# Patient Record
Sex: FEMALE | Race: BLACK OR AFRICAN AMERICAN | Employment: FULL TIME | URBAN - METROPOLITAN AREA
[De-identification: names, ages, dates, MRNs, and addresses within clinical notes are randomized per-mention and may not be internally consistent; named-entity substitution may affect disease eponyms.]

---

## 2017-01-14 ENCOUNTER — OFFICE VISIT (OUTPATIENT)
Dept: OPHTHALMOLOGY | Facility: CLINIC | Age: 57
End: 2017-01-14

## 2017-01-14 DIAGNOSIS — H40.1132 PRIMARY OPEN ANGLE GLAUCOMA OF BOTH EYES, MODERATE STAGE: Primary | ICD-10-CM

## 2017-01-14 DIAGNOSIS — H25.13 AGE-RELATED NUCLEAR CATARACT OF BOTH EYES: ICD-10-CM

## 2017-01-14 DIAGNOSIS — H43.393 FLOATER, VITREOUS, BILATERAL: ICD-10-CM

## 2017-01-14 PROCEDURE — 99212 OFFICE O/P EST SF 10 MIN: CPT | Performed by: OPHTHALMOLOGY

## 2017-01-14 PROCEDURE — 99243 OFF/OP CNSLTJ NEW/EST LOW 30: CPT | Performed by: OPHTHALMOLOGY

## 2017-01-14 PROCEDURE — 92250 FUNDUS PHOTOGRAPHY W/I&R: CPT | Performed by: OPHTHALMOLOGY

## 2017-01-14 PROCEDURE — 92015 DETERMINE REFRACTIVE STATE: CPT | Performed by: OPHTHALMOLOGY

## 2017-01-14 NOTE — PROGRESS NOTES
Nakul Granados is a 60year old female. HPI:     HPI     Consult    Additional comments: Patient is referred by Dr. Pat Duque   Patient is here for a complete eye exam and photos. She is taking Latanoprost OU qhs.   Patient states that s Smokeless Status: Never Used                        Alcohol Use: No                 Comment: None.        Medications:    Current Outpatient Prescriptions:  amoxicillin 875 MG Oral Tab Take 1 tablet (875 mg total) by mouth 2 (two) times da reaction(s): AZITHROMYCIN  Celecoxib                   Comment:Other reaction(s): CELECOXIB  Erythromycin            Pain  Sulfa Antibiotics           Comment:Other reaction(s): SULFA (SULFONAMIDE ANTIBIOTICS)    ROS:     ROS     Positive for: Cardiovascul Refraction (Auto)      Sphere Cylinder Axis Dist Add Near   Right -1.25 +1.00 130      Left -0.75 +0.50 180            Manifest Refraction #2      Sphere Cylinder Axis Dist Add Near   Right -1.25 +1.00 130 20/25 +2.50 20/20   Left -1.00 +0.50 180 20/20- +2

## 2017-01-14 NOTE — ASSESSMENT & PLAN NOTE
IOP is stable. Continue Latanoprost at bedtime in both eyes. Will have patient back in 4 months for a visual field, optic nerve scan and pressure check. Photos were taken today to document optic nerves.

## 2017-01-14 NOTE — PATIENT INSTRUCTIONS
Primary open angle glaucoma of both eyes  IOP is stable. Continue Latanoprost at bedtime in both eyes. Will have patient back in 4 months for a visual field, optic nerve scan and pressure check. Photos were taken today to document optic nerves.

## 2017-01-14 NOTE — ASSESSMENT & PLAN NOTE
Discussed early cataracts with patient. No treatment recommended at this time. New glasses today; update as needed. Discussed that new prescription is only a slight change.

## 2017-01-16 ENCOUNTER — TELEPHONE (OUTPATIENT)
Dept: INTERNAL MEDICINE CLINIC | Facility: CLINIC | Age: 57
End: 2017-01-16

## 2017-01-16 DIAGNOSIS — N28.9 RENAL LESION: Primary | ICD-10-CM

## 2017-02-08 ENCOUNTER — OFFICE VISIT (OUTPATIENT)
Dept: ORTHOPEDICS CLINIC | Facility: CLINIC | Age: 57
End: 2017-02-08

## 2017-02-08 DIAGNOSIS — M75.121 COMPLETE ROTATOR CUFF TEAR OR RUPTURE OF RIGHT SHOULDER, NOT SPECIFIED AS TRAUMATIC: Primary | ICD-10-CM

## 2017-02-08 PROCEDURE — 99213 OFFICE O/P EST LOW 20 MIN: CPT | Performed by: ORTHOPAEDIC SURGERY

## 2017-02-08 PROCEDURE — 99212 OFFICE O/P EST SF 10 MIN: CPT | Performed by: ORTHOPAEDIC SURGERY

## 2017-02-08 RX ORDER — DIAZEPAM 10 MG/1
10 TABLET ORAL ONCE AS NEEDED
Qty: 1 TABLET | Refills: 0 | Status: SHIPPED | OUTPATIENT
Start: 2017-02-08 | End: 2017-02-08

## 2017-02-08 RX ORDER — PREDNISONE 20 MG/1
20 TABLET ORAL DAILY
Qty: 5 TABLET | Refills: 0 | Status: SHIPPED | OUTPATIENT
Start: 2017-02-08 | End: 2017-02-13

## 2017-02-08 NOTE — PROGRESS NOTES
Chief Complaint: Right shoulder pain, mild left shoulder pain    NURSING INTAKE COMMENTS: Patient presents with:  Shoulder Pain: right -- rates pain 7/10 at this time. Pain is interfering with sleep. States she does have numbness and tingling in BUE.  Right Hypertension Mother    • Hypertension Sister    • Hypertension Brother    • Diabetes Neg    • Glaucoma Neg    • Macular degeneration Neg         Smoking Status: Never Smoker                      Smokeless Status: Never Used                        Alcohol U

## 2017-02-18 ENCOUNTER — HOSPITAL ENCOUNTER (OUTPATIENT)
Dept: MRI IMAGING | Age: 57
Discharge: HOME | End: 2017-02-18
Attending: ORTHOPAEDIC SURGERY
Payer: COMMERCIAL

## 2017-02-18 DIAGNOSIS — M75.121 COMPLETE ROTATOR CUFF TEAR OR RUPTURE OF RIGHT SHOULDER, NOT SPECIFIED AS TRAUMATIC: ICD-10-CM

## 2017-02-18 PROCEDURE — 73221 MRI JOINT UPR EXTREM W/O DYE: CPT

## 2017-03-22 ENCOUNTER — OFFICE VISIT (OUTPATIENT)
Dept: ORTHOPEDICS CLINIC | Facility: CLINIC | Age: 57
End: 2017-03-22

## 2017-03-22 DIAGNOSIS — M75.111 INCOMPLETE TEAR OF RIGHT ROTATOR CUFF: Primary | ICD-10-CM

## 2017-03-22 PROCEDURE — 99213 OFFICE O/P EST LOW 20 MIN: CPT | Performed by: ORTHOPAEDIC SURGERY

## 2017-03-22 PROCEDURE — 99212 OFFICE O/P EST SF 10 MIN: CPT | Performed by: ORTHOPAEDIC SURGERY

## 2017-03-22 RX ORDER — PREDNISONE 20 MG/1
TABLET ORAL
Refills: 0 | COMMUNITY
Start: 2017-02-08 | End: 2017-04-13 | Stop reason: ALTCHOICE

## 2017-03-22 RX ORDER — DIAZEPAM 10 MG/1
TABLET ORAL
Refills: 0 | COMMUNITY
Start: 2017-02-08 | End: 2017-03-22 | Stop reason: ALTCHOICE

## 2017-03-22 NOTE — PROGRESS NOTES
Patient returns for follow-up on her right shoulder. She recently had an MRI after failing reasonable conservative management.     I reviewed the MRI results with her in detail and it shows several areas of high-grade partial tearing of the supraspinatus a

## 2017-04-06 ENCOUNTER — TELEPHONE (OUTPATIENT)
Dept: INTERNAL MEDICINE CLINIC | Facility: CLINIC | Age: 57
End: 2017-04-06

## 2017-04-06 ENCOUNTER — TELEPHONE (OUTPATIENT)
Dept: OPHTHALMOLOGY | Facility: CLINIC | Age: 57
End: 2017-04-06

## 2017-04-06 DIAGNOSIS — H00.19 CHALAZION, UNSPECIFIED LATERALITY: Primary | ICD-10-CM

## 2017-04-06 NOTE — TELEPHONE ENCOUNTER
Actions Requested: expedited evening appt  Situation/Background   Problem: knot on top of right eye lid   Onset: > 2weeks   Associated Symptoms: tender to touch, slight drainage, film cover over eyes at time, denies blurred vision, H/A, afebrile, denies ph

## 2017-04-06 NOTE — TELEPHONE ENCOUNTER
\"Knot\" under right eyelid for 2 weeks with mucous. Tried warm compresses, but no improvement.  Appointment scheduled on 3/8/17 at 10:45/kp

## 2017-04-06 NOTE — TELEPHONE ENCOUNTER
Pt states she has a knot on her R eye, thought it was a stye but has not gone away in the past two weeks, pt is concerned, requesting appt for Saturday 4/8. Pls call thank you.

## 2017-04-06 NOTE — TELEPHONE ENCOUNTER
Pt states has knot on eye, thought was stye   Requesting appt with Dr Antoinette Zarate on Sat- no slots    Call transferred Fe/ROWDY

## 2017-04-08 ENCOUNTER — OFFICE VISIT (OUTPATIENT)
Dept: OPHTHALMOLOGY | Facility: CLINIC | Age: 57
End: 2017-04-08

## 2017-04-08 ENCOUNTER — HOSPITAL ENCOUNTER (OUTPATIENT)
Dept: ULTRASOUND IMAGING | Facility: HOSPITAL | Age: 57
Discharge: HOME | End: 2017-04-08
Attending: UROLOGY
Payer: COMMERCIAL

## 2017-04-08 DIAGNOSIS — Q61.02 MULTIPLE RENAL CYSTS: ICD-10-CM

## 2017-04-08 DIAGNOSIS — H00.11 CHALAZION OF RIGHT UPPER EYELID: Primary | ICD-10-CM

## 2017-04-08 DIAGNOSIS — H00.14 CHALAZION LEFT UPPER EYELID: ICD-10-CM

## 2017-04-08 PROCEDURE — 76770 US EXAM ABDO BACK WALL COMP: CPT

## 2017-04-08 PROCEDURE — 99242 OFF/OP CONSLTJ NEW/EST SF 20: CPT | Performed by: OPHTHALMOLOGY

## 2017-04-08 PROCEDURE — 99212 OFFICE O/P EST SF 10 MIN: CPT | Performed by: OPHTHALMOLOGY

## 2017-04-08 NOTE — ASSESSMENT & PLAN NOTE
Recommend aggressive warm compresses; he should use them 20-30 times per day. Information on chalazia given. Told patient that it is not big enough to excise at this time.   Discussed with patient that since is still in the early stages, it may be able t

## 2017-04-08 NOTE — PATIENT INSTRUCTIONS
Chalazion of right upper eyelid  Recommend aggressive warm compresses; he should use them 20-30 times per day. Information on chalazia given. Told patient that it is not big enough to excise at this time.   Discussed with patient that since is still in t 15 minutes, 3 to 4 times a day. This will reduce the swelling and soften the hardened oils blocking the duct. · Massage the area gently after applying the warm compress to help drain the chalazion.  Or follow the directions given by your healthcare provide instructions.

## 2017-04-08 NOTE — PROGRESS NOTES
Ciera Arguello is a 60year old female. HPI:     HPI     Consult    Additional comments: Consult per Dr. Milan Coronel           Comments   Pt complains of a \"knot\" on her RUL nasally with swelling x 2 weeks. She says it gets better and worse.   Pt has been Neg    • Macular degeneration Neg        Social History:   Smoking Status: Never Smoker                      Smokeless Status: Never Used                        Alcohol Use: No                 Comment: None.        Medications:    Current Outpatient Prescri CELECOXIB  Erythromycin            Pain  Sulfa Antibiotics           Comment:Other reaction(s): SULFA (SULFONAMIDE ANTIBIOTICS)    ROS:     ROS     Positive for: Eyes    Negative for: Constitutional, Gastrointestinal, Neurological, Skin, Genitourinary, Mus This Visit:    No prescriptions requested or ordered in this encounter     Follow up instructions:  Return in about 5 weeks (around 5/13/2017) for Visual field, OCT, IOP check (has appointment scheduled) .     4/8/2017  Scribed by: Sherri Bustamante MD

## 2017-04-08 NOTE — TELEPHONE ENCOUNTER
Completed an order for the patient to see ophthalmology. Please provide the patient the phone number so she can call for appointment. Call the patient and relay the information.  Thanks

## 2017-04-13 ENCOUNTER — OFFICE VISIT (OUTPATIENT)
Dept: INTERNAL MEDICINE CLINIC | Facility: CLINIC | Age: 57
End: 2017-04-13

## 2017-04-13 VITALS
WEIGHT: 202.81 LBS | DIASTOLIC BLOOD PRESSURE: 83 MMHG | HEART RATE: 73 BPM | TEMPERATURE: 98 F | SYSTOLIC BLOOD PRESSURE: 129 MMHG | BODY MASS INDEX: 30 KG/M2

## 2017-04-13 DIAGNOSIS — K21.9 GASTROESOPHAGEAL REFLUX DISEASE, ESOPHAGITIS PRESENCE NOT SPECIFIED: Primary | ICD-10-CM

## 2017-04-13 DIAGNOSIS — Z09 FOLLOW UP: ICD-10-CM

## 2017-04-13 DIAGNOSIS — I10 ESSENTIAL HYPERTENSION: ICD-10-CM

## 2017-04-13 DIAGNOSIS — H00.021 HORDEOLUM INTERNUM OF RIGHT UPPER EYELID: ICD-10-CM

## 2017-04-13 PROCEDURE — 99212 OFFICE O/P EST SF 10 MIN: CPT | Performed by: INTERNAL MEDICINE

## 2017-04-13 PROCEDURE — 99214 OFFICE O/P EST MOD 30 MIN: CPT | Performed by: INTERNAL MEDICINE

## 2017-04-13 RX ORDER — PANTOPRAZOLE SODIUM 40 MG/1
40 TABLET, DELAYED RELEASE ORAL
Qty: 90 TABLET | Refills: 3 | Status: SHIPPED | OUTPATIENT
Start: 2017-04-13 | End: 2018-05-21

## 2017-04-13 RX ORDER — AMLODIPINE BESYLATE 5 MG/1
TABLET ORAL
Qty: 90 TABLET | Refills: 1 | Status: SHIPPED | OUTPATIENT
Start: 2017-04-13 | End: 2017-09-05

## 2017-04-13 RX ORDER — AMITRIPTYLINE HYDROCHLORIDE 25 MG/1
TABLET, FILM COATED ORAL
Qty: 90 TABLET | Refills: 1 | Status: SHIPPED | OUTPATIENT
Start: 2017-04-13 | End: 2017-04-13

## 2017-04-13 RX ORDER — TOBRAMYCIN AND DEXAMETHASONE 3; 1 MG/ML; MG/ML
2 SUSPENSION/ DROPS OPHTHALMIC
Qty: 2.5 ML | Refills: 0 | Status: SHIPPED | OUTPATIENT
Start: 2017-04-13 | End: 2017-04-27 | Stop reason: ALTCHOICE

## 2017-04-13 NOTE — PROGRESS NOTES
HPI:    Patient ID: Manuel Dean is a 60year old female. Hypertension  This is a chronic problem. The current episode started more than 1 year ago. Condition status: stable.  Pertinent negatives include no anxiety, chest pain, peripheral edema or [facility] MAJOR [facility] W/O US      COLONOSCOPY  2009    OTHER SURGICAL HISTORY  2005,2007,2008    Comment LAPAROSCOPIC LYSIS OF ADHESION    OTHER SURGICAL HISTORY      Comment right foot bunionectomy    OTHER SURGICAL NIGHT Disp: 3 Bottle Rfl: 3   gabapentin 300 MG Oral Cap  Disp:  Rfl: 0   methocarbamol (ROBAXIN-750) 750 MG Oral Tab One to two four times a day as needed for muscle relaxation Disp: 40 tablet Rfl: 3   nystatin (MYCOSTATIN) 001718 UNIT/GM External Ointmen continue present medications : Amlodipine Besylate 5 mg.    3. (H00.021) Hordeolum internum of right upper eyelid  On physical exam, patient had an internal hordeolum of the right eye, upper lid.    Plan: I prescribed tobramycin-dexamethasone (TOBRADEX) 0.3 reflects my personal performance and is accurate and complete.   Livan Benavides MD, 4/13/2017, 6:25 PM

## 2017-04-18 ENCOUNTER — TELEPHONE (OUTPATIENT)
Dept: SURGERY | Facility: CLINIC | Age: 57
End: 2017-04-18

## 2017-04-18 ENCOUNTER — APPOINTMENT (OUTPATIENT)
Dept: LAB | Age: 57
End: 2017-04-18
Attending: UROLOGY
Payer: COMMERCIAL

## 2017-04-18 DIAGNOSIS — N39.0 URINARY TRACT INFECTION, SITE UNSPECIFIED: ICD-10-CM

## 2017-04-18 DIAGNOSIS — R31.9 HEMATURIA: ICD-10-CM

## 2017-04-18 DIAGNOSIS — R31.9 HEMATURIA: Primary | ICD-10-CM

## 2017-04-18 PROCEDURE — 81001 URINALYSIS AUTO W/SCOPE: CPT

## 2017-04-18 PROCEDURE — 87086 URINE CULTURE/COLONY COUNT: CPT

## 2017-04-18 NOTE — TELEPHONE ENCOUNTER
Advised patient of Dr. Magda Bella note. Patient verbalized understanding and stated that dropped off urine at the lab around 2:45pm today. I see the urine culture in progress ordered by Dr Luis Alberto Davis, but do not see the urinalysis as pending.       Called refere

## 2017-04-18 NOTE — TELEPHONE ENCOUNTER
See below. Returned pt's call and spoke with her. She states she is having some blood in her urine with some small clots and describes \"mild\"  burning with urination, urgency and frequency. States urine is bright red. Denies fever.  Advised her on submit

## 2017-04-18 NOTE — TELEPHONE ENCOUNTER
I called the patient on her cell phone but there was no answer. I left a message on the patient's voicemail that the order for urinalysis and urine culture has been completed and tomorrow or later today I will have the urinalysis available.   The urine cul

## 2017-04-18 NOTE — TELEPHONE ENCOUNTER
Patient states she is experiencing discomfort  And blood in urine since this morning. States there are some blood clots as well. Wanted to see Dr Zaina Bean or another Urologist today.  Let her know Dr Zaina Bean is in surgery for the rest of the day and unfortunat

## 2017-04-23 ENCOUNTER — APPOINTMENT (OUTPATIENT)
Dept: GENERAL RADIOLOGY | Facility: HOSPITAL | Age: 57
End: 2017-04-23
Attending: EMERGENCY MEDICINE
Payer: COMMERCIAL

## 2017-04-23 ENCOUNTER — APPOINTMENT (OUTPATIENT)
Dept: CT IMAGING | Facility: HOSPITAL | Age: 57
End: 2017-04-23
Attending: EMERGENCY MEDICINE
Payer: COMMERCIAL

## 2017-04-23 ENCOUNTER — HOSPITAL ENCOUNTER (EMERGENCY)
Facility: HOSPITAL | Age: 57
Discharge: HOME | End: 2017-04-23
Attending: EMERGENCY MEDICINE
Payer: COMMERCIAL

## 2017-04-23 VITALS
SYSTOLIC BLOOD PRESSURE: 137 MMHG | OXYGEN SATURATION: 99 % | DIASTOLIC BLOOD PRESSURE: 70 MMHG | HEIGHT: 68 IN | WEIGHT: 203 LBS | BODY MASS INDEX: 30.77 KG/M2 | TEMPERATURE: 98 F | HEART RATE: 78 BPM | RESPIRATION RATE: 16 BRPM

## 2017-04-23 DIAGNOSIS — R31.9 HEMATURIA: Primary | ICD-10-CM

## 2017-04-23 PROCEDURE — 85025 COMPLETE CBC W/AUTO DIFF WBC: CPT | Performed by: EMERGENCY MEDICINE

## 2017-04-23 PROCEDURE — 74176 CT ABD & PELVIS W/O CONTRAST: CPT

## 2017-04-23 PROCEDURE — 99284 EMERGENCY DEPT VISIT MOD MDM: CPT

## 2017-04-23 PROCEDURE — 81001 URINALYSIS AUTO W/SCOPE: CPT

## 2017-04-23 PROCEDURE — 96374 THER/PROPH/DIAG INJ IV PUSH: CPT

## 2017-04-23 PROCEDURE — 74000 XR ABDOMEN (KUB) (1 AP VIEW)  (CPT=74000): CPT

## 2017-04-23 PROCEDURE — 80048 BASIC METABOLIC PNL TOTAL CA: CPT | Performed by: EMERGENCY MEDICINE

## 2017-04-23 PROCEDURE — 87086 URINE CULTURE/COLONY COUNT: CPT

## 2017-04-23 PROCEDURE — 88108 CYTOPATH CONCENTRATE TECH: CPT | Performed by: EMERGENCY MEDICINE

## 2017-04-23 RX ORDER — HYDROMORPHONE HYDROCHLORIDE 1 MG/ML
0.5 INJECTION, SOLUTION INTRAMUSCULAR; INTRAVENOUS; SUBCUTANEOUS ONCE
Status: COMPLETED | OUTPATIENT
Start: 2017-04-23 | End: 2017-04-23

## 2017-04-23 RX ORDER — CIPROFLOXACIN 500 MG/1
500 TABLET, FILM COATED ORAL 2 TIMES DAILY
Qty: 14 TABLET | Refills: 0 | Status: SHIPPED | OUTPATIENT
Start: 2017-04-23 | End: 2017-04-27

## 2017-04-23 RX ORDER — TRAMADOL HYDROCHLORIDE 50 MG/1
TABLET ORAL EVERY 4 HOURS PRN
Qty: 15 TABLET | Refills: 0 | Status: SHIPPED | OUTPATIENT
Start: 2017-04-23 | End: 2017-09-16

## 2017-04-23 NOTE — ED PROVIDER NOTES
Patient Seen in: [facility] AND [facility] Emergency Department    History   Patient presents with:  Urinary Symptoms (urologic)    Stated Complaint: urinary complaint    HPI    66-year-old female with history of renal cysts which are followed by yearly imaging, PERFORMED  5/2015    COLONOSCOPY N/A 11/11/2016    Comment Procedure: COLONOSCOPY;  Surgeon: Endy Lambert MD;  Location: [facility] ENDOSCOPY       Medications :   Ciprofloxacin HCl 500 MG Oral Tab,  Take 1 tablet (500 mg total) by mouth 2 (two) ti as noted in HPI. Constitutional and vital signs reviewed. All other systems reviewed and negative except as noted above. PSFH elements reviewed from today and agreed except as otherwise stated in HPI.     Physical Exam       ED Triage Vitals   BP 0 within normal limits   CBC W/ DIFFERENTIAL - Abnormal; Notable for the following:     MCH 26.6 (*)     MPV 7.0 (*)     All other components within normal limits   CBC WITH DIFFERENTIAL WITH PLATELET    Narrative:      The following orders were created for p

## 2017-04-25 ENCOUNTER — TELEPHONE (OUTPATIENT)
Dept: INTERNAL MEDICINE CLINIC | Facility: CLINIC | Age: 57
End: 2017-04-25

## 2017-04-25 DIAGNOSIS — R19.7 DIARRHEA, UNSPECIFIED TYPE: Primary | ICD-10-CM

## 2017-04-25 NOTE — TELEPHONE ENCOUNTER
Urine culture was negative so far in 24 hours. Please call the patient and and tell her to stop the ciprofloxacin. I also put an order for C. difficile in the stool which is an infection the patient can get by taking antibiotics.  Call the patient and rel

## 2017-04-25 NOTE — TELEPHONE ENCOUNTER
Patient was called. LMTCB regarding current symptoms. Please transfer call to Triage RN at ext. [phone]. Thank you.

## 2017-04-25 NOTE — TELEPHONE ENCOUNTER
Per pt, she was in [facility] ER 04/23/2017 for stomach prob and they give her rx med Cipro but it makes her sick Sx: dizzy, light headed and diarrhea , pt made an appt with EG on Thursday but would like to know if she needs to continue to take this med?   Offered

## 2017-04-25 NOTE — TELEPHONE ENCOUNTER
Actions Requested: Tasked to Dr. Mau Cody for further advise and medical recommendations.    Situation/Background   Problem: Diarrhea and dizziness   Onset: Last night   Associated Symptoms:     History of Same:    Precipitated By:    Aggravating/Alleviating Fact

## 2017-04-27 ENCOUNTER — OFFICE VISIT (OUTPATIENT)
Dept: INTERNAL MEDICINE CLINIC | Facility: CLINIC | Age: 57
End: 2017-04-27

## 2017-04-27 ENCOUNTER — OFFICE VISIT (OUTPATIENT)
Dept: PODIATRY CLINIC | Facility: CLINIC | Age: 57
End: 2017-04-27

## 2017-04-27 VITALS — HEART RATE: 63 BPM | TEMPERATURE: 98 F | DIASTOLIC BLOOD PRESSURE: 83 MMHG | SYSTOLIC BLOOD PRESSURE: 146 MMHG

## 2017-04-27 DIAGNOSIS — M20.42 HAMMER TOE OF LEFT FOOT: Primary | ICD-10-CM

## 2017-04-27 DIAGNOSIS — N20.0 NEPHROLITHIASIS: Primary | ICD-10-CM

## 2017-04-27 PROCEDURE — 99213 OFFICE O/P EST LOW 20 MIN: CPT

## 2017-04-27 PROCEDURE — 99214 OFFICE O/P EST MOD 30 MIN: CPT | Performed by: INTERNAL MEDICINE

## 2017-04-27 PROCEDURE — 99212 OFFICE O/P EST SF 10 MIN: CPT | Performed by: INTERNAL MEDICINE

## 2017-04-27 RX ORDER — AMITRIPTYLINE HYDROCHLORIDE 25 MG/1
TABLET, FILM COATED ORAL
Refills: 0 | COMMUNITY
Start: 2017-04-13 | End: 2017-04-27

## 2017-04-27 NOTE — PROGRESS NOTES
HPI:    Patient ID: Jose Eduardo Vasques is a 60year old female. Foot Pain   The pain is present in the left foot and left toes. This is a chronic problem. The current episode started more than 1 year ago. There has been no history of extremity trauma.  The pr Comment right foot bunionectomy    OTHER SURGICAL HISTORY  11-14-14    Comment right superficial anterior peroneal nerve biopsy and right proximal thigh muscle biopsy.     UPPER GI ENDOSCOPY PERFORMED  5/2015    COLONOSCOPY N/A 11/11/2016    Comment Pr Ophthalmic Solution INSTILL 1 DROP INTO BOTH EYES EVERY NIGHT Disp: 3 Bottle Rfl: 3   gabapentin 300 MG Oral Cap  Disp:  Rfl: 0   methocarbamol (ROBAXIN-750) 750 MG Oral Tab One to two four times a day as needed for muscle relaxation Disp: 40 tablet Rfl: 3 Imaging & Referrals:  None       ID#[id number]    By signing my name below, Gavin Abraham,  attest that this documentation has been prepared under the direction and in the presence of Thor Tierney DPM.   Electronically Signed: Alanis Engle, 4/27/2017,

## 2017-04-27 NOTE — PROGRESS NOTES
HPI:    Patient ID: Manuel Dean is a 60year old female. The patient arrives for evaluation following a visit to [facility] ED on 04/23/2017 for a 6 day history of hematuria due to nephrolithiasis.    HPI    Review of Systems   Constitutional: Negative for fe Comment Procedure: COLONOSCOPY;  Surgeon: Christian Drake MD;  Location: [facility] ENDOSCOPY      Family History   Problem Relation Age of Onset   • Hypertension Father    • Hypertension Mother    • Hypertension Sister    • Hypertension Brother    • Erythromycin            Pain  Sulfa Antibiotics           Comment:Other reaction(s): SULFA (SULFONAMIDE ANTIBIOTICS)   PHYSICAL EXAM:   Physical Exam   Constitutional: She is oriented to person, place, and time. She appears well-developed. No distress.    H The patient arrives for evaluation following a visit to [address] [place] [address] ED on 04/23/2017 for a 6 day history of hematuria and suprapubic abdominal pain. PMHx includes renal cysts and diverticular disease.  Medications on discharge includes Ciprofloxacin HCl 500 MG BID

## 2017-05-08 ENCOUNTER — TELEPHONE (OUTPATIENT)
Dept: OPHTHALMOLOGY | Facility: CLINIC | Age: 57
End: 2017-05-08

## 2017-05-08 NOTE — TELEPHONE ENCOUNTER
Patient cannot make 5/13/17 appt. That appt was rescheduled, but 2 separate appts were made, one for the visual field and OCT with no MD and then another for the IOP with the doctor.

## 2017-05-11 ENCOUNTER — TELEPHONE (OUTPATIENT)
Dept: SURGERY | Facility: CLINIC | Age: 57
End: 2017-05-11

## 2017-05-11 ENCOUNTER — OFFICE VISIT (OUTPATIENT)
Dept: SURGERY | Facility: CLINIC | Age: 57
End: 2017-05-11

## 2017-05-11 VITALS
DIASTOLIC BLOOD PRESSURE: 83 MMHG | HEART RATE: 80 BPM | WEIGHT: 201 LBS | BODY MASS INDEX: 29.77 KG/M2 | RESPIRATION RATE: 16 BRPM | SYSTOLIC BLOOD PRESSURE: 135 MMHG | TEMPERATURE: 98 F | HEIGHT: 69 IN

## 2017-05-11 DIAGNOSIS — R31.9 HEMATURIA: Primary | ICD-10-CM

## 2017-05-11 DIAGNOSIS — R31.0 GROSS HEMATURIA: Primary | ICD-10-CM

## 2017-05-11 DIAGNOSIS — Q61.02 MULTIPLE RENAL CYSTS: ICD-10-CM

## 2017-05-11 PROCEDURE — 99212 OFFICE O/P EST SF 10 MIN: CPT | Performed by: UROLOGY

## 2017-05-11 PROCEDURE — 99214 OFFICE O/P EST MOD 30 MIN: CPT | Performed by: UROLOGY

## 2017-05-13 ENCOUNTER — HOSPITAL ENCOUNTER (OUTPATIENT)
Dept: CT IMAGING | Age: 57
Discharge: HOME | End: 2017-05-13
Attending: UROLOGY
Payer: COMMERCIAL

## 2017-05-13 DIAGNOSIS — R31.0 GROSS HEMATURIA: ICD-10-CM

## 2017-05-13 PROCEDURE — 82565 ASSAY OF CREATININE: CPT

## 2017-05-13 PROCEDURE — 74178 CT ABD&PLV WO CNTR FLWD CNTR: CPT | Performed by: UROLOGY

## 2017-05-27 ENCOUNTER — OFFICE VISIT (OUTPATIENT)
Dept: OPHTHALMOLOGY | Facility: CLINIC | Age: 57
End: 2017-05-27

## 2017-05-27 DIAGNOSIS — H40.1132 PRIMARY OPEN ANGLE GLAUCOMA OF BOTH EYES, MODERATE STAGE: ICD-10-CM

## 2017-05-27 PROCEDURE — 92133 CPTRZD OPH DX IMG PST SGM ON: CPT | Performed by: OPHTHALMOLOGY

## 2017-05-27 PROCEDURE — 92083 EXTENDED VISUAL FIELD XM: CPT | Performed by: OPHTHALMOLOGY

## 2017-05-29 NOTE — PROGRESS NOTES
Lei Tucker is a 60year old female. HPI:     HPI     Patient is here for a glaucoma evaluation with HVF and OCT, no M.D.        Last edited by Deon Quiñones on 5/27/2017  8:48 AM.     Patient History:  Past Medical History   Diagnosis Date   • Unspec tablets ( mg total) by mouth every 4 (four) hours as needed for Pain.  Disp: 15 tablet Rfl: 0   AmLODIPine Besylate 5 MG Oral Tab Take at bedtime Disp: 90 tablet Rfl: 1   Pantoprazole Sodium 40 MG Oral Tab EC Take 1 tablet (40 mg total) by mouth every check.    5/29/2017  Scribed by: Geo Turcios MD

## 2017-06-02 ENCOUNTER — OFFICE VISIT (OUTPATIENT)
Dept: SURGERY | Facility: CLINIC | Age: 57
End: 2017-06-02

## 2017-06-02 VITALS
HEIGHT: 69 IN | HEART RATE: 76 BPM | SYSTOLIC BLOOD PRESSURE: 136 MMHG | RESPIRATION RATE: 16 BRPM | TEMPERATURE: 98 F | BODY MASS INDEX: 29.77 KG/M2 | WEIGHT: 201 LBS | DIASTOLIC BLOOD PRESSURE: 76 MMHG

## 2017-06-02 DIAGNOSIS — Q61.02 MULTIPLE RENAL CYSTS: ICD-10-CM

## 2017-06-02 DIAGNOSIS — R31.0 GROSS HEMATURIA: Primary | ICD-10-CM

## 2017-06-02 PROCEDURE — 52000 CYSTOURETHROSCOPY: CPT | Performed by: UROLOGY

## 2017-06-02 PROCEDURE — 99213 OFFICE O/P EST LOW 20 MIN: CPT | Performed by: UROLOGY

## 2017-06-02 NOTE — H&P
Sam Reaves is a 60year old female. HPI:   Patient presents with:  Hematuria: Cystoscopy with Dr. Benjamin Lewis    23-year-old female presents for office cystoscopy in follow-up to a previous visit May 11, 2017.   The patient is being followed for a minimall foot bunionectomy    OTHER SURGICAL HISTORY  11-14-14    Comment right superficial anterior peroneal nerve biopsy and right proximal thigh muscle biopsy.     UPPER GI ENDOSCOPY PERFORMED  5/2015    COLONOSCOPY N/A 11/11/2016    Comment Procedure: Lyndel Prima 40 tablet Rfl: 3   nystatin (MYCOSTATIN) [id number] UNIT/GM External Ointment Apply to affeted area twice a day as needed.  Disp: 30 g Rfl: 5       Allergies:    Azithromycin                Comment:Other reaction(s): AZITHROMYCIN  Celecoxib                   Co

## 2017-06-10 ENCOUNTER — OFFICE VISIT (OUTPATIENT)
Dept: OPHTHALMOLOGY | Facility: CLINIC | Age: 57
End: 2017-06-10

## 2017-06-10 DIAGNOSIS — H00.11 CHALAZION OF RIGHT UPPER EYELID: ICD-10-CM

## 2017-06-10 DIAGNOSIS — H53.8 BLURRY VISION, BILATERAL: ICD-10-CM

## 2017-06-10 DIAGNOSIS — H40.1132 PRIMARY OPEN ANGLE GLAUCOMA OF BOTH EYES, MODERATE STAGE: Primary | ICD-10-CM

## 2017-06-10 PROBLEM — H00.14 CHALAZION LEFT UPPER EYELID: Status: RESOLVED | Noted: 2017-04-08 | Resolved: 2017-06-10

## 2017-06-10 PROCEDURE — 99213 OFFICE O/P EST LOW 20 MIN: CPT | Performed by: OPHTHALMOLOGY

## 2017-06-10 PROCEDURE — 99212 OFFICE O/P EST SF 10 MIN: CPT | Performed by: OPHTHALMOLOGY

## 2017-06-10 RX ORDER — LATANOPROST 50 UG/ML
SOLUTION/ DROPS OPHTHALMIC
Qty: 3 BOTTLE | Refills: 3 | Status: SHIPPED | OUTPATIENT
Start: 2017-06-10 | End: 2017-10-14

## 2017-06-10 NOTE — PATIENT INSTRUCTIONS
Primary open angle glaucoma of both eyes  IOP is stable. Continue Latanoprost at bedtime in both eyes. Will have patient back in 4 months for a pressure check.

## 2017-06-10 NOTE — ASSESSMENT & PLAN NOTE
Patient had new glasses from January 2017 filled, but she still complains of blurry vision. Refer patient to Dr. Bro Hair for a glasses check in the near future.

## 2017-06-10 NOTE — PROGRESS NOTES
Juliana Wyatt is a 60year old female. HPI:     HPI     Here for an IOP check; Pt has been using Latanoprost OU qhs as directed. Pt states that her vision is getting worse at distance or near with her glasses on.    Pt was seen in April 2017 for a maurice degeneration Neg        Social History:   Smoking Status: Never Smoker                      Smokeless Status: Never Used                        Alcohol Use: No                 Comment: None.        Medications:    Current Outpatient Prescriptions:  latanopr Musculoskeletal, HENT, Respiratory, Psychiatric, Allergic/Imm, Heme/Lymph    Last edited by Emilie Goldman O.T. on 6/10/2017  8:52 AM. (History)          PHYSICAL EXAM:     Base Eye Exam     Visual Acuity (Snellen - Linear)      Right Left   Dist cc 20 % Ophthalmic Solution 3 Bottle 3      Sig: INSTILL 1 DROP INTO BOTH EYES EVERY NIGHT            Follow up instructions:  Return in about 4 months (around 10/10/2017) for IOP check.     6/10/2017  Scribed by: Maritza Yates MD

## 2017-06-10 NOTE — ASSESSMENT & PLAN NOTE
IOP is stable. Continue Latanoprost at bedtime in both eyes. Will have patient back in 4 months for a pressure check.

## 2017-06-14 NOTE — LETTER
5/7/2019          To Whom It May Concern:    Sujatha Lu is currently under my medical care, she was seen in the office today and requires the same postoperative restrictions which is may bear weight 50% of the day.     If you require additional informati yes

## 2017-06-27 ENCOUNTER — OFFICE VISIT (OUTPATIENT)
Dept: OPHTHALMOLOGY | Facility: CLINIC | Age: 57
End: 2017-06-27

## 2017-06-27 ENCOUNTER — OFFICE VISIT (OUTPATIENT)
Dept: OPTOMETRY | Facility: CLINIC | Age: 57
End: 2017-06-27

## 2017-06-27 DIAGNOSIS — H00.11 CHALAZION OF RIGHT UPPER EYELID: Primary | ICD-10-CM

## 2017-06-27 DIAGNOSIS — H53.8 BLURRY VISION, BILATERAL: Primary | ICD-10-CM

## 2017-06-27 PROCEDURE — 67800 REMOVE EYELID LESION: CPT | Performed by: OPHTHALMOLOGY

## 2017-06-27 NOTE — PROGRESS NOTES
Miguel Escobar is a 60year old female. HPI:     HPI     Patient is here for a excision of chalazion of the RUL, nasally. Patient states that she has been using warm compresses to the RUL, but the chalazion still there and has not gone away.       Last e Smokeless tobacco: Never Used                      Alcohol use: No               Comment: None.        Medications:    Current Outpatient Prescriptions:  latanoprost 0.005 % Ophthalmic Solution INSTILL 1 Pachymetry (2/25/2015)       Right Left    Thickness 519/+1.5 523/+1          Pupils       Pupils    Right PERRL    Left PERRL                 ASSESSMENT/PLAN:     Diagnoses and Plan:     Chalazion of right upper eyelid  Chalazion of RUL, nasally excised i

## 2017-06-27 NOTE — PROGRESS NOTES
Bethany Jansen is a 60year old female. HPI:     HPI     Patient had an EE with [facility] on 1-14-17 and had a refraction and new glasses RX. Cannot see well with them far or near.  Had them made at [facility] and wants to make sure that they were made properl Smokeless tobacco: Never Used                      Alcohol use: No               Comment: None.        Medications:    Current Outpatient Prescriptions:  latanoprost 0.005 % Ophthalmic Solution INS Sphere Cylinder Axis Add    Right -1.25 +1.00 130 +2.50    Left -1.00 +0.50 180 +2.50    Type:  Progressive bifocal    OD axis off by 10 degrees and OS by 20 degrees. Progressive height is off.           Manifest Refraction       Sphere Cylinder Axis Dis

## 2017-06-27 NOTE — ASSESSMENT & PLAN NOTE
I gave patient a copy of current RX with a note for [facility] to check the axis ou and also the height of the progressive.   NO CHARGE GLASSES RECHECK FOR CHENCHO

## 2017-06-27 NOTE — PATIENT INSTRUCTIONS
Blurry vision, bilateral  I gave patient a copy of current RX with a note for [facility] to check the axis ou and also the height of the progressive.   NO CHARGE GLASSES RECHECK FOR [facility]

## 2017-06-27 NOTE — PROGRESS NOTES
Sanjuana Pabon is a 60year old female. HPI:     HPI     Patient had an EE with [facility] on 1-14-17 and had a refraction and new glasses RX. Cannot see well with them far or near.  Had them made at [facility] and wants to make sure that they were made properl Smokeless tobacco: Never Used                      Alcohol use: No               Comment: None.        Medications:    Current Outpatient Prescriptions:  latanoprost 0.005 % Ophthalmic Solution INS Sphere Cylinder Axis Add    Right -1.25 +1.00 130 +2.50    Left -1.00 +0.50 180 +2.50    Type:  Progressive bifocal    OD axis off by 10 degrees and OS by 20 degrees. Progressive height is off.           Manifest Refraction       Sphere Cylinder Axis Dis

## 2017-06-27 NOTE — PATIENT INSTRUCTIONS
Chalazion of right upper eyelid  Chalazion of RUL, nasally excised in office under local anesthesia. Erythromycin ophthalmic ointment was applied to excised area. Right eye was patched. Patient was told to remove the patch when he gets home.   Patient i

## 2017-08-04 ENCOUNTER — TELEPHONE (OUTPATIENT)
Dept: INTERNAL MEDICINE CLINIC | Facility: CLINIC | Age: 57
End: 2017-08-04

## 2017-08-04 NOTE — TELEPHONE ENCOUNTER
Actions Requested: Possible MD approval appt slot for Thursday 8/10 7:20 pm?  (Pt requesting evening time after 5 pm only)  Problem: swollen middle joint of left index finger  Onset and Timing: Monday  Associated Symptoms: slight redness  Aggravating by: n

## 2017-08-08 NOTE — TELEPHONE ENCOUNTER
Patient called back on status; was informed no available appt with DR REYES and still awaiting on approval.  She desired a late evening appt.  She was fine with seeing any available MD.    appt made WED 6:20pm with DR Geovanna Zayas for acute visit. swollen middle joe

## 2017-08-09 ENCOUNTER — OFFICE VISIT (OUTPATIENT)
Dept: INTERNAL MEDICINE CLINIC | Facility: CLINIC | Age: 57
End: 2017-08-09

## 2017-08-09 VITALS
WEIGHT: 206 LBS | HEART RATE: 80 BPM | HEIGHT: 69 IN | SYSTOLIC BLOOD PRESSURE: 146 MMHG | TEMPERATURE: 98 F | DIASTOLIC BLOOD PRESSURE: 79 MMHG | BODY MASS INDEX: 30.51 KG/M2

## 2017-08-09 DIAGNOSIS — M79.89 SWELLING OF RIGHT INDEX FINGER: Primary | ICD-10-CM

## 2017-08-09 PROCEDURE — 99213 OFFICE O/P EST LOW 20 MIN: CPT | Performed by: INTERNAL MEDICINE

## 2017-08-09 PROCEDURE — 99212 OFFICE O/P EST SF 10 MIN: CPT | Performed by: INTERNAL MEDICINE

## 2017-08-09 RX ORDER — IBUPROFEN 600 MG/1
600 TABLET ORAL EVERY 8 HOURS PRN
Qty: 30 TABLET | Refills: 0 | Status: SHIPPED | OUTPATIENT
Start: 2017-08-09 | End: 2017-09-05

## 2017-08-09 NOTE — PROGRESS NOTES
Juliana Wyatt is a 59year old female. Patient presents with:  Upper Extremity Injury (musculoskeletal): pt didnt not have an accident with her right finger, pt woke up a week ago with her fingers swollen.        HPI:   Patient is a 61-year-old woman comes 2.5 % Rectal Cream Place 1 Application rectally 2 (two) times daily.  Apply by topical route 2 times every day to the affected area(s) Disp: 3 Tube Rfl: 3   gabapentin 300 MG Oral Cap  Disp:  Rfl: 0   methocarbamol (ROBAXIN-750) 750 MG Oral Tab One to two f fatigue  VISION: No recent vision problems, blurry vision or double vision  HEENT: No decreased hearing ear pain nasal congestion or sore throat  SKIN: denies any unusual skin lesions or rashes  RESPIRATORY: denies shortness of breath, cough, wheezing  CAR

## 2017-08-09 NOTE — PATIENT INSTRUCTIONS
Tendonitis  A tendon is the thick fibrous cord that joins muscle to bone and allows joints to move. When a tendon becomes inflamed, it is called tendonitis. This can occur from overuse, injury, or infection.  This usually involves the shoulders, forearm, © 1696-4176 [address], [address] [place]. All rights reserved. This information is not intended as a substitute for professional medical care. Always follow your healthcare professional's instructions.

## 2017-08-15 ENCOUNTER — TELEPHONE (OUTPATIENT)
Dept: INTERNAL MEDICINE CLINIC | Facility: CLINIC | Age: 57
End: 2017-08-15

## 2017-08-15 DIAGNOSIS — L60.0 INGROWING TOENAIL: ICD-10-CM

## 2017-08-15 DIAGNOSIS — L84 CALLUS OF FOOT: Primary | ICD-10-CM

## 2017-08-15 NOTE — TELEPHONE ENCOUNTER
NEED REFERRAL FOR     DR Jessi CLARKE  RIGHT FOOT CALLUS AND INGROW TOENAIL  LEFT CALLUS AND INGROW TOENAIL PROBLEM LEFT FOOT SECOND TOE.       VISITS 3

## 2017-08-21 ENCOUNTER — NURSE TRIAGE (OUTPATIENT)
Dept: OTHER | Age: 57
End: 2017-08-21

## 2017-08-21 ENCOUNTER — OFFICE VISIT (OUTPATIENT)
Dept: FAMILY MEDICINE CLINIC | Facility: CLINIC | Age: 57
End: 2017-08-21

## 2017-08-21 VITALS
TEMPERATURE: 98 F | OXYGEN SATURATION: 98 % | WEIGHT: 200 LBS | RESPIRATION RATE: 20 BRPM | DIASTOLIC BLOOD PRESSURE: 78 MMHG | BODY MASS INDEX: 30 KG/M2 | SYSTOLIC BLOOD PRESSURE: 128 MMHG | HEART RATE: 71 BPM

## 2017-08-21 DIAGNOSIS — H66.92 LEFT ACUTE OTITIS MEDIA: ICD-10-CM

## 2017-08-21 DIAGNOSIS — R05.9 COUGH: ICD-10-CM

## 2017-08-21 DIAGNOSIS — J03.90 TONSILLITIS: ICD-10-CM

## 2017-08-21 DIAGNOSIS — J02.9 SORE THROAT: Primary | ICD-10-CM

## 2017-08-21 LAB
CONTROL LINE PRESENT WITH A CLEAR BACKGROUND (YES/NO): YES YES/NO
STREP GRP A CUL-SCR: NEGATIVE

## 2017-08-21 PROCEDURE — 87880 STREP A ASSAY W/OPTIC: CPT | Performed by: PHYSICIAN ASSISTANT

## 2017-08-21 PROCEDURE — 99203 OFFICE O/P NEW LOW 30 MIN: CPT | Performed by: PHYSICIAN ASSISTANT

## 2017-08-21 RX ORDER — BENZONATATE 200 MG/1
200 CAPSULE ORAL 3 TIMES DAILY PRN
Qty: 20 CAPSULE | Refills: 0 | Status: SHIPPED | OUTPATIENT
Start: 2017-08-21 | End: 2017-08-28

## 2017-08-21 RX ORDER — AMOXICILLIN AND CLAVULANATE POTASSIUM 875; 125 MG/1; MG/1
1 TABLET, FILM COATED ORAL 2 TIMES DAILY
Qty: 20 TABLET | Refills: 0 | Status: SHIPPED | OUTPATIENT
Start: 2017-08-21 | End: 2017-08-23 | Stop reason: ALTCHOICE

## 2017-08-21 NOTE — TELEPHONE ENCOUNTER
Advised IC since no appt available after 5 PM [place] downtown [place],advised home care  Reason for Disposition  • Patient wants to be seen    Protocols used: Manisha Thomas

## 2017-08-21 NOTE — TELEPHONE ENCOUNTER
Pt seeking Appt for cough/cold,sore throat since Friday,taking mucinex,ibuprofen,nyquil,Pt downtown at work and seeking appt after 5 PM unable to find  appt to accommodate her schedule,advised [phone] or IC,Pt agreed

## 2017-08-21 NOTE — PROGRESS NOTES
CHIEF COMPLAINT:   Patient presents with:  URI      HPI:   Sam Reaves is a 59year old female who presents for upper respiratory symptoms for  5 days. Sx began on Thursday, felt chills, sore throat on left side.   Cough started after sore throat, then e 625 MG Oral Tab Take 1 tablet (625 mg total) by mouth 2 (two) times daily. Disp: 60 tablet Rfl: 12   hydrocortisone 2.5 % Rectal Cream Place 1 Application rectally 2 (two) times daily.  Apply by topical route 2 times every day to the affected area(s) Disp: biopsy. 5/2015: UPPER GI ENDOSCOPY PERFORMED      Smoking status: Never Smoker                                                              Smokeless tobacco: Never Used                      Alcohol use: No               Comment: None.          REVIEW OF S (primary encounter diagnosis)  Cough  Left acute otitis media  Tonsillitis  Daryl Kunz was seen today for uri.     Diagnoses and all orders for this visit:    Sore throat  -     STREP A ASSAY W/OPTIC    Cough    Left acute otitis media    Tonsillitis    Other or be signs of more severe illness that require emergent evaluation and treatment. Otitis Media (Middle-Ear Infection) in Adults  Otitis media is another name for a middle-ear infection. It means an infection behind your eardrum.  This kind of ear infect tube or cause the area around it to swell. This can keep fluid from draining from the middle ear. The fluid builds up behind the eardrum. Bacteria and viruses can grow in this fluid. The bacteria and viruses cause the middle-ear infection.   What are the sy your symptoms don’t get better in 48 to 72 hours, contact your health care provider. Middle-ear infections can cause long-term problems if not treated.  They can lead to:  · Infection in other parts of the head  · Permanent hearing loss  · Paralysis of a n

## 2017-08-21 NOTE — PATIENT INSTRUCTIONS
augmentin twice a day to cover tonsillitis and ear infection. Take with food.   Ok to continue ibuprofen if needed  Tessalon cough capsules- take one three times a day if needed for cough    Florastor or Align (or generic ok) over the counter between doses away. You may feel like your middle ear is full. This can continue for months and may affect your hearing. · Chronic otitis media with effusion. Fluid (effusion) remains in the middle ear for a long time.  Or it builds up again and again, even though there your eardrum moves. If you eardrum doesn’t move well, it may mean you have fluid behind it. Your provider may also do a test called tympanometry. This test tells how well the middle ear is working. It can find any changes in pressure in the middle ear.  Clif Croft intended as a substitute for professional medical care. Always follow your healthcare professional's instructions.

## 2017-08-22 ENCOUNTER — TELEPHONE (OUTPATIENT)
Dept: FAMILY MEDICINE CLINIC | Facility: CLINIC | Age: 57
End: 2017-08-22

## 2017-08-22 ENCOUNTER — NURSE TRIAGE (OUTPATIENT)
Dept: OTHER | Age: 57
End: 2017-08-22

## 2017-08-22 DIAGNOSIS — R19.7 DIARRHEA, UNSPECIFIED TYPE: Primary | ICD-10-CM

## 2017-08-22 RX ORDER — CEFUROXIME AXETIL 250 MG/1
250 TABLET ORAL 2 TIMES DAILY
Qty: 25 TABLET | Refills: 0 | Status: SHIPPED | OUTPATIENT
Start: 2017-08-22 | End: 2017-09-01

## 2017-08-22 NOTE — TELEPHONE ENCOUNTER
Action Requested: Summary for Provider     []  Critical Lab, Recommendations Needed  [x] Need Additional Advice  []   FYI    []   Need Orders  [] Need Medications Sent to Pharmacy  []  Other     SUMMARY: advised to return to IC since she's downtown at work

## 2017-08-23 RX ORDER — FLUCONAZOLE 150 MG/1
150 TABLET ORAL ONCE
Qty: 1 TABLET | Refills: 0 | Status: CANCELLED | OUTPATIENT
Start: 2017-08-23 | End: 2017-08-23

## 2017-08-23 NOTE — TELEPHONE ENCOUNTER
Pt seen in IC and abx changed from augmentin to cefuroxime axetil 250 mg one tablet by mouth bid for 2 days then one tablet tid for 7 days. Instructed by IC MD to start this medication today.   Pt is prone to getting yeast infection when taking abx and will

## 2017-08-23 NOTE — TELEPHONE ENCOUNTER
[id number]   F/u call-addressed Pt returned

## 2017-08-24 ENCOUNTER — OFFICE VISIT (OUTPATIENT)
Dept: FAMILY MEDICINE CLINIC | Facility: CLINIC | Age: 57
End: 2017-08-24

## 2017-08-24 DIAGNOSIS — B37.9 ANTIBIOTIC-INDUCED YEAST INFECTION: ICD-10-CM

## 2017-08-24 DIAGNOSIS — T36.95XA ANTIBIOTIC-INDUCED YEAST INFECTION: ICD-10-CM

## 2017-08-24 DIAGNOSIS — Z02.9 ENCOUNTERS FOR ADMINISTRATIVE PURPOSE: Primary | ICD-10-CM

## 2017-08-24 RX ORDER — FLUCONAZOLE 150 MG/1
150 TABLET ORAL ONCE
Qty: 2 TABLET | Refills: 0 | Status: SHIPPED | OUTPATIENT
Start: 2017-08-24 | End: 2017-08-24

## 2017-08-24 NOTE — PROGRESS NOTES
Pt presents due to antibiotic associated yeast infection. Seen and tx for cough/sinus/sore throat. On Ceftin after GI side effects of Augmentin. No has vaginal itching and whitish discharge. States always gets yeast infection from abx.   She has done wel

## 2017-09-05 ENCOUNTER — OFFICE VISIT (OUTPATIENT)
Dept: INTERNAL MEDICINE CLINIC | Facility: CLINIC | Age: 57
End: 2017-09-05

## 2017-09-05 ENCOUNTER — TELEPHONE (OUTPATIENT)
Dept: INTERNAL MEDICINE CLINIC | Facility: CLINIC | Age: 57
End: 2017-09-05

## 2017-09-05 ENCOUNTER — OFFICE VISIT (OUTPATIENT)
Dept: PODIATRY CLINIC | Facility: CLINIC | Age: 57
End: 2017-09-05

## 2017-09-05 VITALS
SYSTOLIC BLOOD PRESSURE: 119 MMHG | WEIGHT: 196.63 LBS | HEART RATE: 86 BPM | BODY MASS INDEX: 29.12 KG/M2 | HEIGHT: 69 IN | TEMPERATURE: 98 F | DIASTOLIC BLOOD PRESSURE: 76 MMHG

## 2017-09-05 DIAGNOSIS — Q64.9 URINARY ANOMALY: ICD-10-CM

## 2017-09-05 DIAGNOSIS — Z12.39 BREAST CANCER SCREENING, HIGH RISK PATIENT: Primary | ICD-10-CM

## 2017-09-05 DIAGNOSIS — M20.42 HAMMER TOE OF LEFT FOOT: ICD-10-CM

## 2017-09-05 DIAGNOSIS — Z00.00 ANNUAL PHYSICAL EXAM: Primary | ICD-10-CM

## 2017-09-05 DIAGNOSIS — I10 ESSENTIAL HYPERTENSION: ICD-10-CM

## 2017-09-05 DIAGNOSIS — M79.672 LEFT FOOT PAIN: Primary | ICD-10-CM

## 2017-09-05 DIAGNOSIS — L60.0 INGROWN TOENAIL: ICD-10-CM

## 2017-09-05 PROCEDURE — 99212 OFFICE O/P EST SF 10 MIN: CPT | Performed by: PODIATRIST

## 2017-09-05 PROCEDURE — 99213 OFFICE O/P EST LOW 20 MIN: CPT | Performed by: PODIATRIST

## 2017-09-05 PROCEDURE — 99396 PREV VISIT EST AGE 40-64: CPT | Performed by: INTERNAL MEDICINE

## 2017-09-05 RX ORDER — AMLODIPINE BESYLATE 5 MG/1
TABLET ORAL
Qty: 90 TABLET | Refills: 2 | Status: SHIPPED | OUTPATIENT
Start: 2017-09-05 | End: 2018-03-21

## 2017-09-05 NOTE — PROGRESS NOTES
HPI:    Patient ID: Bethany Jansen is a 59year old female. HPI  This 38-year-old female presents to the office today having not been seen in about 9 months.   She is very very frustrated with chronic and continued pain associated with the left second toe day as needed for muscle relaxation Disp: 40 tablet Rfl: 3   nystatin (MYCOSTATIN) 087523 UNIT/GM External Ointment Apply to affeted area twice a day as needed.  Disp: 30 g Rfl: 5     Allergies:  Azithromycin            Pain    Comment:Other reaction(s): AZ

## 2017-09-05 NOTE — TELEPHONE ENCOUNTER
Please remind the patient to perform routinely breast examination while taking a shower at least once a month.   Also to have a breast examination by gynecologist or PCP at least once a year since not all the tumors in the breast may show in  a mammogram.Pl

## 2017-09-06 ENCOUNTER — TELEPHONE (OUTPATIENT)
Dept: OBGYN CLINIC | Facility: CLINIC | Age: 57
End: 2017-09-06

## 2017-09-06 DIAGNOSIS — Z12.31 SCREENING MAMMOGRAM, ENCOUNTER FOR: Primary | ICD-10-CM

## 2017-09-07 ENCOUNTER — LAB ENCOUNTER (OUTPATIENT)
Dept: LAB | Facility: HOSPITAL | Age: 57
End: 2017-09-07
Attending: INTERNAL MEDICINE
Payer: COMMERCIAL

## 2017-09-07 DIAGNOSIS — Z00.00 ANNUAL PHYSICAL EXAM: ICD-10-CM

## 2017-09-07 LAB
ALBUMIN SERPL BCP-MCNC: 3.8 G/DL (ref 3.5–4.8)
ALBUMIN/GLOB SERPL: 1.2 {RATIO} (ref 1–2)
ALP SERPL-CCNC: 109 U/L (ref 32–100)
ALT SERPL-CCNC: 21 U/L (ref 14–54)
ANION GAP SERPL CALC-SCNC: 8 MMOL/L (ref 0–18)
AST SERPL-CCNC: 18 U/L (ref 15–41)
BASOPHILS # BLD: 0 K/UL (ref 0–0.2)
BASOPHILS NFR BLD: 0 %
BILIRUB SERPL-MCNC: 0.6 MG/DL (ref 0.3–1.2)
BUN SERPL-MCNC: 15 MG/DL (ref 8–20)
BUN/CREAT SERPL: 18.3 (ref 10–20)
CALCIUM SERPL-MCNC: 9.1 MG/DL (ref 8.5–10.5)
CHLORIDE SERPL-SCNC: 104 MMOL/L (ref 95–110)
CHOLEST SERPL-MCNC: 233 MG/DL (ref 110–200)
CO2 SERPL-SCNC: 29 MMOL/L (ref 22–32)
CREAT SERPL-MCNC: 0.82 MG/DL (ref 0.5–1.5)
EOSINOPHIL # BLD: 0.1 K/UL (ref 0–0.7)
EOSINOPHIL NFR BLD: 1 %
ERYTHROCYTE [DISTWIDTH] IN BLOOD BY AUTOMATED COUNT: 14.6 % (ref 11–15)
GLOBULIN PLAS-MCNC: 3.3 G/DL (ref 2.5–3.7)
GLUCOSE SERPL-MCNC: 110 MG/DL (ref 70–99)
HCT VFR BLD AUTO: 42.8 % (ref 35–48)
HDLC SERPL-MCNC: 49 MG/DL
HGB BLD-MCNC: 14.1 G/DL (ref 12–16)
LDLC SERPL CALC-MCNC: 136 MG/DL (ref 0–99)
LYMPHOCYTES # BLD: 2.2 K/UL (ref 1–4)
LYMPHOCYTES NFR BLD: 28 %
MCH RBC QN AUTO: 26.8 PG (ref 27–32)
MCHC RBC AUTO-ENTMCNC: 33 G/DL (ref 32–37)
MCV RBC AUTO: 81.2 FL (ref 80–100)
MONOCYTES # BLD: 0.6 K/UL (ref 0–1)
MONOCYTES NFR BLD: 7 %
NEUTROPHILS # BLD AUTO: 5.1 K/UL (ref 1.8–7.7)
NEUTROPHILS NFR BLD: 64 %
NONHDLC SERPL-MCNC: 184 MG/DL
OSMOLALITY UR CALC.SUM OF ELEC: 293 MOSM/KG (ref 275–295)
PLATELET # BLD AUTO: 385 K/UL (ref 140–400)
PMV BLD AUTO: 7.7 FL (ref 7.4–10.3)
POTASSIUM SERPL-SCNC: 4 MMOL/L (ref 3.3–5.1)
PROT SERPL-MCNC: 7.1 G/DL (ref 5.9–8.4)
RBC # BLD AUTO: 5.27 M/UL (ref 3.7–5.4)
SODIUM SERPL-SCNC: 141 MMOL/L (ref 136–144)
TRIGL SERPL-MCNC: 242 MG/DL (ref 1–149)
TSH SERPL-ACNC: 1.78 UIU/ML (ref 0.45–5.33)
WBC # BLD AUTO: 7.9 K/UL (ref 4–11)

## 2017-09-07 PROCEDURE — 80061 LIPID PANEL: CPT

## 2017-09-07 PROCEDURE — 36415 COLL VENOUS BLD VENIPUNCTURE: CPT

## 2017-09-07 PROCEDURE — 82306 VITAMIN D 25 HYDROXY: CPT

## 2017-09-07 PROCEDURE — 85025 COMPLETE CBC W/AUTO DIFF WBC: CPT

## 2017-09-07 PROCEDURE — 84443 ASSAY THYROID STIM HORMONE: CPT

## 2017-09-07 PROCEDURE — 80053 COMPREHEN METABOLIC PANEL: CPT

## 2017-09-08 LAB — 25(OH)D3 SERPL-MCNC: 11.2 NG/ML

## 2017-09-16 ENCOUNTER — HOSPITAL ENCOUNTER (OUTPATIENT)
Dept: GENERAL RADIOLOGY | Facility: HOSPITAL | Age: 57
Discharge: HOME | End: 2017-09-16
Attending: INTERNAL MEDICINE
Payer: COMMERCIAL

## 2017-09-16 ENCOUNTER — APPOINTMENT (OUTPATIENT)
Dept: LAB | Facility: HOSPITAL | Age: 57
End: 2017-09-16
Attending: INTERNAL MEDICINE
Payer: COMMERCIAL

## 2017-09-16 ENCOUNTER — OFFICE VISIT (OUTPATIENT)
Dept: INTERNAL MEDICINE CLINIC | Facility: CLINIC | Age: 57
End: 2017-09-16

## 2017-09-16 VITALS
HEIGHT: 69 IN | RESPIRATION RATE: 16 BRPM | BODY MASS INDEX: 29.33 KG/M2 | TEMPERATURE: 98 F | WEIGHT: 198 LBS | DIASTOLIC BLOOD PRESSURE: 84 MMHG | SYSTOLIC BLOOD PRESSURE: 136 MMHG | HEART RATE: 70 BPM

## 2017-09-16 DIAGNOSIS — R05.3 CHRONIC COUGH: ICD-10-CM

## 2017-09-16 DIAGNOSIS — R05.3 CHRONIC COUGH: Primary | ICD-10-CM

## 2017-09-16 DIAGNOSIS — Z11.59 ENCOUNTER FOR HEPATITIS C SCREENING TEST FOR LOW RISK PATIENT: ICD-10-CM

## 2017-09-16 DIAGNOSIS — R73.9 HYPERGLYCEMIA: ICD-10-CM

## 2017-09-16 LAB
ALBUMIN SERPL BCP-MCNC: 4.1 G/DL (ref 3.5–4.8)
ALBUMIN/GLOB SERPL: 1.3 {RATIO} (ref 1–2)
ALP SERPL-CCNC: 115 U/L (ref 32–100)
ALT SERPL-CCNC: 23 U/L (ref 14–54)
ANION GAP SERPL CALC-SCNC: 9 MMOL/L (ref 0–18)
AST SERPL-CCNC: 21 U/L (ref 15–41)
BILIRUB SERPL-MCNC: 1.1 MG/DL (ref 0.3–1.2)
BUN SERPL-MCNC: 10 MG/DL (ref 8–20)
BUN/CREAT SERPL: 11 (ref 10–20)
CALCIUM SERPL-MCNC: 9.1 MG/DL (ref 8.5–10.5)
CHLORIDE SERPL-SCNC: 103 MMOL/L (ref 95–110)
CO2 SERPL-SCNC: 29 MMOL/L (ref 22–32)
CREAT SERPL-MCNC: 0.91 MG/DL (ref 0.5–1.5)
GLOBULIN PLAS-MCNC: 3.2 G/DL (ref 2.5–3.7)
GLUCOSE SERPL-MCNC: 112 MG/DL (ref 70–99)
HBA1C MFR BLD: 5.8 % (ref 4–6)
OSMOLALITY UR CALC.SUM OF ELEC: 292 MOSM/KG (ref 275–295)
POTASSIUM SERPL-SCNC: 3.7 MMOL/L (ref 3.3–5.1)
PROT SERPL-MCNC: 7.3 G/DL (ref 5.9–8.4)
SODIUM SERPL-SCNC: 141 MMOL/L (ref 136–144)

## 2017-09-16 PROCEDURE — 99212 OFFICE O/P EST SF 10 MIN: CPT | Performed by: INTERNAL MEDICINE

## 2017-09-16 PROCEDURE — 71020 XR CHEST PA + LAT CHEST (CPT=71020): CPT | Performed by: INTERNAL MEDICINE

## 2017-09-16 PROCEDURE — 36415 COLL VENOUS BLD VENIPUNCTURE: CPT

## 2017-09-16 PROCEDURE — 86803 HEPATITIS C AB TEST: CPT

## 2017-09-16 PROCEDURE — 99214 OFFICE O/P EST MOD 30 MIN: CPT | Performed by: INTERNAL MEDICINE

## 2017-09-16 PROCEDURE — 80053 COMPREHEN METABOLIC PANEL: CPT

## 2017-09-16 PROCEDURE — 83036 HEMOGLOBIN GLYCOSYLATED A1C: CPT

## 2017-09-16 NOTE — PROGRESS NOTES
HPI:    Patient ID: Lei Tucker is a 59year old female. Cough   This is a chronic problem. The current episode started more than 1 month ago. The problem has been unchanged. The cough is non-productive.  Pertinent negatives include no chest pain, chil HISTORY      Comment: right superficial anterior peroneal nerve                biopsy and right proximal thigh muscle biopsy.   5/2015: UPPER GI ENDOSCOPY PERFORMED   Family History   Problem Relation Age of Onset   • Hypertension Father    • Hypertension M Hearing, tympanic membrane, external ear and ear canal normal.   Left Ear: Hearing, tympanic membrane, external ear and ear canal normal.   Nose: Nose normal.   Mouth/Throat: Oropharynx is clear and moist and mucous membranes are normal.   Left frontal ten medical record entries made by the scribe were at my direction and in my presence. I have reviewed the chart and discharge instructions (if applicable) and agree that the record reflects my personal performance and is accurate and complete.   Carry Stalls,

## 2017-09-18 LAB — HCV AB SERPL QL IA: NONREACTIVE

## 2017-09-25 NOTE — TELEPHONE ENCOUNTER
Patient got her yeast infection concern addressed by going to an [facility] walk-in clinic [facility] JEANNINE) on 8/24/17, see notes in chart. Patient then saw Dr. Donn Palma for her annual physical as planned on 9/5/17.

## 2017-09-27 ENCOUNTER — TELEPHONE (OUTPATIENT)
Dept: OPHTHALMOLOGY | Facility: CLINIC | Age: 57
End: 2017-09-27

## 2017-09-27 NOTE — TELEPHONE ENCOUNTER
Apt booked Oct 14 at 8:00 AM. Pt needed an earlier apt on the same day. Radha Fuentes will put on the schedule.

## 2017-09-27 NOTE — TELEPHONE ENCOUNTER
pt will call back in 15 minutes. She wont have service for a little while  Where she is at.   Thank you

## 2017-09-29 ENCOUNTER — HOSPITAL ENCOUNTER (OUTPATIENT)
Dept: RESPIRATORY THERAPY | Facility: HOSPITAL | Age: 57
Discharge: HOME | End: 2017-09-29
Attending: INTERNAL MEDICINE
Payer: COMMERCIAL

## 2017-09-29 DIAGNOSIS — R05.3 CHRONIC COUGH: ICD-10-CM

## 2017-09-29 PROCEDURE — 94060 EVALUATION OF WHEEZING: CPT | Performed by: INTERNAL MEDICINE

## 2017-09-29 PROCEDURE — 94726 PLETHYSMOGRAPHY LUNG VOLUMES: CPT | Performed by: INTERNAL MEDICINE

## 2017-09-29 PROCEDURE — 94729 DIFFUSING CAPACITY: CPT | Performed by: INTERNAL MEDICINE

## 2017-10-01 ENCOUNTER — HOSPITAL ENCOUNTER (OUTPATIENT)
Age: 57
Discharge: HOME | End: 2017-10-01
Payer: COMMERCIAL

## 2017-10-01 VITALS
RESPIRATION RATE: 18 BRPM | TEMPERATURE: 98 F | HEART RATE: 81 BPM | OXYGEN SATURATION: 97 % | HEIGHT: 69 IN | BODY MASS INDEX: 29.47 KG/M2 | SYSTOLIC BLOOD PRESSURE: 130 MMHG | WEIGHT: 199 LBS | DIASTOLIC BLOOD PRESSURE: 75 MMHG

## 2017-10-01 DIAGNOSIS — N30.00 ACUTE CYSTITIS WITHOUT HEMATURIA: Primary | ICD-10-CM

## 2017-10-01 PROCEDURE — 81002 URINALYSIS NONAUTO W/O SCOPE: CPT

## 2017-10-01 PROCEDURE — 99213 OFFICE O/P EST LOW 20 MIN: CPT

## 2017-10-01 PROCEDURE — 99214 OFFICE O/P EST MOD 30 MIN: CPT

## 2017-10-01 RX ORDER — CIPROFLOXACIN 500 MG/1
500 TABLET, FILM COATED ORAL 2 TIMES DAILY
Qty: 14 TABLET | Refills: 0 | Status: SHIPPED | OUTPATIENT
Start: 2017-10-01 | End: 2017-10-08

## 2017-10-01 NOTE — ED INITIAL ASSESSMENT (HPI)
PATIENT ARRIVED AMBULATORY TO ROOM C/O SYMPTOMS OF A UTI THAT STARTED 4 DAYS AGO. +BURNING WITH URINATION. +MALODOROUS URINE. +LOWER ABDOMINAL \"PRESSURE\" INTERMITTENT. +LOWER BACK PAIN. NO FEVERS. NO N/V/D. NO HEMATURIA.

## 2017-10-01 NOTE — ED PROVIDER NOTES
Patient presents with:  Urinary Symptoms (urologic)      HPI:     Ana Herrera is a 59year old female who presents with a chief complaint of dysuria, urgency, and frequency for the past couple days. No hematuria. No vaginal symptoms or risk for STDs. rashes  HEENT:normocephalic, ears and throat are clear  EYES: sclera non icteric, conjunctiva non-injected  NECK: supple, no adenopathy  LUNGS: clear to auscultation, no RRW  CARDIO: RRR without murmur  EXTREMITIES: no cyanosis or edema.  VALENCIA without diffic days

## 2017-10-02 ENCOUNTER — TELEPHONE (OUTPATIENT)
Dept: OTHER | Age: 57
End: 2017-10-02

## 2017-10-02 RX ORDER — NITROFURANTOIN 25; 75 MG/1; MG/1
100 CAPSULE ORAL 2 TIMES DAILY
Qty: 14 CAPSULE | Refills: 0 | Status: SHIPPED | OUTPATIENT
Start: 2017-10-02 | End: 2017-10-09

## 2017-10-02 NOTE — TELEPHONE ENCOUNTER
Advised pt of Dr Gaudencio Canchola recommendations. Pt states she doesn't want to take probiotic, she would like a different antibiotic.   Advised pt alternative antibiotic may also cause stomach upset, pt states she still would like Dr Sofie Salgado to consider

## 2017-10-02 NOTE — TELEPHONE ENCOUNTER
Patient has multiple allergies to antibiotics so would be hard to find which  antibiotic would work for her infection without having allergies to antibiotic  Or SE    please advised patient to take probiotics  floristar  250    Twice  Daily  For  10 days

## 2017-10-02 NOTE — TELEPHONE ENCOUNTER
Noted   Can  Try   - Macrobid   -send  To  Pharmacy      Fluids  F/u  With pcp   -in 1  Week or sooner if any sy or concerns

## 2017-10-02 NOTE — TELEPHONE ENCOUNTER
Dr. Antonio Amlodovar (on call) for Dr Rea Umaña:  [facility] was [facility] (10/1/17). Was tx with cipro for cystitis. Patient c/o of stomach cramping and diarrhea with first day of taking Cipro. She denied rash, no sob.   Please advise if

## 2017-10-08 ENCOUNTER — HOSPITAL ENCOUNTER (OUTPATIENT)
Dept: MAMMOGRAPHY | Facility: HOSPITAL | Age: 57
Discharge: HOME | End: 2017-10-08
Attending: INTERNAL MEDICINE
Payer: COMMERCIAL

## 2017-10-08 DIAGNOSIS — Z12.39 BREAST CANCER SCREENING, HIGH RISK PATIENT: ICD-10-CM

## 2017-10-08 PROCEDURE — 77067 SCR MAMMO BI INCL CAD: CPT | Performed by: INTERNAL MEDICINE

## 2017-10-09 ENCOUNTER — MED REC SCAN ONLY (OUTPATIENT)
Dept: INTERNAL MEDICINE CLINIC | Facility: CLINIC | Age: 57
End: 2017-10-09

## 2017-10-09 ENCOUNTER — HOSPITAL ENCOUNTER (OUTPATIENT)
Dept: GENERAL RADIOLOGY | Facility: HOSPITAL | Age: 57
Discharge: HOME | End: 2017-10-09
Attending: ORTHOPAEDIC SURGERY
Payer: COMMERCIAL

## 2017-10-09 ENCOUNTER — IMMUNIZATION (OUTPATIENT)
Dept: INTERNAL MEDICINE CLINIC | Facility: CLINIC | Age: 57
End: 2017-10-09

## 2017-10-09 ENCOUNTER — OFFICE VISIT (OUTPATIENT)
Dept: ORTHOPEDICS CLINIC | Facility: CLINIC | Age: 57
End: 2017-10-09

## 2017-10-09 VITALS — DIASTOLIC BLOOD PRESSURE: 84 MMHG | SYSTOLIC BLOOD PRESSURE: 122 MMHG | HEART RATE: 84 BPM | RESPIRATION RATE: 16 BRPM

## 2017-10-09 DIAGNOSIS — M25.561 ACUTE PAIN OF BOTH KNEES: ICD-10-CM

## 2017-10-09 DIAGNOSIS — M17.12 PRIMARY LOCALIZED OSTEOARTHROSIS OF LEFT LOWER LEG: ICD-10-CM

## 2017-10-09 DIAGNOSIS — M25.562 ACUTE PAIN OF BOTH KNEES: ICD-10-CM

## 2017-10-09 DIAGNOSIS — Z23 NEED FOR VACCINATION: ICD-10-CM

## 2017-10-09 DIAGNOSIS — M17.11 PRIMARY LOCALIZED OSTEOARTHROSIS OF RIGHT LOWER LEG: ICD-10-CM

## 2017-10-09 DIAGNOSIS — M25.561 ACUTE PAIN OF BOTH KNEES: Primary | ICD-10-CM

## 2017-10-09 DIAGNOSIS — M25.562 ACUTE PAIN OF BOTH KNEES: Primary | ICD-10-CM

## 2017-10-09 PROCEDURE — 73565 X-RAY EXAM OF KNEES: CPT | Performed by: ORTHOPAEDIC SURGERY

## 2017-10-09 PROCEDURE — 90471 IMMUNIZATION ADMIN: CPT | Performed by: INTERNAL MEDICINE

## 2017-10-09 PROCEDURE — 90686 IIV4 VACC NO PRSV 0.5 ML IM: CPT | Performed by: INTERNAL MEDICINE

## 2017-10-09 PROCEDURE — 20610 DRAIN/INJ JOINT/BURSA W/O US: CPT | Performed by: ORTHOPAEDIC SURGERY

## 2017-10-09 PROCEDURE — 73560 X-RAY EXAM OF KNEE 1 OR 2: CPT | Performed by: ORTHOPAEDIC SURGERY

## 2017-10-09 NOTE — ADDENDUM NOTE
Encounter addended by: Laurance Goodpasture, MD on: 10/9/2017  1:32 PM<BR>    Actions taken: Sign clinical note, Charge Capture section accepted

## 2017-10-09 NOTE — PROCEDURES
[facility]    Patient's Name Lei Tucker MRN [id number]    1960 Pulmonologist Stanton Cadet MD   Location [facility] PCP Miracle Greenberg MD     IMPRESSION:    The PFTs are Abnormal.    There is no airway o

## 2017-10-09 NOTE — PROGRESS NOTES
Per verbal order from VT, draw up 3ml of Kenalog 10 and 3ml of 1% lidocaine for cortisone injection to bilateral knee.  Antonina Reed

## 2017-10-09 NOTE — PROGRESS NOTES
Patient presents for bilateral knee pain and for evaluation of her continued right shoulder pain. Her shoulder did get better after last saw her,  by has started to increase in pain over the past 4 weeks.   She will observe but if it continues to be proble

## 2017-10-14 ENCOUNTER — OFFICE VISIT (OUTPATIENT)
Dept: OPHTHALMOLOGY | Facility: CLINIC | Age: 57
End: 2017-10-14

## 2017-10-14 DIAGNOSIS — H40.1132 PRIMARY OPEN ANGLE GLAUCOMA OF BOTH EYES, MODERATE STAGE: Primary | ICD-10-CM

## 2017-10-14 PROCEDURE — 99213 OFFICE O/P EST LOW 20 MIN: CPT | Performed by: OPHTHALMOLOGY

## 2017-10-14 PROCEDURE — 99212 OFFICE O/P EST SF 10 MIN: CPT | Performed by: OPHTHALMOLOGY

## 2017-10-14 RX ORDER — LATANOPROST 50 UG/ML
SOLUTION/ DROPS OPHTHALMIC
Qty: 3 BOTTLE | Refills: 3 | Status: SHIPPED | OUTPATIENT
Start: 2017-10-14 | End: 2018-08-06 | Stop reason: ALTCHOICE

## 2017-10-14 NOTE — PROGRESS NOTES
Fredis Bae is a 59year old female. HPI:     HPI     Consult    Additional comments: Per Dr. Karime Keita            Comments   Here for an IOP check. Pt has been using Latanoprost OU qhs as directed. Pt has not ocular complaints.         Last edited by Nataliia Lazaro Smokeless tobacco: Never Used                      Alcohol use: No               Comment: None.        Medications:    Current Outpatient Prescriptions:  latanoprost 0.005 % Ophthalmic Solution INSTILL 1 DROP INTO BOT 523/+1          Pupils       Pupils    Right PERRL    Left PERRL            Slit Lamp and Fundus Exam     External Exam       Right Left    External Normal Normal          Slit Lamp Exam       Right Left    Lids/Lashes Dermatochalasis, Meibomian gland dysf

## 2017-10-14 NOTE — PATIENT INSTRUCTIONS
Primary open angle glaucoma of both eyes  IOP is stable. Continue Latanoprost at bedtime in both eyes.     Will have patient back in 4 months for a dilated eye exam.

## 2017-10-14 NOTE — ASSESSMENT & PLAN NOTE
IOP is stable. Continue Latanoprost at bedtime in both eyes.     Will have patient back in 4 months for a dilated eye exam.

## 2017-10-28 ENCOUNTER — OFFICE VISIT (OUTPATIENT)
Dept: INTERNAL MEDICINE CLINIC | Facility: CLINIC | Age: 57
End: 2017-10-28

## 2017-10-28 ENCOUNTER — APPOINTMENT (OUTPATIENT)
Dept: LAB | Age: 57
End: 2017-10-28
Attending: INTERNAL MEDICINE
Payer: COMMERCIAL

## 2017-10-28 VITALS
TEMPERATURE: 98 F | SYSTOLIC BLOOD PRESSURE: 128 MMHG | BODY MASS INDEX: 28.88 KG/M2 | WEIGHT: 195 LBS | DIASTOLIC BLOOD PRESSURE: 82 MMHG | HEIGHT: 69 IN | HEART RATE: 72 BPM

## 2017-10-28 DIAGNOSIS — M60.9 MYOSITIS, UNSPECIFIED MYOSITIS TYPE, UNSPECIFIED SITE: Primary | ICD-10-CM

## 2017-10-28 DIAGNOSIS — K21.9 GASTROESOPHAGEAL REFLUX DISEASE WITHOUT ESOPHAGITIS: ICD-10-CM

## 2017-10-28 DIAGNOSIS — I10 ESSENTIAL HYPERTENSION: ICD-10-CM

## 2017-10-28 DIAGNOSIS — M60.9 MYOSITIS, UNSPECIFIED MYOSITIS TYPE, UNSPECIFIED SITE: ICD-10-CM

## 2017-10-28 PROCEDURE — 83735 ASSAY OF MAGNESIUM: CPT

## 2017-10-28 PROCEDURE — 99214 OFFICE O/P EST MOD 30 MIN: CPT | Performed by: INTERNAL MEDICINE

## 2017-10-28 PROCEDURE — 36415 COLL VENOUS BLD VENIPUNCTURE: CPT

## 2017-10-28 RX ORDER — DULOXETIN HYDROCHLORIDE 30 MG/1
30 CAPSULE, DELAYED RELEASE ORAL DAILY
Qty: 30 CAPSULE | Refills: 2 | Status: SHIPPED | OUTPATIENT
Start: 2017-10-28 | End: 2017-11-20

## 2017-10-28 NOTE — PROGRESS NOTES
Michelle Marcelino is a 59year old female. Patient presents with:  Cramps: Leg cramps. Foot Pain: Both feet shooting pain, mainly right. HPI:   Pt here of cramps on both legs, shooting pains both fees.   Constant pain 10/10 intensity over her muscles d area(s) Disp: 3 Tube Rfl: 3   nystatin (MYCOSTATIN) [id number] UNIT/GM External Ointment Apply to affeted area twice a day as needed.  Disp: 30 g Rfl: 5      Past Medical History:   Diagnosis Date   • Abscess of left thigh    • Acute meniscal tear of knee    • cramps diffusely throughout the body, difficulty with movements due to pain+  NEURO: Shooting pain in bilateral feet with  numbness and tingling+  ENDO: negative  ALL/Asthma: negative. PYSCH/Behavioral: negative.           EXAM:   /82 (BP Location: R hypertension  Medications reviewed. Controlled. Continue present management      GERD        Continue pantoprazole. Advised the patient to avoid ibuprofen if possible  Avoid fatty, acidic and spicy food.   Avoid alcohol, caffeine, chocolate, fizzy b

## 2017-11-20 ENCOUNTER — OFFICE VISIT (OUTPATIENT)
Dept: RHEUMATOLOGY | Facility: CLINIC | Age: 57
End: 2017-11-20

## 2017-11-20 ENCOUNTER — OFFICE VISIT (OUTPATIENT)
Dept: OBGYN CLINIC | Facility: CLINIC | Age: 57
End: 2017-11-20

## 2017-11-20 ENCOUNTER — APPOINTMENT (OUTPATIENT)
Dept: LAB | Facility: HOSPITAL | Age: 57
End: 2017-11-20
Attending: INTERNAL MEDICINE
Payer: COMMERCIAL

## 2017-11-20 VITALS
HEART RATE: 75 BPM | BODY MASS INDEX: 28.83 KG/M2 | SYSTOLIC BLOOD PRESSURE: 136 MMHG | WEIGHT: 194.63 LBS | DIASTOLIC BLOOD PRESSURE: 79 MMHG | HEIGHT: 69 IN

## 2017-11-20 VITALS
BODY MASS INDEX: 28.44 KG/M2 | HEIGHT: 68.8 IN | DIASTOLIC BLOOD PRESSURE: 83 MMHG | WEIGHT: 192 LBS | SYSTOLIC BLOOD PRESSURE: 152 MMHG | HEART RATE: 97 BPM

## 2017-11-20 DIAGNOSIS — Z01.419 ENCOUNTER FOR GYNECOLOGICAL EXAMINATION WITHOUT ABNORMAL FINDING: Primary | ICD-10-CM

## 2017-11-20 DIAGNOSIS — R25.2 MUSCLE CRAMPS: ICD-10-CM

## 2017-11-20 DIAGNOSIS — R25.2 MUSCLE CRAMPS: Primary | ICD-10-CM

## 2017-11-20 DIAGNOSIS — E55.9 VITAMIN D DEFICIENCY: ICD-10-CM

## 2017-11-20 PROCEDURE — 99396 PREV VISIT EST AGE 40-64: CPT | Performed by: OBSTETRICS & GYNECOLOGY

## 2017-11-20 PROCEDURE — 99212 OFFICE O/P EST SF 10 MIN: CPT | Performed by: INTERNAL MEDICINE

## 2017-11-20 PROCEDURE — 84443 ASSAY THYROID STIM HORMONE: CPT

## 2017-11-20 PROCEDURE — 82085 ASSAY OF ALDOLASE: CPT

## 2017-11-20 PROCEDURE — 36415 COLL VENOUS BLD VENIPUNCTURE: CPT

## 2017-11-20 PROCEDURE — 99244 OFF/OP CNSLTJ NEW/EST MOD 40: CPT | Performed by: INTERNAL MEDICINE

## 2017-11-20 PROCEDURE — 82550 ASSAY OF CK (CPK): CPT

## 2017-11-20 RX ORDER — DULOXETINE 40 MG/1
40 CAPSULE, DELAYED RELEASE ORAL DAILY
Qty: 30 CAPSULE | Refills: 5 | Status: SHIPPED | OUTPATIENT
Start: 2017-11-20 | End: 2017-11-20

## 2017-11-20 RX ORDER — DULOXETINE 40 MG/1
CAPSULE, DELAYED RELEASE ORAL
Qty: 90 CAPSULE | Refills: 5 | OUTPATIENT
Start: 2017-11-20

## 2017-11-20 RX ORDER — CHOLECALCIFEROL (VITAMIN D3) 1250 MCG
CAPSULE ORAL
Qty: 12 CAPSULE | Refills: 0 | Status: SHIPPED | OUTPATIENT
Start: 2017-11-20 | End: 2018-09-24

## 2017-11-20 RX ORDER — DULOXETINE 40 MG/1
40 CAPSULE, DELAYED RELEASE ORAL DAILY
Qty: 90 CAPSULE | Refills: 0 | Status: SHIPPED | OUTPATIENT
Start: 2017-11-20 | End: 2017-12-20

## 2017-11-20 NOTE — PROGRESS NOTES
Dear Dr. Nuvia Gauthier:    I saw your patient Kaye Goldman in consultation this afternoon at your request for evaluation of muscle cramps. As you know, she is a 13-year-old woman who for over 3 years has been having muscle spasms.   It can be in an arm, her lower b with positive nitrite, positive protein, large leukocyte esterase. She had knee x-rays done October 9th, 2017, showing bilateral 3 compartment osteoarthritis, severe in her right knee lateral compartment.   Right shoulder MRI February 2017 showed high-gr Her acid reflux is controlled. No stomach pain, nausea or vomiting, constipation or diarrhea, blood in her stools. She gets up 2-3 times a night to urinate. Her lips are dry which she attributes to duloxetine. No Raynaud's or headache.     Physical exam units once weekly for 12 weeks. She should feel better with this. 3.  Diffuse osteoarthritis. 4.  Right shoulder rotator cuff tear. 6.  Chronic insomnia. Apparently a sleep study showed sleep apnea in the past.  I cannot find a record of this.

## 2017-11-20 NOTE — TELEPHONE ENCOUNTER
Per Jax at [address] 20mg Duloxetine is cheaper than 40mg tab. Per [facility] it is ok to change to 20mg tablets. Patient will take 2 tabs once daily.

## 2017-11-21 NOTE — PROGRESS NOTES
HPI:    Patient ID: Sanjuana Pabon is a 59year old female. HPI   and . Only complaints on ROS are an unintended weight loss of 10 lbs in 6-7 months and a recent w/u for wide spread muscle spasms. She has seen Rheum and also had muscle biopsy. DULoxetine HCl 40 MG Oral Cap DR Particles Take 40 mg by mouth daily.  Disp: 90 capsule Rfl: 0     Allergies:  Azithromycin            Pain    Comment:Other reaction(s): AZITHROMYCIN  Celecoxib                   Comment:Other reaction(s): CELECOXIB  Bowen Deis

## 2017-11-23 ENCOUNTER — PATIENT MESSAGE (OUTPATIENT)
Dept: RHEUMATOLOGY | Facility: CLINIC | Age: 57
End: 2017-11-23

## 2017-11-30 NOTE — TELEPHONE ENCOUNTER
From: Jomar Delgado MD  To: Mike Warren  Sent: 11/23/2017 9:31 AM CST  Subject: Lab Results. I hope that you have improved on 40mg of duloxetine daily and high-dose vitamin D. One of your muscle enzyme tests was normal (aldolase).  Your other

## 2017-12-01 NOTE — TELEPHONE ENCOUNTER
She is on duloxetine 40 mg every 7 PM.  It is working better than any medicines she has tried in the past.  She has tried Xanax, Lyrica, tramadol, amitriptyline, gabapentin, Robaxin, tizanidine, and baclofen.     She will increase her duloxetine to 60 mg ev

## 2017-12-06 ENCOUNTER — TELEPHONE (OUTPATIENT)
Dept: ORTHOPEDICS CLINIC | Facility: CLINIC | Age: 57
End: 2017-12-06

## 2017-12-06 NOTE — TELEPHONE ENCOUNTER
Call to Azra Garduno. Appointment for next week.  Offered 4:50 today and offered for tomorrow unable to do either day

## 2017-12-07 ENCOUNTER — OFFICE VISIT (OUTPATIENT)
Dept: SURGERY | Facility: CLINIC | Age: 57
End: 2017-12-07

## 2017-12-07 VITALS
WEIGHT: 198 LBS | DIASTOLIC BLOOD PRESSURE: 88 MMHG | TEMPERATURE: 98 F | HEART RATE: 73 BPM | SYSTOLIC BLOOD PRESSURE: 148 MMHG | BODY MASS INDEX: 29.33 KG/M2 | HEIGHT: 69 IN

## 2017-12-07 DIAGNOSIS — R31.29 MICROHEMATURIA: ICD-10-CM

## 2017-12-07 DIAGNOSIS — R82.90 URINE FINDING: Primary | ICD-10-CM

## 2017-12-07 PROCEDURE — 99213 OFFICE O/P EST LOW 20 MIN: CPT | Performed by: UROLOGY

## 2017-12-07 PROCEDURE — 81002 URINALYSIS NONAUTO W/O SCOPE: CPT | Performed by: UROLOGY

## 2017-12-07 PROCEDURE — 99212 OFFICE O/P EST SF 10 MIN: CPT | Performed by: UROLOGY

## 2017-12-07 RX ORDER — DULOXETIN HYDROCHLORIDE 30 MG/1
CAPSULE, DELAYED RELEASE ORAL
Refills: 2 | COMMUNITY
Start: 2017-10-28 | End: 2017-12-07

## 2017-12-07 RX ORDER — CIPROFLOXACIN 500 MG/1
TABLET, FILM COATED ORAL
Refills: 0 | COMMUNITY
Start: 2017-10-01 | End: 2017-12-07

## 2017-12-07 RX ORDER — NITROFURANTOIN 25; 75 MG/1; MG/1
CAPSULE ORAL
Refills: 0 | COMMUNITY
Start: 2017-10-02 | End: 2017-12-07

## 2017-12-07 RX ORDER — DULOXETIN HYDROCHLORIDE 20 MG/1
CAPSULE, DELAYED RELEASE ORAL
Refills: 0 | COMMUNITY
Start: 2017-11-20 | End: 2017-12-07

## 2017-12-13 ENCOUNTER — OFFICE VISIT (OUTPATIENT)
Dept: PODIATRY CLINIC | Facility: CLINIC | Age: 57
End: 2017-12-13

## 2017-12-13 DIAGNOSIS — B35.1 ONYCHOMYCOSIS: ICD-10-CM

## 2017-12-13 DIAGNOSIS — M79.675 PAIN OF TOE OF LEFT FOOT: Primary | ICD-10-CM

## 2017-12-13 PROCEDURE — 99213 OFFICE O/P EST LOW 20 MIN: CPT | Performed by: PODIATRIST

## 2017-12-13 PROCEDURE — 99212 OFFICE O/P EST SF 10 MIN: CPT | Performed by: PODIATRIST

## 2017-12-13 NOTE — PROGRESS NOTES
HPI:    Patient ID: Cj Soria is a 59year old female. HPI  This 25-year-old female presents to the office having not been seen in about 3 months. Second left toe continues to be a source of pain and frustration.   When asked today she points to the affected area(s) Disp: 3 Tube Rfl: 3     Allergies:  Azithromycin            Pain    Comment:Other reaction(s): AZITHROMYCIN  Celecoxib                   Comment:Other reaction(s): CELECOXIB  Erythromycin            Pain  Sulfa Antibiotics       Tongue Swe

## 2017-12-18 ENCOUNTER — TELEPHONE (OUTPATIENT)
Dept: INTERNAL MEDICINE CLINIC | Facility: CLINIC | Age: 57
End: 2017-12-18

## 2017-12-18 DIAGNOSIS — M79.676 PAIN OF TOE, UNSPECIFIED LATERALITY: Primary | ICD-10-CM

## 2017-12-18 NOTE — TELEPHONE ENCOUNTER
Pt requesting referral to Pamella Segovia in podiatry. Pt has upcoming appointment on Friday 12/22/2017 and also for ongoing issues.      Please Advise

## 2017-12-22 ENCOUNTER — OFFICE VISIT (OUTPATIENT)
Dept: PODIATRY CLINIC | Facility: CLINIC | Age: 57
End: 2017-12-22

## 2017-12-22 VITALS — RESPIRATION RATE: 20 BRPM | HEART RATE: 68 BPM | DIASTOLIC BLOOD PRESSURE: 74 MMHG | SYSTOLIC BLOOD PRESSURE: 126 MMHG

## 2017-12-22 DIAGNOSIS — B35.1 ONYCHOMYCOSIS: Primary | ICD-10-CM

## 2017-12-22 PROCEDURE — 11750 EXCISION NAIL&NAIL MATRIX: CPT | Performed by: PODIATRIST

## 2017-12-22 NOTE — PROGRESS NOTES
HPI:    Patient ID: Scott Myers is a 59year old female. HPI  This 24-year-old female presents for nail removal second toe left foot. She is accompanied today by her . After review of procedure patient signed a written consent.   Review of Sys reevaluation in 1 week         ASSESSMENT/PLAN:   Onychomycosis  (primary encounter diagnosis)    No orders of the defined types were placed in this encounter.       Meds This Visit:  No prescriptions requested or ordered in this encounter    Imaging & Refe

## 2017-12-27 ENCOUNTER — TELEPHONE (OUTPATIENT)
Dept: INTERNAL MEDICINE CLINIC | Facility: CLINIC | Age: 57
End: 2017-12-27

## 2017-12-27 ENCOUNTER — OFFICE VISIT (OUTPATIENT)
Dept: RHEUMATOLOGY | Facility: CLINIC | Age: 57
End: 2017-12-27

## 2017-12-27 VITALS
DIASTOLIC BLOOD PRESSURE: 84 MMHG | HEART RATE: 73 BPM | HEIGHT: 69 IN | WEIGHT: 193.81 LBS | SYSTOLIC BLOOD PRESSURE: 133 MMHG | BODY MASS INDEX: 28.71 KG/M2

## 2017-12-27 DIAGNOSIS — Z12.83 SCREENING FOR SKIN CANCER: Primary | ICD-10-CM

## 2017-12-27 DIAGNOSIS — R25.2 MUSCLE CRAMPS: ICD-10-CM

## 2017-12-27 DIAGNOSIS — E55.9 VITAMIN D DEFICIENCY: Primary | ICD-10-CM

## 2017-12-27 PROCEDURE — 99212 OFFICE O/P EST SF 10 MIN: CPT | Performed by: INTERNAL MEDICINE

## 2017-12-27 PROCEDURE — 99213 OFFICE O/P EST LOW 20 MIN: CPT | Performed by: INTERNAL MEDICINE

## 2017-12-27 RX ORDER — DULOXETIN HYDROCHLORIDE 60 MG/1
CAPSULE, DELAYED RELEASE ORAL
Qty: 90 CAPSULE | Refills: 3 | Status: SHIPPED | OUTPATIENT
Start: 2017-12-27 | End: 2018-05-21 | Stop reason: ALTCHOICE

## 2017-12-27 NOTE — PROGRESS NOTES
HPI:    Patient ID: Fredis Bae is a 59year old female. Lexa Gagnon is a 66-year-old patient of Dr. Zaheer Perea chronic muscle cramps in different areas. Since I saw her November 20th, 2017, she has increased her duloxetine from 20 to 60 mg a day.   It has help AZITHROMYCIN  Celecoxib                   Comment:Other reaction(s): CELECOXIB  Erythromycin            Pain  Sulfa Antibiotics       Tongue Swelling    Comment:Other reaction(s): SULFA (SULFONAMIDE ANTIBIOTICS)   PHYSICAL EXAM:   Physical Exam   Constitut this.          Orders Placed This Encounter      Vitamin D, 25-Hydroxy    Meds This Visit:  Signed Prescriptions Disp Refills    DULoxetine HCl 60 MG Oral Cap DR Particles 90 capsule 3      Sig: Take one daily.            Imaging & Referrals:  None       ID

## 2017-12-27 NOTE — TELEPHONE ENCOUNTER
Dr. Nuvia Gauthier,    Pt called requesting a referral for Derm for left side Mole. Please advise and sign off on referral.      Thank you, [facility].

## 2017-12-29 NOTE — PROGRESS NOTES
Amber Chavarria is a 59year old female. HPI:   Patient presents with:  Hematuria: 6 month visit, UTI, left lower back pain      47-year-old female presents in follow-up to a previous visit June 7, 2017.   Is minimally suspicious Bosniak 2F right renal cys MD;  Location: [facility]                ENDOSCOPY  6.27/2017: EXCISION OF CHALAZION, SINGLE - OD - RIGHT EYE Right      Comment: Nasally  No date: HC ARTHROCENTESIS OR INJECT MAJOR JOINT W/O US  1996: HYSTERECTOMY      Comment: KAI  2005,2007,2008: OTHER SURGICA Comment:Other reaction(s): AZITHROMYCIN  Celecoxib                   Comment:Other reaction(s): CELECOXIB  Erythromycin            Pain  Sulfa Antibiotics       Tongue Swelling    Comment:Other reaction(s): SULFA (SULFONAMIDE ANTIBIOTICS)      ROS:       P

## 2018-01-02 ENCOUNTER — APPOINTMENT (OUTPATIENT)
Dept: LAB | Facility: HOSPITAL | Age: 58
End: 2018-01-02
Attending: INTERNAL MEDICINE
Payer: COMMERCIAL

## 2018-01-02 ENCOUNTER — OFFICE VISIT (OUTPATIENT)
Dept: PODIATRY CLINIC | Facility: CLINIC | Age: 58
End: 2018-01-02

## 2018-01-02 DIAGNOSIS — B35.1 ONYCHOMYCOSIS: ICD-10-CM

## 2018-01-02 DIAGNOSIS — E55.9 VITAMIN D DEFICIENCY: ICD-10-CM

## 2018-01-02 DIAGNOSIS — M79.675 PAIN OF TOE OF LEFT FOOT: Primary | ICD-10-CM

## 2018-01-02 PROCEDURE — 99212 OFFICE O/P EST SF 10 MIN: CPT | Performed by: PODIATRIST

## 2018-01-02 PROCEDURE — 99024 POSTOP FOLLOW-UP VISIT: CPT | Performed by: PODIATRIST

## 2018-01-02 PROCEDURE — 36415 COLL VENOUS BLD VENIPUNCTURE: CPT

## 2018-01-02 PROCEDURE — 82306 VITAMIN D 25 HYDROXY: CPT

## 2018-01-03 NOTE — PROGRESS NOTES
HPI:    Patient ID: Michelle Marcelino is a 59year old female. HPI  This 61-year-old female presents 1 week post nail removal second toe left foot. Patient has no specific noted concerns or complaints.   Review of Systems         Current Outpatient Prescrip

## 2018-01-05 LAB — 25(OH)D3 SERPL-MCNC: 57.9 NG/ML

## 2018-01-15 ENCOUNTER — TELEPHONE (OUTPATIENT)
Dept: INTERNAL MEDICINE CLINIC | Facility: CLINIC | Age: 58
End: 2018-01-15

## 2018-01-15 DIAGNOSIS — M25.511 RIGHT SHOULDER PAIN, UNSPECIFIED CHRONICITY: ICD-10-CM

## 2018-01-15 DIAGNOSIS — M25.561 RIGHT KNEE PAIN, UNSPECIFIED CHRONICITY: Primary | ICD-10-CM

## 2018-01-15 NOTE — TELEPHONE ENCOUNTER
Patient called requesting a referral to see Dr Luigi Leonardo for right shoulder and knee pain.      Please sign off on referral request.     Thank you, Jose

## 2018-01-16 ENCOUNTER — OFFICE VISIT (OUTPATIENT)
Dept: PODIATRY CLINIC | Facility: CLINIC | Age: 58
End: 2018-01-16

## 2018-01-16 DIAGNOSIS — M79.675 PAIN OF TOE OF LEFT FOOT: Primary | ICD-10-CM

## 2018-01-16 DIAGNOSIS — B35.1 ONYCHOMYCOSIS: ICD-10-CM

## 2018-01-16 PROCEDURE — 99212 OFFICE O/P EST SF 10 MIN: CPT | Performed by: PODIATRIST

## 2018-01-16 NOTE — PROGRESS NOTES
HPI:    Patient ID: Shantel Black is a 59year old female. HPI  She presents about 3 weeks after nail removal second toe left foot. She has some discomfort although not significant.   Review of Systems         Current Outpatient Prescriptions:  Marielos Felix

## 2018-01-27 ENCOUNTER — OFFICE VISIT (OUTPATIENT)
Dept: INTERNAL MEDICINE CLINIC | Facility: CLINIC | Age: 58
End: 2018-01-27

## 2018-01-27 ENCOUNTER — OFFICE VISIT (OUTPATIENT)
Dept: DERMATOLOGY CLINIC | Facility: CLINIC | Age: 58
End: 2018-01-27

## 2018-01-27 VITALS
SYSTOLIC BLOOD PRESSURE: 143 MMHG | HEART RATE: 73 BPM | BODY MASS INDEX: 28.57 KG/M2 | WEIGHT: 192.88 LBS | HEIGHT: 69 IN | TEMPERATURE: 98 F | DIASTOLIC BLOOD PRESSURE: 90 MMHG

## 2018-01-27 DIAGNOSIS — D23.60 BENIGN NEOPLASM OF SKIN OF UPPER LIMB, INCLUDING SHOULDER, UNSPECIFIED LATERALITY: ICD-10-CM

## 2018-01-27 DIAGNOSIS — D23.5 BENIGN NEOPLASM OF SKIN OF TRUNK, EXCEPT SCROTUM: ICD-10-CM

## 2018-01-27 DIAGNOSIS — H66.90 ACUTE OTITIS MEDIA, UNSPECIFIED OTITIS MEDIA TYPE: Primary | ICD-10-CM

## 2018-01-27 DIAGNOSIS — L82.0 INFLAMED SEBORRHEIC KERATOSIS: Primary | ICD-10-CM

## 2018-01-27 DIAGNOSIS — R53.83 OTHER FATIGUE: ICD-10-CM

## 2018-01-27 DIAGNOSIS — R06.81 APNEIC EPISODE: ICD-10-CM

## 2018-01-27 DIAGNOSIS — R25.2 MUSCLE CRAMPS: ICD-10-CM

## 2018-01-27 PROCEDURE — 99212 OFFICE O/P EST SF 10 MIN: CPT | Performed by: INTERNAL MEDICINE

## 2018-01-27 PROCEDURE — 99214 OFFICE O/P EST MOD 30 MIN: CPT | Performed by: INTERNAL MEDICINE

## 2018-01-27 PROCEDURE — 99213 OFFICE O/P EST LOW 20 MIN: CPT | Performed by: DERMATOLOGY

## 2018-01-27 PROCEDURE — 99212 OFFICE O/P EST SF 10 MIN: CPT | Performed by: DERMATOLOGY

## 2018-01-27 RX ORDER — AZITHROMYCIN 250 MG/1
TABLET, FILM COATED ORAL
Qty: 6 TABLET | Refills: 0 | Status: SHIPPED | OUTPATIENT
Start: 2018-01-27 | End: 2018-02-01 | Stop reason: ALTCHOICE

## 2018-01-27 RX ORDER — AMOXICILLIN 875 MG/1
875 TABLET, COATED ORAL 2 TIMES DAILY
Qty: 20 TABLET | Refills: 0 | Status: SHIPPED | OUTPATIENT
Start: 2018-01-27 | End: 2018-01-27 | Stop reason: CLARIF

## 2018-01-27 RX ORDER — IBUPROFEN 600 MG/1
600 TABLET ORAL EVERY 8 HOURS PRN
Qty: 60 TABLET | Refills: 0 | Status: SHIPPED | OUTPATIENT
Start: 2018-01-27 | End: 2018-01-27

## 2018-01-29 ENCOUNTER — TELEPHONE (OUTPATIENT)
Dept: INTERNAL MEDICINE CLINIC | Facility: CLINIC | Age: 58
End: 2018-01-29

## 2018-01-29 DIAGNOSIS — R06.83 SNORING: Primary | ICD-10-CM

## 2018-01-29 DIAGNOSIS — R06.81 APNEIC EPISODE: ICD-10-CM

## 2018-01-29 NOTE — TELEPHONE ENCOUNTER
Pt called in requesting an order for an at home sleep study be placed on her chart. Pt states she called the sleep study office and expressed nervousness about being away from home and they instructed her to request the at home study. Please advise.

## 2018-01-31 ENCOUNTER — OFFICE VISIT (OUTPATIENT)
Dept: ORTHOPEDICS CLINIC | Facility: CLINIC | Age: 58
End: 2018-01-31

## 2018-01-31 VITALS — HEART RATE: 92 BPM | RESPIRATION RATE: 20 BRPM | DIASTOLIC BLOOD PRESSURE: 88 MMHG | SYSTOLIC BLOOD PRESSURE: 154 MMHG

## 2018-01-31 DIAGNOSIS — M25.511 PAIN IN JOINT OF RIGHT SHOULDER: ICD-10-CM

## 2018-01-31 DIAGNOSIS — M17.11 PRIMARY LOCALIZED OSTEOARTHROSIS OF RIGHT LOWER LEG: Primary | ICD-10-CM

## 2018-01-31 PROCEDURE — 20610 DRAIN/INJ JOINT/BURSA W/O US: CPT | Performed by: ORTHOPAEDIC SURGERY

## 2018-01-31 RX ORDER — IBUPROFEN 600 MG/1
TABLET ORAL
Qty: 270 TABLET | Refills: 0 | Status: SHIPPED | OUTPATIENT
Start: 2018-01-31 | End: 2020-06-18

## 2018-01-31 NOTE — PROGRESS NOTES
Per verbal order from VT, draw up 3ml of Kenalog 10 and 3ml of 1% lidocaine for cortisone injection to right knee  andPer verbal order from VT, draw up 2ml of Kenalog 10 and 2ml of 1% lidocaine for cortisone injection to right shoulder.  Pts VS stable prior

## 2018-01-31 NOTE — TELEPHONE ENCOUNTER
Refill Protocol Appointment Criteria  · Appointment scheduled in the past 6 months or in the next 3 months  Recent Outpatient Visits            4 days ago Acute otitis media, unspecified otitis media type    [address] [place], [address] [facility] [place] [place]

## 2018-01-31 NOTE — PROGRESS NOTES
Patient returns for follow-up regarding her chronic right shoulder pain and knee pain. She understands that surgery is available for her right shoulder in the form of an arthroscopic rotator cuff repair in for her knee in the form of a knee replacement.

## 2018-02-01 ENCOUNTER — HOSPITAL ENCOUNTER (OUTPATIENT)
Age: 58
Discharge: HOME | End: 2018-02-01
Attending: EMERGENCY MEDICINE
Payer: COMMERCIAL

## 2018-02-01 VITALS
WEIGHT: 192 LBS | HEART RATE: 74 BPM | SYSTOLIC BLOOD PRESSURE: 139 MMHG | DIASTOLIC BLOOD PRESSURE: 71 MMHG | RESPIRATION RATE: 17 BRPM | OXYGEN SATURATION: 99 % | TEMPERATURE: 98 F | HEIGHT: 69 IN | BODY MASS INDEX: 28.44 KG/M2

## 2018-02-01 DIAGNOSIS — J01.90 ACUTE SINUSITIS, RECURRENCE NOT SPECIFIED, UNSPECIFIED LOCATION: Primary | ICD-10-CM

## 2018-02-01 PROCEDURE — 99213 OFFICE O/P EST LOW 20 MIN: CPT

## 2018-02-01 PROCEDURE — 99214 OFFICE O/P EST MOD 30 MIN: CPT

## 2018-02-01 RX ORDER — ACETAMINOPHEN AND CODEINE PHOSPHATE 300; 30 MG/1; MG/1
1 TABLET ORAL EVERY 6 HOURS PRN
Qty: 20 TABLET | Refills: 0 | Status: SHIPPED | OUTPATIENT
Start: 2018-02-01 | End: 2018-05-21 | Stop reason: ALTCHOICE

## 2018-02-01 RX ORDER — CEFDINIR 300 MG/1
300 CAPSULE ORAL 2 TIMES DAILY
Qty: 20 CAPSULE | Refills: 0 | Status: SHIPPED | OUTPATIENT
Start: 2018-02-01 | End: 2018-02-11

## 2018-02-01 NOTE — ED PROVIDER NOTES
Patient Seen in: [address] [facility]    History   No chief complaint on file. Stated Complaint: SINUS PRESSURE     HPI    Patient complains of sinus pain with nasal congestion and drainage.   Symptoms have been present for the l PERFORMED    Family history reviewed and is not pertinent to presenting problem. Smoking status: Never Smoker                                                              Smokeless tobacco: Never Used                      Alcohol use:  No               C at the present time as patient states her blood pressure has been elevated she denies use of over-the-counter decongestants and patient has history of esophageal reflux        Disposition and Plan     Clinical Impression:  Acute sinusitis, recurrence not s

## 2018-02-05 ENCOUNTER — TELEPHONE (OUTPATIENT)
Dept: OPHTHALMOLOGY | Facility: CLINIC | Age: 58
End: 2018-02-05

## 2018-02-05 NOTE — TELEPHONE ENCOUNTER
Patient has an appointment scheduled with Dr. Sara Mason on 2/10/18 and they need a referral for the appointment. Please do not hesitate to call me if you have any questions about this. Thank you, Dash Dueñas at Dr. Mercedes Hernandez office [id number].   Diagnosis is Primary

## 2018-02-05 NOTE — PROGRESS NOTES
Aicha Hunter is a 59year old female. HPI:     CC:  Patient presents with:  Lesion: established pt - LOV 6/2015. Presents with irritated mole to left side. no hx of skin CA. Allergies:  Celecoxib;  Erythromycin; Sulfa Antibiotics    HISTORY:    Pa Smokeless tobacco: Never Used                      Alcohol use: No               Comment: None. Current Outpatient Prescriptions:  DULoxetine HCl 60 MG Oral Cap DR Particles Take one daily.  Disp: 90 capsule Rfl: 3   C Tendinitis    • Unspecified essential hypertension    • Unspecified sleep apnea      Past Surgical History:  No date: ANAL SPHINCTEROTOMY      Comment: 03/12/2013 Gely  2009: COLONOSCOPY  11/11/2016: COLONOSCOPY N/A      Comment: Procedure: COLONOSCOPY; usual state of health. History, medications, allergies reviewed as noted. ROS:  Denies any other systemic complaints. No new or changeing lesions other than noted above. No fevers, chills, night sweats, unusual sun sensitivity.   No other skin compla Benign nevi, seborrheic  keratoses, cherry angiomas:  Reassurance regarding other benign skin lesions. Signs and symptoms of skin cancer, ABCDE's of melanoma discussed with patient. Sunscreen use, sun protection, self exams reviewed.   Followup as noted RTC

## 2018-02-07 ENCOUNTER — NURSE TRIAGE (OUTPATIENT)
Dept: OTHER | Age: 58
End: 2018-02-07

## 2018-02-07 RX ORDER — METHYLPREDNISOLONE 4 MG/1
TABLET ORAL
Qty: 1 KIT | Refills: 0 | Status: SHIPPED | OUTPATIENT
Start: 2018-02-07 | End: 2018-03-24

## 2018-02-07 NOTE — TELEPHONE ENCOUNTER
I have sent the order for Tim Earing. Please inform the patient to take the steroid Dosepak as prescribed. I am not working tomorrow. Please follow-up with me on Friday or early next week. .  Use Lorin pot, saline nasal spray, Claritin-D at night and zelalem

## 2018-02-07 NOTE — TELEPHONE ENCOUNTER
Action Requested: Summary for Provider     []  Critical Lab, Recommendations Needed  [x] Need Additional Advice  []   FYI    []   Need Orders  [] Need Medications Sent to Pharmacy  []  Other     SUMMARY: Advised f/u appt today but pt states cannot make a

## 2018-02-07 NOTE — TELEPHONE ENCOUNTER
Spoke with patient and relayed PJ message below--patient verbalizes understanding and agreement. F/U appt made with PJ for Monday, 2/12/18 at 5:10 p.m. Appt tomorrow cancelled per patient request. No further questions/concerns at this time.

## 2018-02-10 ENCOUNTER — OFFICE VISIT (OUTPATIENT)
Dept: OPHTHALMOLOGY | Facility: CLINIC | Age: 58
End: 2018-02-10

## 2018-02-10 DIAGNOSIS — H43.393 FLOATER, VITREOUS, BILATERAL: ICD-10-CM

## 2018-02-10 DIAGNOSIS — H02.403 PTOSIS OF BOTH EYELIDS: ICD-10-CM

## 2018-02-10 DIAGNOSIS — H40.1132 PRIMARY OPEN ANGLE GLAUCOMA OF BOTH EYES, MODERATE STAGE: Primary | ICD-10-CM

## 2018-02-10 DIAGNOSIS — H25.13 AGE-RELATED NUCLEAR CATARACT OF BOTH EYES: ICD-10-CM

## 2018-02-10 PROCEDURE — 99212 OFFICE O/P EST SF 10 MIN: CPT | Performed by: OPHTHALMOLOGY

## 2018-02-10 PROCEDURE — 92015 DETERMINE REFRACTIVE STATE: CPT | Performed by: OPHTHALMOLOGY

## 2018-02-10 PROCEDURE — 99243 OFF/OP CNSLTJ NEW/EST LOW 30: CPT | Performed by: OPHTHALMOLOGY

## 2018-02-10 NOTE — PATIENT INSTRUCTIONS
Primary open angle glaucoma of both eyes, moderate stage  IOP is stable. Continue Latanoprost at bedtime in both eyes. Will have patient back in 6 months for a visual field, OCT and pressure check.   (would have patient back in 4 months, but will have h

## 2018-02-10 NOTE — PROGRESS NOTES
Maria Elena Simmons is a 59year old female. HPI:     HPI     Consult    Additional comments: Consult per Dr. Esperanza Quinteros  Pts PCP is Dr. Janeth Alcaraz            Comments   Pt complains of blurred vision at distance and near with her glasses and would like an updated Rx. Hypertension Mother    • Hypertension Sister    • Hypertension Brother    • Diabetes Neg    • Glaucoma Neg    • Macular degeneration Neg        Social History: Smoking status: Never Smoker                                                              Smokel Allergic/Imm, Heme/Lymph    Last edited by Betzaida Whitfield O.T. on 2/10/2018  7:27 AM. (History)          PHYSICAL EXAM:     Base Eye Exam     Visual Acuity (Snellen - Linear)       Right Left    Dist cc 20/40 20/60    Dist ph cc NI NI    Near cc 20/40 ASSESSMENT/PLAN:     Diagnoses and Plan:     Primary open angle glaucoma of both eyes, moderate stage  IOP is stable. Continue Latanoprost at bedtime in both eyes. Will have patient back in 6 months for a visual field, OCT and pressure check.   (would

## 2018-02-10 NOTE — ASSESSMENT & PLAN NOTE
Discussed with patient that cataracts in both eyes are  advanced enough at this time to consider surgery; it would be patient's choice. Discussed options such as surgery or change of glasses RX.   Discussed surgical risks, benefits, alternatives and recove

## 2018-02-10 NOTE — ASSESSMENT & PLAN NOTE
IOP is stable. Continue Latanoprost at bedtime in both eyes. Will have patient back in 6 months for a visual field, OCT and pressure check.   (would have patient back in 4 months, but will have her back in 6 months because she will most likely be having

## 2018-02-12 ENCOUNTER — OFFICE VISIT (OUTPATIENT)
Dept: INTERNAL MEDICINE CLINIC | Facility: CLINIC | Age: 58
End: 2018-02-12

## 2018-02-12 VITALS
SYSTOLIC BLOOD PRESSURE: 145 MMHG | BODY MASS INDEX: 28.71 KG/M2 | WEIGHT: 193.81 LBS | DIASTOLIC BLOOD PRESSURE: 84 MMHG | HEIGHT: 69 IN | HEART RATE: 68 BPM | TEMPERATURE: 98 F

## 2018-02-12 DIAGNOSIS — H61.21 IMPACTED CERUMEN OF RIGHT EAR: ICD-10-CM

## 2018-02-12 DIAGNOSIS — I10 ESSENTIAL HYPERTENSION: ICD-10-CM

## 2018-02-12 DIAGNOSIS — J01.00 SUBACUTE MAXILLARY SINUSITIS: Primary | ICD-10-CM

## 2018-02-12 PROCEDURE — 99212 OFFICE O/P EST SF 10 MIN: CPT | Performed by: INTERNAL MEDICINE

## 2018-02-12 PROCEDURE — 99214 OFFICE O/P EST MOD 30 MIN: CPT | Performed by: INTERNAL MEDICINE

## 2018-02-12 PROCEDURE — 69210 REMOVE IMPACTED EAR WAX UNI: CPT | Performed by: INTERNAL MEDICINE

## 2018-02-12 RX ORDER — AMLODIPINE BESYLATE 10 MG/1
10 TABLET ORAL DAILY
Qty: 90 TABLET | Refills: 1 | Status: SHIPPED | OUTPATIENT
Start: 2018-02-12 | End: 2018-05-13

## 2018-02-12 RX ORDER — FLUTICASONE PROPIONATE 50 MCG
2 SPRAY, SUSPENSION (ML) NASAL DAILY
Qty: 1 BOTTLE | Refills: 3 | Status: SHIPPED | OUTPATIENT
Start: 2018-02-12 | End: 2018-05-21 | Stop reason: ALTCHOICE

## 2018-02-12 RX ORDER — LEVOCETIRIZINE DIHYDROCHLORIDE 5 MG/1
5 TABLET, FILM COATED ORAL EVERY EVENING
Qty: 30 TABLET | Refills: 1 | Status: SHIPPED | OUTPATIENT
Start: 2018-02-12 | End: 2018-02-12

## 2018-02-12 NOTE — PROGRESS NOTES
Bethany Jansen is a 59year old female. Patient presents with:  Sinus Problem      HPI:   She is here for follow up of sinusitis, her with her -Al. She was given abx in IC on 2/1/18.   She was given cefdinir in IC and zpack prior to that for acute o to the affected area(s) Disp: 3 Tube Rfl: 3      Past Medical History:   Diagnosis Date   • Abscess of left thigh    • Acute meniscal tear of knee    • Age-related nuclear cataract of both eyes 2/10/2015   • Anal sphincter incontinence 4/28/2014   • Chondr palpitations, swelling in feet. MUSK: Muscle cramps present. NEURO: denies tingling numbness or headaches  ENDO: No fatigue, polyuria, polydipsia, tremors. ALLERGY/ASTHMA: No seasonal allergy or asthma.   HEME: no anemia, no abnormal bleeding or easy bru cerumen of the right ear canal.  Impacted cerumen was removed with water irrigation and ear hook. Other orders  -     Levocetirizine Dihydrochloride 5 MG Oral Tab;  Take 1 tablet (5 mg total) by mouth every evening.  -     Fluticasone Propionate 50 MCG/ACT

## 2018-02-15 RX ORDER — LEVOCETIRIZINE DIHYDROCHLORIDE 5 MG/1
TABLET, FILM COATED ORAL
Qty: 90 TABLET | Refills: 1 | Status: SHIPPED | OUTPATIENT
Start: 2018-02-15 | End: 2018-03-24

## 2018-02-19 ENCOUNTER — OFFICE VISIT (OUTPATIENT)
Dept: SLEEP CENTER | Age: 58
End: 2018-02-19
Attending: INTERNAL MEDICINE
Payer: COMMERCIAL

## 2018-02-19 DIAGNOSIS — G47.33 OSA (OBSTRUCTIVE SLEEP APNEA): Primary | ICD-10-CM

## 2018-02-19 PROCEDURE — 95806 SLEEP STUDY UNATT&RESP EFFT: CPT

## 2018-02-20 ENCOUNTER — OFFICE VISIT (OUTPATIENT)
Dept: PODIATRY CLINIC | Facility: CLINIC | Age: 58
End: 2018-02-20

## 2018-02-20 DIAGNOSIS — M79.675 PAIN OF TOE OF LEFT FOOT: Primary | ICD-10-CM

## 2018-02-20 DIAGNOSIS — B35.1 ONYCHOMYCOSIS: ICD-10-CM

## 2018-02-20 PROCEDURE — 99212 OFFICE O/P EST SF 10 MIN: CPT | Performed by: PODIATRIST

## 2018-02-20 NOTE — PROGRESS NOTES
HPI:    Patient ID: Mike Warren is a 59year old female. HPI  Patient presents for follow-up in reference to the pain of the second left toe. Total nail removal has been performed on this toe.   She has discomfort but thinks that she is better than th evidence of edema nor erythema. The nailbed still has some scabbing irritation that needs to fall off. There is no open or draining no sign of infection. Some debridement was accomplished.   I will reevaluate to be certain in 1 month         ASSESSMENT/P

## 2018-02-21 NOTE — PROCEDURES
[address] [facility]  Accredited by the [employer] (AASM)    PATIENT'S NAME: Lyndamalousterling Diana   ATTENDING PHYSICIAN: Evelyne Parker MD   REFERRING PHYSICIAN: Evelyne Parker MD   PATIENT ACCOUNT #: [de-identified] LOCATION: Sleep Center not drive if at all sleepy. Please do not hesitate to contact me if there is any question whatsoever regarding interpretation of this study.     Dictated By Patricia Zhang MD  d: 02/21/2018 15:43:57  t: 02/21/2018 16:14:50  [facility] [id number]  C/C

## 2018-02-22 ENCOUNTER — TELEPHONE (OUTPATIENT)
Dept: INTERNAL MEDICINE CLINIC | Facility: CLINIC | Age: 58
End: 2018-02-22

## 2018-02-22 NOTE — TELEPHONE ENCOUNTER
Tiffanie Morales, states that Dr. Irma Stinson would like to speak with Opal Valenzuela directly regarding the patient. Please, call Dr. Irma Stinson on his cell phone # [phone]. Doctor does not want to speak the doctor on call or RN. Paged Dr. Marielena Chery.

## 2018-02-23 ENCOUNTER — LAB ENCOUNTER (OUTPATIENT)
Dept: LAB | Facility: HOSPITAL | Age: 58
End: 2018-02-23
Attending: OPHTHALMOLOGY
Payer: COMMERCIAL

## 2018-02-23 DIAGNOSIS — H54.7 VISION LOSS: Primary | ICD-10-CM

## 2018-02-23 LAB
BUN SERPL-MCNC: 13 MG/DL (ref 8–20)
CREAT SERPL-MCNC: 1.06 MG/DL (ref 0.5–1.5)

## 2018-02-23 PROCEDURE — 36415 COLL VENOUS BLD VENIPUNCTURE: CPT

## 2018-02-23 PROCEDURE — 84520 ASSAY OF UREA NITROGEN: CPT

## 2018-02-23 PROCEDURE — 82565 ASSAY OF CREATININE: CPT

## 2018-03-01 ENCOUNTER — TELEPHONE (OUTPATIENT)
Dept: INTERNAL MEDICINE CLINIC | Facility: CLINIC | Age: 58
End: 2018-03-01

## 2018-03-01 NOTE — TELEPHONE ENCOUNTER
Advised patient on Dr. Amaya Jimenez information and recommendation; she verbalized understanding. Given phone number to call to schedule the titration study. Advised to call back if needed; she agreed.

## 2018-03-01 NOTE — TELEPHONE ENCOUNTER
----- Message from Shereen Lima RN sent at 3/1/2018  8:43 AM CST -----      ----- Message -----  From: Duc Green MD  Sent: 2/23/2018   9:26 AM  To: Cornelia Baldwin Lpn/Cma    Results discussed with the patient she does not have a CPAP machine.   Will need ti

## 2018-03-03 ENCOUNTER — HOSPITAL ENCOUNTER (OUTPATIENT)
Dept: MRI IMAGING | Age: 58
Discharge: HOME | End: 2018-03-03
Attending: OPHTHALMOLOGY
Payer: COMMERCIAL

## 2018-03-03 DIAGNOSIS — H54.7 VISION LOSS: ICD-10-CM

## 2018-03-03 PROCEDURE — 70553 MRI BRAIN STEM W/O & W/DYE: CPT | Performed by: OPHTHALMOLOGY

## 2018-03-03 PROCEDURE — A9575 INJ GADOTERATE MEGLUMI 0.1ML: HCPCS | Performed by: OPHTHALMOLOGY

## 2018-03-12 ENCOUNTER — TELEPHONE (OUTPATIENT)
Dept: INTERNAL MEDICINE CLINIC | Facility: CLINIC | Age: 58
End: 2018-03-12

## 2018-03-12 DIAGNOSIS — M79.671 FOOT PAIN, RIGHT: Primary | ICD-10-CM

## 2018-03-12 NOTE — TELEPHONE ENCOUNTER
Patient has appt 3/27 for right foot. Please sign referral if you agree.      Thank you,   Adrian Hunt

## 2018-03-20 NOTE — PROGRESS NOTES
Bryant Leger is a 66-year-old patient of Dr. Monica Martinez chronic muscle cramps in different areas. Since I saw her December 27th, 2017, she has remained on  duloxetine 60 mg a day. It has helped, but her cramps have recently again become more frequent.   Just recently Pain    Comment:Other reaction(s): AZITHROMYCIN  Celecoxib                   Comment:Other reaction(s): CELECOXIB  Erythromycin            Pain  Sulfa Antibiotics       Tongue Swelling    Comment:Other reaction(s): SULFA (SULFONAMIDE ANTIBIOTICS)   PHYSICA She is scheduled for CPAP titration. I'll see her back when and if needed.

## 2018-03-21 ENCOUNTER — OFFICE VISIT (OUTPATIENT)
Dept: RHEUMATOLOGY | Facility: CLINIC | Age: 58
End: 2018-03-21

## 2018-03-21 VITALS
BODY MASS INDEX: 28.71 KG/M2 | HEART RATE: 76 BPM | DIASTOLIC BLOOD PRESSURE: 86 MMHG | HEIGHT: 69 IN | WEIGHT: 193.81 LBS | SYSTOLIC BLOOD PRESSURE: 149 MMHG

## 2018-03-21 DIAGNOSIS — R25.2 MUSCLE CRAMPS: Primary | ICD-10-CM

## 2018-03-21 DIAGNOSIS — G47.33 OBSTRUCTIVE SLEEP APNEA SYNDROME: ICD-10-CM

## 2018-03-21 PROCEDURE — 99212 OFFICE O/P EST SF 10 MIN: CPT | Performed by: INTERNAL MEDICINE

## 2018-03-21 PROCEDURE — 99213 OFFICE O/P EST LOW 20 MIN: CPT | Performed by: INTERNAL MEDICINE

## 2018-03-23 ENCOUNTER — OFFICE VISIT (OUTPATIENT)
Dept: SLEEP CENTER | Age: 58
End: 2018-03-23
Attending: INTERNAL MEDICINE
Payer: COMMERCIAL

## 2018-03-23 DIAGNOSIS — Z76.89 SLEEP CONCERN: Primary | ICD-10-CM

## 2018-03-23 PROCEDURE — 95811 POLYSOM 6/>YRS CPAP 4/> PARM: CPT

## 2018-03-24 ENCOUNTER — OFFICE VISIT (OUTPATIENT)
Dept: INTERNAL MEDICINE CLINIC | Facility: CLINIC | Age: 58
End: 2018-03-24

## 2018-03-24 ENCOUNTER — APPOINTMENT (OUTPATIENT)
Dept: LAB | Facility: HOSPITAL | Age: 58
End: 2018-03-24
Attending: INTERNAL MEDICINE
Payer: COMMERCIAL

## 2018-03-24 VITALS
RESPIRATION RATE: 16 BRPM | BODY MASS INDEX: 27.99 KG/M2 | HEART RATE: 109 BPM | DIASTOLIC BLOOD PRESSURE: 85 MMHG | TEMPERATURE: 98 F | WEIGHT: 189 LBS | HEIGHT: 69 IN | SYSTOLIC BLOOD PRESSURE: 132 MMHG

## 2018-03-24 DIAGNOSIS — R53.82 CHRONIC FATIGUE: ICD-10-CM

## 2018-03-24 DIAGNOSIS — R53.83 EXHAUSTION: ICD-10-CM

## 2018-03-24 DIAGNOSIS — K21.9 GASTROESOPHAGEAL REFLUX DISEASE WITHOUT ESOPHAGITIS: ICD-10-CM

## 2018-03-24 DIAGNOSIS — H02.403 PTOSIS OF BOTH EYELIDS: ICD-10-CM

## 2018-03-24 DIAGNOSIS — R53.82 CHRONIC FATIGUE: Primary | ICD-10-CM

## 2018-03-24 PROCEDURE — 99214 OFFICE O/P EST MOD 30 MIN: CPT | Performed by: INTERNAL MEDICINE

## 2018-03-24 PROCEDURE — 83519 RIA NONANTIBODY: CPT

## 2018-03-24 PROCEDURE — 84238 ASSAY NONENDOCRINE RECEPTOR: CPT

## 2018-03-24 PROCEDURE — 36415 COLL VENOUS BLD VENIPUNCTURE: CPT

## 2018-03-24 PROCEDURE — 83516 IMMUNOASSAY NONANTIBODY: CPT

## 2018-03-24 PROCEDURE — 86255 FLUORESCENT ANTIBODY SCREEN: CPT

## 2018-03-24 PROCEDURE — 99212 OFFICE O/P EST SF 10 MIN: CPT | Performed by: INTERNAL MEDICINE

## 2018-03-24 NOTE — PROGRESS NOTES
Alberto Moreno is a 59year old female. Patient presents with:  Cough: since leaving sleep study this morning  Joint Pain: f/u from rheumatology      HPI:   Jacinta Verma is here today for follow-up. She was seen by Dr. maya last week.   She is still continui Pantoprazole Sodium 40 MG Oral Tab EC Take 1 tablet (40 mg total) by mouth every morning before breakfast. Disp: 90 tablet Rfl: 3      Past Medical History:   Diagnosis Date   • Abscess of left thigh    • Acute meniscal tear of knee    • Age-related nucl throat. SKIN: denies any unusual skin lesions or rashes. RESPIRATORY: denies shortness of breath, wheezing, cough. CARDIOVASCULAR: denies chest pain on exertion, palpitations, swelling in feet.   GI: Acid reflux and heartburns, controlled on pantoprazole muscle cramps, excessive fatigue and tiredness. Autoimmune workup and extensive tests done, was negative for any particular diagnosis. Checking myasthenia gravis panel for completion.   We will contact the patient with information about referral to [facility]

## 2018-03-26 LAB
ACETYLCHOLINE BINDING AB: 0 NMOL/L
ACETYLCHOLINE BLOCKING AB: 11 %
TITIN ANTIBODY: <0.09 IV

## 2018-03-27 ENCOUNTER — OFFICE VISIT (OUTPATIENT)
Dept: PODIATRY CLINIC | Facility: CLINIC | Age: 58
End: 2018-03-27

## 2018-03-27 DIAGNOSIS — M20.42 HAMMER TOE OF LEFT FOOT: ICD-10-CM

## 2018-03-27 DIAGNOSIS — M79.675 PAIN OF TOE OF LEFT FOOT: Primary | ICD-10-CM

## 2018-03-27 PROCEDURE — 99212 OFFICE O/P EST SF 10 MIN: CPT | Performed by: PODIATRIST

## 2018-03-27 NOTE — PROGRESS NOTES
HPI:    Patient ID: Sadi Burkett is a 59year old female. HPI  This 57-year-old female presents for further discussion in reference to the second toe of the left foot.   The nail did not go back but patient still has a lot of frustration and complaints ASSESSMENT/PLAN:   Pain of toe of left foot  (primary encounter diagnosis)  Hammer toe of left foot    No orders of the defined types were placed in this encounter.       Meds This Visit:  No prescriptions requested or ordered in this encounter    Imaging

## 2018-03-28 NOTE — PROCEDURES
[address] [facility]  Accredited by the [employer] (AASM)    PATIENT'S NAME: Ban Burgos   ATTENDING PHYSICIAN: Rashawn Elkins MD   REFERRING PHYSICIAN: Rashawn Elkins MD   PATIENT ACCOUNT #: [de-identified] LOCATION: Sleep Center were no significant periodic limb movements, and the lowest desaturation was to 87%. The average heart rate was 73 beats per minute, and there was no significant bradycardia, asystole, or atrial fibrillation.   There were 4 episodes of transient sinus tach

## 2018-03-30 ENCOUNTER — TELEPHONE (OUTPATIENT)
Dept: OPHTHALMOLOGY | Facility: CLINIC | Age: 58
End: 2018-03-30

## 2018-03-30 NOTE — TELEPHONE ENCOUNTER
Patient is looking to be seen by CHENCHO soon. To re discuss treatment plan. And possibly get another referral to another specialist. Please call. Thank you.

## 2018-03-30 NOTE — TELEPHONE ENCOUNTER
Pt saw Dr. Yossi Rosales for cataarct sx but was told that he does not recommend surgery at this time. Pt states that he ordered an MRI of the brain; showing normal results. VF test was done on March 28 with Dr. Yossi Rosales.  Pt is upset because now shes been told he wou

## 2018-04-02 ENCOUNTER — TELEPHONE (OUTPATIENT)
Dept: CASE MANAGEMENT | Age: 58
End: 2018-04-02

## 2018-04-02 DIAGNOSIS — H53.8 BLURRING OF VISION: Primary | ICD-10-CM

## 2018-04-02 NOTE — TELEPHONE ENCOUNTER
I agree with Dr. Yessi Lambert. Pt. needs to see a neuroophthalmologist first. She needs to go to [place] due to her insurance. Please call her and tell her this.

## 2018-04-02 NOTE — TELEPHONE ENCOUNTER
Spoke to pt and gave her information to [facility] [place] Dr. Agnes Solis. She will get her referral from PCP and Call [facility] [place]. I told her to let me know if there is anything else I can help her with.

## 2018-04-02 NOTE — TELEPHONE ENCOUNTER
Dr. Rick Ma is requesting patient see an Neuro-Ophthalmology at [place], Dr. Prisca Dao. Please enter DX and sign referral if you agree. Thank you,  Jodi Rosas.    [facility]

## 2018-04-03 ENCOUNTER — TELEPHONE (OUTPATIENT)
Dept: CASE MANAGEMENT | Age: 58
End: 2018-04-03

## 2018-04-03 DIAGNOSIS — G47.33 OSA (OBSTRUCTIVE SLEEP APNEA): Primary | ICD-10-CM

## 2018-04-03 NOTE — TELEPHONE ENCOUNTER
Hi Dr. Patito Nick,    Patient needs referral for CPAP and supplies. Thank you,   Vidal Mullins.    [facility]

## 2018-04-04 ENCOUNTER — TELEPHONE (OUTPATIENT)
Dept: INTERNAL MEDICINE CLINIC | Facility: CLINIC | Age: 58
End: 2018-04-04

## 2018-04-04 NOTE — TELEPHONE ENCOUNTER
Dr. Darrick Zhong was requested by Dr. Ankush Vela. I am not sure if there is any particular reason.  Please clarify

## 2018-04-04 NOTE — TELEPHONE ENCOUNTER
Per [facility]     \"We have an in network Neuro-Ophtha, Malika Grayson, I would need to know if there is a reason why she can not see this provider. \"     Thank you, Jose

## 2018-04-09 ENCOUNTER — TELEPHONE (OUTPATIENT)
Dept: OPHTHALMOLOGY | Facility: CLINIC | Age: 58
End: 2018-04-09

## 2018-04-09 NOTE — TELEPHONE ENCOUNTER
Dr Michelle Frazier,     Would you be willing to redirect patient to neuro-ophthalmologist Nataliya Lao rather than [facility]?      Thank you, Jose

## 2018-04-17 ENCOUNTER — TELEPHONE (OUTPATIENT)
Dept: OPHTHALMOLOGY | Facility: CLINIC | Age: 58
End: 2018-04-17

## 2018-04-17 NOTE — TELEPHONE ENCOUNTER
I called Dr. Maged Mckeon office to follow up to see if this patient had an upcoming appointment and to see if they had received the information we had faxed them.    The  said the patient has an appointment with Dr. Jessy Perez on 6/5/18, but they had not

## 2018-04-17 NOTE — TELEPHONE ENCOUNTER
I confirmed with Lyndsey Whitney at Dr. Yessy Castaneda office that they had received all of the notes.

## 2018-04-24 ENCOUNTER — TELEPHONE (OUTPATIENT)
Dept: OBGYN CLINIC | Facility: CLINIC | Age: 58
End: 2018-04-24

## 2018-04-24 NOTE — TELEPHONE ENCOUNTER
PER PT STATE SHE'S HAVING PELVIC PAIN / PT REQUESTING TO GET IN TODAY TO SEE A DR / I DID OFFER PT THE APPT WITH DR. CHOU AT 2:20 TODAY / PT LOOKING FOR TIME AFTER 5 PM / PLS ADV

## 2018-04-24 NOTE — TELEPHONE ENCOUNTER
Pt states that she has pelvic pain, 4-5/10, for a few weeks. Denies any spotting. Pt made an appt with [facility].

## 2018-04-25 ENCOUNTER — OFFICE VISIT (OUTPATIENT)
Dept: OBGYN CLINIC | Facility: CLINIC | Age: 58
End: 2018-04-25

## 2018-04-25 VITALS
BODY MASS INDEX: 29 KG/M2 | SYSTOLIC BLOOD PRESSURE: 125 MMHG | HEART RATE: 71 BPM | DIASTOLIC BLOOD PRESSURE: 80 MMHG | WEIGHT: 193 LBS

## 2018-04-25 DIAGNOSIS — N49.1 BOIL OF TUNICA VAGINALIS: Primary | ICD-10-CM

## 2018-04-25 PROCEDURE — 99213 OFFICE O/P EST LOW 20 MIN: CPT | Performed by: CLINICAL NURSE SPECIALIST

## 2018-04-25 RX ORDER — CEFADROXIL 500 MG/1
500 CAPSULE ORAL 2 TIMES DAILY
Qty: 14 CAPSULE | Refills: 0 | Status: SHIPPED | OUTPATIENT
Start: 2018-04-25 | End: 2018-05-02

## 2018-04-25 NOTE — PROGRESS NOTES
Luz Elena Hanson is a 59year old female  No LMP recorded. Patient is not currently having periods (Reason: Partial Hysterectomy). Patient presents with:  Gyn Problem: vulvar boil  Here with c/o boil to mons pubis that is extremely painful.  States she 0  •  DULoxetine HCl 60 MG Oral Cap DR Particles, Take one daily. , Disp: 90 capsule, Rfl: 3  •  Cholecalciferol (VITAMIN D3) 61799 units Oral Cap, Take one weekly for 12 weeks. , Disp: 12 capsule, Rfl: 0  •  latanoprost 0.005 % Ophthalmic Solution, INSTILL

## 2018-05-01 ENCOUNTER — TELEPHONE (OUTPATIENT)
Dept: OPHTHALMOLOGY | Facility: CLINIC | Age: 58
End: 2018-05-01

## 2018-05-01 NOTE — TELEPHONE ENCOUNTER
Spoke to pt and states that Dr. Juan Villafana office has been canceling and rescheduling her cataract surgery multiple times due to insurance complications.  Pt states that they are telling her that her [employer] is  but pt has a reference number that

## 2018-05-01 NOTE — TELEPHONE ENCOUNTER
Pt is going to call us back on Thursday to let us know if she is able to keep her surgery scheduled on 5/7/18 or if they cancel it due to insurance problems.  If they cancel her  surgery for 5/7/18 she would like [facility] to refer her elsewhere for cataract surg

## 2018-05-03 NOTE — TELEPHONE ENCOUNTER
Spoke with patient and she says that her insurance problem has all been figured out and she will be having cataract surgery done on 5/7/18. She says she wanted to make sure that Chaparro Bowman knows.

## 2018-05-03 NOTE — TELEPHONE ENCOUNTER
Patient calling back. States she has information regarding her insurance and upcoming surgery. Please call. Thank you.

## 2018-05-21 ENCOUNTER — OFFICE VISIT (OUTPATIENT)
Dept: INTERNAL MEDICINE CLINIC | Facility: CLINIC | Age: 58
End: 2018-05-21

## 2018-05-21 VITALS
DIASTOLIC BLOOD PRESSURE: 74 MMHG | TEMPERATURE: 98 F | HEIGHT: 69 IN | SYSTOLIC BLOOD PRESSURE: 124 MMHG | HEART RATE: 83 BPM | BODY MASS INDEX: 29 KG/M2 | WEIGHT: 195.81 LBS

## 2018-05-21 DIAGNOSIS — Z12.83 SCREENING FOR SKIN CANCER: ICD-10-CM

## 2018-05-21 DIAGNOSIS — K21.9 GASTROESOPHAGEAL REFLUX DISEASE, ESOPHAGITIS PRESENCE NOT SPECIFIED: ICD-10-CM

## 2018-05-21 DIAGNOSIS — M25.511 ACUTE PAIN OF RIGHT SHOULDER: ICD-10-CM

## 2018-05-21 DIAGNOSIS — H26.9 CATARACT OF BOTH EYES, UNSPECIFIED CATARACT TYPE: ICD-10-CM

## 2018-05-21 DIAGNOSIS — M48.02 CERVICAL STENOSIS OF SPINAL CANAL: Primary | ICD-10-CM

## 2018-05-21 DIAGNOSIS — D22.9 NUMEROUS MOLES: ICD-10-CM

## 2018-05-21 PROCEDURE — 99212 OFFICE O/P EST SF 10 MIN: CPT | Performed by: INTERNAL MEDICINE

## 2018-05-21 PROCEDURE — 99214 OFFICE O/P EST MOD 30 MIN: CPT | Performed by: INTERNAL MEDICINE

## 2018-05-21 RX ORDER — PANTOPRAZOLE SODIUM 40 MG/1
40 TABLET, DELAYED RELEASE ORAL
Qty: 90 TABLET | Refills: 3 | Status: SHIPPED | OUTPATIENT
Start: 2018-05-21 | End: 2019-07-08

## 2018-05-21 RX ORDER — AMLODIPINE BESYLATE 10 MG/1
TABLET ORAL
Refills: 1 | COMMUNITY
Start: 2018-05-13 | End: 2018-05-21

## 2018-05-21 RX ORDER — AMLODIPINE BESYLATE 10 MG/1
TABLET ORAL
Qty: 90 TABLET | Refills: 1 | Status: SHIPPED | OUTPATIENT
Start: 2018-05-21 | End: 2018-11-19

## 2018-05-21 NOTE — PROGRESS NOTES
Fredis Bae is a 59year old female. Patient presents with:  Neck Pain: right side neck pain radiates to shoulder   Hypertension      HPI:     ADAN Gagnon is here for neck pain. She has chronic neck pain, it has been lately worse.   She has pain in the 2/10/2015   • Anal sphincter incontinence 4/28/2014   • Chondromalacia    • Colon polyps    • Degenerative disc disease    • Diverticular disease    • Esophageal reflux    • Fibroids    • Hammertoe    • Insomnia    • Primary open angle glaucoma of both eye Musculoskeletal: Positive for back pain, myalgias and neck pain. Muscle cramps   Skin: Negative for rash. Neurological: Negative for dizziness and headaches.        EXAM:   /74 (BP Location: Left arm, Cuff Size: large)   Pulse 83   Temp 98 cataract type        Relevant Orders    OPHTHALMOLOGY - INTERNAL    Acute pain of right shoulder        Relevant Orders    ORTHOPEDIC - INTERNAL    Screening for skin cancer        Relevant Orders    DERM - INTERNAL    Numerous moles        Relevant Orders

## 2018-05-22 ENCOUNTER — OFFICE VISIT (OUTPATIENT)
Dept: PODIATRY CLINIC | Facility: CLINIC | Age: 58
End: 2018-05-22

## 2018-05-22 ENCOUNTER — TELEPHONE (OUTPATIENT)
Dept: OPHTHALMOLOGY | Facility: CLINIC | Age: 58
End: 2018-05-22

## 2018-05-22 DIAGNOSIS — L60.0 INGROWN TOENAIL: Primary | ICD-10-CM

## 2018-05-22 PROCEDURE — 99212 OFFICE O/P EST SF 10 MIN: CPT | Performed by: PODIATRIST

## 2018-05-22 NOTE — PROGRESS NOTES
HPI:    Patient ID: Shant Zayas is a 59year old female. HPI  This 18-year-old female presents with continued pain associated with her 2 great toenails. I tried to trim them for this patient that she just continues to have miserable discomfort.   She prescriptions requested or ordered in this encounter    Imaging & Referrals:  None       [id number]

## 2018-06-06 ENCOUNTER — OFFICE VISIT (OUTPATIENT)
Dept: PODIATRY CLINIC | Facility: CLINIC | Age: 58
End: 2018-06-06

## 2018-06-06 ENCOUNTER — OFFICE VISIT (OUTPATIENT)
Dept: PAIN CLINIC | Facility: HOSPITAL | Age: 58
End: 2018-06-06
Attending: INTERNAL MEDICINE
Payer: COMMERCIAL

## 2018-06-06 VITALS
SYSTOLIC BLOOD PRESSURE: 139 MMHG | HEART RATE: 71 BPM | WEIGHT: 195 LBS | RESPIRATION RATE: 18 BRPM | DIASTOLIC BLOOD PRESSURE: 80 MMHG | HEIGHT: 69 IN | BODY MASS INDEX: 28.88 KG/M2

## 2018-06-06 VITALS — SYSTOLIC BLOOD PRESSURE: 130 MMHG | DIASTOLIC BLOOD PRESSURE: 85 MMHG | HEART RATE: 85 BPM | RESPIRATION RATE: 20 BRPM

## 2018-06-06 DIAGNOSIS — M79.18 BILATERAL MYOFASCIAL PAIN: Primary | ICD-10-CM

## 2018-06-06 DIAGNOSIS — L60.0 INGROWN TOENAIL: Primary | ICD-10-CM

## 2018-06-06 DIAGNOSIS — M48.02 CERVICAL STENOSIS OF SPINAL CANAL: ICD-10-CM

## 2018-06-06 PROCEDURE — 99211 OFF/OP EST MAY X REQ PHY/QHP: CPT

## 2018-06-06 PROCEDURE — 11750 EXCISION NAIL&NAIL MATRIX: CPT | Performed by: PODIATRIST

## 2018-06-06 RX ORDER — METHYLPREDNISOLONE ACETATE 40 MG/ML
40 INJECTION, SUSPENSION INTRA-ARTICULAR; INTRALESIONAL; INTRAMUSCULAR; SOFT TISSUE ONCE
Status: COMPLETED | OUTPATIENT
Start: 2018-06-06 | End: 2018-06-06

## 2018-06-06 RX ORDER — BUPIVACAINE HYDROCHLORIDE 2.5 MG/ML
10 INJECTION, SOLUTION EPIDURAL; INFILTRATION; INTRACAUDAL ONCE
Status: COMPLETED | OUTPATIENT
Start: 2018-06-06 | End: 2018-06-06

## 2018-06-06 RX ADMIN — METHYLPREDNISOLONE ACETATE 40 MG: 40 INJECTION, SUSPENSION INTRA-ARTICULAR; INTRALESIONAL; INTRAMUSCULAR; SOFT TISSUE at 16:47:00

## 2018-06-06 RX ADMIN — BUPIVACAINE HYDROCHLORIDE 10 ML: 2.5 INJECTION, SOLUTION EPIDURAL; INFILTRATION; INTRACAUDAL at 16:46:00

## 2018-06-06 NOTE — CHRONIC PAIN
[facility]  Pain Consultation     HISTORY OF PRESENT ILLNESS:  Amber Chavarria is a 59year old old female referred to the pain clinic by Dr. Lavonne Steward for Bilateral myofascial pain  (primary encounter diagnosis)  Cervical stenosis of spinal Wesley Romano Comment:Other reaction(s): CELECOXIB  Erythromycin            PAIN  Sulfa Antibiotics       TONGUE SWELLING    Comment:Other reaction(s): SULFA (SULFONAMIDE ANTIBIOTICS)    SURGICAL HISTORY:  Past Surgical History:  No date: ANAL SPHINCTEROTOMY      Commen dysfunction)     Age-related nuclear cataract of both eyes     Status post Nissen fundoplication     GERD (gastroesophageal reflux disease)     Chronic post-operative pain     Primary open angle glaucoma of both eyes     Neuropathy     Myopathy     Floater Hysterectomy     Other Topics Concern    Caffeine Concern Yes    Comment: 1 CUP COFFEE DAILY    Pt has a pacemaker No    Pt has a defibrillator No    Reaction to local anesthetic No     Social History Narrative   None on file       ADVANCE CARE PLANNING: [facility], X CERV AP LAT, 10/24/2015,              13:46. [facility], MRI              [facility], 11/07/2015, 11:10.     INDICATIONS: Chronic neck pain radiating into the left upper extremity.  Carlos Medal foraminal disc     osteophyte complex superimposed on generalized spondylotic bulge. 3. C4-5: Broad-based central/right paracentral disc osteophyte complex.      Moderate asymmetric central narrowing with impingement on right ventral     lateral margin of ml each site for a total 6 ml  Needle:  25 ga 1.5 inch  Technique:  Injection Through Needle  Attempts:  First Attempt  Injectate:  Depomedrol 5mg each site  Injectate Volume: 7.5 mL  Narrative:  No Paresthesia  Complications:  No Complications  Success:

## 2018-06-06 NOTE — PROGRESS NOTES
06/06/18  PRESENTS AMBULATORY TO CPM;  NEW CONSULT C/O CERVICAL NECK PAIN 10/10; PT SEEN & TX 2015 FOR SAME PROBLEM;  RECEIVED INJECTION AT THAT TIME;  HAS HAD RELIEF UP UNTIL April 2018;  INTERESTED IN INJECTION THERAPY;  SEEN BY DR. Ermias Horner;  REFER TO DI

## 2018-06-06 NOTE — PROGRESS NOTES
HPI:    Patient ID: Leesa Bender is a 59year old female. HPI  This 70-year-old female presents for correction of ingrown toenails. After review of the procedures patient signed a written consent.   Review of Systems  I reviewed present medical status,

## 2018-06-11 ENCOUNTER — TELEPHONE (OUTPATIENT)
Dept: OPHTHALMOLOGY | Facility: CLINIC | Age: 58
End: 2018-06-11

## 2018-06-11 NOTE — TELEPHONE ENCOUNTER
Spoke with Ramirez Paul at Dr. Rivera De Los Santos office. She says that patient was seen on 6/5/18 and has a f/u there on 7/3/18. On 7/3/18, she is due to have a visual field and OCT.   Dr. Stephanie Diaz has not dictated on the 6/5/18 appointment yet, but Ramirez Paul says she klaudia

## 2018-06-19 ENCOUNTER — OFFICE VISIT (OUTPATIENT)
Dept: PODIATRY CLINIC | Facility: CLINIC | Age: 58
End: 2018-06-19

## 2018-06-19 DIAGNOSIS — L60.0 INGROWN TOENAIL: Primary | ICD-10-CM

## 2018-06-19 PROCEDURE — 99212 OFFICE O/P EST SF 10 MIN: CPT | Performed by: PODIATRIST

## 2018-06-19 PROCEDURE — 99024 POSTOP FOLLOW-UP VISIT: CPT | Performed by: PODIATRIST

## 2018-06-19 RX ORDER — CLINDAMYCIN HYDROCHLORIDE 150 MG/1
150 CAPSULE ORAL 3 TIMES DAILY
Qty: 21 CAPSULE | Refills: 0 | Status: SHIPPED | OUTPATIENT
Start: 2018-06-19 | End: 2018-07-09 | Stop reason: ALTCHOICE

## 2018-06-19 NOTE — PROGRESS NOTES
HPI:    Patient ID: Sadi Burkett is a 59year old female. HPI  This 68-year-old female presents postsurgical for ingrown toenail procedures on both great toes.   The right is causing her no problem in the left is causing rather significant pain and incr [id number]

## 2018-07-09 ENCOUNTER — OFFICE VISIT (OUTPATIENT)
Dept: DERMATOLOGY CLINIC | Facility: CLINIC | Age: 58
End: 2018-07-09

## 2018-07-09 DIAGNOSIS — D23.4 BENIGN NEOPLASM OF SCALP AND SKIN OF NECK: ICD-10-CM

## 2018-07-09 DIAGNOSIS — L82.0 INFLAMED SEBORRHEIC KERATOSIS: Primary | ICD-10-CM

## 2018-07-09 DIAGNOSIS — D23.30 BENIGN NEOPLASM OF SKIN OF FACE: ICD-10-CM

## 2018-07-09 DIAGNOSIS — D23.9 BENIGN NEOPLASM OF SKIN, UNSPECIFIED LOCATION: ICD-10-CM

## 2018-07-09 DIAGNOSIS — L82.1 SEBORRHEIC KERATOSES: ICD-10-CM

## 2018-07-09 PROCEDURE — 99213 OFFICE O/P EST LOW 20 MIN: CPT | Performed by: DERMATOLOGY

## 2018-07-09 PROCEDURE — 99212 OFFICE O/P EST SF 10 MIN: CPT | Performed by: DERMATOLOGY

## 2018-07-10 ENCOUNTER — OFFICE VISIT (OUTPATIENT)
Dept: PODIATRY CLINIC | Facility: CLINIC | Age: 58
End: 2018-07-10

## 2018-07-10 DIAGNOSIS — M20.42 HAMMERTOE OF LEFT FOOT: ICD-10-CM

## 2018-07-10 DIAGNOSIS — L60.0 INGROWN TOENAIL: Primary | ICD-10-CM

## 2018-07-10 PROCEDURE — 99212 OFFICE O/P EST SF 10 MIN: CPT | Performed by: PODIATRIST

## 2018-07-10 NOTE — PROGRESS NOTES
HPI:    Patient ID: Aicha Hunter is a 59year old female. HPI  47-year-old female presents postsurgical for ingrown toenails. The patient has some discomfort associated with both borders of each great toe.   There is no clinical evidence of edema nor e

## 2018-07-11 ENCOUNTER — OFFICE VISIT (OUTPATIENT)
Dept: ORTHOPEDICS CLINIC | Facility: CLINIC | Age: 58
End: 2018-07-11

## 2018-07-11 VITALS — DIASTOLIC BLOOD PRESSURE: 73 MMHG | SYSTOLIC BLOOD PRESSURE: 139 MMHG | HEART RATE: 72 BPM

## 2018-07-11 DIAGNOSIS — M17.11 PRIMARY LOCALIZED OSTEOARTHROSIS OF RIGHT LOWER LEG: Primary | ICD-10-CM

## 2018-07-11 DIAGNOSIS — M25.511 PAIN IN JOINT OF RIGHT SHOULDER: ICD-10-CM

## 2018-07-11 PROCEDURE — 20610 DRAIN/INJ JOINT/BURSA W/O US: CPT | Performed by: ORTHOPAEDIC SURGERY

## 2018-07-11 NOTE — PROGRESS NOTES
Patient presents with continued right knee and right shoulder pain. We have in the past recommended right shoulder arthroscopic rotator cuff repair and possible right knee replacement. She is not interested in surgery at this time.   We will continue with

## 2018-07-12 ENCOUNTER — NURSE ONLY (OUTPATIENT)
Dept: FAMILY MEDICINE CLINIC | Facility: CLINIC | Age: 58
End: 2018-07-12

## 2018-07-12 VITALS
HEART RATE: 95 BPM | DIASTOLIC BLOOD PRESSURE: 78 MMHG | RESPIRATION RATE: 16 BRPM | OXYGEN SATURATION: 95 % | SYSTOLIC BLOOD PRESSURE: 138 MMHG

## 2018-07-12 DIAGNOSIS — Z01.30 BLOOD PRESSURE CHECK: Primary | ICD-10-CM

## 2018-07-12 NOTE — PROGRESS NOTES
Requesting blood pressure check. States feeling well, does have a sinus headache, no neurologic symptoms. Explained with  Any associated symptoms recommend visit Refusing  a formal visit, just wants BP check at this time. /78.  Explained to patient th

## 2018-07-22 NOTE — PROGRESS NOTES
Juliana Wyatt is a 59year old female. HPI:     CC:  Patient presents with:  Lesion: Pt presents today with dark lesion of concern under R eye x 1 year that is tender to the touch. Pt denies a personal/family hx of SC. Allergies:  Celecoxib;  Arlin Kami Hypertension Father    • Hypertension Mother    • Hypertension Sister    • Hypertension Brother    • Diabetes Neg    • Glaucoma Neg    • Macular degeneration Neg       Smoking status: Never Smoker INTRAOCULAR LENS IMPLA* Left      Comment: L PC IOL with Dr. Ngoc Sullivan @ [facility]  2009: COLONOSCOPY  11/11/2016: COLONOSCOPY N/A      Comment: Procedure: COLONOSCOPY;  Surgeon: Chante Garvey MD;  Location: [facility] allergies reviewed as noted. ROS:  Denies any other systemic complaints. No new or changeing lesions other than noted above. No fevers, chills, night sweats, unusual sun sensitivity. No other skin complaints.         History, medications, allergies r and symptoms of skin cancer, ABCDE's of melanoma discussed with patient. Sunscreen use, sun protection, self exams reviewed. Followup as noted RTC ---routine checkup    6 mos -one year or p.r.n.

## 2018-08-06 ENCOUNTER — OFFICE VISIT (OUTPATIENT)
Dept: INTERNAL MEDICINE CLINIC | Facility: CLINIC | Age: 58
End: 2018-08-06

## 2018-08-06 VITALS
HEART RATE: 76 BPM | SYSTOLIC BLOOD PRESSURE: 131 MMHG | WEIGHT: 196.19 LBS | BODY MASS INDEX: 29.06 KG/M2 | TEMPERATURE: 98 F | DIASTOLIC BLOOD PRESSURE: 78 MMHG | HEIGHT: 69 IN

## 2018-08-06 DIAGNOSIS — R21 RASH: ICD-10-CM

## 2018-08-06 DIAGNOSIS — G89.29 CHRONIC MIDLINE LOW BACK PAIN, WITH SCIATICA PRESENCE UNSPECIFIED: Primary | ICD-10-CM

## 2018-08-06 DIAGNOSIS — M54.5 CHRONIC MIDLINE LOW BACK PAIN, WITH SCIATICA PRESENCE UNSPECIFIED: Primary | ICD-10-CM

## 2018-08-06 PROCEDURE — 99212 OFFICE O/P EST SF 10 MIN: CPT | Performed by: INTERNAL MEDICINE

## 2018-08-06 PROCEDURE — 99213 OFFICE O/P EST LOW 20 MIN: CPT | Performed by: INTERNAL MEDICINE

## 2018-08-06 RX ORDER — CYCLOBENZAPRINE HCL 10 MG
10 TABLET ORAL NIGHTLY
Qty: 20 TABLET | Refills: 0 | Status: SHIPPED | OUTPATIENT
Start: 2018-08-06 | End: 2018-08-26

## 2018-08-06 RX ORDER — ACETAMINOPHEN 160 MG
2000 TABLET,DISINTEGRATING ORAL DAILY
Qty: 30 CAPSULE | Refills: 1 | Status: SHIPPED | OUTPATIENT
Start: 2018-08-06 | End: 2018-09-24 | Stop reason: ALTCHOICE

## 2018-08-07 ENCOUNTER — OFFICE VISIT (OUTPATIENT)
Dept: PODIATRY CLINIC | Facility: CLINIC | Age: 58
End: 2018-08-07

## 2018-08-07 DIAGNOSIS — L60.0 INGROWN TOENAIL: ICD-10-CM

## 2018-08-07 DIAGNOSIS — M20.42 HAMMER TOE OF LEFT FOOT: Primary | ICD-10-CM

## 2018-08-07 PROCEDURE — 99212 OFFICE O/P EST SF 10 MIN: CPT | Performed by: PODIATRIST

## 2018-08-07 NOTE — PROGRESS NOTES
HPI:    Patient ID: Manuel Dean is a 59year old female. HPI  23-year-old female presents for follow-up in reference to ingrown toenail procedures. She has some discomfort is primarily because of the buildup of callus and the scabs this remaining.   R INTERNAL       GY#[id number]

## 2018-08-07 NOTE — PROGRESS NOTES
Eliane Wall is a 59year old female. Patient presents with:  Back Pain  Rash      HPI:     HPI   Brooks Rebolledo is here with exacerbation of low back pain. She has long-standing history of cervicalgia and lumbago. She has history of fibromyalgia as well.   She angle glaucoma of both eyes 5/19/2015    Diagnosis of glaucoma OU; Started Latanoprost qhs after abnormal VF and OCT 2/25/16;  6/5/18 consult with Dr. Umm Morejon progress note   • Small bowel obstruction [facility])    • Tendinitis    • Unspecified essen Muscle cramps   Skin: Positive for rash. Neurological: Negative for dizziness and headaches.          EXAM:   /78 (BP Location: Right arm, Cuff Size: large)   Pulse 76   Temp 98.2 °F (36.8 °C) (Oral)   Ht 5' 9\" (1.753 m)   Wt 196 lb 3.2 oz (

## 2018-08-23 ENCOUNTER — OFFICE VISIT (OUTPATIENT)
Dept: PAIN CLINIC | Facility: HOSPITAL | Age: 58
End: 2018-08-23
Attending: INTERNAL MEDICINE
Payer: COMMERCIAL

## 2018-08-23 ENCOUNTER — TELEPHONE (OUTPATIENT)
Dept: PODIATRY CLINIC | Facility: CLINIC | Age: 58
End: 2018-08-23

## 2018-08-23 ENCOUNTER — TELEPHONE (OUTPATIENT)
Dept: PAIN CLINIC | Facility: HOSPITAL | Age: 58
End: 2018-08-23

## 2018-08-23 VITALS — HEIGHT: 69 IN | BODY MASS INDEX: 28.88 KG/M2 | RESPIRATION RATE: 18 BRPM | WEIGHT: 195 LBS

## 2018-08-23 DIAGNOSIS — M51.16 LUMBAR DISC DISEASE WITH RADICULOPATHY: Primary | ICD-10-CM

## 2018-08-23 PROCEDURE — 99211 OFF/OP EST MAY X REQ PHY/QHP: CPT

## 2018-08-23 NOTE — TELEPHONE ENCOUNTER
Pt came to  wanting to know if anyone found out if her ins would allow her to go see Dr Cally Michelle per Dr Buchanan [facility] request. Joshua Stacy a nurse was going to call her asap. Please call with details.

## 2018-08-23 NOTE — PROGRESS NOTES
INITIAL CONSULT:    06/06/18  PRESENTS AMBULATORY TO CPM;  NEW CONSULT C/O CERVICAL NECK PAIN 10/10; PT SEEN & TX 2015 FOR SAME PROBLEM;  RECEIVED INJECTION AT THAT TIME;  HAS HAD RELIEF UP UNTIL April 2018;  INTERESTED IN INJECTION THERAPY;  SEEN BY DR. MEYERS

## 2018-08-23 NOTE — CHRONIC PAIN
Follow-up Note    HISTORY OF PRESENT ILLNESS:  Sam Reaves is a 59year old old female, with hx of cervical neck pain, cervical spinal stenosis here for follow up.   She underwent a trigger point injections in the neck with Dr. Magdy Tovar with 3 weeks of rel ulcer  Genitourinary:  Negative  Integumentary :  Negative  Psychiatric:  Negative  Hematologic: No active bleeding  Lymphatic: No current lymphedema  Allergic/Immunologic:  Negative  Musculoskeletal: As above  Neurological: As above  Denies chest pain, sh Comment: 03/12/2013 [place]  05/07/2018: CATARACT EXTRACTION W/  INTRAOCULAR LENS IMPLA* Left      Comment: L PC IOL with Dr. Deysi Huff @ [facility]  2009: COLONOSCOPY  11/11/2016: COLONOSCOPY N/A      Comment: Procedure: COLONOSCOPY;  Surgeon: Fabrice Foss soft, non-tender  Gait: Normal;  cane user - No  Spine: Normal    ROM:   CERVICAL SPINE    Degree Pain   Flexion <45 No   Extension 30 No   Left SB 30 No   Right SB 30 No   Left Rotation 80 No   Right Rotation 80 No     SHOULDERS        LEFT Pain RIGHT Radah Healy pathology.     FINDINGS:  CRANIOCERVICAL AREA:     Normal foramen magnum with no Chiari malformation.     PARASPINAL AREA:       Normal with no visible mass. BONES:                 No fracture, pars defect, or osseous lesion.   CORD:                  Ada Fresh (CST): Dora Forde MD on 6/20/2016 at 20:46     Approved by  (CST): Dora Forde MD on 6/20/2016 at 21:06                                                               Electronically Verified                                            [facility]

## 2018-08-24 NOTE — TELEPHONE ENCOUNTER
Spoke to pt and informed her that referral for Yuliya Mcfadden was authorized. Apologized to pt for her not getting notification of this in the mail. Advised pt to call her insurance to discuss coverage for appt with Yuliya Mcfadden. Pt verbalized understanding.    Sj Bloom

## 2018-08-24 NOTE — TELEPHONE ENCOUNTER
pt called. LOV 8/7/18 with SCR. SCR referred her to see Dr. Arlin Vasquez for 2nd opinion. Was told a RN was going to call to her to see if visit with Dr. Arlin Vasquez would be covered? Please advise.   Thank you

## 2018-09-05 ENCOUNTER — HOSPITAL ENCOUNTER (OUTPATIENT)
Dept: MRI IMAGING | Facility: HOSPITAL | Age: 58
Discharge: HOME | End: 2018-09-05
Attending: ANESTHESIOLOGY
Payer: COMMERCIAL

## 2018-09-05 DIAGNOSIS — M51.16 LUMBAR DISC DISEASE WITH RADICULOPATHY: ICD-10-CM

## 2018-09-05 PROCEDURE — 72148 MRI LUMBAR SPINE W/O DYE: CPT | Performed by: ANESTHESIOLOGY

## 2018-09-08 ENCOUNTER — OFFICE VISIT (OUTPATIENT)
Dept: OPHTHALMOLOGY | Facility: CLINIC | Age: 58
End: 2018-09-08

## 2018-09-08 DIAGNOSIS — H40.1132 PRIMARY OPEN ANGLE GLAUCOMA OF BOTH EYES, MODERATE STAGE: ICD-10-CM

## 2018-09-08 PROCEDURE — 99212 OFFICE O/P EST SF 10 MIN: CPT | Performed by: OPHTHALMOLOGY

## 2018-09-08 PROCEDURE — 92083 EXTENDED VISUAL FIELD XM: CPT | Performed by: OPHTHALMOLOGY

## 2018-09-08 PROCEDURE — 92133 CPTRZD OPH DX IMG PST SGM ON: CPT | Performed by: OPHTHALMOLOGY

## 2018-09-08 PROCEDURE — 99213 OFFICE O/P EST LOW 20 MIN: CPT | Performed by: OPHTHALMOLOGY

## 2018-09-08 RX ORDER — LATANOPROST 50 UG/ML
SOLUTION/ DROPS OPHTHALMIC
Qty: 3 BOTTLE | Refills: 3 | Status: SHIPPED | OUTPATIENT
Start: 2018-09-08 | End: 2019-07-13

## 2018-09-08 NOTE — PROGRESS NOTES
Aicha Hunter is a 59year old female. HPI:     HPI     Consult      Additional comments: Consult per Dr Perez Cervantes              Comments     Pt is here for VF, OCT and IOP check.  Pt is taking latanoprost OU QHS pt states is getting drops in about 4 nights a with Dr. Tez Cage @ [facility]).     Family History   Problem Relation Age of Onset   • Hypertension Father    • Hypertension Mother    • Hypertension Sister    • Hypertension Brother    • Diabetes Neg    • Glaucoma Neg    • Macular degeneration Neg        Social Hi Thickness 519/+1.5 523/+1          Pupils       Pupils    Right PERRL    Left PERRL            Slit Lamp and Fundus Exam     External Exam       Right Left    External Normal Normal          Slit Lamp Exam       Right Left    Lids/Lashes Dermatochalasis, M WIDTH=9%></td></tr>  </table>       Follow up instructions:  Return in about 4 months (around 1/8/2019) for EE and photos.     9/8/2018  Scribed by: Kell Denton MD

## 2018-09-08 NOTE — ASSESSMENT & PLAN NOTE
IOP is stable. Continue Latanoprost in both eyes, but patient thinks that she will have better compliance if she takes the drops in the mornings. Therefore, will have patient take Latanoprost every morning in both eyes.     Visual field today with improve

## 2018-09-08 NOTE — PATIENT INSTRUCTIONS
Primary open angle glaucoma of both eyes, moderate stage  IOP is stable. Continue Latanoprost in both eyes, but patient thinks that she will have better compliance if she takes the drops in the mornings.   Therefore, will have patient take Latanoprost ever

## 2018-09-10 ENCOUNTER — TELEPHONE (OUTPATIENT)
Dept: INTERNAL MEDICINE CLINIC | Facility: CLINIC | Age: 58
End: 2018-09-10

## 2018-09-10 DIAGNOSIS — Z12.31 VISIT FOR SCREENING MAMMOGRAM: Primary | ICD-10-CM

## 2018-09-10 NOTE — TELEPHONE ENCOUNTER
Contacted pt and informed her that mammogram order has been generated. Contact number given. Pt verbalized understanding. Inquired if a paper copy can be mailed to her.   Placed order in the mail

## 2018-09-19 ENCOUNTER — OFFICE VISIT (OUTPATIENT)
Dept: PAIN CLINIC | Facility: HOSPITAL | Age: 58
End: 2018-09-19
Attending: ANESTHESIOLOGY
Payer: COMMERCIAL

## 2018-09-19 ENCOUNTER — TELEPHONE (OUTPATIENT)
Dept: PAIN CLINIC | Facility: HOSPITAL | Age: 58
End: 2018-09-19

## 2018-09-19 VITALS
WEIGHT: 195 LBS | HEART RATE: 81 BPM | DIASTOLIC BLOOD PRESSURE: 84 MMHG | RESPIRATION RATE: 18 BRPM | BODY MASS INDEX: 28.88 KG/M2 | SYSTOLIC BLOOD PRESSURE: 138 MMHG | HEIGHT: 69 IN

## 2018-09-19 DIAGNOSIS — M48.02 CERVICAL STENOSIS OF SPINAL CANAL: ICD-10-CM

## 2018-09-19 DIAGNOSIS — M51.16 LUMBAR DISC DISEASE WITH RADICULOPATHY: Primary | ICD-10-CM

## 2018-09-19 DIAGNOSIS — M12.811 ROTATOR CUFF ARTHROPATHY OF RIGHT SHOULDER: ICD-10-CM

## 2018-09-19 PROCEDURE — 99211 OFF/OP EST MAY X REQ PHY/QHP: CPT

## 2018-09-19 NOTE — CHRONIC PAIN
Follow-up Note    HISTORY OF PRESENT ILLNESS:  Shant Zayas is a 59year old old female, with hx of cervical neck pain, cervical spinal stenosis here for follow up.   She underwent a trigger point injections in the neck with Dr. Uche Maddox with 3 weeks of rel above  Weight Loss: Negative   Fever: Negative   Cardiovascular:  No current chest pain or palpitations   Respiratory:  No current shortness of breath   Gastrointestinal:  No active ulcer  Genitourinary:  Negative  Integumentary :  Negative  Psychiatric: Dr. Vlad Solano progress note  No date: Small bowel obstruction ([facility] [address]  No date: Tendinitis  No date: Unspecified essential hypertension  No date: Unspecified sleep apnea    SURGICAL HISTORY:  Past Surgical History:  No date: ANAL SPHI Not on file    Occupational History      Not on file    Tobacco Use      Smoking status: Never Smoker      Smokeless tobacco: Never Used    Substance and Sexual Activity      Alcohol use: No        Alcohol/week: 0.0 oz        Comment: None.        Drug use: Intrinsic Hand 5/5 5/5    Strength 5/5 5/5     LOWER EXTREMITY      LEFT RIGHT   Iliopsoas 5/5 5/5   Quadriceps 5/5 5/5   Foot DF 5/5 5/5   Foot EHL 5/5 5/5   Gastrocnemius 5/5 5/5     PULSES      LEFT RIGHT   Radial 2/4 2/4                    Right intact. C3-C4: Small central disc protrusion. Mild central narrowing. No foraminal         narrowing. Facet joints intact.   C4-C5: Mild spondylotic disc bulge with a broad-based central/right         paracentral chronic disc herniation with a smaller oste down legs, worsening. No injury. No surgery. No cortisone injections. TECHNIQUE:    A variety of imaging planes and parameters were utilized for visualization of suspected pathology.      FINDINGS:          NUMERATION: For the purposes of this examinati PROGRAM REVIEWED  yes      ASSESSMENT AND PLAN:    Alberto Moreno is a 59year old  female, with cervical and lumbar spondylosis.   Status post cervical epidural steroid injection with resolution of distal radicular component  - Imaging reviewed of her lumba

## 2018-09-20 ENCOUNTER — DOCUMENTATION ONLY (OUTPATIENT)
Dept: PAIN CLINIC | Facility: HOSPITAL | Age: 58
End: 2018-09-20

## 2018-09-24 ENCOUNTER — OFFICE VISIT (OUTPATIENT)
Dept: INTERNAL MEDICINE CLINIC | Facility: CLINIC | Age: 58
End: 2018-09-24

## 2018-09-24 ENCOUNTER — APPOINTMENT (OUTPATIENT)
Dept: LAB | Facility: HOSPITAL | Age: 58
End: 2018-09-24
Attending: INTERNAL MEDICINE
Payer: COMMERCIAL

## 2018-09-24 VITALS
WEIGHT: 200.19 LBS | DIASTOLIC BLOOD PRESSURE: 71 MMHG | HEART RATE: 84 BPM | TEMPERATURE: 98 F | HEIGHT: 69 IN | SYSTOLIC BLOOD PRESSURE: 108 MMHG | BODY MASS INDEX: 29.65 KG/M2

## 2018-09-24 DIAGNOSIS — Z23 NEED FOR VACCINATION: ICD-10-CM

## 2018-09-24 DIAGNOSIS — E55.9 VITAMIN D DEFICIENCY: ICD-10-CM

## 2018-09-24 DIAGNOSIS — G72.9 MYOPATHY: ICD-10-CM

## 2018-09-24 DIAGNOSIS — R25.2 MUSCLE CRAMPS: ICD-10-CM

## 2018-09-24 DIAGNOSIS — E55.9 VITAMIN D DEFICIENCY: Primary | ICD-10-CM

## 2018-09-24 LAB
ANION GAP SERPL CALC-SCNC: 9 MMOL/L (ref 0–18)
BUN SERPL-MCNC: 13 MG/DL (ref 8–20)
BUN/CREAT SERPL: 10.6 (ref 10–20)
CALCIUM SERPL-MCNC: 9.2 MG/DL (ref 8.5–10.5)
CHLORIDE SERPL-SCNC: 104 MMOL/L (ref 95–110)
CK SERPL-CCNC: 640 U/L (ref 38–234)
CO2 SERPL-SCNC: 27 MMOL/L (ref 22–32)
CREAT SERPL-MCNC: 1.23 MG/DL (ref 0.5–1.5)
GLUCOSE SERPL-MCNC: 101 MG/DL (ref 70–99)
MAGNESIUM SERPL-MCNC: 2.1 MG/DL (ref 1.8–2.5)
OSMOLALITY UR CALC.SUM OF ELEC: 290 MOSM/KG (ref 275–295)
POTASSIUM SERPL-SCNC: 3.8 MMOL/L (ref 3.3–5.1)
SODIUM SERPL-SCNC: 140 MMOL/L (ref 136–144)
TSH SERPL-ACNC: 2.28 UIU/ML (ref 0.45–5.33)

## 2018-09-24 PROCEDURE — 90471 IMMUNIZATION ADMIN: CPT | Performed by: INTERNAL MEDICINE

## 2018-09-24 PROCEDURE — 84443 ASSAY THYROID STIM HORMONE: CPT

## 2018-09-24 PROCEDURE — 90686 IIV4 VACC NO PRSV 0.5 ML IM: CPT | Performed by: INTERNAL MEDICINE

## 2018-09-24 PROCEDURE — 36415 COLL VENOUS BLD VENIPUNCTURE: CPT

## 2018-09-24 PROCEDURE — 99212 OFFICE O/P EST SF 10 MIN: CPT | Performed by: INTERNAL MEDICINE

## 2018-09-24 PROCEDURE — 82306 VITAMIN D 25 HYDROXY: CPT

## 2018-09-24 PROCEDURE — 99214 OFFICE O/P EST MOD 30 MIN: CPT | Performed by: INTERNAL MEDICINE

## 2018-09-24 PROCEDURE — 80048 BASIC METABOLIC PNL TOTAL CA: CPT

## 2018-09-24 PROCEDURE — 82550 ASSAY OF CK (CPK): CPT

## 2018-09-24 PROCEDURE — 83735 ASSAY OF MAGNESIUM: CPT

## 2018-09-24 RX ORDER — ERGOCALCIFEROL 1.25 MG/1
50000 CAPSULE ORAL WEEKLY
Qty: 12 CAPSULE | Refills: 0 | Status: SHIPPED | OUTPATIENT
Start: 2018-09-24 | End: 2018-12-23

## 2018-09-24 RX ORDER — POTASSIUM CHLORIDE 750 MG/1
10 TABLET, FILM COATED, EXTENDED RELEASE ORAL DAILY
Qty: 30 TABLET | Refills: 1 | Status: SHIPPED | OUTPATIENT
Start: 2018-09-24 | End: 2018-10-15

## 2018-09-24 RX ORDER — MULTIVITAMIN
1 TABLET ORAL
Status: ON HOLD | COMMUNITY
End: 2019-02-18

## 2018-09-25 ENCOUNTER — DOCUMENTATION ONLY (OUTPATIENT)
Dept: PAIN CLINIC | Facility: HOSPITAL | Age: 58
End: 2018-09-25

## 2018-09-25 ENCOUNTER — TELEPHONE (OUTPATIENT)
Dept: INTERNAL MEDICINE CLINIC | Facility: CLINIC | Age: 58
End: 2018-09-25

## 2018-09-25 RX ORDER — POTASSIUM CHLORIDE 750 MG/1
10 TABLET, FILM COATED, EXTENDED RELEASE ORAL DAILY
Qty: 90 TABLET | Refills: 0 | OUTPATIENT
Start: 2018-09-25

## 2018-09-25 NOTE — PROGRESS NOTES
Lei Tucker is a 59year old female. Patient presents with:  Back Pain      HPI:     HPI   Patiet is here for the follow up of back pain. This is a chronic problem. She was seen in pain clinic.   She had trigger point injection of the neck and back in Medical History:  No date: Abscess of left thigh  No date: Acute meniscal tear of knee  2/10/2015: Age-related nuclear cataract of both eyes  4/28/2014:  Anal sphincter incontinence  No date: Chondromalacia  No date: Colon polyps  No date: Degenerative disc and shortness of breath. Cardiovascular: Negative for chest pain and palpitations. Gastrointestinal: Positive for heartburn. Negative for abdominal pain, blood in stool, constipation, diarrhea, melena, nausea and vomiting. Genitourinary: Negative. transient elevation of CK in the past.  Recheck CK. Has been having muscle cramps.          Relevant Orders    BASIC METABOLIC PANEL (8)    TSH W REFLEX TO FREE T4    MAGNESIUM    CK CREATINE KINASE (NOT CREATININE)    BASIC METABOLIC PANEL (8)    EVELYNU

## 2018-09-25 NOTE — TELEPHONE ENCOUNTER
Spoke with Jonah Galvan and relayed PJ message below--verbalizes understanding and agreement and will fill original Rx. No further questions/concerns at this time.

## 2018-09-25 NOTE — TELEPHONE ENCOUNTER
Spoke with [facility]--Rx shows 12 capsules is a 90 day supply--verbalizes understanding and agreement and has current Rx filled and ready for . No further questions/concerns at this time.

## 2018-09-25 NOTE — TELEPHONE ENCOUNTER
Please inform that we are not planing it as long term,therefore please fill the original prescription

## 2018-09-25 NOTE — ASSESSMENT & PLAN NOTE
Patient with history of fibromyalgia and long-standing history of muscle cramps. Recurrence of the symptoms. Checking map, potassium, vitamin D and thyroid. Starting lower dose of K-Dur and magnesium supplement to help with the cramps.   Advised patient

## 2018-09-25 NOTE — TELEPHONE ENCOUNTER
Current Outpatient Medications:  Potassium Chloride ER 10 MEQ Oral Tab CR Take 1 tablet (10 mEq total) by mouth daily.  Disp: 30 tablet Rfl: 1       The patient is requesting authorization to dispense a 90 day supply

## 2018-09-25 NOTE — ASSESSMENT & PLAN NOTE
History of vitamin D deficiency. Her symptoms are better on ergocalciferol 50,000 units. Sending prescription. Checking levels. Talked about vitamin D rich food intake.     Please increase vitamin D rich food intake ( diary products like milk and yogurt

## 2018-09-25 NOTE — PATIENT INSTRUCTIONS
Please increase vitamin D rich food intake ( diary products like milk and yogurt, green leafy vegetables  likebroccoli, spinach, collards, eggs, fish, poultry, red beans.),  regular weight bearing exercises like walking, running, climbing stairs.      Ple

## 2018-09-25 NOTE — TELEPHONE ENCOUNTER
Current Outpatient Medications:  ergocalciferol 60388 units Oral Cap Take 1 capsule (50,000 Units total) by mouth once a week.  Disp: 12 capsule Rfl: 0     The patient is requesting authorization to dispense a 90 day supply

## 2018-09-25 NOTE — TELEPHONE ENCOUNTER
Please advise in regards to refill request. Thank You    Refill Protocol Appointment Criteria  · Appointment scheduled in the past 6 months or in the next 3 months  Recent Outpatient Visits            Yesterday Vitamin D deficiency    [facility]

## 2018-09-26 LAB — 25(OH)D3 SERPL-MCNC: 45.3 NG/ML (ref 30–100)

## 2018-09-26 RX ORDER — IBUPROFEN 600 MG/1
TABLET ORAL
Qty: 60 TABLET | Refills: 0 | Status: SHIPPED | OUTPATIENT
Start: 2018-09-26 | End: 2018-10-15

## 2018-09-27 ENCOUNTER — TELEPHONE (OUTPATIENT)
Dept: OTHER | Age: 58
End: 2018-09-27

## 2018-09-27 DIAGNOSIS — G72.9 MYOPATHY: ICD-10-CM

## 2018-09-27 DIAGNOSIS — R25.2 MUSCLE CRAMPS: Primary | ICD-10-CM

## 2018-09-27 NOTE — TELEPHONE ENCOUNTER
Notes recorded by Ellen Domingo RN on 9/25/2018 at 7:50 PM CDT  Advised patient of Dr. Tarun Turner note. Patient verbalized understanding.  Patient wanted to know if needs to start the potassium and magnesium that you prescribed yesterday?  ------

## 2018-09-27 NOTE — TELEPHONE ENCOUNTER
She may start potassium and magnesium as prescribed. See if that improves her symptoms. Repeat labs in 2 weeks.  Follow up in office after labs

## 2018-09-28 NOTE — TELEPHONE ENCOUNTER
Pt states she was told potassium and magnesium are normal and pt is asking why she needs to take these if labs were normal.    Please advise.

## 2018-09-29 NOTE — TELEPHONE ENCOUNTER
K and mag in small dose was suggested to see if it helps with her cramps. Do not have to take these. We will repeat muscle enzymes as ordered and see if Vitamin D improves. Attempted to call the patient. No answer.  Will discuss about id during the next

## 2018-10-08 ENCOUNTER — APPOINTMENT (OUTPATIENT)
Dept: LAB | Facility: HOSPITAL | Age: 58
End: 2018-10-08
Attending: INTERNAL MEDICINE
Payer: COMMERCIAL

## 2018-10-08 ENCOUNTER — TELEPHONE (OUTPATIENT)
Dept: PODIATRY CLINIC | Facility: CLINIC | Age: 58
End: 2018-10-08

## 2018-10-08 DIAGNOSIS — R25.2 MUSCLE CRAMPS: ICD-10-CM

## 2018-10-08 DIAGNOSIS — R74.8 ELEVATED CK: ICD-10-CM

## 2018-10-08 DIAGNOSIS — G72.9 MYOPATHY: ICD-10-CM

## 2018-10-08 DIAGNOSIS — G72.9 MYOPATHY: Primary | ICD-10-CM

## 2018-10-08 DIAGNOSIS — M20.42 HAMMERTOE OF LEFT FOOT: Primary | ICD-10-CM

## 2018-10-08 PROCEDURE — 82085 ASSAY OF ALDOLASE: CPT

## 2018-10-08 PROCEDURE — 80048 BASIC METABOLIC PNL TOTAL CA: CPT

## 2018-10-08 PROCEDURE — 83735 ASSAY OF MAGNESIUM: CPT

## 2018-10-08 PROCEDURE — 36415 COLL VENOUS BLD VENIPUNCTURE: CPT

## 2018-10-08 PROCEDURE — 82550 ASSAY OF CK (CPK): CPT

## 2018-10-08 NOTE — TELEPHONE ENCOUNTER
Pt states she needs new referral to see Dr. Thompson Patel, asking for several visits. Pls advise thank you.

## 2018-10-08 NOTE — PROGRESS NOTES
Results of elevated CK discussed with the patient. Kidney function normal  Cr normal.  The mag level normal.  Pending aldolase. Informed the patient to follow up with rheumatology.  Pt seen Dr Heidi Mckeon in the past for the same

## 2018-10-10 ENCOUNTER — HOSPITAL ENCOUNTER (OUTPATIENT)
Dept: MAMMOGRAPHY | Facility: HOSPITAL | Age: 58
Discharge: HOME | End: 2018-10-10
Attending: INTERNAL MEDICINE
Payer: COMMERCIAL

## 2018-10-10 DIAGNOSIS — Z12.31 VISIT FOR SCREENING MAMMOGRAM: ICD-10-CM

## 2018-10-10 PROCEDURE — 77067 SCR MAMMO BI INCL CAD: CPT | Performed by: INTERNAL MEDICINE

## 2018-10-10 PROCEDURE — 77063 BREAST TOMOSYNTHESIS BI: CPT | Performed by: INTERNAL MEDICINE

## 2018-10-15 ENCOUNTER — OFFICE VISIT (OUTPATIENT)
Dept: OBGYN CLINIC | Facility: CLINIC | Age: 58
End: 2018-10-15

## 2018-10-15 VITALS
WEIGHT: 198 LBS | SYSTOLIC BLOOD PRESSURE: 123 MMHG | HEART RATE: 81 BPM | DIASTOLIC BLOOD PRESSURE: 77 MMHG | BODY MASS INDEX: 29 KG/M2

## 2018-10-15 DIAGNOSIS — N90.89 PERINEAL CYST IN FEMALE: Primary | ICD-10-CM

## 2018-10-15 PROCEDURE — 99213 OFFICE O/P EST LOW 20 MIN: CPT | Performed by: OBSTETRICS & GYNECOLOGY

## 2018-10-15 NOTE — PROGRESS NOTES
Mike Warrne is a 59year old female  No LMP recorded. Patient is not currently having periods (Reason: Partial Hysterectomy).  Patient presents with:  Gyn Problem: pelvic pain x 1 month    Last seen in 16,   Pt is not here for pelvic pain-- ju Procedure: COLONOSCOPY;  Surgeon: Mariah Phillips MD;  Location: [facility] ENDOSCOPY   • COLONOSCOPY N/A 11/11/2016    Performed by Mariah Phillips MD at [address], SINGLE - OD - RIGHT EYE Right 6.27/2017 Genitalia: normal appearance, hair distribution, and no lesions  In lower left vulva/perineal area-- 1 cm smooth cyst under skin. No erythema. No tenderness. No head. Assessment & Plan:  1.  Perineal cyst in female, possible epidermal cyst  Pt will

## 2018-10-31 ENCOUNTER — OFFICE VISIT (OUTPATIENT)
Dept: RHEUMATOLOGY | Facility: CLINIC | Age: 58
End: 2018-10-31

## 2018-10-31 ENCOUNTER — OFFICE VISIT (OUTPATIENT)
Dept: PAIN CLINIC | Facility: HOSPITAL | Age: 58
End: 2018-10-31
Attending: ANESTHESIOLOGY
Payer: COMMERCIAL

## 2018-10-31 VITALS
HEART RATE: 75 BPM | HEIGHT: 69 IN | SYSTOLIC BLOOD PRESSURE: 132 MMHG | BODY MASS INDEX: 28.88 KG/M2 | DIASTOLIC BLOOD PRESSURE: 84 MMHG | WEIGHT: 195 LBS

## 2018-10-31 VITALS
HEART RATE: 71 BPM | HEIGHT: 69 IN | WEIGHT: 198 LBS | SYSTOLIC BLOOD PRESSURE: 143 MMHG | DIASTOLIC BLOOD PRESSURE: 89 MMHG | BODY MASS INDEX: 29.33 KG/M2

## 2018-10-31 DIAGNOSIS — R25.2 MUSCLE CRAMPS: Primary | ICD-10-CM

## 2018-10-31 DIAGNOSIS — M54.16 LUMBAR RADICULOPATHY: Chronic | ICD-10-CM

## 2018-10-31 PROCEDURE — 99212 OFFICE O/P EST SF 10 MIN: CPT | Performed by: INTERNAL MEDICINE

## 2018-10-31 PROCEDURE — 99213 OFFICE O/P EST LOW 20 MIN: CPT | Performed by: INTERNAL MEDICINE

## 2018-10-31 PROCEDURE — 99211 OFF/OP EST MAY X REQ PHY/QHP: CPT

## 2018-10-31 RX ORDER — TIZANIDINE 4 MG/1
4 TABLET ORAL NIGHTLY
Qty: 30 TABLET | Refills: 0 | Status: SHIPPED | OUTPATIENT
Start: 2018-10-31 | End: 2018-11-30

## 2018-10-31 NOTE — CHRONIC PAIN
[facility]  Pain Clinic  Follow Up Visit Report       Patient name: Nakul Granados 59year old female  : 1960  MRN: [id number]  Referring MD:  Keyla Torrez MD    COMPLAINT:  Low back pain radiating down to left lower extremity.   Nelly Aquino W/O US     • HYSTERECTOMY  1996    KAI   • OTHER SURGICAL HISTORY  2005,2007,2008    LAPAROSCOPIC LYSIS OF ADHESION   • OTHER SURGICAL HISTORY      right foot bunionectomy   • OTHER SURGICAL HISTORY  11-14-14    right superficial anterior peroneal nerve bi nausea or vomiting, or bowel incontinence   : no dysuria, or pyuria, or bladder incontinence   Endo: no goiter, lethargy, or heat/cold intolerance  Heme/Onc: no pallor, bruising, or bleeding   Musculoskeletal: no changes in strength or swelling   Neuro: procedure. We will start tizanidine 4 mg 1 tablet before she goes to bed.

## 2018-10-31 NOTE — PROGRESS NOTES
INITIAL CONSULT:  06/06/18  PRESENTS AMBULATORY TO CPM;  NEW CONSULT C/O CERVICAL NECK PAIN 10/10; PT SEEN & TX 2015 FOR SAME PROBLEM;  RECEIVED INJECTION AT THAT TIME;  HAS HAD RELIEF UP UNTIL April 2018;  INTERESTED IN INJECTION THERAPY;  SEEN BY DR. Renny Gutierrez

## 2018-11-05 ENCOUNTER — TELEPHONE (OUTPATIENT)
Dept: PAIN CLINIC | Facility: HOSPITAL | Age: 58
End: 2018-11-05

## 2018-11-05 ENCOUNTER — TELEPHONE (OUTPATIENT)
Dept: RHEUMATOLOGY | Facility: CLINIC | Age: 58
End: 2018-11-05

## 2018-11-05 NOTE — TELEPHONE ENCOUNTER
Spoke w/pt and we have scheduled her procedure for 11/16/18, pending insurance approval. Verbal instructions given to the patient, she verbalized understanding. She is aware if she needs to cancel or change her appt to please call us. No furhter needs.

## 2018-11-19 ENCOUNTER — DOCUMENTATION ONLY (OUTPATIENT)
Dept: PAIN CLINIC | Facility: HOSPITAL | Age: 58
End: 2018-11-19

## 2018-11-19 ENCOUNTER — OFFICE VISIT (OUTPATIENT)
Dept: INTERNAL MEDICINE CLINIC | Facility: CLINIC | Age: 58
End: 2018-11-19

## 2018-11-19 ENCOUNTER — TELEPHONE (OUTPATIENT)
Dept: INTERNAL MEDICINE CLINIC | Facility: CLINIC | Age: 58
End: 2018-11-19

## 2018-11-19 VITALS
HEART RATE: 66 BPM | DIASTOLIC BLOOD PRESSURE: 80 MMHG | BODY MASS INDEX: 29.57 KG/M2 | TEMPERATURE: 98 F | WEIGHT: 199.63 LBS | SYSTOLIC BLOOD PRESSURE: 141 MMHG | HEIGHT: 69 IN

## 2018-11-19 DIAGNOSIS — R25.2 MUSCLE CRAMPS: ICD-10-CM

## 2018-11-19 DIAGNOSIS — J32.1 CHRONIC FRONTAL SINUSITIS: ICD-10-CM

## 2018-11-19 DIAGNOSIS — I10 ESSENTIAL HYPERTENSION: Primary | ICD-10-CM

## 2018-11-19 DIAGNOSIS — Z00.00 ANNUAL PHYSICAL EXAM: Primary | ICD-10-CM

## 2018-11-19 PROBLEM — H43.393 FLOATER, VITREOUS, BILATERAL: Status: RESOLVED | Noted: 2018-02-10 | Resolved: 2018-11-19

## 2018-11-19 PROCEDURE — 99212 OFFICE O/P EST SF 10 MIN: CPT | Performed by: INTERNAL MEDICINE

## 2018-11-19 PROCEDURE — 99214 OFFICE O/P EST MOD 30 MIN: CPT | Performed by: INTERNAL MEDICINE

## 2018-11-19 RX ORDER — AZITHROMYCIN 250 MG/1
TABLET, FILM COATED ORAL
Qty: 6 TABLET | Refills: 0 | Status: SHIPPED | OUTPATIENT
Start: 2018-11-19 | End: 2018-12-04

## 2018-11-19 RX ORDER — AMOXICILLIN AND CLAVULANATE POTASSIUM 875; 125 MG/1; MG/1
1 TABLET, FILM COATED ORAL 2 TIMES DAILY
Qty: 14 TABLET | Refills: 0 | Status: SHIPPED | OUTPATIENT
Start: 2018-11-19 | End: 2018-11-19

## 2018-11-19 RX ORDER — AMLODIPINE BESYLATE 10 MG/1
TABLET ORAL
Qty: 90 TABLET | Refills: 1 | Status: SHIPPED | OUTPATIENT
Start: 2018-11-19 | End: 2019-05-20

## 2018-11-20 NOTE — PROGRESS NOTES
Amber Chavarria is a 59year old female. Patient presents with:  Sinus Problem  Hypertension      HPI:     HPI  Patient is here for follow-up of hypertension and with complaints of cough. Complaints of cough that started 5 days ago.   Complaints of sinus • Anal sphincter incontinence 4/28/2014   • Chondromalacia    • Colon polyps    • Degenerative disc disease    • Diverticular disease    • Esophageal reflux    • Fibroids    • Hammertoe    • Insomnia    • Primary open angle glaucoma of both eyes 5/19/201 Gastrointestinal: Positive for heartburn. Negative for abdominal pain, blood in stool, constipation, diarrhea, melena, nausea and vomiting. Genitourinary: Negative. Musculoskeletal: Positive for back pain, myalgias and neck pain.         Muscle cramp etiology. Ultrasound duplex bilateral lower extremities to rule out claudication, PAD. No obvious findings of venous disease on physical exam.  -     US ARTERIAL DUPLEX LOWER EXTREMITY BILATERAL (CPT=93925);  Future    Chronic frontal sinusitis  Chronic s

## 2018-11-21 ENCOUNTER — OFFICE VISIT (OUTPATIENT)
Dept: PODIATRY CLINIC | Facility: CLINIC | Age: 58
End: 2018-11-21

## 2018-11-21 DIAGNOSIS — M20.42 HAMMERTOE OF LEFT FOOT: Primary | ICD-10-CM

## 2018-11-21 DIAGNOSIS — M79.675 PAIN OF TOE OF LEFT FOOT: ICD-10-CM

## 2018-11-21 PROCEDURE — 99213 OFFICE O/P EST LOW 20 MIN: CPT | Performed by: PODIATRIST

## 2018-11-21 PROCEDURE — 99212 OFFICE O/P EST SF 10 MIN: CPT | Performed by: PODIATRIST

## 2018-11-21 NOTE — PROGRESS NOTES
HPI:    Patient ID: Sujatha Lu is a 59year old female. This 23-year-old female presents to discuss the pain associated with the second left toe. I have made recommendations about caring for this toe and referred her for a second opinion.   The nikita anesthesia and I would have closed shoes for approximately 3 weeks. She was accompanied today by her .   They are well-informed and I have answered all of their questions the timing is clearly up to this patient         ASSESSMENT/PLAN:   Rafael

## 2018-11-27 ENCOUNTER — TELEPHONE (OUTPATIENT)
Dept: INTERNAL MEDICINE CLINIC | Facility: CLINIC | Age: 58
End: 2018-11-27

## 2018-11-27 DIAGNOSIS — M54.16 LUMBAR RADICULOPATHY: Primary | Chronic | ICD-10-CM

## 2018-11-29 ENCOUNTER — TELEPHONE (OUTPATIENT)
Dept: OPHTHALMOLOGY | Facility: CLINIC | Age: 58
End: 2018-11-29

## 2018-11-29 NOTE — TELEPHONE ENCOUNTER
Pt calling to reschedule her 12/8 appointment. Offered pt next available. Pt declined. Please advise.

## 2018-12-04 ENCOUNTER — OFFICE VISIT (OUTPATIENT)
Dept: OPHTHALMOLOGY | Facility: CLINIC | Age: 58
End: 2018-12-04

## 2018-12-04 DIAGNOSIS — Z96.1 PSEUDOPHAKIA, LEFT EYE: ICD-10-CM

## 2018-12-04 DIAGNOSIS — H40.1132 PRIMARY OPEN ANGLE GLAUCOMA OF BOTH EYES, MODERATE STAGE: Primary | ICD-10-CM

## 2018-12-04 DIAGNOSIS — H25.11 AGE-RELATED NUCLEAR CATARACT, RIGHT: ICD-10-CM

## 2018-12-04 DIAGNOSIS — H43.393 FLOATER, VITREOUS, BILATERAL: ICD-10-CM

## 2018-12-04 DIAGNOSIS — H26.492 AFTER-CATARACT WITH VISION OBSCURED, LEFT: ICD-10-CM

## 2018-12-04 PROCEDURE — 99212 OFFICE O/P EST SF 10 MIN: CPT | Performed by: OPHTHALMOLOGY

## 2018-12-04 PROCEDURE — 99243 OFF/OP CNSLTJ NEW/EST LOW 30: CPT | Performed by: OPHTHALMOLOGY

## 2018-12-04 PROCEDURE — 92250 FUNDUS PHOTOGRAPHY W/I&R: CPT | Performed by: OPHTHALMOLOGY

## 2018-12-04 PROCEDURE — 92015 DETERMINE REFRACTIVE STATE: CPT | Performed by: OPHTHALMOLOGY

## 2018-12-04 NOTE — PATIENT INSTRUCTIONS
Primary open angle glaucoma of both eyes, moderate stage  Continue Latanoprost every morning in both eyes. Photos taken today to document optic nerves. Pt will return in 4 months for IOP check.        Age-related nuclear cataract, right  No treatment i

## 2018-12-04 NOTE — PROGRESS NOTES
Mike Warren is a 59year old female. HPI:     HPI     Consult      Additional comments: Consult per Dr. Wilson Reaves              Comments     Patient is here for an EE and photos. Patient is using Latanoprost qam OU as directed.    Patient last saw Dr. Erica Reyes anterior peroneal nerve biopsy and right proximal thigh muscle biopsy.); upper gi endoscopy performed (5/2015); colonoscopy (N/A, 11/11/2016) (Procedure: COLONOSCOPY;  Surgeon: Vito Mina MD;  Location: [facility]);  Excision of [place] Cardiovascular, Respiratory, Psychiatric, Allergic/Imm, Heme/Lymph    Last edited by Desiree Skinner on 12/4/2018  4:18 PM. (History)          PHYSICAL EXAM:     Base Eye Exam     Visual Acuity (Snellen - Linear)       Right Left    Dist cc 20/50 +2 20/50 +1 bifocal                 ASSESSMENT/PLAN:     Diagnoses and Plan:     Primary open angle glaucoma of both eyes, moderate stage  Continue Latanoprost every morning in both eyes. Photos taken today to document optic nerves.    Pt will return in 4 months for

## 2018-12-04 NOTE — ASSESSMENT & PLAN NOTE
Discussed posterior capsule opacity with patient. Could have YAG laser in the left eye in the near future. Discussed risks, benefits, treatments and alternatives. Information given and reviewed with patient. Patient elects to have YAG laser done.

## 2018-12-04 NOTE — ASSESSMENT & PLAN NOTE
Continue Latanoprost every morning in both eyes. Photos taken today to document optic nerves. Pt will return in 4 months for IOP check.

## 2018-12-05 ENCOUNTER — TELEPHONE (OUTPATIENT)
Dept: OPHTHALMOLOGY | Facility: CLINIC | Age: 58
End: 2018-12-05

## 2018-12-05 ENCOUNTER — TELEPHONE (OUTPATIENT)
Dept: FAMILY MEDICINE CLINIC | Facility: CLINIC | Age: 58
End: 2018-12-05

## 2018-12-05 DIAGNOSIS — H40.1130 PRIMARY OPEN ANGLE GLAUCOMA OF BOTH EYES, UNSPECIFIED GLAUCOMA STAGE: Primary | ICD-10-CM

## 2018-12-05 NOTE — TELEPHONE ENCOUNTER
Ronan King,     Patient needs a referral to Dr. Adalberto Holliday, please sign referral for processing.      Thank you,   Darshan Farmer

## 2018-12-14 ENCOUNTER — OFFICE VISIT (OUTPATIENT)
Dept: PAIN CLINIC | Facility: HOSPITAL | Age: 58
End: 2018-12-14
Attending: NURSE PRACTITIONER
Payer: COMMERCIAL

## 2018-12-14 DIAGNOSIS — M54.16 LUMBAR RADICULOPATHY: Primary | Chronic | ICD-10-CM

## 2018-12-14 DIAGNOSIS — M54.2 NECK PAIN: ICD-10-CM

## 2018-12-14 PROCEDURE — 99211 OFF/OP EST MAY X REQ PHY/QHP: CPT

## 2018-12-14 NOTE — PROGRESS NOTES
Patient presents to [facility] ambulatory for follow up of bilateral transforaminal epidural steroid injection done 11-26. She reports 50% improvement in her lower back. Today she has no back pain but it gets worse at night.   She complains of right sided neck p

## 2018-12-14 NOTE — CHRONIC PAIN
Follow-up Note  CC: follow up TFESI  HISTORY OF PRESENT ILLNESS:  Maria Elena Simmons is a 59year old old female, originally referred to the pain clinic by Dr. Moya ref.  provider found, with history of Lumbar radiculopathy  (primary encounter diagnosis)  Neck frida Negative   Fever: Negative   Cardiovascular:  No current chest pain or palpitations   Respiratory:  No current shortness of breath   Gastrointestinal:  No active ulcer  Genitourinary:  Negative  Integumentary :  Negative  Psychiatric:  Negative  Hematologi 6/5/18 consult with Dr. Miguel Cannon progress note   • Small bowel obstruction [facility])    • Tendinitis    • Unspecified essential hypertension    • Unspecified sleep apnea        SURGICAL HISTORY:  Past Surgical History:   Procedure Laterality Date   • Comment: None.        Drug use: No      Sexual activity: Yes        Birth control/protection: Hysterectomy    Other Topics      Concerns:         Service: Not Asked        Blood Transfusions: Not Asked        Caffeine Concern: Yes          1 CUP COF Normal  Edema - Absent  Skin - normal     IMAGING:  No new relevant studies     [place] PHYSICIAN MONITORING PROGRAM REVIEWED  Yes      ASSESSMENT AND PLAN:    Cj Soria is a 59year old  female, with  Low back pain and neck pain.    - recommended updating c

## 2018-12-17 ENCOUNTER — HOSPITAL ENCOUNTER (OUTPATIENT)
Dept: ULTRASOUND IMAGING | Facility: HOSPITAL | Age: 58
Discharge: HOME | End: 2018-12-17
Attending: INTERNAL MEDICINE
Payer: COMMERCIAL

## 2018-12-17 DIAGNOSIS — R25.2 MUSCLE CRAMPS: ICD-10-CM

## 2018-12-17 PROCEDURE — 93923 UPR/LXTR ART STDY 3+ LVLS: CPT | Performed by: INTERNAL MEDICINE

## 2018-12-19 ENCOUNTER — OFFICE VISIT (OUTPATIENT)
Dept: OPHTHALMOLOGY | Facility: CLINIC | Age: 58
End: 2018-12-19

## 2018-12-19 DIAGNOSIS — H26.492 AFTER-CATARACT WITH VISION OBSCURED, LEFT: Primary | ICD-10-CM

## 2018-12-19 PROCEDURE — 66821 AFTER CATARACT LASER SURGERY: CPT | Performed by: OPHTHALMOLOGY

## 2018-12-19 NOTE — PATIENT INSTRUCTIONS
After-cataract with vision obscured, left  Left YAG laser capsulotomy was done in the office today by Dr. Jamie Rebolledo with no complications. Patient will return 2-4 weeks post op left YAG.

## 2018-12-19 NOTE — ASSESSMENT & PLAN NOTE
Left YAG laser capsulotomy was done in the office today by Dr. Trevor Fleming with no complications. Patient will return 2-4 weeks post op left YAG.

## 2019-01-02 ENCOUNTER — OFFICE VISIT (OUTPATIENT)
Dept: ORTHOPEDICS CLINIC | Facility: CLINIC | Age: 59
End: 2019-01-02

## 2019-01-02 VITALS — RESPIRATION RATE: 16 BRPM | DIASTOLIC BLOOD PRESSURE: 68 MMHG | SYSTOLIC BLOOD PRESSURE: 113 MMHG | HEART RATE: 83 BPM

## 2019-01-02 DIAGNOSIS — M25.511 PAIN IN JOINT OF RIGHT SHOULDER: Primary | ICD-10-CM

## 2019-01-02 PROCEDURE — 20610 DRAIN/INJ JOINT/BURSA W/O US: CPT | Performed by: ORTHOPAEDIC SURGERY

## 2019-01-05 ENCOUNTER — HOSPITAL ENCOUNTER (OUTPATIENT)
Dept: MRI IMAGING | Facility: HOSPITAL | Age: 59
Discharge: HOME | End: 2019-01-05
Attending: NURSE PRACTITIONER
Payer: COMMERCIAL

## 2019-01-05 DIAGNOSIS — M54.2 NECK PAIN: ICD-10-CM

## 2019-01-05 PROCEDURE — 72141 MRI NECK SPINE W/O DYE: CPT | Performed by: NURSE PRACTITIONER

## 2019-01-19 ENCOUNTER — OFFICE VISIT (OUTPATIENT)
Dept: OBGYN CLINIC | Facility: CLINIC | Age: 59
End: 2019-01-19

## 2019-01-19 VITALS
HEIGHT: 69 IN | WEIGHT: 197 LBS | BODY MASS INDEX: 29.18 KG/M2 | HEART RATE: 83 BPM | SYSTOLIC BLOOD PRESSURE: 117 MMHG | DIASTOLIC BLOOD PRESSURE: 76 MMHG

## 2019-01-19 DIAGNOSIS — Z12.31 SCREENING MAMMOGRAM, ENCOUNTER FOR: ICD-10-CM

## 2019-01-19 DIAGNOSIS — Z01.419 ENCOUNTER FOR GYNECOLOGICAL EXAMINATION WITHOUT ABNORMAL FINDING: Primary | ICD-10-CM

## 2019-01-19 PROCEDURE — 99396 PREV VISIT EST AGE 40-64: CPT | Performed by: OBSTETRICS & GYNECOLOGY

## 2019-01-19 RX ORDER — NYSTATIN AND TRIAMCINOLONE ACETONIDE 100000; 1 [USP'U]/G; MG/G
1 OINTMENT TOPICAL 2 TIMES DAILY
Qty: 15 G | Refills: 0 | Status: SHIPPED | OUTPATIENT
Start: 2019-01-19 | End: 2019-04-01

## 2019-01-31 NOTE — PROGRESS NOTES
HPI:    Patient ID: Miguel Escobar is a 59year old female. HPI   and . Post hyst for benign disease. Rare use of Mycolog for external irritation. Her grandson is studying music at [address].      Review of Systems   Constitutional: Positive for unex PHYSICAL EXAM:   Physical Exam   Constitutional: She appears well-developed and well-nourished. No distress. Neck: Neck supple. No thyromegaly present. Cardiovascular: Normal rate, regular rhythm and normal heart sounds. No murmur heard.   Pulmonary

## 2019-02-09 ENCOUNTER — OFFICE VISIT (OUTPATIENT)
Dept: OPHTHALMOLOGY | Facility: CLINIC | Age: 59
End: 2019-02-09

## 2019-02-09 DIAGNOSIS — Z98.890 POST-OPERATIVE STATE: Primary | ICD-10-CM

## 2019-02-09 DIAGNOSIS — H40.1132 PRIMARY OPEN ANGLE GLAUCOMA OF BOTH EYES, MODERATE STAGE: ICD-10-CM

## 2019-02-09 PROCEDURE — 99024 POSTOP FOLLOW-UP VISIT: CPT | Performed by: OPHTHALMOLOGY

## 2019-02-09 PROCEDURE — 99212 OFFICE O/P EST SF 10 MIN: CPT | Performed by: OPHTHALMOLOGY

## 2019-02-09 NOTE — PROGRESS NOTES
Scott Myers is a 59year old female. HPI:     HPI     Patient is here for a post op Yag Capsulotomy OS. She had the Yag done OS on 12/19/18. Patient states her vision in the left eye has improved since having the laser.   She is taking Latanoprost OU • Hypertension Father    • Hypertension Mother    • Hypertension Sister    • Hypertension Brother    • Diabetes Neg    • Glaucoma Neg    • Macular degeneration Neg    • Breast Cancer Neg    • Ovarian Cancer Neg        Social History: Social History    To Dermatochalasis, Meibomian gland dysfunction    Conjunctiva/Sclera  Normal    Cornea  Clear- no K spindles    Anterior Chamber  Deep and quiet    Iris  No transillumination defects    Lens  PC IOL with YAG     Vitreous  Vitreous floaters          Fundus Ex

## 2019-02-09 NOTE — PATIENT INSTRUCTIONS
Post-operative state  Patient is doing well after left YAG laser. New glasses today; update as needed. Discussed that new prescription is only a slight change.        Primary open angle glaucoma of both eyes, moderate stage  Continue Latanoprost every mo

## 2019-02-17 ENCOUNTER — HOSPITAL ENCOUNTER (INPATIENT)
Facility: HOSPITAL | Age: 59
LOS: 2 days | Discharge: HOME | DRG: 540 | End: 2019-02-20
Attending: EMERGENCY MEDICINE | Admitting: HOSPITALIST
Payer: COMMERCIAL

## 2019-02-17 ENCOUNTER — APPOINTMENT (OUTPATIENT)
Dept: GENERAL RADIOLOGY | Facility: HOSPITAL | Age: 59
DRG: 540 | End: 2019-02-17
Attending: EMERGENCY MEDICINE
Payer: COMMERCIAL

## 2019-02-17 DIAGNOSIS — M86.172 OTHER ACUTE OSTEOMYELITIS OF LEFT FOOT (HCC): Primary | ICD-10-CM

## 2019-02-17 PROBLEM — M86.9 PYOGENIC INFLAMMATION OF BONE (HCC): Status: ACTIVE | Noted: 2019-02-17

## 2019-02-17 LAB
ANION GAP SERPL CALC-SCNC: 5 MMOL/L (ref 0–18)
BASOPHILS # BLD AUTO: 0.03 X10(3) UL (ref 0–0.2)
BASOPHILS NFR BLD AUTO: 0.3 %
BUN BLD-MCNC: 15 MG/DL (ref 7–18)
BUN/CREAT SERPL: 14.4 (ref 10–20)
CALCIUM BLD-MCNC: 8.7 MG/DL (ref 8.5–10.1)
CHLORIDE SERPL-SCNC: 107 MMOL/L (ref 98–107)
CO2 SERPL-SCNC: 28 MMOL/L (ref 21–32)
CREAT BLD-MCNC: 1.04 MG/DL (ref 0.55–1.02)
CRP SERPL-MCNC: <0.29 MG/DL (ref ?–0.3)
DEPRECATED RDW RBC AUTO: 41.1 FL (ref 35.1–46.3)
EOSINOPHIL # BLD AUTO: 0.05 X10(3) UL (ref 0–0.7)
EOSINOPHIL NFR BLD AUTO: 0.4 %
ERYTHROCYTE [DISTWIDTH] IN BLOOD BY AUTOMATED COUNT: 14.3 % (ref 11–15)
ERYTHROCYTE [SEDIMENTATION RATE] IN BLOOD: 11 MM/HR (ref 0–30)
GLUCOSE BLD-MCNC: 113 MG/DL (ref 70–99)
HCT VFR BLD AUTO: 40.1 % (ref 35–48)
HGB BLD-MCNC: 13.6 G/DL (ref 12–16)
IMM GRANULOCYTES # BLD AUTO: 0.04 X10(3) UL (ref 0–1)
IMM GRANULOCYTES NFR BLD: 0.3 %
LYMPHOCYTES # BLD AUTO: 3.12 X10(3) UL (ref 1–4)
LYMPHOCYTES NFR BLD AUTO: 26.3 %
MCH RBC QN AUTO: 27.2 PG (ref 26–34)
MCHC RBC AUTO-ENTMCNC: 33.9 G/DL (ref 31–37)
MCV RBC AUTO: 80.2 FL (ref 80–100)
MONOCYTES # BLD AUTO: 0.73 X10(3) UL (ref 0.1–1)
MONOCYTES NFR BLD AUTO: 6.1 %
NEUTROPHILS # BLD AUTO: 7.9 X10 (3) UL (ref 1.5–7.7)
NEUTROPHILS # BLD AUTO: 7.9 X10(3) UL (ref 1.5–7.7)
NEUTROPHILS NFR BLD AUTO: 66.6 %
OSMOLALITY SERPL CALC.SUM OF ELEC: 292 MOSM/KG (ref 275–295)
PLATELET # BLD AUTO: 412 10(3)UL (ref 150–450)
POTASSIUM SERPL-SCNC: 3.2 MMOL/L (ref 3.5–5.1)
RBC # BLD AUTO: 5 X10(6)UL (ref 3.8–5.3)
SODIUM SERPL-SCNC: 140 MMOL/L (ref 136–145)
WBC # BLD AUTO: 11.9 X10(3) UL (ref 4–11)

## 2019-02-17 PROCEDURE — 73660 X-RAY EXAM OF TOE(S): CPT | Performed by: EMERGENCY MEDICINE

## 2019-02-17 PROCEDURE — 0H9NXZZ DRAINAGE OF LEFT FOOT SKIN, EXTERNAL APPROACH: ICD-10-PCS | Performed by: EMERGENCY MEDICINE

## 2019-02-17 RX ORDER — MORPHINE SULFATE 4 MG/ML
4 INJECTION, SOLUTION INTRAMUSCULAR; INTRAVENOUS ONCE
Status: COMPLETED | OUTPATIENT
Start: 2019-02-17 | End: 2019-02-17

## 2019-02-17 RX ORDER — POTASSIUM CHLORIDE 20 MEQ/1
40 TABLET, EXTENDED RELEASE ORAL ONCE
Status: COMPLETED | OUTPATIENT
Start: 2019-02-17 | End: 2019-02-17

## 2019-02-18 ENCOUNTER — APPOINTMENT (OUTPATIENT)
Dept: MRI IMAGING | Facility: HOSPITAL | Age: 59
DRG: 540 | End: 2019-02-18
Attending: HOSPITALIST
Payer: COMMERCIAL

## 2019-02-18 PROBLEM — M86.172 OTHER ACUTE OSTEOMYELITIS OF LEFT FOOT (HCC): Status: ACTIVE | Noted: 2019-02-18

## 2019-02-18 LAB
ANION GAP SERPL CALC-SCNC: 3 MMOL/L (ref 0–18)
BASOPHILS # BLD AUTO: 0.03 X10(3) UL (ref 0–0.2)
BASOPHILS NFR BLD AUTO: 0.3 %
BILIRUB UR QL: NEGATIVE
BUN BLD-MCNC: 11 MG/DL (ref 7–18)
BUN/CREAT SERPL: 12.8 (ref 10–20)
CALCIUM BLD-MCNC: 8.3 MG/DL (ref 8.5–10.1)
CHLORIDE SERPL-SCNC: 112 MMOL/L (ref 98–107)
CLARITY UR: CLEAR
CO2 SERPL-SCNC: 28 MMOL/L (ref 21–32)
COLOR UR: YELLOW
CREAT BLD-MCNC: 0.86 MG/DL (ref 0.55–1.02)
DEPRECATED RDW RBC AUTO: 42.1 FL (ref 35.1–46.3)
EOSINOPHIL # BLD AUTO: 0.03 X10(3) UL (ref 0–0.7)
EOSINOPHIL NFR BLD AUTO: 0.3 %
ERYTHROCYTE [DISTWIDTH] IN BLOOD BY AUTOMATED COUNT: 14.5 % (ref 11–15)
EST. AVERAGE GLUCOSE BLD GHB EST-MCNC: 128 MG/DL (ref 68–126)
GLUCOSE BLD-MCNC: 125 MG/DL (ref 70–99)
GLUCOSE UR-MCNC: NEGATIVE MG/DL
HBA1C MFR BLD HPLC: 6.1 % (ref ?–5.7)
HCT VFR BLD AUTO: 37.7 % (ref 35–48)
HGB BLD-MCNC: 12.5 G/DL (ref 12–16)
HGB UR QL STRIP.AUTO: NEGATIVE
IMM GRANULOCYTES # BLD AUTO: 0.03 X10(3) UL (ref 0–1)
IMM GRANULOCYTES NFR BLD: 0.3 %
KETONES UR-MCNC: NEGATIVE MG/DL
LEUKOCYTE ESTERASE UR QL STRIP.AUTO: NEGATIVE
LYMPHOCYTES # BLD AUTO: 2.48 X10(3) UL (ref 1–4)
LYMPHOCYTES NFR BLD AUTO: 23.4 %
MCH RBC QN AUTO: 26.7 PG (ref 26–34)
MCHC RBC AUTO-ENTMCNC: 33.2 G/DL (ref 31–37)
MCV RBC AUTO: 80.6 FL (ref 80–100)
MONOCYTES # BLD AUTO: 0.7 X10(3) UL (ref 0.1–1)
MONOCYTES NFR BLD AUTO: 6.6 %
NEUTROPHILS # BLD AUTO: 7.31 X10 (3) UL (ref 1.5–7.7)
NEUTROPHILS # BLD AUTO: 7.31 X10(3) UL (ref 1.5–7.7)
NEUTROPHILS NFR BLD AUTO: 69.1 %
NITRITE UR QL STRIP.AUTO: NEGATIVE
OSMOLALITY SERPL CALC.SUM OF ELEC: 297 MOSM/KG (ref 275–295)
PH UR: 6 [PH] (ref 5–8)
PLATELET # BLD AUTO: 376 10(3)UL (ref 150–450)
POTASSIUM SERPL-SCNC: 4.2 MMOL/L (ref 3.5–5.1)
PROT UR-MCNC: NEGATIVE MG/DL
RBC # BLD AUTO: 4.68 X10(6)UL (ref 3.8–5.3)
SODIUM SERPL-SCNC: 143 MMOL/L (ref 136–145)
SP GR UR STRIP: 1.01 (ref 1–1.03)
UROBILINOGEN UR STRIP-ACNC: <2
VIT C UR-MCNC: NEGATIVE MG/DL
WBC # BLD AUTO: 10.6 X10(3) UL (ref 4–11)

## 2019-02-18 PROCEDURE — 73720 MRI LWR EXTREMITY W/O&W/DYE: CPT | Performed by: HOSPITALIST

## 2019-02-18 RX ORDER — MORPHINE SULFATE 2 MG/ML
4 INJECTION, SOLUTION INTRAMUSCULAR; INTRAVENOUS EVERY 4 HOURS PRN
Status: DISCONTINUED | OUTPATIENT
Start: 2019-02-18 | End: 2019-02-20

## 2019-02-18 RX ORDER — PANTOPRAZOLE SODIUM 40 MG/1
40 TABLET, DELAYED RELEASE ORAL
Status: DISCONTINUED | OUTPATIENT
Start: 2019-02-18 | End: 2019-02-18

## 2019-02-18 RX ORDER — SODIUM CHLORIDE 0.9 % (FLUSH) 0.9 %
3 SYRINGE (ML) INJECTION AS NEEDED
Status: DISCONTINUED | OUTPATIENT
Start: 2019-02-18 | End: 2019-02-20

## 2019-02-18 RX ORDER — MORPHINE SULFATE 2 MG/ML
2 INJECTION, SOLUTION INTRAMUSCULAR; INTRAVENOUS EVERY 4 HOURS PRN
Status: DISCONTINUED | OUTPATIENT
Start: 2019-02-18 | End: 2019-02-20

## 2019-02-18 RX ORDER — DEXTROSE, SODIUM CHLORIDE, AND POTASSIUM CHLORIDE 5; .9; .15 G/100ML; G/100ML; G/100ML
INJECTION INTRAVENOUS CONTINUOUS
Status: DISCONTINUED | OUTPATIENT
Start: 2019-02-18 | End: 2019-02-19

## 2019-02-18 RX ORDER — METOCLOPRAMIDE HYDROCHLORIDE 5 MG/ML
10 INJECTION INTRAMUSCULAR; INTRAVENOUS EVERY 8 HOURS PRN
Status: DISCONTINUED | OUTPATIENT
Start: 2019-02-18 | End: 2019-02-20

## 2019-02-18 RX ORDER — HEPARIN SODIUM 5000 [USP'U]/ML
5000 INJECTION, SOLUTION INTRAVENOUS; SUBCUTANEOUS ONCE
Status: COMPLETED | OUTPATIENT
Start: 2019-02-18 | End: 2019-02-18

## 2019-02-18 RX ORDER — NYSTATIN AND TRIAMCINOLONE ACETONIDE 100000; 1 [USP'U]/G; MG/G
1 OINTMENT TOPICAL 2 TIMES DAILY PRN
Status: DISCONTINUED | OUTPATIENT
Start: 2019-02-18 | End: 2019-02-20

## 2019-02-18 RX ORDER — PANTOPRAZOLE SODIUM 40 MG/1
40 TABLET, DELAYED RELEASE ORAL
Status: DISCONTINUED | OUTPATIENT
Start: 2019-02-18 | End: 2019-02-20

## 2019-02-18 RX ORDER — POTASSIUM CHLORIDE 20 MEQ/1
40 TABLET, EXTENDED RELEASE ORAL EVERY 4 HOURS
Status: COMPLETED | OUTPATIENT
Start: 2019-02-18 | End: 2019-02-18

## 2019-02-18 RX ORDER — LATANOPROST 50 UG/ML
1 SOLUTION/ DROPS OPHTHALMIC DAILY
Status: DISCONTINUED | OUTPATIENT
Start: 2019-02-18 | End: 2019-02-20

## 2019-02-18 RX ORDER — ONDANSETRON 2 MG/ML
4 INJECTION INTRAMUSCULAR; INTRAVENOUS EVERY 6 HOURS PRN
Status: DISCONTINUED | OUTPATIENT
Start: 2019-02-18 | End: 2019-02-20

## 2019-02-18 NOTE — ED NOTES
Orders for admission, patient is aware of plan and ready to go upstairs.  Any questions, please call ED RN Josue Sifuentes at extension [phone]

## 2019-02-18 NOTE — ED PROVIDER NOTES
Pt endorsed to me by Dr. Denisha Hernandez. Discussed with Dr. Keyla Hester - informed him of pt consult. Pt to be admitted.

## 2019-02-18 NOTE — PROGRESS NOTES
[facility] dosing service    Initial Pharmacokinetic Consult for Vancomycin Dosing     Scottie Horner is a 59year old female admitted on 2/17 who is being treated for cellulitis.   Pharmacy has been asked to dose Vancomycin by Dr. Weiss Service    She is [employer] [facility] will follow and monitor renal function changes, toxicity and efficacy. Pharmacy will continue to follow her. We appreciate the opportunity to assist in her care.     David Robertson PharmD  2/18/2019  1:23 AM  [facility] Extension: [phone]

## 2019-02-18 NOTE — PROGRESS NOTES
ADMISSION NOTE    59year old female with h/o remote hammer toe surgery  presents with pain swelling and drainage from that toe. Xray compatable with osteomyelitis. . Available medical records partially reviewed. Dictation to follow.     Andra Nelson

## 2019-02-18 NOTE — PLAN OF CARE
Problem: PAIN - ADULT  Goal: Verbalizes/displays adequate comfort level or patient's stated pain goal  INTERVENTIONS:  - Encourage pt to monitor pain and request assistance  - Assess pain using appropriate pain scale  - Administer analgesics based on type stability and activity tolerance for standing, transferring and ambulating w/ or w/o assistive devices  - Assist with transfers and ambulation using safe patient handling equipment as needed  - Ensure adequate protection for wounds/incisions during mobiliz

## 2019-02-18 NOTE — CONSULTS
[facility]     report of Consultation    Sujatha Lu Patient Status:  Inpatient    1960 MRN [id number]   Location [facility] 5SW/SE Attending Meryle Blood, MD   Hosp Day # 0 PCP Hannah Thrasher MD     Date of Admission:   ARTHROCENTESIS OR INJECT MAJOR JOINT W/O US     • HYSTERECTOMY  1996    KAI   • OTHER SURGICAL HISTORY  2005,2007,2008    LAPAROSCOPIC LYSIS OF ADHESION   • OTHER SURGICAL HISTORY      right foot bunionectomy   • OTHER SURGICAL HISTORY  11-14-14    right s PRN    Review of Systems:  Musculoskeletal:negative, the patient is ambulatory.     Physical Exam:  Integument: Skin on the left foot was examined there is an open ulceration at the medial looks to be PIP joint area of the second toe but the second toe is s Floater, vitreous, bilateral     Ptosis of both eyelids     Obstructive sleep apnea syndrome     Cervical stenosis of spinal canal     Lumbar radiculopathy     Pseudophakia, left eye     After-cataract with vision obscured, left     Post-operative state

## 2019-02-18 NOTE — PLAN OF CARE
Problem: Patient Centered Care  Goal: Patient preferences are identified and integrated in the patient's plan of care  Interventions:  - What would you like us to know as we care for you?  Patient here with c/o pain to left foot X 1 week  - Provide timely, Electrolytes maintained within normal limits  INTERVENTIONS:  - Monitor labs and rhythm and assess patient for signs and symptoms of electrolyte imbalances  - Administer electrolyte replacement as ordered  - Monitor response to electrolyte replacements, in assist with strengthening/mobility  - Encourage toileting schedule  Outcome: Progressing      Comments: Patient is alert and oriented. Plan for MRI today. Pain controlled with PRN pain medications. Will continue to monitor.

## 2019-02-18 NOTE — ED PROVIDER NOTES
Patient Seen in: [facility] AND [facility] Emergency Department    History   Patient presents with: Foot Pain    Stated Complaint: left foot pain    HPI    23-year-old female presents for complaint of pain and swelling to her left second toe.   This has been pre • YAG CAPSULOTOMY - OS - LEFT EYE Left 12/19/2018    RJM           Social History    Tobacco Use      Smoking status: Never Smoker      Smokeless tobacco: Never Used    Alcohol use: No      Alcohol/week: 0.0 oz      Comment: None.      Drug use: No      Rev Nursing note and vitals reviewed.             ED Course     Labs Reviewed   BASIC METABOLIC PANEL (8) - Abnormal; Notable for the following components:       Result Value    Glucose 113 (*)     Potassium 3.2 (*)     Creatinine 1.04 (*)     GFR, Non- IMPRESSION:    There is diffuse soft tissue swelling with erosive changes of the mid and distal phalanx suspicious for osteomyelitis. No abnormal air collections. Remaining visualized left foot bones appears intact.     Case discussed with  Dr. Zaria Haas

## 2019-02-19 ENCOUNTER — APPOINTMENT (OUTPATIENT)
Dept: PICC SERVICES | Facility: HOSPITAL | Age: 59
DRG: 540 | End: 2019-02-19
Attending: INTERNAL MEDICINE
Payer: COMMERCIAL

## 2019-02-19 LAB
ANION GAP SERPL CALC-SCNC: 3 MMOL/L (ref 0–18)
BUN BLD-MCNC: 8 MG/DL (ref 7–18)
BUN/CREAT SERPL: 8.2 (ref 10–20)
CALCIUM BLD-MCNC: 9 MG/DL (ref 8.5–10.1)
CHLORIDE SERPL-SCNC: 108 MMOL/L (ref 98–107)
CO2 SERPL-SCNC: 30 MMOL/L (ref 21–32)
CREAT BLD-MCNC: 0.97 MG/DL (ref 0.55–1.02)
DEPRECATED RDW RBC AUTO: 43.6 FL (ref 35.1–46.3)
ERYTHROCYTE [DISTWIDTH] IN BLOOD BY AUTOMATED COUNT: 14.7 % (ref 11–15)
GLUCOSE BLD-MCNC: 109 MG/DL (ref 70–99)
HCT VFR BLD AUTO: 43.1 % (ref 35–48)
HGB BLD-MCNC: 14.2 G/DL (ref 12–16)
MCH RBC QN AUTO: 26.8 PG (ref 26–34)
MCHC RBC AUTO-ENTMCNC: 32.9 G/DL (ref 31–37)
MCV RBC AUTO: 81.5 FL (ref 80–100)
OSMOLALITY SERPL CALC.SUM OF ELEC: 291 MOSM/KG (ref 275–295)
PLATELET # BLD AUTO: 417 10(3)UL (ref 150–450)
POTASSIUM SERPL-SCNC: 4.4 MMOL/L (ref 3.5–5.1)
RBC # BLD AUTO: 5.29 X10(6)UL (ref 3.8–5.3)
SODIUM SERPL-SCNC: 141 MMOL/L (ref 136–145)
VANCOMYCIN TROUGH SERPL-MCNC: 16.5 UG/ML (ref 10–20)
WBC # BLD AUTO: 9.8 X10(3) UL (ref 4–11)

## 2019-02-19 PROCEDURE — 02HV33Z INSERTION OF INFUSION DEVICE INTO SUPERIOR VENA CAVA, PERCUTANEOUS APPROACH: ICD-10-PCS | Performed by: HOSPITALIST

## 2019-02-19 PROCEDURE — 99233 SBSQ HOSP IP/OBS HIGH 50: CPT | Performed by: HOSPITALIST

## 2019-02-19 RX ORDER — LIDOCAINE HYDROCHLORIDE 10 MG/ML
0.5 INJECTION, SOLUTION INFILTRATION; PERINEURAL ONCE AS NEEDED
Status: ACTIVE | OUTPATIENT
Start: 2019-02-19 | End: 2019-02-19

## 2019-02-19 RX ORDER — AMLODIPINE BESYLATE 10 MG/1
10 TABLET ORAL DAILY
Status: DISCONTINUED | OUTPATIENT
Start: 2019-02-19 | End: 2019-02-20

## 2019-02-19 RX ORDER — SODIUM CHLORIDE 0.9 % (FLUSH) 0.9 %
10 SYRINGE (ML) INJECTION AS NEEDED
Status: DISCONTINUED | OUTPATIENT
Start: 2019-02-19 | End: 2019-02-20

## 2019-02-19 NOTE — PROGRESS NOTES
120 [facility] dosing service    Follow-up Pharmacokinetic Consult for Vancomycin Dosing     Ana Herrera is a 59year old female who is being treated for osteomyelitis. Patient is on day 3 of Vancomycin 1.75 gm IV Q 12 hours. Goal trough is 15-20 ug/mL. pharmacokinetics, 89.8kg weight and renal function)    2. Pharmacy will re-check Vancomycin trough levels prior to 4th dose. Goal trough level 15-20 ug/mL. 3.  Pharmacy will need BUN/Scr daily while on Vancomycin to assess renal function.     4.  Pharm

## 2019-02-19 NOTE — CONSULTS
INFECTIOUS DISEASE CONSULT NOTE    Mars Andersons Patient Status:  Inpatient    1960 MRN [id number]   Location [facility] 5SW/SE Attending Renzo Turner MD   Hosp Day # 1 PCP Sukumar Loving, Procedure Laterality Date   • ANAL SPHINCTEROTOMY      03/12/2013 Gely   • CATARACT EXTRACTION W/  INTRAOCULAR LENS IMPLANT Left 05/07/2018    L PC IOL with Dr. Tommy Ko @ [address]    • COLONOSCOPY N/A 11/11/2016    Performed by Alyson Fernando MD sodium chloride 0.9% 500 mL IVPB, 1,750 mg, Intravenous, Q12H  •  morphINE sulfate (PF) 2 MG/ML injection 2 mg, 2 mg, Intravenous, Q4H PRN  •  morphINE sulfate (PF) 2 MG/ML injection 4 mg, 4 mg, Intravenous, Q4H PRN  •  latanoprost (XALATAN) 0.005 % opha 376.0  417.0     Recent Labs   Lab  02/17/19   2148  02/18/19   0613  02/18/19   1040  02/19/19   1019   GLU  113*  125*   --   109*   BUN  15  11   --   8   CREATSERUM  1.04*  0.86   --   0.97   GFRAA  67  85   --   73   GFRNAA  59*  74   --   64   CA  8 Sarah  2/19/2019

## 2019-02-19 NOTE — PROGRESS NOTES
[facility]    Progress Note    Lenda Pack Patient Status:  Inpatient    1960 MRN [id number]   Location [facility] 5SW/SE Attending Genia Bautista MD   Hosp Day # 1 PCP Lizeth Mercer MD       Subjective:   Lenda Pack is rest nystatin-triamcinolone    Results:     Lab Results   Component Value Date    WBC 10.6 02/18/2019    HGB 12.5 02/18/2019    HCT 37.7 02/18/2019    .0 02/18/2019    CREATSERUM 0.86 02/18/2019    BUN 11 02/18/2019     02/18/2019    K 4.2 02/18/20 2/18/2019 at 7:20          Mri Foot (w+wo), Left (cpt=73720)    Result Date: 2/18/2019  CONCLUSION:   Chronic appearing cortical erosion and destruction of the distal aspect of the second digit proximal phalanx suggestive of postoperative changes as there

## 2019-02-19 NOTE — PROGRESS NOTES
[facility]    Progress Note    Ana Herrera Patient Status:  Inpatient    1960 MRN [id number]   Location [facility] 5SW/SE Attending Niko Farley MD   Hosp Day # 1 PCP Lester Mg MD     History:  Past Medical History:   Kary Macular degeneration Neg    • Breast Cancer Neg    • Ovarian Cancer Neg       reports that  has never smoked. she has never used smokeless tobacco. She reports that she does not drink alcohol or use drugs.     Allergies:    Celecoxib                   Comme films discussed the care with the patient. The second toe of her left foot does not appear to be acutely or seriously infected at surgery beginning the eschar and scab over the area. There is no purulent drainage but it is still sore on palpation.     Rozann Homans Age-related nuclear cataract, right     Status post Nissen fundoplication     GERD (gastroesophageal reflux disease)     Chronic post-operative pain     Primary open angle glaucoma of both eyes     Neuropathy     Myopathy     Floater, vitreous     History

## 2019-02-19 NOTE — PLAN OF CARE
Problem: Patient Centered Care  Goal: Patient preferences are identified and integrated in the patient's plan of care  Interventions:  - What would you like us to know as we care for you?  Patient here with c/o pain to left foot X 1 week  - Provide timely, Electrolytes maintained within normal limits  INTERVENTIONS:  - Monitor labs and rhythm and assess patient for signs and symptoms of electrolyte imbalances  - Administer electrolyte replacement as ordered  - Monitor response to electrolyte replacements, in assist with strengthening/mobility  - Encourage toileting schedule  Outcome: Progressing      Comments: Patient alert and oriented and able to make her needs known. Morphine administered for pain management.

## 2019-02-20 VITALS
RESPIRATION RATE: 18 BRPM | SYSTOLIC BLOOD PRESSURE: 146 MMHG | HEIGHT: 69 IN | BODY MASS INDEX: 29.33 KG/M2 | TEMPERATURE: 99 F | DIASTOLIC BLOOD PRESSURE: 80 MMHG | OXYGEN SATURATION: 98 % | HEART RATE: 92 BPM | WEIGHT: 198 LBS

## 2019-02-20 LAB
ANION GAP SERPL CALC-SCNC: 5 MMOL/L (ref 0–18)
BUN BLD-MCNC: 10 MG/DL (ref 7–18)
BUN/CREAT SERPL: 11.1 (ref 10–20)
CALCIUM BLD-MCNC: 8.9 MG/DL (ref 8.5–10.1)
CHLORIDE SERPL-SCNC: 108 MMOL/L (ref 98–107)
CO2 SERPL-SCNC: 29 MMOL/L (ref 21–32)
CREAT BLD-MCNC: 0.9 MG/DL (ref 0.55–1.02)
DEPRECATED RDW RBC AUTO: 41.4 FL (ref 35.1–46.3)
ERYTHROCYTE [DISTWIDTH] IN BLOOD BY AUTOMATED COUNT: 14.4 % (ref 11–15)
GLUCOSE BLD-MCNC: 105 MG/DL (ref 70–99)
HCT VFR BLD AUTO: 40.8 % (ref 35–48)
HGB BLD-MCNC: 13.5 G/DL (ref 12–16)
MCH RBC QN AUTO: 26.8 PG (ref 26–34)
MCHC RBC AUTO-ENTMCNC: 33.1 G/DL (ref 31–37)
MCV RBC AUTO: 81.1 FL (ref 80–100)
OSMOLALITY SERPL CALC.SUM OF ELEC: 293 MOSM/KG (ref 275–295)
PLATELET # BLD AUTO: 364 10(3)UL (ref 150–450)
POTASSIUM SERPL-SCNC: 3.9 MMOL/L (ref 3.5–5.1)
RBC # BLD AUTO: 5.03 X10(6)UL (ref 3.8–5.3)
SODIUM SERPL-SCNC: 142 MMOL/L (ref 136–145)
WBC # BLD AUTO: 9.2 X10(3) UL (ref 4–11)

## 2019-02-20 PROCEDURE — 99239 HOSP IP/OBS DSCHRG MGMT >30: CPT | Performed by: HOSPITALIST

## 2019-02-20 RX ORDER — HYDROCODONE BITARTRATE AND ACETAMINOPHEN 5; 325 MG/1; MG/1
1-2 TABLET ORAL EVERY 6 HOURS PRN
Qty: 30 TABLET | Refills: 0 | Status: SHIPPED | OUTPATIENT
Start: 2019-02-20 | End: 2019-04-02 | Stop reason: ALTCHOICE

## 2019-02-20 NOTE — PLAN OF CARE
Problem: Patient Centered Care  Goal: Patient preferences are identified and integrated in the patient's plan of care  Interventions:  - What would you like us to know as we care for you?  Patient here with c/o pain to left foot X 1 week  - Provide timely, pain medication at this time     Problem: METABOLIC/FLUID AND ELECTROLYTES - ADULT  Goal: Electrolytes maintained within normal limits  INTERVENTIONS:  - Monitor labs and rhythm and assess patient for signs and symptoms of electrolyte imbalances  - Adminis with activity based on assessment  - Modify environment to reduce risk of injury  - Provide assistive devices as appropriate  - Consider OT/PT consult to assist with strengthening/mobility  - Encourage toileting schedule  Outcome: Progressing

## 2019-02-20 NOTE — PROGRESS NOTES
[facility] ID PROGRESS NOTE    Donnal Frame Patient Status:  Inpatient    1960 MRN [id number]   Location [facility] 5SW/SE Attending Abimbola Parada MD   Hosp Day # 2 PCP Familia Black MD     Subjective:  Awake, no new complaints.  Tolerating antib Vitamin D deficiency     Primary open angle glaucoma of both eyes, moderate stage     Floater, vitreous, bilateral     Ptosis of both eyelids     Obstructive sleep apnea syndrome     Cervical stenosis of spinal canal     Lumbar radiculopathy     [facility]  [phone]  2/20/2019

## 2019-02-20 NOTE — PROGRESS NOTES
02/20/19  0901   BP: 139/83   Pulse: 72   Resp: 18   Temp: 98.3 °F (36.8 °C)   Patient was seen resting comfortably nursing is in the room as well she is sitting in a chair.     Today discussed proper wound care with the patient's of just painted with Beta

## 2019-02-20 NOTE — PLAN OF CARE
METABOLIC/FLUID AND ELECTROLYTES - ADULT    • Electrolytes maintained within normal limits Adequate for Discharge        MUSCULOSKELETAL - ADULT    • Return mobility to safest level of function Adequate for Discharge        PAIN - ADULT    • Verbalizes/dis

## 2019-02-20 NOTE — CM/SW NOTE
Pt will be requiring LT IV Abx. Due to pt's insurance, if pt is not homebound & is ambulatory, pt will be required to come to [facility] infusion center. SW met w/ pt to discuss.  Pt aware that home option or other infusion center is not available due to insurance

## 2019-02-20 NOTE — PLAN OF CARE
Problem: Patient Centered Care  Goal: Patient preferences are identified and integrated in the patient's plan of care  Interventions:  - What would you like us to know as we care for you?  Patient here with c/o pain to left foot X 1 week  - Provide timely, Electrolytes maintained within normal limits  INTERVENTIONS:  - Monitor labs and rhythm and assess patient for signs and symptoms of electrolyte imbalances  - Administer electrolyte replacement as ordered  - Monitor response to electrolyte replacements, in assist with strengthening/mobility  - Encourage toileting schedule  Outcome: Progressing      Comments: Patient is alert and oriented. Pain controlled with PRN morphine. PICC line inserted for long term antibiotics. IV fluids discontinued.  Will continue to

## 2019-02-20 NOTE — CM/SW NOTE
Rn notified CTL that patient requesting different times for infusion center    CTL spoke with patient at bedside who stated the best times during the week are first thing in the morning or last late appointment in the afternoon,  due to her job.     CTL spo

## 2019-02-21 ENCOUNTER — OFFICE VISIT (OUTPATIENT)
Dept: HEMATOLOGY/ONCOLOGY | Facility: HOSPITAL | Age: 59
End: 2019-02-21
Attending: INTERNAL MEDICINE
Payer: COMMERCIAL

## 2019-02-21 VITALS
DIASTOLIC BLOOD PRESSURE: 81 MMHG | RESPIRATION RATE: 18 BRPM | OXYGEN SATURATION: 98 % | SYSTOLIC BLOOD PRESSURE: 138 MMHG | TEMPERATURE: 98 F | BODY MASS INDEX: 29 KG/M2 | HEART RATE: 92 BPM | WEIGHT: 198.19 LBS

## 2019-02-21 DIAGNOSIS — M86.172 OTHER ACUTE OSTEOMYELITIS OF LEFT FOOT (HCC): Primary | ICD-10-CM

## 2019-02-21 PROCEDURE — 96375 TX/PRO/DX INJ NEW DRUG ADDON: CPT

## 2019-02-21 PROCEDURE — 96365 THER/PROPH/DIAG IV INF INIT: CPT

## 2019-02-21 PROCEDURE — A4216 STERILE WATER/SALINE, 10 ML: HCPCS | Performed by: PHYSICIAN ASSISTANT

## 2019-02-21 RX ORDER — SODIUM CHLORIDE 9 MG/ML
INJECTION, SOLUTION INTRAVENOUS
Status: DISCONTINUED
Start: 2019-02-21 | End: 2019-02-21

## 2019-02-21 RX ORDER — 0.9 % SODIUM CHLORIDE 0.9 %
VIAL (ML) INJECTION
Status: DISCONTINUED
Start: 2019-02-21 | End: 2019-02-21

## 2019-02-21 NOTE — DISCHARGE SUMMARY
[facility]    Discharge Summary    Sadi Burkett Patient Status:  Inpatient    1960 MRN [id number]   Location [facility] 5SW/SE Attending No att. providers found   Hosp Day # 2 PCP Kathy Vargas MD     Date of Admission:  foot in bandage which is c/d/i  Neuro:  nonfocal      Reason for Admission:       Left second toe swelling and pain.     HISTORY OF PRESENT ILLNESS:  This is a very pleasant 52-year-old woman with no past medical history of diabetes or known vascular disea consult.   - Appreciate podiatry and ID rec  PICC line placed and pt dc'd home on IV ceftriaxone and daptomycin     #HTN  - Continue PTA amlodipine     #Prediabetes  - A1c 6.1           Consultations:   ID, podiatry    Discharge Condition:  Good    Discharg Where to Get Your Medications      Please  your prescriptions at the location directed by your doctor or nurse    Bring a paper prescription for each of these medications  · DAPTOmycin 50 mg/mL in Sterile Water for Injection  · HYDROcodone-acet

## 2019-02-21 NOTE — PROGRESS NOTES
Pt to infusion for daily Rocephin and Dapto for osteomyelitis of left foot. Reviewed PICC line care. Pt states arm with PICC line is sore. Some bruising around insertion site noted. Pt denies n/v.  States some diarrhea.   Suggested adding yogurt to diet

## 2019-02-22 ENCOUNTER — OFFICE VISIT (OUTPATIENT)
Dept: HEMATOLOGY/ONCOLOGY | Facility: HOSPITAL | Age: 59
End: 2019-02-22
Attending: INTERNAL MEDICINE
Payer: COMMERCIAL

## 2019-02-22 DIAGNOSIS — M86.172 OTHER ACUTE OSTEOMYELITIS OF LEFT FOOT (HCC): Primary | ICD-10-CM

## 2019-02-22 PROCEDURE — 96365 THER/PROPH/DIAG IV INF INIT: CPT

## 2019-02-22 PROCEDURE — 96375 TX/PRO/DX INJ NEW DRUG ADDON: CPT

## 2019-02-22 PROCEDURE — A4216 STERILE WATER/SALINE, 10 ML: HCPCS | Performed by: PHYSICIAN ASSISTANT

## 2019-02-22 RX ORDER — SODIUM CHLORIDE 9 MG/ML
INJECTION, SOLUTION INTRAVENOUS
Status: DISCONTINUED
Start: 2019-02-22 | End: 2019-02-22

## 2019-02-22 RX ORDER — 0.9 % SODIUM CHLORIDE 0.9 %
VIAL (ML) INJECTION
Status: DISCONTINUED
Start: 2019-02-22 | End: 2019-02-22

## 2019-02-22 NOTE — PROGRESS NOTES
Pt to infusion for daily Rocephin and Dapto for osteomyelitis of left foot. Pain in left foot is 5/10 and is taking hydrocodone with good results. Pain in mostly when she puts pressure on foot like when walking.   Patient states she has diarrhea she has had

## 2019-02-23 ENCOUNTER — OFFICE VISIT (OUTPATIENT)
Dept: HEMATOLOGY/ONCOLOGY | Facility: HOSPITAL | Age: 59
End: 2019-02-23
Attending: INTERNAL MEDICINE
Payer: COMMERCIAL

## 2019-02-23 ENCOUNTER — APPOINTMENT (OUTPATIENT)
Dept: LAB | Facility: HOSPITAL | Age: 59
End: 2019-02-23
Attending: INTERNAL MEDICINE
Payer: COMMERCIAL

## 2019-02-23 VITALS
HEART RATE: 71 BPM | DIASTOLIC BLOOD PRESSURE: 67 MMHG | TEMPERATURE: 97 F | OXYGEN SATURATION: 100 % | RESPIRATION RATE: 18 BRPM | SYSTOLIC BLOOD PRESSURE: 135 MMHG

## 2019-02-23 DIAGNOSIS — M86.172 OTHER ACUTE OSTEOMYELITIS OF LEFT FOOT (HCC): Primary | ICD-10-CM

## 2019-02-23 DIAGNOSIS — M86.172 OTHER ACUTE OSTEOMYELITIS OF LEFT FOOT (HCC): ICD-10-CM

## 2019-02-23 LAB — C DIFF TOX B STL QL: NEGATIVE

## 2019-02-23 PROCEDURE — A4216 STERILE WATER/SALINE, 10 ML: HCPCS | Performed by: PHYSICIAN ASSISTANT

## 2019-02-23 PROCEDURE — 96365 THER/PROPH/DIAG IV INF INIT: CPT

## 2019-02-23 PROCEDURE — 96375 TX/PRO/DX INJ NEW DRUG ADDON: CPT

## 2019-02-23 PROCEDURE — 87493 C DIFF AMPLIFIED PROBE: CPT

## 2019-02-23 RX ORDER — 0.9 % SODIUM CHLORIDE 0.9 %
VIAL (ML) INJECTION
Status: DISCONTINUED
Start: 2019-02-23 | End: 2019-02-23

## 2019-02-23 RX ORDER — SODIUM CHLORIDE 9 MG/ML
INJECTION, SOLUTION INTRAVENOUS
Status: DISCONTINUED
Start: 2019-02-23 | End: 2019-02-23

## 2019-02-23 NOTE — PROGRESS NOTES
Pt to infusion for daily Rocephin and Dapto for osteomyelitis of left foot. Pt denies n/v, fevers. States continued diarrhea. Stool sample for C-diff provided to [facility] lab this morning. Pt tolerated Rocephin over 30 minutes and Dapto IVP over 2 minutes.

## 2019-02-24 ENCOUNTER — OFFICE VISIT (OUTPATIENT)
Dept: HEMATOLOGY/ONCOLOGY | Facility: HOSPITAL | Age: 59
End: 2019-02-24
Attending: INTERNAL MEDICINE
Payer: COMMERCIAL

## 2019-02-24 VITALS
RESPIRATION RATE: 18 BRPM | TEMPERATURE: 99 F | OXYGEN SATURATION: 95 % | DIASTOLIC BLOOD PRESSURE: 63 MMHG | HEART RATE: 80 BPM | SYSTOLIC BLOOD PRESSURE: 141 MMHG

## 2019-02-24 DIAGNOSIS — M86.172 OTHER ACUTE OSTEOMYELITIS OF LEFT FOOT (HCC): Primary | ICD-10-CM

## 2019-02-24 PROCEDURE — 96375 TX/PRO/DX INJ NEW DRUG ADDON: CPT

## 2019-02-24 PROCEDURE — 96365 THER/PROPH/DIAG IV INF INIT: CPT

## 2019-02-24 PROCEDURE — A4216 STERILE WATER/SALINE, 10 ML: HCPCS | Performed by: PHYSICIAN ASSISTANT

## 2019-02-24 RX ORDER — SODIUM CHLORIDE 9 MG/ML
INJECTION, SOLUTION INTRAVENOUS
Status: DISCONTINUED
Start: 2019-02-24 | End: 2019-02-24

## 2019-02-24 RX ORDER — 0.9 % SODIUM CHLORIDE 0.9 %
VIAL (ML) INJECTION
Status: DISCONTINUED
Start: 2019-02-24 | End: 2019-02-24

## 2019-02-24 NOTE — PROGRESS NOTES
Patient to Infusion for daily Daptomycin and Rocephin for treatment of Osteomyelitis left foot. Patient denies pain in left foot. Cleaning wound daily with betadine, then applies gauze dressing.   Patient reports headache today, states she thinks her head

## 2019-02-25 ENCOUNTER — OFFICE VISIT (OUTPATIENT)
Dept: INTERNAL MEDICINE CLINIC | Facility: CLINIC | Age: 59
End: 2019-02-25

## 2019-02-25 ENCOUNTER — OFFICE VISIT (OUTPATIENT)
Dept: HEMATOLOGY/ONCOLOGY | Facility: HOSPITAL | Age: 59
End: 2019-02-25
Attending: INTERNAL MEDICINE
Payer: COMMERCIAL

## 2019-02-25 VITALS
SYSTOLIC BLOOD PRESSURE: 133 MMHG | HEART RATE: 71 BPM | RESPIRATION RATE: 18 BRPM | OXYGEN SATURATION: 99 % | TEMPERATURE: 98 F | DIASTOLIC BLOOD PRESSURE: 70 MMHG

## 2019-02-25 VITALS
TEMPERATURE: 99 F | HEART RATE: 78 BPM | SYSTOLIC BLOOD PRESSURE: 129 MMHG | DIASTOLIC BLOOD PRESSURE: 83 MMHG | HEIGHT: 69 IN | BODY MASS INDEX: 29 KG/M2

## 2019-02-25 DIAGNOSIS — Z09 HOSPITAL DISCHARGE FOLLOW-UP: Primary | ICD-10-CM

## 2019-02-25 DIAGNOSIS — M86.172 OTHER ACUTE OSTEOMYELITIS OF LEFT FOOT (HCC): ICD-10-CM

## 2019-02-25 DIAGNOSIS — M86.172 OTHER ACUTE OSTEOMYELITIS OF LEFT FOOT (HCC): Primary | ICD-10-CM

## 2019-02-25 PROBLEM — Z98.890 POST-OPERATIVE STATE: Status: RESOLVED | Noted: 2019-02-09 | Resolved: 2019-02-25

## 2019-02-25 PROBLEM — H53.8 BLURRY VISION, BILATERAL: Status: RESOLVED | Noted: 2017-06-10 | Resolved: 2019-02-25

## 2019-02-25 PROBLEM — H26.492 AFTER-CATARACT WITH VISION OBSCURED, LEFT: Status: RESOLVED | Noted: 2018-12-04 | Resolved: 2019-02-25

## 2019-02-25 LAB
ALBUMIN SERPL-MCNC: 3.5 G/DL (ref 3.4–5)
ALBUMIN/GLOB SERPL: 0.9 {RATIO} (ref 1–2)
ALP LIVER SERPL-CCNC: 106 U/L (ref 46–118)
ALT SERPL-CCNC: 27 U/L (ref 13–56)
ANION GAP SERPL CALC-SCNC: 7 MMOL/L (ref 0–18)
AST SERPL-CCNC: 22 U/L (ref 15–37)
BASOPHILS # BLD AUTO: 0.03 X10(3) UL (ref 0–0.2)
BASOPHILS NFR BLD AUTO: 0.4 %
BILIRUB SERPL-MCNC: 0.6 MG/DL (ref 0.1–2)
BUN BLD-MCNC: 8 MG/DL (ref 7–18)
BUN/CREAT SERPL: 8.1 (ref 10–20)
CALCIUM BLD-MCNC: 8.7 MG/DL (ref 8.5–10.1)
CHLORIDE SERPL-SCNC: 110 MMOL/L (ref 98–107)
CK SERPL-CCNC: 459 U/L (ref 26–192)
CO2 SERPL-SCNC: 24 MMOL/L (ref 21–32)
CREAT BLD-MCNC: 0.99 MG/DL (ref 0.55–1.02)
CRP SERPL-MCNC: 0.36 MG/DL (ref ?–0.3)
DEPRECATED RDW RBC AUTO: 42.5 FL (ref 35.1–46.3)
EOSINOPHIL # BLD AUTO: 0.09 X10(3) UL (ref 0–0.7)
EOSINOPHIL NFR BLD AUTO: 1.1 %
ERYTHROCYTE [DISTWIDTH] IN BLOOD BY AUTOMATED COUNT: 14.6 % (ref 11–15)
ERYTHROCYTE [SEDIMENTATION RATE] IN BLOOD: 10 MM/HR (ref 0–30)
GLOBULIN PLAS-MCNC: 3.8 G/DL (ref 2.8–4.4)
GLUCOSE BLD-MCNC: 120 MG/DL (ref 70–99)
HCT VFR BLD AUTO: 40.6 % (ref 35–48)
HGB BLD-MCNC: 13.6 G/DL (ref 12–16)
IMM GRANULOCYTES # BLD AUTO: 0.03 X10(3) UL (ref 0–1)
IMM GRANULOCYTES NFR BLD: 0.4 %
LYMPHOCYTES # BLD AUTO: 1.73 X10(3) UL (ref 1–4)
LYMPHOCYTES NFR BLD AUTO: 20.6 %
M PROTEIN MFR SERPL ELPH: 7.3 G/DL (ref 6.4–8.2)
MCH RBC QN AUTO: 26.9 PG (ref 26–34)
MCHC RBC AUTO-ENTMCNC: 33.5 G/DL (ref 31–37)
MCV RBC AUTO: 80.2 FL (ref 80–100)
MONOCYTES # BLD AUTO: 0.56 X10(3) UL (ref 0.1–1)
MONOCYTES NFR BLD AUTO: 6.7 %
NEUTROPHILS # BLD AUTO: 5.97 X10 (3) UL (ref 1.5–7.7)
NEUTROPHILS # BLD AUTO: 5.97 X10(3) UL (ref 1.5–7.7)
NEUTROPHILS NFR BLD AUTO: 70.8 %
OSMOLALITY SERPL CALC.SUM OF ELEC: 292 MOSM/KG (ref 275–295)
PLATELET # BLD AUTO: 358 10(3)UL (ref 150–450)
POTASSIUM SERPL-SCNC: 3.3 MMOL/L (ref 3.5–5.1)
RBC # BLD AUTO: 5.06 X10(6)UL (ref 3.8–5.3)
SODIUM SERPL-SCNC: 141 MMOL/L (ref 136–145)
WBC # BLD AUTO: 8.4 X10(3) UL (ref 4–11)

## 2019-02-25 PROCEDURE — 96375 TX/PRO/DX INJ NEW DRUG ADDON: CPT

## 2019-02-25 PROCEDURE — 1111F DSCHRG MED/CURRENT MED MERGE: CPT | Performed by: INTERNAL MEDICINE

## 2019-02-25 PROCEDURE — 99212 OFFICE O/P EST SF 10 MIN: CPT | Performed by: INTERNAL MEDICINE

## 2019-02-25 PROCEDURE — 82550 ASSAY OF CK (CPK): CPT | Performed by: PHYSICIAN ASSISTANT

## 2019-02-25 PROCEDURE — 99214 OFFICE O/P EST MOD 30 MIN: CPT | Performed by: INTERNAL MEDICINE

## 2019-02-25 PROCEDURE — 96374 THER/PROPH/DIAG INJ IV PUSH: CPT

## 2019-02-25 PROCEDURE — 85025 COMPLETE CBC W/AUTO DIFF WBC: CPT

## 2019-02-25 PROCEDURE — 80053 COMPREHEN METABOLIC PANEL: CPT

## 2019-02-25 PROCEDURE — 86140 C-REACTIVE PROTEIN: CPT

## 2019-02-25 PROCEDURE — A4216 STERILE WATER/SALINE, 10 ML: HCPCS | Performed by: PHYSICIAN ASSISTANT

## 2019-02-25 PROCEDURE — 85652 RBC SED RATE AUTOMATED: CPT

## 2019-02-25 RX ORDER — HYDROCODONE BITARTRATE AND ACETAMINOPHEN 5; 325 MG/1; MG/1
1 TABLET ORAL 2 TIMES DAILY PRN
Qty: 15 TABLET | Refills: 0 | Status: SHIPPED | OUTPATIENT
Start: 2019-02-25 | End: 2019-03-04

## 2019-02-25 RX ORDER — SODIUM CHLORIDE 9 MG/ML
INJECTION, SOLUTION INTRAVENOUS
Status: DISCONTINUED
Start: 2019-02-25 | End: 2019-02-25

## 2019-02-25 RX ORDER — 0.9 % SODIUM CHLORIDE 0.9 %
VIAL (ML) INJECTION
Status: DISCONTINUED
Start: 2019-02-25 | End: 2019-02-25

## 2019-02-25 NOTE — PROGRESS NOTES
Patient to Infusion for daily Daptomycin and Rocephin for treatment of Osteomyelitis left foot. Patient denies pain in left foot. Cleaning wound daily with betadine, then applies gauze dressing. Denies any fevers or flu like symptoms.      Pt tolerated m

## 2019-02-26 ENCOUNTER — TELEPHONE (OUTPATIENT)
Dept: ORTHOPEDICS CLINIC | Facility: CLINIC | Age: 59
End: 2019-02-26

## 2019-02-26 ENCOUNTER — OFFICE VISIT (OUTPATIENT)
Dept: HEMATOLOGY/ONCOLOGY | Facility: HOSPITAL | Age: 59
End: 2019-02-26
Attending: INTERNAL MEDICINE
Payer: COMMERCIAL

## 2019-02-26 VITALS
HEART RATE: 81 BPM | TEMPERATURE: 99 F | DIASTOLIC BLOOD PRESSURE: 78 MMHG | RESPIRATION RATE: 18 BRPM | SYSTOLIC BLOOD PRESSURE: 145 MMHG

## 2019-02-26 DIAGNOSIS — M86.172 OTHER ACUTE OSTEOMYELITIS OF LEFT FOOT (HCC): Primary | ICD-10-CM

## 2019-02-26 PROCEDURE — 96375 TX/PRO/DX INJ NEW DRUG ADDON: CPT

## 2019-02-26 PROCEDURE — 96365 THER/PROPH/DIAG IV INF INIT: CPT

## 2019-02-26 PROCEDURE — A4216 STERILE WATER/SALINE, 10 ML: HCPCS | Performed by: PHYSICIAN ASSISTANT

## 2019-02-26 RX ORDER — SODIUM CHLORIDE 9 MG/ML
INJECTION, SOLUTION INTRAVENOUS
Status: DISCONTINUED
Start: 2019-02-26 | End: 2019-02-26

## 2019-02-26 RX ORDER — 0.9 % SODIUM CHLORIDE 0.9 %
VIAL (ML) INJECTION
Status: DISCONTINUED
Start: 2019-02-26 | End: 2019-02-26

## 2019-02-26 NOTE — TELEPHONE ENCOUNTER
Pt. States that she saw Dr Jefe Sauer at [facility]. Last week for acute osteomyelitis of L foot, and she is concerned as she is not getting better. Pt. States that the antibiotics prescribed is not helping with the infection.  Pt was last seen in office on 11/2

## 2019-02-26 NOTE — PROGRESS NOTES
Jose Eduardo Vasques is a 59year old female. Patient presents with:  Hospital F/U      HPI:     HPI  Patient is here for hospital follow-up.   She was admitted to [facility] on 2/17/2019 and discharged home on 2/20/2019 for acute osteomyelitis of the left g into the vein daily. Disp: 42 Dose Rfl: 0   DAPTOmycin 50 mg/mL in Sterile Water for Injection Inject 10 mL (500 mg total) into the vein daily.  Disp: 420 mL Rfl: 0   HYDROcodone-acetaminophen 5-325 MG Oral Tab Take 1-2 tablets by mouth every 6 (six) hour Enma Tafoya MD;  Location: [facility] ENDOSCOPY   • Excision of chalazion, single - od - right eye Right 6.27/2017    Nasally   • Hc arthrocentesis or inject major joint w/o us     • Hysterectomy  1996    KAI   • Other surgical history  2005,2007, involving the middle and distal phalanx second digit.  Suspect osteomyelitis    MRI foot  Chronic appearing cortical erosion and destruction of the distal aspect of the second digit proximal phalanx suggestive of postoperative changes as there is no marrow

## 2019-02-26 NOTE — PROGRESS NOTES
Alessio Lute to infusion today for daily antibiotics for left foot osteomyelitis. She arrives alert and independent. She is able to wear regular shoes, states the swelling has gone down. Denies pain, fevers, chills.  She cleanses wound daily with betadine and has

## 2019-02-26 NOTE — TELEPHONE ENCOUNTER
Pt saw Dr. Crissy Gonzales in the hospital . Appointment for follow up on the 3-4-19. Pt is on IV antibiotics. Still getting IV antibiotics daily. Had this am. Pain in the toe - toe pain level 5. No fever. Denies redness. Slight swelling - not like it was.    Wants

## 2019-02-26 NOTE — TELEPHONE ENCOUNTER
I spoke with the patient and she is coming in on Monday she wants to have her second toe removed because she does not think it is getting any better. We can certainly arrange to have that done on an outpatient basis.   She has an appointment this coming Mo

## 2019-02-27 ENCOUNTER — OFFICE VISIT (OUTPATIENT)
Dept: HEMATOLOGY/ONCOLOGY | Facility: HOSPITAL | Age: 59
End: 2019-02-27
Attending: INTERNAL MEDICINE
Payer: COMMERCIAL

## 2019-02-27 VITALS
TEMPERATURE: 99 F | RESPIRATION RATE: 16 BRPM | SYSTOLIC BLOOD PRESSURE: 142 MMHG | DIASTOLIC BLOOD PRESSURE: 68 MMHG | OXYGEN SATURATION: 98 % | HEART RATE: 87 BPM

## 2019-02-27 DIAGNOSIS — M86.172 OTHER ACUTE OSTEOMYELITIS OF LEFT FOOT (HCC): Primary | ICD-10-CM

## 2019-02-27 LAB — CK SERPL-CCNC: 547 U/L (ref 26–192)

## 2019-02-27 PROCEDURE — 96365 THER/PROPH/DIAG IV INF INIT: CPT

## 2019-02-27 PROCEDURE — 82550 ASSAY OF CK (CPK): CPT

## 2019-02-27 PROCEDURE — A4216 STERILE WATER/SALINE, 10 ML: HCPCS | Performed by: PHYSICIAN ASSISTANT

## 2019-02-27 RX ORDER — SODIUM CHLORIDE 9 MG/ML
INJECTION, SOLUTION INTRAVENOUS
Status: DISCONTINUED
Start: 2019-02-27 | End: 2019-02-27

## 2019-02-27 RX ORDER — 0.9 % SODIUM CHLORIDE 0.9 %
VIAL (ML) INJECTION
Status: DISCONTINUED
Start: 2019-02-27 | End: 2019-02-27

## 2019-02-27 NOTE — PROGRESS NOTES
Pt to infusion for daily Rocephin for treat L foot osteomyelitis. Orders received from Dr. Nelly Mead to hold Dapto today due to elevated CPK. Order to re-draw today, and new orders to be given tomorrow. Pt arrived ambulatory.  Reports muscle pain, especiall

## 2019-02-28 ENCOUNTER — OFFICE VISIT (OUTPATIENT)
Dept: HEMATOLOGY/ONCOLOGY | Facility: HOSPITAL | Age: 59
End: 2019-02-28
Attending: INTERNAL MEDICINE
Payer: COMMERCIAL

## 2019-02-28 VITALS
RESPIRATION RATE: 16 BRPM | TEMPERATURE: 98 F | SYSTOLIC BLOOD PRESSURE: 124 MMHG | DIASTOLIC BLOOD PRESSURE: 72 MMHG | HEART RATE: 72 BPM

## 2019-02-28 DIAGNOSIS — M86.172 OTHER ACUTE OSTEOMYELITIS OF LEFT FOOT (HCC): Primary | ICD-10-CM

## 2019-02-28 PROCEDURE — 96365 THER/PROPH/DIAG IV INF INIT: CPT

## 2019-02-28 RX ORDER — SODIUM CHLORIDE 9 MG/ML
INJECTION, SOLUTION INTRAVENOUS
Status: DISCONTINUED
Start: 2019-02-28 | End: 2019-02-28

## 2019-02-28 RX ORDER — 0.9 % SODIUM CHLORIDE 0.9 %
VIAL (ML) INJECTION
Status: DISCONTINUED
Start: 2019-02-28 | End: 2019-02-28

## 2019-02-28 NOTE — PROGRESS NOTES
Pt to infusion for daily Rocephin to treat L foot osteomyelitis. Ambulating with a steady gait. Arrived c/o fatigue and just not feeling well. She did work today.  Informed her that her antibiotic orders were changed to continue ceftriaxone but change dapt

## 2019-03-01 ENCOUNTER — TELEPHONE (OUTPATIENT)
Dept: PODIATRY CLINIC | Facility: CLINIC | Age: 59
End: 2019-03-01

## 2019-03-01 ENCOUNTER — OFFICE VISIT (OUTPATIENT)
Dept: HEMATOLOGY/ONCOLOGY | Facility: HOSPITAL | Age: 59
End: 2019-03-01
Attending: INTERNAL MEDICINE
Payer: COMMERCIAL

## 2019-03-01 VITALS
SYSTOLIC BLOOD PRESSURE: 135 MMHG | HEART RATE: 85 BPM | RESPIRATION RATE: 16 BRPM | DIASTOLIC BLOOD PRESSURE: 73 MMHG | OXYGEN SATURATION: 95 % | TEMPERATURE: 98 F

## 2019-03-01 DIAGNOSIS — M86.172 OTHER ACUTE OSTEOMYELITIS OF LEFT FOOT (HCC): Primary | ICD-10-CM

## 2019-03-01 PROCEDURE — 96365 THER/PROPH/DIAG IV INF INIT: CPT

## 2019-03-01 RX ORDER — SODIUM CHLORIDE 9 MG/ML
INJECTION, SOLUTION INTRAVENOUS
Status: DISCONTINUED
Start: 2019-03-01 | End: 2019-03-01

## 2019-03-01 RX ORDER — 0.9 % SODIUM CHLORIDE 0.9 %
VIAL (ML) INJECTION
Status: DISCONTINUED
Start: 2019-03-01 | End: 2019-03-01

## 2019-03-01 NOTE — TELEPHONE ENCOUNTER
Pt states she is taking infusion treatments, states her dosage was recently changed, does she need to continue infusion treatment? States it is interfering with her work schedule. Pls advise thank you.

## 2019-03-01 NOTE — TELEPHONE ENCOUNTER
Call back to [facility]. Informed she needs to clear the antibiotics thru ID.  Pt verbalizes understanding

## 2019-03-01 NOTE — TELEPHONE ENCOUNTER
One of her antibiotic have been changed and it is a two hour infusion. She would like to remain on one antibiotic  But does she need two. She does have an appointment on Monday\  This is interferring with her work  Please advise

## 2019-03-01 NOTE — PROGRESS NOTES
This nurse reviewed plan of care with Alessio Diaz for length of infusion time for vancomycin and rocephin. Was unable to leave work in time today for vancomycin to start; to start tomorrow[place]  Reviewed the importance of keeping t

## 2019-03-02 ENCOUNTER — OFFICE VISIT (OUTPATIENT)
Dept: HEMATOLOGY/ONCOLOGY | Facility: HOSPITAL | Age: 59
End: 2019-03-02
Attending: INTERNAL MEDICINE
Payer: COMMERCIAL

## 2019-03-02 VITALS
HEART RATE: 78 BPM | DIASTOLIC BLOOD PRESSURE: 67 MMHG | SYSTOLIC BLOOD PRESSURE: 137 MMHG | OXYGEN SATURATION: 99 % | RESPIRATION RATE: 16 BRPM | TEMPERATURE: 99 F

## 2019-03-02 DIAGNOSIS — M86.172 OTHER ACUTE OSTEOMYELITIS OF LEFT FOOT (HCC): Primary | ICD-10-CM

## 2019-03-02 PROCEDURE — 96365 THER/PROPH/DIAG IV INF INIT: CPT

## 2019-03-02 PROCEDURE — 96367 TX/PROPH/DG ADDL SEQ IV INF: CPT

## 2019-03-02 PROCEDURE — 96366 THER/PROPH/DIAG IV INF ADDON: CPT

## 2019-03-02 RX ORDER — SODIUM CHLORIDE 9 MG/ML
INJECTION, SOLUTION INTRAVENOUS
Status: DISPENSED
Start: 2019-03-02 | End: 2019-03-02

## 2019-03-02 RX ORDER — 0.9 % SODIUM CHLORIDE 0.9 %
VIAL (ML) INJECTION
Status: DISPENSED
Start: 2019-03-02 | End: 2019-03-02

## 2019-03-02 NOTE — PROGRESS NOTES
Michell Shed to infusion today for daily antibiotics for left foot osteomyelitis. She arrives alert and independent. She is able to wear regular shoes, states it is painful when she walks distances for work and is trying to get [facility] paperwork completed . pain toda

## 2019-03-03 ENCOUNTER — OFFICE VISIT (OUTPATIENT)
Dept: HEMATOLOGY/ONCOLOGY | Facility: HOSPITAL | Age: 59
End: 2019-03-03
Attending: INTERNAL MEDICINE
Payer: COMMERCIAL

## 2019-03-03 VITALS — SYSTOLIC BLOOD PRESSURE: 132 MMHG | RESPIRATION RATE: 18 BRPM | DIASTOLIC BLOOD PRESSURE: 71 MMHG | HEART RATE: 81 BPM

## 2019-03-03 DIAGNOSIS — M86.172 OTHER ACUTE OSTEOMYELITIS OF LEFT FOOT (HCC): Primary | ICD-10-CM

## 2019-03-03 PROCEDURE — 96367 TX/PROPH/DG ADDL SEQ IV INF: CPT

## 2019-03-03 PROCEDURE — 96366 THER/PROPH/DIAG IV INF ADDON: CPT

## 2019-03-03 PROCEDURE — 96365 THER/PROPH/DIAG IV INF INIT: CPT

## 2019-03-03 RX ORDER — 0.9 % SODIUM CHLORIDE 0.9 %
VIAL (ML) INJECTION
Status: DISCONTINUED
Start: 2019-03-03 | End: 2019-03-03

## 2019-03-03 RX ORDER — SODIUM CHLORIDE 9 MG/ML
INJECTION, SOLUTION INTRAVENOUS
Status: DISCONTINUED
Start: 2019-03-03 | End: 2019-03-03

## 2019-03-03 NOTE — PROGRESS NOTES
Jax Webb arrives for IV ABX for treatment of osteomyelitis. She is able to wear regular shoes, states it is painful when she walks distances for work. Pt is still experiencing muscle pains from Dapto. She was changed to Vanco as her CPK levels were elevated.

## 2019-03-04 ENCOUNTER — OFFICE VISIT (OUTPATIENT)
Dept: PODIATRY CLINIC | Facility: CLINIC | Age: 59
End: 2019-03-04

## 2019-03-04 ENCOUNTER — OFFICE VISIT (OUTPATIENT)
Dept: HEMATOLOGY/ONCOLOGY | Facility: HOSPITAL | Age: 59
End: 2019-03-04
Attending: INTERNAL MEDICINE
Payer: COMMERCIAL

## 2019-03-04 VITALS
DIASTOLIC BLOOD PRESSURE: 80 MMHG | TEMPERATURE: 98 F | HEART RATE: 85 BPM | OXYGEN SATURATION: 99 % | RESPIRATION RATE: 18 BRPM | SYSTOLIC BLOOD PRESSURE: 142 MMHG

## 2019-03-04 DIAGNOSIS — M86.9 OSTEOMYELITIS OF TOE OF LEFT FOOT (HCC): Primary | ICD-10-CM

## 2019-03-04 DIAGNOSIS — M79.675 PAIN OF TOE OF LEFT FOOT: ICD-10-CM

## 2019-03-04 DIAGNOSIS — M20.42 HAMMER TOE OF LEFT FOOT: ICD-10-CM

## 2019-03-04 DIAGNOSIS — M20.42 HAMMERTOE OF LEFT FOOT: ICD-10-CM

## 2019-03-04 DIAGNOSIS — M79.672 LEFT FOOT PAIN: ICD-10-CM

## 2019-03-04 DIAGNOSIS — M86.172 OTHER ACUTE OSTEOMYELITIS OF LEFT FOOT (HCC): Primary | ICD-10-CM

## 2019-03-04 LAB
ALBUMIN SERPL-MCNC: 3.6 G/DL (ref 3.4–5)
ALBUMIN/GLOB SERPL: 0.9 {RATIO} (ref 1–2)
ALP LIVER SERPL-CCNC: 110 U/L (ref 46–118)
ALT SERPL-CCNC: 26 U/L (ref 13–56)
ANION GAP SERPL CALC-SCNC: 7 MMOL/L (ref 0–18)
AST SERPL-CCNC: 17 U/L (ref 15–37)
BASOPHILS # BLD AUTO: 0.03 X10(3) UL (ref 0–0.2)
BASOPHILS NFR BLD AUTO: 0.3 %
BILIRUB SERPL-MCNC: 0.3 MG/DL (ref 0.1–2)
BUN BLD-MCNC: 13 MG/DL (ref 7–18)
BUN/CREAT SERPL: 13 (ref 10–20)
CALCIUM BLD-MCNC: 8.7 MG/DL (ref 8.5–10.1)
CHLORIDE SERPL-SCNC: 109 MMOL/L (ref 98–107)
CK SERPL-CCNC: 293 U/L (ref 26–192)
CO2 SERPL-SCNC: 25 MMOL/L (ref 21–32)
CREAT BLD-MCNC: 1 MG/DL (ref 0.55–1.02)
CRP SERPL-MCNC: <0.29 MG/DL (ref ?–0.3)
DEPRECATED RDW RBC AUTO: 42.4 FL (ref 35.1–46.3)
EOSINOPHIL # BLD AUTO: 0.12 X10(3) UL (ref 0–0.7)
EOSINOPHIL NFR BLD AUTO: 1.3 %
ERYTHROCYTE [DISTWIDTH] IN BLOOD BY AUTOMATED COUNT: 14.6 % (ref 11–15)
ERYTHROCYTE [SEDIMENTATION RATE] IN BLOOD: 7 MM/HR (ref 0–30)
GLOBULIN PLAS-MCNC: 3.9 G/DL (ref 2.8–4.4)
GLUCOSE BLD-MCNC: 144 MG/DL (ref 70–99)
HCT VFR BLD AUTO: 39.6 % (ref 35–48)
HGB BLD-MCNC: 13.4 G/DL (ref 12–16)
IMM GRANULOCYTES # BLD AUTO: 0.03 X10(3) UL (ref 0–1)
IMM GRANULOCYTES NFR BLD: 0.3 %
LYMPHOCYTES # BLD AUTO: 2.32 X10(3) UL (ref 1–4)
LYMPHOCYTES NFR BLD AUTO: 24.5 %
M PROTEIN MFR SERPL ELPH: 7.5 G/DL (ref 6.4–8.2)
MCH RBC QN AUTO: 27.1 PG (ref 26–34)
MCHC RBC AUTO-ENTMCNC: 33.8 G/DL (ref 31–37)
MCV RBC AUTO: 80 FL (ref 80–100)
MONOCYTES # BLD AUTO: 0.57 X10(3) UL (ref 0.1–1)
MONOCYTES NFR BLD AUTO: 6 %
NEUTROPHILS # BLD AUTO: 6.41 X10 (3) UL (ref 1.5–7.7)
NEUTROPHILS # BLD AUTO: 6.41 X10(3) UL (ref 1.5–7.7)
NEUTROPHILS NFR BLD AUTO: 67.6 %
OSMOLALITY SERPL CALC.SUM OF ELEC: 295 MOSM/KG (ref 275–295)
PLATELET # BLD AUTO: 354 10(3)UL (ref 150–450)
POTASSIUM SERPL-SCNC: 3.3 MMOL/L (ref 3.5–5.1)
RBC # BLD AUTO: 4.95 X10(6)UL (ref 3.8–5.3)
SODIUM SERPL-SCNC: 141 MMOL/L (ref 136–145)
VANCOMYCIN TROUGH SERPL-MCNC: 3.6 UG/ML (ref 10–20)
WBC # BLD AUTO: 9.5 X10(3) UL (ref 4–11)

## 2019-03-04 PROCEDURE — 99213 OFFICE O/P EST LOW 20 MIN: CPT | Performed by: PODIATRIST

## 2019-03-04 PROCEDURE — 85652 RBC SED RATE AUTOMATED: CPT

## 2019-03-04 PROCEDURE — 80202 ASSAY OF VANCOMYCIN: CPT | Performed by: INTERNAL MEDICINE

## 2019-03-04 PROCEDURE — 96365 THER/PROPH/DIAG IV INF INIT: CPT

## 2019-03-04 PROCEDURE — 96366 THER/PROPH/DIAG IV INF ADDON: CPT

## 2019-03-04 PROCEDURE — 86140 C-REACTIVE PROTEIN: CPT

## 2019-03-04 PROCEDURE — 85025 COMPLETE CBC W/AUTO DIFF WBC: CPT

## 2019-03-04 PROCEDURE — 96367 TX/PROPH/DG ADDL SEQ IV INF: CPT

## 2019-03-04 PROCEDURE — 82550 ASSAY OF CK (CPK): CPT | Performed by: PHYSICIAN ASSISTANT

## 2019-03-04 PROCEDURE — 80053 COMPREHEN METABOLIC PANEL: CPT

## 2019-03-04 RX ORDER — SODIUM CHLORIDE 9 MG/ML
INJECTION, SOLUTION INTRAVENOUS
Status: DISCONTINUED
Start: 2019-03-04 | End: 2019-03-04

## 2019-03-04 RX ORDER — 0.9 % SODIUM CHLORIDE 0.9 %
VIAL (ML) INJECTION
Status: DISCONTINUED
Start: 2019-03-04 | End: 2019-03-04

## 2019-03-05 ENCOUNTER — TELEPHONE (OUTPATIENT)
Dept: PODIATRY CLINIC | Facility: CLINIC | Age: 59
End: 2019-03-05

## 2019-03-05 ENCOUNTER — OFFICE VISIT (OUTPATIENT)
Dept: HEMATOLOGY/ONCOLOGY | Facility: HOSPITAL | Age: 59
End: 2019-03-05
Attending: INTERNAL MEDICINE
Payer: COMMERCIAL

## 2019-03-05 VITALS
OXYGEN SATURATION: 99 % | DIASTOLIC BLOOD PRESSURE: 74 MMHG | TEMPERATURE: 98 F | RESPIRATION RATE: 16 BRPM | SYSTOLIC BLOOD PRESSURE: 128 MMHG | HEART RATE: 81 BPM

## 2019-03-05 DIAGNOSIS — M86.9 OSTEOMYELITIS OF TOE OF LEFT FOOT (HCC): Primary | ICD-10-CM

## 2019-03-05 DIAGNOSIS — M86.172 OTHER ACUTE OSTEOMYELITIS OF LEFT FOOT (HCC): Primary | ICD-10-CM

## 2019-03-05 PROCEDURE — 96366 THER/PROPH/DIAG IV INF ADDON: CPT

## 2019-03-05 PROCEDURE — 96365 THER/PROPH/DIAG IV INF INIT: CPT

## 2019-03-05 RX ORDER — SODIUM CHLORIDE 9 MG/ML
INJECTION, SOLUTION INTRAVENOUS
Status: DISCONTINUED
Start: 2019-03-05 | End: 2019-03-05

## 2019-03-05 RX ORDER — 0.9 % SODIUM CHLORIDE 0.9 %
VIAL (ML) INJECTION
Status: DISCONTINUED
Start: 2019-03-05 | End: 2019-03-05

## 2019-03-05 NOTE — PROGRESS NOTES
Latisha Shall arrives for IV ABX for treatment of osteomyelitis. States some loose stools. Eating yogurt. Denies fevers. States she saw podiatry MD yesterday and plans for surgery next week.   Pt states that Podiatrist was going to call ID to coordinate weekly a

## 2019-03-05 NOTE — TELEPHONE ENCOUNTER
Called and spoke to pt surgery is scheduled at [facility] on 03/15/19. Pt's PO appointment with Madonna Hernandez is scheduled on   03/25. Pt informed sx center will call the day before surgery with a time to arrive and all other instructions.   All questions answered

## 2019-03-06 ENCOUNTER — TELEPHONE (OUTPATIENT)
Dept: PODIATRY CLINIC | Facility: CLINIC | Age: 59
End: 2019-03-06

## 2019-03-06 ENCOUNTER — OFFICE VISIT (OUTPATIENT)
Dept: HEMATOLOGY/ONCOLOGY | Facility: HOSPITAL | Age: 59
End: 2019-03-06
Attending: INTERNAL MEDICINE
Payer: COMMERCIAL

## 2019-03-06 VITALS
OXYGEN SATURATION: 99 % | HEART RATE: 84 BPM | SYSTOLIC BLOOD PRESSURE: 134 MMHG | DIASTOLIC BLOOD PRESSURE: 79 MMHG | TEMPERATURE: 98 F | RESPIRATION RATE: 16 BRPM

## 2019-03-06 DIAGNOSIS — M86.172 OTHER ACUTE OSTEOMYELITIS OF LEFT FOOT (HCC): Primary | ICD-10-CM

## 2019-03-06 PROCEDURE — 96366 THER/PROPH/DIAG IV INF ADDON: CPT

## 2019-03-06 PROCEDURE — 96365 THER/PROPH/DIAG IV INF INIT: CPT

## 2019-03-06 PROCEDURE — 96367 TX/PROPH/DG ADDL SEQ IV INF: CPT

## 2019-03-06 RX ORDER — SODIUM CHLORIDE 9 MG/ML
INJECTION, SOLUTION INTRAVENOUS
Status: DISCONTINUED
Start: 2019-03-06 | End: 2019-03-06

## 2019-03-06 RX ORDER — 0.9 % SODIUM CHLORIDE 0.9 %
VIAL (ML) INJECTION
Status: DISCONTINUED
Start: 2019-03-06 | End: 2019-03-06

## 2019-03-06 NOTE — PROGRESS NOTES
Andrea Sams arrives for IV ABX for treatment of osteomyelitis. Denies fevers. Pt is waiting to hear back from Dr. Suazo [place] office to see if weekly antibiotics are an option for her. She is frustrated that their is not much follow-up from [facility].

## 2019-03-07 ENCOUNTER — OFFICE VISIT (OUTPATIENT)
Dept: HEMATOLOGY/ONCOLOGY | Facility: HOSPITAL | Age: 59
End: 2019-03-07
Attending: INTERNAL MEDICINE
Payer: COMMERCIAL

## 2019-03-07 ENCOUNTER — TELEPHONE (OUTPATIENT)
Dept: ORTHOPEDICS CLINIC | Facility: CLINIC | Age: 59
End: 2019-03-07

## 2019-03-07 VITALS
TEMPERATURE: 97 F | OXYGEN SATURATION: 99 % | DIASTOLIC BLOOD PRESSURE: 69 MMHG | RESPIRATION RATE: 16 BRPM | HEART RATE: 70 BPM | SYSTOLIC BLOOD PRESSURE: 126 MMHG

## 2019-03-07 DIAGNOSIS — M86.172 OTHER ACUTE OSTEOMYELITIS OF LEFT FOOT (HCC): Primary | ICD-10-CM

## 2019-03-07 PROCEDURE — 96365 THER/PROPH/DIAG IV INF INIT: CPT

## 2019-03-07 PROCEDURE — 96366 THER/PROPH/DIAG IV INF ADDON: CPT

## 2019-03-07 RX ORDER — 0.9 % SODIUM CHLORIDE 0.9 %
VIAL (ML) INJECTION
Status: DISCONTINUED
Start: 2019-03-07 | End: 2019-03-07

## 2019-03-07 RX ORDER — SODIUM CHLORIDE 9 MG/ML
INJECTION, SOLUTION INTRAVENOUS
Status: DISCONTINUED
Start: 2019-03-07 | End: 2019-03-07

## 2019-03-07 NOTE — PROGRESS NOTES
Mary Burnett arrives for IV ABX for treatment of osteomyelitis. Denies new complaints today. Pt expresses frustration with scheduling of MD appointments, infusion appointments, surgery all while trying to continue to work.   Pt states she may cancel ID appointmen

## 2019-03-08 ENCOUNTER — OFFICE VISIT (OUTPATIENT)
Dept: HEMATOLOGY/ONCOLOGY | Facility: HOSPITAL | Age: 59
End: 2019-03-08
Attending: INTERNAL MEDICINE
Payer: COMMERCIAL

## 2019-03-08 VITALS
DIASTOLIC BLOOD PRESSURE: 72 MMHG | SYSTOLIC BLOOD PRESSURE: 133 MMHG | TEMPERATURE: 99 F | HEART RATE: 81 BPM | RESPIRATION RATE: 16 BRPM | OXYGEN SATURATION: 98 %

## 2019-03-08 DIAGNOSIS — M86.172 OTHER ACUTE OSTEOMYELITIS OF LEFT FOOT (HCC): Primary | ICD-10-CM

## 2019-03-08 PROCEDURE — 96368 THER/DIAG CONCURRENT INF: CPT

## 2019-03-08 PROCEDURE — 96365 THER/PROPH/DIAG IV INF INIT: CPT

## 2019-03-08 PROCEDURE — 96366 THER/PROPH/DIAG IV INF ADDON: CPT

## 2019-03-08 RX ORDER — 0.9 % SODIUM CHLORIDE 0.9 %
VIAL (ML) INJECTION
Status: DISCONTINUED
Start: 2019-03-08 | End: 2019-03-08

## 2019-03-08 RX ORDER — SODIUM CHLORIDE 9 MG/ML
INJECTION, SOLUTION INTRAVENOUS
Status: DISCONTINUED
Start: 2019-03-08 | End: 2019-03-08

## 2019-03-08 NOTE — PROGRESS NOTES
Daryl Kunz arrives for IV ABX for treatment of osteomyelitis. .  Denies fevers. .  Patient given Rocephin over 30 minutes via PIV in right AC per pt preference to run both abx at same time for time saving. Vancomycin over 2 hours via PICC line.   Patient t

## 2019-03-09 ENCOUNTER — OFFICE VISIT (OUTPATIENT)
Dept: HEMATOLOGY/ONCOLOGY | Facility: HOSPITAL | Age: 59
End: 2019-03-09
Attending: INTERNAL MEDICINE
Payer: COMMERCIAL

## 2019-03-09 VITALS
SYSTOLIC BLOOD PRESSURE: 134 MMHG | TEMPERATURE: 97 F | RESPIRATION RATE: 18 BRPM | DIASTOLIC BLOOD PRESSURE: 73 MMHG | HEART RATE: 79 BPM

## 2019-03-09 DIAGNOSIS — M86.172 OTHER ACUTE OSTEOMYELITIS OF LEFT FOOT (HCC): Primary | ICD-10-CM

## 2019-03-09 PROCEDURE — 96365 THER/PROPH/DIAG IV INF INIT: CPT

## 2019-03-09 PROCEDURE — 96367 TX/PROPH/DG ADDL SEQ IV INF: CPT

## 2019-03-09 PROCEDURE — 96366 THER/PROPH/DIAG IV INF ADDON: CPT

## 2019-03-09 RX ORDER — 0.9 % SODIUM CHLORIDE 0.9 %
VIAL (ML) INJECTION
Status: DISCONTINUED
Start: 2019-03-09 | End: 2019-03-09

## 2019-03-09 RX ORDER — SODIUM CHLORIDE 9 MG/ML
INJECTION, SOLUTION INTRAVENOUS
Status: DISCONTINUED
Start: 2019-03-09 | End: 2019-03-09

## 2019-03-09 NOTE — PROGRESS NOTES
Joel Dueñas arrives for IV ABX for treatment of osteomyelitis. .  Denies fevers/chills or GI issues. Denies pain. Attempt x2 to place PIV without success - she is ok with rocephin via PICC line for today. Enc'd fluids.     .  Vancomycin over 2 hours, rocephi

## 2019-03-10 ENCOUNTER — APPOINTMENT (OUTPATIENT)
Dept: HEMATOLOGY/ONCOLOGY | Facility: HOSPITAL | Age: 59
End: 2019-03-10
Attending: INTERNAL MEDICINE
Payer: COMMERCIAL

## 2019-03-11 ENCOUNTER — OFFICE VISIT (OUTPATIENT)
Dept: HEMATOLOGY/ONCOLOGY | Facility: HOSPITAL | Age: 59
End: 2019-03-11
Attending: INTERNAL MEDICINE
Payer: COMMERCIAL

## 2019-03-11 VITALS
SYSTOLIC BLOOD PRESSURE: 121 MMHG | DIASTOLIC BLOOD PRESSURE: 71 MMHG | HEART RATE: 73 BPM | TEMPERATURE: 98 F | RESPIRATION RATE: 18 BRPM

## 2019-03-11 DIAGNOSIS — M86.172 OTHER ACUTE OSTEOMYELITIS OF LEFT FOOT (HCC): Primary | ICD-10-CM

## 2019-03-11 LAB
ALBUMIN SERPL-MCNC: 3.8 G/DL (ref 3.4–5)
ALBUMIN/GLOB SERPL: 1.1 {RATIO} (ref 1–2)
ALP LIVER SERPL-CCNC: 104 U/L (ref 46–118)
ALT SERPL-CCNC: 28 U/L (ref 13–56)
ANION GAP SERPL CALC-SCNC: 6 MMOL/L (ref 0–18)
AST SERPL-CCNC: 22 U/L (ref 15–37)
BASOPHILS # BLD AUTO: 0.04 X10(3) UL (ref 0–0.2)
BASOPHILS NFR BLD AUTO: 0.5 %
BILIRUB SERPL-MCNC: 0.5 MG/DL (ref 0.1–2)
BUN BLD-MCNC: 12 MG/DL (ref 7–18)
BUN/CREAT SERPL: 13.3 (ref 10–20)
CALCIUM BLD-MCNC: 8.6 MG/DL (ref 8.5–10.1)
CHLORIDE SERPL-SCNC: 108 MMOL/L (ref 98–107)
CK SERPL-CCNC: 396 U/L (ref 26–192)
CO2 SERPL-SCNC: 26 MMOL/L (ref 21–32)
CREAT BLD-MCNC: 0.9 MG/DL (ref 0.55–1.02)
CRP SERPL-MCNC: <0.29 MG/DL (ref ?–0.3)
DEPRECATED RDW RBC AUTO: 42.2 FL (ref 35.1–46.3)
EOSINOPHIL # BLD AUTO: 0.13 X10(3) UL (ref 0–0.7)
EOSINOPHIL NFR BLD AUTO: 1.5 %
ERYTHROCYTE [DISTWIDTH] IN BLOOD BY AUTOMATED COUNT: 14.6 % (ref 11–15)
ERYTHROCYTE [SEDIMENTATION RATE] IN BLOOD: 9 MM/HR (ref 0–30)
GLOBULIN PLAS-MCNC: 3.4 G/DL (ref 2.8–4.4)
GLUCOSE BLD-MCNC: 108 MG/DL (ref 70–99)
HCT VFR BLD AUTO: 38.1 % (ref 35–48)
HGB BLD-MCNC: 12.8 G/DL (ref 12–16)
IMM GRANULOCYTES # BLD AUTO: 0.03 X10(3) UL (ref 0–1)
IMM GRANULOCYTES NFR BLD: 0.3 %
LYMPHOCYTES # BLD AUTO: 2.36 X10(3) UL (ref 1–4)
LYMPHOCYTES NFR BLD AUTO: 26.7 %
M PROTEIN MFR SERPL ELPH: 7.2 G/DL (ref 6.4–8.2)
MCH RBC QN AUTO: 26.7 PG (ref 26–34)
MCHC RBC AUTO-ENTMCNC: 33.6 G/DL (ref 31–37)
MCV RBC AUTO: 79.5 FL (ref 80–100)
MONOCYTES # BLD AUTO: 0.65 X10(3) UL (ref 0.1–1)
MONOCYTES NFR BLD AUTO: 7.4 %
NEUTROPHILS # BLD AUTO: 5.63 X10 (3) UL (ref 1.5–7.7)
NEUTROPHILS # BLD AUTO: 5.63 X10(3) UL (ref 1.5–7.7)
NEUTROPHILS NFR BLD AUTO: 63.6 %
OSMOLALITY SERPL CALC.SUM OF ELEC: 290 MOSM/KG (ref 275–295)
PLATELET # BLD AUTO: 346 10(3)UL (ref 150–450)
POTASSIUM SERPL-SCNC: 3.4 MMOL/L (ref 3.5–5.1)
RBC # BLD AUTO: 4.79 X10(6)UL (ref 3.8–5.3)
SODIUM SERPL-SCNC: 140 MMOL/L (ref 136–145)
VANCOMYCIN TROUGH SERPL-MCNC: 1.2 UG/ML (ref 10–20)
WBC # BLD AUTO: 8.8 X10(3) UL (ref 4–11)

## 2019-03-11 PROCEDURE — 80202 ASSAY OF VANCOMYCIN: CPT | Performed by: INTERNAL MEDICINE

## 2019-03-11 PROCEDURE — 85652 RBC SED RATE AUTOMATED: CPT

## 2019-03-11 PROCEDURE — 96367 TX/PROPH/DG ADDL SEQ IV INF: CPT

## 2019-03-11 PROCEDURE — 85025 COMPLETE CBC W/AUTO DIFF WBC: CPT

## 2019-03-11 PROCEDURE — 82550 ASSAY OF CK (CPK): CPT | Performed by: PHYSICIAN ASSISTANT

## 2019-03-11 PROCEDURE — 96365 THER/PROPH/DIAG IV INF INIT: CPT

## 2019-03-11 PROCEDURE — 86140 C-REACTIVE PROTEIN: CPT

## 2019-03-11 PROCEDURE — 80053 COMPREHEN METABOLIC PANEL: CPT

## 2019-03-11 PROCEDURE — 96366 THER/PROPH/DIAG IV INF ADDON: CPT

## 2019-03-11 RX ORDER — SODIUM CHLORIDE 9 MG/ML
INJECTION, SOLUTION INTRAVENOUS
Status: DISCONTINUED
Start: 2019-03-11 | End: 2019-03-11

## 2019-03-11 RX ORDER — 0.9 % SODIUM CHLORIDE 0.9 %
VIAL (ML) INJECTION
Status: DISCONTINUED
Start: 2019-03-11 | End: 2019-03-11

## 2019-03-11 NOTE — PROGRESS NOTES
Patient arrives for daily antibiotics for osteomyletis. Patient reports she is fatigue and has joint pain due to the antibiotic. PICC line present to left upper arm, PICC flushed with saline, positive blood return noted.  Vancomycin given over two hours, pa

## 2019-03-12 ENCOUNTER — OFFICE VISIT (OUTPATIENT)
Dept: HEMATOLOGY/ONCOLOGY | Facility: HOSPITAL | Age: 59
End: 2019-03-12
Attending: INTERNAL MEDICINE
Payer: COMMERCIAL

## 2019-03-12 VITALS
SYSTOLIC BLOOD PRESSURE: 150 MMHG | HEART RATE: 81 BPM | DIASTOLIC BLOOD PRESSURE: 85 MMHG | RESPIRATION RATE: 18 BRPM | TEMPERATURE: 98 F

## 2019-03-12 DIAGNOSIS — M86.172 OTHER ACUTE OSTEOMYELITIS OF LEFT FOOT (HCC): Primary | ICD-10-CM

## 2019-03-12 PROCEDURE — 99211 OFF/OP EST MAY X REQ PHY/QHP: CPT

## 2019-03-12 NOTE — PROGRESS NOTES
Carson Forrester to infusion; plan of care explained from [address]. IV antibiotics stopped, picc line removed, to call [facility] to schedule follow up exam one week post op. Carson Forrester in reclining position, picc line slowly removed.   PICC line removed, 41cm matches pic

## 2019-03-12 NOTE — PATIENT INSTRUCTIONS
Call [facility]  to schedule follow up with  one week post op

## 2019-03-13 ENCOUNTER — APPOINTMENT (OUTPATIENT)
Dept: HEMATOLOGY/ONCOLOGY | Facility: HOSPITAL | Age: 59
End: 2019-03-13
Attending: INTERNAL MEDICINE
Payer: COMMERCIAL

## 2019-03-14 ENCOUNTER — APPOINTMENT (OUTPATIENT)
Dept: HEMATOLOGY/ONCOLOGY | Facility: HOSPITAL | Age: 59
End: 2019-03-14
Attending: INTERNAL MEDICINE
Payer: COMMERCIAL

## 2019-03-15 ENCOUNTER — LAB REQUISITION (OUTPATIENT)
Dept: LAB | Facility: HOSPITAL | Age: 59
End: 2019-03-15
Payer: COMMERCIAL

## 2019-03-15 ENCOUNTER — APPOINTMENT (OUTPATIENT)
Dept: HEMATOLOGY/ONCOLOGY | Facility: HOSPITAL | Age: 59
End: 2019-03-15
Attending: INTERNAL MEDICINE
Payer: COMMERCIAL

## 2019-03-15 DIAGNOSIS — Z01.89 ENCOUNTER FOR OTHER SPECIFIED SPECIAL EXAMINATIONS: ICD-10-CM

## 2019-03-15 PROCEDURE — 88305 TISSUE EXAM BY PATHOLOGIST: CPT | Performed by: PODIATRIST

## 2019-03-15 PROCEDURE — 88311 DECALCIFY TISSUE: CPT | Performed by: PODIATRIST

## 2019-03-16 ENCOUNTER — TELEPHONE (OUTPATIENT)
Dept: PODIATRY CLINIC | Facility: CLINIC | Age: 59
End: 2019-03-16

## 2019-03-16 ENCOUNTER — APPOINTMENT (OUTPATIENT)
Dept: HEMATOLOGY/ONCOLOGY | Facility: HOSPITAL | Age: 59
End: 2019-03-16
Attending: INTERNAL MEDICINE
Payer: COMMERCIAL

## 2019-03-16 NOTE — TELEPHONE ENCOUNTER
Pt calling states she had sx 3/15 and pt is having pain in her foot. Pt aware of office hours and states she feels okay to wait until Monday. Was unable to transfer to RN, pls contact.

## 2019-03-17 ENCOUNTER — APPOINTMENT (OUTPATIENT)
Dept: HEMATOLOGY/ONCOLOGY | Facility: HOSPITAL | Age: 59
End: 2019-03-17
Attending: INTERNAL MEDICINE
Payer: COMMERCIAL

## 2019-03-18 ENCOUNTER — APPOINTMENT (OUTPATIENT)
Dept: HEMATOLOGY/ONCOLOGY | Facility: HOSPITAL | Age: 59
End: 2019-03-18
Attending: INTERNAL MEDICINE
Payer: COMMERCIAL

## 2019-03-18 NOTE — TELEPHONE ENCOUNTER
S/w pt and she states on 3/16 she had some bleeding through her sock, but it has stopped and dried up since. Pt has c/o nausea, dizziness, lightheaded, and diarrhea 4 times per day since Friday 3/15.  She states she stopped taking the hydorcodone on 3/15

## 2019-03-18 NOTE — TELEPHONE ENCOUNTER
Per verbal order from [facility]- stop the augmentin and if diarrhea doesn't stop go to ER and give a stool sample. Pt notified per [facility] orders and advised her to [facility] and notify us if symptoms don't improve or nay concerns. She verbalized understanding.

## 2019-03-19 ENCOUNTER — APPOINTMENT (OUTPATIENT)
Dept: HEMATOLOGY/ONCOLOGY | Facility: HOSPITAL | Age: 59
End: 2019-03-19
Attending: INTERNAL MEDICINE
Payer: COMMERCIAL

## 2019-03-20 ENCOUNTER — APPOINTMENT (OUTPATIENT)
Dept: HEMATOLOGY/ONCOLOGY | Facility: HOSPITAL | Age: 59
End: 2019-03-20
Attending: INTERNAL MEDICINE
Payer: COMMERCIAL

## 2019-03-21 ENCOUNTER — APPOINTMENT (OUTPATIENT)
Dept: HEMATOLOGY/ONCOLOGY | Facility: HOSPITAL | Age: 59
End: 2019-03-21
Attending: INTERNAL MEDICINE
Payer: COMMERCIAL

## 2019-03-22 ENCOUNTER — APPOINTMENT (OUTPATIENT)
Dept: HEMATOLOGY/ONCOLOGY | Facility: HOSPITAL | Age: 59
End: 2019-03-22
Attending: INTERNAL MEDICINE
Payer: COMMERCIAL

## 2019-03-23 ENCOUNTER — APPOINTMENT (OUTPATIENT)
Dept: HEMATOLOGY/ONCOLOGY | Facility: HOSPITAL | Age: 59
End: 2019-03-23
Attending: INTERNAL MEDICINE
Payer: COMMERCIAL

## 2019-03-24 ENCOUNTER — APPOINTMENT (OUTPATIENT)
Dept: HEMATOLOGY/ONCOLOGY | Facility: HOSPITAL | Age: 59
End: 2019-03-24
Attending: INTERNAL MEDICINE
Payer: COMMERCIAL

## 2019-03-25 ENCOUNTER — APPOINTMENT (OUTPATIENT)
Dept: HEMATOLOGY/ONCOLOGY | Facility: HOSPITAL | Age: 59
End: 2019-03-25
Attending: INTERNAL MEDICINE
Payer: COMMERCIAL

## 2019-03-25 ENCOUNTER — OFFICE VISIT (OUTPATIENT)
Dept: PODIATRY CLINIC | Facility: CLINIC | Age: 59
End: 2019-03-25

## 2019-03-25 DIAGNOSIS — Z98.890 POST-OPERATIVE STATE: Primary | ICD-10-CM

## 2019-03-25 DIAGNOSIS — M79.672 LEFT FOOT PAIN: ICD-10-CM

## 2019-03-25 DIAGNOSIS — M20.42 HAMMERTOE OF LEFT FOOT: ICD-10-CM

## 2019-03-25 DIAGNOSIS — M86.9 OSTEOMYELITIS OF TOE OF LEFT FOOT (HCC): ICD-10-CM

## 2019-03-25 PROCEDURE — 99024 POSTOP FOLLOW-UP VISIT: CPT | Performed by: PODIATRIST

## 2019-03-25 NOTE — PROGRESS NOTES
Ana Herrera is a 59year old female. Patient presents with:  Post-Op: s/p left 2nd toe -- Sx 03/15/19. Rates pain 3/10. Denies any fever. States right lower leg has had calf pain.          HPI:   Patient returns to the clinic now 10 days status post surge Latanoprost qhs after abnormal VF and OCT 2/25/16;  6/5/18 consult with Dr. Peace Brewster progress note   • Small bowel obstruction [facility])    • Tendinitis    • Unspecified essential hypertension    • Unspecified sleep apnea       Past Surgical History: a defibrillator: No        Reaction to local anesthetic: No          REVIEW OF SYSTEMS:   Review of Systems  Today reviewed systens as documented below  GENERAL HEALTH: feels well otherwise  SKIN: denies any unusual skin lesions or rashes  RESPIRATORY: den

## 2019-03-26 ENCOUNTER — APPOINTMENT (OUTPATIENT)
Dept: HEMATOLOGY/ONCOLOGY | Facility: HOSPITAL | Age: 59
End: 2019-03-26
Attending: INTERNAL MEDICINE
Payer: COMMERCIAL

## 2019-03-26 ENCOUNTER — TELEPHONE (OUTPATIENT)
Dept: INTERNAL MEDICINE CLINIC | Facility: CLINIC | Age: 59
End: 2019-03-26

## 2019-03-26 DIAGNOSIS — M79.671 FOOT PAIN, RIGHT: Primary | ICD-10-CM

## 2019-03-26 DIAGNOSIS — L60.0 INGROWN TOENAIL: ICD-10-CM

## 2019-03-26 NOTE — TELEPHONE ENCOUNTER
Patient requesting a referral with Dr. Brenda De La Torre.      Please sign off on this referral.    Thanks,    Galindo

## 2019-03-27 ENCOUNTER — APPOINTMENT (OUTPATIENT)
Dept: HEMATOLOGY/ONCOLOGY | Facility: HOSPITAL | Age: 59
End: 2019-03-27
Attending: INTERNAL MEDICINE
Payer: COMMERCIAL

## 2019-03-28 ENCOUNTER — APPOINTMENT (OUTPATIENT)
Dept: HEMATOLOGY/ONCOLOGY | Facility: HOSPITAL | Age: 59
End: 2019-03-28
Attending: INTERNAL MEDICINE
Payer: COMMERCIAL

## 2019-03-29 ENCOUNTER — APPOINTMENT (OUTPATIENT)
Dept: HEMATOLOGY/ONCOLOGY | Facility: HOSPITAL | Age: 59
End: 2019-03-29
Attending: INTERNAL MEDICINE
Payer: COMMERCIAL

## 2019-03-29 NOTE — PROGRESS NOTES
Mike Warren is a 60year old female. HPI:   Patient presents with:  Kidney Problem: 1 year follow up ultrasoud completed    15-year-old -American female presents in follow-up to a previous visit April 11, 2016.   I have followed the patient for [phone]    Comment LAPAROSCOPIC LYSIS OF ADHESION    OTHER SURGICAL HISTORY      Comment right foot bunionectomy    OTHER SURGICAL HISTORY  11-14-14    Comment right superficial anterior peroneal nerve biopsy and right proximal thigh muscle biopsy. methocarbamol (ROBAXIN-750) 750 MG Oral Tab One to two four times a day as needed for muscle relaxation Disp: 40 tablet Rfl: 3   nystatin (MYCOSTATIN) 212314 UNIT/GM External Ointment Apply to affeted area twice a day as needed.  Disp: 30 g Rfl: 5       A no

## 2019-03-30 ENCOUNTER — APPOINTMENT (OUTPATIENT)
Dept: HEMATOLOGY/ONCOLOGY | Facility: HOSPITAL | Age: 59
End: 2019-03-30
Attending: INTERNAL MEDICINE
Payer: COMMERCIAL

## 2019-03-31 ENCOUNTER — APPOINTMENT (OUTPATIENT)
Dept: HEMATOLOGY/ONCOLOGY | Facility: HOSPITAL | Age: 59
End: 2019-03-31
Attending: INTERNAL MEDICINE
Payer: COMMERCIAL

## 2019-04-01 ENCOUNTER — OFFICE VISIT (OUTPATIENT)
Dept: INTERNAL MEDICINE CLINIC | Facility: CLINIC | Age: 59
End: 2019-04-01

## 2019-04-01 ENCOUNTER — APPOINTMENT (OUTPATIENT)
Dept: HEMATOLOGY/ONCOLOGY | Facility: HOSPITAL | Age: 59
End: 2019-04-01
Attending: INTERNAL MEDICINE
Payer: COMMERCIAL

## 2019-04-01 VITALS
DIASTOLIC BLOOD PRESSURE: 84 MMHG | BODY MASS INDEX: 29 KG/M2 | HEART RATE: 69 BPM | HEIGHT: 69 IN | TEMPERATURE: 99 F | SYSTOLIC BLOOD PRESSURE: 136 MMHG

## 2019-04-01 DIAGNOSIS — Z51.81 ENCOUNTER FOR THERAPEUTIC DRUG MONITORING: ICD-10-CM

## 2019-04-01 DIAGNOSIS — G89.29 CHRONIC RIGHT SHOULDER PAIN: Primary | ICD-10-CM

## 2019-04-01 DIAGNOSIS — M25.511 CHRONIC RIGHT SHOULDER PAIN: Primary | ICD-10-CM

## 2019-04-01 DIAGNOSIS — L30.4 INTERTRIGO: ICD-10-CM

## 2019-04-01 DIAGNOSIS — R42 DIZZINESS: ICD-10-CM

## 2019-04-01 PROCEDURE — 99212 OFFICE O/P EST SF 10 MIN: CPT | Performed by: INTERNAL MEDICINE

## 2019-04-01 PROCEDURE — 99214 OFFICE O/P EST MOD 30 MIN: CPT | Performed by: INTERNAL MEDICINE

## 2019-04-01 RX ORDER — NYSTATIN AND TRIAMCINOLONE ACETONIDE 100000; 1 [USP'U]/G; MG/G
1 OINTMENT TOPICAL 2 TIMES DAILY PRN
Qty: 60 G | Refills: 1 | Status: SHIPPED | OUTPATIENT
Start: 2019-04-01 | End: 2019-06-18 | Stop reason: ALTCHOICE

## 2019-04-01 RX ORDER — IBUPROFEN 600 MG/1
TABLET ORAL
Qty: 270 TABLET | Refills: 0 | Status: CANCELLED | OUTPATIENT
Start: 2019-04-01

## 2019-04-01 RX ORDER — IBUPROFEN 600 MG/1
600 TABLET ORAL DAILY PRN
Qty: 20 TABLET | Refills: 0 | Status: SHIPPED | OUTPATIENT
Start: 2019-04-01 | End: 2019-05-20

## 2019-04-01 NOTE — PROGRESS NOTES
Mars Chandler is a 59year old female. Patient presents with:  Lightheadedness  Neck Pain      HPI:     HPI   Patient is here with the complaints of right-sided neck pain, lightheadedness and right shoulder pain. The symptoms started 2 weeks ago.   She ha daily as needed. Disp: 60 g Rfl: 1   ibuprofen 600 MG Oral Tab Take 1 tablet (600 mg total) by mouth daily as needed for Pain. Take with meals (for pain/inflammation).  Disp: 20 tablet Rfl: 0   HYDROcodone-acetaminophen 5-325 MG Oral Tab Take 1-2 tablets by Hysterectomy  1996    KAI   • Other surgical history  2005,2007,2008    LAPAROSCOPIC LYSIS OF ADHESION   • Other surgical history      right foot bunionectomy   • Other surgical history  11-14-14    right superficial anterior peroneal nerve biopsy and righ Diagnoses and all orders for this visit:    Chronic right shoulder pain  -     Cancel: PHYSIATRY - INTERNAL  -     ORTHOPEDIC - INTERNAL  Her physical exam is concerning for right rotator cuff tendinopathy.   She has history of rotator cuff tendinopat

## 2019-04-02 ENCOUNTER — LAB ENCOUNTER (OUTPATIENT)
Dept: LAB | Facility: HOSPITAL | Age: 59
End: 2019-04-02
Attending: INTERNAL MEDICINE
Payer: COMMERCIAL

## 2019-04-02 ENCOUNTER — OFFICE VISIT (OUTPATIENT)
Dept: PODIATRY CLINIC | Facility: CLINIC | Age: 59
End: 2019-04-02

## 2019-04-02 ENCOUNTER — APPOINTMENT (OUTPATIENT)
Dept: HEMATOLOGY/ONCOLOGY | Facility: HOSPITAL | Age: 59
End: 2019-04-02
Attending: INTERNAL MEDICINE
Payer: COMMERCIAL

## 2019-04-02 DIAGNOSIS — M86.9 OSTEOMYELITIS OF TOE OF LEFT FOOT (HCC): Primary | ICD-10-CM

## 2019-04-02 DIAGNOSIS — R42 DIZZINESS: ICD-10-CM

## 2019-04-02 DIAGNOSIS — Z51.81 ENCOUNTER FOR THERAPEUTIC DRUG MONITORING: ICD-10-CM

## 2019-04-02 DIAGNOSIS — Z00.00 ANNUAL PHYSICAL EXAM: ICD-10-CM

## 2019-04-02 PROCEDURE — 85025 COMPLETE CBC W/AUTO DIFF WBC: CPT

## 2019-04-02 PROCEDURE — 99212 OFFICE O/P EST SF 10 MIN: CPT | Performed by: PODIATRIST

## 2019-04-02 PROCEDURE — 80053 COMPREHEN METABOLIC PANEL: CPT

## 2019-04-02 PROCEDURE — 36415 COLL VENOUS BLD VENIPUNCTURE: CPT

## 2019-04-02 PROCEDURE — 99024 POSTOP FOLLOW-UP VISIT: CPT | Performed by: PODIATRIST

## 2019-04-02 PROCEDURE — 82550 ASSAY OF CK (CPK): CPT

## 2019-04-02 PROCEDURE — 84443 ASSAY THYROID STIM HORMONE: CPT

## 2019-04-02 RX ORDER — IBUPROFEN 600 MG/1
TABLET ORAL
Qty: 90 TABLET | Refills: 0 | OUTPATIENT
Start: 2019-04-02

## 2019-04-02 NOTE — PROGRESS NOTES
HPI:    Patient ID: Manuel Dean is a 59year old female. 78-year-old female presents to the office today having had an amputation of the second left toe 2 weeks ago.   The procedure was performed by  due to a significant infection with osteomye prescriptions requested or ordered in this encounter       Imaging & Referrals:  None       #[id number]

## 2019-04-03 ENCOUNTER — TELEPHONE (OUTPATIENT)
Dept: PODIATRY CLINIC | Facility: CLINIC | Age: 59
End: 2019-04-03

## 2019-04-03 NOTE — TELEPHONE ENCOUNTER
Pt called stating pt had appt 4-2-19. Advised to return in one week to have stitches removed. No appts available.   Please call pt to advise

## 2019-04-03 NOTE — TELEPHONE ENCOUNTER
Called pt and made appt on 4/9/19 @ 550pm at [facility].

## 2019-04-07 ENCOUNTER — APPOINTMENT (OUTPATIENT)
Dept: LAB | Facility: HOSPITAL | Age: 59
End: 2019-04-07
Attending: INTERNAL MEDICINE
Payer: COMMERCIAL

## 2019-04-07 DIAGNOSIS — Z00.00 ANNUAL PHYSICAL EXAM: ICD-10-CM

## 2019-04-07 PROCEDURE — 80061 LIPID PANEL: CPT

## 2019-04-07 PROCEDURE — 36415 COLL VENOUS BLD VENIPUNCTURE: CPT

## 2019-04-08 ENCOUNTER — TELEPHONE (OUTPATIENT)
Dept: PODIATRY CLINIC | Facility: CLINIC | Age: 59
End: 2019-04-08

## 2019-04-08 NOTE — TELEPHONE ENCOUNTER
Called pt up and patient and I did the letter together.   Sent the letter to [facility] Chart

## 2019-04-08 NOTE — TELEPHONE ENCOUNTER
Spoke to pt and she is requesting work note giving her a \"30 minute nichole period\" in the morning prior to work and in the evening leaving work. States she has to ride the train and bus to and from work.    Informed pt that I will send request to [facility] and c

## 2019-04-09 ENCOUNTER — OFFICE VISIT (OUTPATIENT)
Dept: PODIATRY CLINIC | Facility: CLINIC | Age: 59
End: 2019-04-09

## 2019-04-09 DIAGNOSIS — M86.9 OSTEOMYELITIS OF TOE OF LEFT FOOT (HCC): Primary | ICD-10-CM

## 2019-04-09 PROCEDURE — 99024 POSTOP FOLLOW-UP VISIT: CPT | Performed by: PODIATRIST

## 2019-04-09 PROCEDURE — 99212 OFFICE O/P EST SF 10 MIN: CPT | Performed by: PODIATRIST

## 2019-04-09 NOTE — PROGRESS NOTES
HPI:    Patient ID: Andreina Avila is a 59year old female. 68-year-old female presents postsurgical in reference to an amputation of the second left toe.   Patient has no specific noted complaints today      ROS:              Current Outpatient Medications

## 2019-04-09 NOTE — TELEPHONE ENCOUNTER
S/w pt and she states she can normally see letters from her phone but can't see this one. I advised she may need to look from a computer or laptop.  She will be at [place] [address] today and will print her a copy to p/u

## 2019-04-17 ENCOUNTER — OFFICE VISIT (OUTPATIENT)
Dept: ORTHOPEDICS CLINIC | Facility: CLINIC | Age: 59
End: 2019-04-17

## 2019-04-17 VITALS — SYSTOLIC BLOOD PRESSURE: 135 MMHG | RESPIRATION RATE: 18 BRPM | DIASTOLIC BLOOD PRESSURE: 74 MMHG | HEART RATE: 77 BPM

## 2019-04-17 DIAGNOSIS — M25.511 PAIN IN JOINT OF RIGHT SHOULDER: Primary | ICD-10-CM

## 2019-04-17 PROCEDURE — 20610 DRAIN/INJ JOINT/BURSA W/O US: CPT | Performed by: ORTHOPAEDIC SURGERY

## 2019-04-17 NOTE — PROGRESS NOTES
Per verbal order from [facility], draw up 2ml of Kenalog 10 and 2ml of 1% lidocaine for cortisone injection to right shoulder.  Antonina Reed

## 2019-05-07 ENCOUNTER — OFFICE VISIT (OUTPATIENT)
Dept: PODIATRY CLINIC | Facility: CLINIC | Age: 59
End: 2019-05-07

## 2019-05-07 DIAGNOSIS — M20.42 HAMMERTOE OF LEFT FOOT: Primary | ICD-10-CM

## 2019-05-07 DIAGNOSIS — L60.0 INGROWN TOENAIL: ICD-10-CM

## 2019-05-07 PROCEDURE — 99212 OFFICE O/P EST SF 10 MIN: CPT | Performed by: PODIATRIST

## 2019-05-07 PROCEDURE — 99024 POSTOP FOLLOW-UP VISIT: CPT | Performed by: PODIATRIST

## 2019-05-07 NOTE — PROGRESS NOTES
HPI:    Patient ID: Eliane Wall is a 59year old female. This 70-year-old female presents for follow-up in reference to the digital amputation.   Patient is progressing doing relatively well with no specific noted concerns other than a degree of swelling both great toes         ASSESSMENT/PLAN:   Hammertoe of left foot  (primary encounter diagnosis)  Ingrown toenail    No orders of the defined types were placed in this encounter.       Meds This Visit:  Requested Prescriptions      No prescriptions requeste

## 2019-05-08 ENCOUNTER — TELEPHONE (OUTPATIENT)
Dept: PODIATRY CLINIC | Facility: CLINIC | Age: 59
End: 2019-05-08

## 2019-05-08 NOTE — TELEPHONE ENCOUNTER
Pts employer calling states they received a back to work note from the office, but need clarification. They need to know the specific timeframe for pts restrictions. Asking to provide pt with a new note.

## 2019-05-09 NOTE — TELEPHONE ENCOUNTER
Pt cb and is requesting for a copy of the note be mailed home. Pt address in [facility] confirmed. Pt wants to be called when completed.  pls advise thank you

## 2019-05-20 ENCOUNTER — OFFICE VISIT (OUTPATIENT)
Dept: INTERNAL MEDICINE CLINIC | Facility: CLINIC | Age: 59
End: 2019-05-20

## 2019-05-20 VITALS
SYSTOLIC BLOOD PRESSURE: 121 MMHG | BODY MASS INDEX: 29.09 KG/M2 | HEART RATE: 76 BPM | DIASTOLIC BLOOD PRESSURE: 78 MMHG | WEIGHT: 196.38 LBS | TEMPERATURE: 98 F | HEIGHT: 69 IN

## 2019-05-20 DIAGNOSIS — Z12.11 ENCOUNTER FOR SCREENING COLONOSCOPY: ICD-10-CM

## 2019-05-20 DIAGNOSIS — I10 ESSENTIAL HYPERTENSION: ICD-10-CM

## 2019-05-20 DIAGNOSIS — R51.9 UNILATERAL HEADACHE: Primary | ICD-10-CM

## 2019-05-20 DIAGNOSIS — N28.1 RENAL CYST: ICD-10-CM

## 2019-05-20 DIAGNOSIS — E04.1 THYROID NODULE: ICD-10-CM

## 2019-05-20 PROBLEM — M86.9 PYOGENIC INFLAMMATION OF BONE (HCC): Status: RESOLVED | Noted: 2019-02-17 | Resolved: 2019-05-20

## 2019-05-20 PROBLEM — H00.11 CHALAZION OF RIGHT UPPER EYELID: Status: RESOLVED | Noted: 2017-04-08 | Resolved: 2019-05-20

## 2019-05-20 PROBLEM — H43.393 FLOATER, VITREOUS, BILATERAL: Status: RESOLVED | Noted: 2018-02-10 | Resolved: 2019-05-20

## 2019-05-20 PROBLEM — M86.172 OTHER ACUTE OSTEOMYELITIS OF LEFT FOOT (HCC): Status: RESOLVED | Noted: 2019-02-18 | Resolved: 2019-05-20

## 2019-05-20 PROCEDURE — 99214 OFFICE O/P EST MOD 30 MIN: CPT | Performed by: INTERNAL MEDICINE

## 2019-05-20 PROCEDURE — 99212 OFFICE O/P EST SF 10 MIN: CPT | Performed by: INTERNAL MEDICINE

## 2019-05-20 RX ORDER — AMLODIPINE BESYLATE 10 MG/1
TABLET ORAL
Qty: 90 TABLET | Refills: 1 | Status: SHIPPED | OUTPATIENT
Start: 2019-05-20 | End: 2019-07-08

## 2019-05-20 RX ORDER — INDOMETHACIN 25 MG/1
25 CAPSULE ORAL
Qty: 15 CAPSULE | Refills: 0 | Status: SHIPPED | OUTPATIENT
Start: 2019-05-20 | End: 2019-05-23

## 2019-05-20 NOTE — PROGRESS NOTES
Scottie Horner is a 59year old female. Patient presents with:  Headache  Hypertension: med refill      HPI:     HPI  Patient is here for above. Complaints of headache. Have been going on for 3 weeks. Has been having sharp pain behind her right eye.  No na needed.  Disp: 60 g Rfl: 1   latanoprost 0.005 % Ophthalmic Solution Instill 1 drop by ophthalmic route every morning in both eyes Disp: 3 Bottle Rfl: 3   IBUPROFEN 600 MG Oral Tab TAKE 1 TABLET BY MOUTH EVERY 8 HOURS AS NEEDED FOR PAIN Disp: 270 tablet Rfl CHENCHO      Social History:  Social History    Tobacco Use      Smoking status: Never Smoker      Smokeless tobacco: Never Used    Alcohol use: No      Alcohol/week: 0.0 oz      Comment: None.      Drug use: No       REVIEW OF SYSTEMS:   Review of Systems all orders for this visit:  Headaches. 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 Lacks autonomic findings of SUNCT headaches.   Her nasopharynx and sinuses are clear    Giving indomethacin 25 mg 3 times daily for 3 days followed by 25 mg as neede

## 2019-05-31 ENCOUNTER — OFFICE VISIT (OUTPATIENT)
Dept: PODIATRY CLINIC | Facility: CLINIC | Age: 59
End: 2019-05-31

## 2019-05-31 VITALS — SYSTOLIC BLOOD PRESSURE: 140 MMHG | DIASTOLIC BLOOD PRESSURE: 82 MMHG | HEART RATE: 74 BPM

## 2019-05-31 DIAGNOSIS — L60.0 INGROWN TOENAIL: Primary | ICD-10-CM

## 2019-05-31 PROCEDURE — 11750 EXCISION NAIL&NAIL MATRIX: CPT | Performed by: PODIATRIST

## 2019-05-31 NOTE — PROGRESS NOTES
HPI:    Patient ID: Shant Zayas is a 59year old female. 29-year-old female presents for correction of ingrown toenail. Have to review with the procedure patient signed a written consent.     ROS:     I did review medical status medications were noted

## 2019-06-03 ENCOUNTER — HOSPITAL ENCOUNTER (OUTPATIENT)
Dept: ULTRASOUND IMAGING | Age: 59
Discharge: HOME | End: 2019-06-03
Attending: INTERNAL MEDICINE
Payer: COMMERCIAL

## 2019-06-03 DIAGNOSIS — E04.1 THYROID NODULE: ICD-10-CM

## 2019-06-03 PROCEDURE — 76536 US EXAM OF HEAD AND NECK: CPT | Performed by: INTERNAL MEDICINE

## 2019-06-06 ENCOUNTER — TELEPHONE (OUTPATIENT)
Dept: PODIATRY CLINIC | Facility: CLINIC | Age: 59
End: 2019-06-06

## 2019-06-07 ENCOUNTER — TELEPHONE (OUTPATIENT)
Dept: ORTHOPEDICS CLINIC | Facility: CLINIC | Age: 59
End: 2019-06-07

## 2019-06-07 NOTE — TELEPHONE ENCOUNTER
SCR- please advise on note. LOV 5/31 and she doesn't have f/u scheduled yet. No evenings available soon.

## 2019-06-07 NOTE — TELEPHONE ENCOUNTER
The problem is I will leave at 445 for a 5 mtg. If there are no concerns she can wait another week.  Thanks scr

## 2019-06-07 NOTE — TELEPHONE ENCOUNTER
Pt. states that she needs to get a return to work note, which needs to be updated. Pt. States that she needs to p/up note today to take to work on Monday.

## 2019-06-07 NOTE — TELEPHONE ENCOUNTER
Pts states that her 6/11 Post op appt. was cx due to dr rachel pedro, pt. requesting to get a sooner appt.  around 4:50-5:00pm.

## 2019-06-07 NOTE — TELEPHONE ENCOUNTER
S/w pt and appt made on 6/18 for f/u.     Pt states she still needs 2 notes that should be until her f/u on 6/18-   One note that says she can only WB 50% of the day   The other note to say she needs 30 minutes extra travel time in the morning and leave wor

## 2019-06-18 ENCOUNTER — OFFICE VISIT (OUTPATIENT)
Dept: PODIATRY CLINIC | Facility: CLINIC | Age: 59
End: 2019-06-18

## 2019-06-18 VITALS — BODY MASS INDEX: 28.14 KG/M2 | HEIGHT: 69 IN | WEIGHT: 190 LBS

## 2019-06-18 DIAGNOSIS — L60.0 INGROWN TOENAIL: Primary | ICD-10-CM

## 2019-06-18 PROCEDURE — 99212 OFFICE O/P EST SF 10 MIN: CPT | Performed by: PODIATRIST

## 2019-06-18 PROCEDURE — 99024 POSTOP FOLLOW-UP VISIT: CPT | Performed by: PODIATRIST

## 2019-06-18 NOTE — PROGRESS NOTES
HPI:    Patient ID: Cj Soria is a 59year old female. 68-year-old female presents postsurgical for ingrown toenails. She has no specific noted complaints or concerns. Patient is doing well.   They have a slight bit of draining that I encourage her t

## 2019-07-01 ENCOUNTER — TELEPHONE (OUTPATIENT)
Dept: GASTROENTEROLOGY | Facility: CLINIC | Age: 59
End: 2019-07-01

## 2019-07-01 ENCOUNTER — OFFICE VISIT (OUTPATIENT)
Dept: GASTROENTEROLOGY | Facility: CLINIC | Age: 59
End: 2019-07-01

## 2019-07-01 VITALS
HEART RATE: 76 BPM | HEIGHT: 69 IN | WEIGHT: 196.81 LBS | DIASTOLIC BLOOD PRESSURE: 80 MMHG | SYSTOLIC BLOOD PRESSURE: 124 MMHG | BODY MASS INDEX: 29.15 KG/M2

## 2019-07-01 DIAGNOSIS — D36.9 ADENOMATOUS POLYPS: Primary | ICD-10-CM

## 2019-07-01 DIAGNOSIS — Z86.010 HISTORY OF COLON POLYPS: Primary | ICD-10-CM

## 2019-07-01 PROCEDURE — 99244 OFF/OP CNSLTJ NEW/EST MOD 40: CPT | Performed by: INTERNAL MEDICINE

## 2019-07-01 PROCEDURE — 99212 OFFICE O/P EST SF 10 MIN: CPT | Performed by: INTERNAL MEDICINE

## 2019-07-01 RX ORDER — POLYETHYLENE GLYCOL 3350, SODIUM CHLORIDE, SODIUM BICARBONATE, POTASSIUM CHLORIDE 420; 11.2; 5.72; 1.48 G/4L; G/4L; G/4L; G/4L
POWDER, FOR SOLUTION ORAL
Qty: 1 BOTTLE | Refills: 0 | Status: SHIPPED | OUTPATIENT
Start: 2019-07-01 | End: 2019-07-08 | Stop reason: ALTCHOICE

## 2019-07-01 NOTE — H&P
[address] [facility] [address] Gastroenterology                                                                                                  Clinic History and Physical     No 11/11/2016    Performed by Supriya Bray MD at [address], SINGLE - OD - RIGHT EYE Right 6.27/2017    Nasally   • HC ARTHROCENTESIS OR INJECT MAJOR JOINT W/O US     • HYSTERECTOMY  1996    KAI   • OTHER SURGICAL H shortness of breath  CARDIOVASCULAR:  negative for crushing sub-sternal chest pain  GASTROINTESTINAL:  see HPI  GENITOURINARY:  negative for dysuria or gross hematuria  INTEGUMENT/BREAST:  SKIN:  negative for jaundice   ALLERGIC/IMMUNOLOGIC:  negative for hospitalization, surgical intervention, or even death. I also specifically mentioned the miss rate of colonoscopy of 5-10% in the best of all circumstances. All questions were answered to the patient’s satisfaction.  The patient signed informed consent and

## 2019-07-01 NOTE — TELEPHONE ENCOUNTER
Scheduled for:  [place] [id number]  Provider Name: Igor Romano  Date:  9/6/19  Location:  [facility]  Sedation:  MAC  Time:  1215pm pt told to arrive at 1115am  Prep:colyte and miralax depending on coverage  Meds/Allergies Reconciled?:  Physician reviewed  Diagnosis wi

## 2019-07-01 NOTE — PATIENT INSTRUCTIONS
Recommend:  -Schedule colonoscopy w/MAC  -Prep: Split dose Colyte or suprep or equivalent    ** If MAC @ [facility]/NE:    - HOLD ACE/ARBs the night before and the day of the procedure(s)    - NO alcohol, recreational drugs nor erectile dysfunction mediations 24

## 2019-07-08 ENCOUNTER — OFFICE VISIT (OUTPATIENT)
Dept: INTERNAL MEDICINE CLINIC | Facility: CLINIC | Age: 59
End: 2019-07-08

## 2019-07-08 VITALS
BODY MASS INDEX: 29 KG/M2 | HEIGHT: 69 IN | DIASTOLIC BLOOD PRESSURE: 75 MMHG | SYSTOLIC BLOOD PRESSURE: 123 MMHG | HEART RATE: 89 BPM | WEIGHT: 195.81 LBS | TEMPERATURE: 98 F

## 2019-07-08 DIAGNOSIS — I10 ESSENTIAL HYPERTENSION: ICD-10-CM

## 2019-07-08 DIAGNOSIS — K21.9 GASTROESOPHAGEAL REFLUX DISEASE, ESOPHAGITIS PRESENCE NOT SPECIFIED: Primary | ICD-10-CM

## 2019-07-08 DIAGNOSIS — M25.552 LEFT HIP PAIN: ICD-10-CM

## 2019-07-08 PROCEDURE — 99214 OFFICE O/P EST MOD 30 MIN: CPT | Performed by: INTERNAL MEDICINE

## 2019-07-08 RX ORDER — PANTOPRAZOLE SODIUM 40 MG/1
40 TABLET, DELAYED RELEASE ORAL
Qty: 90 TABLET | Refills: 3 | Status: SHIPPED | OUTPATIENT
Start: 2019-07-08 | End: 2020-01-20 | Stop reason: ALTCHOICE

## 2019-07-08 RX ORDER — METHYLPREDNISOLONE 4 MG/1
TABLET ORAL
Qty: 1 KIT | Refills: 0 | Status: SHIPPED | OUTPATIENT
Start: 2019-07-08 | End: 2019-08-20 | Stop reason: ALTCHOICE

## 2019-07-08 RX ORDER — AMLODIPINE BESYLATE 10 MG/1
TABLET ORAL
Qty: 90 TABLET | Refills: 1 | Status: SHIPPED | OUTPATIENT
Start: 2019-07-08 | End: 2019-08-21

## 2019-07-08 RX ORDER — CYCLOBENZAPRINE HCL 10 MG
10 TABLET ORAL NIGHTLY
Qty: 30 TABLET | Refills: 0 | Status: SHIPPED | OUTPATIENT
Start: 2019-07-08 | End: 2019-08-07

## 2019-07-08 RX ORDER — ACETAMINOPHEN AND CODEINE PHOSPHATE 300; 30 MG/1; MG/1
1 TABLET ORAL DAILY PRN
Qty: 30 TABLET | Refills: 0 | Status: SHIPPED | OUTPATIENT
Start: 2019-07-08 | End: 2019-07-22

## 2019-07-08 NOTE — PROGRESS NOTES
Nakul Granados is a 62year old female. Patient presents with:  Hip Pain: left hip      HPI:     HPI    Left hip pain. Pain for 1 month;  Progressively getting worse. Able to walk ok, sometimes limbs due to pain. No tingling or numbness of the legs. No la • Degenerative disc disease    • Diverticular disease    • Esophageal reflux    • Fibroids    • Hammertoe    • High blood pressure    • Insomnia    • Primary open angle glaucoma of both eyes 5/19/2015    Diagnosis of glaucoma OU; Started Latanoprost qhs af Psychiatric/Behavioral: Positive for depression. The patient is nervous/anxious.           EXAM:   /75 (BP Location: Right arm, Patient Position: Sitting, Cuff Size: adult)   Pulse 89   Temp 98.1 °F (36.7 °C) (Oral)   Ht 5' 9\" (1.753 m)   Wt 195 lb 1 -     Pantoprazole Sodium 40 MG Oral Tab EC; Take 1 tablet (40 mg total) by mouth every morning before breakfast.  Scheduled for colonoscopy in September 2019. Essential hypertension. Stable on amlodipine 10 mg. Refill given.   Tolerating it well    Ot

## 2019-07-10 ENCOUNTER — HOSPITAL ENCOUNTER (OUTPATIENT)
Dept: GENERAL RADIOLOGY | Age: 59
Discharge: HOME | End: 2019-07-10
Attending: INTERNAL MEDICINE
Payer: COMMERCIAL

## 2019-07-10 ENCOUNTER — TELEPHONE (OUTPATIENT)
Dept: INTERNAL MEDICINE CLINIC | Facility: CLINIC | Age: 59
End: 2019-07-10

## 2019-07-10 DIAGNOSIS — R07.81 RIB PAIN: ICD-10-CM
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
## 2019-07-10 DIAGNOSIS — Z89.622: ICD-10-CM
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
## 2019-07-10 PROCEDURE — 71046 X-RAY EXAM CHEST 2 VIEWS: CPT | Performed by: INTERNAL MEDICINE

## 2019-07-10 PROCEDURE — 73502 X-RAY EXAM HIP UNI 2-3 VIEWS: CPT | Performed by: INTERNAL MEDICINE

## 2019-07-10 NOTE — TELEPHONE ENCOUNTER
Patient seen in [facility] 7/8/19 after an accidental fall. Is following up as recommended, due to new symptoms of pain felt under left breast area and right side of abdomen.  Worse pain felt with deep breaths and with movement, sitting/standing, feels may be bruis

## 2019-07-13 ENCOUNTER — OFFICE VISIT (OUTPATIENT)
Dept: OPHTHALMOLOGY | Facility: CLINIC | Age: 59
End: 2019-07-13

## 2019-07-13 DIAGNOSIS — H40.1132 PRIMARY OPEN ANGLE GLAUCOMA OF BOTH EYES, MODERATE STAGE: Primary | ICD-10-CM

## 2019-07-13 PROCEDURE — 99213 OFFICE O/P EST LOW 20 MIN: CPT | Performed by: OPHTHALMOLOGY

## 2019-07-13 RX ORDER — LATANOPROST 50 UG/ML
SOLUTION/ DROPS OPHTHALMIC
Qty: 3 BOTTLE | Refills: 3 | Status: SHIPPED | OUTPATIENT
Start: 2019-07-13 | End: 2020-06-17

## 2019-07-13 NOTE — ASSESSMENT & PLAN NOTE
IOP is elevated since patient discontinued Latanoprost on her own accord due to burning. Stressed importance of using eyedrops as directed. Discussed that non-compliance of with eye drops can lead to vision loss.   Discussed other options of eyedrops, b

## 2019-07-13 NOTE — PATIENT INSTRUCTIONS
Primary open angle glaucoma of both eyes, moderate stage  IOP is elevated since patient discontinued Latanoprost on her own accord due to burning. Stressed importance of using eyedrops as directed.   Discussed that non-compliance of with eye drops can le

## 2019-07-13 NOTE — PROGRESS NOTES
Andreina Avila is a 62year old female. HPI:     HPI     Consult      Additional comments: Consult per Dr. Lily Rubio              Comments     Patient is here for an IOP check.   Patient stopped taking Latanoprost OU QAM about 2 weeks ago because they burn he Relation Age of Onset   • Hypertension Father    • Hypertension Mother    • Hypertension Sister    • Hypertension Brother    • Diabetes Neg    • Glaucoma Neg    • Macular degeneration Neg    • Breast Cancer Neg    • Ovarian Cancer Neg        Social History Glasses          Tonometry (Applanation, 8:59 AM)       Right Left    Pressure 19 18          Pachymetry (2/25/2015)       Right Left    Thickness 519/+1.5 523/+1          Pupils       Pupils    Right PERRL    Left PERRL            Slit Lamp and Fundus Exa Both Eyes      Meds This Visit:  Requested Prescriptions      No prescriptions requested or ordered in this encounter        Follow up instructions:  Return in about 4 months (around 11/13/2019) for Visual field, OCT, Complete eye exam.    7/13/2019  Alvaro

## 2019-07-16 ENCOUNTER — OFFICE VISIT (OUTPATIENT)
Dept: SURGERY | Facility: CLINIC | Age: 59
End: 2019-07-16

## 2019-07-16 VITALS
BODY MASS INDEX: 28.58 KG/M2 | TEMPERATURE: 98 F | HEIGHT: 69 IN | SYSTOLIC BLOOD PRESSURE: 123 MMHG | WEIGHT: 193 LBS | RESPIRATION RATE: 16 BRPM | DIASTOLIC BLOOD PRESSURE: 76 MMHG | HEART RATE: 69 BPM

## 2019-07-16 DIAGNOSIS — N28.1 RENAL CYST: Primary | ICD-10-CM

## 2019-07-16 DIAGNOSIS — R31.29 MICROHEMATURIA: ICD-10-CM

## 2019-07-16 PROCEDURE — 99214 OFFICE O/P EST MOD 30 MIN: CPT | Performed by: UROLOGY

## 2019-07-22 ENCOUNTER — OFFICE VISIT (OUTPATIENT)
Dept: INTERNAL MEDICINE CLINIC | Facility: CLINIC | Age: 59
End: 2019-07-22

## 2019-07-22 VITALS
HEART RATE: 73 BPM | WEIGHT: 196.38 LBS | DIASTOLIC BLOOD PRESSURE: 77 MMHG | TEMPERATURE: 98 F | HEIGHT: 69 IN | SYSTOLIC BLOOD PRESSURE: 127 MMHG | BODY MASS INDEX: 29.09 KG/M2
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
## 2019-07-22 DIAGNOSIS — M25.552 LEFT HIP PAIN: ICD-10-CM

## 2019-07-22 DIAGNOSIS — R10.12 LEFT UPPER QUADRANT PAIN: ICD-10-CM

## 2019-07-22 PROCEDURE — 99214 OFFICE O/P EST MOD 30 MIN: CPT | Performed by: INTERNAL MEDICINE

## 2019-07-22 RX ORDER — ACETAMINOPHEN AND CODEINE PHOSPHATE 300; 30 MG/1; MG/1
1 TABLET ORAL DAILY PRN
Qty: 30 TABLET | Refills: 0 | Status: SHIPPED | OUTPATIENT
Start: 2019-07-22 | End: 2020-08-11

## 2019-07-22 RX ORDER — LIDOCAINE 50 MG/G
1 PATCH TOPICAL EVERY 24 HOURS
Qty: 15 PATCH | Refills: 0 | Status: SHIPPED | OUTPATIENT
Start: 2019-07-22 | End: 2019-08-21

## 2019-07-23 ENCOUNTER — OFFICE VISIT (OUTPATIENT)
Dept: PODIATRY CLINIC | Facility: CLINIC | Age: 59
End: 2019-07-23

## 2019-07-23 DIAGNOSIS — L60.0 INGROWN TOENAIL: Primary | ICD-10-CM

## 2019-07-23 NOTE — PROGRESS NOTES
HPI:    Patient ID: Leesa Bender is a 62year old female. 29-year-old female presents post ingrown toenail procedures with some continued pressure and discomfort.       ROS:              Current Outpatient Medications:  lidocaine 5 % External Patch Place

## 2019-07-23 NOTE — PROGRESS NOTES
Sujatha Lu is a 62year old female. Patient presents with:  Breast Pain: left breast f/u      HPI:     HPI  Patient is here for pain under the left breast.  She had a fall 2 weeks ago. She tripped and fell on the sidewalk.   She landed on her left side Pantoprazole Sodium 40 MG Oral Tab EC Take 1 tablet (40 mg total) by mouth every morning before breakfast. Disp: 90 tablet Rfl: 3   IBUPROFEN 600 MG Oral Tab TAKE 1 TABLET BY MOUTH EVERY 8 HOURS AS NEEDED FOR PAIN Disp: 270 tablet Rfl: 0      Past Medical Alcohol/week: 0.0 standard drinks      Comment: None. Drug use: No       REVIEW OF SYSTEMS:   Review of Systems   Constitutional: Negative for chills and fever. Eyes: Positive for blurred vision. Respiratory: Negative for cough.     Akin Frederick Had a fall 2 weeks ago. Chest x-ray was negative for any acute process or rib fracture.   Continues to have pain in the left rib cage area beneath the breast.  Most likely muscle contusion however given ongoing pain and no significant improvement I would l

## 2019-08-03 ENCOUNTER — HOSPITAL ENCOUNTER (OUTPATIENT)
Dept: CT IMAGING | Facility: HOSPITAL | Age: 59
Discharge: HOME | End: 2019-08-03
Attending: INTERNAL MEDICINE
Payer: COMMERCIAL

## 2019-08-03 ENCOUNTER — TELEPHONE (OUTPATIENT)
Dept: INTERNAL MEDICINE CLINIC | Facility: CLINIC | Age: 59
End: 2019-08-03
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
## 2019-08-03 DIAGNOSIS — R10.12 LEFT UPPER QUADRANT PAIN: ICD-10-CM
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
## 2019-08-03 LAB — CREAT BLD-MCNC: 0.8 MG/DL (ref 0.55–1.02)

## 2019-08-03 PROCEDURE — 74177 CT ABD & PELVIS W/CONTRAST: CPT | Performed by: INTERNAL MEDICINE

## 2019-08-03 PROCEDURE — 82565 ASSAY OF CREATININE: CPT

## 2019-08-03 NOTE — TELEPHONE ENCOUNTER
[facility] CT tech, [address] [place] [address] xt [id number], attempted to page Dr Karson Gómez without response, patient is in CT, radiologist recommends procedure be changed to CT abdomen and pelvis with David Gómez on call for Dr Nuvia Gauthier, verbal order to change order to CT abdo

## 2019-08-05 ENCOUNTER — HOSPITAL ENCOUNTER (OUTPATIENT)
Dept: ULTRASOUND IMAGING | Age: 59
Discharge: HOME | End: 2019-08-05
Attending: UROLOGY
Payer: COMMERCIAL

## 2019-08-05 DIAGNOSIS — N28.1 RENAL CYST: ICD-10-CM

## 2019-08-05 PROCEDURE — 76770 US EXAM ABDO BACK WALL COMP: CPT | Performed by: UROLOGY

## 2019-08-07 NOTE — PROGRESS NOTES
Nakul Granados is a 62year old female. HPI:   Patient presents with:  UTI: c/o lower back pain      62year old AA female here for followup to a previous visit 12/2017.   Had a Bosniak 2F right renal cyst, h/o microhematuria (cysto, 2017, CT urogram 5/20 EYE Left 12/19/2018    RJM      Family History   Problem Relation Age of Onset   • Hypertension Father    • Hypertension Mother    • Hypertension Sister    • Hypertension Brother    • Diabetes Neg    • Glaucoma Neg    • Macular degeneration Neg    • Breast defined types were placed in this encounter.       Meds This Visit:  Requested Prescriptions      No prescriptions requested or ordered in this encounter       Imaging & Referrals:  None     8/6/2019  Judy Allen MD

## 2019-08-16 RX ORDER — AMLODIPINE BESYLATE 10 MG/1
TABLET ORAL
Qty: 90 TABLET | Refills: 0 | OUTPATIENT
Start: 2019-08-16

## 2019-08-17 NOTE — TELEPHONE ENCOUNTER
Duplicate request, previously addressed. Approved 7/8/19 for 6 month supply.     Requested Prescriptions   Pending Prescriptions Disp Refills   • amLODIPine Besylate 10 MG Oral Tab [Pharmacy Med Name: AMLODIPINE BESYLATE 10MG TABLETS] 90 tablet 0     Sig:

## 2019-08-20 ENCOUNTER — OFFICE VISIT (OUTPATIENT)
Dept: PODIATRY CLINIC | Facility: CLINIC | Age: 59
End: 2019-08-20

## 2019-08-20 DIAGNOSIS — M79.672 LEFT FOOT PAIN: ICD-10-CM

## 2019-08-20 DIAGNOSIS — L60.0 INGROWN TOENAIL: Primary | ICD-10-CM

## 2019-08-20 PROCEDURE — L3060 FOOT ARCH SUPP LONGITUD/META: HCPCS | Performed by: PODIATRIST

## 2019-08-20 PROCEDURE — 99213 OFFICE O/P EST LOW 20 MIN: CPT | Performed by: PODIATRIST

## 2019-08-20 NOTE — PROGRESS NOTES
HPI:    Patient ID: Eliane Wall is a 62year old female. This 17-year-old female presents for follow-up in reference to 2 issues.   The first is in reference to postoperative ingrown toenails the second is in reference to chronic pain is with the left fo the use of ibuprofen 400 mg 3 times per day with meals. I reviewed the use of the medication, concerns and expectations. She was also instructed to ice the lateral aspect of the foot and ankle twice every evening for 15 minutes.   I do not see reason for

## 2019-08-21 ENCOUNTER — OFFICE VISIT (OUTPATIENT)
Dept: INTERNAL MEDICINE CLINIC | Facility: CLINIC | Age: 59
End: 2019-08-21

## 2019-08-21 ENCOUNTER — APPOINTMENT (OUTPATIENT)
Dept: LAB | Age: 59
End: 2019-08-21
Attending: INTERNAL MEDICINE
Payer: COMMERCIAL

## 2019-08-21 VITALS
SYSTOLIC BLOOD PRESSURE: 122 MMHG | DIASTOLIC BLOOD PRESSURE: 79 MMHG | HEIGHT: 69 IN | HEART RATE: 80 BPM | RESPIRATION RATE: 17 BRPM | BODY MASS INDEX: 28.73 KG/M2 | WEIGHT: 194 LBS

## 2019-08-21 DIAGNOSIS — E87.6 HYPOKALEMIA: ICD-10-CM

## 2019-08-21 DIAGNOSIS — M17.11 PRIMARY OSTEOARTHRITIS OF RIGHT KNEE: ICD-10-CM

## 2019-08-21 DIAGNOSIS — I10 ESSENTIAL HYPERTENSION: Primary | ICD-10-CM

## 2019-08-21 DIAGNOSIS — M75.111 NONTRAUMATIC INCOMPLETE TEAR OF RIGHT ROTATOR CUFF: ICD-10-CM

## 2019-08-21 PROBLEM — R74.8 ELEVATED CPK: Status: ACTIVE | Noted: 2019-08-21

## 2019-08-21 LAB
ANION GAP SERPL CALC-SCNC: 7 MMOL/L (ref 0–18)
BUN BLD-MCNC: 14 MG/DL (ref 7–18)
BUN/CREAT SERPL: 14.9 (ref 10–20)
CALCIUM BLD-MCNC: 9.2 MG/DL (ref 8.5–10.1)
CHLORIDE SERPL-SCNC: 107 MMOL/L (ref 98–112)
CO2 SERPL-SCNC: 28 MMOL/L (ref 21–32)
CREAT BLD-MCNC: 0.94 MG/DL (ref 0.55–1.02)
GLUCOSE BLD-MCNC: 104 MG/DL (ref 70–99)
OSMOLALITY SERPL CALC.SUM OF ELEC: 295 MOSM/KG (ref 275–295)
PATIENT FASTING: NO
POTASSIUM SERPL-SCNC: 3.7 MMOL/L (ref 3.5–5.1)
SODIUM SERPL-SCNC: 142 MMOL/L (ref 136–145)

## 2019-08-21 PROCEDURE — 99214 OFFICE O/P EST MOD 30 MIN: CPT | Performed by: INTERNAL MEDICINE

## 2019-08-21 PROCEDURE — 80048 BASIC METABOLIC PNL TOTAL CA: CPT

## 2019-08-21 PROCEDURE — 36415 COLL VENOUS BLD VENIPUNCTURE: CPT

## 2019-08-21 RX ORDER — AMLODIPINE BESYLATE 10 MG/1
TABLET ORAL
Qty: 90 TABLET | Refills: 3 | Status: SHIPPED | OUTPATIENT
Start: 2019-08-21 | End: 2020-08-11

## 2019-08-21 NOTE — PROGRESS NOTES
Lawrence Prieto is a 62year old female. Patient presents with:   Follow - Up      HPI:   Pt comes as a new pt -referred by Dr. Miles Lucas  C/c htn   C/o fell on July 8th and has some left rib pain and back pains   Had some ct scans and would like to go through i • Unspecified sleep apnea       Past Surgical History:   Procedure Laterality Date   • Anal sphincterotomy      03/12/2013 Gely   • Cataract extraction w/  intraocular lens implant Left 05/07/2018    L PC IOL with Dr. Maira Prather @ [facility]   • Colonoscopy N/A 1 suspicious lesions  HEENT: atraumatic, normocephalic  LUNGS: clear to auscultation, no wheeze  CARDIO: RRR without murmur  GI: good BS's,no masses or tenderness  EXTREMITIES: no cyanosis, or edema, noted mild swelling of the right knee, no crepitus felt bi

## 2019-09-04 ENCOUNTER — TELEPHONE (OUTPATIENT)
Dept: GASTROENTEROLOGY | Facility: CLINIC | Age: 59
End: 2019-09-04

## 2019-09-04 NOTE — TELEPHONE ENCOUNTER
Pt was not sure which prep instructions to use. She had one for the split dose Colyte and one for the Miralax/Gatorade. She picked up the Colyte at the pharmacy so she was instructed to use those instructions and she verbalizes understanding.     She ha

## 2019-09-05 ENCOUNTER — OFFICE VISIT (OUTPATIENT)
Dept: PAIN CLINIC | Facility: HOSPITAL | Age: 59
End: 2019-09-05
Attending: INTERNAL MEDICINE
Payer: COMMERCIAL

## 2019-09-05 ENCOUNTER — TELEPHONE (OUTPATIENT)
Dept: ORTHOPEDICS CLINIC | Facility: CLINIC | Age: 59
End: 2019-09-05

## 2019-09-05 ENCOUNTER — HOSPITAL ENCOUNTER (OUTPATIENT)
Dept: GENERAL RADIOLOGY | Facility: HOSPITAL | Age: 59
Discharge: HOME | End: 2019-09-05
Attending: PHYSICIAN ASSISTANT
Payer: COMMERCIAL

## 2019-09-05 ENCOUNTER — OFFICE VISIT (OUTPATIENT)
Dept: ORTHOPEDICS CLINIC | Facility: CLINIC | Age: 59
End: 2019-09-05

## 2019-09-05 VITALS
BODY MASS INDEX: 28.73 KG/M2 | HEART RATE: 67 BPM | WEIGHT: 194 LBS | SYSTOLIC BLOOD PRESSURE: 135 MMHG | DIASTOLIC BLOOD PRESSURE: 76 MMHG | HEIGHT: 69 IN

## 2019-09-05 VITALS
RESPIRATION RATE: 18 BRPM | HEART RATE: 78 BPM | SYSTOLIC BLOOD PRESSURE: 125 MMHG | DIASTOLIC BLOOD PRESSURE: 76 MMHG | BODY MASS INDEX: 28.73 KG/M2 | HEIGHT: 69 IN | WEIGHT: 194 LBS

## 2019-09-05 DIAGNOSIS — M54.16 LUMBAR RADICULOPATHY: Primary | Chronic | ICD-10-CM

## 2019-09-05 DIAGNOSIS — M67.911 DISORDER OF RIGHT ROTATOR CUFF: Primary | ICD-10-CM

## 2019-09-05 DIAGNOSIS — M25.552 LEFT HIP PAIN: ICD-10-CM

## 2019-09-05 DIAGNOSIS — G62.9 NEUROPATHY: ICD-10-CM

## 2019-09-05 PROCEDURE — 99214 OFFICE O/P EST MOD 30 MIN: CPT | Performed by: ORTHOPAEDIC SURGERY

## 2019-09-05 PROCEDURE — 99211 OFF/OP EST MAY X REQ PHY/QHP: CPT

## 2019-09-05 PROCEDURE — 20610 DRAIN/INJ JOINT/BURSA W/O US: CPT | Performed by: ORTHOPAEDIC SURGERY

## 2019-09-05 RX ORDER — IBUPROFEN 200 MG
600 TABLET ORAL EVERY 8 HOURS PRN
Qty: 40 TABLET | Refills: 2 | Status: SHIPPED | OUTPATIENT
Start: 2019-09-05 | End: 2019-10-02

## 2019-09-05 RX ORDER — MELOXICAM 7.5 MG/1
7.5 TABLET ORAL DAILY
Qty: 30 TABLET | Refills: 1 | Status: SHIPPED | OUTPATIENT
Start: 2019-09-05 | End: 2019-09-05

## 2019-09-05 RX ORDER — TRIAMCINOLONE ACETONIDE 40 MG/ML
40 INJECTION, SUSPENSION INTRA-ARTICULAR; INTRAMUSCULAR ONCE
Status: COMPLETED | OUTPATIENT
Start: 2019-09-05 | End: 2019-09-05

## 2019-09-05 NOTE — TELEPHONE ENCOUNTER
Spoke with  glenis Whitehead regarding prescription for Motrin 200 mg tablets. Per pharmacist patient's insurance does not cover the 200 mg tablets but will most likely will cover the 600 mg tablets.     Pharmacist is asking if Dr. Red Prieto will approv

## 2019-09-05 NOTE — CHRONIC PAIN
Initial Consultation Note      HISTORY OF PRESENT ILLNESS:  Aicha Hunter is a 62year old old female referred to the pain clinic by Dr. Lory Chou for LBP which began 2 weeks ago. The pain radiates into the both buttocks. No specific inciting event recalled. Kecia Small MD at [address], SINGLE - OD - RIGHT EYE Right 6.27/2017    Nasally   • HC ARTHROCENTESIS OR INJECT MAJOR JOINT W/O US     • HYSTERECTOMY  1996    KAI   • OTHER      Lap Nissen Fundoplication surgery   • OTHER SURGI Ta-see progress note   • Small bowel obstruction (HCC)    • Tendinitis    • Unspecified essential hypertension    • Unspecified sleep apnea     uses CPAP       FAMILY HISTORY:  Family History   Problem Relation Age of Onset   • Hypertension Father    • Not Asked        Blood Transfusions: Not Asked        Caffeine Concern: Yes          1 CUP COFFEE DAILY        Occupational Exposure: Not Asked        Hobby Hazards: Not Asked        Sleep Concern: Not Asked        Stress Concern: Not Asked        Weight C Lower Extremity: Normal  Right Upper Extremity:  Normal  Left Upper Extremity:  Normal    IMAGING:  No results found.     LABS:  Lab Results   Component Value Date    WBC 10.3 04/02/2019    RBC 5.19 04/02/2019    HGB 13.7 04/02/2019    HCT 42.2 04/02/2019

## 2019-09-05 NOTE — PROGRESS NOTES
Pt presents to [facility] for evaluation of low back pain, bilateral hip pain and right shoulder pain. Pt was seen in 2018 and received epidural injection with good relief for about 1 year.  Pt states the pain started coming back gradually,pain has been present fo

## 2019-09-05 NOTE — TELEPHONE ENCOUNTER
Correction: Ok to change prescription to Ibuprofen 600 mg tablets, one tablet PO Q8hrs PRN pain. Thanks.

## 2019-09-05 NOTE — PROGRESS NOTES
NURSING INTAKE COMMENTS: Patient presents with:  Consult: C/o right shoulder pain for a couple a months. MRI right shoulder completed on 2/18/17. Last cortisone injection was on 4/17/19 that lasted for a while.   Stts she is unsure if she reinjured or agg Unspecified sleep apnea     uses CPAP     Past Surgical History:   Procedure Laterality Date   • ANAL SPHINCTEROTOMY      03/12/2013 Gely   • CATARACT EXTRACTION W/  INTRAOCULAR LENS IMPLANT Left 05/07/2018    L PC IOL with Dr. Kaur Castellanos @ [place] [address] ANTIBIOTICS)  Family History   Problem Relation Age of Onset   • Hypertension Father    • Hypertension Mother    • Hypertension Sister    • Hypertension Brother    • Diabetes Neg    • Glaucoma Neg    • Macular degeneration Neg    • Breast Cancer Neg    • O tenderness over the greater trochanter. She is mildly tender in the left flank and gluteal musculature. Mild sciatic notch tenderness. Range of motion of the hip measures flexion 90 degrees, internal rotation 20 degrees, external rotation 50 degrees.   H pain, partial-thickness rotator cuff tear, left hip pain, likely lumbar radiculopathy    Plan: Recommended nonoperative treatment of both problems. Injected right subacromial space with Kenalog. Prescribe ibuprofen.   Recommended outpatient physical thera

## 2019-09-06 ENCOUNTER — HOSPITAL ENCOUNTER (OUTPATIENT)
Facility: HOSPITAL | Age: 59
Setting detail: HOSPITAL OUTPATIENT SURGERY
Discharge: HOME | End: 2019-09-06
Attending: INTERNAL MEDICINE | Admitting: INTERNAL MEDICINE
Payer: COMMERCIAL

## 2019-09-06 ENCOUNTER — ANESTHESIA (OUTPATIENT)
Dept: ENDOSCOPY | Facility: HOSPITAL | Age: 59
End: 2019-09-06
Payer: COMMERCIAL

## 2019-09-06 ENCOUNTER — ANESTHESIA EVENT (OUTPATIENT)
Dept: ENDOSCOPY | Facility: HOSPITAL | Age: 59
End: 2019-09-06
Payer: COMMERCIAL

## 2019-09-06 VITALS
RESPIRATION RATE: 20 BRPM | WEIGHT: 194 LBS | HEIGHT: 69 IN | HEART RATE: 70 BPM | BODY MASS INDEX: 28.73 KG/M2 | OXYGEN SATURATION: 99 % | SYSTOLIC BLOOD PRESSURE: 137 MMHG | DIASTOLIC BLOOD PRESSURE: 76 MMHG

## 2019-09-06 DIAGNOSIS — Z86.010 HISTORY OF COLON POLYPS: ICD-10-CM

## 2019-09-06 PROCEDURE — 0DBM8ZX EXCISION OF DESCENDING COLON, VIA NATURAL OR ARTIFICIAL OPENING ENDOSCOPIC, DIAGNOSTIC: ICD-10-PCS | Performed by: INTERNAL MEDICINE

## 2019-09-06 PROCEDURE — 45385 COLONOSCOPY W/LESION REMOVAL: CPT | Performed by: INTERNAL MEDICINE

## 2019-09-06 PROCEDURE — 45380 COLONOSCOPY AND BIOPSY: CPT | Performed by: INTERNAL MEDICINE

## 2019-09-06 PROCEDURE — 0DBP8ZX EXCISION OF RECTUM, VIA NATURAL OR ARTIFICIAL OPENING ENDOSCOPIC, DIAGNOSTIC: ICD-10-PCS | Performed by: INTERNAL MEDICINE

## 2019-09-06 RX ORDER — SODIUM CHLORIDE, SODIUM LACTATE, POTASSIUM CHLORIDE, CALCIUM CHLORIDE 600; 310; 30; 20 MG/100ML; MG/100ML; MG/100ML; MG/100ML
INJECTION, SOLUTION INTRAVENOUS CONTINUOUS
Status: DISCONTINUED | OUTPATIENT
Start: 2019-09-06 | End: 2019-09-06

## 2019-09-06 RX ORDER — LIDOCAINE HYDROCHLORIDE 10 MG/ML
INJECTION, SOLUTION EPIDURAL; INFILTRATION; INTRACAUDAL; PERINEURAL AS NEEDED
Status: DISCONTINUED | OUTPATIENT
Start: 2019-09-06 | End: 2019-09-06 | Stop reason: SURG

## 2019-09-06 RX ADMIN — SODIUM CHLORIDE, SODIUM LACTATE, POTASSIUM CHLORIDE, CALCIUM CHLORIDE: 600; 310; 30; 20 INJECTION, SOLUTION INTRAVENOUS at 11:49:00

## 2019-09-06 RX ADMIN — SODIUM CHLORIDE, SODIUM LACTATE, POTASSIUM CHLORIDE, CALCIUM CHLORIDE: 600; 310; 30; 20 INJECTION, SOLUTION INTRAVENOUS at 12:10:00

## 2019-09-06 RX ADMIN — LIDOCAINE HYDROCHLORIDE 50 MG: 10 INJECTION, SOLUTION EPIDURAL; INFILTRATION; INTRACAUDAL; PERINEURAL at 11:49:00

## 2019-09-06 NOTE — TELEPHONE ENCOUNTER
Dr Cynthia Cunningham, pt requesting 90 day Rx order for Meloxicam 7.5 mg. Pt was seen by you on 09/06/19.

## 2019-09-06 NOTE — H&P
Pre Procedure History & Physical Examination    Patient Name: Ana Herrera  MRN: [id number]  [facility]: [id number]  YOB: 1960    Diagnosis: Hx of polyps, repeat recommended 3 years.  Last done 2016      Medications Prior to Admission:  Cholecalcifer Primary open angle glaucoma of both eyes 5/19/2015    Diagnosis of glaucoma OU; Started Latanoprost qhs after abnormal VF and OCT 2/25/16;  6/5/18 consult with Dr. Vlad Solano progress note   • Small bowel obstruction [facility])    • Tendinitis    • Unsp hematuria  INTEGUMENT/BREAST:  SKIN:  negative for jaundice   ALLERGIC/IMMUNOLOGIC:  negative for hay fever  ENDOCRINE:  negative for cold intolerance and heat intolerance  MUSCULOSKELETAL:  negative for joint effusion/severe erythema  BEHAVIOR/PSYCH:  neg

## 2019-09-06 NOTE — ANESTHESIA POSTPROCEDURE EVALUATION
Patient: Ana Herrera    Procedure Summary     Date:  09/06/19 Room / Location:  [address] [place] [address] ENDOSCOPY 04 / [address] [place] [address] ENDOSCOPY    Anesthesia Start:  7628 Anesthesia Stop:      Procedure:  COLONOSCOPY (N/A ) Diagnosis:       History of colon polyps      (Polyps, divert

## 2019-09-06 NOTE — ANESTHESIA PREPROCEDURE EVALUATION
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  Disp: 30 tablet Rfl: 0 Taking   latanoprost 0.005 % Ophthalmic Solution Instill 1 drop by ophthalmic route every morning in both eyes Disp: 3 Bottle Rfl: 3 9/5/2019 at 2000   Pantoprazole Alcohol use: No        Alcohol/week: 0.0 standard drinks        Comment: None.        Drug use: No      Sexual activity: Yes        Birth control/protection: Hysterectomy    Lifestyle      Physical activity:        Days per week: Not on file        Minutes 9\" and weight is 194 lb. Her blood pressure is 130/67 and her pulse is 71. Her respiration is 13 and oxygen saturation is 100%.     09/04/19  1325 09/06/19  1137   BP:  130/67   Pulse:  71   Resp:  13   SpO2:  100%   Weight: 194 lb    Height: 5' 9\"

## 2019-09-06 NOTE — OPERATIVE REPORT
COLONOSCOPY REPORT    Mars Chandler     1960 Age 62year old   PCP Jeevan Mejia MD Endoscopist Celestino Rodriguez MD     Date of procedure: 19    Procedure: Colonoscopy w/ biopsy and cold snare polypectomy    Pre-operative diagnosis: polyp histor pandiverticulosis. 3. Terminal ileum: the visualized mucosa appeared normal.    4. A retroflexed view of the rectum revealed hemorrhoids.     5. The colonic mucosa throughout the colon showed normal vascular pattern, without evidence of angioectasias or

## 2019-09-06 NOTE — ANESTHESIA POSTPROCEDURE EVALUATION
Patient: Manuel Dean    Procedure Summary     Date:  09/06/19 Room / Location:  [address] [place] [address] ENDOSCOPY 04 / [address] [place] [address] ENDOSCOPY    Anesthesia Start:  2586 Anesthesia Stop:      Procedure:  COLONOSCOPY (N/A ) Diagnosis:       History of colon polyps      (Polyps, divert

## 2019-09-09 ENCOUNTER — TELEPHONE (OUTPATIENT)
Dept: GASTROENTEROLOGY | Facility: CLINIC | Age: 59
End: 2019-09-09

## 2019-09-09 RX ORDER — MELOXICAM 7.5 MG/1
TABLET ORAL
Qty: 90 TABLET | Refills: 1 | OUTPATIENT
Start: 2019-09-09

## 2019-09-09 NOTE — TELEPHONE ENCOUNTER
Entered into Epic:Recall colon in 5 years per dr. Flor Santoro. Last Colon done 9/6/19, next due 9/6/24. HM updated.

## 2019-09-09 NOTE — TELEPHONE ENCOUNTER
Viewed by Jose Eduardo Vasques on 9/9/2019 11:18 AM   Written by Trena Mcallister MD on 9/8/2019  5:13 PM     Dear Joe Romero,     I reviewed the pathology report from the polyp(s) completely removed during your recent colonoscopy.  One or more polyps were an adenoma, kristi

## 2019-09-09 NOTE — TELEPHONE ENCOUNTER
Patient was already prescribed Ibuprofen by Dr. Yan Jameson. Discontinue this prescription for Meloxicam as patient should only be taking one NSAID.

## 2019-09-09 NOTE — TELEPHONE ENCOUNTER
----- Message from Cortney Miller MD sent at 9/8/2019  5:13 PM CDT -----  GI staff: please place recall for colonoscopy in 5 years

## 2019-09-25 ENCOUNTER — DOCUMENTATION ONLY (OUTPATIENT)
Dept: PAIN CLINIC | Facility: HOSPITAL | Age: 59
End: 2019-09-25

## 2019-09-25 NOTE — PROGRESS NOTES
Procedure code 10660--[id number] APPROVAL    PA started via [address] # [id number]    Once approval has been received writer will fax order form to surgery center

## 2019-10-02 ENCOUNTER — DOCUMENTATION ONLY (OUTPATIENT)
Dept: PAIN CLINIC | Facility: HOSPITAL | Age: 59
End: 2019-10-02

## 2019-10-02 ENCOUNTER — OFFICE VISIT (OUTPATIENT)
Dept: INTERNAL MEDICINE CLINIC | Facility: CLINIC | Age: 59
End: 2019-10-02

## 2019-10-02 VITALS
HEART RATE: 86 BPM | HEIGHT: 69 IN | SYSTOLIC BLOOD PRESSURE: 132 MMHG | WEIGHT: 193.81 LBS | BODY MASS INDEX: 28.71 KG/M2 | DIASTOLIC BLOOD PRESSURE: 84 MMHG

## 2019-10-02 DIAGNOSIS — R74.8 ELEVATED CPK: ICD-10-CM

## 2019-10-02 DIAGNOSIS — B37.2 CANDIDA INFECTION OF FLEXURAL SKIN: ICD-10-CM

## 2019-10-02 DIAGNOSIS — M62.838 MUSCLE SPASM: Primary | ICD-10-CM

## 2019-10-02 DIAGNOSIS — M79.89 SWELLING OF LEFT HAND: ICD-10-CM

## 2019-10-02 PROCEDURE — 99214 OFFICE O/P EST MOD 30 MIN: CPT | Performed by: INTERNAL MEDICINE

## 2019-10-02 NOTE — PATIENT INSTRUCTIONS
Back Spasm (No Trauma)    Spasm of the back muscles can occur after a sudden forceful twisting or bending such as in a car accident. A spasm can also happen after a simple awkward movement, or after lifting something heavy with poor body positioning.  In · You can start with ice, then switch to heat. Heat from a hot shower, hot bath, or heating pad reduces pain and works well for muscle spasms. Put heat on the painful area for 20 minutes, then remove it for 20 minutes.  Do this over a period of 60 to 90 min If X-rays were taken, they may be reviewed by a radiologist. Roxy Keith will be told of any new findings that may affect your care.   Call   Call if any of these occur:  · Trouble breathing  · Confusion  · Drowsiness or trouble awakening  · Fainting or loss of con

## 2019-10-02 NOTE — PROGRESS NOTES
Procedure code 67104--[id number] for 10/14/19    Auth rcv'd from [facility] # [id number]    Valid from 09/25/19--12/24/19    Order form sent to the surgery center, confirmation rcv'd

## 2019-10-02 NOTE — PROGRESS NOTES
Mars Chandler is a 62year old female.   Patient presents with:  Muscle Pain: all over       HPI:   Patient comes for f/u  C/C muscle pains  C/o has elevated muscle enzymes and her whole body hurts like cramps and has had this for yrs   She has had both mus Sodium 40 MG Oral Tab EC Take 1 tablet (40 mg total) by mouth every morning before breakfast. Disp: 90 tablet Rfl: 3   IBUPROFEN 600 MG Oral Tab TAKE 1 TABLET BY MOUTH EVERY 8 HOURS AS NEEDED FOR PAIN Disp: 270 tablet Rfl: 0      Past Medical History:   Tabitha Ornelas capsulotomy - os - left eye Left 12/19/2018    CHENCHO      Social History:  Social History    Tobacco Use      Smoking status: Never Smoker      Smokeless tobacco: Never Used    Alcohol use: No      Alcohol/week: 0.0 standard drinks      Comment: None.       and advised her to keep the area clean and dry    Preventative medicine  Colonoscopy done September 6 by Dr. Navneet Saleem rpt in yrs ie 2024  Mammogram–10/11/19 - has upcoming appointment  Pap 2016 dr Darlyn Villafana -normal     The patient indicates understanding of thes

## 2019-10-03 ENCOUNTER — HOSPITAL ENCOUNTER (OUTPATIENT)
Dept: GENERAL RADIOLOGY | Age: 59
Discharge: HOME | End: 2019-10-03
Attending: INTERNAL MEDICINE
Payer: COMMERCIAL

## 2019-10-03 DIAGNOSIS — M79.89 SWELLING OF LEFT HAND: ICD-10-CM

## 2019-10-03 PROCEDURE — 73130 X-RAY EXAM OF HAND: CPT | Performed by: INTERNAL MEDICINE

## 2019-10-11 ENCOUNTER — HOSPITAL ENCOUNTER (OUTPATIENT)
Age: 59
Discharge: HOME | End: 2019-10-11
Attending: EMERGENCY MEDICINE
Payer: COMMERCIAL

## 2019-10-11 PROCEDURE — 90471 IMMUNIZATION ADMIN: CPT

## 2019-10-11 PROCEDURE — 90686 IIV4 VACC NO PRSV 0.5 ML IM: CPT

## 2019-10-12 ENCOUNTER — HOSPITAL ENCOUNTER (OUTPATIENT)
Dept: MAMMOGRAPHY | Age: 59
Discharge: HOME | End: 2019-10-12
Attending: OBSTETRICS & GYNECOLOGY
Payer: COMMERCIAL

## 2019-10-12 DIAGNOSIS — Z12.31 SCREENING MAMMOGRAM, ENCOUNTER FOR: ICD-10-CM

## 2019-10-12 PROCEDURE — 77067 SCR MAMMO BI INCL CAD: CPT | Performed by: OBSTETRICS & GYNECOLOGY

## 2019-10-12 PROCEDURE — 77063 BREAST TOMOSYNTHESIS BI: CPT | Performed by: OBSTETRICS & GYNECOLOGY

## 2019-10-24 ENCOUNTER — OFFICE VISIT (OUTPATIENT)
Dept: NEUROLOGY | Facility: CLINIC | Age: 59
End: 2019-10-24

## 2019-10-24 VITALS
DIASTOLIC BLOOD PRESSURE: 82 MMHG | SYSTOLIC BLOOD PRESSURE: 124 MMHG | RESPIRATION RATE: 18 BRPM | HEIGHT: 69 IN | WEIGHT: 193 LBS | BODY MASS INDEX: 28.58 KG/M2 | HEART RATE: 86 BPM

## 2019-10-24 DIAGNOSIS — M51.36 DDD (DEGENERATIVE DISC DISEASE), LUMBAR: ICD-10-CM

## 2019-10-24 DIAGNOSIS — M54.41 CHRONIC BILATERAL LOW BACK PAIN WITH BILATERAL SCIATICA: ICD-10-CM

## 2019-10-24 DIAGNOSIS — M79.18 MYOFASCIAL PAIN: Primary | ICD-10-CM

## 2019-10-24 DIAGNOSIS — M51.26 HERNIATION OF INTERVERTEBRAL DISC BETWEEN L4 AND L5: ICD-10-CM

## 2019-10-24 DIAGNOSIS — G89.29 CHRONIC BILATERAL LOW BACK PAIN WITH BILATERAL SCIATICA: ICD-10-CM

## 2019-10-24 DIAGNOSIS — M54.42 CHRONIC BILATERAL LOW BACK PAIN WITH BILATERAL SCIATICA: ICD-10-CM

## 2019-10-24 DIAGNOSIS — M48.061 LUMBAR FORAMINAL STENOSIS: ICD-10-CM

## 2019-10-24 PROBLEM — M51.369 DDD (DEGENERATIVE DISC DISEASE), LUMBAR: Status: ACTIVE | Noted: 2019-10-24

## 2019-10-24 PROCEDURE — 99244 OFF/OP CNSLTJ NEW/EST MOD 40: CPT | Performed by: PHYSICAL MEDICINE & REHABILITATION

## 2019-10-24 RX ORDER — DULOXETIN HYDROCHLORIDE 30 MG/1
30 CAPSULE, DELAYED RELEASE ORAL DAILY
Qty: 30 CAPSULE | Refills: 1 | Status: SHIPPED | OUTPATIENT
Start: 2019-10-24 | End: 2020-01-11 | Stop reason: ALTCHOICE

## 2019-10-24 NOTE — PROGRESS NOTES
Location of pain: entire body  Rate your pain: 10  Timeline of pain: few years  Characterize the pain: Muscle Spasms over the entire body  Worse with: turn a certain way   Better with: heating pads   Medications used: none  Previous Injections: none  Previ

## 2019-10-24 NOTE — PATIENT INSTRUCTIONS
1) Start Cymbalta 30 mg nightly. This emdication may take a couple of weeks to start working. Do not stop it abruptly as it can cause withdrawal side ffect  2) Begin formal physical therapy to work on stretching and strengthening the low back.    3) Follow

## 2019-10-24 NOTE — H&P
[address] H&P    Requesting Physician: Landen Esteves MD    Chief Complaint (Reason for Visit):  No chief complaint on file. History of Present Illness:   The patient is a 62year old RIGHT padilla aCtie Brewer MD at [address], SINGLE - OD - RIGHT EYE Right 6.27/2017    Nasally   • HC ARTHROCENTESIS OR INJECT MAJOR JOINT W/O US     • HYSTERECTOMY  1996    KAI   • OTHER      Lap Nissen Fundoplication surger by ophthalmic route every morning in both eyes, Disp: 3 Bottle, Rfl: 3  Pantoprazole Sodium 40 MG Oral Tab EC, Take 1 tablet (40 mg total) by mouth every morning before breakfast., Disp: 90 tablet, Rfl: 3  IBUPROFEN 600 MG Oral Tab, TAKE 1 TABLET BY MOUTH myotomes of the BILATERAL lower extremities   Sensation: Intact to light touch in all dermatomes of the lower extremities   Reflexes: 2/4 at L4 and S1  Facet Loading: no specific facet pain  Straight leg raise: negative for radicular pain symptoms  Slump t 08/21/2019 28.0  21.0 - 32.0 mmol/L Final   • Anion Gap 08/21/2019 7  0 - 18 mmol/L Final   • BUN 08/21/2019 14  7 - 18 mg/dL Final   • Creatinine 08/21/2019 0.94  0.55 - 1.02 mg/dL Final   • BUN/CREA Ratio 08/21/2019 14.9  10.0 - 20.0 Final   • Calcium, T foraminal stenosis. L2-L3:   No significant disc/facet abnormality, spinal stenosis, or foraminal stenosis. L3-L4:   No significant disc/facet abnormality, spinal stenosis, or foraminal stenosis. L4-L5:   Small diffuse disc bulge.  Mild bilateral l assessment and plan. All questions were answered. There were no barriers to learning.          Myofascial pain  (primary encounter diagnosis)  Chronic bilateral low back pain with bilateral sciatica  DDD (degenerative disc disease), lumbar  Herniation of

## 2019-10-25 ENCOUNTER — TELEPHONE (OUTPATIENT)
Dept: INTERNAL MEDICINE CLINIC | Facility: CLINIC | Age: 59
End: 2019-10-25

## 2019-10-25 DIAGNOSIS — M62.838 MUSCLE SPASM: Primary | ICD-10-CM

## 2019-10-25 DIAGNOSIS — R74.8 ELEVATED CPK: ICD-10-CM

## 2019-10-25 NOTE — TELEPHONE ENCOUNTER
Patient called requesting referral to Dr Shila Benedict MD (Physiatry). Please sign off if agree.   Thank you,  [address]

## 2019-10-28 ENCOUNTER — TELEPHONE (OUTPATIENT)
Dept: NEUROLOGY | Facility: CLINIC | Age: 59
End: 2019-10-28

## 2019-10-28 RX ORDER — DULOXETIN HYDROCHLORIDE 30 MG/1
CAPSULE, DELAYED RELEASE ORAL
Qty: 90 CAPSULE | Refills: 1 | OUTPATIENT
Start: 2019-10-28

## 2019-10-28 NOTE — TELEPHONE ENCOUNTER
Received in basket from [email or web address]  advising of approval for physical therapy. Will call Pt. To inform. Pt. Informed 8 PT sessions were approved. She will call to schedule appt.

## 2019-11-11 ENCOUNTER — HOSPITAL ENCOUNTER (OUTPATIENT)
Dept: GENERAL RADIOLOGY | Facility: HOSPITAL | Age: 59
Discharge: HOME | End: 2019-11-11
Attending: INTERNAL MEDICINE
Payer: COMMERCIAL

## 2019-11-11 ENCOUNTER — OFFICE VISIT (OUTPATIENT)
Dept: INTERNAL MEDICINE CLINIC | Facility: CLINIC | Age: 59
End: 2019-11-11

## 2019-11-11 ENCOUNTER — TELEPHONE (OUTPATIENT)
Dept: INTERNAL MEDICINE CLINIC | Facility: CLINIC | Age: 59
End: 2019-11-11

## 2019-11-11 ENCOUNTER — APPOINTMENT (OUTPATIENT)
Dept: PHYSICAL THERAPY | Age: 59
End: 2019-11-11
Attending: PHYSICAL MEDICINE & REHABILITATION
Payer: COMMERCIAL

## 2019-11-11 VITALS
WEIGHT: 197.25 LBS | RESPIRATION RATE: 18 BRPM | SYSTOLIC BLOOD PRESSURE: 119 MMHG | BODY MASS INDEX: 29.21 KG/M2 | TEMPERATURE: 98 F | HEIGHT: 69 IN | HEART RATE: 76 BPM | DIASTOLIC BLOOD PRESSURE: 73 MMHG

## 2019-11-11 DIAGNOSIS — G89.29 CHRONIC PAIN OF BOTH SHOULDERS: Primary | ICD-10-CM

## 2019-11-11 DIAGNOSIS — L84 CORNS AND CALLUS: ICD-10-CM

## 2019-11-11 DIAGNOSIS — M25.512 CHRONIC PAIN OF BOTH SHOULDERS: ICD-10-CM

## 2019-11-11 DIAGNOSIS — M54.16 LUMBAR RADICULOPATHY: Chronic | ICD-10-CM

## 2019-11-11 DIAGNOSIS — G89.29 CHRONIC PAIN OF BOTH SHOULDERS: ICD-10-CM

## 2019-11-11 DIAGNOSIS — M75.111 NONTRAUMATIC INCOMPLETE TEAR OF RIGHT ROTATOR CUFF: ICD-10-CM

## 2019-11-11 DIAGNOSIS — M25.511 CHRONIC PAIN OF BOTH SHOULDERS: ICD-10-CM

## 2019-11-11 DIAGNOSIS — M25.512 CHRONIC PAIN OF BOTH SHOULDERS: Primary | ICD-10-CM

## 2019-11-11 DIAGNOSIS — M25.511 CHRONIC PAIN OF BOTH SHOULDERS: Primary | ICD-10-CM

## 2019-11-11 PROCEDURE — 73030 X-RAY EXAM OF SHOULDER: CPT | Performed by: INTERNAL MEDICINE

## 2019-11-11 PROCEDURE — 99214 OFFICE O/P EST MOD 30 MIN: CPT | Performed by: INTERNAL MEDICINE

## 2019-11-11 NOTE — PATIENT INSTRUCTIONS
Back Basics: A Healthy Spine    A healthy spine supports the body while letting it move freely. It does this with the help of three natural curves. Strong, flexible muscles help, too. They support the spine by keeping its curves properly aligned.  The dis · Stay at a healthy weight. If you are overweight, losing weight will help most types of back pain. · Exercise is an important part of recovery from most types of back pain. The muscles behind and in front of the spine support the back.  This means strengt · Be careful if you are given prescription pain medicines, opioids, or medicine for muscle spasm. They can cause drowsiness, and affect your coordination, reflexes, and judgment. Don't drive or operate heavy machinery while taking these types of medicines. The best way to sleep is on your side with your knees bent. Put a low pillow under your head to support your neck in a neutral spine position. Don't use thick pillows that bend your neck to one side.  Put a pillow between your legs to further relax your low

## 2019-11-11 NOTE — PROGRESS NOTES
Juliana Wyatt is a 62year old female. Patient presents with:  Shoulder Pain: Pt comes in for bilateral shoulder pain, ongoing for about a month. Pt would like a referral for podiatry.        HPI:   Pt comes for f/u with her    C/c b/l shoulder pain Medication Sig Dispense Refill   • DULoxetine HCl (CYMBALTA) 30 MG Oral Cap DR Particles Take 1 capsule (30 mg total) by mouth daily. 30 capsule 1   • triamcinolone acetonide 0.1 % External Cream Apply topically 2 (two) times daily as needed.  60 g 3   • MD;  Location: [facility] ENDOSCOPY   • Colonoscopy N/A 9/6/2019    Procedure: COLONOSCOPY;  Surgeon: José Miguel Gambino MD;  Location: [facility] ENDOSCOPY   • Excision of chalazion, single - od - right eye Right 6.27/2017    Nasally   • Hc arthrocentesis or inject major joe bilaterally  Tenderness of the lower left paraspinal and tenderness of the suprascapular region of the left side      ASSESSMENT AND PLAN:   Diagnoses and all orders for this visit:    Chronic pain of both shoulders  -     XR SHOULDER, COMPLETE (MIN 2 VIEW

## 2019-11-18 ENCOUNTER — OFFICE VISIT (OUTPATIENT)
Dept: PHYSICAL THERAPY | Age: 59
End: 2019-11-18
Attending: PHYSICAL MEDICINE & REHABILITATION
Payer: COMMERCIAL

## 2019-11-18 DIAGNOSIS — M51.36 DDD (DEGENERATIVE DISC DISEASE), LUMBAR: ICD-10-CM

## 2019-11-18 DIAGNOSIS — M54.42 CHRONIC BILATERAL LOW BACK PAIN WITH BILATERAL SCIATICA: ICD-10-CM

## 2019-11-18 DIAGNOSIS — M51.26 HERNIATION OF INTERVERTEBRAL DISC BETWEEN L4 AND L5: ICD-10-CM

## 2019-11-18 DIAGNOSIS — M54.41 CHRONIC BILATERAL LOW BACK PAIN WITH BILATERAL SCIATICA: ICD-10-CM

## 2019-11-18 DIAGNOSIS — M79.18 MYOFASCIAL PAIN: ICD-10-CM

## 2019-11-18 DIAGNOSIS — G89.29 CHRONIC BILATERAL LOW BACK PAIN WITH BILATERAL SCIATICA: ICD-10-CM

## 2019-11-18 DIAGNOSIS — M48.061 LUMBAR FORAMINAL STENOSIS: ICD-10-CM

## 2019-11-18 PROCEDURE — 97163 PT EVAL HIGH COMPLEX 45 MIN: CPT | Performed by: PHYSICAL THERAPIST

## 2019-11-19 NOTE — PROGRESS NOTES
LUMBAR SPINE EVALUATION:   Referring Physician: Dr. Nakul Waterman  Diagnosis: Myofascial pain (M79.18)  Chronic bilateral low back pain with bilateral sciatica (M54.42,M54.41,G89.29)  DDD (degenerative disc disease), lumbar (M51.36)  Herniation of intervertebra Primary open angle glaucoma of both eyes (5/19/2015), Small bowel obstruction ([address] Tendinitis, Unspecified essential hypertension, and Unspecified sleep apnea.  She also has no past medical history of Anesthesia complication, Anxiety state, Deep vein thro (* denotes performed with pain)    L-spine           Flexion        75%*                Extension 50%*   R Rotation 100%   L Rotation 100%   R Sidebend    L Sidebend    R Sideglide 75%*   L Sideglide 75%*     Repeated Motion Testing:   RFIS- P cramps in ch prn    Education or treatment limitation: None  Rehab Potential:good  FOTO: 61 % limitated    Patient was advised of these findings, precautions, and treatment options and has agreed to actively participate in planning and for this course of care.     Thank

## 2019-11-25 ENCOUNTER — TELEPHONE (OUTPATIENT)
Dept: OPHTHALMOLOGY | Facility: CLINIC | Age: 59
End: 2019-11-25

## 2019-11-25 ENCOUNTER — TELEPHONE (OUTPATIENT)
Dept: INTERNAL MEDICINE CLINIC | Facility: CLINIC | Age: 59
End: 2019-11-25

## 2019-11-25 DIAGNOSIS — H40.1132 PRIMARY OPEN ANGLE GLAUCOMA OF BOTH EYES, MODERATE STAGE: Primary | ICD-10-CM

## 2019-11-25 DIAGNOSIS — H02.403 PTOSIS OF BOTH EYELIDS: ICD-10-CM

## 2019-11-25 NOTE — TELEPHONE ENCOUNTER
Per patient she received her xray result via [employer] and nothing was seen and Dr Cady Rosales told her if nothing seen on Xray she will issue an MRI on her Shoulders and neck. Please call patient when Order is ready.

## 2019-11-26 ENCOUNTER — TELEPHONE (OUTPATIENT)
Dept: INTERNAL MEDICINE CLINIC | Facility: CLINIC | Age: 59
End: 2019-11-26

## 2019-11-26 ENCOUNTER — MED REC SCAN ONLY (OUTPATIENT)
Dept: INTERNAL MEDICINE CLINIC | Facility: CLINIC | Age: 59
End: 2019-11-26

## 2019-11-26 ENCOUNTER — OFFICE VISIT (OUTPATIENT)
Dept: OPHTHALMOLOGY | Facility: CLINIC | Age: 59
End: 2019-11-26

## 2019-11-26 DIAGNOSIS — M65.842 STENOSING TENOSYNOVITIS OF FINGER OF LEFT HAND: Primary | ICD-10-CM

## 2019-11-26 DIAGNOSIS — H25.11 AGE-RELATED NUCLEAR CATARACT, RIGHT: ICD-10-CM

## 2019-11-26 DIAGNOSIS — Z96.1 PSEUDOPHAKIA, LEFT EYE: ICD-10-CM

## 2019-11-26 DIAGNOSIS — H40.1132 PRIMARY OPEN ANGLE GLAUCOMA OF BOTH EYES, MODERATE STAGE: Primary | ICD-10-CM

## 2019-11-26 DIAGNOSIS — H02.403 PTOSIS OF BOTH EYELIDS: ICD-10-CM

## 2019-11-26 DIAGNOSIS — H43.393 FLOATER, VITREOUS, BILATERAL: ICD-10-CM

## 2019-11-26 PROCEDURE — 92083 EXTENDED VISUAL FIELD XM: CPT | Performed by: OPHTHALMOLOGY

## 2019-11-26 PROCEDURE — 92133 CPTRZD OPH DX IMG PST SGM ON: CPT | Performed by: OPHTHALMOLOGY

## 2019-11-26 PROCEDURE — 92014 COMPRE OPH EXAM EST PT 1/>: CPT | Performed by: OPHTHALMOLOGY

## 2019-11-26 NOTE — ASSESSMENT & PLAN NOTE
Visual field and OCT completed in office today with stable results that were discussed with patient in office today. Continue Latanoprost every morning in both eyes. Return in 4 months for IOP check.

## 2019-11-26 NOTE — TELEPHONE ENCOUNTER
Dr. Dickson Oscar, you had referred patient to Dr. Marleny Wallis on 10/02/2019 for left hand swelling. [facility], spoke to [place] who states patient will be received steroid injection to the left had due to Tendonitis.      Please reply to pool

## 2019-11-26 NOTE — PATIENT INSTRUCTIONS
Primary open angle glaucoma of both eyes, moderate stage  Visual field and OCT completed in office today with stable results that were discussed with patient in office today. Continue Latanoprost every morning in both eyes.        Return in 4 months for

## 2019-11-26 NOTE — ASSESSMENT & PLAN NOTE
Refer to Dr. Mary Mcleod for evaluation and treatment of bilateral ptosis.    Referral requested, but discussed that she will also need to contact her primary care for referral.

## 2019-11-26 NOTE — PROGRESS NOTES
Cj Soria is a 62year old female. HPI:     HPI     Consult      Additional comments: Per Dr Monatna Cagle               Comments     Pt is here for a VF, OCT and complete exam. Pt is taking Latanoprost OU QAM as directed.  Pt states vision is stable, stil ENDOSCOPY); Excision of Chalazion, Single - OD - Right Eye (Right, 6.27/2017) (Nasally); Cataract extraction w/  intraocular lens implant (Left, 05/07/2018) (L PC IOL with Dr. Ronna Galeano @ [facility]);  Yag Capsulotomy - OS - Left Eye (Left, 12/19/2018) (CHENCHO); other ( PAIN  Sulfa Antibiotics       TONGUE SWELLING    Comment:Other reaction(s): SULFA (SULFONAMIDE ANTIBIOTICS)    ROS:     ROS     Positive for: Eyes    Negative for: Constitutional, Gastrointestinal, Neurological, Skin, Genitourinary, Musculoskeletal, HENT, Pt is waiting for new glasses to be made                   ASSESSMENT/PLAN:     Diagnoses and Plan:     Primary open angle glaucoma of both eyes, moderate stage  Visual field and OCT completed in office today with stable results that were discussed with

## 2019-11-26 NOTE — TELEPHONE ENCOUNTER
Dr. Estefanía Rachel office called requesting a referral for pt's steroid injection. Please sign referral if you agree. Thank you.

## 2019-11-26 NOTE — TELEPHONE ENCOUNTER
Call patient and left message--not sure who Dr. Trung Saleem is and why she would be seeing a plastic surgeon  Asked patient to call back to clarify  Did not sign referral

## 2019-11-26 NOTE — TELEPHONE ENCOUNTER
Call patient and spoke to her–she was told that Dr. Rosa Balderas is under the network–signed pended order  I had referred her to Dr. Remington Thakkar but the name did not come up and when she called it was Dr. Rosa Balderas

## 2019-11-27 ENCOUNTER — NURSE ONLY (OUTPATIENT)
Dept: INTERNAL MEDICINE CLINIC | Facility: CLINIC | Age: 59
End: 2019-11-27

## 2019-11-27 ENCOUNTER — OFFICE VISIT (OUTPATIENT)
Dept: NEUROLOGY | Facility: CLINIC | Age: 59
End: 2019-11-27

## 2019-11-27 VITALS
HEIGHT: 69 IN | DIASTOLIC BLOOD PRESSURE: 72 MMHG | BODY MASS INDEX: 29.18 KG/M2 | HEART RATE: 69 BPM | WEIGHT: 197 LBS | SYSTOLIC BLOOD PRESSURE: 120 MMHG

## 2019-11-27 DIAGNOSIS — M51.36 DDD (DEGENERATIVE DISC DISEASE), LUMBAR: ICD-10-CM

## 2019-11-27 DIAGNOSIS — M48.061 LUMBAR FORAMINAL STENOSIS: ICD-10-CM

## 2019-11-27 DIAGNOSIS — G89.29 CHRONIC BILATERAL LOW BACK PAIN WITH BILATERAL SCIATICA: ICD-10-CM

## 2019-11-27 DIAGNOSIS — M79.18 MYOFASCIAL PAIN: Primary | ICD-10-CM

## 2019-11-27 DIAGNOSIS — Z23 NEED FOR SHINGLES VACCINE: Primary | ICD-10-CM

## 2019-11-27 DIAGNOSIS — M54.42 CHRONIC BILATERAL LOW BACK PAIN WITH BILATERAL SCIATICA: ICD-10-CM

## 2019-11-27 DIAGNOSIS — Z23 NEED FOR VACCINATION: Primary | ICD-10-CM

## 2019-11-27 DIAGNOSIS — M51.26 HERNIATION OF INTERVERTEBRAL DISC BETWEEN L4 AND L5: ICD-10-CM

## 2019-11-27 DIAGNOSIS — M54.41 CHRONIC BILATERAL LOW BACK PAIN WITH BILATERAL SCIATICA: ICD-10-CM

## 2019-11-27 PROCEDURE — 99214 OFFICE O/P EST MOD 30 MIN: CPT | Performed by: PHYSICAL MEDICINE & REHABILITATION

## 2019-11-27 PROCEDURE — 90471 IMMUNIZATION ADMIN: CPT | Performed by: INTERNAL MEDICINE

## 2019-11-27 PROCEDURE — 90750 HZV VACC RECOMBINANT IM: CPT | Performed by: INTERNAL MEDICINE

## 2019-11-27 NOTE — PATIENT INSTRUCTIONS
Continue Cymbalta 30 mg nightly  Perform one session of physical therapy per month for the next 2 months with an extensive home exercise program  Follow-up with me in 3 months

## 2019-11-27 NOTE — PROGRESS NOTES
[address]  Progress Note    CHIEF COMPLAINT:  Patient presents with:  Leg Pain: pt here for f/u leg cramps on both legs. History of Present Illness:   The patient is a 62year old right-handed female wi Grey Manning MD at [address] [facility] ENDOSCOPY   • COLONOSCOPY N/A 11/11/2016    Performed by Mariah Phillips MD at [facility] [address], SINGLE - OD - RIGHT EYE Right 6.27/2017    Nasally   • HC ARTHROCENTESIS OR INJECT MAJOR JOINT W/O U for Pain.  30 tablet 0   • latanoprost 0.005 % Ophthalmic Solution Instill 1 drop by ophthalmic route every morning in both eyes 3 Bottle 3   • Pantoprazole Sodium 40 MG Oral Tab EC Take 1 tablet (40 mg total) by mouth every morning before breakfast. 90 tab spine.  ROM: FAROM  Motor: 5/5 in all myotomes of the BILATERAL lower extremities   Sensation: Intact to light touch in all dermatomes of the lower extremities   Reflexes: 2/4 at L4 and S1  Facet Loading: no specific facet pain  Straight leg raise: negativ 107  98 - 112 mmol/L Final   • CO2 08/21/2019 28.0  21.0 - 32.0 mmol/L Final   • Anion Gap 08/21/2019 7  0 - 18 mmol/L Final   • BUN 08/21/2019 14  7 - 18 mg/dL Final   • Creatinine 08/21/2019 0.94  0.55 - 1.02 mg/dL Final   • BUN/CREA Ratio 08/21/2019 14. Cymbalta 30 mg nightly. I would also like for her to continue with therapy with one session per month for the next 2 months and an extensive home exercise program.  I would like for Rikki Sandoval to follow-up with me in 3 months.   At that time we can determine if

## 2019-11-27 NOTE — PROGRESS NOTES
Pt presents for Shingles vaccine . Name and  verified . Order placed by Dr Lisbeth Maldonado . Pt states she contacted her insurance and was told Shingrix vaccine is covered in our clinic facility .

## 2019-12-02 ENCOUNTER — OFFICE VISIT (OUTPATIENT)
Dept: PHYSICAL THERAPY | Age: 59
End: 2019-12-02
Attending: PHYSICAL MEDICINE & REHABILITATION
Payer: COMMERCIAL

## 2019-12-02 DIAGNOSIS — G89.29 CHRONIC BILATERAL LOW BACK PAIN WITH BILATERAL SCIATICA: ICD-10-CM

## 2019-12-02 DIAGNOSIS — M51.36 DDD (DEGENERATIVE DISC DISEASE), LUMBAR: ICD-10-CM

## 2019-12-02 DIAGNOSIS — M48.061 LUMBAR FORAMINAL STENOSIS: ICD-10-CM

## 2019-12-02 DIAGNOSIS — M79.18 MYOFASCIAL PAIN: ICD-10-CM

## 2019-12-02 DIAGNOSIS — M54.42 CHRONIC BILATERAL LOW BACK PAIN WITH BILATERAL SCIATICA: ICD-10-CM

## 2019-12-02 DIAGNOSIS — M54.41 CHRONIC BILATERAL LOW BACK PAIN WITH BILATERAL SCIATICA: ICD-10-CM

## 2019-12-02 DIAGNOSIS — M51.26 HERNIATION OF INTERVERTEBRAL DISC BETWEEN L4 AND L5: ICD-10-CM

## 2019-12-02 PROCEDURE — 97110 THERAPEUTIC EXERCISES: CPT | Performed by: PHYSICAL THERAPIST

## 2019-12-02 NOTE — PROGRESS NOTES
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 Pt report 50% or more reduction in body cramping.     Plan: Cont PT per plan of care for one more visit    Charges: 2 therex       Total Timed Treatment: 26 min  Total Treatment Time: 26 min

## 2019-12-03 ENCOUNTER — TELEPHONE (OUTPATIENT)
Dept: INTERNAL MEDICINE CLINIC | Facility: CLINIC | Age: 59
End: 2019-12-03

## 2019-12-03 NOTE — TELEPHONE ENCOUNTER
Patient calling and states she need a referral for optometrists     Dr. Sánchez Marking  [facility]       Please advise

## 2019-12-04 ENCOUNTER — TELEPHONE (OUTPATIENT)
Dept: INTERNAL MEDICINE CLINIC | Facility: CLINIC | Age: 59
End: 2019-12-04

## 2019-12-04 DIAGNOSIS — H02.403 PTOSIS OF BOTH EYELIDS: Primary | ICD-10-CM

## 2019-12-04 NOTE — TELEPHONE ENCOUNTER
Patient needs a referral to see Aaron Che  For Ptosis of both eyelids. Dr. Minda Shaver already gave her one, but they informed her that she needs a referral from her PCP. Patient is scheduled on 12/11 with Dr. Madhav Hayes. Thank you.

## 2019-12-05 NOTE — TELEPHONE ENCOUNTER
Patient called again to check on the status of her referral.    Please see message below. Thank you.

## 2019-12-05 NOTE — TELEPHONE ENCOUNTER
Dr. Fabio Ayala, patient is requesting a referral for Dr. Ngoc Sullivan. Patient was referred by Dr. Michelle Frazier. Referral has been pended, please advise.      Please reply to pool: EM TRIAGE SUPPORT

## 2019-12-05 NOTE — TELEPHONE ENCOUNTER
Noted.     Nat Collins; Em Triage Support 4 minutes ago (1:51 PM)      Good Afternoon     Referral is pending review with insurance, once approved we will contact patient via phone or [employer]     [address] [place], [place] [address]

## 2019-12-06 NOTE — TELEPHONE ENCOUNTER
Duplicate message. See TE referral 12/04/2019. Referral to be faxed to Dr. Maira Prather once approved by insurance.

## 2019-12-09 ENCOUNTER — APPOINTMENT (OUTPATIENT)
Dept: PHYSICAL THERAPY | Age: 59
End: 2019-12-09
Attending: PHYSICAL MEDICINE & REHABILITATION
Payer: COMMERCIAL

## 2019-12-09 NOTE — TELEPHONE ENCOUNTER
Patient verbalized understanding of Galindo's message.      Nat Collins  Em Triage Support 26 minutes ago (1:57 PM)      Referral to Dr. Ronna Galeano has been authorized and sent to patients mychart     Galindo     Routing comment

## 2019-12-16 ENCOUNTER — APPOINTMENT (OUTPATIENT)
Dept: PHYSICAL THERAPY | Age: 59
End: 2019-12-16
Attending: PHYSICAL MEDICINE & REHABILITATION
Payer: COMMERCIAL

## 2019-12-17 ENCOUNTER — OFFICE VISIT (OUTPATIENT)
Dept: PODIATRY CLINIC | Facility: CLINIC | Age: 59
End: 2019-12-17

## 2019-12-17 DIAGNOSIS — M79.672 PAIN IN BOTH FEET: Primary | ICD-10-CM

## 2019-12-17 DIAGNOSIS — M20.41 HAMMER TOE OF RIGHT FOOT: ICD-10-CM

## 2019-12-17 DIAGNOSIS — M79.671 PAIN IN BOTH FEET: Primary | ICD-10-CM

## 2019-12-17 DIAGNOSIS — M19.90 ARTHRITIS: ICD-10-CM

## 2019-12-17 PROCEDURE — L3060 FOOT ARCH SUPP LONGITUD/META: HCPCS | Performed by: PODIATRIST

## 2019-12-17 PROCEDURE — 99213 OFFICE O/P EST LOW 20 MIN: CPT | Performed by: PODIATRIST

## 2019-12-18 NOTE — PROGRESS NOTES
HPI:    Patient ID: Miguel Escobar is a 62year old female. This pleasant 25-year-old female presents to the office today having not been seen in about 4 months. She has multiple complaints primarily with both feet.   The first complaint is pain associat physical exam pedal pulses are noted. There is no significant edema in either feet. There is some minor edema on the medial lower leg of the left but there is no erythema there is no palpable discomfort.   This is rather chronic and I do not believe relat

## 2019-12-23 ENCOUNTER — APPOINTMENT (OUTPATIENT)
Dept: PHYSICAL THERAPY | Age: 59
End: 2019-12-23
Attending: PHYSICAL MEDICINE & REHABILITATION
Payer: COMMERCIAL

## 2019-12-27 ENCOUNTER — OFFICE VISIT (OUTPATIENT)
Dept: PHYSICAL THERAPY | Age: 59
End: 2019-12-27
Attending: PHYSICAL MEDICINE & REHABILITATION
Payer: COMMERCIAL

## 2019-12-27 PROCEDURE — 97110 THERAPEUTIC EXERCISES: CPT | Performed by: PHYSICAL THERAPIST

## 2019-12-27 NOTE — PROGRESS NOTES
Vanda  Pt has attended 3 visits in Physical Therapy.      Dx: Myofascial pain (M79.18)  Chronic bilateral low back pain with bilateral sciatica (M54.42,M54.41,G89.29)  DDD (degenerative disc disease), lumbar (M51.36)  Herniation of intervertebr body mechanics with floor to waist lifting of 10# in order to lift laundry basket-not assessed  4. Pt to report ability to sit for 2 hours with pain reported less than 2/10 in order to drive  in car-met  5.  Pt to exhibit increase in lumbar AROM to min loss

## 2019-12-30 ENCOUNTER — OFFICE VISIT (OUTPATIENT)
Dept: ORTHOPEDICS CLINIC | Facility: CLINIC | Age: 59
End: 2019-12-30

## 2019-12-30 ENCOUNTER — APPOINTMENT (OUTPATIENT)
Dept: PHYSICAL THERAPY | Age: 59
End: 2019-12-30
Attending: PHYSICAL MEDICINE & REHABILITATION
Payer: COMMERCIAL

## 2019-12-30 VITALS
SYSTOLIC BLOOD PRESSURE: 134 MMHG | DIASTOLIC BLOOD PRESSURE: 87 MMHG | HEART RATE: 74 BPM | BODY MASS INDEX: 29.18 KG/M2 | WEIGHT: 197 LBS | HEIGHT: 69 IN

## 2019-12-30 DIAGNOSIS — M77.8 TENDINITIS OF RIGHT SHOULDER: ICD-10-CM

## 2019-12-30 DIAGNOSIS — M77.8 TENDINITIS OF LEFT SHOULDER: Primary | ICD-10-CM

## 2019-12-30 PROCEDURE — 20610 DRAIN/INJ JOINT/BURSA W/O US: CPT | Performed by: ORTHOPAEDIC SURGERY

## 2019-12-30 PROCEDURE — 99214 OFFICE O/P EST MOD 30 MIN: CPT | Performed by: ORTHOPAEDIC SURGERY

## 2019-12-30 RX ORDER — TRIAMCINOLONE ACETONIDE 40 MG/ML
40 INJECTION, SUSPENSION INTRA-ARTICULAR; INTRAMUSCULAR ONCE
Status: COMPLETED | OUTPATIENT
Start: 2019-12-30 | End: 2019-12-30

## 2019-12-30 NOTE — PROGRESS NOTES
NURSING INTAKE COMMENTS: Patient presents with:  Shoulder Pain: C/o bilateral shoulder pain. Rec'd cortisone injection in the right shoulder on 9/5/19 when she last saw Dr. Raiza Villagomez. Injection lasted for about 3 months.   She recalls no injury to the left with Dr. Tommy Ko @ [facility]   • COLONOSCOPY N/A 9/6/2019    Performed by Jaymie Hopson MD at [facility] ENDOSCOPY   • COLONOSCOPY N/A 11/11/2016    Performed by Alyson Fernando MD at [address] [place] [address] Ave, SINGLE - OD - RIGHT EYE Right 6. 2 Diabetes Neg    • Glaucoma Neg    • Macular degeneration Neg    • Breast Cancer Neg    • Ovarian Cancer Neg      No family Hx of DVT/PE    Social History    Occupational History      Not on file    Tobacco Use      Smoking status: Never Smoker      Smokele radiculopathy as well as intrinsic shoulder complaints. Strength of the upper extremities were 5 out of 5 including the rotator cuffs. Imaging: X-rays of the right shoulder previously were normal.      No results found.      Lab Results   Component Valu

## 2019-12-30 NOTE — PROGRESS NOTES
Verbal order given by Dr. Meka Yun to draw up 5 cc 0.5% marcaine, and 1 cc kenalog 40 for a left shoulder injection.

## 2020-01-01 NOTE — TELEPHONE ENCOUNTER
Per patient she has an appointment today with the pain center @ 12:45PM and they told her that she needs a referral.  Patient has an appointment with Dr Lamonte Clarke today but after her appointment with the pain clinic.
Referral has already been entered.
Statement Selected

## 2020-01-09 ENCOUNTER — HOSPITAL ENCOUNTER (OUTPATIENT)
Dept: MRI IMAGING | Age: 60
Discharge: HOME | End: 2020-01-09
Attending: ORTHOPAEDIC SURGERY
Payer: COMMERCIAL

## 2020-01-09 DIAGNOSIS — M77.8 TENDINITIS OF RIGHT SHOULDER: ICD-10-CM

## 2020-01-09 PROCEDURE — 73221 MRI JOINT UPR EXTREM W/O DYE: CPT | Performed by: ORTHOPAEDIC SURGERY

## 2020-01-11 ENCOUNTER — OFFICE VISIT (OUTPATIENT)
Dept: INTERNAL MEDICINE CLINIC | Facility: CLINIC | Age: 60
End: 2020-01-11

## 2020-01-11 VITALS
BODY MASS INDEX: 29.15 KG/M2 | HEART RATE: 64 BPM | DIASTOLIC BLOOD PRESSURE: 82 MMHG | SYSTOLIC BLOOD PRESSURE: 128 MMHG | HEIGHT: 69 IN | WEIGHT: 196.81 LBS

## 2020-01-11 DIAGNOSIS — R25.2 LEG CRAMPS: ICD-10-CM

## 2020-01-11 DIAGNOSIS — M79.18 MYOFASCIAL PAIN: ICD-10-CM

## 2020-01-11 DIAGNOSIS — R25.2 MUSCLE CRAMPS: ICD-10-CM

## 2020-01-11 DIAGNOSIS — K21.9 GASTROESOPHAGEAL REFLUX DISEASE, ESOPHAGITIS PRESENCE NOT SPECIFIED: ICD-10-CM

## 2020-01-11 DIAGNOSIS — I10 ESSENTIAL HYPERTENSION: Primary | ICD-10-CM

## 2020-01-11 DIAGNOSIS — M75.121 NONTRAUMATIC COMPLETE TEAR OF RIGHT ROTATOR CUFF: ICD-10-CM

## 2020-01-11 PROCEDURE — 99214 OFFICE O/P EST MOD 30 MIN: CPT | Performed by: INTERNAL MEDICINE

## 2020-01-11 RX ORDER — CYCLOBENZAPRINE HCL 10 MG
10 TABLET ORAL 3 TIMES DAILY
Qty: 30 TABLET | Refills: 1 | Status: SHIPPED | OUTPATIENT
Start: 2020-01-11 | End: 2020-01-11

## 2020-01-11 RX ORDER — CYCLOBENZAPRINE HCL 10 MG
10 TABLET ORAL NIGHTLY PRN
Qty: 30 TABLET | Refills: 0 | Status: SHIPPED | OUTPATIENT
Start: 2020-01-11 | End: 2020-01-31

## 2020-01-11 NOTE — PROGRESS NOTES
Luz Elena Hanson is a 62year old female.   Patient presents with:  Heartburn: Pt states heartburn last couple of weeks       HPI:   Patient comes for follow-up  C/C.heartburn and doesn't think the ppi is working   C/o cramps and heartburn   Dr Pal Avalos Non obstructing kid stones   elmer on cpap   Glaucoma suspect   Right shoulder rotator cuff tear  Osteoarthritis knees     Dr Deon Sandy - eye dr Dr Trudy Galicia - Carey Mendez- rheum   Dr. Milagros Guido- ortho   Dr Vincent Pérez intraocular lens implant Left 05/07/2018    L PC IOL with Dr. Tez Cage @ [facility]   • Colonoscopy N/A 11/11/2016    Procedure: COLONOSCOPY;  Surgeon: Dickson Duke MD;  Location: [address] [place] [address] ENDOSCOPY   • Colonoscopy N/A 9/6/2019    Procedure: COLONOSCOPY; good BS's,no masses + tenderness  EXTREMITIES: no cyanosis, or edema  Restricted range of motion of bilateral shoulders    ASSESSMENT AND PLAN:   Diagnoses and all orders for this visit:    Essential hypertension  Advised pt to follow a low salt , low sodi

## 2020-01-13 ENCOUNTER — TELEPHONE (OUTPATIENT)
Dept: CARDIOLOGY CLINIC | Facility: CLINIC | Age: 60
End: 2020-01-13

## 2020-01-13 ENCOUNTER — TELEPHONE (OUTPATIENT)
Dept: GASTROENTEROLOGY | Facility: CLINIC | Age: 60
End: 2020-01-13

## 2020-01-13 NOTE — TELEPHONE ENCOUNTER
Question:  Which Rx?     1 nighlty or tid? They town. Please respond. Current Outpatient Medications:   •  cyclobenzaprine 10 MG Oral Tab, Take 1 tablet (10 mg total) by mouth nightly as needed for Muscle spasms. , Disp: 30 tablet, Rfl: 0

## 2020-01-13 NOTE — TELEPHONE ENCOUNTER
Pt's last OV was 07/01/19  Colonoscopy 09/06/19    Prescribed pantoprazole by Arminda Mclaughlin MD on 07/08/19. Pt states for the last 2-3 weeks the pantoprazole is not working as well.     She takes one 40 mg tablet q am    She has heartburn all day with bloa

## 2020-01-13 NOTE — TELEPHONE ENCOUNTER
Patient has appointment with  2/17/2020, patient indicates medication for heartburn is not working. Patient would like to be seen sooner, but request Monday for later time slot, please call at:[phone],thanks.

## 2020-01-13 NOTE — TELEPHONE ENCOUNTER
I spoke to the pt and she accepted appt for 01/20/20 @ 5 pm. Date, time and location verified with the pt    Future Appointments   Date Time Provider Sherrie Marley   1/20/2020  5:00 PM Kerry Pulido MD [facility] Rhina GONZALEZ

## 2020-01-13 NOTE — TELEPHONE ENCOUNTER
RPh at [facility] patient already picked up the Cyclobenzaprine 10 mg three times a day on 1/11. Cannot put through the dosage change to 1 po q hs due to insurance not paying for.

## 2020-01-18 ENCOUNTER — APPOINTMENT (OUTPATIENT)
Dept: LAB | Facility: HOSPITAL | Age: 60
End: 2020-01-18
Attending: INTERNAL MEDICINE
Payer: COMMERCIAL

## 2020-01-18 DIAGNOSIS — K21.9 GASTROESOPHAGEAL REFLUX DISEASE, ESOPHAGITIS PRESENCE NOT SPECIFIED: ICD-10-CM

## 2020-01-18 PROCEDURE — 87338 HPYLORI STOOL AG IA: CPT

## 2020-01-20 ENCOUNTER — OFFICE VISIT (OUTPATIENT)
Dept: GASTROENTEROLOGY | Facility: CLINIC | Age: 60
End: 2020-01-20

## 2020-01-20 ENCOUNTER — TELEPHONE (OUTPATIENT)
Dept: GASTROENTEROLOGY | Facility: CLINIC | Age: 60
End: 2020-01-20

## 2020-01-20 VITALS
BODY MASS INDEX: 29.47 KG/M2 | SYSTOLIC BLOOD PRESSURE: 130 MMHG | WEIGHT: 199 LBS | DIASTOLIC BLOOD PRESSURE: 83 MMHG | HEART RATE: 69 BPM | HEIGHT: 69 IN

## 2020-01-20 DIAGNOSIS — K21.9 GASTROESOPHAGEAL REFLUX DISEASE, ESOPHAGITIS PRESENCE NOT SPECIFIED: Primary | ICD-10-CM

## 2020-01-20 DIAGNOSIS — R07.89 ATYPICAL CHEST PAIN: ICD-10-CM

## 2020-01-20 DIAGNOSIS — Z98.890 HISTORY OF NISSEN FUNDOPLICATION: ICD-10-CM

## 2020-01-20 DIAGNOSIS — R14.0 BLOATING SYMPTOM: ICD-10-CM

## 2020-01-20 PROCEDURE — 99214 OFFICE O/P EST MOD 30 MIN: CPT | Performed by: INTERNAL MEDICINE

## 2020-01-20 RX ORDER — SUCRALFATE 1 G/1
TABLET ORAL
Qty: 120 TABLET | Refills: 0 | Status: SHIPPED | OUTPATIENT
Start: 2020-01-20 | End: 2020-01-21

## 2020-01-20 RX ORDER — OMEPRAZOLE 20 MG/1
40 CAPSULE, DELAYED RELEASE ORAL EVERY MORNING
Qty: 180 CAPSULE | Refills: 0 | Status: SHIPPED | OUTPATIENT
Start: 2020-01-20 | End: 2020-03-17

## 2020-01-20 NOTE — PATIENT INSTRUCTIONS
1. Worsening bloating and reflux  2. Atypical chest pain, not with eating  3.  Weight loss      Recommend:  Consider cardiac workup first given atypical chest pain  Repeat 9/2024 for colonoscopy  Await H pylori  Plan EGD with MAC when cleared by primary/car

## 2020-01-20 NOTE — PROGRESS NOTES
[address] Gastroenterology  Clinic Follow-up Visit    Patient presents with:   Follow - Up    Subjective/HPI:   Lei Tucker is a 62year old year-old female, patient of  with a history Nissen fundoplication, hemorrhoids, diverticulosis, kg)  12/30/19 : 197 lb (89.4 kg)  11/27/19 : 197 lb (89.4 kg)  11/11/19 : 197 lb 4 oz (89.5 kg)  10/24/19 : 193 lb (87.5 kg)  10/02/19 : 193 lb 12.8 oz (87.9 kg)  09/04/19 : 194 lb (88 kg)  09/05/19 : 194 lb (88 kg)  09/05/19 : 194 lb (88 kg)  08/21/19 : 1 EYE Right 6.27/2017    Nasally   • HC ARTHROCENTESIS OR INJECT MAJOR JOINT W/O US     • HYSTERECTOMY  1996    KAI   • OTHER      Lap Nissen Fundoplication surgery   • OTHER SURGICAL HISTORY  2005,2007,2008    LAPAROSCOPIC LYSIS OF ADHESION   • OTHER SURGIC Allergies:    Celecoxib                   Comment:Other reaction(s): CELECOXIB  Erythromycin            PAIN  Sulfa Antibiotics       TONGUE SWELLING    Comment:Other reaction(s): SULFA (SULFONAMIDE ANTIBIOTICS)    ROS:   CONSTITUTIONAL:  negative fo polyps, anal sphincterotomy and rectocele,  presenting for atypical chest pain    1. Worsening bloating and reflux  2. Atypical chest pain, not with eating  3.  Weight loss      Recommend:  Consider cardiac workup first given atypical chest pain  Repeat 9/2 No prescriptions requested or ordered in this encounter       Imaging & Referrals:  None

## 2020-01-21 LAB — H PYLORI AG STL QL IA: NEGATIVE

## 2020-01-21 RX ORDER — SUCRALFATE 1 G/1
TABLET ORAL
Qty: 360 TABLET | Refills: 0 | Status: SHIPPED | OUTPATIENT
Start: 2020-01-21 | End: 2020-08-11

## 2020-01-21 NOTE — TELEPHONE ENCOUNTER
RN's   Pt is to get surgical clearance, see note below:    EGD with MAC when cleared by primary: Bella Aguayo/ cardiology ? Pt has not been seen by Cardiology, pt is to schedule appt with cardiology prior to EGD.   I scheduled EGD in March to allow stefani

## 2020-01-21 NOTE — TELEPHONE ENCOUNTER
Recommend:  Consider cardiac workup first given atypical chest pain      Do you want the pt cleared by both cardiology and her PCP or just cardiology?     Cardiologist is Verenice Oakes MD  PCP is Toño Hancock MD

## 2020-01-21 NOTE — TELEPHONE ENCOUNTER
Scheduled for: EGD [address]41 w/possible Dilatation  Provider Name: Dr Jason Hensley  Date: Fri 3/27/20  Location: [facility]  Sedation: MAC   Time: 9:00 am  Prep: Nothing after midnight the day before procedure and NPO 3 hrs prior procedure  Meds/Allergies Reconciled?: Celec

## 2020-01-22 ENCOUNTER — TELEPHONE (OUTPATIENT)
Dept: GASTROENTEROLOGY | Facility: CLINIC | Age: 60
End: 2020-01-22

## 2020-01-22 NOTE — TELEPHONE ENCOUNTER
Current Outpatient Medications   Medication Sig Dispense Refill   • omeprazole 20 MG Oral Capsule Delayed Release Take 2 capsules (40 mg total) by mouth every morning. 180 capsule 0     Per pharmacy - Pt picked up Pantoprazole on 1/11/2020.   Please clarify

## 2020-01-22 NOTE — TELEPHONE ENCOUNTER
Pt hs an appt with her PCP on 01/29/2020. She needs to see her PCP first due to HMO. If PCP recommends, she will refer to cardiology for cardiac clearance.     I will follow up at the end of the month

## 2020-01-22 NOTE — TELEPHONE ENCOUNTER
KISHOREI: Dr Saundra Egan    Pt has a current prescription for Pantoprazole 40 mg q am written by Abe Hutchins back in July 2019 for 1 year.     You wrote for omeprazole 20 mg 2 capsules by mouth q am.    The pharmacist will explain to her she only needs to take one or

## 2020-01-28 ENCOUNTER — OFFICE VISIT (OUTPATIENT)
Dept: PODIATRY CLINIC | Facility: CLINIC | Age: 60
End: 2020-01-28

## 2020-01-28 DIAGNOSIS — M19.90 ARTHRITIS: Primary | ICD-10-CM

## 2020-01-28 DIAGNOSIS — M20.41 HAMMER TOE OF RIGHT FOOT: ICD-10-CM

## 2020-01-28 PROCEDURE — 99212 OFFICE O/P EST SF 10 MIN: CPT | Performed by: PODIATRIST

## 2020-01-29 ENCOUNTER — OFFICE VISIT (OUTPATIENT)
Dept: OBGYN CLINIC | Facility: CLINIC | Age: 60
End: 2020-01-29

## 2020-01-29 ENCOUNTER — OFFICE VISIT (OUTPATIENT)
Dept: INTERNAL MEDICINE CLINIC | Facility: CLINIC | Age: 60
End: 2020-01-29

## 2020-01-29 VITALS
WEIGHT: 196.81 LBS | BODY MASS INDEX: 29 KG/M2 | SYSTOLIC BLOOD PRESSURE: 145 MMHG | HEART RATE: 80 BPM | DIASTOLIC BLOOD PRESSURE: 83 MMHG

## 2020-01-29 VITALS
DIASTOLIC BLOOD PRESSURE: 91 MMHG | RESPIRATION RATE: 18 BRPM | OXYGEN SATURATION: 98 % | HEART RATE: 78 BPM | TEMPERATURE: 99 F | SYSTOLIC BLOOD PRESSURE: 152 MMHG

## 2020-01-29 DIAGNOSIS — Z01.419 ENCOUNTER FOR GYNECOLOGICAL EXAMINATION WITHOUT ABNORMAL FINDING: Primary | ICD-10-CM

## 2020-01-29 DIAGNOSIS — Z12.31 SCREENING MAMMOGRAM, ENCOUNTER FOR: ICD-10-CM

## 2020-01-29 DIAGNOSIS — R09.82 POSTNASAL DRIP: ICD-10-CM

## 2020-01-29 DIAGNOSIS — J06.9 VIRAL UPPER RESPIRATORY TRACT INFECTION: ICD-10-CM

## 2020-01-29 DIAGNOSIS — I10 ESSENTIAL HYPERTENSION: Primary | ICD-10-CM

## 2020-01-29 PROCEDURE — 99214 OFFICE O/P EST MOD 30 MIN: CPT | Performed by: INTERNAL MEDICINE

## 2020-01-29 PROCEDURE — 99396 PREV VISIT EST AGE 40-64: CPT | Performed by: OBSTETRICS & GYNECOLOGY

## 2020-01-29 RX ORDER — BENZONATATE 100 MG/1
100 CAPSULE ORAL 2 TIMES DAILY PRN
Qty: 20 CAPSULE | Refills: 0 | Status: SHIPPED | OUTPATIENT
Start: 2020-01-29 | End: 2020-02-24

## 2020-01-29 NOTE — PROGRESS NOTES
HPI:    Patient ID: Aicha Hunter is a 62year old female. 49-year-old female presents for follow-up and states that she is benefiting but is frustrated with continued pain. She states her feet hurt every day.   She describes it as an aching soreness and shoes at all times no barefoot walking. I discussed hammertoe surgery.   Plan follow-up in a few weeks to assess the benefits of added support         ASSESSMENT/PLAN:   Arthritis  (primary encounter diagnosis)  Hammer toe of right foot    No orders of the

## 2020-01-30 NOTE — PROGRESS NOTES
Alberto Moreno is a 62year old female.   Patient presents with:  URI: Patient present with a cough, congestion and both ears painful      HPI:   Pt comes for f/u  C/c cough and congestion   C/o states she saw the gi dr and was rec to do ekg prior to [employer] left rib pain and back pains   Had some ct scans and would like to go through it  Needs referrals    Usually gets shots to her knees and shoulders- was told to follow-up with a new doctor     PM  gerd daily ppi --h/o nissens fundoplication  ? 2006  HTN  Hi disc disease    • Diverticular disease    • Esophageal reflux    • Fibroids    • Hammertoe    • High blood pressure    • Insomnia    • Primary open angle glaucoma of both eyes 5/19/2015    Diagnosis of glaucoma OU; Started Latanoprost qhs after abnormal VF coughing so much using  SKIN: denies any unusual skin lesions or rashes  RESPIRATORY: denies shortness of breath, + cough- yellow ,+ wheezing  CARDIOVASCULAR: denies chest pain on exertion, palpitations, swelling in feet  NEURO: +  headaches - from coughin

## 2020-01-31 ENCOUNTER — APPOINTMENT (OUTPATIENT)
Dept: LAB | Age: 60
End: 2020-01-31
Attending: INTERNAL MEDICINE
Payer: COMMERCIAL

## 2020-01-31 DIAGNOSIS — I10 ESSENTIAL HYPERTENSION: ICD-10-CM

## 2020-01-31 PROCEDURE — 93010 ELECTROCARDIOGRAM REPORT: CPT | Performed by: INTERNAL MEDICINE

## 2020-01-31 PROCEDURE — 93005 ELECTROCARDIOGRAM TRACING: CPT

## 2020-01-31 NOTE — TELEPHONE ENCOUNTER
Dr Byron Schroeder,    Please advise if this pt is cleared to proceed with EGD on 03/27/2020 for worsening bloating and reflux symptoms. See Dr Gerard Estes note from  [address] [place] [address] Rd 01/20/2020:      1. Worsening bloating and reflux  2. Atypical chest pain, not with eating  3.

## 2020-01-31 NOTE — TELEPHONE ENCOUNTER
Patient seen 2 days ago, EKG was ordered but she has not gotten that done yet   please ask her to get it done

## 2020-02-04 NOTE — TELEPHONE ENCOUNTER
Dr Gill Longo,    Per Dr Altaf Clark, pt is cleared for EGD.  Please advise if pt may proceed with EGD in March 27th of 2020

## 2020-02-05 NOTE — PROGRESS NOTES
HPI:    Patient ID: Ciera Arguello is a 62year old female. HPI   and . She is post hyst for benign disease. No new family medical issues.  Only issue on ROS is a skin mole on right breast.     Review of Systems   Constitutional: Negative for a Comment:Other reaction(s): CELECOXIB  Erythromycin            PAIN  Sulfa Antibiotics       TONGUE SWELLING    Comment:Other reaction(s): SULFA (SULFONAMIDE ANTIBIOTICS)   PHYSICAL EXAM:   Physical Exam   Constitutional: She appears well-de

## 2020-02-05 NOTE — TELEPHONE ENCOUNTER
I spoke to the pt and let her know Dr Ant Felix gave her cardiac clearance and Dr Deborah Hayes said ok to proceed.  Pt very appreciative

## 2020-02-14 ENCOUNTER — TELEPHONE (OUTPATIENT)
Dept: INTERNAL MEDICINE CLINIC | Facility: CLINIC | Age: 60
End: 2020-02-14

## 2020-02-14 NOTE — TELEPHONE ENCOUNTER
Patient states she is scheduled for surgery on 3/5/2020 for a eye lift and wants to know what is needed. Please advise.

## 2020-02-15 NOTE — TELEPHONE ENCOUNTER
Advised patient on Dr. Ronny Wagner information and recommendations. Patient verbalized understanding. She will call surgeon's office. Last chest xray 2019. EKG Jan 2020 WNL. Labs in 2019.  She will check and tell doctor at the 2/24/2020 pre-op appt with Dr Chan

## 2020-02-17 ENCOUNTER — TELEPHONE (OUTPATIENT)
Dept: INTERNAL MEDICINE CLINIC | Facility: CLINIC | Age: 60
End: 2020-02-17

## 2020-02-24 ENCOUNTER — OFFICE VISIT (OUTPATIENT)
Dept: INTERNAL MEDICINE CLINIC | Facility: CLINIC | Age: 60
End: 2020-02-24

## 2020-02-24 VITALS
WEIGHT: 199 LBS | DIASTOLIC BLOOD PRESSURE: 73 MMHG | SYSTOLIC BLOOD PRESSURE: 138 MMHG | BODY MASS INDEX: 29.47 KG/M2 | HEART RATE: 72 BPM | HEIGHT: 69 IN

## 2020-02-24 DIAGNOSIS — I10 ESSENTIAL HYPERTENSION: ICD-10-CM

## 2020-02-24 DIAGNOSIS — M17.0 PRIMARY OSTEOARTHRITIS OF BOTH KNEES: ICD-10-CM

## 2020-02-24 DIAGNOSIS — H02.403 PTOSIS OF BOTH EYELIDS: ICD-10-CM

## 2020-02-24 DIAGNOSIS — Z01.818 PREOP EXAMINATION: Primary | ICD-10-CM

## 2020-02-24 DIAGNOSIS — H61.21 RIGHT EAR IMPACTED CERUMEN: ICD-10-CM

## 2020-02-24 PROCEDURE — 99213 OFFICE O/P EST LOW 20 MIN: CPT | Performed by: INTERNAL MEDICINE

## 2020-02-24 NOTE — PROGRESS NOTES
Amber Chavarria is a 62year old female.   Patient presents with:  Pre-Op Exam: [facility]  #[phone] fax #[phone]  3/5 no labs requested just needs a letter       HPI:   Patient comes for preop  C/C preop  C/o we will go for eyelid lift C/o fell on July 8th and has some left rib pain and back pains   Had some ct scans and would like to go through it  Needs referrals    Usually gets shots to her knees and shoulders- was told to follow-up with a new doctor     [facility]  gerd daily ppi --h/o ni Started Latanoprost qhs after abnormal VF and OCT 2/25/16;  6/5/18 consult with Dr. Anoop Henson progress note   • Small bowel obstruction [facility])    • Tendinitis    • Unspecified essential hypertension    • Unspecified sleep apnea     uses CPAP      P heartburn, nausea or vomiting  : No Pain on urination, change in the color of urine, discharge, urinating frequently  MUS: No back pain,+ joint pain- knee , muscle pain  NEURO: denies headaches but has sensitivity to smell,no anxiety, depression    EXAM:

## 2020-02-25 ENCOUNTER — TELEPHONE (OUTPATIENT)
Dept: INTERNAL MEDICINE CLINIC | Facility: CLINIC | Age: 60
End: 2020-02-25

## 2020-02-29 ENCOUNTER — LAB ENCOUNTER (OUTPATIENT)
Dept: LAB | Facility: HOSPITAL | Age: 60
End: 2020-02-29
Attending: INTERNAL MEDICINE
Payer: COMMERCIAL

## 2020-02-29 DIAGNOSIS — I10 ESSENTIAL HYPERTENSION: ICD-10-CM

## 2020-02-29 LAB
ALBUMIN SERPL-MCNC: 3.9 G/DL (ref 3.4–5)
ALBUMIN/GLOB SERPL: 1 {RATIO} (ref 1–2)
ALP LIVER SERPL-CCNC: 119 U/L (ref 50–130)
ALT SERPL-CCNC: 25 U/L (ref 13–56)
ANION GAP SERPL CALC-SCNC: 3 MMOL/L (ref 0–18)
AST SERPL-CCNC: 20 U/L (ref 15–37)
BASOPHILS # BLD AUTO: 0.04 X10(3) UL (ref 0–0.2)
BASOPHILS NFR BLD AUTO: 0.5 %
BILIRUB SERPL-MCNC: 0.5 MG/DL (ref 0.1–2)
BUN BLD-MCNC: 13 MG/DL (ref 7–18)
BUN/CREAT SERPL: 13.5 (ref 10–20)
CALCIUM BLD-MCNC: 9.4 MG/DL (ref 8.5–10.1)
CHLORIDE SERPL-SCNC: 107 MMOL/L (ref 98–112)
CHOLEST SMN-MCNC: 243 MG/DL (ref ?–200)
CO2 SERPL-SCNC: 32 MMOL/L (ref 21–32)
CREAT BLD-MCNC: 0.96 MG/DL (ref 0.55–1.02)
DEPRECATED RDW RBC AUTO: 41.9 FL (ref 35.1–46.3)
EOSINOPHIL # BLD AUTO: 0.07 X10(3) UL (ref 0–0.7)
EOSINOPHIL NFR BLD AUTO: 0.8 %
ERYTHROCYTE [DISTWIDTH] IN BLOOD BY AUTOMATED COUNT: 14.5 % (ref 11–15)
GLOBULIN PLAS-MCNC: 3.9 G/DL (ref 2.8–4.4)
GLUCOSE BLD-MCNC: 106 MG/DL (ref 70–99)
HCT VFR BLD AUTO: 44.6 % (ref 35–48)
HDLC SERPL-MCNC: 62 MG/DL (ref 40–59)
HGB BLD-MCNC: 14.9 G/DL (ref 12–16)
IMM GRANULOCYTES # BLD AUTO: 0.03 X10(3) UL (ref 0–1)
IMM GRANULOCYTES NFR BLD: 0.3 %
LDLC SERPL CALC-MCNC: 150 MG/DL (ref ?–100)
LYMPHOCYTES # BLD AUTO: 2.32 X10(3) UL (ref 1–4)
LYMPHOCYTES NFR BLD AUTO: 26.2 %
M PROTEIN MFR SERPL ELPH: 7.8 G/DL (ref 6.4–8.2)
MCH RBC QN AUTO: 26.8 PG (ref 26–34)
MCHC RBC AUTO-ENTMCNC: 33.4 G/DL (ref 31–37)
MCV RBC AUTO: 80.4 FL (ref 80–100)
MONOCYTES # BLD AUTO: 0.55 X10(3) UL (ref 0.1–1)
MONOCYTES NFR BLD AUTO: 6.2 %
NEUTROPHILS # BLD AUTO: 5.84 X10 (3) UL (ref 1.5–7.7)
NEUTROPHILS # BLD AUTO: 5.84 X10(3) UL (ref 1.5–7.7)
NEUTROPHILS NFR BLD AUTO: 66 %
NONHDLC SERPL-MCNC: 181 MG/DL (ref ?–130)
OSMOLALITY SERPL CALC.SUM OF ELEC: 295 MOSM/KG (ref 275–295)
PATIENT FASTING Y/N/NP: YES
PATIENT FASTING Y/N/NP: YES
PLATELET # BLD AUTO: 345 10(3)UL (ref 150–450)
POTASSIUM SERPL-SCNC: 3.7 MMOL/L (ref 3.5–5.1)
RBC # BLD AUTO: 5.55 X10(6)UL (ref 3.8–5.3)
SODIUM SERPL-SCNC: 142 MMOL/L (ref 136–145)
TRIGL SERPL-MCNC: 154 MG/DL (ref 30–149)
TSI SER-ACNC: 1.64 MIU/ML (ref 0.36–3.74)
VLDLC SERPL CALC-MCNC: 31 MG/DL (ref 0–30)
WBC # BLD AUTO: 8.9 X10(3) UL (ref 4–11)

## 2020-02-29 PROCEDURE — 85025 COMPLETE CBC W/AUTO DIFF WBC: CPT

## 2020-02-29 PROCEDURE — 84443 ASSAY THYROID STIM HORMONE: CPT

## 2020-02-29 PROCEDURE — 85060 BLOOD SMEAR INTERPRETATION: CPT

## 2020-02-29 PROCEDURE — 80061 LIPID PANEL: CPT

## 2020-02-29 PROCEDURE — 80053 COMPREHEN METABOLIC PANEL: CPT

## 2020-02-29 PROCEDURE — 36415 COLL VENOUS BLD VENIPUNCTURE: CPT

## 2020-03-04 ENCOUNTER — OFFICE VISIT (OUTPATIENT)
Dept: ORTHOPEDICS CLINIC | Facility: CLINIC | Age: 60
End: 2020-03-04

## 2020-03-04 ENCOUNTER — HOSPITAL ENCOUNTER (OUTPATIENT)
Dept: GENERAL RADIOLOGY | Facility: HOSPITAL | Age: 60
Discharge: HOME | End: 2020-03-04
Attending: ORTHOPAEDIC SURGERY
Payer: COMMERCIAL

## 2020-03-04 VITALS — HEART RATE: 71 BPM | RESPIRATION RATE: 16 BRPM | DIASTOLIC BLOOD PRESSURE: 85 MMHG | SYSTOLIC BLOOD PRESSURE: 126 MMHG

## 2020-03-04 DIAGNOSIS — M25.561 CHRONIC PAIN OF BOTH KNEES: ICD-10-CM

## 2020-03-04 DIAGNOSIS — G89.29 CHRONIC PAIN OF BOTH KNEES: ICD-10-CM

## 2020-03-04 DIAGNOSIS — M25.562 CHRONIC PAIN OF BOTH KNEES: ICD-10-CM

## 2020-03-04 DIAGNOSIS — M17.0 PRIMARY OSTEOARTHRITIS OF BOTH KNEES: Primary | ICD-10-CM

## 2020-03-04 PROCEDURE — 99214 OFFICE O/P EST MOD 30 MIN: CPT | Performed by: ORTHOPAEDIC SURGERY

## 2020-03-04 PROCEDURE — 20610 DRAIN/INJ JOINT/BURSA W/O US: CPT | Performed by: ORTHOPAEDIC SURGERY

## 2020-03-04 PROCEDURE — 73562 X-RAY EXAM OF KNEE 3: CPT | Performed by: ORTHOPAEDIC SURGERY

## 2020-03-04 RX ORDER — MELOXICAM 7.5 MG/1
7.5 TABLET ORAL DAILY
Qty: 30 TABLET | Refills: 3 | Status: SHIPPED | OUTPATIENT
Start: 2020-03-04 | End: 2020-08-11

## 2020-03-04 RX ORDER — TRIAMCINOLONE ACETONIDE 40 MG/ML
40 INJECTION, SUSPENSION INTRA-ARTICULAR; INTRAMUSCULAR ONCE
Status: COMPLETED | OUTPATIENT
Start: 2020-03-04 | End: 2020-03-04

## 2020-03-04 RX ADMIN — TRIAMCINOLONE ACETONIDE 40 MG: 40 INJECTION, SUSPENSION INTRA-ARTICULAR; INTRAMUSCULAR at 18:15:00

## 2020-03-05 NOTE — PROGRESS NOTES
Per Dr Josiah Rollins, draw up two syringes of 3 mL of 0.5% Marcaine, 2mL 1% Lidocaine and 1 mL of Kenalog 40 for injection to bilateral knees. KP    Patient provided education handout for cortisone injection.     Patient left office prior to obtaining post inject

## 2020-03-05 NOTE — PROGRESS NOTES
NURSING INTAKE COMMENTS: Patient presents with: Follow - Up: LOV with Dr Miladys Triplett 12/30/19. bilateral knee pain: pain has been going on for years, but it's getting worse. it is getting difficult to walk. 9/10 pain.  pain equal.       HPI: This 62year old Fundoplication surgery   • OTHER SURGICAL HISTORY  2005,2007,2008    LAPAROSCOPIC LYSIS OF ADHESION   • OTHER SURGICAL HISTORY      right foot bunionectomy   • OTHER SURGICAL HISTORY  11-14-14    right superficial anterior peroneal nerve biopsy and right p Alcohol/week: 0.0 standard drinks        Comment: None.        Drug use: No      Sexual activity: Yes        Birth control/protection: Hysterectomy       Review of Systems:  GENERAL: denies fevers, chills, night sweats, fatigue, unintentional weight loss/ga moderate degenerative change bilateral knees primarily involving the lateral compartments and patellofemoral joints bilaterally.   Labs:  Lab Results   Component Value Date    WBC 8.9 02/29/2020    HGB 14.9 02/29/2020    .0 02/29/2020      Lab Result

## 2020-03-09 ENCOUNTER — TELEPHONE (OUTPATIENT)
Dept: INTERNAL MEDICINE CLINIC | Facility: CLINIC | Age: 60
End: 2020-03-09

## 2020-03-09 DIAGNOSIS — H02.834 DERMATOCHALASIS OF LEFT UPPER EYELID: ICD-10-CM

## 2020-03-09 DIAGNOSIS — H02.831 DERMATOCHALASIS OF RIGHT UPPER EYELID: Primary | ICD-10-CM

## 2020-03-09 NOTE — TELEPHONE ENCOUNTER
Dr. Lita Landa, patient is requesting a referral for Dr. Kimberley Santos. Referral has been pended, please advise.      Please reply to pool: [facility] CARE - MAIN

## 2020-03-09 NOTE — TELEPHONE ENCOUNTER
Patient calling and need a referral for Dr Vilma Rogers Ophthalmologist with [facility] appointment schedule for March 11, 2020        Please advise  [phone]

## 2020-03-10 NOTE — TELEPHONE ENCOUNTER
Managed Care, please inform pt when referral has been authorized. At time of this note status is \"open\".

## 2020-03-12 ENCOUNTER — TELEPHONE (OUTPATIENT)
Dept: INTERNAL MEDICINE CLINIC | Facility: CLINIC | Age: 60
End: 2020-03-12

## 2020-03-12 DIAGNOSIS — Z23 NEED FOR SHINGLES VACCINE: Primary | ICD-10-CM

## 2020-03-12 NOTE — TELEPHONE ENCOUNTER
Patient is due for second dose of Shingrix vaccine, had first dose on 11/27/2019. Please see pended order sign if approve, will contact patient for NV once order is signed. Thank you.

## 2020-03-12 NOTE — TELEPHONE ENCOUNTER
Please call patient and schedule NV appt for second dose of Shingrix at [facility], we have a vaccine on hold for her.  Thanks

## 2020-03-17 RX ORDER — OMEPRAZOLE 20 MG/1
CAPSULE, DELAYED RELEASE ORAL
Qty: 180 CAPSULE | Refills: 0 | Status: SHIPPED | OUTPATIENT
Start: 2020-03-17 | End: 2020-06-17

## 2020-03-17 NOTE — TELEPHONE ENCOUNTER
Requested Prescriptions     Pending Prescriptions Disp Refills   • OMEPRAZOLE 20 MG Oral Capsule Delayed Release [Pharmacy Med Name: OMEPRAZOLE 20MG CAPSULES] 180 capsule 0     Sig: TAKE 2 CAPSULES(40 MG) BY MOUTH EVERY MORNING     LR: 1/20/2020  Lov:1/20/

## 2020-03-20 ENCOUNTER — TELEPHONE (OUTPATIENT)
Dept: GASTROENTEROLOGY | Facility: CLINIC | Age: 60
End: 2020-03-20

## 2020-03-20 DIAGNOSIS — K21.9 GASTROESOPHAGEAL REFLUX DISEASE, ESOPHAGITIS PRESENCE NOT SPECIFIED: Primary | ICD-10-CM

## 2020-03-20 DIAGNOSIS — Z98.890 HISTORY OF NISSEN FUNDOPLICATION: ICD-10-CM

## 2020-03-20 DIAGNOSIS — R07.89 ATYPICAL CHEST PAIN: ICD-10-CM

## 2020-03-20 NOTE — TELEPHONE ENCOUNTER
Scheduled for:  [facility] [address] with possible dilatation  Provider Name:  Dr. Garth Moffett  Date:  5/8/2020  Location:  [facility]  Sedation:  MAC  Time:  7:45 am, arrival 6:45 am  Prep:  NPO after midnight  Meds/Allergies Reconciled?:  Physician reviewed  Diagnosis with code

## 2020-03-21 ENCOUNTER — NURSE ONLY (OUTPATIENT)
Dept: FAMILY MEDICINE CLINIC | Facility: CLINIC | Age: 60
End: 2020-03-21

## 2020-03-21 DIAGNOSIS — Z23 IMMUNIZATION DUE: Primary | ICD-10-CM

## 2020-03-21 PROCEDURE — 90750 HZV VACC RECOMBINANT IM: CPT | Performed by: INTERNAL MEDICINE

## 2020-03-21 PROCEDURE — 90471 IMMUNIZATION ADMIN: CPT | Performed by: INTERNAL MEDICINE

## 2020-04-30 ENCOUNTER — TELEPHONE (OUTPATIENT)
Dept: INTERNAL MEDICINE CLINIC | Facility: CLINIC | Age: 60
End: 2020-04-30

## 2020-04-30 ENCOUNTER — TELEPHONE (OUTPATIENT)
Dept: GASTROENTEROLOGY | Facility: CLINIC | Age: 60
End: 2020-04-30

## 2020-04-30 DIAGNOSIS — R07.89 ATYPICAL CHEST PAIN: ICD-10-CM

## 2020-04-30 DIAGNOSIS — Z98.890 HISTORY OF NISSEN FUNDOPLICATION: ICD-10-CM

## 2020-04-30 DIAGNOSIS — L84 CORNS AND CALLUS: Primary | ICD-10-CM

## 2020-04-30 DIAGNOSIS — K21.9 GASTROESOPHAGEAL REFLUX DISEASE, ESOPHAGITIS PRESENCE NOT SPECIFIED: Primary | ICD-10-CM

## 2020-04-30 DIAGNOSIS — R20.2 NUMBNESS AND TINGLING: ICD-10-CM

## 2020-04-30 DIAGNOSIS — R20.0 NUMBNESS AND TINGLING: ICD-10-CM

## 2020-04-30 NOTE — TELEPHONE ENCOUNTER
Patient called and advised she needs a referral for a Podiatrist.     Numbness, tingling & burning in her feet is getting worse. Please advise.

## 2020-04-30 NOTE — TELEPHONE ENCOUNTER
Patient is calling to reschedule the colonoscopy that is currently scheduled for 5/8. She would like a later date due to the Covid-19 crisis. Please advise

## 2020-04-30 NOTE — TELEPHONE ENCOUNTER
Signed referral as she has seen the podiatrist earlier this year–help pt with TE visit to discuss if sympt continue or any other questions or concerns

## 2020-05-01 NOTE — TELEPHONE ENCOUNTER
Advised patient of Dr. Sarah Perez note. Patient verbalized understanding and already scheduled an appointment with the podiatrist. Patient does not have any other concerns or issues, if she does will schedule telephone visit.

## 2020-05-04 NOTE — TELEPHONE ENCOUNTER
Scheduled for:  EGD - 59923 w/poss DIL  Provider Name:  Dr. Michael Galvan  Date:  FROM - 5/8/20                TO - 6/26/20  Location:  [facility]  Sedation:  MAC  Time:  FROM - 7:45 am            TO - 2:45 pm (pt is aware to arrive at 1:45 pm)  Prep:  NPO after midnight

## 2020-05-05 ENCOUNTER — TELEPHONE (OUTPATIENT)
Dept: GASTROENTEROLOGY | Facility: CLINIC | Age: 60
End: 2020-05-05

## 2020-05-05 DIAGNOSIS — R07.89 ATYPICAL CHEST PAIN: ICD-10-CM

## 2020-05-05 DIAGNOSIS — K21.9 GASTROESOPHAGEAL REFLUX DISEASE, ESOPHAGITIS PRESENCE NOT SPECIFIED: Primary | ICD-10-CM

## 2020-05-05 DIAGNOSIS — Z98.890 HISTORY OF NISSEN FUNDOPLICATION: ICD-10-CM

## 2020-05-05 NOTE — TELEPHONE ENCOUNTER
Rescheduled for:  EGD w/poss Dil 45527  Provider Name:  Dr. Christina Xiong  Date:    From-6/26/20  To-5/14/20  Location:    [facility]  Sedation:  MAC  Time:    From-1445  To-1100 (pt is aware to arrive at 1000)   Prep:  NPO after midnight, sent new instructions via [employer]

## 2020-05-11 ENCOUNTER — NURSE ONLY (OUTPATIENT)
Dept: LAB | Facility: HOSPITAL | Age: 60
End: 2020-05-11
Attending: INTERNAL MEDICINE
Payer: COMMERCIAL

## 2020-05-11 DIAGNOSIS — Z01.818 PRE-OP TESTING: ICD-10-CM

## 2020-05-14 ENCOUNTER — ANESTHESIA (OUTPATIENT)
Dept: ENDOSCOPY | Facility: HOSPITAL | Age: 60
End: 2020-05-14
Payer: COMMERCIAL

## 2020-05-14 ENCOUNTER — ANESTHESIA EVENT (OUTPATIENT)
Dept: ENDOSCOPY | Facility: HOSPITAL | Age: 60
End: 2020-05-14
Payer: COMMERCIAL

## 2020-05-14 ENCOUNTER — HOSPITAL ENCOUNTER (OUTPATIENT)
Facility: HOSPITAL | Age: 60
Setting detail: HOSPITAL OUTPATIENT SURGERY
Discharge: HOME | End: 2020-05-14
Attending: INTERNAL MEDICINE | Admitting: INTERNAL MEDICINE
Payer: COMMERCIAL

## 2020-05-14 VITALS
HEART RATE: 71 BPM | OXYGEN SATURATION: 97 % | SYSTOLIC BLOOD PRESSURE: 128 MMHG | DIASTOLIC BLOOD PRESSURE: 90 MMHG | RESPIRATION RATE: 15 BRPM | TEMPERATURE: 98 F | WEIGHT: 180 LBS | HEIGHT: 69 IN | BODY MASS INDEX: 26.66 KG/M2

## 2020-05-14 DIAGNOSIS — R07.89 ATYPICAL CHEST PAIN: ICD-10-CM

## 2020-05-14 DIAGNOSIS — Z98.890 HISTORY OF NISSEN FUNDOPLICATION: ICD-10-CM

## 2020-05-14 DIAGNOSIS — K21.9 GASTROESOPHAGEAL REFLUX DISEASE, ESOPHAGITIS PRESENCE NOT SPECIFIED: ICD-10-CM

## 2020-05-14 DIAGNOSIS — Z01.818 PRE-OP TESTING: Primary | ICD-10-CM

## 2020-05-14 PROCEDURE — 43239 EGD BIOPSY SINGLE/MULTIPLE: CPT | Performed by: INTERNAL MEDICINE

## 2020-05-14 PROCEDURE — 0DB68ZX EXCISION OF STOMACH, VIA NATURAL OR ARTIFICIAL OPENING ENDOSCOPIC, DIAGNOSTIC: ICD-10-PCS | Performed by: INTERNAL MEDICINE

## 2020-05-14 PROCEDURE — 0DB98ZX EXCISION OF DUODENUM, VIA NATURAL OR ARTIFICIAL OPENING ENDOSCOPIC, DIAGNOSTIC: ICD-10-PCS | Performed by: INTERNAL MEDICINE

## 2020-05-14 RX ORDER — SODIUM CHLORIDE, SODIUM LACTATE, POTASSIUM CHLORIDE, CALCIUM CHLORIDE 600; 310; 30; 20 MG/100ML; MG/100ML; MG/100ML; MG/100ML
INJECTION, SOLUTION INTRAVENOUS CONTINUOUS
Status: DISCONTINUED | OUTPATIENT
Start: 2020-05-14 | End: 2020-05-14

## 2020-05-14 RX ORDER — SODIUM CHLORIDE, SODIUM LACTATE, POTASSIUM CHLORIDE, CALCIUM CHLORIDE 600; 310; 30; 20 MG/100ML; MG/100ML; MG/100ML; MG/100ML
INJECTION, SOLUTION INTRAVENOUS CONTINUOUS PRN
Status: DISCONTINUED | OUTPATIENT
Start: 2020-05-14 | End: 2020-05-14 | Stop reason: SURG

## 2020-05-14 RX ORDER — NALOXONE HYDROCHLORIDE 0.4 MG/ML
80 INJECTION, SOLUTION INTRAMUSCULAR; INTRAVENOUS; SUBCUTANEOUS AS NEEDED
Status: DISCONTINUED | OUTPATIENT
Start: 2020-05-14 | End: 2020-05-14

## 2020-05-14 RX ADMIN — SODIUM CHLORIDE, SODIUM LACTATE, POTASSIUM CHLORIDE, CALCIUM CHLORIDE: 600; 310; 30; 20 INJECTION, SOLUTION INTRAVENOUS at 10:54:00

## 2020-05-14 RX ADMIN — SODIUM CHLORIDE, SODIUM LACTATE, POTASSIUM CHLORIDE, CALCIUM CHLORIDE: 600; 310; 30; 20 INJECTION, SOLUTION INTRAVENOUS at 10:38:00

## 2020-05-14 NOTE — OPERATIVE REPORT
ESOPHAGOGASTRODUODENOSCOPY (EGD) REPORT    Alberto Moreno     1960 Age 62year old   PCP Jannie Jameson MD Endoscopist Gumaro Myers MD       Date of procedure: 20    Procedure: EGD w/biopsies    Pre-operative diagnosis: bloating, reflux, atypi Duodenum: The duodenal mucosa appeared normal in the 1st and 2nd portion of the duodenum. Biopsies taken for celiac    Impression:  1. Chest pain and regurgitation possibly from loose nissen-- consider evaulation by surgery  2.  Bloating could be related to

## 2020-05-14 NOTE — H&P
Pre Procedure History & Physical Examination    Patient Name: Alberto Moreno  MRN: [id number]  CSN: [id number]  YOB: 1960    Diagnosis: atypical chest pain, reflux, bloating, early satiety    Wt Readings from Last 10 Encounters:  05/09/20 : 180 Acute meniscal tear of knee    • Age-related nuclear cataract of both eyes 2/10/2015   • Anal sphincter incontinence 4/28/2014   • Back problem    • Chondromalacia    • Colon polyps    • Degenerative disc disease    • Diverticular disease    • Esophageal r History    Tobacco Use      Smoking status: Never Smoker      Smokeless tobacco: Never Used    Alcohol use: No      Alcohol/week: 0.0 standard drinks      Comment: None.          ROS:   CONSTITUTIONAL:  negative for fevers, rigors  EYES:  negative for diplo

## 2020-05-14 NOTE — ANESTHESIA POSTPROCEDURE EVALUATION
Patient: Shantel Black    Procedure Summary     Date:  05/14/20 Room / Location:  [facility] ENDOSCOPY 04 / [facility] ENDOSCOPY    Anesthesia Start:  8792 Anesthesia Stop:  6724    Procedure:  ESOPHAGOGASTRODUODENOSCOPY (EGD) (N/A ) Diagnosis:       Gastroesophageal re

## 2020-05-14 NOTE — ANESTHESIA PREPROCEDURE EVALUATION
Anesthesia PreOp Note    HPI:     Leesa Bender is a 62year old female who presents for preoperative consultation requested by: Melina Moreira MD    Date of Surgery: 5/14/2020    Procedure(s):  ESOPHAGOGASTRODUODENOSCOPY (EGD)  Indication: Gastroesophageal Date Noted: 11/11/2016      Internal hemorrhoid         Date Noted: 11/11/2016      Neuropathy         Date Noted: 06/30/2015      Myopathy         Date Noted: 06/30/2015      Chronic post-operative pain         Date Noted: 04/23/2015      Status po • EXCISION OF CHALAZION, SINGLE - OD - RIGHT EYE Right 6.27/2017    Nasally   • HC ARTHROCENTESIS OR INJECT MAJOR JOINT W/O US     • HYSTERECTOMY  1996    KAI   • OTHER      Lap Nissen Fundoplication surgery   • OTHER SURGICAL HISTORY  2005,2007,2008    LA Family History   Problem Relation Age of Onset   • Hypertension Father    • Hypertension Mother    • Hypertension Sister    • Cancer Sister         liver cancer   • Hypertension Brother    • Diabetes Neg    • Glaucoma Neg    • Macular degeneration Neg    • Hobby Hazards: Not Asked        Sleep Concern: Not Asked        Stress Concern: Not Asked        Weight Concern: Not Asked        Special Diet: Not Asked        Back Care: Not Asked        Exercise: Not Asked        Bike Helmet: Not Asked        Seat GI/Hepatic/Renal    (+) GERD,     Endo/Other - negative ROS   Abdominal                Anesthesia Plan:   ASA:  2  Plan:   MAC  Informed Consent Plan and Risks Discussed With:  Patient  Discussed plan with:  Surgeon      I have informed Chavo Ashton and

## 2020-05-18 ENCOUNTER — HOSPITAL ENCOUNTER (OUTPATIENT)
Dept: GENERAL RADIOLOGY | Age: 60
Discharge: HOME | End: 2020-05-18
Attending: PODIATRIST
Payer: COMMERCIAL

## 2020-05-18 ENCOUNTER — OFFICE VISIT (OUTPATIENT)
Dept: PODIATRY CLINIC | Facility: CLINIC | Age: 60
End: 2020-05-18

## 2020-05-18 ENCOUNTER — TELEPHONE (OUTPATIENT)
Dept: INTERNAL MEDICINE CLINIC | Facility: CLINIC | Age: 60
End: 2020-05-18

## 2020-05-18 VITALS — RESPIRATION RATE: 18 BRPM | DIASTOLIC BLOOD PRESSURE: 76 MMHG | SYSTOLIC BLOOD PRESSURE: 121 MMHG | HEART RATE: 93 BPM

## 2020-05-18 DIAGNOSIS — L84 CALLUS OF FOOT: ICD-10-CM

## 2020-05-18 DIAGNOSIS — M20.11 VALGUS DEFORMITY OF BOTH GREAT TOES: ICD-10-CM

## 2020-05-18 DIAGNOSIS — M77.41 METATARSALGIA OF BOTH FEET: ICD-10-CM

## 2020-05-18 DIAGNOSIS — M20.12 VALGUS DEFORMITY OF BOTH GREAT TOES: ICD-10-CM

## 2020-05-18 DIAGNOSIS — M77.42 METATARSALGIA OF BOTH FEET: Primary | ICD-10-CM

## 2020-05-18 DIAGNOSIS — M77.42 METATARSALGIA OF BOTH FEET: ICD-10-CM

## 2020-05-18 DIAGNOSIS — M77.41 METATARSALGIA OF BOTH FEET: Primary | ICD-10-CM

## 2020-05-18 DIAGNOSIS — M20.42 HAMMER TOES OF BOTH FEET: ICD-10-CM

## 2020-05-18 DIAGNOSIS — M20.41 HAMMER TOES OF BOTH FEET: ICD-10-CM

## 2020-05-18 PROCEDURE — 73630 X-RAY EXAM OF FOOT: CPT | Performed by: PODIATRIST

## 2020-05-18 PROCEDURE — 99213 OFFICE O/P EST LOW 20 MIN: CPT | Performed by: PODIATRIST

## 2020-05-18 NOTE — PROGRESS NOTES
Scott Myers is a 62year old female. Patient presents with:   Foot Pain: Bilateral foot - she was seen by [facility] - rates pain as 10/10 at all the time         HPI:   Patient returns to the clinic she is complaining of diffuse burning in her feet in particula • High blood pressure    • Insomnia    • Primary open angle glaucoma of both eyes 5/19/2015    Diagnosis of glaucoma OU; Started Latanoprost qhs after abnormal VF and OCT 2/25/16;  6/5/18 consult with Dr. Madeline Vogel progress note   • Small bowel level: Not on file    Tobacco Use      Smoking status: Never Smoker      Smokeless tobacco: Never Used    Substance and Sexual Activity      Alcohol use: No        Alcohol/week: 0.0 standard drinks        Comment: None.        Drug use: No      Sexual activ prominence of the plantar fifth metatarsal.  The second toe has been amputated on the right foot. She has rigid hammertoes digits 2-5 bilateral on the x-rays these do not reduce on forefoot loading.     ASSESSMENT AND PLAN:   Diagnoses and all orders for t heads and hopefully offload and fix a lot of the burning. If this is helpful for all those things on the plantar surface of the foot then may proceed with fusions and arthroplasties as documented above.   Discussed at great length with the patient will hav

## 2020-05-18 NOTE — TELEPHONE ENCOUNTER
Per patient she is returning a call, verified name and , transferred to [facility] regarding schedule appointment for tomorrow 20.

## 2020-05-19 ENCOUNTER — TELEPHONE (OUTPATIENT)
Dept: OBGYN CLINIC | Facility: CLINIC | Age: 60
End: 2020-05-19

## 2020-05-19 ENCOUNTER — VIRTUAL PHONE E/M (OUTPATIENT)
Dept: INTERNAL MEDICINE CLINIC | Facility: CLINIC | Age: 60
End: 2020-05-19

## 2020-05-19 ENCOUNTER — TELEPHONE (OUTPATIENT)
Dept: INTERNAL MEDICINE CLINIC | Facility: CLINIC | Age: 60
End: 2020-05-19

## 2020-05-19 ENCOUNTER — APPOINTMENT (OUTPATIENT)
Dept: LAB | Facility: REFERENCE LAB | Age: 60
End: 2020-05-19
Attending: INTERNAL MEDICINE
Payer: COMMERCIAL

## 2020-05-19 DIAGNOSIS — R63.4 WEIGHT LOSS: ICD-10-CM

## 2020-05-19 DIAGNOSIS — R73.03 PREDIABETES: Primary | ICD-10-CM

## 2020-05-19 DIAGNOSIS — R73.03 PREDIABETES: ICD-10-CM

## 2020-05-19 DIAGNOSIS — R10.30 LOWER ABDOMINAL PAIN: ICD-10-CM

## 2020-05-19 PROCEDURE — 99214 OFFICE O/P EST MOD 30 MIN: CPT | Performed by: INTERNAL MEDICINE

## 2020-05-19 PROCEDURE — 36415 COLL VENOUS BLD VENIPUNCTURE: CPT

## 2020-05-19 PROCEDURE — 81001 URINALYSIS AUTO W/SCOPE: CPT

## 2020-05-19 PROCEDURE — 83036 HEMOGLOBIN GLYCOSYLATED A1C: CPT

## 2020-05-19 NOTE — PROGRESS NOTES
Telemedicine Visit     Virtual/Telephone Check-In    Ana Herrera verbally consents to a Telephone Check-In service on 05/19/20.  Patient understands and accepts financial responsibility for any deductible, co-insurance and/or co-pays associated with this COVID19 [x]       • Occupation or large gatherings:      Patient Active Problem List:     Anal sphincter incontinence     Essential hypertension     Rectocele     Anal sphincter tear     Colon polyp     Trigger finger     Hand arthritis     Age-related nuc TAKE 1 TABLET BY MOUTH EVERY 8 HOURS AS NEEDED FOR PAIN 270 tablet 0       Celecoxib                   Comment:Other reaction(s): CELECOXIB  Erythromycin            PAIN  Sulfa Antibiotics       TONGUE SWELLING    Comment:Other reaction(s): SULFA (SULFONAM Every conscious effort was taken to allow for sufficient and adequate time. This billing was spent on reviewing labs, medications, radiology tests and decision making.   Appropriate medical decision-making and tests are ordered as detailed in the plan of c

## 2020-05-19 NOTE — TELEPHONE ENCOUNTER
Did speak with pt today and noted all screening up to date and was askedto call gyn office if rpt pap was needed as her last one was 2016   Pap smear as per gyn recs, can hold off on pelvic exam unless patient feels the need to go  will follow-up on blood

## 2020-05-19 NOTE — TELEPHONE ENCOUNTER
Pt called this office to follow up. Contacted Dr. Moore Epp office via [address] inquiring about pap and was informed that paps are no longer needed as she is s/p hysterectomy. Reviewed Dr. Renée Martinez 5/19/20 visit note.  Appears that pelvic exam was recom

## 2020-05-20 ENCOUNTER — TELEPHONE (OUTPATIENT)
Dept: GASTROENTEROLOGY | Facility: CLINIC | Age: 60
End: 2020-05-20

## 2020-05-20 NOTE — TELEPHONE ENCOUNTER
Patient contacted and message from Dr. Elinor Dotson given. Number for central scheduling given. Patient voiced understanding.

## 2020-05-29 ENCOUNTER — HOSPITAL ENCOUNTER (OUTPATIENT)
Dept: CT IMAGING | Age: 60
Discharge: HOME | End: 2020-05-29
Attending: INTERNAL MEDICINE
Payer: COMMERCIAL

## 2020-05-29 DIAGNOSIS — R52 DIFFUSE PAIN: ICD-10-CM

## 2020-05-29 DIAGNOSIS — R63.4 WEIGHT LOSS, ABNORMAL: ICD-10-CM

## 2020-05-29 PROCEDURE — 71260 CT THORAX DX C+: CPT | Performed by: INTERNAL MEDICINE

## 2020-05-29 PROCEDURE — 82565 ASSAY OF CREATININE: CPT

## 2020-05-29 PROCEDURE — 74177 CT ABD & PELVIS W/CONTRAST: CPT | Performed by: INTERNAL MEDICINE

## 2020-06-01 ENCOUNTER — TELEPHONE (OUTPATIENT)
Dept: PODIATRY CLINIC | Facility: CLINIC | Age: 60
End: 2020-06-01

## 2020-06-17 ENCOUNTER — TELEPHONE (OUTPATIENT)
Dept: PODIATRY CLINIC | Facility: CLINIC | Age: 60
End: 2020-06-17

## 2020-06-17 ENCOUNTER — APPOINTMENT (OUTPATIENT)
Dept: GENERAL RADIOLOGY | Age: 60
End: 2020-06-17
Attending: NURSE PRACTITIONER
Payer: COMMERCIAL

## 2020-06-17 ENCOUNTER — HOSPITAL ENCOUNTER (OUTPATIENT)
Age: 60
Discharge: HOME | End: 2020-06-17
Payer: COMMERCIAL

## 2020-06-17 VITALS
RESPIRATION RATE: 20 BRPM | OXYGEN SATURATION: 96 % | SYSTOLIC BLOOD PRESSURE: 133 MMHG | TEMPERATURE: 98 F | HEART RATE: 85 BPM | DIASTOLIC BLOOD PRESSURE: 83 MMHG

## 2020-06-17 DIAGNOSIS — M79.672 LEFT FOOT PAIN: Primary | ICD-10-CM

## 2020-06-17 PROCEDURE — 99213 OFFICE O/P EST LOW 20 MIN: CPT

## 2020-06-17 PROCEDURE — 73630 X-RAY EXAM OF FOOT: CPT | Performed by: NURSE PRACTITIONER

## 2020-06-17 RX ORDER — OMEPRAZOLE 20 MG/1
CAPSULE, DELAYED RELEASE ORAL
Qty: 180 CAPSULE | Refills: 0 | Status: SHIPPED | OUTPATIENT
Start: 2020-06-17 | End: 2020-08-11

## 2020-06-17 RX ORDER — LATANOPROST 50 UG/ML
SOLUTION/ DROPS OPHTHALMIC
Qty: 3 BOTTLE | Refills: 3 | Status: SHIPPED | OUTPATIENT
Start: 2020-06-17 | End: 2021-04-10

## 2020-06-17 NOTE — TELEPHONE ENCOUNTER
Requested Prescriptions     Pending Prescriptions Disp Refills   • OMEPRAZOLE 20 MG Oral Capsule Delayed Release [Pharmacy Med Name: OMEPRAZOLE 20MG CAPSULES] 180 capsule 0     Sig: TAKE 2 CAPSULES(40 MG) BY MOUTH EVERY MORNING     Lr:3/17/2020  Lov: 1/20/

## 2020-06-17 NOTE — ED PROVIDER NOTES
Patient presents with:  Musculoskeletal Problem      HPI:     Juliana Wyatt is a 62year old female who presents today with a chief complaint of pain in the top of the left foot that started this morning.   The patient has a history of surgery in the left f Transportation needs:        Medical: Not on file        Non-medical: Not on file    Tobacco Use      Smoking status: Never Smoker      Smokeless tobacco: Never Used    Substance and Sexual Activity      Alcohol use: No        Alcohol/week: 0.0 standard above.  Constitutional and Vital Signs Reviewed.       Physical Exam:   Findings:  EXTREMITIES: no cyanosis or edema   Edema  No  Bruising:  No  Tendon function intact:  Yes  Skin intact:  Yes  Normal sensation: Yes  Normal capillary refill: Yes  ROM:  pain x-ray results. She has an appointment with her podiatrist for Monday. A postop shoe was given to the patient to use for comfort. I will recommend ice, elevation, and Tylenol as needed for pain. Diagnosis:    ICD-10-CM    1.  Left foot pain M79.672

## 2020-06-17 NOTE — TELEPHONE ENCOUNTER
Spoke to pt and she states that her left foot where her 2nd toe used to be has shooting, stabbing, piercing \"nerve pain\" that is radiate up foot. Taking ibuprofen, applying heat and ice. States foot is a little red.  Denies any swelling or increased warmt

## 2020-06-17 NOTE — TELEPHONE ENCOUNTER
Spoke to pt and informed her of Saúl's message and instructions. Pt states she will go to the [address] [place] [address] in [place]. Pt will keep appt for 06/22/20 and verbalized understanding.

## 2020-06-17 NOTE — TELEPHONE ENCOUNTER
If there is no open wound or any problem she can ice it as well my recommendation is for her to go to urgent care to have it evaluated and get some x-rays make sure that she does not have any type of a stress fracture or other problem going on in her foot

## 2020-06-18 RX ORDER — IBUPROFEN 600 MG/1
TABLET ORAL
Qty: 15 TABLET | Refills: 0 | Status: SHIPPED | OUTPATIENT
Start: 2020-06-18

## 2020-06-18 NOTE — TELEPHONE ENCOUNTER
Dr Zamzam Yeung requesting refill of ibuprofen 600 mg  Rx'd on 09/05/19 #40 and 2 RF  LOV was on 03/04/20

## 2020-06-22 ENCOUNTER — OFFICE VISIT (OUTPATIENT)
Dept: PODIATRY CLINIC | Facility: CLINIC | Age: 60
End: 2020-06-22

## 2020-06-22 VITALS — SYSTOLIC BLOOD PRESSURE: 153 MMHG | DIASTOLIC BLOOD PRESSURE: 86 MMHG | HEART RATE: 96 BPM

## 2020-06-22 DIAGNOSIS — M77.41 METATARSALGIA OF BOTH FEET: ICD-10-CM

## 2020-06-22 DIAGNOSIS — M77.42 METATARSALGIA OF BOTH FEET: ICD-10-CM

## 2020-06-22 DIAGNOSIS — M20.12 VALGUS DEFORMITY OF BOTH GREAT TOES: ICD-10-CM

## 2020-06-22 DIAGNOSIS — G57.82 NEUROMA OF THIRD INTERSPACE OF LEFT FOOT: Primary | ICD-10-CM

## 2020-06-22 DIAGNOSIS — L84 CALLUS OF FOOT: ICD-10-CM

## 2020-06-22 DIAGNOSIS — M20.11 VALGUS DEFORMITY OF BOTH GREAT TOES: ICD-10-CM

## 2020-06-22 PROCEDURE — 64455 NJX AA&/STRD PLTR COM DG NRV: CPT | Performed by: PODIATRIST

## 2020-06-22 PROCEDURE — 29799 UNLISTED PX CASTING/STRPG: CPT | Performed by: PODIATRIST

## 2020-06-22 PROCEDURE — L3000 FT INSERT UCB BERKELEY SHELL: HCPCS | Performed by: PODIATRIST

## 2020-06-22 RX ORDER — TRIAMCINOLONE ACETONIDE 40 MG/ML
20 INJECTION, SUSPENSION INTRA-ARTICULAR; INTRAMUSCULAR ONCE
Status: COMPLETED | OUTPATIENT
Start: 2020-06-22 | End: 2020-06-22

## 2020-06-22 RX ADMIN — TRIAMCINOLONE ACETONIDE 20 MG: 40 INJECTION, SUSPENSION INTRA-ARTICULAR; INTRAMUSCULAR at 14:45:00

## 2020-06-23 NOTE — PROGRESS NOTES
Ana Herrera is a 62year old female. Patient presents with:   Foot Pain: c/o 7/10 left foot pain, pt is taking tylenol for pain, pt denies injury, tingling, swelling, redness, fever, or chills, xray left foot on 6/17/2020  Orthotic Status: pt presents for Medical History:   Diagnosis Date   • Abscess of left thigh    • Acute meniscal tear of knee    • Age-related nuclear cataract of both eyes 2/10/2015   • Anal sphincter incontinence 4/28/2014   • Back problem    • Chondromalacia    • Colon polyps    • Dege liver cancer   • Hypertension Brother    • Diabetes Neg    • Glaucoma Neg    • Macular degeneration Neg    • Breast Cancer Neg    • Ovarian Cancer Neg       Social History    Socioeconomic History      Marital status:       Spouse name: Not on file visit:    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

## 2020-07-30 ENCOUNTER — OFFICE VISIT (OUTPATIENT)
Dept: OPHTHALMOLOGY | Facility: CLINIC | Age: 60
End: 2020-07-30

## 2020-07-30 DIAGNOSIS — H40.1132 PRIMARY OPEN ANGLE GLAUCOMA OF BOTH EYES, MODERATE STAGE: Primary | ICD-10-CM

## 2020-07-30 PROCEDURE — 99213 OFFICE O/P EST LOW 20 MIN: CPT | Performed by: OPHTHALMOLOGY

## 2020-07-30 NOTE — PROGRESS NOTES
Miguel Escobar is a 56year old female. HPI:     HPI     Pt is here for an IOP check. Pt is taking Latanoprost OU QHS as directed. Pt had bilateral blepharoplasty by Dr Darby Koch on 3/5/2020. Pt states vision is stable.  Pt has no ocular complaints at this ti bunionectomy); other surgical history (11-14-14) (right superficial anterior peroneal nerve biopsy and right proximal thigh muscle biopsy.); and blepharoplasty anesthesia (Bilateral, 03/05/2020) (Dr [facility] ).     Family History   Problem Antibiotics       TONGUE SWELLING    Comment:Other reaction(s): SULFA (SULFONAMIDE ANTIBIOTICS)    ROS:       PHYSICAL EXAM:     Base Eye Exam     Visual Acuity (Snellen - Linear)       Right Left    Dist cc 20/30 20/40 +2    Dist ph cc NI NI    Correction

## 2020-07-30 NOTE — ASSESSMENT & PLAN NOTE
IOP is stable. Continue Latanoprost at bedtime in both eyes.        Pt will return in 4 months for visual field, OCT and refraction and dilated eye exam.

## 2020-07-30 NOTE — PATIENT INSTRUCTIONS
Primary open angle glaucoma of both eyes, moderate stage  IOP is stable. Continue Latanoprost at bedtime in both eyes.        Pt will return in 4 months for visual field, OCT and refraction and dilated eye exam.

## 2020-08-11 ENCOUNTER — OFFICE VISIT (OUTPATIENT)
Dept: INTERNAL MEDICINE CLINIC | Facility: CLINIC | Age: 60
End: 2020-08-11

## 2020-08-11 VITALS
DIASTOLIC BLOOD PRESSURE: 79 MMHG | RESPIRATION RATE: 15 BRPM | SYSTOLIC BLOOD PRESSURE: 131 MMHG | BODY MASS INDEX: 30.04 KG/M2 | TEMPERATURE: 98 F | WEIGHT: 202.81 LBS | HEIGHT: 69 IN | HEART RATE: 74 BPM

## 2020-08-11 DIAGNOSIS — J01.00 ACUTE NON-RECURRENT MAXILLARY SINUSITIS: Primary | ICD-10-CM

## 2020-08-11 DIAGNOSIS — K21.9 GASTROESOPHAGEAL REFLUX DISEASE WITHOUT ESOPHAGITIS: ICD-10-CM

## 2020-08-11 DIAGNOSIS — M75.121 NONTRAUMATIC COMPLETE TEAR OF RIGHT ROTATOR CUFF: ICD-10-CM

## 2020-08-11 DIAGNOSIS — I10 ESSENTIAL HYPERTENSION: ICD-10-CM

## 2020-08-11 PROCEDURE — 3075F SYST BP GE 130 - 139MM HG: CPT | Performed by: INTERNAL MEDICINE

## 2020-08-11 PROCEDURE — 3078F DIAST BP <80 MM HG: CPT | Performed by: INTERNAL MEDICINE

## 2020-08-11 PROCEDURE — 3008F BODY MASS INDEX DOCD: CPT | Performed by: INTERNAL MEDICINE

## 2020-08-11 PROCEDURE — 99214 OFFICE O/P EST MOD 30 MIN: CPT | Performed by: INTERNAL MEDICINE

## 2020-08-11 RX ORDER — AMLODIPINE BESYLATE 10 MG/1
TABLET ORAL
Qty: 90 TABLET | Refills: 3 | Status: SHIPPED | OUTPATIENT
Start: 2020-08-11 | End: 2021-08-09

## 2020-08-11 RX ORDER — AMOXICILLIN AND CLAVULANATE POTASSIUM 875; 125 MG/1; MG/1
1 TABLET, FILM COATED ORAL 2 TIMES DAILY
Qty: 20 TABLET | Refills: 0 | Status: SHIPPED | OUTPATIENT
Start: 2020-08-11 | End: 2020-08-12 | Stop reason: ALTCHOICE

## 2020-08-11 RX ORDER — OMEPRAZOLE 20 MG/1
40 CAPSULE, DELAYED RELEASE ORAL EVERY MORNING
Qty: 180 CAPSULE | Refills: 3 | Status: SHIPPED | OUTPATIENT
Start: 2020-08-11 | End: 2021-09-24

## 2020-08-11 NOTE — PROGRESS NOTES
Shant Zayas is a 56year old female.   Patient presents with:  Sinusitis  Neck Pain  Shoulder Pain      HPI:   Patient comes for follow-up  C/c  Neck pain and shoulder pain and sinus isssues too   C/o sinus pain around her eyes for the past 3 days–tried o 2019--no help   Since the last visit she did see her orthopedic doctor and received a short of a cortisone shot to her right shoulder  Also c/o left hand tender swelling x few weeks   She also states that she gets rashes underneath her breasts and the tria Chondromalacia    • Colon polyps    • Degenerative disc disease    • Diverticular disease    • Esophageal reflux    • Fibroids    • Hammertoe    • High blood pressure    • Insomnia    • Primary open angle glaucoma of both eyes 5/19/2015    Diagnosis of gla vision or double vision--just the chronic left eye vision problems no shortness of breath  RESPIRATORY: denies shortness of breath, cough, wheezing  CARDIOVASCULAR: denies chest pain on exertion, palpitations, swelling in feet  GI: denies abdominal pain an complete tear of right rotator cuff  -     ORTHOPEDIC - INTERNAL  Reviewed x-ray from January 2020 noted she does have a full thickness tear which was new        Preventative medicine  Colonoscopy done  9/19  Dr. hall rpt in yrs ie 2024  Mammogram–10/19 -

## 2020-08-12 ENCOUNTER — OFFICE VISIT (OUTPATIENT)
Dept: ORTHOPEDICS CLINIC | Facility: CLINIC | Age: 60
End: 2020-08-12

## 2020-08-12 VITALS — RESPIRATION RATE: 16 BRPM | SYSTOLIC BLOOD PRESSURE: 133 MMHG | DIASTOLIC BLOOD PRESSURE: 74 MMHG | HEART RATE: 66 BPM

## 2020-08-12 DIAGNOSIS — M77.8 TENDINITIS OF RIGHT SHOULDER: ICD-10-CM

## 2020-08-12 DIAGNOSIS — M75.121 NONTRAUMATIC COMPLETE TEAR OF RIGHT ROTATOR CUFF: Primary | ICD-10-CM

## 2020-08-12 PROCEDURE — 20610 DRAIN/INJ JOINT/BURSA W/O US: CPT | Performed by: ORTHOPAEDIC SURGERY

## 2020-08-12 PROCEDURE — 3078F DIAST BP <80 MM HG: CPT | Performed by: PHYSICIAN ASSISTANT

## 2020-08-12 PROCEDURE — 3075F SYST BP GE 130 - 139MM HG: CPT | Performed by: PHYSICIAN ASSISTANT

## 2020-08-12 PROCEDURE — 99213 OFFICE O/P EST LOW 20 MIN: CPT | Performed by: ORTHOPAEDIC SURGERY

## 2020-08-12 RX ORDER — TRIAMCINOLONE ACETONIDE 40 MG/ML
40 INJECTION, SUSPENSION INTRA-ARTICULAR; INTRAMUSCULAR ONCE
Status: COMPLETED | OUTPATIENT
Start: 2020-08-12 | End: 2020-08-12

## 2020-08-12 RX ADMIN — TRIAMCINOLONE ACETONIDE 40 MG: 40 INJECTION, SUSPENSION INTRA-ARTICULAR; INTRAMUSCULAR at 14:15:00

## 2020-08-12 NOTE — PROGRESS NOTES
Per verbal order from AMIRA Tadeo, draw up 3ml of 0.5% Marcaine & 2ml 1% lidocaine and 1ml of Kenalog 40 for cortisone injection to left shoulder. Gillian Valera RN    Patient provided education handout for cortisone injection.    Patient lef

## 2020-08-12 NOTE — PROGRESS NOTES
NURSING INTAKE COMMENTS: Patient presents with:  Shoulder Pain: Right- pt had cortisone injection 09/2019 and pain returned 07/2020. HPI: This 56year old female presents today with complaints of shoulder pain follow-up.   She was last here 9 months a Sherman Negrete MD at [facility] ENDOSCOPY   • EXCISION OF CHALAZION, SINGLE - OD - RIGHT EYE Right 6.27/2017    Nasally   • HC ARTHROCENTESIS OR INJECT MAJOR JOINT W/O US     • HYSTERECTOMY  1996    KAI   • OTHER      Lap Nissen Fundoplication surgery   • OTHER GUZMAN Alcohol/week: 0.0 standard drinks        Comment: None.        Drug use: No      Sexual activity: Yes        Birth control/protection: Hysterectomy       Review of Systems:  GENERAL: denies fevers, chills, night sweats, fatigue, unintentional weight loss/ga impingement sign strongly positive. Speeds test mildly positive. Crank sign equivocal.  Neurological: Right hand neurologically intact light touch sensory and motor strength testing. Imaging:   No results found.    MRI of the right shoulder and report

## 2020-08-17 ENCOUNTER — TELEPHONE (OUTPATIENT)
Dept: INTERNAL MEDICINE CLINIC | Facility: CLINIC | Age: 60
End: 2020-08-17

## 2020-08-17 RX ORDER — DOXYCYCLINE HYCLATE 100 MG/1
100 TABLET, DELAYED RELEASE ORAL 2 TIMES DAILY
Qty: 14 TABLET | Refills: 0 | Status: SHIPPED | OUTPATIENT
Start: 2020-08-17 | End: 2020-08-24

## 2020-08-17 NOTE — TELEPHONE ENCOUNTER
Patient called, stating that she does not feel like the Amoxicillin-Pot Clavulanate 875-125 MG Oral Tab is helping. She is asking for a different medication to be prescribed.

## 2020-08-17 NOTE — TELEPHONE ENCOUNTER
Spoke with patient, (  verified ) informed of Dr. Bernida Nageotte  instructions below    Advised to call back with any questions/ concerns     Patient verbalizes understanding and agrees.

## 2020-08-17 NOTE — TELEPHONE ENCOUNTER
Verified pt name and . Pt asking for medication change. Pt states she stopped taking the Amoxicillin-Pot Clavulanate on 2020. States she had really bad diarrhea after taking that med.  Pt states she continues to have some diarrhea, she is now

## 2020-08-17 NOTE — TELEPHONE ENCOUNTER
Agree with advice of staying hydrated and following a brat diet and also will try changing the antibiotics to doxycycline

## 2020-08-19 ENCOUNTER — TELEPHONE (OUTPATIENT)
Dept: INTERNAL MEDICINE CLINIC | Facility: CLINIC | Age: 60
End: 2020-08-19

## 2020-08-19 NOTE — TELEPHONE ENCOUNTER
Doxycycline Mono is covered with $7.00 OOP. Pharmacist will switch. Approval given by Dr. Alexi Howell through [employer] message.

## 2020-08-19 NOTE — TELEPHONE ENCOUNTER
Prior authorization has been denied for Doxycycline. Patients plan states medication is not covered due to not meeting criteria. Patient must have tried two preferred alternatives.    -Minocycline  -Tetracycline

## 2020-08-19 NOTE — TELEPHONE ENCOUNTER
Doxycycline Hyclate 100 MG Oral Tab EC    Plan does not cover thos med plz call plan @ [phone] to initiate prior auth or call/fax pharmacy to change med

## 2020-08-19 NOTE — TELEPHONE ENCOUNTER
Prior authorization for Doxycycline Hyclate 100MG dr tablets completed w/ [employer] on cover my meds Key: [id number], turn around time 3-5 days.

## 2020-08-24 ENCOUNTER — LAB ENCOUNTER (OUTPATIENT)
Dept: LAB | Age: 60
End: 2020-08-24
Attending: INTERNAL MEDICINE
Payer: COMMERCIAL

## 2020-08-24 ENCOUNTER — OFFICE VISIT (OUTPATIENT)
Dept: INTERNAL MEDICINE CLINIC | Facility: CLINIC | Age: 60
End: 2020-08-24

## 2020-08-24 VITALS
HEIGHT: 69 IN | SYSTOLIC BLOOD PRESSURE: 132 MMHG | DIASTOLIC BLOOD PRESSURE: 83 MMHG | HEART RATE: 78 BPM | BODY MASS INDEX: 29.33 KG/M2 | WEIGHT: 198 LBS

## 2020-08-24 DIAGNOSIS — R73.03 PREDIABETES: ICD-10-CM

## 2020-08-24 DIAGNOSIS — M77.41 METATARSALGIA OF BOTH FEET: ICD-10-CM

## 2020-08-24 DIAGNOSIS — R25.2 LEG CRAMPS: ICD-10-CM

## 2020-08-24 DIAGNOSIS — R74.8 ELEVATED CK: ICD-10-CM

## 2020-08-24 DIAGNOSIS — M20.11 VALGUS DEFORMITY OF BOTH GREAT TOES: ICD-10-CM

## 2020-08-24 DIAGNOSIS — M77.42 METATARSALGIA OF BOTH FEET: ICD-10-CM

## 2020-08-24 DIAGNOSIS — I10 ESSENTIAL HYPERTENSION: ICD-10-CM

## 2020-08-24 DIAGNOSIS — M20.12 VALGUS DEFORMITY OF BOTH GREAT TOES: ICD-10-CM

## 2020-08-24 DIAGNOSIS — L84 CALLUS OF FOOT: ICD-10-CM

## 2020-08-24 DIAGNOSIS — I10 ESSENTIAL HYPERTENSION: Primary | ICD-10-CM

## 2020-08-24 LAB
ALBUMIN SERPL-MCNC: 3.8 G/DL (ref 3.4–5)
ALBUMIN/GLOB SERPL: 1 {RATIO} (ref 1–2)
ALP LIVER SERPL-CCNC: 141 U/L (ref 50–130)
ALT SERPL-CCNC: 38 U/L (ref 13–56)
ANION GAP SERPL CALC-SCNC: 6 MMOL/L (ref 0–18)
AST SERPL-CCNC: 18 U/L (ref 15–37)
BILIRUB SERPL-MCNC: 0.5 MG/DL (ref 0.1–2)
BUN BLD-MCNC: 15 MG/DL (ref 7–18)
BUN/CREAT SERPL: 14.7 (ref 10–20)
CALCIUM BLD-MCNC: 9.1 MG/DL (ref 8.5–10.1)
CHLORIDE SERPL-SCNC: 107 MMOL/L (ref 98–112)
CK SERPL-CCNC: 384 U/L (ref 26–192)
CO2 SERPL-SCNC: 28 MMOL/L (ref 21–32)
CREAT BLD-MCNC: 1.02 MG/DL (ref 0.55–1.02)
EST. AVERAGE GLUCOSE BLD GHB EST-MCNC: 123 MG/DL (ref 68–126)
GLOBULIN PLAS-MCNC: 4 G/DL (ref 2.8–4.4)
GLUCOSE BLD-MCNC: 103 MG/DL (ref 70–99)
HBA1C MFR BLD HPLC: 5.9 % (ref ?–5.7)
LDH SERPL L TO P-CCNC: 248 U/L
M PROTEIN MFR SERPL ELPH: 7.8 G/DL (ref 6.4–8.2)
OSMOLALITY SERPL CALC.SUM OF ELEC: 293 MOSM/KG (ref 275–295)
PATIENT FASTING Y/N/NP: NO
POTASSIUM SERPL-SCNC: 3.8 MMOL/L (ref 3.5–5.1)
SODIUM SERPL-SCNC: 141 MMOL/L (ref 136–145)

## 2020-08-24 PROCEDURE — 83036 HEMOGLOBIN GLYCOSYLATED A1C: CPT

## 2020-08-24 PROCEDURE — 83615 LACTATE (LD) (LDH) ENZYME: CPT

## 2020-08-24 PROCEDURE — 3008F BODY MASS INDEX DOCD: CPT | Performed by: INTERNAL MEDICINE

## 2020-08-24 PROCEDURE — 80053 COMPREHEN METABOLIC PANEL: CPT

## 2020-08-24 PROCEDURE — 99214 OFFICE O/P EST MOD 30 MIN: CPT | Performed by: INTERNAL MEDICINE

## 2020-08-24 PROCEDURE — 3079F DIAST BP 80-89 MM HG: CPT | Performed by: INTERNAL MEDICINE

## 2020-08-24 PROCEDURE — 3075F SYST BP GE 130 - 139MM HG: CPT | Performed by: INTERNAL MEDICINE

## 2020-08-24 PROCEDURE — 82550 ASSAY OF CK (CPK): CPT

## 2020-08-24 PROCEDURE — 36415 COLL VENOUS BLD VENIPUNCTURE: CPT

## 2020-08-24 RX ORDER — BACLOFEN 10 MG/1
10 TABLET ORAL 2 TIMES DAILY PRN
Qty: 30 TABLET | Refills: 0 | Status: SHIPPED | OUTPATIENT
Start: 2020-08-24 | End: 2020-12-03

## 2020-08-24 RX ORDER — DOXYCYCLINE 100 MG/1
CAPSULE ORAL
COMMUNITY
Start: 2020-08-19 | End: 2020-08-24

## 2020-08-24 NOTE — PROGRESS NOTES
Alberto Moreno is a 56year old female.   Patient presents with:  Cramps: bilateral legs      HPI:   Patient comes for follow-up  C/c cramps    c/o whole leg - mostly in th calves   Had it before and it was lessened after taking magnesium but not its back Used to see dr Gerardo Seip and now sees dr Ananya Lemus and got a shot to the right shoulder   Needs a referral to see pain clinic   Needs referral to see podiatrist --corns and calluses right foot / toe and also for ingrown toenails   Pain is 10/10 -- iburprof 1 T PO  QD 90 tablet 3   • triamcinolone acetonide 0.1 % External Cream 1 Application 2 (two) times daily as needed. apply to affected area     • IBUPROFEN 600 MG Oral Tab TAKE 1 TABLET BY MOUTH EVERY 8 HOURS WITH FOOD AS NEEDED FOR PAIN.  STOP IF STOMACH U ADHESION   • Other surgical history      right foot bunionectomy   • Other surgical history  11-14-14    right superficial anterior peroneal nerve biopsy and right proximal thigh muscle biopsy.    • Upper gi endoscopy performed  5/2015   • Yag capsulotomy - LDH; Future  Would like to go to [address] [place] [address]   Prediabetes  -     HEMOGLOBIN A1C; Future  Will recheck     Metatarsalgia of both feet  Callus of foot  Valgus deformity of both great toes  Will refer to podiatry       Preventative medicine  Colonoscopy done

## 2020-08-25 ENCOUNTER — OFFICE VISIT (OUTPATIENT)
Dept: PODIATRY CLINIC | Facility: CLINIC | Age: 60
End: 2020-08-25

## 2020-08-25 DIAGNOSIS — M77.42 METATARSALGIA OF BOTH FEET: Primary | ICD-10-CM

## 2020-08-25 DIAGNOSIS — M77.41 METATARSALGIA OF BOTH FEET: Primary | ICD-10-CM

## 2020-08-25 DIAGNOSIS — L84 CALLUS OF FOOT: ICD-10-CM

## 2020-08-25 DIAGNOSIS — M72.2 PLANTAR FASCIITIS OF RIGHT FOOT: ICD-10-CM

## 2020-08-25 DIAGNOSIS — M72.2 PLANTAR FASCIITIS OF LEFT FOOT: ICD-10-CM

## 2020-08-25 PROCEDURE — 11056 PARNG/CUTG B9 HYPRKR LES 2-4: CPT | Performed by: PODIATRIST

## 2020-08-25 NOTE — PROGRESS NOTES
Eliane Wall is a 56year old female. Patient presents with:   Foot Pain: Bilateral f/u - states she has pain in her feet rated as 10/10 on and off         HPI:   Patient returns to the clinic she is still not received her orthotics we will have to look in [facility])    • Tendinitis    • Unspecified essential hypertension    • Unspecified sleep apnea     uses CPAP      Past Surgical History:   Procedure Laterality Date   • ANAL SPHINCTEROTOMY      03/12/2013 Gely   • BLEPHAROPLASTY ANESTHESIA Bilateral 03/05/20 Comment: None.        Drug use: No      Sexual activity: Yes        Birth control/protection: Hysterectomy    Other Topics      Concerns:        Caffeine Concern: Yes          1 CUP COFFEE DAILY        Pt has a pacemaker: No        Pt has a defibrillato happened and why they did not come in. The patient indicates understanding of these issues and agrees to the plan.     Christo Sidhu DPM

## 2020-08-26 DIAGNOSIS — R74.8 ELEVATED CK: Primary | ICD-10-CM

## 2020-09-09 ENCOUNTER — LAB ENCOUNTER (OUTPATIENT)
Dept: LAB | Age: 60
End: 2020-09-09
Attending: FAMILY MEDICINE
Payer: COMMERCIAL

## 2020-09-09 ENCOUNTER — OFFICE VISIT (OUTPATIENT)
Dept: INTEGRATIVE MEDICINE | Facility: CLINIC | Age: 60
End: 2020-09-09

## 2020-09-09 VITALS
WEIGHT: 199 LBS | DIASTOLIC BLOOD PRESSURE: 62 MMHG | HEIGHT: 69 IN | OXYGEN SATURATION: 97 % | HEART RATE: 92 BPM | SYSTOLIC BLOOD PRESSURE: 110 MMHG | BODY MASS INDEX: 29.47 KG/M2

## 2020-09-09 DIAGNOSIS — R25.2 MUSCLE CRAMPING: ICD-10-CM

## 2020-09-09 DIAGNOSIS — E01.0 THYROMEGALY: ICD-10-CM

## 2020-09-09 DIAGNOSIS — M79.10 MYALGIA: ICD-10-CM

## 2020-09-09 DIAGNOSIS — E66.3 OVERWEIGHT (BMI 25.0-29.9): ICD-10-CM

## 2020-09-09 DIAGNOSIS — R74.8 ELEVATED CK: ICD-10-CM

## 2020-09-09 DIAGNOSIS — R25.2 MUSCLE CRAMPING: Primary | ICD-10-CM

## 2020-09-09 LAB
25(OH)D3 SERPL-MCNC: 16.9 NG/ML (ref 30–100)
CRP SERPL HS-MCNC: 1.07 MG/L (ref ?–3)
DEPRECATED HBV CORE AB SER IA-ACNC: 267.3 NG/ML (ref 18–340)
DHEA-S SERPL-MCNC: 41.9 UG/DL
ERYTHROCYTE [SEDIMENTATION RATE] IN BLOOD: 12 MM/HR (ref 0–30)
HAV IGM SER QL: 2.4 MG/DL (ref 1.6–2.6)
T3FREE SERPL-MCNC: 2.62 PG/ML (ref 2.4–4.2)
T4 FREE SERPL-MCNC: 1.1 NG/DL (ref 0.8–1.7)
THYROPEROXIDASE AB SERPL-ACNC: 43 U/ML (ref ?–60)
TSI SER-ACNC: 1.11 MIU/ML (ref 0.36–3.74)
VIT B12 SERPL-MCNC: 300 PG/ML (ref 193–986)

## 2020-09-09 PROCEDURE — 84443 ASSAY THYROID STIM HORMONE: CPT

## 2020-09-09 PROCEDURE — 82728 ASSAY OF FERRITIN: CPT

## 2020-09-09 PROCEDURE — 3074F SYST BP LT 130 MM HG: CPT | Performed by: FAMILY MEDICINE

## 2020-09-09 PROCEDURE — 82607 VITAMIN B-12: CPT

## 2020-09-09 PROCEDURE — 3008F BODY MASS INDEX DOCD: CPT | Performed by: FAMILY MEDICINE

## 2020-09-09 PROCEDURE — 86376 MICROSOMAL ANTIBODY EACH: CPT

## 2020-09-09 PROCEDURE — 82180 ASSAY OF ASCORBIC ACID: CPT

## 2020-09-09 PROCEDURE — 84140 ASSAY OF PREGNENOLONE: CPT

## 2020-09-09 PROCEDURE — 82306 VITAMIN D 25 HYDROXY: CPT

## 2020-09-09 PROCEDURE — 36415 COLL VENOUS BLD VENIPUNCTURE: CPT

## 2020-09-09 PROCEDURE — 86800 THYROGLOBULIN ANTIBODY: CPT

## 2020-09-09 PROCEDURE — 84439 ASSAY OF FREE THYROXINE: CPT

## 2020-09-09 PROCEDURE — 3078F DIAST BP <80 MM HG: CPT | Performed by: FAMILY MEDICINE

## 2020-09-09 PROCEDURE — 85652 RBC SED RATE AUTOMATED: CPT

## 2020-09-09 PROCEDURE — 82627 DEHYDROEPIANDROSTERONE: CPT

## 2020-09-09 PROCEDURE — 84481 FREE ASSAY (FT-3): CPT

## 2020-09-09 PROCEDURE — 84207 ASSAY OF VITAMIN B-6: CPT

## 2020-09-09 PROCEDURE — 84630 ASSAY OF ZINC: CPT

## 2020-09-09 PROCEDURE — 83735 ASSAY OF MAGNESIUM: CPT

## 2020-09-09 PROCEDURE — 86628 CANDIDA ANTIBODY: CPT

## 2020-09-09 PROCEDURE — 86141 C-REACTIVE PROTEIN HS: CPT

## 2020-09-09 PROCEDURE — 99214 OFFICE O/P EST MOD 30 MIN: CPT | Performed by: FAMILY MEDICINE

## 2020-09-09 NOTE — PROGRESS NOTES
Leesa Bender is a 56year old female. Patient presents with:  Establish Care      HPI:     Having severe cramps in her muscles. Will occur all day, worse at night. Waking her up from sleep. Will get bruises sometimes after the cramping.   Will occur • Primary open angle glaucoma of both eyes 5/19/2015    Diagnosis of glaucoma OU; Started Latanoprost qhs after abnormal VF and OCT 2/25/16;  6/5/18 consult with Dr. Soco Ward progress note   • Small bowel obstruction [facility])    • Tendinitis    • Un Days per week: Not on file        Minutes per session: Not on file      Stress: Not on file    Relationships      Social connections:        Talks on phone: Not on file        Gets together: Not on file        Attends Rastafarian service: Not on file Procedure: COLONOSCOPY;  Surgeon: Monika Block MD;  Location: [address] [place] [address] ENDOSCOPY   • Excision of chalazion, single - od - right eye Right 6.27/2017    Nasally   • Hc arthrocentesis or inject major joint w/o us     • Hysterectomy  1996    KAI   • Other      [place] - ZINC, PLASMA OR SERUM; Future  - CARNITINE; Future  - SED RATE, NIDAREN (AUTOMATED); Future  - C-RP/HIGH SENSITIVITY; Future  - VITAMIN C, SERUM; Future    2. Myalgia  - CANDIDA IGG/A/M AB PANEL;  Future  - DEHYDROEPIANDROSTERONE SULFATE; Future  - NABILA Vitamin D, 25-Hydroxy [E]      Vitamin B12 [E]      Vitamin B6 [E]      Zinc, Plasma Or Serum [E]      Carnitine      Sed Rate, Westergren (Automated) [E]      C-RP/High Sensitivity [E]      Vitamin C, Serum    No orders of the defined types were place

## 2020-09-09 NOTE — PATIENT INSTRUCTIONS
I have complete vasyl in the body's ability to heal and transform. The products and items listed below (the “Products”)  and their claims have not been evaluated by the Food and Drug Administration.  Dietary products are not intended to treat, prevent, m

## 2020-09-10 LAB — THYROGLOB SERPL-MCNC: 20 U/ML (ref ?–60)

## 2020-09-12 LAB — VITAMIN B6: 46.1 NMOL/L

## 2020-09-13 LAB
PREGNENOLONE BY MS/MS, SERUM: 18 NG/DL
VITAMIN C LEVEL: 63 UMOL/L

## 2020-09-14 LAB
CARNITINE ESTERIF/FREE (RATIO): 0.1
CARNITINE, ESTERIFIED: 4 UMOL/L
CARNITINE, FREE: 34 UMOL/L
CARNITINE, TOTAL: 38 UMOL/L
ZINC: 79.7 UG/DL

## 2020-09-15 LAB
CANDIDA ANTIBODY IGA: 1.55 EV
CANDIDA ANTIBODY IGG: 0.81 EV
CANDIDA ANTIBODY IGM: 0.15 EV

## 2020-09-16 NOTE — ASSESSMENT & PLAN NOTE
Chalazion of RUL, nasally excised in office under local anesthesia. Erythromycin ophthalmic ointment was applied to excised area. Right eye was patched. Patient was told to remove the patch when he gets home.   Patient instructed to use ice packs for sw Modified Advancement Flap Text: The defect edges were debeveled with a #15 scalpel blade.  Given the location of the defect, shape of the defect and the proximity to free margins a modified advancement flap was deemed most appropriate.  Using a sterile surgical marker, an appropriate advancement flap was drawn incorporating the defect and placing the expected incisions within the relaxed skin tension lines where possible.    The area thus outlined was incised deep to adipose tissue with a #15 scalpel blade.  The skin margins were undermined to an appropriate distance in all directions utilizing iris scissors.

## 2020-09-22 ENCOUNTER — HOSPITAL ENCOUNTER (OUTPATIENT)
Dept: ULTRASOUND IMAGING | Age: 60
Discharge: HOME | End: 2020-09-22
Attending: FAMILY MEDICINE
Payer: COMMERCIAL

## 2020-09-22 DIAGNOSIS — E01.0 THYROMEGALY: ICD-10-CM

## 2020-09-22 PROCEDURE — 76536 US EXAM OF HEAD AND NECK: CPT | Performed by: FAMILY MEDICINE

## 2020-09-28 ENCOUNTER — IMMUNIZATION (OUTPATIENT)
Dept: INTERNAL MEDICINE CLINIC | Facility: CLINIC | Age: 60
End: 2020-09-28

## 2020-09-28 DIAGNOSIS — Z23 NEED FOR VACCINATION: ICD-10-CM

## 2020-09-28 PROCEDURE — 90686 IIV4 VACC NO PRSV 0.5 ML IM: CPT | Performed by: INTERNAL MEDICINE

## 2020-09-28 PROCEDURE — 90471 IMMUNIZATION ADMIN: CPT | Performed by: INTERNAL MEDICINE

## 2020-10-08 ENCOUNTER — OFFICE VISIT (OUTPATIENT)
Dept: PODIATRY CLINIC | Facility: CLINIC | Age: 60
End: 2020-10-08

## 2020-10-08 DIAGNOSIS — M77.42 METATARSALGIA OF BOTH FEET: Primary | ICD-10-CM

## 2020-10-08 DIAGNOSIS — M77.41 METATARSALGIA OF BOTH FEET: Primary | ICD-10-CM

## 2020-10-08 PROCEDURE — 99213 OFFICE O/P EST LOW 20 MIN: CPT | Performed by: PODIATRIST

## 2020-10-08 NOTE — PROGRESS NOTES
HPI:    Patient ID: Eri Hill is a 56year old female. This pleasant 77-year-old female presents to the office today having not been seen in about 9 months. She is having so much frustration with her feet. She has recurrent calluses and nail issues. I have answered her questions am not sure that there is anything else available to care for this patient based on the multiple surgeries should she is already undergone.   Follow-up when symptoms redevelop         ASSESSMENT/PLAN:   Metatarsalgia of both f

## 2020-10-13 ENCOUNTER — HOSPITAL ENCOUNTER (OUTPATIENT)
Dept: MAMMOGRAPHY | Facility: HOSPITAL | Age: 60
Discharge: HOME | End: 2020-10-13
Attending: OBSTETRICS & GYNECOLOGY
Payer: COMMERCIAL

## 2020-10-13 DIAGNOSIS — Z12.31 SCREENING MAMMOGRAM, ENCOUNTER FOR: ICD-10-CM

## 2020-10-13 PROCEDURE — 77063 BREAST TOMOSYNTHESIS BI: CPT | Performed by: OBSTETRICS & GYNECOLOGY

## 2020-10-13 PROCEDURE — 77067 SCR MAMMO BI INCL CAD: CPT | Performed by: OBSTETRICS & GYNECOLOGY

## 2020-11-01 ENCOUNTER — HOSPITAL ENCOUNTER (OUTPATIENT)
Age: 60
Discharge: HOME | End: 2020-11-01
Payer: COMMERCIAL

## 2020-11-01 VITALS
RESPIRATION RATE: 20 BRPM | DIASTOLIC BLOOD PRESSURE: 75 MMHG | SYSTOLIC BLOOD PRESSURE: 130 MMHG | OXYGEN SATURATION: 99 % | HEART RATE: 64 BPM | TEMPERATURE: 97 F

## 2020-11-01 DIAGNOSIS — R42 VERTIGO: Primary | ICD-10-CM

## 2020-11-01 PROCEDURE — 99214 OFFICE O/P EST MOD 30 MIN: CPT

## 2020-11-01 PROCEDURE — 99213 OFFICE O/P EST LOW 20 MIN: CPT

## 2020-11-01 RX ORDER — MECLIZINE HYDROCHLORIDE 25 MG/1
25 TABLET ORAL 3 TIMES DAILY PRN
Qty: 90 TABLET | Refills: 0 | Status: SHIPPED | OUTPATIENT
Start: 2020-11-01 | End: 2020-12-01

## 2020-11-01 RX ORDER — MECLIZINE HYDROCHLORIDE 25 MG/1
25 TABLET ORAL ONCE
Status: COMPLETED | OUTPATIENT
Start: 2020-11-01 | End: 2020-11-01

## 2020-11-01 RX ORDER — ONDANSETRON 4 MG/1
4 TABLET, ORALLY DISINTEGRATING ORAL ONCE
Status: COMPLETED | OUTPATIENT
Start: 2020-11-01 | End: 2020-11-01

## 2020-11-01 RX ORDER — ONDANSETRON 4 MG/1
4 TABLET, ORALLY DISINTEGRATING ORAL EVERY 4 HOURS PRN
Qty: 10 TABLET | Refills: 0 | Status: SHIPPED | OUTPATIENT
Start: 2020-11-01 | End: 2020-11-08

## 2020-11-01 NOTE — ED INITIAL ASSESSMENT (HPI)
Dizzy upon waking up yesterday. Felt better throughout the day and worse today. + nausea without vomiting. Denies headache. Neuro intact. No cough, congestion, or fever.

## 2020-11-01 NOTE — ED PROVIDER NOTES
Patient Seen in: [facility]      History   Patient presents with:  Dizziness    Stated Complaint: vertigo    HPI    Patient presents to the immediate care with complaint of vertigo. Patient states that she does have a history of vertigo.   St ANESTHESIA Bilateral 03/05/2020    Dr Soo Khan Ophthalmology    • CATARACT EXTRACTION W/  INTRAOCULAR LENS IMPLANT Left 05/07/2018    L PC IOL with Dr. Maira Prather @ [facility]   • COLONOSCOPY N/A 9/6/2019    Performed by Portillo Layton MD at [facility] ENDOSCOPY   • COLO 130/75   Pulse 64   Temp 97.3 °F (36.3 °C) (Temporal)   Resp 20   SpO2 99%         Physical Exam  Vitals signs and nursing note reviewed. Constitutional:       Appearance: She is well-developed. HENT:      Head: Normocephalic and atraumatic.       Right develops chest pain or shortness of breath, focal weakness, dizziness, change in vision or blurred vision, or is any new or worsening symptoms. Strict return precautions were given. Patient advised to follow-up with his primary care doctor in 2-3 days.

## 2020-11-10 ENCOUNTER — TELEPHONE (OUTPATIENT)
Dept: INTERNAL MEDICINE CLINIC | Facility: CLINIC | Age: 60
End: 2020-11-10

## 2020-11-10 DIAGNOSIS — M20.12 VALGUS DEFORMITY OF BOTH GREAT TOES: Primary | ICD-10-CM

## 2020-11-10 DIAGNOSIS — M20.11 VALGUS DEFORMITY OF BOTH GREAT TOES: Primary | ICD-10-CM

## 2020-11-10 DIAGNOSIS — L84 CALLUS OF FOOT: ICD-10-CM

## 2020-11-10 DIAGNOSIS — M77.41 METATARSALGIA OF BOTH FEET: ICD-10-CM

## 2020-11-10 DIAGNOSIS — M77.42 METATARSALGIA OF BOTH FEET: ICD-10-CM

## 2020-11-10 NOTE — TELEPHONE ENCOUNTER
Patient is requesting a new referral with 3 visits for Dr. Luz Isaac. Patient is seeing Dr. Luz Isaac for pain in her feet.

## 2020-11-16 ENCOUNTER — TELEMEDICINE (OUTPATIENT)
Dept: INTERNAL MEDICINE CLINIC | Facility: CLINIC | Age: 60
End: 2020-11-16

## 2020-11-16 DIAGNOSIS — R05.8 DRY COUGH: Primary | ICD-10-CM

## 2020-11-16 PROCEDURE — 99213 OFFICE O/P EST LOW 20 MIN: CPT | Performed by: INTERNAL MEDICINE

## 2020-11-16 NOTE — PROGRESS NOTES
Telemedicine Visit for Respiratory Illness - Potential COVID-19 Infection    Virtual/Telephone Check-In    Lei Both verbally consents to a Telephone Check-In service on 11/16/20.  Patient understands and accepts financial responsibility for any deducti Status post Nissen fundoplication     GERD (gastroesophageal reflux disease)     Chronic post-operative pain     Neuropathy     Myopathy     Colon polyps     Diverticulosis     Internal hemorrhoid     Chronic cough     Muscle cramps     Vitamin D deficienc SULFA (SULFONAMIDE ANTIBIOTICS)    Physical Exam  Patient is alert and oriented x3, no acute distress  Patient speaking in complete sentences without any conversational dyspnea or respiratory just  No coughing heard    Summary of topics discussed/ diagnosi

## 2020-11-17 ENCOUNTER — OFFICE VISIT (OUTPATIENT)
Dept: PODIATRY CLINIC | Facility: CLINIC | Age: 60
End: 2020-11-17

## 2020-11-17 ENCOUNTER — LAB ENCOUNTER (OUTPATIENT)
Dept: LAB | Facility: HOSPITAL | Age: 60
End: 2020-11-17
Attending: INTERNAL MEDICINE
Payer: COMMERCIAL

## 2020-11-17 DIAGNOSIS — M79.675 PAIN IN TOES OF BOTH FEET: ICD-10-CM

## 2020-11-17 DIAGNOSIS — M79.671 PAIN IN BOTH FEET: ICD-10-CM

## 2020-11-17 DIAGNOSIS — M77.42 METATARSALGIA OF BOTH FEET: Primary | ICD-10-CM

## 2020-11-17 DIAGNOSIS — Z20.822 EXPOSURE TO COVID-19 VIRUS: Primary | ICD-10-CM

## 2020-11-17 DIAGNOSIS — M77.41 METATARSALGIA OF BOTH FEET: Primary | ICD-10-CM

## 2020-11-17 DIAGNOSIS — L84 CALLUS OF FOOT: ICD-10-CM

## 2020-11-17 DIAGNOSIS — L60.0 ONYCHOCRYPTOSIS: ICD-10-CM

## 2020-11-17 DIAGNOSIS — M79.674 PAIN IN TOES OF BOTH FEET: ICD-10-CM

## 2020-11-17 DIAGNOSIS — M79.672 PAIN IN BOTH FEET: ICD-10-CM

## 2020-11-17 PROCEDURE — 11056 PARNG/CUTG B9 HYPRKR LES 2-4: CPT | Performed by: PODIATRIST

## 2020-11-17 PROCEDURE — 11765 WEDGE EXCISION SKN NAIL FOLD: CPT | Performed by: PODIATRIST

## 2020-11-17 NOTE — PROGRESS NOTES
Kristin Henriquez is a 56year old female. Patient presents with: Foot Pain: bilateral -- States orthotics are not helping. Callus: bilateral feet -- Rates pain 6/10.          HPI:   Patient presents to the clinic with a chief complaint of ingrown toenails a Started Latanoprost qhs after abnormal VF and OCT 2/25/16;  6/5/18 consult with Dr. Anoop Henson progress note   • Small bowel obstruction [facility])    • Tendinitis    • Unspecified essential hypertension    • Unspecified sleep apnea     uses CPAP      P Never Smoker      Smokeless tobacco: Never Used    Substance and Sexual Activity      Alcohol use: No        Alcohol/week: 0.0 standard drinks        Comment: None.        Drug use: No      Sexual activity: Yes        Birth control/protection: Hysterectomy feet    Callus of foot    Pain in both feet    Onychocryptosis    Pain in toes of both feet        Plan: Today using a slant back technique trimmed and debrided onychocryptotic nail edges to the patient's relief.   Utilizing a 15 blade trimmed down debrided

## 2020-12-01 ENCOUNTER — TELEPHONE (OUTPATIENT)
Dept: OPHTHALMOLOGY | Facility: CLINIC | Age: 60
End: 2020-12-01

## 2020-12-03 ENCOUNTER — OFFICE VISIT (OUTPATIENT)
Dept: INTERNAL MEDICINE CLINIC | Facility: CLINIC | Age: 60
End: 2020-12-03

## 2020-12-03 ENCOUNTER — OFFICE VISIT (OUTPATIENT)
Dept: OPHTHALMOLOGY | Facility: CLINIC | Age: 60
End: 2020-12-03

## 2020-12-03 VITALS
HEIGHT: 69 IN | SYSTOLIC BLOOD PRESSURE: 128 MMHG | HEART RATE: 78 BPM | DIASTOLIC BLOOD PRESSURE: 81 MMHG | WEIGHT: 199 LBS | RESPIRATION RATE: 17 BRPM | BODY MASS INDEX: 29.47 KG/M2

## 2020-12-03 DIAGNOSIS — H20.00 ACUTE IRITIS, LEFT EYE: Primary | ICD-10-CM

## 2020-12-03 DIAGNOSIS — R25.2 LEG CRAMPS: ICD-10-CM

## 2020-12-03 DIAGNOSIS — M25.552 LEFT HIP PAIN: ICD-10-CM

## 2020-12-03 DIAGNOSIS — M25.50 PAIN IN JOINT, MULTIPLE SITES: Primary | ICD-10-CM

## 2020-12-03 DIAGNOSIS — M54.2 NECK PAIN: ICD-10-CM

## 2020-12-03 DIAGNOSIS — R73.03 PREDIABETES: ICD-10-CM

## 2020-12-03 DIAGNOSIS — I10 ESSENTIAL HYPERTENSION: ICD-10-CM

## 2020-12-03 DIAGNOSIS — E55.9 VITAMIN D DEFICIENCY: ICD-10-CM

## 2020-12-03 DIAGNOSIS — R74.8 ELEVATED CK: ICD-10-CM

## 2020-12-03 DIAGNOSIS — H01.116 CONTACT DERMATITIS OF EYELID, LEFT: ICD-10-CM

## 2020-12-03 PROCEDURE — 99213 OFFICE O/P EST LOW 20 MIN: CPT | Performed by: OPHTHALMOLOGY

## 2020-12-03 PROCEDURE — 99214 OFFICE O/P EST MOD 30 MIN: CPT | Performed by: INTERNAL MEDICINE

## 2020-12-03 PROCEDURE — 3079F DIAST BP 80-89 MM HG: CPT | Performed by: INTERNAL MEDICINE

## 2020-12-03 PROCEDURE — 3074F SYST BP LT 130 MM HG: CPT | Performed by: INTERNAL MEDICINE

## 2020-12-03 PROCEDURE — 3008F BODY MASS INDEX DOCD: CPT | Performed by: INTERNAL MEDICINE

## 2020-12-03 RX ORDER — PREDNISOLONE ACETATE 10 MG/ML
SUSPENSION/ DROPS OPHTHALMIC
Qty: 1 BOTTLE | Refills: 0 | Status: SHIPPED | OUTPATIENT
Start: 2020-12-03 | End: 2020-12-22

## 2020-12-03 RX ORDER — ERGOCALCIFEROL 1.25 MG/1
50000 CAPSULE ORAL WEEKLY
Qty: 4 CAPSULE | Refills: 3 | Status: SHIPPED | OUTPATIENT
Start: 2020-12-03 | End: 2021-06-14

## 2020-12-03 RX ORDER — BACLOFEN 10 MG/1
10 TABLET ORAL 2 TIMES DAILY PRN
Qty: 30 TABLET | Refills: 0 | Status: SHIPPED | OUTPATIENT
Start: 2020-12-03 | End: 2021-02-01

## 2020-12-03 NOTE — ASSESSMENT & PLAN NOTE
Discussed diagnosis and treatment in detail with patient. Start Pred forte drops:  Use 1 drop 4 times a day in the left eye for 1 week, then 3 times a day for 1 week, then 2 times a day for 1 week, then once a day for 1 week, then discontinue.     Patient

## 2020-12-03 NOTE — ASSESSMENT & PLAN NOTE
STOP Vaseline. Use 1% Hydrocortisone cream or ointment  (over the counter) 1-2 times per day on affected area for 5-7 days, being careful not to get in eye.

## 2020-12-03 NOTE — PROGRESS NOTES
Mars Chandler is a 56year old female. HPI:     HPI     Pt called yesterday complaining of a swelling around her left eye, irritation OS with tearing and some redness x2 weeks.  Pt states that when she uses the Latanoprost OU qhs her left eye burns after Kali Woo MD;  Location: [facility]); other surgical history (2005,2007,2008) ([facility]); other surgical history (right foot bunionectomy); other surgical history (11-14-14) (right superficial anterior peroneal nerve biopsy and Comment:Other reaction(s): CELECOXIB  Sulfa Antibiotics       TONGUE SWELLING    Comment:Other reaction(s): SULFA (SULFONAMIDE ANTIBIOTICS)    ROS:     ROS     Positive for: Eyes    Negative for: Constitutional, Gastrointestinal, Neurological, Skin, resolve. She has a VF, OCT, dilated eye exam with refraction and photos scheduled on 12/22/20. Contact dermatitis of eyelid, left  STOP Vaseline.   Use 1% Hydrocortisone cream or ointment  (over the counter) 1-2 times per day on affected area for

## 2020-12-03 NOTE — PROGRESS NOTES
Manuel Dean is a 56year old female.   Patient presents with:  Neck Pain  Hip Pain: left       HPI:   Urgent visit   C/c neck pain x one week --getting worse   C/o \"everything is sore - fingers left  hip   Difficult to turn head   Not taking baclofen   L clinic   Needs referral to see podiatrist --corns and calluses right foot / toe and also for ingrown toenails   Pain is 10/10 -- iburprofen does helps but doesn't like taking it too much --takes T#3 but t just puts her to sleep and then she will wake up wi 0   • ergocalciferol 1.25 MG (61900 UT) Oral Cap Take 1 capsule (50,000 Units total) by mouth once a week. 4 capsule 3   • omeprazole 20 MG Oral Capsule Delayed Release Take 2 capsules (40 mg total) by mouth every morning.  180 capsule 3   • amLODIPine Besy • Excision of chalazion, single - od - right eye Right 6.27/2017    Nasally   • Hc arthrocentesis or inject major joint w/o us     • Hysterectomy  1996    KAI   • Other      Lap Nissen Fundoplication surgery   • Other surgical history  2005,2007,2008 all orders for this visit:    Pain in joint, multiple sites  And   Left hip pain  -     XR HIP + PELVIS MIN 4 VIEWS LEFT (CPT=73503); Future  And   Leg cramps  -     baclofen 10 MG Oral Tab;  Take 1 tablet (10 mg total) by mouth 2 (two) times daily as neede

## 2020-12-03 NOTE — PATIENT INSTRUCTIONS
Acute iritis, left eye  Discussed diagnosis and treatment in detail with patient.    Start Pred forte drops:  Use 1 drop 4 times a day in the left eye for 1 week, then 3 times a day for 1 week, then 2 times a day for 1 week, then once a day for 1 week, then · You may use acetaminophen or ibuprofen to control pain, unless another medicine was prescribed.  Talk with your healthcare provider before taking these medicines if you have chronic liver or kidney disease, or if you have ever had a stomach ulcer or diges If iritis is treated right away, it will likely not cause any other problems. But in some cases it can cause complications. Your risk for these varies depending on your age, your health, and the cause of your iritis.  Possible complications include:  · Glau © 7925-0493 [employer] [address]. [address] [facility], [address]. All rights reserved. This information is not intended as a substitute for professional medical care. Always follow your healthcare professional's instructions.         Darcie Bates Your eye care doctor (ophthalmologist) will ask about your past health and give you an eye exam. He or she may look into your eye with a slit lamp microscope. This is a device that magnifies the surface and inside of your eye.  Your doctor may also put a dy

## 2020-12-04 ENCOUNTER — LAB ENCOUNTER (OUTPATIENT)
Dept: LAB | Age: 60
End: 2020-12-04
Attending: INTERNAL MEDICINE
Payer: COMMERCIAL

## 2020-12-04 ENCOUNTER — HOSPITAL ENCOUNTER (OUTPATIENT)
Dept: GENERAL RADIOLOGY | Age: 60
Discharge: HOME | End: 2020-12-04
Attending: INTERNAL MEDICINE
Payer: COMMERCIAL

## 2020-12-04 DIAGNOSIS — M25.552 LEFT HIP PAIN: ICD-10-CM

## 2020-12-04 DIAGNOSIS — I10 ESSENTIAL HYPERTENSION: ICD-10-CM

## 2020-12-04 DIAGNOSIS — R74.8 ELEVATED CK: ICD-10-CM

## 2020-12-04 DIAGNOSIS — R73.03 PREDIABETES: ICD-10-CM

## 2020-12-04 PROCEDURE — 80061 LIPID PANEL: CPT

## 2020-12-04 PROCEDURE — 80053 COMPREHEN METABOLIC PANEL: CPT

## 2020-12-04 PROCEDURE — 85025 COMPLETE CBC W/AUTO DIFF WBC: CPT

## 2020-12-04 PROCEDURE — 36415 COLL VENOUS BLD VENIPUNCTURE: CPT

## 2020-12-04 PROCEDURE — 83036 HEMOGLOBIN GLYCOSYLATED A1C: CPT

## 2020-12-04 PROCEDURE — 82550 ASSAY OF CK (CPK): CPT

## 2020-12-04 PROCEDURE — 73502 X-RAY EXAM HIP UNI 2-3 VIEWS: CPT | Performed by: INTERNAL MEDICINE

## 2020-12-09 DIAGNOSIS — Z01.419 ENCOUNTER FOR ROUTINE GYNECOLOGICAL EXAMINATION WITH PAPANICOLAOU SMEAR OF CERVIX: ICD-10-CM

## 2020-12-09 DIAGNOSIS — Z78.0 ASYMPTOMATIC MENOPAUSAL STATE: ICD-10-CM

## 2020-12-09 DIAGNOSIS — Z91.89 AT RISK FOR DECREASED BONE DENSITY: Primary | ICD-10-CM

## 2020-12-18 ENCOUNTER — PATIENT MESSAGE (OUTPATIENT)
Dept: INTERNAL MEDICINE CLINIC | Facility: CLINIC | Age: 60
End: 2020-12-18

## 2020-12-18 ENCOUNTER — HOSPITAL ENCOUNTER (OUTPATIENT)
Dept: BONE DENSITY | Age: 60
Discharge: HOME | End: 2020-12-18
Attending: INTERNAL MEDICINE
Payer: COMMERCIAL

## 2020-12-18 DIAGNOSIS — Z91.89 AT RISK FOR DECREASED BONE DENSITY: ICD-10-CM

## 2020-12-18 DIAGNOSIS — Z78.0 ASYMPTOMATIC MENOPAUSAL STATE: ICD-10-CM

## 2020-12-18 PROCEDURE — 77080 DXA BONE DENSITY AXIAL: CPT | Performed by: INTERNAL MEDICINE

## 2020-12-19 NOTE — TELEPHONE ENCOUNTER
From: Cornell Coelho  To: Elda Smith MD  Sent: 12/18/2020 6:06 PM CST  Subject: Test Results Question    I have a question about DEXA Scan resulted on 12/18/20, 12:50 PM.    Hi Dr Ian Vela,    What exactly does these findings mean ?      Thank you

## 2020-12-19 NOTE — TELEPHONE ENCOUNTER
Pt had Dexa test today    CONCLUSION:      1. Osteopenia. 2. Interval decrease in bone mineral density of 10.9% at the total hip and 7.9% at the lumbar spine. 3.  FRAX 10 year risk fracture assessment:  Major osteoporotic fracture 3.7%, hip fracture

## 2020-12-22 ENCOUNTER — OFFICE VISIT (OUTPATIENT)
Dept: OPHTHALMOLOGY | Facility: CLINIC | Age: 60
End: 2020-12-22

## 2020-12-22 DIAGNOSIS — H25.11 AGE-RELATED NUCLEAR CATARACT, RIGHT: ICD-10-CM

## 2020-12-22 DIAGNOSIS — Z96.1 PSEUDOPHAKIA, LEFT EYE: ICD-10-CM

## 2020-12-22 DIAGNOSIS — H43.393 FLOATER, VITREOUS, BILATERAL: ICD-10-CM

## 2020-12-22 DIAGNOSIS — H40.1132 PRIMARY OPEN ANGLE GLAUCOMA OF BOTH EYES, MODERATE STAGE: Primary | ICD-10-CM

## 2020-12-22 PROBLEM — H01.116: Status: RESOLVED | Noted: 2020-12-03 | Resolved: 2020-12-22

## 2020-12-22 PROCEDURE — 92133 CPTRZD OPH DX IMG PST SGM ON: CPT | Performed by: OPHTHALMOLOGY

## 2020-12-22 PROCEDURE — 92083 EXTENDED VISUAL FIELD XM: CPT | Performed by: OPHTHALMOLOGY

## 2020-12-22 PROCEDURE — 92015 DETERMINE REFRACTIVE STATE: CPT | Performed by: OPHTHALMOLOGY

## 2020-12-22 PROCEDURE — 92250 FUNDUS PHOTOGRAPHY W/I&R: CPT | Performed by: OPHTHALMOLOGY

## 2020-12-22 PROCEDURE — 92014 COMPRE OPH EXAM EST PT 1/>: CPT | Performed by: OPHTHALMOLOGY

## 2020-12-22 NOTE — PROGRESS NOTES
Sujatha Lu is a 56year old female. HPI:     HPI     Pt in today for a VF, OCT, photos and a complete eye exam. Per pt is taking Latanoprost OU QHS as directed. Pt complains of burry vision in OS that started after cataract surgery.       Last edited other surgical history (11-14-14) (right superficial anterior peroneal nerve biopsy and right proximal thigh muscle biopsy.); and blepharoplasty anesthesia (Bilateral, 03/05/2020) (Dr [facility] M[facility] ).     Family History   Problem Relation Age o +2    Dist ph cc NI NI    Near cc 20/50 20/50    Correction: Glasses          Tonometry (Applanation, 1:55 PM)       Right Left    Pressure 16 17          Pachymetry (2/25/2015)       Right Left    Thickness 519/+1.5 523/+1          Pupils       Pupils open angle glaucoma of both eyes, moderate stage  Visual field and OCT completed in office today with stable results that were discussed with patient in office today. Continue Latanoprost every morning in both eyes.        Return in 4 months for IOP chec

## 2020-12-24 ENCOUNTER — TELEPHONE (OUTPATIENT)
Dept: INTERNAL MEDICINE CLINIC | Facility: CLINIC | Age: 60
End: 2020-12-24

## 2020-12-24 DIAGNOSIS — M25.562 PAIN IN BOTH KNEES, UNSPECIFIED CHRONICITY: Primary | ICD-10-CM

## 2020-12-24 DIAGNOSIS — M25.511 RIGHT SHOULDER PAIN, UNSPECIFIED CHRONICITY: ICD-10-CM

## 2020-12-24 DIAGNOSIS — M25.561 PAIN IN BOTH KNEES, UNSPECIFIED CHRONICITY: Primary | ICD-10-CM

## 2020-12-24 NOTE — TELEPHONE ENCOUNTER
Patient called and would like a referral to see either Dr. Shivani Leonard or Dr. Sunita Gómez for Orthopedics. For Both of her knees and her right shoulder. Please Advise.

## 2020-12-26 ENCOUNTER — TELEPHONE (OUTPATIENT)
Dept: ORTHOPEDICS CLINIC | Facility: CLINIC | Age: 60
End: 2020-12-26

## 2020-12-26 ENCOUNTER — TELEPHONE (OUTPATIENT)
Dept: INTERNAL MEDICINE CLINIC | Facility: CLINIC | Age: 60
End: 2020-12-26

## 2020-12-26 NOTE — TELEPHONE ENCOUNTER
Patient calling and have a referral for Dr. Apoorva Sykes his first available is not until January 7 she need the referral switch to any ortho doctor.   Dr. Mercy Munoz have availability this Monday can the referral be switch to any ortho she need get

## 2020-12-26 NOTE — TELEPHONE ENCOUNTER
Per pt wanting to know if can be seen by Dr. Emeli Montejoo on 12/28/20. Advised doctors do not share patients. Pt wanting to be seen for knee pain and right shoulder pain. Pt made aware clinical staff will not be in until Monday.  Please advise

## 2020-12-26 NOTE — TELEPHONE ENCOUNTER
Austin Garcia  4:07 AM                Patient calling and have a referral for Dr. Yolette Davis his first available is not until January 7 she need the referral switch to any ortho doctor.   Dr. Evelin June have availability this Monday can t

## 2020-12-28 NOTE — TELEPHONE ENCOUNTER
S/w pt and she states she was just trying to get in sooner and isn't looking for a second opinion. Pt has appt with DO on 1/7. Pt is on waitlist and will call her sooner if any openings.

## 2021-01-07 ENCOUNTER — OFFICE VISIT (OUTPATIENT)
Dept: ORTHOPEDICS CLINIC | Facility: CLINIC | Age: 61
End: 2021-01-07

## 2021-01-07 VITALS
HEIGHT: 68 IN | WEIGHT: 188 LBS | SYSTOLIC BLOOD PRESSURE: 110 MMHG | RESPIRATION RATE: 20 BRPM | DIASTOLIC BLOOD PRESSURE: 78 MMHG | BODY MASS INDEX: 28.49 KG/M2

## 2021-01-07 DIAGNOSIS — M17.0 PRIMARY OSTEOARTHRITIS OF BOTH KNEES: Primary | ICD-10-CM

## 2021-01-07 PROCEDURE — 20610 DRAIN/INJ JOINT/BURSA W/O US: CPT | Performed by: ORTHOPAEDIC SURGERY

## 2021-01-07 PROCEDURE — 3008F BODY MASS INDEX DOCD: CPT | Performed by: ORTHOPAEDIC SURGERY

## 2021-01-07 PROCEDURE — 3078F DIAST BP <80 MM HG: CPT | Performed by: ORTHOPAEDIC SURGERY

## 2021-01-07 PROCEDURE — 3074F SYST BP LT 130 MM HG: CPT | Performed by: ORTHOPAEDIC SURGERY

## 2021-01-07 PROCEDURE — 99213 OFFICE O/P EST LOW 20 MIN: CPT | Performed by: ORTHOPAEDIC SURGERY

## 2021-01-07 RX ORDER — TRIAMCINOLONE ACETONIDE 40 MG/ML
40 INJECTION, SUSPENSION INTRA-ARTICULAR; INTRAMUSCULAR
Status: COMPLETED | OUTPATIENT
Start: 2021-01-07 | End: 2021-01-07

## 2021-01-07 RX ORDER — MELOXICAM 7.5 MG/1
7.5 TABLET ORAL DAILY
Qty: 30 TABLET | Refills: 0 | Status: SHIPPED | OUTPATIENT
Start: 2021-01-07 | End: 2021-02-09

## 2021-01-07 RX ADMIN — TRIAMCINOLONE ACETONIDE 40 MG: 40 INJECTION, SUSPENSION INTRA-ARTICULAR; INTRAMUSCULAR at 14:48:00

## 2021-01-07 RX ADMIN — TRIAMCINOLONE ACETONIDE 40 MG: 40 INJECTION, SUSPENSION INTRA-ARTICULAR; INTRAMUSCULAR at 14:47:00

## 2021-01-07 NOTE — PROGRESS NOTES
NURSING INTAKE COMMENTS: Patient presents with:  Knee Pain: bilateral -- Right knee is worse. Rates pain 8/10 in right knee. Pain can increase to 10/10. . States left knee pain s 4/10 right now.        HPI: This 56year old female presents today with compla • ESOPHAGOGASTRODUODENOSCOPY (EGD) N/A 5/14/2020    Performed by Edwin Tinsley MD at [address], SINGLE - OD - RIGHT EYE Right 6.27/2017    Nasally   • HC ARTHROCENTESIS OR INJECT MAJOR JOINT W/O US     • HYSTERECTOMY  1996 Hypertension Sister    • Cancer Sister         liver cancer   • Hypertension Brother    • Diabetes Neg    • Glaucoma Neg    • Macular degeneration Neg    • Breast Cancer Neg    • Ovarian Cancer Neg        Social History    Occupational History      Not on medial joint line lateral joint line. There is patellofemoral crepitus bilaterally. Ligament exam unremarkable. No pain with passive range of motion of the hips. Neurological: Light touch and pinprick sensation intact throughout the lower extremities. 10-year probability of a hip fracture > 3% or a 10-year probability of a major osteoporosis-related fracture > 20% based on the [place]-adapted WHO algorithm         CONCLUSION:   1. Osteopenia.   2. Interval decrease in bone mineral density of 10.9% at the tota

## 2021-01-07 NOTE — PROGRESS NOTES
Per verbal order from AMIRA Cross, draw up 2 syringes each with 3ml of 0.5% Marcaine & 2ml 1% lidocaine and 1ml of Kenalog 40 for cortisone injection to bilateral knees.   Linda Blackmon RN    Patient provided education handout for cortisone i

## 2021-01-13 ENCOUNTER — OFFICE VISIT (OUTPATIENT)
Dept: ORTHOPEDICS CLINIC | Facility: CLINIC | Age: 61
End: 2021-01-13

## 2021-01-13 VITALS
BODY MASS INDEX: 28.49 KG/M2 | HEIGHT: 68 IN | WEIGHT: 188 LBS | DIASTOLIC BLOOD PRESSURE: 85 MMHG | HEART RATE: 73 BPM | SYSTOLIC BLOOD PRESSURE: 136 MMHG

## 2021-01-13 DIAGNOSIS — M75.121 NONTRAUMATIC COMPLETE TEAR OF RIGHT ROTATOR CUFF: Primary | ICD-10-CM

## 2021-01-13 PROCEDURE — 3075F SYST BP GE 130 - 139MM HG: CPT | Performed by: ORTHOPAEDIC SURGERY

## 2021-01-13 PROCEDURE — 3008F BODY MASS INDEX DOCD: CPT | Performed by: ORTHOPAEDIC SURGERY

## 2021-01-13 PROCEDURE — 99213 OFFICE O/P EST LOW 20 MIN: CPT | Performed by: ORTHOPAEDIC SURGERY

## 2021-01-13 PROCEDURE — 20610 DRAIN/INJ JOINT/BURSA W/O US: CPT | Performed by: ORTHOPAEDIC SURGERY

## 2021-01-13 PROCEDURE — 3079F DIAST BP 80-89 MM HG: CPT | Performed by: ORTHOPAEDIC SURGERY

## 2021-01-13 RX ORDER — TRIAMCINOLONE ACETONIDE 40 MG/ML
40 INJECTION, SUSPENSION INTRA-ARTICULAR; INTRAMUSCULAR ONCE
Status: COMPLETED | OUTPATIENT
Start: 2021-01-13 | End: 2021-01-13

## 2021-01-13 RX ADMIN — TRIAMCINOLONE ACETONIDE 40 MG: 40 INJECTION, SUSPENSION INTRA-ARTICULAR; INTRAMUSCULAR at 14:29:00

## 2021-01-13 NOTE — PROGRESS NOTES
NURSING INTAKE COMMENTS: Patient presents with:  Shoulder Pain: Right- pt states cortisone injection on 8/12/20 helped until 12/2020. HPI: This 56year old female presents today with complaints of right shoulder rotator cuff tear follow-up.   Her last surgery   • OTHER SURGICAL HISTORY  2005,2007,2008    LAPAROSCOPIC LYSIS OF ADHESION   • OTHER SURGICAL HISTORY      right foot bunionectomy   • OTHER SURGICAL HISTORY  11-14-14    right superficial anterior peroneal nerve biopsy and right proximal thigh m Smoker      Smokeless tobacco: Never Used    Substance and Sexual Activity      Alcohol use: No        Alcohol/week: 0.0 standard drinks        Comment: None.        Drug use: No      Sexual activity: Yes        Birth control/protection: Hysterectomy density of the lumbar spine and hip was performed on a [employer] dual energy x-ray absorptiometry scanner.   FINDINGS:   LEFT FEMORAL NECK  BMD: 0.676 gm/sq. cm. T SCORE: -1.6 Z SCORE: -0.8  LEFT TOTAL HIP  BMD: 0.864 gm/sq. cm. T SCORE: -0.6 Z SCORE: -0.2  % CREATSERUM 0.94 12/04/2020    GFR 46 (L) 04/02/2016    GFRNAA 65 12/04/2020    GFRAA 75 12/04/2020        Assessment and Plan:  Diagnoses and all orders for this visit:    Nontraumatic complete tear of right rotator cuff  -     DRAIN/INJECT LARGE JOINT/

## 2021-01-13 NOTE — PROGRESS NOTES
Per verbal order from AMIRA Delgado, draw up 3ml of 0.5% Marcaine & 2ml 1% lidocaine and 1ml of Kenalog 40 for cortisone injection to right shoulder. Remy Tavera RN    Patient provided education handout for cortisone injection.

## 2021-02-01 ENCOUNTER — OFFICE VISIT (OUTPATIENT)
Dept: INTERNAL MEDICINE CLINIC | Facility: CLINIC | Age: 61
End: 2021-02-01

## 2021-02-01 VITALS
BODY MASS INDEX: 29.86 KG/M2 | HEIGHT: 68 IN | HEART RATE: 91 BPM | WEIGHT: 197 LBS | SYSTOLIC BLOOD PRESSURE: 124 MMHG | DIASTOLIC BLOOD PRESSURE: 80 MMHG

## 2021-02-01 DIAGNOSIS — I10 ESSENTIAL HYPERTENSION: Primary | ICD-10-CM

## 2021-02-01 DIAGNOSIS — R74.8 ELEVATED CPK: ICD-10-CM

## 2021-02-01 DIAGNOSIS — R05.3 CHRONIC COUGH: ICD-10-CM

## 2021-02-01 PROCEDURE — 3074F SYST BP LT 130 MM HG: CPT | Performed by: INTERNAL MEDICINE

## 2021-02-01 PROCEDURE — 3079F DIAST BP 80-89 MM HG: CPT | Performed by: INTERNAL MEDICINE

## 2021-02-01 PROCEDURE — 99214 OFFICE O/P EST MOD 30 MIN: CPT | Performed by: INTERNAL MEDICINE

## 2021-02-01 PROCEDURE — 3008F BODY MASS INDEX DOCD: CPT | Performed by: INTERNAL MEDICINE

## 2021-02-01 RX ORDER — PREDNISONE 20 MG/1
20 TABLET ORAL DAILY
Qty: 21 TABLET | Refills: 0 | Status: SHIPPED | OUTPATIENT
Start: 2021-02-01 | End: 2021-02-01

## 2021-02-01 RX ORDER — PREDNISONE 20 MG/1
20 TABLET ORAL DAILY
Qty: 7 TABLET | Refills: 0 | Status: SHIPPED | OUTPATIENT
Start: 2021-02-01 | End: 2021-02-08

## 2021-02-01 RX ORDER — BENZONATATE 100 MG/1
100 CAPSULE ORAL 2 TIMES DAILY PRN
Qty: 10 CAPSULE | Refills: 0 | Status: SHIPPED | OUTPATIENT
Start: 2021-02-01 | End: 2021-02-08

## 2021-02-01 NOTE — PROGRESS NOTES
Andreina Avila is a 56year old female.   Patient presents with:  Pain: soreness       HPI:   Pt comes for f/u  C/c cough x 2 weeks   C/o soreness from coughing so much - had this before   Taking ppi , denies PND , no fevers   HELPING HER  WHO IS SICK foot / toe and also for ingrown toenails   Pain is 10/10 -- iburprofen does helps but doesn't like taking it too much --takes T#3 but t just puts her to sleep and then she will wake up with pain -- unable ot sratch her back   No falls trauma or injury that Oral Tab Take 1 tablet (7.5 mg total) by mouth daily. 30 tablet 0   • triamcinolone acetonide 0.1 % External Cream Apply 1 Application topically 2 (two) times daily as needed.  apply to affected area 45 g 0   • ergocalciferol 1.25 MG (10860 UT) Oral Cap [facility] 9/6/2019    Procedure: COLONOSCOPY;  Surgeon: Qamar Vargas MD;  Location: [facility] ENDOSCOPY   • Excision of chalazion, single - od - right eye Right 6.27/2017    Nasally   • Hc arthrocentesis or inject major joint w/o us     • Hysterectomy  1996    KAI   • Ot MG Oral Cap; Take 1 capsule (100 mg total) by mouth 2 (two) times daily as needed for cough. -     Albuterol Sulfate 108 (90 Base) MCG/ACT Inhalation Aerosol Powder, Breath Activated; Inhale 2 puffs into the lungs every 4 to 6 hours as needed.   -     Disc

## 2021-02-07 NOTE — PROGRESS NOTES
Meagan Smith is a 53-year-old patient of Dr. Tristan Rosario, with chronic muscle cramps in different areas. who I last saw October 31st of 2018.   She has failed multiple medications in the past.      She day or night can have muscle spasms usually in her legs and Pulmonary/Chest: Effort normal and breath sounds normal.   Abdominal: Soft. Bowel sounds are normal. She exhibits no mass. There is no tenderness. There is no rebound and no guarding. Musculoskeletal: She exhibits no edema.    There is some tenderness t

## 2021-02-08 ENCOUNTER — OFFICE VISIT (OUTPATIENT)
Dept: INTERNAL MEDICINE CLINIC | Facility: CLINIC | Age: 61
End: 2021-02-08

## 2021-02-08 VITALS
HEART RATE: 86 BPM | HEIGHT: 68 IN | WEIGHT: 198 LBS | BODY MASS INDEX: 30.01 KG/M2 | DIASTOLIC BLOOD PRESSURE: 78 MMHG | SYSTOLIC BLOOD PRESSURE: 131 MMHG

## 2021-02-08 DIAGNOSIS — R05.3 CHRONIC COUGH: Primary | ICD-10-CM

## 2021-02-08 DIAGNOSIS — R05.8 DRY COUGH: ICD-10-CM

## 2021-02-08 DIAGNOSIS — M25.50 MULTIPLE JOINT PAIN: ICD-10-CM

## 2021-02-08 PROCEDURE — 3078F DIAST BP <80 MM HG: CPT | Performed by: INTERNAL MEDICINE

## 2021-02-08 PROCEDURE — 3075F SYST BP GE 130 - 139MM HG: CPT | Performed by: INTERNAL MEDICINE

## 2021-02-08 PROCEDURE — 3008F BODY MASS INDEX DOCD: CPT | Performed by: INTERNAL MEDICINE

## 2021-02-08 PROCEDURE — 99213 OFFICE O/P EST LOW 20 MIN: CPT | Performed by: INTERNAL MEDICINE

## 2021-02-08 RX ORDER — GUAIFENESIN AND CODEINE PHOSPHATE 100; 10 MG/5ML; MG/5ML
5 SOLUTION ORAL 3 TIMES DAILY PRN
Qty: 118 ML | Refills: 0 | Status: SHIPPED | OUTPATIENT
Start: 2021-02-08 | End: 2021-02-22

## 2021-02-08 RX ORDER — AZITHROMYCIN 250 MG/1
TABLET, FILM COATED ORAL
Qty: 6 TABLET | Refills: 0 | Status: SHIPPED | OUTPATIENT
Start: 2021-02-08 | End: 2021-02-13

## 2021-02-08 NOTE — PROGRESS NOTES
Andreina Avila is a 56year old female.   Patient presents with:  Joint Pain: follow up       HPI:   Patient comes for follow-up  C/C cough  C/o still coughing -- no help with cough pills or the prednisone  The inhaler does help sometimes no postnasal drip clinic   Needs referral to see podiatrist --corns and calluses right foot / toe and also for ingrown toenails   Pain is 10/10 -- iburprofen does helps but doesn't like taking it too much --takes T#3 but t just puts her to sleep and then she will wake up wi daily. 30 tablet 0   • triamcinolone acetonide 0.1 % External Cream Apply 1 Application topically 2 (two) times daily as needed.  apply to affected area 45 g 0   • ergocalciferol 1.25 MG (62603 UT) Oral Cap Take 1 capsule (50,000 Units total) by mouth once Rosette Garcia MD;  Location: [facility] ENDOSCOPY   • Excision of chalazion, single - od - right eye Right 6.27/2017    Nasally   • Hc arthrocentesis or inject major joint w/o us     • Hysterectomy  1996    KAI   • Other      Lap Nissen Fundoplication surgery   • Other Future  -     guaiFENesin-Codeine 100-10 MG/5ML Oral Syrup; Take 5 mL by mouth 3 (three) times daily as needed.   Unlikely Covid, will give her codeine cough syrup–she is aware of the side effects and not to drive or operate machinery after taking it  If no

## 2021-02-09 ENCOUNTER — HOSPITAL ENCOUNTER (OUTPATIENT)
Dept: GENERAL RADIOLOGY | Age: 61
Discharge: HOME | End: 2021-02-09
Attending: INTERNAL MEDICINE
Payer: COMMERCIAL

## 2021-02-09 ENCOUNTER — OFFICE VISIT (OUTPATIENT)
Dept: RHEUMATOLOGY | Facility: CLINIC | Age: 61
End: 2021-02-09

## 2021-02-09 VITALS
DIASTOLIC BLOOD PRESSURE: 72 MMHG | WEIGHT: 197 LBS | BODY MASS INDEX: 29.86 KG/M2 | SYSTOLIC BLOOD PRESSURE: 129 MMHG | HEART RATE: 77 BPM | HEIGHT: 68 IN

## 2021-02-09 DIAGNOSIS — R25.2 MUSCLE CRAMPS: Primary | ICD-10-CM

## 2021-02-09 DIAGNOSIS — R05.3 CHRONIC COUGH: ICD-10-CM

## 2021-02-09 DIAGNOSIS — R05.8 DRY COUGH: ICD-10-CM

## 2021-02-09 PROCEDURE — 3074F SYST BP LT 130 MM HG: CPT | Performed by: INTERNAL MEDICINE

## 2021-02-09 PROCEDURE — 99213 OFFICE O/P EST LOW 20 MIN: CPT | Performed by: INTERNAL MEDICINE

## 2021-02-09 PROCEDURE — 3008F BODY MASS INDEX DOCD: CPT | Performed by: INTERNAL MEDICINE

## 2021-02-09 PROCEDURE — 3078F DIAST BP <80 MM HG: CPT | Performed by: INTERNAL MEDICINE

## 2021-02-09 PROCEDURE — 71046 X-RAY EXAM CHEST 2 VIEWS: CPT | Performed by: INTERNAL MEDICINE

## 2021-02-09 RX ORDER — METAXALONE 800 MG/1
TABLET ORAL
Qty: 90 TABLET | Refills: 2 | Status: SHIPPED | OUTPATIENT
Start: 2021-02-09 | End: 2021-06-14

## 2021-02-18 ENCOUNTER — OFFICE VISIT (OUTPATIENT)
Dept: OBGYN CLINIC | Facility: CLINIC | Age: 61
End: 2021-02-18

## 2021-02-18 VITALS
DIASTOLIC BLOOD PRESSURE: 82 MMHG | BODY MASS INDEX: 30 KG/M2 | HEART RATE: 101 BPM | SYSTOLIC BLOOD PRESSURE: 130 MMHG | WEIGHT: 198 LBS

## 2021-02-18 DIAGNOSIS — Z12.31 SCREENING MAMMOGRAM, ENCOUNTER FOR: ICD-10-CM

## 2021-02-18 DIAGNOSIS — Z01.419 ENCOUNTER FOR GYNECOLOGICAL EXAMINATION WITHOUT ABNORMAL FINDING: Primary | ICD-10-CM

## 2021-02-18 DIAGNOSIS — R25.2 CRAMPS, MUSCLE, GENERAL: ICD-10-CM

## 2021-02-18 PROCEDURE — 3075F SYST BP GE 130 - 139MM HG: CPT | Performed by: OBSTETRICS & GYNECOLOGY

## 2021-02-18 PROCEDURE — 99396 PREV VISIT EST AGE 40-64: CPT | Performed by: OBSTETRICS & GYNECOLOGY

## 2021-02-18 PROCEDURE — 3079F DIAST BP 80-89 MM HG: CPT | Performed by: OBSTETRICS & GYNECOLOGY

## 2021-02-25 PROBLEM — R25.2 CRAMPS, MUSCLE, GENERAL: Status: ACTIVE | Noted: 2017-11-20

## 2021-02-25 NOTE — PROGRESS NOTES
HPI:    Patient ID: Jose Eduardo Vasques is a 56year old female. HPI   and . No new personal or family medical issues but her main complaint is that of severe recurrent muscle cramps in many body areas.  These happen multiple times daily and are [employer] TAKE 1 TABLET BY MOUTH EVERY 8 HOURS WITH FOOD AS NEEDED FOR PAIN. STOP IF STOMACH UPSET 15 tablet 0   • latanoprost 0.005 % Ophthalmic Solution INSTILL 1 DROP IN BOTH EYES EVERY MORNING 3 Bottle 3   • MAGNESIUM OR Take by mouth.        Allergies:  Erythrom defined types were placed in this encounter.       Meds This Visit:  Requested Prescriptions      No prescriptions requested or ordered in this encounter       Imaging & Referrals:  [facility] ROMINA 2D+3D SCREENING BILAT (CPT=77067/86654)       PG#[phone]

## 2021-03-08 ENCOUNTER — OFFICE VISIT (OUTPATIENT)
Dept: INTERNAL MEDICINE CLINIC | Facility: CLINIC | Age: 61
End: 2021-03-08

## 2021-03-08 ENCOUNTER — TELEPHONE (OUTPATIENT)
Dept: INTERNAL MEDICINE CLINIC | Facility: CLINIC | Age: 61
End: 2021-03-08

## 2021-03-08 VITALS
HEIGHT: 68 IN | SYSTOLIC BLOOD PRESSURE: 125 MMHG | WEIGHT: 198.38 LBS | HEART RATE: 102 BPM | BODY MASS INDEX: 30.06 KG/M2 | DIASTOLIC BLOOD PRESSURE: 79 MMHG

## 2021-03-08 DIAGNOSIS — Z20.822 COVID-19 RULED OUT: ICD-10-CM

## 2021-03-08 DIAGNOSIS — R05.3 CHRONIC COUGH: Primary | ICD-10-CM

## 2021-03-08 DIAGNOSIS — R74.8 ELEVATED CPK: ICD-10-CM

## 2021-03-08 DIAGNOSIS — G47.33 OBSTRUCTIVE SLEEP APNEA SYNDROME: ICD-10-CM

## 2021-03-08 DIAGNOSIS — R25.2 MUSCLE CRAMPS: ICD-10-CM

## 2021-03-08 DIAGNOSIS — R25.2 CRAMPS, MUSCLE, GENERAL: ICD-10-CM

## 2021-03-08 DIAGNOSIS — E55.9 VITAMIN D DEFICIENCY: ICD-10-CM

## 2021-03-08 DIAGNOSIS — M79.10 MUSCLE PAIN: Primary | ICD-10-CM

## 2021-03-08 PROCEDURE — 3008F BODY MASS INDEX DOCD: CPT | Performed by: INTERNAL MEDICINE

## 2021-03-08 PROCEDURE — 3078F DIAST BP <80 MM HG: CPT | Performed by: INTERNAL MEDICINE

## 2021-03-08 PROCEDURE — 99214 OFFICE O/P EST MOD 30 MIN: CPT | Performed by: INTERNAL MEDICINE

## 2021-03-08 PROCEDURE — 3074F SYST BP LT 130 MM HG: CPT | Performed by: INTERNAL MEDICINE

## 2021-03-08 RX ORDER — DEXAMETHASONE 4 MG/1
1 TABLET ORAL 2 TIMES DAILY
Qty: 3 CAN | Refills: 3 | Status: SHIPPED | OUTPATIENT
Start: 2021-03-08 | End: 2021-06-14

## 2021-03-08 NOTE — TELEPHONE ENCOUNTER
Patient states she contacted her insurance and was advised that it was her pcps responsibility to find her an in network rheumatologist. Requesting call with recommendations

## 2021-03-08 NOTE — PROGRESS NOTES
Eri Hill is a 56year old female.   Patient presents with:  Joint Pain      HPI:   Patient comes for follow-up  C/C still having her cough and also the leg cramps  C/O cramps is all over but mostly in the legs-stretching makes it works and the [employer] better   Would like to see dr Shelbie Poole   Not taking cymbalta - takes T#3 one a week , ibuprofen \"seldom\"- once a mn   Muscle cramps coming back - legs thighs and feet , no RLS               History– 11/19  C/o right shoulder has a tear  And she do isabel     Current Outpatient Medications   Medication Sig Dispense Refill   • Fluticasone Propionate HFA (FLOVENT HFA) 110 MCG/ACT Inhalation Aerosol Inhale 1 puff into the lungs 2 (two) times daily.  3 Can 3   • Metaxalone 800 MG Oral Tab Take one three ti sphincterotomy      03/12/2013 [facility]   • Blepharoplasty anesthesia Bilateral 03/05/2020    Dr Rekha [facility] Ophthal[facility]    • Cataract extraction w/  intraocular lens implant Left 05/07/2018    L PC IOL with Dr. Radha Pandey @ [facility]   • Colonoscopy N/A 11/11/2016 GENERAL: well developed, well nourished,in no apparent distress  SKIN: no rashes,no suspicious lesions  HEENT: atraumatic, normocephalic  LUNGS: clear to auscultation, no wheeze  CARDIO: RRR without murmur  EXTREMITIES: no cyanosis, or edema    ASSESSMEN

## 2021-03-08 NOTE — TELEPHONE ENCOUNTER
This is simply not true   I did place a referral for Dr. Jose Duke who is under [facility]-[phone]  Please let patient know that I am not 100% sure if this will be covered by her insurance and her insurance should be providing her with a list of in - network doctors or at least the website   Per the computer it does look like this is an in network doctor but will forward to manage care as well to see if they can check as pt was recommended to be evaluated at an academic center

## 2021-03-09 ENCOUNTER — LAB ENCOUNTER (OUTPATIENT)
Dept: LAB | Age: 61
End: 2021-03-09
Attending: INTERNAL MEDICINE
Payer: COMMERCIAL

## 2021-03-09 DIAGNOSIS — G72.9 MYOPATHY: ICD-10-CM

## 2021-03-09 DIAGNOSIS — R05.3 CHRONIC COUGH: ICD-10-CM

## 2021-03-09 DIAGNOSIS — R74.8 ELEVATED CPK: ICD-10-CM

## 2021-03-09 DIAGNOSIS — R25.2 LEG CRAMPS: ICD-10-CM

## 2021-03-09 DIAGNOSIS — G62.9 NEUROPATHY: Primary | ICD-10-CM

## 2021-03-09 DIAGNOSIS — R25.2 CRAMPS, MUSCLE, GENERAL: ICD-10-CM

## 2021-03-09 DIAGNOSIS — M48.061 LUMBAR FORAMINAL STENOSIS: ICD-10-CM

## 2021-03-09 DIAGNOSIS — M79.18 MYOFASCIAL PAIN: ICD-10-CM

## 2021-03-09 DIAGNOSIS — Z20.822 COVID-19 RULED OUT: ICD-10-CM

## 2021-03-09 LAB — SARS-COV-2 RNA RESP QL NAA+PROBE: NOT DETECTED

## 2021-03-10 NOTE — TELEPHONE ENCOUNTER
pls see other referral which was placed last night with documentation   Pt has seen rheumatology at [place] [address] and it was the rhematologist who had advised the pt to be referred to the Neuromuscular Clinic at [facility]

## 2021-03-10 NOTE — TELEPHONE ENCOUNTER
Hello,    Please advise why patient is needing to see Rheumatology at tertiary? Per health plan patient will need to see in  first, such as Rheumatology at [facility]. Please advise? Thank you, Trent Abdi Specialist    Managed Care.

## 2021-03-12 ENCOUNTER — HOSPITAL ENCOUNTER (OUTPATIENT)
Dept: RESPIRATORY THERAPY | Facility: HOSPITAL | Age: 61
Discharge: HOME | End: 2021-03-12
Attending: INTERNAL MEDICINE
Payer: COMMERCIAL

## 2021-03-12 DIAGNOSIS — R05.3 CHRONIC COUGH: ICD-10-CM

## 2021-03-12 PROCEDURE — 94726 PLETHYSMOGRAPHY LUNG VOLUMES: CPT | Performed by: INTERNAL MEDICINE

## 2021-03-12 PROCEDURE — 94729 DIFFUSING CAPACITY: CPT | Performed by: INTERNAL MEDICINE

## 2021-03-12 PROCEDURE — 94060 EVALUATION OF WHEEZING: CPT | Performed by: INTERNAL MEDICINE

## 2021-03-16 NOTE — TELEPHONE ENCOUNTER
Referral has been sent to health [facility] for review. Thank you, Leonardo hSaikh Specialist    Managed Care.

## 2021-03-22 ENCOUNTER — TELEPHONE (OUTPATIENT)
Dept: INTERNAL MEDICINE CLINIC | Facility: CLINIC | Age: 61
End: 2021-03-22

## 2021-03-22 DIAGNOSIS — R05.3 COUGH, PERSISTENT: Primary | ICD-10-CM

## 2021-03-23 NOTE — PROCEDURES
[address] [facility] 1960 MRN [id number]   Height  76 inh  Age 56year old   Weight  198 lbs  Sex Female         Spirometry:   FEV1 2.18 L which is 78%  FEV1/FVC 77%    No significant bronchodilator

## 2021-03-23 NOTE — ADDENDUM NOTE
Encounter addended by: Chris Jaime MD on: 3/23/2021 4:43 PM   Actions taken: Clinical Note Signed, Charge Capture section accepted

## 2021-03-23 NOTE — TELEPHONE ENCOUNTER
Please check with the pulmonary triage team and see whether the result has been reported. It appears to me that it has not resulted yet    Please inform patient that we are in the process of obtaining the report from the pulmonary lab.     Once I get the r

## 2021-03-23 NOTE — TELEPHONE ENCOUNTER
Respiratory lab contacted at [phone]. Tech confirmed the report has not been read yet. He will page Dr. Ade Burgos for read to be done and entered under \"notes\" tab. Patient notified of status.

## 2021-03-24 NOTE — TELEPHONE ENCOUNTER
Dr. Ian Vela please see PFT test results and notes from Dr. Ad Whitt. Please advise on results interpretation. Thanks.

## 2021-03-24 NOTE — TELEPHONE ENCOUNTER
Pt is looking for PFT results - its nL please inform pt    Also she needs to  change PCP name to you   She has [address]

## 2021-03-25 NOTE — TELEPHONE ENCOUNTER
Spoke with patient ( verified) and relayed Dr. Tamanna Sibley message below--patient verbalizes understanding and agreement. Patient previously called her insurance to change her PCP to Dr. Medina Sides will call again.     Patient reports she sti

## 2021-03-25 NOTE — TELEPHONE ENCOUNTER
Thank you   Please call patient and ask her to call the insurance to change PCP to my name  PFTs normal

## 2021-04-07 ENCOUNTER — TELEPHONE (OUTPATIENT)
Dept: INTERNAL MEDICINE CLINIC | Facility: CLINIC | Age: 61
End: 2021-04-07

## 2021-04-07 DIAGNOSIS — H43.393 FLOATER, VITREOUS, BILATERAL: Primary | ICD-10-CM

## 2021-04-10 ENCOUNTER — HOSPITAL ENCOUNTER (OUTPATIENT)
Dept: CT IMAGING | Facility: HOSPITAL | Age: 61
Discharge: HOME | End: 2021-04-10
Attending: INTERNAL MEDICINE
Payer: COMMERCIAL

## 2021-04-10 ENCOUNTER — OFFICE VISIT (OUTPATIENT)
Dept: OPHTHALMOLOGY | Facility: CLINIC | Age: 61
End: 2021-04-10

## 2021-04-10 DIAGNOSIS — H40.1132 PRIMARY OPEN ANGLE GLAUCOMA OF BOTH EYES, MODERATE STAGE: Primary | ICD-10-CM

## 2021-04-10 DIAGNOSIS — H25.11 AGE-RELATED NUCLEAR CATARACT, RIGHT: ICD-10-CM

## 2021-04-10 DIAGNOSIS — R05.3 COUGH, PERSISTENT: ICD-10-CM

## 2021-04-10 PROCEDURE — 99213 OFFICE O/P EST LOW 20 MIN: CPT | Performed by: OPHTHALMOLOGY

## 2021-04-10 PROCEDURE — 92015 DETERMINE REFRACTIVE STATE: CPT | Performed by: OPHTHALMOLOGY

## 2021-04-10 PROCEDURE — 71250 CT THORAX DX C-: CPT | Performed by: INTERNAL MEDICINE

## 2021-04-10 RX ORDER — LATANOPROST 50 UG/ML
SOLUTION/ DROPS OPHTHALMIC
Qty: 3 BOTTLE | Refills: 3 | Status: SHIPPED | OUTPATIENT
Start: 2021-04-10 | End: 2021-09-28

## 2021-04-10 NOTE — PATIENT INSTRUCTIONS
Primary open angle glaucoma of both eyes, moderate stage  IOP is stable. Continue Latanoprost at bedtime in both eyes. Patient states that Latanoprost at night is irritating her eyes.   Recommend using warm compresses twice a day and patient told to us

## 2021-04-10 NOTE — ASSESSMENT & PLAN NOTE
IOP is stable. Continue Latanoprost at bedtime in both eyes. Patient states that Latanoprost at night is irritating her eyes.   Recommend using warm compresses twice a day and patient told to use one drop of any brand of artificial tears 5-10 minutes i

## 2021-04-10 NOTE — PROGRESS NOTES
Ana Herrera is a 56year old female. HPI:     HPI     Pt is here for an IOP check. Pt is taking Latanoprost both eyes at bedtime as directed.  Pt complains of eyes burning and tearing after putting her drops in every night, feels eyes bother her the re 12/19/2018) ([facility]); other (Lap Nissen Fundoplication surgery); colonoscopy (N/A, 9/6/2019) (Procedure: COLONOSCOPY;  Surgeon: Leandro Phillips MD;  Location: [address] [place] [address]); other surgical history (2005,2007,2008) (LAPAROSCOPIC LYSIS OF ADHESION); other surgic mouth every morning. 180 capsule 3   • amLODIPine Besylate 10 MG Oral Tab TK 1 T PO  QD 90 tablet 3   • IBUPROFEN 600 MG Oral Tab TAKE 1 TABLET BY MOUTH EVERY 8 HOURS WITH FOOD AS NEEDED FOR PAIN.  STOP IF STOMACH UPSET 15 tablet 0   • MAGNESIUM OR Take by +0.75 180 2.50    Type: OLD Progressive bifocal          Wearing Rx #2       Sphere Cylinder Axis Add    Right -0.75 +1.00 135     Left Depauw +0.25 155     Type: LAST RX           Manifest Refraction       Sphere Cylinder [place] [facility] Near [facility]

## 2021-04-30 ENCOUNTER — TELEPHONE (OUTPATIENT)
Dept: INTERNAL MEDICINE CLINIC | Facility: CLINIC | Age: 61
End: 2021-04-30

## 2021-04-30 DIAGNOSIS — R25.2 CRAMPS, MUSCLE, GENERAL: ICD-10-CM

## 2021-04-30 DIAGNOSIS — R25.2 MUSCLE CRAMPS: ICD-10-CM

## 2021-04-30 DIAGNOSIS — M79.10 MUSCLE PAIN: Primary | ICD-10-CM

## 2021-04-30 DIAGNOSIS — R74.8 ELEVATED CPK: ICD-10-CM

## 2021-04-30 NOTE — TELEPHONE ENCOUNTER
Patient was referred to Dr. Summer Seals for muscle pain. Dr. Kylah Paulino is referring patient to neurologist Dr. Sharif Burris.  Patient is requesting a referral. Please advise

## 2021-05-03 ENCOUNTER — TELEPHONE (OUTPATIENT)
Dept: INTERNAL MEDICINE CLINIC | Facility: CLINIC | Age: 61
End: 2021-05-03

## 2021-05-03 ENCOUNTER — MED REC SCAN ONLY (OUTPATIENT)
Dept: INTERNAL MEDICINE CLINIC | Facility: CLINIC | Age: 61
End: 2021-05-03

## 2021-05-03 NOTE — TELEPHONE ENCOUNTER
Signed pended referral and will forward to manage care  Please let patient know that this has to be approved by managed care before she can go and will wait upon approval

## 2021-05-03 NOTE — TELEPHONE ENCOUNTER
Patient is requesting for an updated referral for orthopedic doctor, Dr. Flora Christie. Patient states she wants to see this doctor for bilateral knee issue follow up. Patient states she does not have an appointment scheduled with this doctor yet.     Ph#

## 2021-05-04 NOTE — TELEPHONE ENCOUNTER
Referral has been sent to health [facility] for review. Thank you, Dayana Cisneros Specialist    Managed Care.

## 2021-05-05 NOTE — TELEPHONE ENCOUNTER
MOVL message viewed by patient.      From   Neelam Henriquez To   Ashley Thomas and Delivered   5/4/2021 10:57 AM   Last Read in [place]   5/4/2021 11:01 AM by Mars Chandler

## 2021-05-11 NOTE — TELEPHONE ENCOUNTER
pls let pt know   she saw the doctor at [facility] (rheumatologist Dr. Yan Almanzar ) And that  Is the one who gave her the referral to the neuromuscular neurologist Dr. Ronald Mcnulty-- do we have a neuromuscular neurologist here or can we provide her with an in net

## 2021-05-11 NOTE — TELEPHONE ENCOUNTER
Dr. Alexi Howell, referral to Neurology was cancelled by [facility] since managed care never responded to message. Please advise on questions. General 05/07/2021  2:09 PM Alex Ramirez.  Auto: Referral message -   Note    ----- Message -----  From: Deandre Smith

## 2021-05-12 NOTE — TELEPHONE ENCOUNTER
Message below was sent by patient's health plan. PCP message will be forward to health plan. Please allow time for health plan to responded. Thank you, Erick Pierson Specialist    Managed Care.

## 2021-05-14 NOTE — TELEPHONE ENCOUNTER
Per patient;s health plan patient can be seen by the in network providers listed below. Thank you, Chico Stoll Specialist    Managed Care.      Dr. Santiago Saucedo [phone]  Dr. Mariana Delgado [phone]  Dr. Karolina Edmondson [phone]

## 2021-05-19 ENCOUNTER — HOSPITAL ENCOUNTER (OUTPATIENT)
Dept: GENERAL RADIOLOGY | Facility: HOSPITAL | Age: 61
Discharge: HOME | End: 2021-05-19
Attending: ORTHOPAEDIC SURGERY
Payer: COMMERCIAL

## 2021-05-19 ENCOUNTER — OFFICE VISIT (OUTPATIENT)
Dept: ORTHOPEDICS CLINIC | Facility: CLINIC | Age: 61
End: 2021-05-19

## 2021-05-19 VITALS — RESPIRATION RATE: 14 BRPM | HEART RATE: 79 BPM | DIASTOLIC BLOOD PRESSURE: 89 MMHG | SYSTOLIC BLOOD PRESSURE: 133 MMHG

## 2021-05-19 DIAGNOSIS — R52 PAIN: Primary | ICD-10-CM

## 2021-05-19 DIAGNOSIS — R52 PAIN: ICD-10-CM

## 2021-05-19 DIAGNOSIS — M17.0 PRIMARY OSTEOARTHRITIS OF BOTH KNEES: ICD-10-CM

## 2021-05-19 PROCEDURE — 20610 DRAIN/INJ JOINT/BURSA W/O US: CPT | Performed by: PHYSICIAN ASSISTANT

## 2021-05-19 PROCEDURE — 3079F DIAST BP 80-89 MM HG: CPT | Performed by: ORTHOPAEDIC SURGERY

## 2021-05-19 PROCEDURE — 99243 OFF/OP CNSLTJ NEW/EST LOW 30: CPT | Performed by: ORTHOPAEDIC SURGERY

## 2021-05-19 PROCEDURE — 3075F SYST BP GE 130 - 139MM HG: CPT | Performed by: ORTHOPAEDIC SURGERY

## 2021-05-19 PROCEDURE — 73562 X-RAY EXAM OF KNEE 3: CPT | Performed by: ORTHOPAEDIC SURGERY

## 2021-05-19 RX ORDER — MELOXICAM 15 MG/1
15 TABLET ORAL DAILY
Qty: 30 TABLET | Refills: 0 | Status: SHIPPED | OUTPATIENT
Start: 2021-05-19 | End: 2021-05-20

## 2021-05-19 RX ORDER — TRIAMCINOLONE ACETONIDE 40 MG/ML
40 INJECTION, SUSPENSION INTRA-ARTICULAR; INTRAMUSCULAR ONCE
Status: COMPLETED | OUTPATIENT
Start: 2021-05-19 | End: 2021-05-19

## 2021-05-19 RX ADMIN — TRIAMCINOLONE ACETONIDE 40 MG: 40 INJECTION, SUSPENSION INTRA-ARTICULAR; INTRAMUSCULAR at 17:15:00

## 2021-05-19 NOTE — PROGRESS NOTES
I have reviewed Blake Reis's assessment of this patient and agree with his documentation. Per verbal order from Dr. Devante Vaughn, draw up and 4ml of 0.5% Marcaine and 1ml of Kenalog 40 for injection into right knee. Colletta Husk, RN  Patient provided education padilla

## 2021-05-19 NOTE — PROGRESS NOTES
NURSING INTAKE COMMENTS: Patient presents with:  Knee Pain: bilateral knee pain - pain started years ago- denies any injuries and falls- has had cortisone injections in the past- 10/10 pain level at all times-       HPI: This 56year old female presents to LAPAROSCOPIC LYSIS OF ADHESION   • OTHER SURGICAL HISTORY      right foot bunionectomy   • OTHER SURGICAL HISTORY  11-14-14    right superficial anterior peroneal nerve biopsy and right proximal thigh muscle biopsy.    • UPPER GI ENDOSCOPY PERFORMED  5/2 Neg        Social History    Occupational History      Not on file    Tobacco Use      Smoking status: Never Smoker      Smokeless tobacco: Never Used    Vaping Use      Vaping Use: Never used    Substance and Sexual Activity      Alcohol use: No        Al Future    Primary osteoarthritis of both knees  -     DRAIN/INJECT LARGE JOINT/BURSA  -     triamcinolone acetonide (KENALOG-40) 40 MG/ML injection 40 mg    Procedure: The risks and benefits of a cortisone injection were discussed with the patient.   An inf

## 2021-05-20 RX ORDER — MELOXICAM 15 MG/1
TABLET ORAL
Qty: 90 TABLET | Refills: 0 | Status: SHIPPED | OUTPATIENT
Start: 2021-05-20 | End: 2021-06-23

## 2021-06-01 ENCOUNTER — OFFICE VISIT (OUTPATIENT)
Dept: PODIATRY CLINIC | Facility: CLINIC | Age: 61
End: 2021-06-01

## 2021-06-01 DIAGNOSIS — M77.41 METATARSALGIA OF BOTH FEET: ICD-10-CM

## 2021-06-01 DIAGNOSIS — M77.42 METATARSALGIA OF BOTH FEET: ICD-10-CM

## 2021-06-01 DIAGNOSIS — G62.9 NEUROPATHY: Primary | ICD-10-CM

## 2021-06-01 PROCEDURE — 99213 OFFICE O/P EST LOW 20 MIN: CPT | Performed by: PODIATRIST

## 2021-06-01 NOTE — PROGRESS NOTES
HPI:    Patient ID: Rizwan De La Vega is a 56year old female. 58-year-old female presents complaining of pain associated with both of her feet. She feels as though at times it is getting worse.   Its of burning numbish feeling it bothers her more towards the Oral Cap Take 1 capsule (50,000 Units total) by mouth once a week.  (Patient not taking: Reported on 6/1/2021 ) 4 capsule 3     Allergies:  Erythromycin            PAIN  Celecoxib                   Comment:Other reaction(s): CELECOXIB  Sulfa Antibiotics

## 2021-06-14 ENCOUNTER — LAB ENCOUNTER (OUTPATIENT)
Dept: LAB | Age: 61
End: 2021-06-14
Attending: INTERNAL MEDICINE
Payer: COMMERCIAL

## 2021-06-14 ENCOUNTER — OFFICE VISIT (OUTPATIENT)
Dept: NEUROLOGY | Facility: CLINIC | Age: 61
End: 2021-06-14

## 2021-06-14 VITALS
HEART RATE: 68 BPM | RESPIRATION RATE: 16 BRPM | SYSTOLIC BLOOD PRESSURE: 124 MMHG | DIASTOLIC BLOOD PRESSURE: 70 MMHG | HEIGHT: 68 IN | WEIGHT: 198 LBS | BODY MASS INDEX: 30.01 KG/M2

## 2021-06-14 DIAGNOSIS — E55.9 VITAMIN D DEFICIENCY: ICD-10-CM

## 2021-06-14 DIAGNOSIS — R20.2 PARESTHESIAS: ICD-10-CM

## 2021-06-14 DIAGNOSIS — R25.2 CRAMPS, MUSCLE, GENERAL: ICD-10-CM

## 2021-06-14 DIAGNOSIS — R25.2 MUSCLE CRAMPS: ICD-10-CM

## 2021-06-14 DIAGNOSIS — R10.9 ABDOMINAL CRAMPING: ICD-10-CM

## 2021-06-14 DIAGNOSIS — R74.8 ELEVATED CPK: Primary | ICD-10-CM

## 2021-06-14 DIAGNOSIS — R74.8 ELEVATED CPK: ICD-10-CM

## 2021-06-14 PROCEDURE — 82607 VITAMIN B-12: CPT

## 2021-06-14 PROCEDURE — 99244 OFF/OP CNSLTJ NEW/EST MOD 40: CPT | Performed by: OTHER

## 2021-06-14 PROCEDURE — 86341 ISLET CELL ANTIBODY: CPT

## 2021-06-14 PROCEDURE — 82306 VITAMIN D 25 HYDROXY: CPT

## 2021-06-14 PROCEDURE — 36415 COLL VENOUS BLD VENIPUNCTURE: CPT

## 2021-06-14 PROCEDURE — 82550 ASSAY OF CK (CPK): CPT

## 2021-06-14 PROCEDURE — 86900 BLOOD TYPING SEROLOGIC ABO: CPT

## 2021-06-14 PROCEDURE — 86850 RBC ANTIBODY SCREEN: CPT

## 2021-06-14 PROCEDURE — 82085 ASSAY OF ALDOLASE: CPT

## 2021-06-14 PROCEDURE — 3074F SYST BP LT 130 MM HG: CPT | Performed by: OTHER

## 2021-06-14 PROCEDURE — 86901 BLOOD TYPING SEROLOGIC RH(D): CPT

## 2021-06-14 PROCEDURE — 3008F BODY MASS INDEX DOCD: CPT | Performed by: OTHER

## 2021-06-14 PROCEDURE — 3078F DIAST BP <80 MM HG: CPT | Performed by: OTHER

## 2021-06-14 NOTE — H&P
[facility] New Patient / Consult Visit    Miguel Escobar is a 56year old female.                          Referring MD: Onnie Eisenmenger    Patient presents with:  Neurologic Problem: C/O of muscle cramps all over body      HPI:    Gui Cera first occurred, it was in both calfs in the legs and then progressed up to thighs after 2-3 yrs and then in the past 5 yrs has been more diffuse.      Otherwise, patient denies any recent weight change, fevers, chills, nausea, double vision/ blurry vision / SURGICAL HISTORY      right foot bunionectomy   • OTHER SURGICAL HISTORY  11-14-14    right superficial anterior peroneal nerve biopsy and right proximal thigh muscle biopsy.    • UPPER GI ENDOSCOPY PERFORMED  5/2015   • YAG CAPSULOTOMY - OS - LEFT EYE Left Location: Left arm, Patient Position: Sitting, Cuff Size: adult)   Pulse 68   Resp 16   Ht 68\"   Wt 198 lb (89.8 kg)   BMI 30.11 kg/m²   Estimated body mass index is 30.11 kg/m² as calculated from the following:    Height as of this encounter: 68\".     We absent    Coordination:  Finger to nose normal bilaterally  Rapid alternating movements normal bilaterally  Heel to shin is normal bilaterally    DTRs:   2+, symmetric, throughout, toes downgoing bilaterally; no clonsu         Gait:  Normal casual, heel, t PHOSPHATASE      50 - 130 U/L  140 (H)   Total Bilirubin      0.1 - 2.0 mg/dL  0.5   TOTAL PROTEIN      6.4 - 8.2 g/dL  7.9   Albumin      3.4 - 5.0 g/dL  4.1   Globulin      2.8 - 4.4 g/dL  3.8   A/G Ratio      1.0 - 2.0  1.1   Patient Fasting?        Yes IMPRESSION AND PLAN:   Sadi Burkett is a 56year old female with PMHx significant for HTN, HL, OA, osteopenia and GERD, as well as elevated CPK, who presents for evaluation of cramps all over her body, which have been chronic, over the past 10 years, therapeutic options with muscle relaxants, Lyrica, as well as amitriptyline, without significant improvement, and I would defer starting her on any new medications while awaiting further work-up.     We did discuss the option of benzodiazepines such as rios

## 2021-06-23 ENCOUNTER — OFFICE VISIT (OUTPATIENT)
Dept: NEUROLOGY | Facility: CLINIC | Age: 61
End: 2021-06-23

## 2021-06-23 VITALS
WEIGHT: 189 LBS | BODY MASS INDEX: 28.64 KG/M2 | SYSTOLIC BLOOD PRESSURE: 118 MMHG | HEIGHT: 68 IN | DIASTOLIC BLOOD PRESSURE: 80 MMHG

## 2021-06-23 DIAGNOSIS — R25.2 CRAMPS, MUSCLE, GENERAL: ICD-10-CM

## 2021-06-23 DIAGNOSIS — G62.9 NEUROPATHY: Primary | ICD-10-CM

## 2021-06-23 NOTE — PROGRESS NOTES
Ms. Dana Joy appointment unfortunately rescheduled as neurologic consultation. EMG room was occupied. History, examination was deferred. She was used evaluated by neurologist at [facility].   She had been evaluated at [facility] by rheumatologist.  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 Impression: Muscle cramps, elevated CPK. No history suggest malignant hyperthermia. No clinical evidence for myopathy, myotonia. Suggested a trial of Zanaflex. I will not charge this patient for this consultation due to scheduling error.

## 2021-06-28 ENCOUNTER — PROCEDURE VISIT (OUTPATIENT)
Dept: NEUROLOGY | Facility: CLINIC | Age: 61
End: 2021-06-28

## 2021-06-28 DIAGNOSIS — G62.9 NEUROPATHY: Primary | ICD-10-CM

## 2021-06-28 PROCEDURE — 95910 NRV CNDJ TEST 7-8 STUDIES: CPT | Performed by: OTHER

## 2021-06-28 PROCEDURE — 95886 MUSC TEST DONE W/N TEST COMP: CPT | Performed by: OTHER

## 2021-06-28 NOTE — PROCEDURES
[address]  Phone: [phone]  Fax: [phone]    ELECTRODIAGNOSTIC REPORT          Patient: Ade Joseph Hand Dominance:  RIGHT HANDED   Patient ID: [id number] Referring Dr: conduction study is consistent with a very slight sensory peripheral neuropathy. Motor studies are normal.  Left peroneal to EDB cannot be obtained at the ankle–possible aberrant enervation.   Bilateral medial plantar sensory nerve action potentials were a ?10.0 Ref. L Tibial - Plantar      Med Sole Med Mall NR NR NR NR Med Sole - Med Mall 14 NR      Ref.   ?3.70 ? 10.0 ?10.0 Ref. EMG Summary Table     Spontaneous MUAP Recruitment   Muscle Nerve Roots IA Fib PSW John H.F. Comments Amp Dur.  PPP Pat

## 2021-07-08 ENCOUNTER — OFFICE VISIT (OUTPATIENT)
Dept: PODIATRY CLINIC | Facility: CLINIC | Age: 61
End: 2021-07-08

## 2021-07-08 DIAGNOSIS — G62.9 NEUROPATHY: Primary | ICD-10-CM

## 2021-07-08 PROCEDURE — 99213 OFFICE O/P EST LOW 20 MIN: CPT | Performed by: PODIATRIST

## 2021-07-09 NOTE — PROGRESS NOTES
HPI:    Patient ID: Rizwan De La Vega is a 59year old female. 49-year-old female presents to discuss findings of the EMG study and consideration of care.   She remains quite frustrated with numbish tingling sensations that radiate into the toes of both feet study demonstrates some sensory neuropathy there is no apparent radicular concern. Its rather generalized and if you will nonspecific. I do not have reservations in reference to this patient circulation.   She has had recent labs demonstrating a high norm

## 2021-07-12 NOTE — TELEPHONE ENCOUNTER
LAST ANNUAL WAS 11-23-16 AND LAST MAMMO WAS 10-7-16 WHICH WAS NOTED AT THAT TIME. PT NOTIFIED ORDER HAS BEEN PLACED AND PHONE NUMBER GIVEN FOR [address]. Detail Level: Detailed Show Applicator Variable?: Yes Render Note In Bullet Format When Appropriate: No Number Of Freeze-Thaw Cycles: 1 freeze-thaw cycle Duration Of Freeze Thaw-Cycle (Seconds): 4 Post-Care Instructions: I reviewed with the patient in detail post-care instructions. Patient is to wear sunprotection, and avoid picking at any of the treated lesions. Pt may apply Vaseline to crusted or scabbing areas. Consent: The patient's consent was obtained including but not limited to risks of crusting, scabbing, blistering, scarring, darker or lighter pigmentary change, recurrence, incomplete removal and infection.

## 2021-07-22 ENCOUNTER — APPOINTMENT (OUTPATIENT)
Dept: GENERAL RADIOLOGY | Age: 61
End: 2021-07-22
Attending: NURSE PRACTITIONER
Payer: COMMERCIAL

## 2021-07-22 ENCOUNTER — HOSPITAL ENCOUNTER (OUTPATIENT)
Age: 61
Discharge: HOME | End: 2021-07-22
Payer: COMMERCIAL

## 2021-07-22 VITALS
SYSTOLIC BLOOD PRESSURE: 138 MMHG | HEIGHT: 68 IN | HEART RATE: 75 BPM | RESPIRATION RATE: 12 BRPM | TEMPERATURE: 98 F | BODY MASS INDEX: 28.49 KG/M2 | WEIGHT: 188 LBS | OXYGEN SATURATION: 98 % | DIASTOLIC BLOOD PRESSURE: 77 MMHG

## 2021-07-22 DIAGNOSIS — J01.90 ACUTE SINUSITIS, RECURRENCE NOT SPECIFIED, UNSPECIFIED LOCATION: Primary | ICD-10-CM

## 2021-07-22 LAB
S PYO AG THROAT QL: NEGATIVE
SARS-COV-2 RNA RESP QL NAA+PROBE: NOT DETECTED

## 2021-07-22 PROCEDURE — 99214 OFFICE O/P EST MOD 30 MIN: CPT

## 2021-07-22 PROCEDURE — 71046 X-RAY EXAM CHEST 2 VIEWS: CPT | Performed by: NURSE PRACTITIONER

## 2021-07-22 PROCEDURE — 87880 STREP A ASSAY W/OPTIC: CPT

## 2021-07-22 RX ORDER — FLUTICASONE PROPIONATE 50 MCG
2 SPRAY, SUSPENSION (ML) NASAL DAILY
Qty: 16 G | Refills: 0 | Status: SHIPPED | OUTPATIENT
Start: 2021-07-22 | End: 2021-08-21

## 2021-07-22 RX ORDER — DOXYCYCLINE HYCLATE 100 MG/1
100 CAPSULE ORAL 2 TIMES DAILY
Qty: 20 CAPSULE | Refills: 0 | Status: SHIPPED | OUTPATIENT
Start: 2021-07-22 | End: 2021-08-01

## 2021-07-22 RX ORDER — METHYLPREDNISOLONE 4 MG/1
TABLET ORAL
Qty: 21 TABLET | Refills: 0 | Status: SHIPPED | OUTPATIENT
Start: 2021-07-22 | End: 2021-08-26 | Stop reason: ALTCHOICE

## 2021-07-22 RX ORDER — AMOXICILLIN AND CLAVULANATE POTASSIUM 875; 125 MG/1; MG/1
1 TABLET, FILM COATED ORAL 2 TIMES DAILY
Qty: 20 TABLET | Refills: 0 | Status: SHIPPED | OUTPATIENT
Start: 2021-07-22 | End: 2021-07-22 | Stop reason: CLARIF

## 2021-07-22 RX ORDER — CODEINE PHOSPHATE AND GUAIFENESIN 10; 100 MG/5ML; MG/5ML
5 SOLUTION ORAL EVERY 6 HOURS PRN
Qty: 118 ML | Refills: 0 | Status: SHIPPED | OUTPATIENT
Start: 2021-07-22 | End: 2021-08-26 | Stop reason: ALTCHOICE

## 2021-07-22 NOTE — ED INITIAL ASSESSMENT (HPI)
Cold symptoms for 5 days. Mild cough. Denies sore throat. + chills. No fever. Denies chest pain or SOB. Fully vaccinated for covid.

## 2021-07-22 NOTE — ED PROVIDER NOTES
Patient Seen in: [facility]      History   Patient presents with:  Nasal Congestion    Stated Complaint: Congestion    HPI/Subjective:   Lei Both is a 59year-old female presenting to the [facility] for evaluation.  Patient is complai INTRAOCULAR LENS IMPLANT Left 05/07/2018    L PC IOL with Dr. Donnie Vogt @ [facility]   • COLONOSCOPY N/A 11/11/2016    Procedure: COLONOSCOPY;  Surgeon: Davidson Rich MD;  Location: [address] [place] [address] ENDOSCOPY   • COLONOSCOPY N/A 9/6/2019    Procedure: COLONOSCOPY; Current:/77   Pulse 75   Temp 97.6 °F (36.4 °C) (Temporal)   Resp 12   Ht 172.7 cm (5' 8\")   Wt 85.3 kg   SpO2 98%   BMI 28.59 kg/m²         Physical Exam  Vitals and nursing note reviewed.    Constitutional:       Appearance: Normal appearance performed during the hospital encounter of 07/22/21   Rapid SARS-CoV-2 by PCR    Collection Time: 07/22/21  5:38 PM    Specimen: Nares;  Other   Result Value Ref Range    Rapid SARS-CoV-2 by PCR Not Detected Not Detected   POCT Rapid Strep    Collection Rosalia Garcia has no evidence of any emergent medical condition at this time which would warrant further evaluation or admission to the hospital. Patient will be discharged home with outpatient management and close PMD follow-up.  Discharge instructions discussed at [facility]

## 2021-08-07 ENCOUNTER — OFFICE VISIT (OUTPATIENT)
Dept: OPHTHALMOLOGY | Facility: CLINIC | Age: 61
End: 2021-08-07

## 2021-08-07 DIAGNOSIS — H40.1132 PRIMARY OPEN ANGLE GLAUCOMA OF BOTH EYES, MODERATE STAGE: Primary | ICD-10-CM

## 2021-08-07 PROCEDURE — 99213 OFFICE O/P EST LOW 20 MIN: CPT | Performed by: OPHTHALMOLOGY

## 2021-08-07 NOTE — PATIENT INSTRUCTIONS
Primary open angle glaucoma of both eyes, moderate stage  IOP is stable. Continue Latanoprost at bedtime in both eyes.          Return in 4 months for visual field, OCT and dilated eye exam.

## 2021-08-07 NOTE — ASSESSMENT & PLAN NOTE
IOP is stable. Continue Latanoprost at bedtime in both eyes.          Return in 4 months for visual field, OCT and dilated eye exam.

## 2021-08-07 NOTE — PROGRESS NOTES
Sanjuana Pabon is a 59year old female. HPI:     HPI     Consult      Additional comments: Per Dr Abdiaziz Rico               Comments     Pt is here for an IOP check. Pt is taking Latanoprost both eyes at bedtime as directed.  Pt complains of still having blur (LAPAROSCOPIC LYSIS OF ADHESION); other surgical history (right foot bunionectomy); other surgical history (11-14-14) (right superficial anterior peroneal nerve biopsy and right proximal thigh muscle biopsy.); and blepharoplasty anesthesia (Bilateral, 03/0 reaction(s): CELECOXIB  Sulfa Antibiotics       TONGUE SWELLING    Comment:Other reaction(s): SULFA (SULFONAMIDE ANTIBIOTICS)    ROS:     ROS     Positive for: Eyes    Negative for: Constitutional, Gastrointestinal, Neurological, Skin, Genitourinary, Muscu Visual Field - OU - Both Eyes      Meds This Visit:  Requested Prescriptions      No prescriptions requested or ordered in this encounter        Follow up instructions:  Return in about 4 months (around 12/7/2021) for Visual field, OCT, Complete eye exam.

## 2021-08-09 DIAGNOSIS — I10 ESSENTIAL HYPERTENSION: ICD-10-CM

## 2021-08-09 RX ORDER — AMLODIPINE BESYLATE 10 MG/1
TABLET ORAL
Qty: 90 TABLET | Refills: 1 | Status: SHIPPED | OUTPATIENT
Start: 2021-08-09 | End: 2022-01-31

## 2021-08-09 NOTE — TELEPHONE ENCOUNTER
Refill passed per [facility] protocol    Requested Prescriptions   Pending Prescriptions Disp Refills    AMLODIPINE BESYLATE 10 MG Oral Tab [Pharmacy Med Name: AMLODIPINE BESYLATE 10MG TABLETS] 90 tablet 3     Sig: TAKE 1 TABLET BY MOUTH EVERY DAY

## 2021-08-16 ENCOUNTER — TELEPHONE (OUTPATIENT)
Dept: PODIATRY CLINIC | Facility: CLINIC | Age: 61
End: 2021-08-16

## 2021-08-16 ENCOUNTER — HOSPITAL ENCOUNTER (OUTPATIENT)
Age: 61
Discharge: HOME | End: 2021-08-16
Payer: COMMERCIAL

## 2021-08-16 VITALS
OXYGEN SATURATION: 100 % | WEIGHT: 188 LBS | BODY MASS INDEX: 28.49 KG/M2 | DIASTOLIC BLOOD PRESSURE: 68 MMHG | SYSTOLIC BLOOD PRESSURE: 144 MMHG | HEART RATE: 69 BPM | HEIGHT: 68 IN | RESPIRATION RATE: 20 BRPM | TEMPERATURE: 98 F

## 2021-08-16 DIAGNOSIS — M79.672 FOOT PAIN, BILATERAL: Primary | ICD-10-CM

## 2021-08-16 DIAGNOSIS — M79.671 FOOT PAIN, BILATERAL: Primary | ICD-10-CM

## 2021-08-16 PROCEDURE — 99213 OFFICE O/P EST LOW 20 MIN: CPT

## 2021-08-16 RX ORDER — ACETAMINOPHEN AND CODEINE PHOSPHATE 300; 30 MG/1; MG/1
1 TABLET ORAL 2 TIMES DAILY PRN
Qty: 6 TABLET | Refills: 0 | Status: SHIPPED | OUTPATIENT
Start: 2021-08-16 | End: 2021-08-26 | Stop reason: ALTCHOICE

## 2021-08-16 NOTE — TELEPHONE ENCOUNTER
S/w pt and she states she has been having a lot of left foot pain, Dr. Anam Waller did sx in the past. Offered her appton 8/17 @ 1015am at [facility] but she declined as she has other commitments that day and she states she will go to  today.  Offered to make

## 2021-08-16 NOTE — TELEPHONE ENCOUNTER
Patient states she is experiencing a lot of left foot pain. States Dr. Lizzette Horn is the one who did the surgery and although she had been seeing Dr. Steven Ricardo she would like to continue care with Dr. Lizzette Horn since he is the one that did surgery.  Next appointmen

## 2021-08-16 NOTE — ED INITIAL ASSESSMENT (HPI)
Pt dx with neuropathy by provider, no meds given. Has been having bilateral top of foot pain x 3 days. Getting worse and unable to sleep r/t pain 10 / 10 .

## 2021-08-17 ENCOUNTER — TELEPHONE (OUTPATIENT)
Dept: INTERNAL MEDICINE CLINIC | Facility: CLINIC | Age: 61
End: 2021-08-17

## 2021-08-17 DIAGNOSIS — M25.511 RIGHT SHOULDER PAIN, UNSPECIFIED CHRONICITY: Primary | ICD-10-CM

## 2021-08-17 NOTE — TELEPHONE ENCOUNTER
Pt calling requesting new orthopedic referral for her right shoulder. She used all of her visits in her last one. Please advise.

## 2021-08-18 NOTE — TELEPHONE ENCOUNTER
From   Aquilino Otto To   [facility] and Delivered   8/17/2021  3:22 PM   Last Read in [address]   8/17/2021  8:15 PM by Betty Nelson

## 2021-08-26 ENCOUNTER — TELEPHONE (OUTPATIENT)
Dept: ORTHOPEDICS CLINIC | Facility: CLINIC | Age: 61
End: 2021-08-26

## 2021-08-26 ENCOUNTER — TELEPHONE (OUTPATIENT)
Dept: INTERNAL MEDICINE CLINIC | Facility: CLINIC | Age: 61
End: 2021-08-26

## 2021-08-26 ENCOUNTER — LAB ENCOUNTER (OUTPATIENT)
Dept: LAB | Age: 61
End: 2021-08-26
Attending: INTERNAL MEDICINE
Payer: COMMERCIAL

## 2021-08-26 ENCOUNTER — OFFICE VISIT (OUTPATIENT)
Dept: PODIATRY CLINIC | Facility: CLINIC | Age: 61
End: 2021-08-26

## 2021-08-26 ENCOUNTER — OFFICE VISIT (OUTPATIENT)
Dept: INTERNAL MEDICINE CLINIC | Facility: CLINIC | Age: 61
End: 2021-08-26

## 2021-08-26 ENCOUNTER — OFFICE VISIT (OUTPATIENT)
Dept: ORTHOPEDICS CLINIC | Facility: CLINIC | Age: 61
End: 2021-08-26

## 2021-08-26 VITALS — WEIGHT: 188 LBS | HEIGHT: 68 IN | BODY MASS INDEX: 28.49 KG/M2

## 2021-08-26 VITALS
SYSTOLIC BLOOD PRESSURE: 125 MMHG | WEIGHT: 196 LBS | DIASTOLIC BLOOD PRESSURE: 82 MMHG | BODY MASS INDEX: 29.7 KG/M2 | RESPIRATION RATE: 17 BRPM | HEIGHT: 68 IN | HEART RATE: 89 BPM

## 2021-08-26 DIAGNOSIS — M75.121 NONTRAUMATIC COMPLETE TEAR OF RIGHT ROTATOR CUFF: Primary | ICD-10-CM

## 2021-08-26 DIAGNOSIS — G62.9 NEUROPATHY: Primary | ICD-10-CM

## 2021-08-26 DIAGNOSIS — R73.03 PREDIABETES: ICD-10-CM

## 2021-08-26 DIAGNOSIS — L84 CALLUS OF FOOT: ICD-10-CM

## 2021-08-26 DIAGNOSIS — J01.01 ACUTE RECURRENT MAXILLARY SINUSITIS: ICD-10-CM

## 2021-08-26 DIAGNOSIS — R25.2 MUSCLE CRAMPS: ICD-10-CM

## 2021-08-26 DIAGNOSIS — G47.00 INSOMNIA, UNSPECIFIED TYPE: ICD-10-CM

## 2021-08-26 DIAGNOSIS — M77.42 METATARSALGIA OF BOTH FEET: ICD-10-CM

## 2021-08-26 DIAGNOSIS — R74.8 ELEVATED CPK: ICD-10-CM

## 2021-08-26 DIAGNOSIS — M79.671 PAIN IN BOTH FEET: ICD-10-CM

## 2021-08-26 DIAGNOSIS — R73.03 PREDIABETES: Primary | ICD-10-CM

## 2021-08-26 DIAGNOSIS — M77.41 METATARSALGIA OF BOTH FEET: ICD-10-CM

## 2021-08-26 DIAGNOSIS — M79.672 PAIN IN BOTH FEET: ICD-10-CM

## 2021-08-26 LAB
ALBUMIN SERPL-MCNC: 4 G/DL (ref 3.4–5)
ALBUMIN/GLOB SERPL: 1 {RATIO} (ref 1–2)
ALP LIVER SERPL-CCNC: 126 U/L
ALT SERPL-CCNC: 36 U/L
ANION GAP SERPL CALC-SCNC: 3 MMOL/L (ref 0–18)
AST SERPL-CCNC: 18 U/L (ref 15–37)
BILIRUB SERPL-MCNC: 0.4 MG/DL (ref 0.1–2)
BUN BLD-MCNC: 14 MG/DL (ref 7–18)
BUN/CREAT SERPL: 14.7 (ref 10–20)
CALCIUM BLD-MCNC: 9.2 MG/DL (ref 8.5–10.1)
CHLORIDE SERPL-SCNC: 105 MMOL/L (ref 98–112)
CK SERPL-CCNC: 298 U/L
CO2 SERPL-SCNC: 31 MMOL/L (ref 21–32)
CREAT BLD-MCNC: 0.95 MG/DL
DEPRECATED RDW RBC AUTO: 42.5 FL (ref 35.1–46.3)
ERYTHROCYTE [DISTWIDTH] IN BLOOD BY AUTOMATED COUNT: 14.3 % (ref 11–15)
EST. AVERAGE GLUCOSE BLD GHB EST-MCNC: 126 MG/DL (ref 68–126)
GLOBULIN PLAS-MCNC: 4.1 G/DL (ref 2.8–4.4)
GLUCOSE BLD-MCNC: 108 MG/DL (ref 70–99)
HBA1C MFR BLD HPLC: 6 % (ref ?–5.7)
HCT VFR BLD AUTO: 46 %
HGB BLD-MCNC: 15 G/DL
M PROTEIN MFR SERPL ELPH: 8.1 G/DL (ref 6.4–8.2)
MCH RBC QN AUTO: 26.6 PG (ref 26–34)
MCHC RBC AUTO-ENTMCNC: 32.6 G/DL (ref 31–37)
MCV RBC AUTO: 81.6 FL
OSMOLALITY SERPL CALC.SUM OF ELEC: 289 MOSM/KG (ref 275–295)
PATIENT FASTING Y/N/NP: NO
PLATELET # BLD AUTO: 427 10(3)UL (ref 150–450)
POTASSIUM SERPL-SCNC: 4.1 MMOL/L (ref 3.5–5.1)
RBC # BLD AUTO: 5.64 X10(6)UL
SODIUM SERPL-SCNC: 139 MMOL/L (ref 136–145)
TSI SER-ACNC: 0.78 MIU/ML (ref 0.36–3.74)
WBC # BLD AUTO: 12 X10(3) UL (ref 4–11)

## 2021-08-26 PROCEDURE — 3079F DIAST BP 80-89 MM HG: CPT | Performed by: INTERNAL MEDICINE

## 2021-08-26 PROCEDURE — 80053 COMPREHEN METABOLIC PANEL: CPT

## 2021-08-26 PROCEDURE — 99213 OFFICE O/P EST LOW 20 MIN: CPT | Performed by: PHYSICIAN ASSISTANT

## 2021-08-26 PROCEDURE — 36415 COLL VENOUS BLD VENIPUNCTURE: CPT

## 2021-08-26 PROCEDURE — 82550 ASSAY OF CK (CPK): CPT

## 2021-08-26 PROCEDURE — 3074F SYST BP LT 130 MM HG: CPT | Performed by: INTERNAL MEDICINE

## 2021-08-26 PROCEDURE — 99213 OFFICE O/P EST LOW 20 MIN: CPT | Performed by: PODIATRIST

## 2021-08-26 PROCEDURE — 83036 HEMOGLOBIN GLYCOSYLATED A1C: CPT

## 2021-08-26 PROCEDURE — 84443 ASSAY THYROID STIM HORMONE: CPT

## 2021-08-26 PROCEDURE — 99214 OFFICE O/P EST MOD 30 MIN: CPT | Performed by: INTERNAL MEDICINE

## 2021-08-26 PROCEDURE — 20610 DRAIN/INJ JOINT/BURSA W/O US: CPT | Performed by: PHYSICIAN ASSISTANT

## 2021-08-26 PROCEDURE — 3008F BODY MASS INDEX DOCD: CPT | Performed by: INTERNAL MEDICINE

## 2021-08-26 PROCEDURE — 3008F BODY MASS INDEX DOCD: CPT | Performed by: PHYSICIAN ASSISTANT

## 2021-08-26 PROCEDURE — 85027 COMPLETE CBC AUTOMATED: CPT

## 2021-08-26 RX ORDER — TRAZODONE HYDROCHLORIDE 100 MG/1
100 TABLET ORAL NIGHTLY
Qty: 90 TABLET | Refills: 3 | Status: SHIPPED | OUTPATIENT
Start: 2021-08-26 | End: 2021-10-04

## 2021-08-26 RX ORDER — TRIAMCINOLONE ACETONIDE 40 MG/ML
40 INJECTION, SUSPENSION INTRA-ARTICULAR; INTRAMUSCULAR ONCE
Status: COMPLETED | OUTPATIENT
Start: 2021-08-26 | End: 2021-08-26

## 2021-08-26 RX ORDER — IBUPROFEN 600 MG/1
600 TABLET ORAL EVERY 8 HOURS PRN
Qty: 90 TABLET | Refills: 2 | Status: SHIPPED | OUTPATIENT
Start: 2021-08-26 | End: 2021-10-04

## 2021-08-26 RX ORDER — DOXYCYCLINE HYCLATE 100 MG/1
100 TABLET, DELAYED RELEASE ORAL 2 TIMES DAILY
Qty: 14 TABLET | Refills: 0 | Status: SHIPPED | OUTPATIENT
Start: 2021-08-26 | End: 2021-08-30

## 2021-08-26 RX ORDER — DIAZEPAM 2 MG/1
2 TABLET ORAL DAILY PRN
Qty: 10 TABLET | Refills: 0 | Status: SHIPPED | OUTPATIENT
Start: 2021-08-26 | End: 2021-10-04 | Stop reason: ALTCHOICE

## 2021-08-26 RX ORDER — GABAPENTIN 300 MG/1
300 CAPSULE ORAL NIGHTLY
Qty: 30 CAPSULE | Refills: 0 | Status: SHIPPED | OUTPATIENT
Start: 2021-08-26 | End: 2021-10-04 | Stop reason: ALTCHOICE

## 2021-08-26 RX ORDER — IBUPROFEN 200 MG
600 TABLET ORAL EVERY 6 HOURS PRN
Qty: 120 TABLET | Refills: 2 | Status: SHIPPED | OUTPATIENT
Start: 2021-08-26 | End: 2021-08-26

## 2021-08-26 RX ORDER — MONTELUKAST SODIUM 10 MG/1
10 TABLET ORAL DAILY
Qty: 90 TABLET | Refills: 3 | Status: SHIPPED | OUTPATIENT
Start: 2021-08-26 | End: 2022-08-21

## 2021-08-26 RX ADMIN — TRIAMCINOLONE ACETONIDE 40 MG: 40 INJECTION, SUSPENSION INTRA-ARTICULAR; INTRAMUSCULAR at 10:20:00

## 2021-08-26 NOTE — PROGRESS NOTES
Luz Elena Hanson is a 59year old female.   Patient presents with:  Sinusitis  Follow - Up      HPI:   Urgent vist   C/c sinus issues /   Rolly Chimera went to  in July foer her sinus and IC 8/16 due to neropathy so bad it was hurting also neuropathy in the feet bothe 2015 she saw the neurologist Dr. Enamorado and was recommended to go to the pain doctors--Flexeril was tried  Needs to go back to see Dr. Keyla Hester the podiatrist for her orthotics                 History   Last seen in February and since then in March she saw  visit   C/c htn   C/o fell on July 8th and has some left rib pain and back pains   Had some ct scans and would like to go through it  Needs referrals    Usually gets shots to her knees and shoulders- was told to follow-up with a new doctor     ALEJANDRO barrientos mg total) by mouth nightly.  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      Drug use: No       REVIEW OF SYSTEMS:   GENERAL HEALTH: No fevers, addictive properties and to use the Valium with caution–she was very reluctant to start it but I agreed on taking it    Elevated CPK  -     CK CREATINE KINASE (NOT CREATININE);  Future  Recheck , noted it had come down a little , has seen chaya epstein for this i

## 2021-08-26 NOTE — TELEPHONE ENCOUNTER
Prior authorization for doxycycline has been initiated through [employer] using keycode: [id number] It takes about 1-5 business days for a decision to come back.

## 2021-08-26 NOTE — PROGRESS NOTES
Per verbal order from AMIRA Boland, draw up 3ml of 0.5% Marcaine & 2ml 1% lidocaine and 1ml of Kenalog 40 for cortisone injection to right shoulder, John Lynch, [facility]    Patient provided education handout for cortisone injection.    Patient le

## 2021-08-26 NOTE — TELEPHONE ENCOUNTER
Pt asking for Rx from today to be changed from ibuprofen 200 mg to 600 mg - insurance will not cover the 200 mg

## 2021-08-26 NOTE — PROGRESS NOTES
Amber Chavarria is a 59year old female. Patient presents with: Foot Pain: pt in for mary foot pain, pt completed EMG, pain is 10/10, hard to walk         HPI:   Patient presents to the clinic she has had her EMG done.   Her pain is 10 out of 10 is very diffi problem    • Chondromalacia    • Colon polyps    • Degenerative disc disease    • Diverticular disease    • Esophageal reflux    • Fibroids    • Hammertoe    • High blood pressure    • Insomnia    • Neuropathy    • Primary open angle glaucoma of both eyes Social History    Socioeconomic History      Marital status:       Spouse name: Not on file      Number of children: Not on file      Years of education: Not on file      Highest education level: Not on file    Tobacco Use      Smoking status: [employer] painful. ASSESSMENT AND PLAN:   Diagnoses and all orders for this visit:    Neuropathy    Metatarsalgia of both feet    Callus of foot        Plan:  At today's office visit I reviewed with her some surgical tape needs we can probably do a revision bunion

## 2021-08-26 NOTE — TELEPHONE ENCOUNTER
Patient is calling and asking for a referral to see the Podiatrist, Dr. Bob Loera. Patient advised that she just saw him for the Neuropathy & Pain in her foot/feet and Dr would like to have her follow up. She does not have any more appointments on her current referral.     Patient is currently scheduled to see Dr. Carrington Vasquez again on 9/13      Please Advise.

## 2021-08-26 NOTE — PROGRESS NOTES
NURSING INTAKE COMMENTS: Patient presents with: Follow - Up: right rotator cuff tear ,pain has been severe at a 10/10 ,radiates to her neck as well       HPI: This 59year old female presents today with complaints of right shoulder follow-up.   She has a k Miri Abdalla MD;  Location: [facility] ENDOSCOPY   • EXCISION OF CHALAZION, SINGLE - OD - RIGHT EYE Right 6.27/2017    Nasally   • HC ARTHROCENTESIS OR INJECT MAJOR JOINT W/O US     • HYSTERECTOMY  1996    KAI   • OTHER      Lap Nissen Fundoplication surgery   • OTHER Vaping Use: Never used    Substance and Sexual Activity      Alcohol use: No        Alcohol/week: 0.0 standard drinks        Comment: None.        Drug use: No      Sexual activity: Yes        Birth control/protection: Hysterectomy       Review of Systems: scapula.  strength is intact. Neurological: Right hand neurologically intact light touch motor strength testing. Imaging:   No results found.      Labs:  Lab Results   Component Value Date    WBC 8.7 12/04/2020    HGB 15.3 12/04/2020    .0 1

## 2021-08-27 ENCOUNTER — TELEPHONE (OUTPATIENT)
Dept: INTERNAL MEDICINE CLINIC | Facility: CLINIC | Age: 61
End: 2021-08-27

## 2021-08-27 NOTE — TELEPHONE ENCOUNTER
Spoke to [facility] and they ran it for 10 days and its still not being approved.  Please provide another medication if appropriate

## 2021-08-27 NOTE — TELEPHONE ENCOUNTER
Insurance will cover for 10 days not 7 days.  Please change the medication to something else or change the duration of the medication

## 2021-08-27 NOTE — TELEPHONE ENCOUNTER
Doxycycline delayed release is over $100. Kar Siddiqi from pharmacy would like to know if it is okay to order the regular release Doxycyline.  Medication pended for your review and approval.      Doxycycline Hyclate 100 MG Oral Tab EC 14 tablet 0 8/26/2021 9/5/20

## 2021-08-30 RX ORDER — DOXYCYCLINE HYCLATE 100 MG
100 TABLET ORAL 2 TIMES DAILY
Qty: 14 TABLET | Refills: 0 | Status: SHIPPED | OUTPATIENT
Start: 2021-08-30 | End: 2021-10-04

## 2021-09-08 ENCOUNTER — PATIENT MESSAGE (OUTPATIENT)
Dept: INTERNAL MEDICINE CLINIC | Facility: CLINIC | Age: 61
End: 2021-09-08

## 2021-09-10 ENCOUNTER — TELEPHONE (OUTPATIENT)
Dept: INTERNAL MEDICINE CLINIC | Facility: CLINIC | Age: 61
End: 2021-09-10

## 2021-09-10 NOTE — TELEPHONE ENCOUNTER
Pt calling stating that she was informed by her insurance that she needs to go through her Dr to get referral for   [facility] [place]   Fx#: [phone]  Medical Record#: [id number]    Pt states she discussed this with Dr previously. Please advise.

## 2021-09-10 NOTE — TELEPHONE ENCOUNTER
From: Aicha Hunter  To: Pricila Kim MD  Sent: 9/8/2021 6:58 PM CDT  Subject: Other    Hello Dr Pricila Kim,    What is it that I need to do to get a referral to the [facility] for my muscle cramps ?    The cramps are getting worse, to the point where I

## 2021-09-13 ENCOUNTER — OFFICE VISIT (OUTPATIENT)
Dept: PODIATRY CLINIC | Facility: CLINIC | Age: 61
End: 2021-09-13

## 2021-09-13 DIAGNOSIS — M20.42 HAMMER TOES OF BOTH FEET: ICD-10-CM

## 2021-09-13 DIAGNOSIS — M77.42 METATARSALGIA OF BOTH FEET: ICD-10-CM

## 2021-09-13 DIAGNOSIS — L84 CALLUS OF FOOT: Primary | ICD-10-CM

## 2021-09-13 DIAGNOSIS — M79.675 PAIN IN TOES OF BOTH FEET: ICD-10-CM

## 2021-09-13 DIAGNOSIS — G62.9 NEUROPATHY: ICD-10-CM

## 2021-09-13 DIAGNOSIS — M77.41 METATARSALGIA OF BOTH FEET: ICD-10-CM

## 2021-09-13 DIAGNOSIS — M20.41 HAMMER TOES OF BOTH FEET: ICD-10-CM

## 2021-09-13 DIAGNOSIS — M79.674 PAIN IN TOES OF BOTH FEET: ICD-10-CM

## 2021-09-13 PROCEDURE — 99213 OFFICE O/P EST LOW 20 MIN: CPT | Performed by: PODIATRIST

## 2021-09-15 NOTE — PROGRESS NOTES
Miguel Escobar is a 59year old female. Patient presents with: Follow - Up: Patient tried to take the gabapentin 300mg 1 po nightly, but patient states that it makes her feel like she can not function. Patient still rates 10/10.     HPI:   Patient presents STOMACH UPSET 15 tablet 0   • MAGNESIUM OR Take by mouth.         Past Medical History:   Diagnosis Date   • Abscess of left thigh    • Acute meniscal tear of knee    • Age-related nuclear cataract of both eyes 2/10/2015   • Anal sphincter incontinence 4/28 Relation Age of Onset   • Hypertension Father    • Hypertension Mother    • Hypertension Sister    • Cancer Sister         liver cancer   • Hypertension Brother    • Diabetes Neg    • Glaucoma Neg    • Macular degeneration Neg    • Breast Cancer Neg    • O night we can treat that with gabapentin. 4. Musculoskeletal: The patient has hallux valgus deformity bilateral.  This is painful.       ASSESSMENT AND PLAN:   Diagnoses and all orders for this visit:    Callus of foot    Metatarsalgia of both feet    Pain

## 2021-09-24 DIAGNOSIS — K21.9 GASTROESOPHAGEAL REFLUX DISEASE WITHOUT ESOPHAGITIS: ICD-10-CM

## 2021-09-24 RX ORDER — OMEPRAZOLE 20 MG/1
40 CAPSULE, DELAYED RELEASE ORAL EVERY MORNING
Qty: 180 CAPSULE | Refills: 1 | Status: SHIPPED | OUTPATIENT
Start: 2021-09-24

## 2021-09-24 NOTE — TELEPHONE ENCOUNTER
Refill passed per [facility] protocol.   Requested Prescriptions   Pending Prescriptions Disp Refills    OMEPRAZOLE 20 MG Oral Capsule Delayed Release [Pharmacy Med Name: OMEPRAZOLE 20MG CAPSULES] 180 capsule 3     Sig: TAKE 2 CAPSULES(40 MG) BY MOUTH

## 2021-09-28 RX ORDER — LATANOPROST 50 UG/ML
SOLUTION/ DROPS OPHTHALMIC
Qty: 7.5 ML | Refills: 3 | Status: SHIPPED | OUTPATIENT
Start: 2021-09-28

## 2021-09-28 NOTE — TELEPHONE ENCOUNTER
LDE: 12/22/2020  Last visit: 8/7/21  Due for: VF, OCT and EE  Upcoming visit: 12/28/21    Routed to [facility] [place]

## 2021-09-29 ENCOUNTER — TELEPHONE (OUTPATIENT)
Dept: INTERNAL MEDICINE CLINIC | Facility: CLINIC | Age: 61
End: 2021-09-29

## 2021-10-04 ENCOUNTER — LAB ENCOUNTER (OUTPATIENT)
Dept: LAB | Age: 61
End: 2021-10-04
Attending: INTERNAL MEDICINE
Payer: COMMERCIAL

## 2021-10-04 ENCOUNTER — HOSPITAL ENCOUNTER (OUTPATIENT)
Dept: CT IMAGING | Age: 61
Discharge: HOME | End: 2021-10-04
Attending: INTERNAL MEDICINE
Payer: COMMERCIAL

## 2021-10-04 ENCOUNTER — OFFICE VISIT (OUTPATIENT)
Dept: INTERNAL MEDICINE CLINIC | Facility: CLINIC | Age: 61
End: 2021-10-04

## 2021-10-04 VITALS
WEIGHT: 194 LBS | SYSTOLIC BLOOD PRESSURE: 126 MMHG | BODY MASS INDEX: 29.4 KG/M2 | HEIGHT: 68 IN | DIASTOLIC BLOOD PRESSURE: 81 MMHG | HEART RATE: 71 BPM

## 2021-10-04 DIAGNOSIS — N20.0 RIGHT KIDNEY STONE: ICD-10-CM

## 2021-10-04 DIAGNOSIS — I10 ESSENTIAL HYPERTENSION: ICD-10-CM

## 2021-10-04 DIAGNOSIS — N02.9 BENIGN HEMATURIA: ICD-10-CM

## 2021-10-04 DIAGNOSIS — R05.3 CHRONIC COUGH: ICD-10-CM

## 2021-10-04 DIAGNOSIS — Z12.31 SCREENING MAMMOGRAM, ENCOUNTER FOR: ICD-10-CM

## 2021-10-04 DIAGNOSIS — G62.9 NEUROPATHY: ICD-10-CM

## 2021-10-04 DIAGNOSIS — D72.829 LEUKOCYTOSIS, UNSPECIFIED TYPE: ICD-10-CM

## 2021-10-04 DIAGNOSIS — R10.32 LEFT LOWER QUADRANT ABDOMINAL PAIN: ICD-10-CM

## 2021-10-04 DIAGNOSIS — R10.32 LEFT LOWER QUADRANT ABDOMINAL PAIN: Primary | ICD-10-CM

## 2021-10-04 DIAGNOSIS — Z23 NEED FOR VACCINATION: ICD-10-CM

## 2021-10-04 DIAGNOSIS — R73.03 PREDIABETES: ICD-10-CM

## 2021-10-04 PROCEDURE — 3074F SYST BP LT 130 MM HG: CPT | Performed by: INTERNAL MEDICINE

## 2021-10-04 PROCEDURE — 90471 IMMUNIZATION ADMIN: CPT | Performed by: INTERNAL MEDICINE

## 2021-10-04 PROCEDURE — 81002 URINALYSIS NONAUTO W/O SCOPE: CPT | Performed by: INTERNAL MEDICINE

## 2021-10-04 PROCEDURE — 85027 COMPLETE CBC AUTOMATED: CPT

## 2021-10-04 PROCEDURE — 3008F BODY MASS INDEX DOCD: CPT | Performed by: INTERNAL MEDICINE

## 2021-10-04 PROCEDURE — 74176 CT ABD & PELVIS W/O CONTRAST: CPT | Performed by: INTERNAL MEDICINE

## 2021-10-04 PROCEDURE — 36415 COLL VENOUS BLD VENIPUNCTURE: CPT

## 2021-10-04 PROCEDURE — 99214 OFFICE O/P EST MOD 30 MIN: CPT | Performed by: INTERNAL MEDICINE

## 2021-10-04 PROCEDURE — 90686 IIV4 VACC NO PRSV 0.5 ML IM: CPT | Performed by: INTERNAL MEDICINE

## 2021-10-04 PROCEDURE — 3079F DIAST BP 80-89 MM HG: CPT | Performed by: INTERNAL MEDICINE

## 2021-10-04 RX ORDER — FLUTICASONE FUROATE AND VILANTEROL TRIFENATATE 100; 25 UG/1; UG/1
1 POWDER RESPIRATORY (INHALATION) DAILY
Qty: 1 EACH | Refills: 0 | Status: SHIPPED | OUTPATIENT
Start: 2021-10-04 | End: 2021-11-29

## 2021-10-04 NOTE — PROGRESS NOTES
Sanjuana Pabon is a 59year old female.   Patient presents with:  Abdominal Pain  Muscle Pain      HPI:   Pt comes for f/u  C/c mult complaints   C/o abd pains and muscle aches   She has been seeing Ortho for the neuropathy and also received shots to the jackie which was ordered last time   she has noticed that the rescue inhaler does help                       HISTORY  History from August 2020   Has seen the rheumatologist, physiatry and gone for physical therapy  Has tried Cymbalta given by physiatry but in [place] sratch her back   No falls trauma or injury that she is aware of      History   She has had both muscle and nerve biopsies by dr Radha Shrestha   She gets these cramps every day   Steroids do not help either  Noted that she had try cyclobenzaprine 10 mg in July 2 Application topically 2 (two) times daily as needed. apply to affected area 45 g 0   • IBUPROFEN 600 MG Oral Tab TAKE 1 TABLET BY MOUTH EVERY 8 HOURS WITH FOOD AS NEEDED FOR PAIN. STOP IF STOMACH UPSET 15 tablet 0   • MAGNESIUM OR Take by mouth.         Pas nerve biopsy and right proximal thigh muscle biopsy.    • Upper gi endoscopy performed  5/2015   • Yag capsulotomy - os - left eye Left 12/19/2018    [facility]      Social History:  Social History    Tobacco Use      Smoking status: Never Smoker      Smokeless to foods), can look up DASH diet, exercise 30 min a day , monitor bp     Chronic cough  -     PULMONARY - INTERNAL  -     fluticasone furoate-vilanterol (BREO ELLIPTA) 100-25 MCG/INH Inhalation Aerosol Powder, Breath Activated;  Inhale 1 puff into the lungs da

## 2021-10-04 NOTE — PATIENT INSTRUCTIONS
Prediabetes  You have been diagnosed with prediabetes. This means that the level of sugar (glucose) in your blood is too high. If you have prediabetes, you are at risk for developing type 2 diabetes.  Type 2 diabetes is diagnosed when the level of glucose 100 mg/dL to 125 mg/dL. Diabetes is 126 mg/dL or higher. · Glucose tolerance test. Your blood sugar is measured before and after you drink a very sugary liquid. A normal test result is 139 milligrams per deciliter (mg/dL) or lower.  Prediabetes is 140 mg/d often  · Lose weight for no reason  · Feel numbness or tingling in your fingers or toes  · Have cuts or bruises that don’t seem to heal  · Have blurry vision  Marie last reviewed this educational content on 6/1/2019  © 5561-1938 [employer]

## 2021-10-14 ENCOUNTER — TELEPHONE (OUTPATIENT)
Dept: INTERNAL MEDICINE CLINIC | Facility: CLINIC | Age: 61
End: 2021-10-14

## 2021-10-14 ENCOUNTER — OFFICE VISIT (OUTPATIENT)
Dept: ORTHOPEDICS CLINIC | Facility: CLINIC | Age: 61
End: 2021-10-14

## 2021-10-14 DIAGNOSIS — M17.0 PRIMARY OSTEOARTHRITIS OF BOTH KNEES: ICD-10-CM

## 2021-10-14 DIAGNOSIS — M25.511 RIGHT SHOULDER PAIN, UNSPECIFIED CHRONICITY: Primary | ICD-10-CM

## 2021-10-14 DIAGNOSIS — M75.121 NONTRAUMATIC COMPLETE TEAR OF RIGHT ROTATOR CUFF: Primary | ICD-10-CM

## 2021-10-14 PROCEDURE — 99213 OFFICE O/P EST LOW 20 MIN: CPT | Performed by: ORTHOPAEDIC SURGERY

## 2021-10-14 NOTE — TELEPHONE ENCOUNTER
Please sign off if you agree with plan of care    Thank you,  [facility]   Referral specialist

## 2021-10-14 NOTE — PROGRESS NOTES
NURSING INTAKE COMMENTS: Patient presents with:  Shoulder Pain: Pt here for f/u on Right shoulder pain, reports injection at last visit did not help at all, pain today \"10/10\"      HPI: This 59year old female presents today with complaints of right shou MD;  Location: [facility] ENDOSCOPY   • EXCISION OF CHALAZION, SINGLE - OD - RIGHT EYE Right 6.27/2017    Nasally   • HC ARTHROCENTESIS OR INJECT MAJOR JOINT W/O US     • HYSTERECTOMY  1996    KAI   • OTHER      Lap Nissen Fundoplication surgery   • OTHER SURGICA • Diabetes Neg    • Glaucoma Neg    • Macular degeneration Neg    • Breast Cancer Neg    • Ovarian Cancer Neg        Social History    Occupational History      Not on file    Tobacco Use      Smoking status: Never Smoker      Smokeless tobacco: Never Us strength 4-5 belly press 5 out of 5.   Saldana and Neer impingement signs are strongly positive  Neurological: Right hand light touch and pinprick sensory exam normal. Lateral shoulder light touch and pinprick sensory examination normal.  strength,wrist hiatal hernia. No gastric obstruction. Duodenum is unremarkable. PANCREAS: No lesion, fluid collection, ductal dilatation, or atrophy. AORTA/VASCULAR:   No aortic aneurysm or significant atherosclerosis. RETROPERITONEUM: No mass or enlarged adenopathy. discussed. Questions were answered. No guarantees were made. She would like to proceed with surgery. I did review her previous MRI imaging today. It shows minimal atrophy of the supraspinatus. I will see her again on day of the procedure.   I advised

## 2021-10-19 ENCOUNTER — TELEPHONE (OUTPATIENT)
Dept: ORTHOPEDICS CLINIC | Facility: CLINIC | Age: 61
End: 2021-10-19

## 2021-10-28 ENCOUNTER — HOSPITAL ENCOUNTER (OUTPATIENT)
Dept: ULTRASOUND IMAGING | Age: 61
Discharge: HOME | End: 2021-10-28
Attending: INTERNAL MEDICINE
Payer: COMMERCIAL

## 2021-10-28 DIAGNOSIS — R10.32 LEFT LOWER QUADRANT ABDOMINAL PAIN: ICD-10-CM

## 2021-10-28 PROCEDURE — 76770 US EXAM ABDO BACK WALL COMP: CPT | Performed by: INTERNAL MEDICINE

## 2021-10-28 NOTE — TELEPHONE ENCOUNTER
Tried to contact patient on her cell # and home #. I left her a message to call me back at [phone], as I have surgical dates available.  11/22/21; 12/20/21; 12/27/21

## 2021-10-30 ENCOUNTER — HOSPITAL ENCOUNTER (OUTPATIENT)
Dept: MAMMOGRAPHY | Facility: HOSPITAL | Age: 61
Discharge: HOME | End: 2021-10-30
Attending: INTERNAL MEDICINE
Payer: COMMERCIAL

## 2021-10-30 DIAGNOSIS — Z12.31 SCREENING MAMMOGRAM, ENCOUNTER FOR: ICD-10-CM

## 2021-10-30 PROCEDURE — 77063 BREAST TOMOSYNTHESIS BI: CPT | Performed by: INTERNAL MEDICINE

## 2021-10-30 PROCEDURE — 77067 SCR MAMMO BI INCL CAD: CPT | Performed by: INTERNAL MEDICINE

## 2021-11-01 ENCOUNTER — PATIENT MESSAGE (OUTPATIENT)
Dept: INTERNAL MEDICINE CLINIC | Facility: CLINIC | Age: 61
End: 2021-11-01

## 2021-11-02 NOTE — TELEPHONE ENCOUNTER
From: Shantel Black  To: Gallo Villa MD  Sent: 11/1/2021 8:36 PM CDT  Subject: Referral     Hi Dr Kristin Shaw,  Can you please put a referral in for the urologist, I’m still having pain.     Thank you

## 2021-11-05 ENCOUNTER — TELEPHONE (OUTPATIENT)
Dept: ORTHOPEDICS CLINIC | Facility: CLINIC | Age: 61
End: 2021-11-05

## 2021-11-05 ENCOUNTER — IMMUNIZATION (OUTPATIENT)
Dept: LAB | Facility: HOSPITAL | Age: 61
End: 2021-11-05
Attending: EMERGENCY MEDICINE
Payer: COMMERCIAL

## 2021-11-05 ENCOUNTER — HOSPITAL ENCOUNTER (OUTPATIENT)
Dept: MAMMOGRAPHY | Facility: HOSPITAL | Age: 61
Discharge: HOME | End: 2021-11-05
Attending: INTERNAL MEDICINE
Payer: COMMERCIAL

## 2021-11-05 DIAGNOSIS — M75.121 NONTRAUMATIC COMPLETE TEAR OF RIGHT ROTATOR CUFF: Primary | ICD-10-CM

## 2021-11-05 DIAGNOSIS — Z23 NEED FOR VACCINATION: Primary | ICD-10-CM

## 2021-11-05 DIAGNOSIS — R92.8 ABNORMAL MAMMOGRAM: ICD-10-CM

## 2021-11-05 PROCEDURE — 0064A SARSCOV2 VAC 50MCG/0.25ML IM: CPT

## 2021-11-05 PROCEDURE — 77061 BREAST TOMOSYNTHESIS UNI: CPT | Performed by: INTERNAL MEDICINE

## 2021-11-05 PROCEDURE — 77065 DX MAMMO INCL CAD UNI: CPT | Performed by: INTERNAL MEDICINE

## 2021-11-05 NOTE — TELEPHONE ENCOUNTER
Type of surgery:  Right shoulder arthroscopy, rotator cuff repair  Date: 3/7/22  Location:  [facility]  Medical Clearance:      *Medical: Yes      *Dental: No      *Other:  Prior Authorization Status: Pending  Workers Comp:  Medacta/Perico:  Alba: Yes  POV: Yes

## 2021-11-14 ENCOUNTER — HOSPITAL ENCOUNTER (OUTPATIENT)
Age: 61
Discharge: HOME | End: 2021-11-14
Payer: COMMERCIAL

## 2021-11-14 ENCOUNTER — APPOINTMENT (OUTPATIENT)
Dept: CT IMAGING | Age: 61
End: 2021-11-14
Attending: NURSE PRACTITIONER
Payer: COMMERCIAL

## 2021-11-14 VITALS
SYSTOLIC BLOOD PRESSURE: 125 MMHG | OXYGEN SATURATION: 100 % | RESPIRATION RATE: 18 BRPM | DIASTOLIC BLOOD PRESSURE: 77 MMHG | TEMPERATURE: 98 F | HEART RATE: 68 BPM

## 2021-11-14 DIAGNOSIS — R31.9 URINARY TRACT INFECTION WITH HEMATURIA, SITE UNSPECIFIED: Primary | ICD-10-CM

## 2021-11-14 DIAGNOSIS — N39.0 URINARY TRACT INFECTION WITH HEMATURIA, SITE UNSPECIFIED: Primary | ICD-10-CM

## 2021-11-14 PROCEDURE — 74176 CT ABD & PELVIS W/O CONTRAST: CPT | Performed by: NURSE PRACTITIONER

## 2021-11-14 PROCEDURE — 81002 URINALYSIS NONAUTO W/O SCOPE: CPT

## 2021-11-14 PROCEDURE — 96375 TX/PRO/DX INJ NEW DRUG ADDON: CPT

## 2021-11-14 PROCEDURE — 96365 THER/PROPH/DIAG IV INF INIT: CPT

## 2021-11-14 PROCEDURE — 99214 OFFICE O/P EST MOD 30 MIN: CPT

## 2021-11-14 PROCEDURE — 99213 OFFICE O/P EST LOW 20 MIN: CPT

## 2021-11-14 RX ORDER — CEPHALEXIN 500 MG/1
500 CAPSULE ORAL 2 TIMES DAILY
Qty: 20 CAPSULE | Refills: 0 | Status: SHIPPED | OUTPATIENT
Start: 2021-11-14 | End: 2021-11-24

## 2021-11-14 RX ORDER — SODIUM CHLORIDE 9 MG/ML
1000 INJECTION, SOLUTION INTRAVENOUS ONCE
Status: COMPLETED | OUTPATIENT
Start: 2021-11-14 | End: 2021-11-14

## 2021-11-14 RX ORDER — KETOROLAC TROMETHAMINE 30 MG/ML
15 INJECTION, SOLUTION INTRAMUSCULAR; INTRAVENOUS ONCE
Status: COMPLETED | OUTPATIENT
Start: 2021-11-14 | End: 2021-11-14

## 2021-11-14 NOTE — ED PROVIDER NOTES
Patient Seen in: [facility]    History   Patient presents with:  Urinary Symptoms    Stated Complaint: Blood in urine    HPI    61yr old female here today with c/o dysuria, frequency and urgency that started yesterday with right flank and low Fundoplication surgery   • OTHER SURGICAL HISTORY  2005,2007,2008    LAPAROSCOPIC LYSIS OF ADHESION   • OTHER SURGICAL HISTORY      right foot bunionectomy   • OTHER SURGICAL HISTORY  11-14-14    right superficial anterior peroneal nerve biopsy and right p HPI.  Constitutional and vital signs reviewed. All other systems reviewed and negative except as noted above. PSFH elements reviewed from today and agreed except as otherwise stated in HPI.     Physical Exam     ED Triage Vitals [11/14/21 0821]   BP understanding and agreement with the plan. I explained to the patient that emergent conditions may arise and to go to the ER for new, worsening or any persistent conditions.  I've explained the importance of taking all medicatons as prescribed, follow up,

## 2021-11-17 NOTE — PRE-SEDATION ASSESSMENT
Physician Pre-Sedation Assessment    Pre-Sedation Assessment:    Sedation History: Previous Sedation with No Complications and Airway Assessed    Cardiac: normal S1, S2  Respiratory: breath sounds clear bilaterally   Abdomen: soft, BS (+), non-tender    AS 1-1.9 cm

## 2021-11-23 ENCOUNTER — OFFICE VISIT (OUTPATIENT)
Dept: PODIATRY CLINIC | Facility: CLINIC | Age: 61
End: 2021-11-23

## 2021-11-23 VITALS — DIASTOLIC BLOOD PRESSURE: 71 MMHG | SYSTOLIC BLOOD PRESSURE: 124 MMHG

## 2021-11-23 DIAGNOSIS — M20.12 VALGUS DEFORMITY OF BOTH GREAT TOES: ICD-10-CM

## 2021-11-23 DIAGNOSIS — M20.11 VALGUS DEFORMITY OF BOTH GREAT TOES: ICD-10-CM

## 2021-11-23 DIAGNOSIS — L84 CALLUS OF FOOT: ICD-10-CM

## 2021-11-23 DIAGNOSIS — M77.42 METATARSALGIA OF BOTH FEET: ICD-10-CM

## 2021-11-23 DIAGNOSIS — M77.41 METATARSALGIA OF BOTH FEET: ICD-10-CM

## 2021-11-23 DIAGNOSIS — G57.62 MORTON'S METATARSALGIA, NEURALGIA, OR NEUROMA, LEFT: ICD-10-CM

## 2021-11-23 DIAGNOSIS — M20.41 HAMMER TOE OF RIGHT FOOT: ICD-10-CM

## 2021-11-23 DIAGNOSIS — G57.82 NEUROMA OF THIRD INTERSPACE OF LEFT FOOT: Primary | ICD-10-CM

## 2021-11-23 PROCEDURE — 3078F DIAST BP <80 MM HG: CPT | Performed by: PODIATRIST

## 2021-11-23 PROCEDURE — 3074F SYST BP LT 130 MM HG: CPT | Performed by: PODIATRIST

## 2021-11-23 PROCEDURE — 64455 NJX AA&/STRD PLTR COM DG NRV: CPT | Performed by: PODIATRIST

## 2021-11-23 RX ORDER — TRIAMCINOLONE ACETONIDE 40 MG/ML
20 INJECTION, SUSPENSION INTRA-ARTICULAR; INTRAMUSCULAR ONCE
Status: COMPLETED | OUTPATIENT
Start: 2021-11-23 | End: 2021-11-23

## 2021-11-23 NOTE — PROGRESS NOTES
Per Dr. Radha Goldberg draw up 0.5ml of 0.5% Marcaine and 0.5ml of Kenalog 40 for injection to left foot.

## 2021-11-23 NOTE — PROGRESS NOTES
Jose Eduardo Vasques is a 59year old female. Patient presents with: Foot Pain: Bilateral foor pain, pt states has pain all day. rates pain 7/10 today.         HPI:   Pleasant patient is complaining of bilateral foot pain due to calluses but also she is complaini disease    • Esophageal reflux    • Fibroids    • Hammertoe    • High blood pressure    • Insomnia    • Neuropathy    • Primary open angle glaucoma of both eyes 5/19/2015    Diagnosis of glaucoma OU; Started Latanoprost qhs after abnormal VF and OCT 2/25/1 Smoking status: Never Smoker      Smokeless tobacco: Never Used    Vaping Use      Vaping Use: Never used    Substance and Sexual Activity      Alcohol use: No        Alcohol/week: 0.0 standard drinks        Comment: None.        Drug use: No      Sexual ac feet    Hammer toe of right foot    Valgus deformity of both great toes        Plan: Today using a 15 blade trimmed down debrided all hyperkeratoses. The patient was consented for and after timeout was taken a cortisone injection with peripheral nerve block

## 2021-11-29 ENCOUNTER — TELEPHONE (OUTPATIENT)
Dept: INTERNAL MEDICINE CLINIC | Facility: CLINIC | Age: 61
End: 2021-11-29

## 2021-11-29 NOTE — TELEPHONE ENCOUNTER
Patient called me back. She is no longer using the [employer] inhaler. She was using for cough and not asthma.  Removed from medication list.

## 2021-12-06 ENCOUNTER — LAB ENCOUNTER (OUTPATIENT)
Dept: LAB | Facility: HOSPITAL | Age: 61
End: 2021-12-06
Attending: INTERNAL MEDICINE
Payer: COMMERCIAL

## 2021-12-06 ENCOUNTER — TELEPHONE (OUTPATIENT)
Dept: SURGERY | Facility: CLINIC | Age: 61
End: 2021-12-06

## 2021-12-06 ENCOUNTER — OFFICE VISIT (OUTPATIENT)
Dept: SURGERY | Facility: CLINIC | Age: 61
End: 2021-12-06

## 2021-12-06 DIAGNOSIS — R31.0 GROSS HEMATURIA: ICD-10-CM

## 2021-12-06 DIAGNOSIS — R31.0 GROSS HEMATURIA: Primary | ICD-10-CM

## 2021-12-06 PROCEDURE — 87086 URINE CULTURE/COLONY COUNT: CPT

## 2021-12-06 PROCEDURE — 99214 OFFICE O/P EST MOD 30 MIN: CPT | Performed by: UROLOGY

## 2021-12-06 PROCEDURE — 81001 URINALYSIS AUTO W/SCOPE: CPT

## 2021-12-06 NOTE — TELEPHONE ENCOUNTER
-LMTCB= KHB called to surgery. We need to notify pt & rescheduled todays OV to this afternoon or another date.  -Outcome pending pt response.

## 2021-12-06 NOTE — PROGRESS NOTES
Lei Tucker is a 59year old female. HPI:   Patient presents with:  Kidney Problem: Renal Cyst      25-year-old female in follow-up to previous visit July 16, 2019.   Stable Bosniak 2F renal cyst seen in the office in June 2019 for persistent microscop Procedure Laterality Date   • ANAL SPHINCTEROTOMY      03/12/2013 [place]   • BLEPHAROPLASTY ANESTHESIA Bilateral 03/05/2020    Dr Andee [facility]    • CATARACT EXTRACTION W/  INTRAOCULAR LENS IMPLANT Left 05/07/2018    L PC IOL with Dr. Dasia Jackson capsule 1   • montelukast (SINGULAIR) 10 MG Oral Tab Take 1 tablet (10 mg total) by mouth daily.  90 tablet 3   • amLODIPine besylate 10 MG Oral Tab TAKE 1 TABLET BY MOUTH EVERY DAY 90 tablet 1   • triamcinolone acetonide 0.1 % External Cream Apply 1 Applic

## 2021-12-09 ENCOUNTER — TELEPHONE (OUTPATIENT)
Dept: SURGERY | Facility: CLINIC | Age: 61
End: 2021-12-09

## 2021-12-09 NOTE — TELEPHONE ENCOUNTER
LM reminder sohan with Dr. Livan Zabala on 12/14/21 at 9:00am pt to check her mychart as well for reminder.

## 2021-12-10 ENCOUNTER — HOSPITAL ENCOUNTER (OUTPATIENT)
Dept: CT IMAGING | Age: 61
Discharge: HOME | End: 2021-12-10
Attending: UROLOGY
Payer: COMMERCIAL

## 2021-12-10 DIAGNOSIS — R31.0 GROSS HEMATURIA: ICD-10-CM

## 2021-12-10 PROCEDURE — 82565 ASSAY OF CREATININE: CPT

## 2021-12-10 PROCEDURE — 76377 3D RENDER W/INTRP POSTPROCES: CPT | Performed by: UROLOGY

## 2021-12-10 PROCEDURE — 74178 CT ABD&PLV WO CNTR FLWD CNTR: CPT | Performed by: UROLOGY

## 2021-12-14 ENCOUNTER — PROCEDURE (OUTPATIENT)
Dept: SURGERY | Facility: CLINIC | Age: 61
End: 2021-12-14

## 2021-12-14 VITALS
WEIGHT: 183 LBS | HEIGHT: 68 IN | RESPIRATION RATE: 16 BRPM | BODY MASS INDEX: 27.74 KG/M2 | SYSTOLIC BLOOD PRESSURE: 124 MMHG | DIASTOLIC BLOOD PRESSURE: 70 MMHG | HEART RATE: 81 BPM

## 2021-12-14 DIAGNOSIS — R31.0 GROSS HEMATURIA: Primary | ICD-10-CM

## 2021-12-14 PROCEDURE — 52000 CYSTOURETHROSCOPY: CPT | Performed by: UROLOGY

## 2021-12-14 PROCEDURE — 3074F SYST BP LT 130 MM HG: CPT | Performed by: UROLOGY

## 2021-12-14 PROCEDURE — 3008F BODY MASS INDEX DOCD: CPT | Performed by: UROLOGY

## 2021-12-14 PROCEDURE — 99213 OFFICE O/P EST LOW 20 MIN: CPT | Performed by: UROLOGY

## 2021-12-14 PROCEDURE — 3078F DIAST BP <80 MM HG: CPT | Performed by: UROLOGY

## 2021-12-14 RX ORDER — CIPROFLOXACIN 500 MG/1
500 TABLET, FILM COATED ORAL ONCE
Status: COMPLETED | OUTPATIENT
Start: 2021-12-14 | End: 2021-12-14

## 2021-12-14 RX ADMIN — CIPROFLOXACIN 500 MG: 500 TABLET, FILM COATED ORAL at 09:53:00

## 2021-12-14 NOTE — PROGRESS NOTES
Alberto Moreno is a 59year old female.     HPI:   Patient presents with:  Hematuria: cystoscopy, gross hematuria       64-year-old female presents for office cystoscopy for evaluation of intermittent gross painless hematuria most recently seen in the office ARTHROCENTESIS OR INJECT MAJOR JOINT W/O US     • HYSTERECTOMY  1996    KAI   • OTHER      Lap Nissen Fundoplication surgery   • OTHER SURGICAL HISTORY  2005,2007,2008    LAPAROSCOPIC LYSIS OF ADHESION   • OTHER SURGICAL HISTORY      right foot bunionectom Comment:Other reaction(s): CELECOXIB  Sulfa Antibiotics       TONGUE SWELLING    Comment:Other reaction(s): SULFA (SULFONAMIDE ANTIBIOTICS)  Amoxicillin             DIARRHEA      ROS:       PHYSICAL EXAM:   Rizwan De La Vega  : 1960  Referring Physician

## 2021-12-21 ENCOUNTER — OFFICE VISIT (OUTPATIENT)
Dept: INTERNAL MEDICINE CLINIC | Facility: CLINIC | Age: 61
End: 2021-12-21

## 2021-12-21 VITALS
HEART RATE: 72 BPM | DIASTOLIC BLOOD PRESSURE: 83 MMHG | BODY MASS INDEX: 29.55 KG/M2 | SYSTOLIC BLOOD PRESSURE: 130 MMHG | HEIGHT: 68 IN | RESPIRATION RATE: 16 BRPM | WEIGHT: 195 LBS

## 2021-12-21 DIAGNOSIS — R51.9 HEADACHE BEHIND THE EYES: Primary | ICD-10-CM

## 2021-12-21 PROCEDURE — 3075F SYST BP GE 130 - 139MM HG: CPT | Performed by: NURSE PRACTITIONER

## 2021-12-21 PROCEDURE — 3079F DIAST BP 80-89 MM HG: CPT | Performed by: NURSE PRACTITIONER

## 2021-12-21 PROCEDURE — 3008F BODY MASS INDEX DOCD: CPT | Performed by: NURSE PRACTITIONER

## 2021-12-21 PROCEDURE — 99213 OFFICE O/P EST LOW 20 MIN: CPT | Performed by: NURSE PRACTITIONER

## 2021-12-21 RX ORDER — DOXYCYCLINE HYCLATE 100 MG/1
100 CAPSULE ORAL 2 TIMES DAILY
Qty: 14 CAPSULE | Refills: 0 | Status: SHIPPED | OUTPATIENT
Start: 2021-12-21 | End: 2021-12-28 | Stop reason: ALTCHOICE

## 2021-12-21 NOTE — PROGRESS NOTES
Анна Christine is a 59year old female. Patient presents with:  Headache    HPI:   Patient presents to the clinic with complaints of headache for 1 week. Patient has been taking ibuprofen and tylenol severe sinus with relief but the pain comes back.  The head ANAL SPHINCTEROTOMY      03/12/2013 [place]   • BLEPHAROPLASTY ANESTHESIA Bilateral 03/05/2020    Dr Andee [facility]headle Ophthalmology    • CATARACT EXTRACTION W/  INTRAOCULAR LENS IMPLANT Left 05/07/2018    L PC IOL with Dr. Dasia Jackson @ [facility]   • COLONOSCOPY N/A 11/11 congestion. Negative for sore throat. Eyes: Positive for pain (right eye pain). Negative for photophobia, discharge and itching. Respiratory: Negative for cough, shortness of breath and wheezing. Cardiovascular: Negative for chest pain.    [employer] - doxycycline 100 MG Oral Cap; Take 1 capsule (100 mg total) by mouth 2 (two) times daily. Dispense: 14 capsule; Refill: 0        The patient indicates understanding of these issues and agrees to the plan. No follow-ups on file.

## 2021-12-27 ENCOUNTER — TELEPHONE (OUTPATIENT)
Dept: INTERNAL MEDICINE CLINIC | Facility: CLINIC | Age: 61
End: 2021-12-27

## 2021-12-27 NOTE — TELEPHONE ENCOUNTER
LOV 12/21/21, prescribed antibiotic for sinus infection. Continues to have sinus pressure and headache. No recent covid testing. Patient wants to see Paramjit Boland only. Consents to f/u visit with Jose Porras tomorrow.

## 2021-12-28 ENCOUNTER — OFFICE VISIT (OUTPATIENT)
Dept: OPHTHALMOLOGY | Facility: CLINIC | Age: 61
End: 2021-12-28

## 2021-12-28 ENCOUNTER — OFFICE VISIT (OUTPATIENT)
Dept: INTERNAL MEDICINE CLINIC | Facility: CLINIC | Age: 61
End: 2021-12-28

## 2021-12-28 ENCOUNTER — TELEPHONE (OUTPATIENT)
Dept: INTERNAL MEDICINE CLINIC | Facility: CLINIC | Age: 61
End: 2021-12-28

## 2021-12-28 VITALS
BODY MASS INDEX: 30.01 KG/M2 | DIASTOLIC BLOOD PRESSURE: 83 MMHG | HEIGHT: 68 IN | HEART RATE: 78 BPM | WEIGHT: 198 LBS | SYSTOLIC BLOOD PRESSURE: 144 MMHG

## 2021-12-28 DIAGNOSIS — H25.11 AGE-RELATED NUCLEAR CATARACT, RIGHT: ICD-10-CM

## 2021-12-28 DIAGNOSIS — M79.645 PAIN OF FINGER OF LEFT HAND: ICD-10-CM

## 2021-12-28 DIAGNOSIS — H40.1132 PRIMARY OPEN ANGLE GLAUCOMA OF BOTH EYES, MODERATE STAGE: Primary | ICD-10-CM

## 2021-12-28 DIAGNOSIS — J01.90 ACUTE NON-RECURRENT SINUSITIS, UNSPECIFIED LOCATION: ICD-10-CM

## 2021-12-28 DIAGNOSIS — L03.012 PARONYCHIA OF LEFT MIDDLE FINGER: ICD-10-CM

## 2021-12-28 DIAGNOSIS — Z96.1 PSEUDOPHAKIA, LEFT EYE: ICD-10-CM

## 2021-12-28 DIAGNOSIS — H40.9 GLAUCOMA, UNSPECIFIED GLAUCOMA TYPE, UNSPECIFIED LATERALITY: Primary | ICD-10-CM

## 2021-12-28 DIAGNOSIS — H43.393 FLOATER, VITREOUS, BILATERAL: ICD-10-CM

## 2021-12-28 DIAGNOSIS — R51.9 ACUTE NONINTRACTABLE HEADACHE, UNSPECIFIED HEADACHE TYPE: Primary | ICD-10-CM

## 2021-12-28 PROCEDURE — 3077F SYST BP >= 140 MM HG: CPT | Performed by: INTERNAL MEDICINE

## 2021-12-28 PROCEDURE — 92014 COMPRE OPH EXAM EST PT 1/>: CPT | Performed by: OPHTHALMOLOGY

## 2021-12-28 PROCEDURE — 3079F DIAST BP 80-89 MM HG: CPT | Performed by: INTERNAL MEDICINE

## 2021-12-28 PROCEDURE — 92015 DETERMINE REFRACTIVE STATE: CPT | Performed by: OPHTHALMOLOGY

## 2021-12-28 PROCEDURE — 92133 CPTRZD OPH DX IMG PST SGM ON: CPT | Performed by: OPHTHALMOLOGY

## 2021-12-28 PROCEDURE — 92083 EXTENDED VISUAL FIELD XM: CPT | Performed by: OPHTHALMOLOGY

## 2021-12-28 PROCEDURE — 3008F BODY MASS INDEX DOCD: CPT | Performed by: INTERNAL MEDICINE

## 2021-12-28 PROCEDURE — 99214 OFFICE O/P EST MOD 30 MIN: CPT | Performed by: INTERNAL MEDICINE

## 2021-12-28 RX ORDER — PREDNISONE 20 MG/1
40 TABLET ORAL DAILY
Qty: 10 TABLET | Refills: 0 | Status: SHIPPED | OUTPATIENT
Start: 2021-12-28 | End: 2022-01-02

## 2021-12-28 NOTE — PATIENT INSTRUCTIONS
Pseudophakia, left eye  No treatment. New glasses today improve left eye; suggest update.      Primary open angle glaucoma of both eyes, moderate stage  VF done in the office today showing progression of peripheral vision loss in both eyes and OCT showing

## 2021-12-28 NOTE — ASSESSMENT & PLAN NOTE
VF done in the office today showing progression of peripheral vision loss in both eyes and OCT showing stable results in both eyes. Pt stopped Latanoprost 2 weeks ago due to burning. Informed patient to never stop the drops on her own.   Information given

## 2021-12-28 NOTE — PROGRESS NOTES
Amber Chavarria is a 59year old female.     HPI:     HPI     Consult     Comments: Consult per Dr. Andrew Pedro              Comments     Patient is here for a VF, OCT and complete exam.  She stopped taking Latanoprost OU at bedtime about 2 weeks ago because the 9/6/2019) (Procedure: COLONOSCOPY;  Surgeon: Landen Moran MD;  Location: [address] [place] [address] ENDOSCOPY); other surgical history (2005,2007,2008) (LAPAROSCOPIC LYSIS OF ADHESION); other surgical history (right foot bunionectomy); other surgical history (11-14-14) (right s Comment:Other reaction(s): SULFA (SULFONAMIDE ANTIBIOTICS)  Amoxicillin             DIARRHEA    ROS:     ROS     Positive for: Eyes    Negative for: Constitutional, Gastrointestinal, Neurological, Skin, Genitourinary, Musculoskeletal, HENT, Cardiovascular, old-Progressive bifocal          Manifest Refraction       [employer] [employer] [place] [facility] [place]    Right -1.25 +1.25 125 20/25+ +2.50 20/30    Left -0.75 +0.50 180 20/25+ +2.50 20/30          Final Rx       [employer] [employer] [place] [facility] [place]    R

## 2021-12-28 NOTE — PROGRESS NOTES
Patient ID: Sam Reaves is a 59year old female. Patient presents with:  Headache: C/o a headache for about 2.5 weeks.   Stts she was given doxycycline 100 MG and has completed rx with no relief  Finger Pain: C/o 3rd digit on the left hand for about a w • Unspecified essential hypertension    • Unspecified sleep apnea     uses CPAP       Past Surgical History:   Procedure Laterality Date   • ANAL SPHINCTEROTOMY      03/12/2013 [place]   • BLEPHAROPLASTY ANESTHESIA Bilateral 03/05/2020    Dr Matthew Wilson Take by mouth., Disp: , Rfl:     Allergies:  Erythromycin            PAIN  Celecoxib                   Comment:Other reaction(s): CELECOXIB  Sulfa Antibiotics       TONGUE SWELLING    Comment:Other reaction(s): SULFA (SULFONAMIDE ANTIBIOTICS)  Amoxicillin file        PHYSICAL EXAM:      12/28/21  1727   BP: 144/83   Pulse: 78   Weight: 198 lb (89.8 kg)   Height: 5' 8\" (1.727 m)       Body mass index is 30.11 kg/m². Physical Exam  Constitutional:       Appearance: Normal appearance.    HENT:      Nose: prepared using [employer] [place] [employer] voice recognition dictation software. As a result errors may occur. When identified these errors have been corrected.  While every attempt is made to correct errors during dictation discrepancies may still exist.    Yulia Flowers,

## 2021-12-28 NOTE — TELEPHONE ENCOUNTER
Patient requesting referral for the following providers.      Dr. Benjamin Browne-   For glaucoma evaluation   Ph. [phone]    Kwaku Turner  for neuro opthalmology   [facility] eye care   Ph [phone]

## 2021-12-28 NOTE — PATIENT INSTRUCTIONS
Paronychia of the Finger or Toe  Paronychia is an infection near a fingernail or toenail. It usually occurs when an opening in the cuticle or an ingrown toenail lets bacteria under the skin. The infection will need to be drained if pus is present.  If t streaks in the skin leading away from the wound  · Pus or fluid draining from the nail area  · Fever of 100.4ºF (38ºC) or higher, or as directed by your provider  Marie last reviewed this educational content on 7/1/2019 © 2000-2021 [employer] the label. If you do, your symptoms may get worse. You may also take pills that contain pseudoephedrine. Don’t use products that combine multiple medicines. This may increase side effects. Read all medicine labels. You can also ask the pharmacist for help. substitute for professional medical care. Always follow your healthcare professional's instructions.

## 2021-12-29 NOTE — TELEPHONE ENCOUNTER
Good Afternoon Dr Gui Hargrove and staff,    Please see message below and generate referrals if you agree with plan of care. Thank you.     [facility]

## 2022-01-14 ENCOUNTER — OFFICE VISIT (OUTPATIENT)
Dept: INTERNAL MEDICINE CLINIC | Facility: CLINIC | Age: 62
End: 2022-01-14
Payer: COMMERCIAL

## 2022-01-14 ENCOUNTER — TELEPHONE (OUTPATIENT)
Dept: INTERNAL MEDICINE CLINIC | Facility: CLINIC | Age: 62
End: 2022-01-14

## 2022-01-14 VITALS
RESPIRATION RATE: 17 BRPM | BODY MASS INDEX: 29.86 KG/M2 | SYSTOLIC BLOOD PRESSURE: 124 MMHG | DIASTOLIC BLOOD PRESSURE: 78 MMHG | HEART RATE: 97 BPM | WEIGHT: 197 LBS | HEIGHT: 68 IN

## 2022-01-14 DIAGNOSIS — L03.019 PARONYCHIA OF MIDDLE FINGER: ICD-10-CM

## 2022-01-14 DIAGNOSIS — M77.41 METATARSALGIA OF BOTH FEET: ICD-10-CM

## 2022-01-14 DIAGNOSIS — G62.9 NEUROPATHY: ICD-10-CM

## 2022-01-14 DIAGNOSIS — J32.0 CHRONIC MAXILLARY SINUSITIS: Primary | ICD-10-CM

## 2022-01-14 DIAGNOSIS — M77.42 METATARSALGIA OF BOTH FEET: ICD-10-CM

## 2022-01-14 DIAGNOSIS — L84 CALLUS OF FOOT: ICD-10-CM

## 2022-01-14 PROCEDURE — 99214 OFFICE O/P EST MOD 30 MIN: CPT | Performed by: INTERNAL MEDICINE

## 2022-01-14 PROCEDURE — 3008F BODY MASS INDEX DOCD: CPT | Performed by: INTERNAL MEDICINE

## 2022-01-14 PROCEDURE — 3074F SYST BP LT 130 MM HG: CPT | Performed by: INTERNAL MEDICINE

## 2022-01-14 PROCEDURE — 3078F DIAST BP <80 MM HG: CPT | Performed by: INTERNAL MEDICINE

## 2022-01-14 RX ORDER — METHYLPREDNISOLONE 4 MG/1
TABLET ORAL
Qty: 1 EACH | Refills: 0 | Status: SHIPPED | OUTPATIENT
Start: 2022-01-14

## 2022-01-14 NOTE — PROGRESS NOTES
Mcarthur Litten is a 59year old female.   Patient presents with:  Sinusitis      HPI:   URGENT VISIT   C/C SINUS ISSUES   C/O STARTED  Around [place] and got doxy which didn't help and then saw another provider and got steroid which did help   Now the codien cough syrup just put her to sleep as well      Finished doxy yest taht she got from the  ,didn't help          HISTORY 3/2021   cramps is all over but mostly in the legs-stretching makes it works and the meloxicam is not helping and she did se to see dr Christoph Nash   Not taking cymbalta - takes T#3 one a week , ibuprofen \"seldom\"- once a mn   Muscle cramps coming back - legs thighs and feet , no RLS               History– 11/19  C/o right shoulder has a tear  And she does admit she uses the left Outpatient Medications   Medication Sig Dispense Refill   • methylPREDNISolone (MEDROL) 4 MG Oral Tablet Therapy Pack As directed.  1 each 0   • latanoprost 0.005 % Ophthalmic Solution INSTILL 1 DROP IN BOTH EYES EVERY MORNING 7.5 mL 3   • omeprazole 20 MG Gaurav Koo MD;  Location: [facility] ENDOSCOPY   • Excision of chalazion, single - od - right eye Right 6.27/2017    Nasally   • Hc arthrocentesis or inject major joint w/o us     • Hysterectomy  1996    KAI   • Other      Lap Nissen Fundoplication surgery   • Other maxillary sinusitis  -     CT SINUS (CPT=70486); Future  -     ENT - INTERNAL  -     ALLERGY - INTERNAL  -     methylPREDNISolone (MEDROL) 4 MG Oral Tablet Therapy Pack; As directed.   Give letter for work, will check CT of the sinuses but will refer her to

## 2022-01-17 NOTE — TELEPHONE ENCOUNTER
From   Erika Galarza RN To   Suha Golden and Delivered   1/14/2022  4:17 PM   Last Read in [address]   1/15/2022 11:43 AM by Foster Huffman

## 2022-01-20 ENCOUNTER — HOSPITAL ENCOUNTER (OUTPATIENT)
Dept: CT IMAGING | Age: 62
Discharge: HOME | End: 2022-01-20
Attending: INTERNAL MEDICINE
Payer: COMMERCIAL

## 2022-01-20 ENCOUNTER — OFFICE VISIT (OUTPATIENT)
Dept: PODIATRY CLINIC | Facility: CLINIC | Age: 62
End: 2022-01-20
Payer: COMMERCIAL

## 2022-01-20 ENCOUNTER — PATIENT MESSAGE (OUTPATIENT)
Dept: INTERNAL MEDICINE CLINIC | Facility: CLINIC | Age: 62
End: 2022-01-20

## 2022-01-20 DIAGNOSIS — R52 PAIN: Primary | ICD-10-CM

## 2022-01-20 DIAGNOSIS — G57.82 NEUROMA OF THIRD INTERSPACE OF LEFT FOOT: ICD-10-CM

## 2022-01-20 DIAGNOSIS — M77.41 METATARSALGIA OF BOTH FEET: ICD-10-CM

## 2022-01-20 DIAGNOSIS — M20.12 VALGUS DEFORMITY OF BOTH GREAT TOES: ICD-10-CM

## 2022-01-20 DIAGNOSIS — J32.0 CHRONIC MAXILLARY SINUSITIS: ICD-10-CM

## 2022-01-20 DIAGNOSIS — M20.11 VALGUS DEFORMITY OF BOTH GREAT TOES: ICD-10-CM

## 2022-01-20 DIAGNOSIS — L84 CALLUS OF FOOT: ICD-10-CM

## 2022-01-20 DIAGNOSIS — K21.9 GASTROESOPHAGEAL REFLUX DISEASE, UNSPECIFIED WHETHER ESOPHAGITIS PRESENT: Primary | ICD-10-CM

## 2022-01-20 DIAGNOSIS — M20.41 HAMMER TOE OF RIGHT FOOT: ICD-10-CM

## 2022-01-20 DIAGNOSIS — M77.42 METATARSALGIA OF BOTH FEET: ICD-10-CM

## 2022-01-20 DIAGNOSIS — M79.674 PAIN IN TOES OF BOTH FEET: ICD-10-CM

## 2022-01-20 DIAGNOSIS — G57.62 MORTON'S METATARSALGIA, NEURALGIA, OR NEUROMA, LEFT: ICD-10-CM

## 2022-01-20 DIAGNOSIS — M79.675 PAIN IN TOES OF BOTH FEET: ICD-10-CM

## 2022-01-20 PROCEDURE — 99213 OFFICE O/P EST LOW 20 MIN: CPT | Performed by: PODIATRIST

## 2022-01-20 PROCEDURE — 70486 CT MAXILLOFACIAL W/O DYE: CPT | Performed by: INTERNAL MEDICINE

## 2022-01-20 NOTE — PROGRESS NOTES
Angélica Turner is a 59year old female. Patient presents with: Follow - Up: Bilateral foot pain, 8/10 pain scale        HPI:   Patient returns to the clinic complaining of 8 out of 10 foot pain she is painful bunion she has pain in the balls of both feet. essential hypertension    • Unspecified sleep apnea     uses CPAP      Past Surgical History:   Procedure Laterality Date   • ANAL SPHINCTEROTOMY      03/12/2013 [place]   • BLEPHAROPLASTY ANESTHESIA Bilateral 03/05/2020    Dr Josefina Welch Ophthalmology occasional soda        Exercise: Yes        Pt has a pacemaker: No        Pt has a defibrillator: No        Reaction to local anesthetic: No          REVIEW OF SYSTEMS:   Today reviewed systens as documented below  GENERAL HEALTH: feels well otherwise  SKI patient which would be fusion of the first MTP joint fusion of the second and third toes condylectomy of the fifth metatarsal head on the right foot. I also discussed with her that we would probably need to send her for a diagnostic ultrasound as well.

## 2022-01-21 ENCOUNTER — TELEPHONE (OUTPATIENT)
Dept: GASTROENTEROLOGY | Facility: CLINIC | Age: 62
End: 2022-01-21

## 2022-01-21 ENCOUNTER — TELEPHONE (OUTPATIENT)
Dept: INTERNAL MEDICINE CLINIC | Facility: CLINIC | Age: 62
End: 2022-01-21

## 2022-01-21 DIAGNOSIS — R12 HEART BURN: Primary | ICD-10-CM

## 2022-01-21 RX ORDER — SUCRALFATE 1 G/1
TABLET ORAL
Qty: 120 TABLET | Refills: 5 | Status: SHIPPED | OUTPATIENT
Start: 2022-01-21

## 2022-01-21 NOTE — TELEPHONE ENCOUNTER
Spoke with Tyler Gunn. Accepted follow up appointment for this upcoming Wednesday, 01/26/2022 at 1:30 pm at [facility] office. Verified time, location and to arrive 15 minutes early.      Patient expressed understanding with no further questions or concerns at

## 2022-01-21 NOTE — TELEPHONE ENCOUNTER
Pt. States that she is concerned about having heartburn, which started a few weeks ago, and it is just getting worse. Pt. Would like to get a call back from the nurse.

## 2022-01-21 NOTE — TELEPHONE ENCOUNTER
Can double book a wed 1 pm slot but I would recommend following up with surgery as likely from loosened fundoplication    Ordered carafate as needed for now

## 2022-01-21 NOTE — TELEPHONE ENCOUNTER
Routed to Dr Twila Bernheim  for advise, thanks. Also [employer] message sent to pt. GI referral pended.

## 2022-01-21 NOTE — TELEPHONE ENCOUNTER
From: Dustin Busby  To: Debora Boogie MD  Sent: 1/20/2022 2:44 PM CST  Subject: Referral:       Hi Dr Debora Boogie,  Would you please put in a referral for me to the Gastrointestinal doctor Dr Raquel Monte having a lot of heartburn.  I do take heartburn med

## 2022-01-21 NOTE — TELEPHONE ENCOUNTER
Patient is calling to request a referral to see the following for heartburn    Dr. Esther Melendrez  Gastroentorology

## 2022-01-21 NOTE — TELEPHONE ENCOUNTER
Dr. Anoop Yusuf--    Spoke with Tamika Lucas to get symptom details. Last seen in clinic 01/20/2020 2/2 worsening bloating/reflux, atypical chest pain and weight loss. Egd findings showed loose fundoplication, gastric erythema and gastric polyps.      Reports bu

## 2022-01-24 ENCOUNTER — OFFICE VISIT (OUTPATIENT)
Dept: SURGERY | Facility: CLINIC | Age: 62
End: 2022-01-24
Payer: COMMERCIAL

## 2022-01-24 DIAGNOSIS — S61.303A UNSPECIFIED OPEN WOUND OF LEFT MIDDLE FINGER WITH DAMAGE TO NAIL, INITIAL ENCOUNTER: Primary | ICD-10-CM

## 2022-01-24 PROCEDURE — 99243 OFF/OP CNSLTJ NEW/EST LOW 30: CPT | Performed by: PLASTIC SURGERY

## 2022-01-24 RX ORDER — HYDROCODONE BITARTRATE AND ACETAMINOPHEN 7.5; 325 MG/1; MG/1
1 TABLET ORAL
Qty: 10 TABLET | Refills: 0 | Status: SHIPPED | OUTPATIENT
Start: 2022-01-24

## 2022-01-24 NOTE — PROGRESS NOTES
Patient request for surgery signed by patient and witnessed and signed by RN. Prescription for Norco and narcotic prescription electronically sent to pharmacy per Dr. Kati French order and patient instructed to  prescription before surgery.    Pre-

## 2022-01-24 NOTE — H&P
Nettie Souza is a 59year old female that presents with Patient presents with: Infection: left middle finger   .     REFERRED BY:  Abby Harper      Pacemaker: No  Latex Allergy: no  Coumadin: No  Plavix: No  Other anticoagulants: No  Cardiac stents: No 6/5/18 consult with Dr. Anjelica Cage progress note   • Small bowel obstruction [facility])    • Tendinitis    • Unspecified essential hypertension    • Unspecified sleep apnea     uses CPAP        SURGICAL  Past Surgical History:   Procedure Laterality Reese 7.5 mL 3   • omeprazole 20 MG Oral Capsule Delayed Release Take 2 capsules (40 mg total) by mouth every morning. 180 capsule 1   • montelukast (SINGULAIR) 10 MG Oral Tab Take 1 tablet (10 mg total) by mouth daily.  90 tablet 3   • amLODIPine besylate 10 MG in a permanent narrow nail    This may recur    She wishes to proceed       ASSESSMENT/PLAN:     No diagnosis found.       1/24/2022  Donna Arriaga MD

## 2022-01-24 NOTE — TELEPHONE ENCOUNTER
Please sign off if you  Agree with plan of care.      Thank you,  [facility]   Referral specialist

## 2022-01-26 ENCOUNTER — OFFICE VISIT (OUTPATIENT)
Dept: GASTROENTEROLOGY | Facility: CLINIC | Age: 62
End: 2022-01-26
Payer: COMMERCIAL

## 2022-01-26 VITALS
DIASTOLIC BLOOD PRESSURE: 74 MMHG | SYSTOLIC BLOOD PRESSURE: 122 MMHG | HEIGHT: 68 IN | WEIGHT: 200 LBS | BODY MASS INDEX: 30.31 KG/M2

## 2022-01-26 DIAGNOSIS — R14.2 BELCHING SYMPTOM: Primary | ICD-10-CM

## 2022-01-26 DIAGNOSIS — R10.10 UPPER ABDOMINAL PAIN: ICD-10-CM

## 2022-01-26 PROCEDURE — 3008F BODY MASS INDEX DOCD: CPT | Performed by: INTERNAL MEDICINE

## 2022-01-26 PROCEDURE — 3074F SYST BP LT 130 MM HG: CPT | Performed by: INTERNAL MEDICINE

## 2022-01-26 PROCEDURE — 3078F DIAST BP <80 MM HG: CPT | Performed by: INTERNAL MEDICINE

## 2022-01-26 PROCEDURE — 99214 OFFICE O/P EST MOD 30 MIN: CPT | Performed by: INTERNAL MEDICINE

## 2022-01-26 NOTE — PROGRESS NOTES
[address] Gastroenterology  Clinic Follow-up Visit    Patient presents with:  Gastro-esophageal Reflux: still experiencing symptoms; carafate has not helped    Subjective/HPI:   Laura Ruiz is a 59year old year-old female, patient of Brody Navas regular  Doing omeprazole 40 mg before breakfast and currently carafate after meals but no helping  Still burning in the chest and having beltching        Wt Readings from Last 20 Encounters:  01/26/22 : 200 lb (90.7 kg)  01/14/22 : 197 lb (89.4 kg)  12/28 03/12/2013 [place]   • BLEPHAROPLASTY ANESTHESIA Bilateral 03/05/2020    Dr Roseann Dc Ophthalmology    • CATARACT EXTRACTION W/  INTRAOCULAR LENS IMPLANT Left 05/07/2018    L PC IOL with Dr. Akira Waite @ [address]   • COLONOSCOPY N/A 11/11/2016    Procedure: COLON 120 tablet 5   • latanoprost 0.005 % Ophthalmic Solution INSTILL 1 DROP IN BOTH EYES EVERY MORNING 7.5 mL 3   • omeprazole 20 MG Oral Capsule Delayed Release Take 2 capsules (40 mg total) by mouth every morning.  180 capsule 1   • montelukast (SINGULAIR) 10 non-tender exam in all quadrants without rigidity or guarding, non-distended, no abnormal bowel sounds noted, no masses are palpated  : no CVA tenderness  Skin: dry, warm, no jaundice  Ext: no cyanosis, clubbing or edema is evident.    Neuro: Alert and or

## 2022-01-26 NOTE — PATIENT INSTRUCTIONS
1. Worsening bloating and reflux  2.  Atypical chest pain, not with eating  EGD and cardiac workup negative  Given severity of pain and no etiology found   Had CT chest in 2/2021-- no etiology found  CT abd/plevis 11/2021-- no etiology found    Recommend:

## 2022-01-27 ENCOUNTER — LAB ENCOUNTER (OUTPATIENT)
Dept: LAB | Age: 62
End: 2022-01-27
Attending: OPHTHALMOLOGY
Payer: COMMERCIAL

## 2022-01-27 DIAGNOSIS — H53.8 BLURRY VISION: Primary | ICD-10-CM

## 2022-01-27 LAB
FOLATE SERPL-MCNC: 6.9 NG/ML (ref 8.7–?)
VIT B12 SERPL-MCNC: 329 PG/ML (ref 193–986)
VIT D+METAB SERPL-MCNC: 22.8 NG/ML (ref 30–100)

## 2022-01-27 PROCEDURE — 36415 COLL VENOUS BLD VENIPUNCTURE: CPT

## 2022-01-27 PROCEDURE — 82607 VITAMIN B-12: CPT

## 2022-01-27 PROCEDURE — 86618 LYME DISEASE ANTIBODY: CPT

## 2022-01-27 PROCEDURE — 84597 ASSAY OF VITAMIN K: CPT

## 2022-01-27 PROCEDURE — 82746 ASSAY OF FOLIC ACID SERUM: CPT

## 2022-01-27 PROCEDURE — 82306 VITAMIN D 25 HYDROXY: CPT

## 2022-01-27 PROCEDURE — 84590 ASSAY OF VITAMIN A: CPT

## 2022-01-28 ENCOUNTER — TELEPHONE (OUTPATIENT)
Dept: INTERNAL MEDICINE CLINIC | Facility: CLINIC | Age: 62
End: 2022-01-28

## 2022-01-28 ENCOUNTER — MED REC SCAN ONLY (OUTPATIENT)
Dept: INTERNAL MEDICINE CLINIC | Facility: CLINIC | Age: 62
End: 2022-01-28

## 2022-01-28 DIAGNOSIS — H40.9 GLAUCOMA, UNSPECIFIED GLAUCOMA TYPE, UNSPECIFIED LATERALITY: Primary | ICD-10-CM

## 2022-01-28 LAB — B BURGDOR IGG+IGM SER QL: NEGATIVE

## 2022-01-28 NOTE — TELEPHONE ENCOUNTER
Patient called to inform she tried to make an appt with Dr. Sharif Yip but only has appt available at the end of March. Patient requesting a new referral to see a different ophthalmologist that can see her sooner due to her Glaucoma diagnosis.

## 2022-01-29 NOTE — TELEPHONE ENCOUNTER
Can manged care hel p with finding a dr who is under her network -- a few names so she ca n call and ask who is first availand then call back with the name so referral can be placed appropriately

## 2022-01-31 DIAGNOSIS — I10 ESSENTIAL HYPERTENSION: ICD-10-CM

## 2022-01-31 LAB
INTERPRETATION VIT A, SER/PLA: NORMAL
RETINYL PALMITATE: <0.02 MG/L
VITAMIN A (RETINOL): 0.61 MG/L
VITAMIN K1, SERUM: 1.23 NMOL/L

## 2022-01-31 RX ORDER — AMLODIPINE BESYLATE 10 MG/1
TABLET ORAL
Qty: 90 TABLET | Refills: 1 | Status: SHIPPED | OUTPATIENT
Start: 2022-01-31 | End: 2022-07-23

## 2022-02-01 NOTE — TELEPHONE ENCOUNTER
Refill passed per [facility] protocol.   Requested Prescriptions   Pending Prescriptions Disp Refills    AMLODIPINE 10 MG Oral Tab [Pharmacy Med Name: AMLODIPINE BESYLATE 10MG TABLETS] 90 tablet 1     Sig: TAKE 1 TABLET BY MOUTH EVERY DAY        Hypertensive Medications Protocol Passed - 1/31/2022  6:09 PM        Passed - CMP or BMP in past 12 months        Passed - Appointment in past 6 or next 3 months        Passed - GFR  > 50     Lab Results   Component Value Date    GFRAA 69 12/10/2021                     Future Appointments         Provider Department Appt Notes    In 1 week Sanju Marie MD [employer], [facility], [address], [place] chronic sinuitis    In 1 week Pamela Siegel MD [facility], [address], [place] Chronic maxillary sinusitis    In 3 weeks Mel Carbajal, [facility], [address], [place]    In 1 month Sandra Welch MD [facility], [address], [place] post-op    In 1 month Tae Magdaleno MD; [facility] Ophthalmology - Fabi Pettit glaucoma eval/ ref- Dr. Hawa Sorto ([facility])/ adv [facility] Opth[facility] loc    In 2 months Edil Escobar MD [facility] Ophthalmology IOP check           Recent Outpatient Visits              5 days ago Belching symptom    Nita Geller MD    Office Visit    1 week ago Unspecified open wound of left middle finger with damage to nail, initial encounter    [address] [facility], [address], Sandra Dennis MD    Office Visit    1 week ago [address] [place] [address], [place], [place]    Office Visit    2 weeks ago Chronic maxillary sinusitis    Michael Brito MD    Office Visit    1 month ago Acute nonintractable headache, unspecified headache type Octavio Ramires MD    Office Visit

## 2022-02-07 ENCOUNTER — NURSE ONLY (OUTPATIENT)
Dept: ALLERGY | Facility: CLINIC | Age: 62
End: 2022-02-07
Payer: COMMERCIAL

## 2022-02-07 ENCOUNTER — OFFICE VISIT (OUTPATIENT)
Dept: ALLERGY | Facility: CLINIC | Age: 62
End: 2022-02-07
Payer: COMMERCIAL

## 2022-02-07 VITALS
SYSTOLIC BLOOD PRESSURE: 126 MMHG | DIASTOLIC BLOOD PRESSURE: 84 MMHG | HEART RATE: 93 BPM | BODY MASS INDEX: 30.31 KG/M2 | WEIGHT: 200 LBS | HEIGHT: 68 IN | OXYGEN SATURATION: 96 %

## 2022-02-07 DIAGNOSIS — J32.0 CHRONIC MAXILLARY SINUSITIS: Primary | ICD-10-CM

## 2022-02-07 DIAGNOSIS — Z91.09 ENVIRONMENTAL ALLERGIES: ICD-10-CM

## 2022-02-07 DIAGNOSIS — Z23 FLU VACCINE NEED: ICD-10-CM

## 2022-02-07 DIAGNOSIS — J30.89 PERENNIAL ALLERGIC RHINITIS: ICD-10-CM

## 2022-02-07 DIAGNOSIS — Z92.29 COVID-19 VACCINE SERIES COMPLETED: ICD-10-CM

## 2022-02-07 PROCEDURE — 95004 PERQ TESTS W/ALRGNC XTRCS: CPT | Performed by: ALLERGY & IMMUNOLOGY

## 2022-02-07 PROCEDURE — 95024 IQ TESTS W/ALLERGENIC XTRCS: CPT | Performed by: ALLERGY & IMMUNOLOGY

## 2022-02-07 PROCEDURE — 3074F SYST BP LT 130 MM HG: CPT | Performed by: ALLERGY & IMMUNOLOGY

## 2022-02-07 PROCEDURE — 99244 OFF/OP CNSLTJ NEW/EST MOD 40: CPT | Performed by: ALLERGY & IMMUNOLOGY

## 2022-02-07 PROCEDURE — 3008F BODY MASS INDEX DOCD: CPT | Performed by: ALLERGY & IMMUNOLOGY

## 2022-02-07 PROCEDURE — 3079F DIAST BP 80-89 MM HG: CPT | Performed by: ALLERGY & IMMUNOLOGY

## 2022-02-07 RX ORDER — LEVOCETIRIZINE DIHYDROCHLORIDE 5 MG/1
TABLET, FILM COATED ORAL
Qty: 90 TABLET | Refills: 0 | Status: SHIPPED | OUTPATIENT
Start: 2022-02-07 | End: 2022-03-03

## 2022-02-07 RX ORDER — LEVOCETIRIZINE DIHYDROCHLORIDE 5 MG/1
5 TABLET, FILM COATED ORAL NIGHTLY
Qty: 30 TABLET | Refills: 0 | Status: SHIPPED | OUTPATIENT
Start: 2022-02-07 | End: 2022-02-07

## 2022-02-07 RX ORDER — PREDNISONE 10 MG/1
TABLET ORAL
Qty: 38 TABLET | Refills: 0 | Status: SHIPPED | OUTPATIENT
Start: 2022-02-07 | End: 2022-03-02

## 2022-02-07 RX ORDER — AZELASTINE 1 MG/ML
2 SPRAY, METERED NASAL DAILY
Qty: 1 EACH | Refills: 0 | Status: SHIPPED | OUTPATIENT
Start: 2022-02-07

## 2022-02-07 NOTE — PATIENT INSTRUCTIONS
#1 chronic sinusitis  CT of sinus report reviewed suggesting right maxillary chronic sinusitis and nasal septal deviation  Patient to be seen by ENT later this week. Patient is tried multiple medications without symptomatically for the exception of prednisone. Sense of smell intact. No history of asthma or NSAID allergy. Start prednisone 40 mg once a day with food x5 days then taper by 10 mg every 3 days. Start Xyzal, levocetirizine 5 mg once a day as an antihistamine  Start Astelin nasal spray 2 sprays per nostril twice a day as an antihistamine nasal spray  May consider Sudafed as needed if can tolerate  Consider immunotherapy if individual allergens are detected    #2 allergic rhinitis  Handouts on allergies and avoidance measures provided and reviewed including potential treatment option of immunotherapy   Xyzal and Astelin  Sudafed as needed  Sinus rinses      #3 nasal septal deviation  Per CT of sinus report.   To be seen by ENT    #4 COVID vaccination up-to-date x3 doses    #5 flu vaccine up-to-date

## 2022-02-09 ENCOUNTER — OFFICE VISIT (OUTPATIENT)
Dept: OTOLARYNGOLOGY | Facility: CLINIC | Age: 62
End: 2022-02-09
Payer: COMMERCIAL

## 2022-02-09 ENCOUNTER — OFFICE VISIT (OUTPATIENT)
Dept: ORTHOPEDICS CLINIC | Facility: CLINIC | Age: 62
End: 2022-02-09
Payer: COMMERCIAL

## 2022-02-09 VITALS — HEIGHT: 68 IN | BODY MASS INDEX: 30.31 KG/M2 | WEIGHT: 200 LBS

## 2022-02-09 DIAGNOSIS — M75.121 NONTRAUMATIC COMPLETE TEAR OF RIGHT ROTATOR CUFF: Primary | ICD-10-CM

## 2022-02-09 DIAGNOSIS — J34.2 NASAL SEPTAL DEVIATION: ICD-10-CM

## 2022-02-09 DIAGNOSIS — J32.0 CHRONIC MAXILLARY SINUSITIS: Primary | ICD-10-CM

## 2022-02-09 PROCEDURE — 99213 OFFICE O/P EST LOW 20 MIN: CPT | Performed by: ORTHOPAEDIC SURGERY

## 2022-02-09 PROCEDURE — 3008F BODY MASS INDEX DOCD: CPT | Performed by: SPECIALIST

## 2022-02-09 PROCEDURE — 99213 OFFICE O/P EST LOW 20 MIN: CPT | Performed by: SPECIALIST

## 2022-02-09 RX ORDER — CEFDINIR 300 MG/1
300 CAPSULE ORAL 2 TIMES DAILY
Qty: 20 CAPSULE | Refills: 0 | Status: SHIPPED | OUTPATIENT
Start: 2022-02-09 | End: 2022-03-02

## 2022-02-09 NOTE — PATIENT INSTRUCTIONS
CT scan was reviewed. There is a chronic right maxillary sinusitis. There is also a left septal deviation. I placed you on a trial of Omnicef. Follow-up in 3 weeks time. If you are not resolved, we can consider a septoplasty in left uncinectomy and maxillary antrostomy.

## 2022-02-26 ENCOUNTER — OFFICE VISIT (OUTPATIENT)
Dept: OBGYN CLINIC | Facility: CLINIC | Age: 62
End: 2022-02-26
Payer: COMMERCIAL

## 2022-02-26 VITALS
SYSTOLIC BLOOD PRESSURE: 138 MMHG | HEART RATE: 81 BPM | WEIGHT: 195.38 LBS | DIASTOLIC BLOOD PRESSURE: 80 MMHG | BODY MASS INDEX: 30 KG/M2

## 2022-02-26 DIAGNOSIS — Z01.419 WELL WOMAN EXAM: Primary | ICD-10-CM

## 2022-02-26 DIAGNOSIS — Z12.31 SCREENING MAMMOGRAM, ENCOUNTER FOR: ICD-10-CM

## 2022-02-26 PROCEDURE — 3079F DIAST BP 80-89 MM HG: CPT | Performed by: OBSTETRICS & GYNECOLOGY

## 2022-02-26 PROCEDURE — 3075F SYST BP GE 130 - 139MM HG: CPT | Performed by: OBSTETRICS & GYNECOLOGY

## 2022-02-26 PROCEDURE — 99396 PREV VISIT EST AGE 40-64: CPT | Performed by: OBSTETRICS & GYNECOLOGY

## 2022-02-28 ENCOUNTER — TELEPHONE (OUTPATIENT)
Dept: ORTHOPEDICS CLINIC | Facility: CLINIC | Age: 62
End: 2022-02-28

## 2022-02-28 ENCOUNTER — TELEPHONE (OUTPATIENT)
Dept: INTERNAL MEDICINE CLINIC | Facility: CLINIC | Age: 62
End: 2022-02-28

## 2022-02-28 NOTE — TELEPHONE ENCOUNTER
RN Called patient. RN spoke with patient. Patient's date of birth and full name both confirmed. RN informed patient of provider's message below. Pt agreeable to 3/2/22 Wed 5pm appointment. RN discussed with Call Center to assist with scheduling. Appointment made. 3/23/22 pre-op appt canceled as requested by patient.      Future Appointments   Date Time Provider Sherrie Marley   3/2/2022  5:00 PM Dipak Calzada MD [facility]   3/16/2022  1:00 PM Radha Oglesby MD [facility] OF [facility]   3/22/2022 12:30 PM Shamar Ledesma MD [facility]    4/9/2022 10:30 AM Lokesh Arrington MD [address] [facility] OF THE [facility]   11/7/2022  5:00 PM CYRIL SOTO [facility] [place]

## 2022-02-28 NOTE — TELEPHONE ENCOUNTER
[facility]  Patient is to have medical clearance prior to surgery with Dr Liz Evans. Patient has not completed this. We will need to cancel surgery if patient does not get in for medical clearance.  She can call me back at [phone]

## 2022-02-28 NOTE — TELEPHONE ENCOUNTER
Pt calling stating that she just got scheduled for a surgery she is having on 3/7 and she is in need of pre op visit before then. Pt can come in anytime this week after 3pm.   Requesting if Dr can fit her in this week. Please advise.

## 2022-03-02 ENCOUNTER — LAB ENCOUNTER (OUTPATIENT)
Dept: LAB | Facility: HOSPITAL | Age: 62
End: 2022-03-02
Attending: INTERNAL MEDICINE
Payer: COMMERCIAL

## 2022-03-02 ENCOUNTER — OFFICE VISIT (OUTPATIENT)
Dept: INTERNAL MEDICINE CLINIC | Facility: CLINIC | Age: 62
End: 2022-03-02
Payer: COMMERCIAL

## 2022-03-02 VITALS
HEART RATE: 80 BPM | BODY MASS INDEX: 30.16 KG/M2 | DIASTOLIC BLOOD PRESSURE: 80 MMHG | WEIGHT: 199 LBS | RESPIRATION RATE: 18 BRPM | SYSTOLIC BLOOD PRESSURE: 120 MMHG | HEIGHT: 68 IN

## 2022-03-02 DIAGNOSIS — Z01.818 PREOP EXAMINATION: Primary | ICD-10-CM

## 2022-03-02 DIAGNOSIS — Z01.818 PREOP EXAMINATION: ICD-10-CM

## 2022-03-02 DIAGNOSIS — L03.012 PARONYCHIA OF FINGER OF LEFT HAND: ICD-10-CM

## 2022-03-02 DIAGNOSIS — H40.9 GLAUCOMA OF BOTH EYES, UNSPECIFIED GLAUCOMA TYPE: ICD-10-CM

## 2022-03-02 LAB
ALBUMIN SERPL-MCNC: 3.8 G/DL (ref 3.4–5)
ALBUMIN/GLOB SERPL: 1.1 {RATIO} (ref 1–2)
ALP LIVER SERPL-CCNC: 124 U/L
ALT SERPL-CCNC: 30 U/L
ANION GAP SERPL CALC-SCNC: 4 MMOL/L (ref 0–18)
APTT PPP: 33.5 SECONDS (ref 23.3–35.6)
AST SERPL-CCNC: 19 U/L (ref 15–37)
BILIRUB SERPL-MCNC: 0.4 MG/DL (ref 0.1–2)
BILIRUB UR QL: NEGATIVE
BUN BLD-MCNC: 15 MG/DL (ref 7–18)
BUN/CREAT SERPL: 16.3 (ref 10–20)
CALCIUM BLD-MCNC: 9.2 MG/DL (ref 8.5–10.1)
CHLORIDE SERPL-SCNC: 108 MMOL/L (ref 98–112)
CLARITY UR: CLEAR
CO2 SERPL-SCNC: 30 MMOL/L (ref 21–32)
COLOR UR: YELLOW
CREAT BLD-MCNC: 0.92 MG/DL
DEPRECATED RDW RBC AUTO: 42.5 FL (ref 35.1–46.3)
ERYTHROCYTE [DISTWIDTH] IN BLOOD BY AUTOMATED COUNT: 14.6 % (ref 11–15)
FASTING STATUS PATIENT QL REPORTED: NO
GLOBULIN PLAS-MCNC: 3.4 G/DL (ref 2.8–4.4)
GLUCOSE BLD-MCNC: 105 MG/DL (ref 70–99)
GLUCOSE UR-MCNC: NEGATIVE MG/DL
HCT VFR BLD AUTO: 42.6 %
HGB BLD-MCNC: 14.1 G/DL
HGB UR QL STRIP.AUTO: NEGATIVE
INR BLD: 1 (ref 0.8–1.2)
KETONES UR-MCNC: NEGATIVE MG/DL
LEUKOCYTE ESTERASE UR QL STRIP.AUTO: NEGATIVE
MCH RBC QN AUTO: 26.7 PG (ref 26–34)
MCHC RBC AUTO-ENTMCNC: 33.1 G/DL (ref 31–37)
MCV RBC AUTO: 80.5 FL
NITRITE UR QL STRIP.AUTO: NEGATIVE
OSMOLALITY SERPL CALC.SUM OF ELEC: 295 MOSM/KG (ref 275–295)
PH UR: 6 [PH] (ref 5–8)
PLATELET # BLD AUTO: 407 10(3)UL (ref 150–450)
POTASSIUM SERPL-SCNC: 4.4 MMOL/L (ref 3.5–5.1)
PROT SERPL-MCNC: 7.2 G/DL (ref 6.4–8.2)
PROT UR-MCNC: 30 MG/DL
PROTHROMBIN TIME: 13.3 SECONDS (ref 11.6–14.8)
RBC # BLD AUTO: 5.29 X10(6)UL
SODIUM SERPL-SCNC: 142 MMOL/L (ref 136–145)
SP GR UR STRIP: 1.01 (ref 1–1.03)
UROBILINOGEN UR STRIP-ACNC: <2
WBC # BLD AUTO: 9.9 X10(3) UL (ref 4–11)

## 2022-03-02 PROCEDURE — 85610 PROTHROMBIN TIME: CPT

## 2022-03-02 PROCEDURE — 93010 ELECTROCARDIOGRAM REPORT: CPT | Performed by: INTERNAL MEDICINE

## 2022-03-02 PROCEDURE — 36415 COLL VENOUS BLD VENIPUNCTURE: CPT

## 2022-03-02 PROCEDURE — 80053 COMPREHEN METABOLIC PANEL: CPT

## 2022-03-02 PROCEDURE — 85730 THROMBOPLASTIN TIME PARTIAL: CPT

## 2022-03-02 PROCEDURE — 81001 URINALYSIS AUTO W/SCOPE: CPT

## 2022-03-02 PROCEDURE — 85027 COMPLETE CBC AUTOMATED: CPT

## 2022-03-02 PROCEDURE — 3074F SYST BP LT 130 MM HG: CPT | Performed by: INTERNAL MEDICINE

## 2022-03-02 PROCEDURE — 87081 CULTURE SCREEN ONLY: CPT

## 2022-03-02 PROCEDURE — 3008F BODY MASS INDEX DOCD: CPT | Performed by: INTERNAL MEDICINE

## 2022-03-02 PROCEDURE — 3079F DIAST BP 80-89 MM HG: CPT | Performed by: INTERNAL MEDICINE

## 2022-03-02 PROCEDURE — 99214 OFFICE O/P EST MOD 30 MIN: CPT | Performed by: INTERNAL MEDICINE

## 2022-03-02 PROCEDURE — 93005 ELECTROCARDIOGRAM TRACING: CPT

## 2022-03-04 DIAGNOSIS — K21.9 GASTROESOPHAGEAL REFLUX DISEASE WITHOUT ESOPHAGITIS: ICD-10-CM

## 2022-03-04 RX ORDER — OMEPRAZOLE 20 MG/1
40 CAPSULE, DELAYED RELEASE ORAL EVERY MORNING
Qty: 180 CAPSULE | Refills: 1 | Status: SHIPPED | OUTPATIENT
Start: 2022-03-04 | End: 2022-09-02

## 2022-03-04 NOTE — TELEPHONE ENCOUNTER
Refill passed per [facility] protocol.     Requested Prescriptions   Pending Prescriptions Disp Refills    OMEPRAZOLE 20 MG Oral Capsule Delayed Release [Pharmacy Med Name: OMEPRAZOLE 20MG CAPSULES] 180 capsule 1     Sig: TAKE 2 CAPSULES(40 MG) BY MOUTH EVERY MORNING        Gastrointestional Medication Protocol Passed - 3/4/2022  6:15 AM        Passed - Appointment in past 12 or next 3 months              Recent Outpatient Visits              2 days ago Preop examination    [facility], [address] MD    Office Visit    6 days ago Well woman exam    [facility], [address], [facility],     Office Visit    3 weeks ago Nontraumatic complete tear of right rotator cuff    [facility], [address], [place]    Office Visit    3 weeks ago Chronic maxillary sinusitis    [facility], [address], Jesica Dumont MD    Office Visit    3 weeks ago Environmental allergies    [facility], [address]    Nurse Only            Future Appointments         Provider Department Appt Notes    Tomorrow Berto Davis     In 1 week Anna Cronin MD [facility], [address] post-op    In 2 weeks Percy Maher MD; [facility] Ophthalmology - [place] glaucoma eval/ ref- Dr. Jose Guadalupe Aleman ([facility])/ adv [address] [facility]    In 1 month Maricarmen Reyes MD [facility] Ophthalmology IOP check     In 8 months [facility]

## 2022-03-05 ENCOUNTER — LAB ENCOUNTER (OUTPATIENT)
Dept: LAB | Age: 62
End: 2022-03-05
Attending: ORTHOPAEDIC SURGERY
Payer: COMMERCIAL

## 2022-03-05 DIAGNOSIS — Z01.818 PRE-OP TESTING: ICD-10-CM

## 2022-03-05 LAB — SARS-COV-2 RNA RESP QL NAA+PROBE: NOT DETECTED

## 2022-03-07 ENCOUNTER — ANESTHESIA (OUTPATIENT)
Dept: SURGERY | Facility: HOSPITAL | Age: 62
End: 2022-03-07
Payer: COMMERCIAL

## 2022-03-07 ENCOUNTER — ANESTHESIA EVENT (OUTPATIENT)
Dept: SURGERY | Facility: HOSPITAL | Age: 62
End: 2022-03-07
Payer: COMMERCIAL

## 2022-03-07 ENCOUNTER — HOSPITAL ENCOUNTER (OUTPATIENT)
Facility: HOSPITAL | Age: 62
Setting detail: HOSPITAL OUTPATIENT SURGERY
Discharge: HOME | End: 2022-03-07
Attending: ORTHOPAEDIC SURGERY | Admitting: ORTHOPAEDIC SURGERY
Payer: COMMERCIAL

## 2022-03-07 ENCOUNTER — TELEPHONE (OUTPATIENT)
Dept: ORTHOPEDICS CLINIC | Facility: CLINIC | Age: 62
End: 2022-03-07

## 2022-03-07 VITALS
DIASTOLIC BLOOD PRESSURE: 64 MMHG | WEIGHT: 196 LBS | HEART RATE: 87 BPM | BODY MASS INDEX: 29.7 KG/M2 | SYSTOLIC BLOOD PRESSURE: 122 MMHG | HEIGHT: 68 IN | RESPIRATION RATE: 18 BRPM | TEMPERATURE: 98 F | OXYGEN SATURATION: 93 %

## 2022-03-07 DIAGNOSIS — Z01.818 PRE-OP TESTING: Primary | ICD-10-CM

## 2022-03-07 DIAGNOSIS — M75.121 NONTRAUMATIC COMPLETE TEAR OF RIGHT ROTATOR CUFF: ICD-10-CM

## 2022-03-07 PROCEDURE — 0LQ14ZZ REPAIR RIGHT SHOULDER TENDON, PERCUTANEOUS ENDOSCOPIC APPROACH: ICD-10-PCS | Performed by: ORTHOPAEDIC SURGERY

## 2022-03-07 PROCEDURE — S0077 INJECTION, CLINDAMYCIN PHOSP: HCPCS

## 2022-03-07 PROCEDURE — 0RNJ4ZZ RELEASE RIGHT SHOULDER JOINT, PERCUTANEOUS ENDOSCOPIC APPROACH: ICD-10-PCS | Performed by: ORTHOPAEDIC SURGERY

## 2022-03-07 PROCEDURE — 64415 NJX AA&/STRD BRCH PLXS IMG: CPT | Performed by: ORTHOPAEDIC SURGERY

## 2022-03-07 PROCEDURE — S0077 INJECTION, CLINDAMYCIN PHOSP: HCPCS | Performed by: ANESTHESIOLOGY

## 2022-03-07 PROCEDURE — 76942 ECHO GUIDE FOR BIOPSY: CPT | Performed by: ORTHOPAEDIC SURGERY

## 2022-03-07 RX ORDER — ONDANSETRON 2 MG/ML
4 INJECTION INTRAMUSCULAR; INTRAVENOUS ONCE AS NEEDED
Status: DISCONTINUED | OUTPATIENT
Start: 2022-03-07 | End: 2022-03-07

## 2022-03-07 RX ORDER — METOCLOPRAMIDE 10 MG/1
10 TABLET ORAL ONCE
Status: COMPLETED | OUTPATIENT
Start: 2022-03-07 | End: 2022-03-07

## 2022-03-07 RX ORDER — SODIUM CHLORIDE, SODIUM LACTATE, POTASSIUM CHLORIDE, CALCIUM CHLORIDE 600; 310; 30; 20 MG/100ML; MG/100ML; MG/100ML; MG/100ML
INJECTION, SOLUTION INTRAVENOUS CONTINUOUS
Status: DISCONTINUED | OUTPATIENT
Start: 2022-03-07 | End: 2022-03-07

## 2022-03-07 RX ORDER — NALOXONE HYDROCHLORIDE 0.4 MG/ML
80 INJECTION, SOLUTION INTRAMUSCULAR; INTRAVENOUS; SUBCUTANEOUS AS NEEDED
Status: DISCONTINUED | OUTPATIENT
Start: 2022-03-07 | End: 2022-03-07

## 2022-03-07 RX ORDER — HYDROMORPHONE HYDROCHLORIDE 1 MG/ML
0.6 INJECTION, SOLUTION INTRAMUSCULAR; INTRAVENOUS; SUBCUTANEOUS EVERY 5 MIN PRN
Status: DISCONTINUED | OUTPATIENT
Start: 2022-03-07 | End: 2022-03-07

## 2022-03-07 RX ORDER — PROCHLORPERAZINE EDISYLATE 5 MG/ML
5 INJECTION INTRAMUSCULAR; INTRAVENOUS ONCE AS NEEDED
Status: DISCONTINUED | OUTPATIENT
Start: 2022-03-07 | End: 2022-03-07

## 2022-03-07 RX ORDER — ONDANSETRON 2 MG/ML
INJECTION INTRAMUSCULAR; INTRAVENOUS AS NEEDED
Status: DISCONTINUED | OUTPATIENT
Start: 2022-03-07 | End: 2022-03-07 | Stop reason: SURG

## 2022-03-07 RX ORDER — MORPHINE SULFATE 10 MG/ML
6 INJECTION, SOLUTION INTRAMUSCULAR; INTRAVENOUS EVERY 10 MIN PRN
Status: DISCONTINUED | OUTPATIENT
Start: 2022-03-07 | End: 2022-03-07

## 2022-03-07 RX ORDER — MORPHINE SULFATE 4 MG/ML
4 INJECTION, SOLUTION INTRAMUSCULAR; INTRAVENOUS EVERY 10 MIN PRN
Status: DISCONTINUED | OUTPATIENT
Start: 2022-03-07 | End: 2022-03-07

## 2022-03-07 RX ORDER — HYDROMORPHONE HYDROCHLORIDE 1 MG/ML
0.2 INJECTION, SOLUTION INTRAMUSCULAR; INTRAVENOUS; SUBCUTANEOUS EVERY 5 MIN PRN
Status: DISCONTINUED | OUTPATIENT
Start: 2022-03-07 | End: 2022-03-07

## 2022-03-07 RX ORDER — MIDAZOLAM HYDROCHLORIDE 1 MG/ML
INJECTION INTRAMUSCULAR; INTRAVENOUS AS NEEDED
Status: DISCONTINUED | OUTPATIENT
Start: 2022-03-07 | End: 2022-03-07 | Stop reason: SURG

## 2022-03-07 RX ORDER — CLINDAMYCIN PHOSPHATE 600 MG/50ML
600 INJECTION INTRAVENOUS ONCE
Status: DISCONTINUED | OUTPATIENT
Start: 2022-03-07 | End: 2022-03-07 | Stop reason: HOSPADM

## 2022-03-07 RX ORDER — ACETAMINOPHEN 500 MG
1000 TABLET ORAL ONCE
Status: COMPLETED | OUTPATIENT
Start: 2022-03-07 | End: 2022-03-07

## 2022-03-07 RX ORDER — OXYCODONE HCL 10 MG/1
10 TABLET, FILM COATED, EXTENDED RELEASE ORAL ONCE
Status: COMPLETED | OUTPATIENT
Start: 2022-03-07 | End: 2022-03-07

## 2022-03-07 RX ORDER — EPHEDRINE SULFATE 50 MG/ML
INJECTION, SOLUTION INTRAVENOUS AS NEEDED
Status: DISCONTINUED | OUTPATIENT
Start: 2022-03-07 | End: 2022-03-07 | Stop reason: SURG

## 2022-03-07 RX ORDER — DEXAMETHASONE SODIUM PHOSPHATE 10 MG/ML
INJECTION, SOLUTION INTRAMUSCULAR; INTRAVENOUS AS NEEDED
Status: DISCONTINUED | OUTPATIENT
Start: 2022-03-07 | End: 2022-03-07 | Stop reason: SURG

## 2022-03-07 RX ORDER — HYDROMORPHONE HYDROCHLORIDE 1 MG/ML
0.4 INJECTION, SOLUTION INTRAMUSCULAR; INTRAVENOUS; SUBCUTANEOUS EVERY 5 MIN PRN
Status: DISCONTINUED | OUTPATIENT
Start: 2022-03-07 | End: 2022-03-07

## 2022-03-07 RX ORDER — HYDROCODONE BITARTRATE AND ACETAMINOPHEN 5; 325 MG/1; MG/1
2 TABLET ORAL AS NEEDED
Status: DISCONTINUED | OUTPATIENT
Start: 2022-03-07 | End: 2022-03-07

## 2022-03-07 RX ORDER — HYDROCODONE BITARTRATE AND ACETAMINOPHEN 5; 325 MG/1; MG/1
1 TABLET ORAL AS NEEDED
Status: DISCONTINUED | OUTPATIENT
Start: 2022-03-07 | End: 2022-03-07

## 2022-03-07 RX ORDER — ROPIVACAINE HYDROCHLORIDE 5 MG/ML
INJECTION, SOLUTION EPIDURAL; INFILTRATION; PERINEURAL AS NEEDED
Status: DISCONTINUED | OUTPATIENT
Start: 2022-03-07 | End: 2022-03-07 | Stop reason: SURG

## 2022-03-07 RX ORDER — HALOPERIDOL 5 MG/ML
0.25 INJECTION INTRAMUSCULAR ONCE AS NEEDED
Status: DISCONTINUED | OUTPATIENT
Start: 2022-03-07 | End: 2022-03-07

## 2022-03-07 RX ORDER — OXYCODONE HYDROCHLORIDE AND ACETAMINOPHEN 5; 325 MG/1; MG/1
1-2 TABLET ORAL EVERY 4 HOURS PRN
Qty: 35 TABLET | Refills: 0 | Status: SHIPPED | OUTPATIENT
Start: 2022-03-07

## 2022-03-07 RX ORDER — LIDOCAINE HYDROCHLORIDE 10 MG/ML
INJECTION, SOLUTION EPIDURAL; INFILTRATION; INTRACAUDAL; PERINEURAL AS NEEDED
Status: DISCONTINUED | OUTPATIENT
Start: 2022-03-07 | End: 2022-03-07 | Stop reason: SURG

## 2022-03-07 RX ORDER — MORPHINE SULFATE 4 MG/ML
2 INJECTION, SOLUTION INTRAMUSCULAR; INTRAVENOUS EVERY 10 MIN PRN
Status: DISCONTINUED | OUTPATIENT
Start: 2022-03-07 | End: 2022-03-07

## 2022-03-07 RX ORDER — FAMOTIDINE 20 MG/1
20 TABLET, FILM COATED ORAL ONCE
Status: DISCONTINUED | OUTPATIENT
Start: 2022-03-07 | End: 2022-03-07 | Stop reason: HOSPADM

## 2022-03-07 RX ORDER — SODIUM CHLORIDE, SODIUM LACTATE, POTASSIUM CHLORIDE, CALCIUM CHLORIDE 600; 310; 30; 20 MG/100ML; MG/100ML; MG/100ML; MG/100ML
INJECTION, SOLUTION INTRAVENOUS CONTINUOUS PRN
Status: DISCONTINUED | OUTPATIENT
Start: 2022-03-07 | End: 2022-03-07

## 2022-03-07 RX ORDER — CLINDAMYCIN PHOSPHATE 150 MG/ML
INJECTION, SOLUTION INTRAVENOUS AS NEEDED
Status: DISCONTINUED | OUTPATIENT
Start: 2022-03-07 | End: 2022-03-07 | Stop reason: SURG

## 2022-03-07 RX ADMIN — ONDANSETRON 4 MG: 2 INJECTION INTRAMUSCULAR; INTRAVENOUS at 14:16:00

## 2022-03-07 RX ADMIN — DEXAMETHASONE SODIUM PHOSPHATE 4 MG: 10 INJECTION, SOLUTION INTRAMUSCULAR; INTRAVENOUS at 12:32:00

## 2022-03-07 RX ADMIN — CLINDAMYCIN PHOSPHATE 600 MG: 150 INJECTION, SOLUTION INTRAVENOUS at 13:08:00

## 2022-03-07 RX ADMIN — LIDOCAINE HYDROCHLORIDE 50 MG: 10 INJECTION, SOLUTION EPIDURAL; INFILTRATION; INTRACAUDAL; PERINEURAL at 13:04:00

## 2022-03-07 RX ADMIN — EPHEDRINE SULFATE 10 MG: 50 INJECTION, SOLUTION INTRAVENOUS at 13:40:00

## 2022-03-07 RX ADMIN — ROPIVACAINE HYDROCHLORIDE 30 ML: 5 INJECTION, SOLUTION EPIDURAL; INFILTRATION; PERINEURAL at 12:32:00

## 2022-03-07 RX ADMIN — SODIUM CHLORIDE, SODIUM LACTATE, POTASSIUM CHLORIDE, CALCIUM CHLORIDE: 600; 310; 30; 20 INJECTION, SOLUTION INTRAVENOUS at 14:33:00

## 2022-03-07 RX ADMIN — MIDAZOLAM HYDROCHLORIDE 2 MG: 1 INJECTION INTRAMUSCULAR; INTRAVENOUS at 12:28:00

## 2022-03-07 NOTE — OPERATIVE REPORT
Operative Note    Patient Name: Diana Richardson    Preoperative Diagnosis: Nontraumatic complete tear of right rotator cuff [M75.121]    Postoperative Diagnosis: Nontraumatic complete tear of right rotator cuff [M75.121], Subacromial impingement    Primary Surgeon: Santiago Gonzalez MD     Assistant: Edel Persaud, [facility]    Procedures: R shoulder arthroscopy, RCR, SAD    Surgical Findings: above    Anesthesia: General/regional    Complications: none    Specimen: none    Drains: none    Condition: stable to RR    Estimated Blood Loss: Barbara Marino MD

## 2022-03-07 NOTE — ANESTHESIA PROCEDURE NOTES
Peripheral Block  Performed by: Tammie Benedict MD  Authorized by: Tammie Benedict MD       General Information and Staff    Start Time:  3/7/2022 12:28 PM  End Time:  3/7/2022 12:32 PM  Anesthesiologist:  Tammie Benedict MD  Performed by: Anesthesiologist  Patient Location:  PACU    Block Placement: Pre Induction  Site Identification: real time ultrasound guided and image stored and retrievable    Block site/laterality marked before start: site marked  Reason for Block: at surgeon's request and post-op pain management    Preanesthetic Checklist: 2 patient identifers, IV checked, risks and benefits discussed, monitors and equipment checked, pre-op evaluation, timeout performed, anesthesia consent and sterile technique used      Procedure Details    Patient Position:  Sitting  Prep: ChloraPrep    Monitoring:  Cardiac monitor  Block Type:   Interscalene  Laterality:  Right  Injection Technique:  Single-shot    Needle    Needle Type:  Short-bevel  Needle Gauge:  22 G  Needle Length:  50 mm  Needle Localization:  Ultrasound guidance  Reason for Ultrasound Use: appropriate spread of the medication was noted in real time and no ultrasound evidence of intravascular and/or intraneural injection            Assessment    Injection Assessment:  Good spread noted, incremental injection, local visualized surrounding nerve on ultrasound, low pressure, negative aspiration for heme and no pain on injection  Heart Rate Change: No        Medications      Additional Comments

## 2022-03-07 NOTE — ANESTHESIA PROCEDURE NOTES
Airway  Date/Time: 3/7/2022 1:05 PM  Urgency: Elective      General Information and Staff    Patient location during procedure: OR  Anesthesiologist: Rico King MD  Resident/CRNA: Valentin Fernando CRNA  Performed: CRNA     Indications and Patient Condition  Indications for airway management: anesthesia  Sedation level: deep  Preoxygenated: yes  Patient position: sniffing  Mask difficulty assessment: 1 - vent by mask    Final Airway Details  Final airway type: endotracheal airway      Successful airway: ETT  Cuffed: yes   Successful intubation technique: direct laryngoscopy  Endotracheal tube insertion site: oral  Blade: Jack  Blade size: #3  ETT size (mm): 7.0    Cormack-Lehane Classification: grade IIA - partial view of glottis  Placement verified by: chest auscultation and capnometry   Cuff volume (mL): 6  Measured from: teeth  ETT to teeth (cm): 22  Number of attempts at approach: 1

## 2022-03-08 ENCOUNTER — TELEPHONE (OUTPATIENT)
Dept: ORTHOPEDICS CLINIC | Facility: CLINIC | Age: 62
End: 2022-03-08

## 2022-03-08 NOTE — OPERATIVE REPORT
[facility]    PATIENT'S NAME: [facility]   ATTENDING PHYSICIAN: Marcin Arroyo MD   OPERATING PHYSICIAN: Marcin Arroyo MD   PATIENT ACCOUNT#:   [id number]    LOCATION:  [address]  MEDICAL RECORD #:   [id number]       YOB: 1960  ADMISSION DATE:       03/07/2022      OPERATION DATE:  03/07/2022    OPERATIVE REPORT      PREOPERATIVE DIAGNOSIS:  Right shoulder rotator cuff tear. POSTOPERATIVE DIAGNOSIS:    1. Right shoulder rotator cuff tear. 2.   Right shoulder subacromial impingement. PROCEDURE:    1. Right shoulder arthroscopy. 2.   Arthroscopic rotator cuff repair. 3.   Arthroscopic subacromial decompression. ASSISTANT:  Deepthi Westbrook PA-C     ANESTHESIA:  General with regional nerve block. COMPLICATIONS:  None. BLOOD LOSS:  10 mL. SPECIMEN:  None. DRAINS:  None. INDICATIONS:  The patient is a 57-year-old female with history of right shoulder pain unresponsive to conservative care. Preoperative physical findings and imaging studies were consistent with a tear of the rotator cuff. She failed conservative treatment including anti-inflammatory medications, therapeutic exercise, subacromial injections, and activity modifications. After discussion with the patient the risks and benefits of proceeding with operative treatment of the right shoulder, she wished to proceed with surgery. FINDINGS:  Examination under anesthesia, the patient had full passive range of motion when anesthetized. Anterior, posterior load and shift maneuvers were negative. Arthroscopic Findings:    1. Glenohumeral joint:  The glenoid articular surface and the humeral head articular surfaces were intact, unremarkable. 2.   Labrum:  The superior labrum was unremarkable. The anterior labrum had minimal degenerative fraying at the rotator interval.  Posterior, inferior labrum unremarkable. 3.   Subscapularis tendon:  Intact, unremarkable.   4.   Biceps tendon: Intact, unremarkable. 5.   Axillary recess:  No significant loose bodies or capsular avulsions. Subacromial Space:    6. Bursa: Thickened and erythematous, consistent with subacromial bursitis. 7.   Acromion:  There was a small osteophyte at the anterolateral acromion measuring approximately 7 mm in size. 8.   Rotator cuff: There was a high-grade partial-thickness bursal-sided tear of the rotator cuff involving approximately 80% to 90% of the diameter of the tendon. A large portion of the greater tuberosity was devoid of any supraspinatus attachment. 9.   AC joint:  Not visualized. OPERATIVE TECHNIQUE:  The patient was identified in the preoperative holding area. The appropriate consents were obtained. She was taken to the operating room, placed in supine position on the operating room table. After adequate general and regional anesthesia was obtained, she was turned to the left lateral decubitus position. An axillary roll was placed under the left axilla. Bony prominences were well padded. SCD devices were placed on both lower extremities. The patient was given preoperative IV antibiotics. The right shoulder and upper extremity were then prepped and draped in a sterile fashion. The extremity suspended from the axial traction device with 10 pounds of axial traction applied. A posterior portal was established. The camera was inserted in the glenohumeral joint, and a thorough examination of the shoulder joint was performed. The findings were as stated. Next, an anterior portal was established just inferior to the acromioclavicular joint. A motorized shaver and radiofrequency wand were used to debride the anterior, superior labrum of all fibrillated material.  The camera was then redirected into the subacromial space. Lateral portal was established just lateral to the acromion. A motorized shaver and radiofrequency wand were used to perform a complete subacromial bursectomy.   A motorized bur was used to perform a bony acromioplasty. The greater tuberosity was next prepared for reattachment of the supraspinatus tendon. A rasp was used to remove soft tissue from the cortical surface. Two accessory lateral portals were made, 1 anterior and 1 posterior, and 2 [employer]rex Bio[employer]posite SwiveLock anchors were placed at the articular margin of the greater tuberosity, 1 anterior and 1 posterior. We then placed 4 FiberTapes and 4 FiberWire sutures through the free edge of the supraspinatus. Two knots were tied, 1 anterior and 1 posterior. This adequately secured the medial row of the repair. We then placed 2 additional lateral row anchors at the lateral margin of the greater tuberosity. We placed 2 FiberTapes and 2 FiberWire sutures through each anchor. Excellent recreation of the anatomic footprint was noted. There were no persistent defects. Excess suture was removed. Subacromial space was suction irrigated. The stability of the tear was excellent with no significant tension on the repair. Instruments were removed. The portals were closed with 4-0 nylon sutures. A sterile dressing was applied including an UltraSling. The patient's anesthesia was reversed. She was extubated and taken to the recovery room in stable condition. All sponge and instrument counts were reported as correct. The attending physician, Dr. Laura Miles, was present and performed all critical portions of the procedure. There were no complications. The first assistant was medically necessary for the surgery, assisted with patient positioning and suture management. He assisted by operating the arthroscope during portions of the procedure. He also assisted with wound closure and hemostasis. Without the aid of the assistant, the surgical procedure were not been possible. Dictated By Roberta Harper.  Laura Miles MD  d: 03/07/2022 14:32:20  t: 03/07/2022 23:11:30  Job [id number]  [place]/

## 2022-03-08 NOTE — TELEPHONE ENCOUNTER
Attending physician statement forms received via faxed from [facility] [employer] scanned to forms department

## 2022-03-09 NOTE — TELEPHONE ENCOUNTER
Pt called back requesting assistance on how to reply back to [employer] message. Assisted pt.   Pt first day off work 3/7/22 - 6-8 weeks pending recovery

## 2022-03-10 ENCOUNTER — TELEPHONE (OUTPATIENT)
Dept: INTERNAL MEDICINE CLINIC | Facility: CLINIC | Age: 62
End: 2022-03-10

## 2022-03-10 NOTE — TELEPHONE ENCOUNTER
Sergei hernandez is faxing a form on 3/10 to be completed and verified in regards to patients short term disability. Verification can also be done by phone.     [phone]  Fax [phone]     Claim # [id number]

## 2022-03-10 NOTE — TELEPHONE ENCOUNTER
Fax received today from [address] [place] [address] - Attending Physician Statement for Disability Benefits. Already sent to Forms Dept.

## 2022-03-14 ENCOUNTER — MED REC SCAN ONLY (OUTPATIENT)
Dept: INTERNAL MEDICINE CLINIC | Facility: CLINIC | Age: 62
End: 2022-03-14

## 2022-03-16 ENCOUNTER — OFFICE VISIT (OUTPATIENT)
Dept: ORTHOPEDICS CLINIC | Facility: CLINIC | Age: 62
End: 2022-03-16
Payer: COMMERCIAL

## 2022-03-16 ENCOUNTER — TELEPHONE (OUTPATIENT)
Dept: PHYSICAL THERAPY | Facility: HOSPITAL | Age: 62
End: 2022-03-16

## 2022-03-16 DIAGNOSIS — M75.121 NONTRAUMATIC COMPLETE TEAR OF RIGHT ROTATOR CUFF: Primary | ICD-10-CM

## 2022-03-16 DIAGNOSIS — Z47.89 ORTHOPEDIC AFTERCARE: ICD-10-CM

## 2022-03-16 PROCEDURE — 99024 POSTOP FOLLOW-UP VISIT: CPT | Performed by: ORTHOPAEDIC SURGERY

## 2022-03-21 ENCOUNTER — TELEPHONE (OUTPATIENT)
Dept: CASE MANAGEMENT | Age: 62
End: 2022-03-21

## 2022-03-21 ENCOUNTER — TELEPHONE (OUTPATIENT)
Dept: INTERNAL MEDICINE CLINIC | Facility: CLINIC | Age: 62
End: 2022-03-21

## 2022-03-21 RX ORDER — LATANOPROST 50 UG/ML
SOLUTION/ DROPS OPHTHALMIC
Qty: 7.5 ML | Refills: 3 | Status: SHIPPED | OUTPATIENT
Start: 2022-03-21

## 2022-03-21 NOTE — TELEPHONE ENCOUNTER
LDE: 12/28/21  Last visit: 12/28/21  Due for: IOP check   Upcoming visit: 4/9/22    Routed to hospitals

## 2022-03-21 NOTE — TELEPHONE ENCOUNTER
Dr. Carlos Leyva,    The patient called requesting referral to   Dr. Bernabe. Pended referral please review diagnosis and sign off if you agree. Thank you.   Dimas Mtz

## 2022-03-22 ENCOUNTER — OFFICE VISIT (OUTPATIENT)
Dept: PODIATRY CLINIC | Facility: CLINIC | Age: 62
End: 2022-03-22
Payer: COMMERCIAL

## 2022-03-22 ENCOUNTER — TELEPHONE (OUTPATIENT)
Dept: PODIATRY CLINIC | Facility: CLINIC | Age: 62
End: 2022-03-22

## 2022-03-22 DIAGNOSIS — M79.674 PAIN OF TOE OF RIGHT FOOT: ICD-10-CM

## 2022-03-22 DIAGNOSIS — L84 CALLUS OF FOOT: ICD-10-CM

## 2022-03-22 DIAGNOSIS — M77.41 METATARSALGIA OF BOTH FEET: ICD-10-CM

## 2022-03-22 DIAGNOSIS — M77.42 METATARSALGIA OF BOTH FEET: ICD-10-CM

## 2022-03-22 DIAGNOSIS — M20.41 HAMMER TOE OF RIGHT FOOT: Primary | ICD-10-CM

## 2022-03-22 PROCEDURE — 99213 OFFICE O/P EST LOW 20 MIN: CPT | Performed by: PODIATRIST

## 2022-03-22 NOTE — TELEPHONE ENCOUNTER
Spoke with pt informed her of turnaround time. Advised pt to call HR for an ext.  Will call us back if she has any issues

## 2022-03-22 NOTE — TELEPHONE ENCOUNTER
Dr. Toribio Cunningham     Please sign off on form:Disability  -Highlight the patient and hit \"Chart\" button. -In Chart Review, w/in the Encounter tab - click 1 time on the Telephone call encounter for 03/08/22 Scroll down the telephone encounter.  -Click \"scan on\" blue Hyperlink under \"Media\" heading for Disab Dr Toribio Cunningham 03/22/22  w/in the telephone enc.  -Click on Acknowledge button at the bottom right corner and left-click onto image, signature stamp appears and drag signature to Provider signature line. Stamp will turn blue. Close window.      Thank you,    Vesta Her

## 2022-03-22 NOTE — TELEPHONE ENCOUNTER
Called patient this date and she is requesting surgery on 4/1/22 which was scheduled this date at [address] Managed care order placed this date. Patient was told I would call her back tomorrow to review the details once [facility] confirms.

## 2022-03-22 NOTE — TELEPHONE ENCOUNTER
Procedure: Fusion of the PIP joint using the smart toe implant system third toe, right foot  CPT code: 43814  Length of Surgery: 30-minute  Any Instruments: Mini power, mini fluoroscopy, Smart toe implant system (this is not the pin or absorbable pin)  Call patient: within 24 hours  Anesthesia: MAC  Location: [facility]  Assistance: none  Pacemaker: No  Anticoagulants: No  Nickel Allergy: No  Latex Allergy: No  Diagnosis/ICD Code:   (M20.41) Hammer toe of right foot  Plan:     (M79.674) Pain of toe of right foot  Plan:

## 2022-03-24 NOTE — TELEPHONE ENCOUNTER
Patient called in this date and surgery was confirmed for 4/1/22 at 8:45 a.m. at [facility]. Reviewed pre-op instructions this date and also sent them thru My Chart. Scheduled patient's post op visits.   She will contact me directly with any questions at [phone]

## 2022-03-28 ENCOUNTER — TELEPHONE (OUTPATIENT)
Dept: PHYSICAL THERAPY | Facility: HOSPITAL | Age: 62
End: 2022-03-28

## 2022-03-29 ENCOUNTER — OFFICE VISIT (OUTPATIENT)
Dept: PHYSICAL THERAPY | Age: 62
End: 2022-03-29
Attending: PHYSICIAN ASSISTANT
Payer: COMMERCIAL

## 2022-03-29 ENCOUNTER — LAB REQUISITION (OUTPATIENT)
Dept: SURGERY | Age: 62
End: 2022-03-29
Payer: COMMERCIAL

## 2022-03-29 DIAGNOSIS — Z47.89 ORTHOPEDIC AFTERCARE: ICD-10-CM

## 2022-03-29 DIAGNOSIS — M75.121 NONTRAUMATIC COMPLETE TEAR OF RIGHT ROTATOR CUFF: ICD-10-CM

## 2022-03-29 PROCEDURE — 97014 ELECTRIC STIMULATION THERAPY: CPT

## 2022-03-29 PROCEDURE — 97110 THERAPEUTIC EXERCISES: CPT

## 2022-03-29 PROCEDURE — 97163 PT EVAL HIGH COMPLEX 45 MIN: CPT

## 2022-03-30 LAB — SARS-COV-2 RNA RESP QL NAA+PROBE: NOT DETECTED

## 2022-03-31 ENCOUNTER — OFFICE VISIT (OUTPATIENT)
Dept: PHYSICAL THERAPY | Age: 62
End: 2022-03-31
Payer: COMMERCIAL

## 2022-03-31 PROCEDURE — 97014 ELECTRIC STIMULATION THERAPY: CPT

## 2022-03-31 PROCEDURE — 97110 THERAPEUTIC EXERCISES: CPT

## 2022-04-01 ENCOUNTER — TELEPHONE (OUTPATIENT)
Dept: ORTHOPEDICS CLINIC | Facility: CLINIC | Age: 62
End: 2022-04-01

## 2022-04-01 ENCOUNTER — PROCEDURE (OUTPATIENT)
Dept: PODIATRY CLINIC | Facility: CLINIC | Age: 62
End: 2022-04-01

## 2022-04-01 ENCOUNTER — APPOINTMENT (OUTPATIENT)
Dept: PHYSICAL THERAPY | Age: 62
End: 2022-04-01
Attending: PHYSICIAN ASSISTANT
Payer: COMMERCIAL

## 2022-04-01 PROCEDURE — 28285 REPAIR OF HAMMERTOE: CPT | Performed by: PODIATRIST

## 2022-04-01 NOTE — TELEPHONE ENCOUNTER
Spoke with patient work excuse generated and sent via [employer]. Patient also given forms department number.

## 2022-04-01 NOTE — TELEPHONE ENCOUNTER
Please clarify- should pt be off until next appt or longer? Then I can put a letter in and send to forms.

## 2022-04-02 ENCOUNTER — TELEPHONE (OUTPATIENT)
Dept: PODIATRY CLINIC | Facility: CLINIC | Age: 62
End: 2022-04-02

## 2022-04-02 RX ORDER — HYDROCODONE BITARTRATE AND ACETAMINOPHEN 5; 325 MG/1; MG/1
TABLET ORAL
COMMUNITY
Start: 2022-04-01

## 2022-04-02 RX ORDER — AZITHROMYCIN 250 MG/1
TABLET, FILM COATED ORAL
COMMUNITY
Start: 2022-04-01

## 2022-04-02 NOTE — TELEPHONE ENCOUNTER
Procedure date: 4/1/22  How are you feeling? Feeling fine  Any bleeding? No  Is the dressing dry & intact? Yes  Level of pain? 10/10  Character of pain: aching  Onset of pain: overnight   Alleviating factors: icing and elevating   Are you taking the prescribed medication? Yes last dose 430am   Are you following all of the PO instructions? Yes    Other Comments: pt states pain got worse overnight so she took 1 tablet of norco at 430am and fell back asleep. She has ibuprofen 600 tabs at home and will take 1 tablet every 8 hours in addition to norco to see if that helps pain. She denies the dressing being too tight but I advised her if pain severe and pain meds not helping to remove ace bandage and rewrap the dressing so not so tight. Pt advised if pain is still severe after taking motrin and loosening bandages to CB and have Dr. Kendall Reeder paged. Dr. Kendall Reeder has been updated on pt status and agrees with plan.     Follow-up appt  date: 4/5/22

## 2022-04-04 NOTE — TELEPHONE ENCOUNTER
Dr. Savannah Chavis     Please sign off on form:Disability  -Highlight the patient and hit \"Chart\" button. -In Chart Review, w/in the Encounter tab - click 1 time on the Telephone call encounter for 03/08/22 Scroll down the telephone encounter.  -Click \"scan on\" blue Hyperlink under \"Media\" heading for Disab Dr Savannah Chavis 03/22/22  w/in the telephone enc.  -Click on Acknowledge button at the bottom right corner and left-click onto image, signature stamp appears and drag signature to Provider signature line. Stamp will turn blue. Close window.      Thank you,    Allyssa Lang

## 2022-04-05 ENCOUNTER — OFFICE VISIT (OUTPATIENT)
Dept: PHYSICAL THERAPY | Age: 62
End: 2022-04-05
Payer: COMMERCIAL

## 2022-04-05 ENCOUNTER — APPOINTMENT (OUTPATIENT)
Dept: PHYSICAL THERAPY | Age: 62
End: 2022-04-05
Attending: PHYSICIAN ASSISTANT
Payer: COMMERCIAL

## 2022-04-05 ENCOUNTER — OFFICE VISIT (OUTPATIENT)
Dept: PODIATRY CLINIC | Facility: CLINIC | Age: 62
End: 2022-04-05
Payer: COMMERCIAL

## 2022-04-05 ENCOUNTER — TELEPHONE (OUTPATIENT)
Dept: ORTHOPEDICS CLINIC | Facility: CLINIC | Age: 62
End: 2022-04-05

## 2022-04-05 DIAGNOSIS — Z98.890 STATUS POST FOOT SURGERY: Primary | ICD-10-CM

## 2022-04-05 PROCEDURE — 97014 ELECTRIC STIMULATION THERAPY: CPT

## 2022-04-05 PROCEDURE — 97110 THERAPEUTIC EXERCISES: CPT

## 2022-04-05 PROCEDURE — 99024 POSTOP FOLLOW-UP VISIT: CPT | Performed by: PODIATRIST

## 2022-04-05 NOTE — TELEPHONE ENCOUNTER
Per pt had surgery 3/7 and is still in pain. Per pt she needs an emergency appointment as soon as possible, no appointments until 4/28.   Please advise

## 2022-04-05 NOTE — PROGRESS NOTES
[address] [facility]   OPERATIVE REPORT    Diana Richardson    [facility] [id number] MRN [id number]    1960 Age 59year old   Admission Date (Not on file) Operation Date  2022   Attending Physician No att. providers found Operating Physician Lance Ahumada, DPM   PCP Chon Wright MD             PREOPERATIVE DIAGNOSIS:   Painful rigid hammertoe third digit right foot with painful dorsal keratotic lesion  POSTOPERATIVE DIAGNOSIS:   Same  PROCEDURE:   Proximal inner phalangeal joint fusion third toe right foot with the use of a 15 mm Smart toe implant at 10 degrees     ANESTHESIA:  Local with light sedation, utilizing 0.5% Marcaine plain. Via third toe digital block right foot     HEMOSTASIS:   Pneumatic ankle tourniquet inflated to 250 mmHg following exsanguination Chuckie's bandage right lower extremity     ESTIMATED BLOOD LOSS:   2 cc     INDICATIONS:  This 59year old female presented with this pleasant patient was suffering from a painful hammertoe irritated in all shoe gear she could not get relief from trimming. FINDINGS:   The third toe of the right foot was held in a contracted position. This produced a painful nucleated Aloma on the dorsal aspect of the third toe at the level of the proximal inner phalangeal joint. SPECIMENS:   None     COMPLICATIONS:  None. DRAINS:   None     OPERATIVE TECHNIQUE:      The patient was brought into the operating room with vital signs stable and placed in a supine position on the operating table. With all personnel present the implant system was present along with the implants as well as the representative from the company timeout was taken and there were no additions deletions or concerns reported. Intravenous sedation was administered the third toe right foot was anesthetized with Marcaine. The right foot was then prepped and draped using usual aseptic technique and hemostasis was achieved as above.   2 converging semielliptical incisions were made encompassing the dorsal keratoma the incision length measured 2 cm in length. The encompass section of skin was dissected free and discarded. The skin edges were underscored retracted medially and laterally. The tendon of extensor digitorum longus and his expansion were incised transversely across the head of the proximal phalanx underscored and retracted proximally and then dissected off of the  middle phalanx at the base. utilizing a sagittal saw the articular surfaces of the head of the proximal phalanx   At this point and base of the middle phalanx were resected in total.  Utilizing the drill from the smart toe implant system the proximal phalanx was drilled it was measured with a K wire at about 1.8 cm and therefore a 50 mm implant was chosen. Using the broach was in the proximal and middle phalanges the holes were prepared for the implant. While maintaining the toe in its corrected position a 15 mm implant was inserted first into the proximal phalanx and then into the middle phalanx. Because this material expands with the body's heat it was held in position for 2 minutes. The fusion site was found to be good there were no soft tissues in it. Fluoroscopy was used to visualize the implant was found to be in good position. The area was flushed with saline copiously. And attention was directed towards closure. Using 3-0 Vicryl suture the tendon of extensor digitorum longus and his expansion was reapproximated and maintained in a simple interrupted fashion. Skin edges were then reapproximated and maintained using 4-0 nylon suture in a simple interrupted fashion. Pneumatic ankle tourniquet was released the digits returned to their uniform pink color and were warm to touch while maintaining the toe in its correct position Adaptic gauze followed by Fred Schaeffer and a wrap was applied.   The patient tolerated the above anesthesia procedure well left the operating with vital signs stable and the vascular status of her right foot intact to recovery room via cart.     Austin Stern DPM

## 2022-04-05 NOTE — TELEPHONE ENCOUNTER
S/p right shoulder arthroscopy on 3/7/22. S/w pt and she states she continues to have severe pain 10/10 in right shoulder, but now having a different pain that radiates from shoulder to her hand and feels 'electric'. She denies any swelling, new injury. Pt taking ibuprofen 600mg every 6 hrs and icing and it's not helping. She states she took herself off the percocet because it was causing dizziness. PT advised her to get appt with DO. LOV 3/16 and per DO note pt to f/u in 3 wks. Scheduled pt appt on 4/7 @ 230pm, advised there may be a wait. She will keep her 4/14 appt for now and cx if needed.

## 2022-04-06 ENCOUNTER — APPOINTMENT (OUTPATIENT)
Dept: PHYSICAL THERAPY | Age: 62
End: 2022-04-06
Attending: PHYSICIAN ASSISTANT
Payer: COMMERCIAL

## 2022-04-07 ENCOUNTER — PATIENT MESSAGE (OUTPATIENT)
Dept: ORTHOPEDICS CLINIC | Facility: CLINIC | Age: 62
End: 2022-04-07

## 2022-04-07 ENCOUNTER — OFFICE VISIT (OUTPATIENT)
Dept: ORTHOPEDICS CLINIC | Facility: CLINIC | Age: 62
End: 2022-04-07
Payer: COMMERCIAL

## 2022-04-07 ENCOUNTER — OFFICE VISIT (OUTPATIENT)
Dept: PHYSICAL THERAPY | Age: 62
End: 2022-04-07
Attending: PHYSICIAN ASSISTANT
Payer: COMMERCIAL

## 2022-04-07 DIAGNOSIS — M75.121 NONTRAUMATIC COMPLETE TEAR OF RIGHT ROTATOR CUFF: Primary | ICD-10-CM

## 2022-04-07 PROCEDURE — 97110 THERAPEUTIC EXERCISES: CPT

## 2022-04-07 PROCEDURE — 99024 POSTOP FOLLOW-UP VISIT: CPT | Performed by: ORTHOPAEDIC SURGERY

## 2022-04-07 PROCEDURE — 97140 MANUAL THERAPY 1/> REGIONS: CPT

## 2022-04-07 RX ORDER — TRAMADOL HYDROCHLORIDE 50 MG/1
50 TABLET ORAL EVERY 6 HOURS PRN
Qty: 25 TABLET | Refills: 0 | Status: SHIPPED | OUTPATIENT
Start: 2022-04-07

## 2022-04-12 ENCOUNTER — OFFICE VISIT (OUTPATIENT)
Dept: PHYSICAL THERAPY | Age: 62
End: 2022-04-12
Attending: PHYSICIAN ASSISTANT
Payer: COMMERCIAL

## 2022-04-12 ENCOUNTER — OFFICE VISIT (OUTPATIENT)
Dept: OPHTHALMOLOGY | Facility: CLINIC | Age: 62
End: 2022-04-12
Payer: COMMERCIAL

## 2022-04-12 DIAGNOSIS — H40.1132 PRIMARY OPEN ANGLE GLAUCOMA OF BOTH EYES, MODERATE STAGE: Primary | ICD-10-CM

## 2022-04-12 PROCEDURE — 99213 OFFICE O/P EST LOW 20 MIN: CPT | Performed by: OPHTHALMOLOGY

## 2022-04-12 PROCEDURE — 97110 THERAPEUTIC EXERCISES: CPT

## 2022-04-12 NOTE — PATIENT INSTRUCTIONS
Primary open angle glaucoma of both eyes, moderate stage  IOP is stable; continue Latanoprost at bedtime in both eyes. Return in 4 months for a pressure check. She has an appointment scheduled with Dr. Kg Zhu this month for a cataract evaluation in the right eye. She saw Dr. Freeman Monday on 3/22/22. He told her to continue Latanoprost at bedtime in both eyes and to follow up with him as needed.

## 2022-04-12 NOTE — ASSESSMENT & PLAN NOTE
IOP is stable; continue Latanoprost at bedtime in both eyes. Return in 4 months for a pressure check. She has an appointment scheduled with Dr. Lizeth Beverly this month for a cataract evaluation in the right eye. She saw Dr. Justin Givens on 3/22/22. He told her to continue Latanoprost at bedtime in both eyes and to follow up with him as needed.

## 2022-04-14 ENCOUNTER — OFFICE VISIT (OUTPATIENT)
Dept: PHYSICAL THERAPY | Age: 62
End: 2022-04-14
Attending: PHYSICIAN ASSISTANT
Payer: COMMERCIAL

## 2022-04-14 ENCOUNTER — OFFICE VISIT (OUTPATIENT)
Dept: PODIATRY CLINIC | Facility: CLINIC | Age: 62
End: 2022-04-14
Payer: COMMERCIAL

## 2022-04-14 ENCOUNTER — TELEPHONE (OUTPATIENT)
Dept: PHYSICAL THERAPY | Age: 62
End: 2022-04-14

## 2022-04-14 ENCOUNTER — OFFICE VISIT (OUTPATIENT)
Dept: INTERNAL MEDICINE CLINIC | Facility: CLINIC | Age: 62
End: 2022-04-14
Payer: COMMERCIAL

## 2022-04-14 ENCOUNTER — HOSPITAL ENCOUNTER (OUTPATIENT)
Dept: GENERAL RADIOLOGY | Facility: HOSPITAL | Age: 62
Discharge: HOME | End: 2022-04-14
Attending: INTERNAL MEDICINE
Payer: COMMERCIAL

## 2022-04-14 VITALS
BODY MASS INDEX: 30.31 KG/M2 | RESPIRATION RATE: 17 BRPM | SYSTOLIC BLOOD PRESSURE: 124 MMHG | WEIGHT: 200 LBS | DIASTOLIC BLOOD PRESSURE: 77 MMHG | HEIGHT: 68 IN | HEART RATE: 66 BPM

## 2022-04-14 DIAGNOSIS — M54.42 CHRONIC LEFT-SIDED LOW BACK PAIN WITH LEFT-SIDED SCIATICA: ICD-10-CM

## 2022-04-14 DIAGNOSIS — M77.42 METATARSALGIA OF BOTH FEET: ICD-10-CM

## 2022-04-14 DIAGNOSIS — M77.41 METATARSALGIA OF BOTH FEET: ICD-10-CM

## 2022-04-14 DIAGNOSIS — Z98.890 STATUS POST FOOT SURGERY: Primary | ICD-10-CM

## 2022-04-14 DIAGNOSIS — G89.29 CHRONIC FOOT PAIN, UNSPECIFIED LATERALITY: ICD-10-CM

## 2022-04-14 DIAGNOSIS — M79.673 CHRONIC FOOT PAIN, UNSPECIFIED LATERALITY: ICD-10-CM

## 2022-04-14 DIAGNOSIS — M25.531 RIGHT WRIST PAIN: Primary | ICD-10-CM

## 2022-04-14 DIAGNOSIS — M19.079 ARTHRITIS OF FOOT: ICD-10-CM

## 2022-04-14 DIAGNOSIS — G89.29 CHRONIC LEFT-SIDED LOW BACK PAIN WITH LEFT-SIDED SCIATICA: ICD-10-CM

## 2022-04-14 PROCEDURE — 99214 OFFICE O/P EST MOD 30 MIN: CPT | Performed by: INTERNAL MEDICINE

## 2022-04-14 PROCEDURE — 72110 X-RAY EXAM L-2 SPINE 4/>VWS: CPT | Performed by: INTERNAL MEDICINE

## 2022-04-14 PROCEDURE — 3078F DIAST BP <80 MM HG: CPT | Performed by: INTERNAL MEDICINE

## 2022-04-14 PROCEDURE — 99024 POSTOP FOLLOW-UP VISIT: CPT | Performed by: PODIATRIST

## 2022-04-14 PROCEDURE — 3008F BODY MASS INDEX DOCD: CPT | Performed by: INTERNAL MEDICINE

## 2022-04-14 PROCEDURE — 3074F SYST BP LT 130 MM HG: CPT | Performed by: INTERNAL MEDICINE

## 2022-04-14 PROCEDURE — 97110 THERAPEUTIC EXERCISES: CPT

## 2022-04-14 RX ORDER — GABAPENTIN 100 MG/1
100 CAPSULE ORAL 2 TIMES DAILY PRN
Qty: 60 CAPSULE | Refills: 0 | Status: SHIPPED | OUTPATIENT
Start: 2022-04-14

## 2022-04-14 RX ORDER — GABAPENTIN 100 MG/1
100 CAPSULE ORAL 2 TIMES DAILY PRN
Qty: 60 CAPSULE | Refills: 0 | Status: SHIPPED | OUTPATIENT
Start: 2022-04-14 | End: 2022-04-14

## 2022-04-14 RX ORDER — PREDNISONE 10 MG/1
TABLET ORAL
Qty: 20 TABLET | Refills: 0 | Status: SHIPPED | OUTPATIENT
Start: 2022-04-14

## 2022-04-14 RX ORDER — PREDNISONE 10 MG/1
TABLET ORAL
Qty: 20 TABLET | Refills: 0 | Status: SHIPPED | OUTPATIENT
Start: 2022-04-14 | End: 2022-04-14

## 2022-04-14 NOTE — TELEPHONE ENCOUNTER
----- Message from Riley Zamora MD sent at 4/13/2022  4:55 PM CDT -----  Please follow moderate protocol. Thanks. DO  ----- Message -----  From: Kamilah Bashir PTA  Sent: 4/12/2022  12:45 PM CDT  To: Samantha Horan, PT, MD Marielena Brown dr . I saw your  patient Anthony Harris  today for PT apt and she stated that you would like us to advance her  to moderate RC protocol ( we are following Large Tear Rotator Cuff Repair ) . Please , clarify and let us know which protocol would you like us to follow . Thank you .                                                                            Lorrie Delgado PTA

## 2022-04-18 ENCOUNTER — IMMUNIZATION (OUTPATIENT)
Dept: LAB | Age: 62
End: 2022-04-18
Attending: EMERGENCY MEDICINE
Payer: COMMERCIAL

## 2022-04-18 DIAGNOSIS — Z23 NEED FOR VACCINATION: Primary | ICD-10-CM

## 2022-04-18 PROCEDURE — 0064A SARSCOV2 VAC 50MCG/0.25ML IM: CPT

## 2022-04-19 ENCOUNTER — OFFICE VISIT (OUTPATIENT)
Dept: PHYSICAL THERAPY | Age: 62
End: 2022-04-19
Attending: PHYSICIAN ASSISTANT
Payer: COMMERCIAL

## 2022-04-19 PROCEDURE — 97110 THERAPEUTIC EXERCISES: CPT

## 2022-04-21 ENCOUNTER — OFFICE VISIT (OUTPATIENT)
Dept: PHYSICAL THERAPY | Age: 62
End: 2022-04-21
Attending: PHYSICIAN ASSISTANT
Payer: COMMERCIAL

## 2022-04-21 PROCEDURE — 97110 THERAPEUTIC EXERCISES: CPT

## 2022-04-26 ENCOUNTER — OFFICE VISIT (OUTPATIENT)
Dept: PHYSICAL THERAPY | Age: 62
End: 2022-04-26
Attending: PHYSICIAN ASSISTANT
Payer: COMMERCIAL

## 2022-04-26 PROCEDURE — 97110 THERAPEUTIC EXERCISES: CPT

## 2022-04-28 ENCOUNTER — OFFICE VISIT (OUTPATIENT)
Dept: PHYSICAL THERAPY | Age: 62
End: 2022-04-28
Attending: PHYSICIAN ASSISTANT
Payer: COMMERCIAL

## 2022-04-28 PROCEDURE — 97110 THERAPEUTIC EXERCISES: CPT

## 2022-05-03 ENCOUNTER — OFFICE VISIT (OUTPATIENT)
Dept: PHYSICAL THERAPY | Age: 62
End: 2022-05-03
Attending: PHYSICIAN ASSISTANT
Payer: COMMERCIAL

## 2022-05-03 ENCOUNTER — APPOINTMENT (OUTPATIENT)
Dept: PHYSICAL THERAPY | Age: 62
End: 2022-05-03
Attending: PHYSICIAN ASSISTANT
Payer: COMMERCIAL

## 2022-05-03 PROCEDURE — 97110 THERAPEUTIC EXERCISES: CPT

## 2022-05-04 ENCOUNTER — TELEPHONE (OUTPATIENT)
Dept: INTERNAL MEDICINE CLINIC | Facility: CLINIC | Age: 62
End: 2022-05-04

## 2022-05-04 DIAGNOSIS — G62.9 NEUROPATHY: ICD-10-CM

## 2022-05-04 DIAGNOSIS — M54.16 LUMBAR RADICULOPATHY: Primary | ICD-10-CM

## 2022-05-04 DIAGNOSIS — M48.02 CERVICAL STENOSIS OF SPINAL CANAL: ICD-10-CM

## 2022-05-04 NOTE — TELEPHONE ENCOUNTER
Good AFternoon Dr Savanna Vlaencia and staff,    Please see message below and generate referral if you agree with plan of care.     Thank you,    [facility]

## 2022-05-05 ENCOUNTER — APPOINTMENT (OUTPATIENT)
Dept: PHYSICAL THERAPY | Age: 62
End: 2022-05-05
Attending: PHYSICIAN ASSISTANT
Payer: COMMERCIAL

## 2022-05-05 ENCOUNTER — PROCEDURE VISIT (OUTPATIENT)
Dept: PHYSICAL MEDICINE AND REHAB | Facility: CLINIC | Age: 62
End: 2022-05-05
Payer: COMMERCIAL

## 2022-05-05 ENCOUNTER — MED REC SCAN ONLY (OUTPATIENT)
Dept: INTERNAL MEDICINE CLINIC | Facility: CLINIC | Age: 62
End: 2022-05-05

## 2022-05-05 ENCOUNTER — OFFICE VISIT (OUTPATIENT)
Dept: ORTHOPEDICS CLINIC | Facility: CLINIC | Age: 62
End: 2022-05-05
Payer: COMMERCIAL

## 2022-05-05 DIAGNOSIS — G56.31 NEUROPATHY OF RIGHT RADIAL NERVE: Primary | ICD-10-CM

## 2022-05-05 DIAGNOSIS — M75.121 NONTRAUMATIC COMPLETE TEAR OF RIGHT ROTATOR CUFF: Primary | ICD-10-CM

## 2022-05-05 PROCEDURE — 99024 POSTOP FOLLOW-UP VISIT: CPT | Performed by: ORTHOPAEDIC SURGERY

## 2022-05-05 PROCEDURE — 95886 MUSC TEST DONE W/N TEST COMP: CPT | Performed by: PHYSICAL MEDICINE & REHABILITATION

## 2022-05-05 PROCEDURE — 95909 NRV CNDJ TST 5-6 STUDIES: CPT | Performed by: PHYSICAL MEDICINE & REHABILITATION

## 2022-05-06 ENCOUNTER — APPOINTMENT (OUTPATIENT)
Dept: PHYSICAL THERAPY | Age: 62
End: 2022-05-06
Attending: PHYSICIAN ASSISTANT
Payer: COMMERCIAL

## 2022-05-09 ENCOUNTER — OFFICE VISIT (OUTPATIENT)
Dept: PODIATRY CLINIC | Facility: CLINIC | Age: 62
End: 2022-05-09
Payer: COMMERCIAL

## 2022-05-09 VITALS — SYSTOLIC BLOOD PRESSURE: 128 MMHG | DIASTOLIC BLOOD PRESSURE: 72 MMHG

## 2022-05-09 DIAGNOSIS — M77.50 CAPSULITIS OF METATARSOPHALANGEAL (MTP) JOINT: ICD-10-CM

## 2022-05-09 DIAGNOSIS — Z98.890 STATUS POST FOOT SURGERY: ICD-10-CM

## 2022-05-09 PROCEDURE — 3078F DIAST BP <80 MM HG: CPT | Performed by: PODIATRIST

## 2022-05-09 PROCEDURE — 20600 DRAIN/INJ JOINT/BURSA W/O US: CPT | Performed by: PODIATRIST

## 2022-05-09 PROCEDURE — 3074F SYST BP LT 130 MM HG: CPT | Performed by: PODIATRIST

## 2022-05-09 PROCEDURE — 99024 POSTOP FOLLOW-UP VISIT: CPT | Performed by: PODIATRIST

## 2022-05-09 RX ORDER — TRIAMCINOLONE ACETONIDE 40 MG/ML
20 INJECTION, SUSPENSION INTRA-ARTICULAR; INTRAMUSCULAR ONCE
Status: COMPLETED | OUTPATIENT
Start: 2022-05-09 | End: 2022-05-09

## 2022-05-09 NOTE — PROGRESS NOTES
Per Dr. Samantha Wharton draw up 0.5ml of 0.5% Marcaine and 0.5ml of Kenalog 40 for injection to right foot.

## 2022-05-10 ENCOUNTER — APPOINTMENT (OUTPATIENT)
Dept: PHYSICAL THERAPY | Age: 62
End: 2022-05-10
Payer: COMMERCIAL

## 2022-05-12 ENCOUNTER — APPOINTMENT (OUTPATIENT)
Dept: PHYSICAL THERAPY | Age: 62
End: 2022-05-12
Attending: PHYSICIAN ASSISTANT
Payer: COMMERCIAL

## 2022-05-12 ENCOUNTER — OFFICE VISIT (OUTPATIENT)
Dept: PHYSICAL THERAPY | Age: 62
End: 2022-05-12
Payer: COMMERCIAL

## 2022-05-12 PROCEDURE — 97110 THERAPEUTIC EXERCISES: CPT

## 2022-05-17 ENCOUNTER — OFFICE VISIT (OUTPATIENT)
Dept: PHYSICAL THERAPY | Age: 62
End: 2022-05-17
Payer: COMMERCIAL

## 2022-05-17 ENCOUNTER — APPOINTMENT (OUTPATIENT)
Dept: PHYSICAL THERAPY | Age: 62
End: 2022-05-17
Attending: PHYSICIAN ASSISTANT
Payer: COMMERCIAL

## 2022-05-17 DIAGNOSIS — M75.121 NONTRAUMATIC COMPLETE TEAR OF RIGHT ROTATOR CUFF: ICD-10-CM

## 2022-05-17 DIAGNOSIS — Z47.89 ORTHOPEDIC AFTERCARE: ICD-10-CM

## 2022-05-17 PROCEDURE — 97110 THERAPEUTIC EXERCISES: CPT

## 2022-05-18 ENCOUNTER — OFFICE VISIT (OUTPATIENT)
Dept: INTERNAL MEDICINE CLINIC | Facility: CLINIC | Age: 62
End: 2022-05-18
Payer: COMMERCIAL

## 2022-05-18 VITALS
SYSTOLIC BLOOD PRESSURE: 138 MMHG | HEIGHT: 68 IN | BODY MASS INDEX: 30 KG/M2 | DIASTOLIC BLOOD PRESSURE: 85 MMHG | HEART RATE: 78 BPM

## 2022-05-18 DIAGNOSIS — M25.50 MULTIPLE JOINT PAIN: Primary | ICD-10-CM

## 2022-05-18 DIAGNOSIS — R60.0 LOCALIZED EDEMA: ICD-10-CM

## 2022-05-18 DIAGNOSIS — M17.11 OSTEOARTHRITIS OF RIGHT KNEE, UNSPECIFIED OSTEOARTHRITIS TYPE: ICD-10-CM

## 2022-05-18 PROCEDURE — 3079F DIAST BP 80-89 MM HG: CPT | Performed by: INTERNAL MEDICINE

## 2022-05-18 PROCEDURE — 3075F SYST BP GE 130 - 139MM HG: CPT | Performed by: INTERNAL MEDICINE

## 2022-05-18 PROCEDURE — 99214 OFFICE O/P EST MOD 30 MIN: CPT | Performed by: INTERNAL MEDICINE

## 2022-05-19 ENCOUNTER — APPOINTMENT (OUTPATIENT)
Dept: PHYSICAL THERAPY | Age: 62
End: 2022-05-19
Payer: COMMERCIAL

## 2022-05-19 ENCOUNTER — APPOINTMENT (OUTPATIENT)
Dept: PHYSICAL THERAPY | Age: 62
End: 2022-05-19
Attending: PHYSICIAN ASSISTANT
Payer: COMMERCIAL

## 2022-05-21 ENCOUNTER — LAB ENCOUNTER (OUTPATIENT)
Dept: LAB | Facility: HOSPITAL | Age: 62
End: 2022-05-21
Attending: INTERNAL MEDICINE
Payer: COMMERCIAL

## 2022-05-21 DIAGNOSIS — M25.50 MULTIPLE JOINT PAIN: ICD-10-CM

## 2022-05-21 LAB
ALBUMIN SERPL-MCNC: 3.8 G/DL (ref 3.4–5)
ALBUMIN/GLOB SERPL: 1 {RATIO} (ref 1–2)
ALP LIVER SERPL-CCNC: 136 U/L
ALT SERPL-CCNC: 33 U/L
ANION GAP SERPL CALC-SCNC: 6 MMOL/L (ref 0–18)
AST SERPL-CCNC: 22 U/L (ref 15–37)
BILIRUB SERPL-MCNC: 0.4 MG/DL (ref 0.1–2)
BILIRUB UR QL: NEGATIVE
BUN BLD-MCNC: 14 MG/DL (ref 7–18)
BUN/CREAT SERPL: 15.1 (ref 10–20)
CALCIUM BLD-MCNC: 9.2 MG/DL (ref 8.5–10.1)
CHLORIDE SERPL-SCNC: 109 MMOL/L (ref 98–112)
CK SERPL-CCNC: 395 U/L
CLARITY UR: CLEAR
CO2 SERPL-SCNC: 29 MMOL/L (ref 21–32)
COLOR UR: YELLOW
CREAT BLD-MCNC: 0.93 MG/DL
CRP SERPL-MCNC: <0.29 MG/DL (ref ?–0.3)
ERYTHROCYTE [SEDIMENTATION RATE] IN BLOOD: 11 MM/HR
FASTING STATUS PATIENT QL REPORTED: YES
GLOBULIN PLAS-MCNC: 3.8 G/DL (ref 2.8–4.4)
GLUCOSE BLD-MCNC: 111 MG/DL (ref 70–99)
GLUCOSE UR-MCNC: NEGATIVE MG/DL
HGB UR QL STRIP.AUTO: NEGATIVE
KETONES UR-MCNC: NEGATIVE MG/DL
LEUKOCYTE ESTERASE UR QL STRIP.AUTO: NEGATIVE
NITRITE UR QL STRIP.AUTO: NEGATIVE
OSMOLALITY SERPL CALC.SUM OF ELEC: 299 MOSM/KG (ref 275–295)
PH UR: 6 [PH] (ref 5–8)
POTASSIUM SERPL-SCNC: 3.7 MMOL/L (ref 3.5–5.1)
PROT SERPL-MCNC: 7.6 G/DL (ref 6.4–8.2)
PROT UR-MCNC: 100 MG/DL
RHEUMATOID FACT SERPL-ACNC: <10 IU/ML (ref ?–15)
SODIUM SERPL-SCNC: 144 MMOL/L (ref 136–145)
SP GR UR STRIP: 1.01 (ref 1–1.03)
UROBILINOGEN UR STRIP-ACNC: <2
VIT C UR-MCNC: NEGATIVE MG/DL

## 2022-05-21 PROCEDURE — 86140 C-REACTIVE PROTEIN: CPT

## 2022-05-21 PROCEDURE — 80053 COMPREHEN METABOLIC PANEL: CPT

## 2022-05-21 PROCEDURE — 81001 URINALYSIS AUTO W/SCOPE: CPT

## 2022-05-21 PROCEDURE — 36415 COLL VENOUS BLD VENIPUNCTURE: CPT

## 2022-05-21 PROCEDURE — 82550 ASSAY OF CK (CPK): CPT

## 2022-05-21 PROCEDURE — 86038 ANTINUCLEAR ANTIBODIES: CPT

## 2022-05-21 PROCEDURE — 86200 CCP ANTIBODY: CPT

## 2022-05-21 PROCEDURE — 86431 RHEUMATOID FACTOR QUANT: CPT

## 2022-05-21 PROCEDURE — 85652 RBC SED RATE AUTOMATED: CPT

## 2022-05-23 LAB
CCP IGG SERPL-ACNC: 2 U/ML (ref 0–6.9)
NUCLEAR IGG TITR SER IF: NEGATIVE {TITER}

## 2022-05-24 ENCOUNTER — APPOINTMENT (OUTPATIENT)
Dept: PHYSICAL THERAPY | Age: 62
End: 2022-05-24
Payer: COMMERCIAL

## 2022-05-24 ENCOUNTER — APPOINTMENT (OUTPATIENT)
Dept: PHYSICAL THERAPY | Age: 62
End: 2022-05-24
Attending: PHYSICIAN ASSISTANT
Payer: COMMERCIAL

## 2022-05-25 ENCOUNTER — APPOINTMENT (OUTPATIENT)
Dept: PHYSICAL THERAPY | Age: 62
End: 2022-05-25
Attending: PHYSICIAN ASSISTANT
Payer: COMMERCIAL

## 2022-05-25 ENCOUNTER — TELEPHONE (OUTPATIENT)
Dept: PHYSICAL THERAPY | Facility: HOSPITAL | Age: 62
End: 2022-05-25

## 2022-05-26 ENCOUNTER — APPOINTMENT (OUTPATIENT)
Dept: PHYSICAL THERAPY | Age: 62
End: 2022-05-26
Payer: COMMERCIAL

## 2022-05-26 ENCOUNTER — TELEPHONE (OUTPATIENT)
Dept: PHYSICAL THERAPY | Facility: HOSPITAL | Age: 62
End: 2022-05-26

## 2022-05-26 ENCOUNTER — APPOINTMENT (OUTPATIENT)
Dept: PHYSICAL THERAPY | Age: 62
End: 2022-05-26
Attending: PHYSICIAN ASSISTANT
Payer: COMMERCIAL

## 2022-05-26 ENCOUNTER — OFFICE VISIT (OUTPATIENT)
Dept: PHYSICAL THERAPY | Age: 62
End: 2022-05-26
Attending: INTERNAL MEDICINE
Payer: COMMERCIAL

## 2022-05-26 PROCEDURE — 97110 THERAPEUTIC EXERCISES: CPT

## 2022-05-27 ENCOUNTER — APPOINTMENT (OUTPATIENT)
Dept: PHYSICAL THERAPY | Age: 62
End: 2022-05-27
Attending: PHYSICIAN ASSISTANT
Payer: COMMERCIAL

## 2022-05-31 ENCOUNTER — APPOINTMENT (OUTPATIENT)
Dept: PHYSICAL THERAPY | Age: 62
End: 2022-05-31
Payer: COMMERCIAL

## 2022-06-02 ENCOUNTER — OFFICE VISIT (OUTPATIENT)
Dept: PHYSICAL THERAPY | Age: 62
End: 2022-06-02
Payer: COMMERCIAL

## 2022-06-02 PROCEDURE — 97110 THERAPEUTIC EXERCISES: CPT

## 2022-06-06 ENCOUNTER — OFFICE VISIT (OUTPATIENT)
Dept: PODIATRY CLINIC | Facility: CLINIC | Age: 62
End: 2022-06-06
Payer: COMMERCIAL

## 2022-06-06 DIAGNOSIS — L60.0 ONYCHOCRYPTOSIS: ICD-10-CM

## 2022-06-06 DIAGNOSIS — Z98.890 STATUS POST FOOT SURGERY: Primary | ICD-10-CM

## 2022-06-06 PROCEDURE — 99024 POSTOP FOLLOW-UP VISIT: CPT | Performed by: PODIATRIST

## 2022-06-07 ENCOUNTER — APPOINTMENT (OUTPATIENT)
Dept: PHYSICAL THERAPY | Age: 62
End: 2022-06-07
Payer: COMMERCIAL

## 2022-06-07 NOTE — TELEPHONE ENCOUNTER
Asking if Dr spoke with inf dx Dr about changing pts treatment
Dr. Christina Waller, please advise
Note Text (......Xxx Chief Complaint.): This diagnosis correlates with the
Please call this patient back and tell her that she can skip the Sundays dose but should continue on the antibiotics until I can talk directly with Dr. Rama Hsu.   I spoke with Dr. Juan Steen who is on-call for him and is excepting his patients however right no
Please let the patient know that she can miss the Sundays dosage I did speak with the infectious disease expert and that would be fine if she misses a couple as long as we can monitor the toe please make sure that she knows if there is any increasing sign
S/w pt and she states [facility] was supposed to s/w Dr. Dov Yeung MD about decreasing her treatments due to upcoming sx w/ [facility]. Pt currently doing 7 days a week for 6 wks and [facility] wanted once per wk.    Please advise
Spoke to pt and informed her of [facility] message. Pt states she is scheduled for surgery on 03/15/19. Pt states she cannot make some of the AM appts and needs to know next steps until surgery with [facility] on 03/15/19.
Spoke to pt and informed her of [facility] and pt verbalized understanding.
Detail Level: Zone

## 2022-06-09 ENCOUNTER — APPOINTMENT (OUTPATIENT)
Dept: PHYSICAL THERAPY | Age: 62
End: 2022-06-09
Payer: COMMERCIAL

## 2022-06-09 ENCOUNTER — OFFICE VISIT (OUTPATIENT)
Dept: ORTHOPEDICS CLINIC | Facility: CLINIC | Age: 62
End: 2022-06-09
Payer: COMMERCIAL

## 2022-06-09 DIAGNOSIS — M75.01 ADHESIVE CAPSULITIS OF RIGHT SHOULDER: Primary | ICD-10-CM

## 2022-06-09 DIAGNOSIS — M75.121 NONTRAUMATIC COMPLETE TEAR OF RIGHT ROTATOR CUFF: ICD-10-CM

## 2022-06-09 PROCEDURE — 20610 DRAIN/INJ JOINT/BURSA W/O US: CPT | Performed by: ORTHOPAEDIC SURGERY

## 2022-06-09 RX ORDER — TRIAMCINOLONE ACETONIDE 40 MG/ML
40 INJECTION, SUSPENSION INTRA-ARTICULAR; INTRAMUSCULAR ONCE
Status: COMPLETED | OUTPATIENT
Start: 2022-06-09 | End: 2022-06-09

## 2022-06-09 RX ADMIN — TRIAMCINOLONE ACETONIDE 40 MG: 40 INJECTION, SUSPENSION INTRA-ARTICULAR; INTRAMUSCULAR at 16:58:00

## 2022-06-09 NOTE — PROGRESS NOTES
Per verbal order from AMIRA Rivas, draw up 3ml of 0.5% Marcaine & 2ml 1% lidocaine and 1ml of Kenalog 40 for cortisone injection to Right shoulder.  Trev Kelly

## 2022-06-10 ENCOUNTER — TELEPHONE (OUTPATIENT)
Dept: PHYSICAL THERAPY | Age: 62
End: 2022-06-10

## 2022-06-11 ENCOUNTER — HOSPITAL ENCOUNTER (OUTPATIENT)
Dept: CV DIAGNOSTICS | Facility: HOSPITAL | Age: 62
Discharge: HOME | End: 2022-06-11
Attending: INTERNAL MEDICINE
Payer: COMMERCIAL

## 2022-06-11 DIAGNOSIS — R60.0 LOCALIZED EDEMA: ICD-10-CM

## 2022-06-11 PROCEDURE — 93306 TTE W/DOPPLER COMPLETE: CPT | Performed by: INTERNAL MEDICINE

## 2022-06-13 ENCOUNTER — APPOINTMENT (OUTPATIENT)
Dept: PHYSICAL THERAPY | Age: 62
End: 2022-06-13
Payer: COMMERCIAL

## 2022-06-14 ENCOUNTER — APPOINTMENT (OUTPATIENT)
Dept: PHYSICAL THERAPY | Age: 62
End: 2022-06-14
Payer: COMMERCIAL

## 2022-06-21 ENCOUNTER — TELEPHONE (OUTPATIENT)
Dept: PHYSICAL THERAPY | Age: 62
End: 2022-06-21

## 2022-06-21 ENCOUNTER — APPOINTMENT (OUTPATIENT)
Dept: PHYSICAL THERAPY | Age: 62
End: 2022-06-21
Attending: INTERNAL MEDICINE
Payer: COMMERCIAL

## 2022-06-21 ENCOUNTER — OFFICE VISIT (OUTPATIENT)
Dept: PHYSICAL THERAPY | Age: 62
End: 2022-06-21
Attending: INTERNAL MEDICINE
Payer: COMMERCIAL

## 2022-06-21 PROCEDURE — 97110 THERAPEUTIC EXERCISES: CPT

## 2022-06-23 ENCOUNTER — APPOINTMENT (OUTPATIENT)
Dept: PHYSICAL THERAPY | Age: 62
End: 2022-06-23
Attending: INTERNAL MEDICINE
Payer: COMMERCIAL

## 2022-06-23 ENCOUNTER — APPOINTMENT (OUTPATIENT)
Dept: PHYSICAL THERAPY | Age: 62
End: 2022-06-23
Attending: ORTHOPAEDIC SURGERY
Payer: COMMERCIAL

## 2022-06-27 ENCOUNTER — HOSPITAL ENCOUNTER (OUTPATIENT)
Age: 62
Discharge: HOME | End: 2022-06-27
Attending: EMERGENCY MEDICINE
Payer: COMMERCIAL

## 2022-06-27 VITALS
SYSTOLIC BLOOD PRESSURE: 132 MMHG | RESPIRATION RATE: 20 BRPM | OXYGEN SATURATION: 100 % | HEART RATE: 88 BPM | DIASTOLIC BLOOD PRESSURE: 68 MMHG | TEMPERATURE: 99 F

## 2022-06-27 DIAGNOSIS — U07.1 COVID-19: Primary | ICD-10-CM

## 2022-06-27 LAB — SARS-COV-2 RNA RESP QL NAA+PROBE: DETECTED

## 2022-06-27 PROCEDURE — 99213 OFFICE O/P EST LOW 20 MIN: CPT

## 2022-06-27 NOTE — ED INITIAL ASSESSMENT (HPI)
PATIENT ARRIVED AMBULATORY TO ROOM C/O SYMPTOMS THAT STARTED 2 DAYS AGO. +NASAL CONGESTION +HEADACHES. NO FEVERS. SLIGHT COUGH.

## 2022-06-28 ENCOUNTER — APPOINTMENT (OUTPATIENT)
Dept: PHYSICAL THERAPY | Age: 62
End: 2022-06-28
Attending: ORTHOPAEDIC SURGERY
Payer: COMMERCIAL

## 2022-06-30 ENCOUNTER — APPOINTMENT (OUTPATIENT)
Dept: PHYSICAL THERAPY | Age: 62
End: 2022-06-30
Attending: ORTHOPAEDIC SURGERY
Payer: COMMERCIAL

## 2022-07-05 ENCOUNTER — TELEPHONE (OUTPATIENT)
Dept: PHYSICAL THERAPY | Age: 62
End: 2022-07-05

## 2022-07-07 ENCOUNTER — APPOINTMENT (OUTPATIENT)
Dept: PHYSICAL THERAPY | Age: 62
End: 2022-07-07
Attending: ORTHOPAEDIC SURGERY
Payer: COMMERCIAL

## 2022-07-21 ENCOUNTER — APPOINTMENT (OUTPATIENT)
Dept: PHYSICAL THERAPY | Age: 62
End: 2022-07-21
Attending: ORTHOPAEDIC SURGERY
Payer: COMMERCIAL

## 2022-07-22 DIAGNOSIS — I10 ESSENTIAL HYPERTENSION: ICD-10-CM

## 2022-07-23 RX ORDER — AMLODIPINE BESYLATE 10 MG/1
TABLET ORAL
Qty: 90 TABLET | Refills: 1 | Status: SHIPPED | OUTPATIENT
Start: 2022-07-23

## 2022-07-28 ENCOUNTER — APPOINTMENT (OUTPATIENT)
Dept: PHYSICAL THERAPY | Age: 62
End: 2022-07-28
Attending: ORTHOPAEDIC SURGERY
Payer: COMMERCIAL

## 2022-08-04 ENCOUNTER — APPOINTMENT (OUTPATIENT)
Dept: PHYSICAL THERAPY | Age: 62
End: 2022-08-04
Attending: ORTHOPAEDIC SURGERY
Payer: COMMERCIAL

## 2022-08-06 ENCOUNTER — OFFICE VISIT (OUTPATIENT)
Dept: OPHTHALMOLOGY | Facility: CLINIC | Age: 62
End: 2022-08-06
Payer: COMMERCIAL

## 2022-08-06 DIAGNOSIS — H40.1132 PRIMARY OPEN ANGLE GLAUCOMA OF BOTH EYES, MODERATE STAGE: Primary | ICD-10-CM

## 2022-08-06 PROCEDURE — 99213 OFFICE O/P EST LOW 20 MIN: CPT | Performed by: OPHTHALMOLOGY

## 2022-08-06 RX ORDER — LATANOPROST 50 UG/ML
SOLUTION/ DROPS OPHTHALMIC
Qty: 7.5 ML | Refills: 3 | Status: SHIPPED | OUTPATIENT
Start: 2022-08-06

## 2022-08-06 NOTE — PATIENT INSTRUCTIONS
Primary open angle glaucoma of both eyes, moderate stage  IOP is stable. Continue Latanoprost at bedtime in both eyes.          Return in 4 months for visual field, OCT and IOP check

## 2022-08-06 NOTE — ASSESSMENT & PLAN NOTE
IOP is stable. Continue Latanoprost at bedtime in both eyes.          Return in 4 months for visual field, OCT, EE and photos

## 2022-08-10 ENCOUNTER — TELEPHONE (OUTPATIENT)
Dept: PHYSICAL THERAPY | Age: 62
End: 2022-08-10

## 2022-08-11 ENCOUNTER — OFFICE VISIT (OUTPATIENT)
Dept: PHYSICAL THERAPY | Age: 62
End: 2022-08-11
Attending: ORTHOPAEDIC SURGERY
Payer: COMMERCIAL

## 2022-08-11 PROCEDURE — 97110 THERAPEUTIC EXERCISES: CPT

## 2022-08-18 ENCOUNTER — HOSPITAL ENCOUNTER (OUTPATIENT)
Dept: GENERAL RADIOLOGY | Facility: HOSPITAL | Age: 62
Discharge: HOME | End: 2022-08-18
Attending: ORTHOPAEDIC SURGERY
Payer: COMMERCIAL

## 2022-08-18 ENCOUNTER — OFFICE VISIT (OUTPATIENT)
Dept: ORTHOPEDICS CLINIC | Facility: CLINIC | Age: 62
End: 2022-08-18
Payer: COMMERCIAL

## 2022-08-18 VITALS — HEART RATE: 77 BPM | DIASTOLIC BLOOD PRESSURE: 81 MMHG | SYSTOLIC BLOOD PRESSURE: 137 MMHG

## 2022-08-18 DIAGNOSIS — G89.29 CHRONIC PAIN OF BOTH KNEES: ICD-10-CM

## 2022-08-18 DIAGNOSIS — M25.562 CHRONIC PAIN OF BOTH KNEES: ICD-10-CM

## 2022-08-18 DIAGNOSIS — M25.561 CHRONIC PAIN OF BOTH KNEES: ICD-10-CM

## 2022-08-18 DIAGNOSIS — M17.0 PRIMARY OSTEOARTHRITIS OF BOTH KNEES: Primary | ICD-10-CM

## 2022-08-18 PROCEDURE — 3079F DIAST BP 80-89 MM HG: CPT | Performed by: ORTHOPAEDIC SURGERY

## 2022-08-18 PROCEDURE — 73564 X-RAY EXAM KNEE 4 OR MORE: CPT | Performed by: ORTHOPAEDIC SURGERY

## 2022-08-18 PROCEDURE — 20610 DRAIN/INJ JOINT/BURSA W/O US: CPT | Performed by: ORTHOPAEDIC SURGERY

## 2022-08-18 PROCEDURE — 3075F SYST BP GE 130 - 139MM HG: CPT | Performed by: ORTHOPAEDIC SURGERY

## 2022-08-18 PROCEDURE — 99213 OFFICE O/P EST LOW 20 MIN: CPT | Performed by: ORTHOPAEDIC SURGERY

## 2022-08-18 RX ORDER — MELOXICAM 7.5 MG/1
7.5 TABLET ORAL DAILY
Qty: 30 TABLET | Refills: 0 | Status: SHIPPED | OUTPATIENT
Start: 2022-08-18

## 2022-08-18 RX ORDER — TRIAMCINOLONE ACETONIDE 40 MG/ML
40 INJECTION, SUSPENSION INTRA-ARTICULAR; INTRAMUSCULAR
Status: COMPLETED | OUTPATIENT
Start: 2022-08-18 | End: 2022-08-18

## 2022-08-18 RX ADMIN — TRIAMCINOLONE ACETONIDE 40 MG: 40 INJECTION, SUSPENSION INTRA-ARTICULAR; INTRAMUSCULAR at 16:55:00

## 2022-08-18 RX ADMIN — TRIAMCINOLONE ACETONIDE 40 MG: 40 INJECTION, SUSPENSION INTRA-ARTICULAR; INTRAMUSCULAR at 16:53:00

## 2022-08-18 NOTE — PROGRESS NOTES
Per verbal order from Dr. Joey Medeiros  draw up 3ml of 0.5% Marcaine & 2ml 1% lidocaine and 1ml of Kenalog 40 for cortisone injection to bilateral knee [place]    Patient provided education handout for cortisone injection.

## 2022-09-02 DIAGNOSIS — K21.9 GASTROESOPHAGEAL REFLUX DISEASE WITHOUT ESOPHAGITIS: ICD-10-CM

## 2022-09-02 RX ORDER — OMEPRAZOLE 20 MG/1
40 CAPSULE, DELAYED RELEASE ORAL EVERY MORNING
Qty: 180 CAPSULE | Refills: 1 | Status: SHIPPED | OUTPATIENT
Start: 2022-09-02

## 2022-09-02 NOTE — TELEPHONE ENCOUNTER
Refill passed per Marley Spoon, [facility] protocol.     Requested Prescriptions   Pending Prescriptions Disp Refills    OMEPRAZOLE 20 MG Oral Capsule Delayed Release [Pharmacy Med Name: OMEPRAZOLE 20MG CAPSULES] 180 capsule 1     Sig: TAKE 2 CAPSULES(40 MG) BY MOUTH EVERY MORNING        Gastrointestional Medication Protocol Passed - 9/2/2022  6:08 AM        Passed - In person appointment or virtual visit in the past 12 mos or appointment in next 3 mos       Recent Outpatient Visits              2 weeks ago Primary osteoarthritis of both [facility], [address], Ankur Woodall MD    Office Visit    3 weeks ago     [facility] in [place], [place]    Office Visit    3 weeks ago Primary open angle glaucoma of both eyes, moderate stage    [facility] Estell Koyanagi, MD    Office Visit    2 months ago     [facility] [place], [place]    Office Visit    2 months ago Adhesive capsulitis of right shoulder    [facility] [address], Ankur Woodall MD    Office Visit     Future Appointments         Provider Department Appt Notes    In 2 months [facility] [address]    In 3 months Estell Koyanagi, MD [facility] EP/VF,OCT, EE and photos    In 5 months Yue Haq DO [facility], [facility], [place], [place] Annual physical                     Recent Outpatient Visits              2 weeks ago Primary osteoarthritis of both [facility] Rekha NogueraAnkur MD    Office Visit    3 weeks ago     [facility] in [place], [place]    Office Visit    3 weeks ago Primary open angle glaucoma of both eyes, moderate stage    [facility] Estell Koyanagi, MD    Office Visit    2 months ago [facility] in [address] [place] [address]    Office Visit    2 months ago Adhesive capsulitis of right shoulder    [facility] [place], [address] [facility] [address], Kely Woodall MD    Office Visit            Future Appointments         Provider Department Appt Notes    In 2 months [facility] [address]    In 3 months Marilee Cooley MD [facility] EP/VF,OCT, EE and photos    In 5 months Miranda Wagner DO [facility], [facility], [place], [place] Annual physical

## 2022-09-07 ENCOUNTER — OFFICE VISIT (OUTPATIENT)
Dept: INTERNAL MEDICINE CLINIC | Facility: CLINIC | Age: 62
End: 2022-09-07
Payer: COMMERCIAL

## 2022-09-07 VITALS
RESPIRATION RATE: 16 BRPM | SYSTOLIC BLOOD PRESSURE: 122 MMHG | WEIGHT: 196 LBS | DIASTOLIC BLOOD PRESSURE: 79 MMHG | HEIGHT: 68 IN | HEART RATE: 68 BPM | BODY MASS INDEX: 29.7 KG/M2

## 2022-09-07 DIAGNOSIS — R74.8 ELEVATED CPK: ICD-10-CM

## 2022-09-07 DIAGNOSIS — M25.512 ACUTE PAIN OF LEFT SHOULDER: ICD-10-CM

## 2022-09-07 DIAGNOSIS — I10 ESSENTIAL HYPERTENSION: ICD-10-CM

## 2022-09-07 DIAGNOSIS — R25.2 MUSCLE CRAMPS: Primary | ICD-10-CM

## 2022-09-07 PROCEDURE — 3078F DIAST BP <80 MM HG: CPT | Performed by: INTERNAL MEDICINE

## 2022-09-07 PROCEDURE — 99214 OFFICE O/P EST MOD 30 MIN: CPT | Performed by: INTERNAL MEDICINE

## 2022-09-07 PROCEDURE — 3074F SYST BP LT 130 MM HG: CPT | Performed by: INTERNAL MEDICINE

## 2022-09-07 PROCEDURE — 3008F BODY MASS INDEX DOCD: CPT | Performed by: INTERNAL MEDICINE

## 2022-09-07 RX ORDER — BACLOFEN 10 MG/1
10 TABLET ORAL 2 TIMES DAILY PRN
Qty: 30 TABLET | Refills: 0 | Status: SHIPPED | OUTPATIENT
Start: 2022-09-07

## 2022-09-08 ENCOUNTER — HOSPITAL ENCOUNTER (OUTPATIENT)
Dept: GENERAL RADIOLOGY | Age: 62
Discharge: HOME | End: 2022-09-08
Attending: INTERNAL MEDICINE
Payer: COMMERCIAL

## 2022-09-08 DIAGNOSIS — M25.512 ACUTE PAIN OF LEFT SHOULDER: ICD-10-CM

## 2022-09-08 PROCEDURE — 73030 X-RAY EXAM OF SHOULDER: CPT | Performed by: INTERNAL MEDICINE

## 2022-09-17 ENCOUNTER — IMMUNIZATION (OUTPATIENT)
Dept: LAB | Age: 62
End: 2022-09-17
Attending: EMERGENCY MEDICINE
Payer: COMMERCIAL

## 2022-09-17 DIAGNOSIS — Z23 NEED FOR VACCINATION: Primary | ICD-10-CM

## 2022-09-17 PROCEDURE — 0134A SARSCOV2 VAC BVL 50MCG/0.5ML: CPT

## 2022-10-04 ENCOUNTER — TELEPHONE (OUTPATIENT)
Dept: ORTHOPEDICS CLINIC | Facility: CLINIC | Age: 62
End: 2022-10-04

## 2022-10-04 NOTE — TELEPHONE ENCOUNTER
Per pt has an appointment for shoulder pain 11/3 and asking to be seen sooner, no appointments available.  Please advise

## 2022-10-06 ENCOUNTER — OFFICE VISIT (OUTPATIENT)
Dept: ORTHOPEDICS CLINIC | Facility: CLINIC | Age: 62
End: 2022-10-06
Payer: COMMERCIAL

## 2022-10-06 DIAGNOSIS — M75.102 TEAR OF LEFT ROTATOR CUFF, UNSPECIFIED TEAR EXTENT, UNSPECIFIED WHETHER TRAUMATIC: Primary | ICD-10-CM

## 2022-10-06 PROCEDURE — 99214 OFFICE O/P EST MOD 30 MIN: CPT | Performed by: ORTHOPAEDIC SURGERY

## 2022-10-06 PROCEDURE — 20610 DRAIN/INJ JOINT/BURSA W/O US: CPT | Performed by: ORTHOPAEDIC SURGERY

## 2022-10-06 RX ORDER — TRIAMCINOLONE ACETONIDE 40 MG/ML
40 INJECTION, SUSPENSION INTRA-ARTICULAR; INTRAMUSCULAR ONCE
Status: COMPLETED | OUTPATIENT
Start: 2022-10-06 | End: 2022-10-06

## 2022-10-06 NOTE — PROCEDURES
Per verbal order from Dr. Toribio Cunningham draw up 3ml of 0.5% Marcaine & 2ml 1% lidocaine and 1ml of Kenalog 40 for cortisone injection to the left shoulder.

## 2022-10-08 ENCOUNTER — HOSPITAL ENCOUNTER (OUTPATIENT)
Age: 62
Discharge: HOME | End: 2022-10-08
Attending: EMERGENCY MEDICINE
Payer: COMMERCIAL

## 2022-10-08 VITALS
WEIGHT: 188 LBS | BODY MASS INDEX: 28.49 KG/M2 | HEART RATE: 76 BPM | OXYGEN SATURATION: 98 % | SYSTOLIC BLOOD PRESSURE: 148 MMHG | DIASTOLIC BLOOD PRESSURE: 64 MMHG | HEIGHT: 68 IN | TEMPERATURE: 98 F | RESPIRATION RATE: 16 BRPM

## 2022-10-08 DIAGNOSIS — M26.621 ARTHRALGIA OF RIGHT TEMPOROMANDIBULAR JOINT: ICD-10-CM

## 2022-10-08 DIAGNOSIS — H60.501 ACUTE OTITIS EXTERNA OF RIGHT EAR, UNSPECIFIED TYPE: Primary | ICD-10-CM

## 2022-10-08 PROCEDURE — 99213 OFFICE O/P EST LOW 20 MIN: CPT

## 2022-10-08 RX ORDER — DOXYCYCLINE HYCLATE 100 MG/1
100 CAPSULE ORAL 2 TIMES DAILY
Qty: 14 CAPSULE | Refills: 0 | Status: SHIPPED | OUTPATIENT
Start: 2022-10-08 | End: 2022-10-15

## 2022-10-08 NOTE — ED INITIAL ASSESSMENT (HPI)
Per pt was eating an apple on Wednesday and heard a pop and now having right ear pain and pain with chewing.

## 2022-10-10 NOTE — PROGRESS NOTES
Miguel Escobar is a 59year old female. Patient presents with:  Sinus Problem  Sleep Problem      HPI:   She is here for sinus infection and sleep issues. She is feeling  nose and sinus congestion for 1 week.  She has ear pain, more on the left side, with thigh  No date: Acute meniscal tear of knee  2/10/2015: Age-related nuclear cataract of both eyes  4/28/2014:  Anal sphincter incontinence  No date: Chondromalacia  No date: Colon polyps  No date: Degenerative disc disease  No date: Diverticular disease  No cough.  CARDIOVASCULAR: denies chest pain on exertion, palpitations, swelling in feet. GI: denies abdominal pain and denies heartburn, nausea or vomiting. : No pain on urination, change in the color of urine, discharge, urinating frequently.   MUSK: No hypertension, CAD, kidney dysfunction and GI ulcers    Other fatigue  Possible underlying KB. Ordering sleep study    -     OP [facility] ALT REFERRAL DIAGOSTIC SLEEP STUDY ADULT    Apneic episode  Witnessed apneic episodes per her .   Patient has coexist Island Pedicle Flap With Canthal Suspension Text: The defect edges were debeveled with a #15 scalpel blade.  Given the location of the defect, shape of the defect and the proximity to free margins an island pedicle advancement flap was deemed most appropriate.  Using a sterile surgical marker, an appropriate advancement flap was drawn incorporating the defect, outlining the appropriate donor tissue and placing the expected incisions within the relaxed skin tension lines where possible. The area thus outlined was incised deep to adipose tissue with a #15 scalpel blade.  The skin margins were undermined to an appropriate distance in all directions around the primary defect and laterally outward around the island pedicle utilizing iris scissors.  There was minimal undermining beneath the pedicle flap. A suspension suture was placed in the canthal tendon to prevent tension and prevent ectropion.

## 2022-10-12 ENCOUNTER — IMMUNIZATION (OUTPATIENT)
Dept: LAB | Age: 62
End: 2022-10-12
Attending: EMERGENCY MEDICINE
Payer: COMMERCIAL

## 2022-10-12 DIAGNOSIS — Z23 NEED FOR VACCINATION: Primary | ICD-10-CM

## 2022-10-12 PROCEDURE — 90471 IMMUNIZATION ADMIN: CPT

## 2022-10-12 PROCEDURE — 90686 IIV4 VACC NO PRSV 0.5 ML IM: CPT

## 2022-10-25 ENCOUNTER — TELEPHONE (OUTPATIENT)
Dept: INTERNAL MEDICINE CLINIC | Facility: CLINIC | Age: 62
End: 2022-10-25

## 2022-10-25 DIAGNOSIS — M79.671 FOOT PAIN, BILATERAL: Primary | ICD-10-CM

## 2022-10-25 DIAGNOSIS — M79.672 FOOT PAIN, BILATERAL: Primary | ICD-10-CM

## 2022-10-25 NOTE — TELEPHONE ENCOUNTER
Patient is requesting referral.     Name of specialist and specialty department :  Dr. Bulmaro Tee, Podiatrist    Reason for visit with the specialist:  Continued foot pain follow up appointments    Address of the specialist office:    Fredrick English   [address] [facility]  [place] [address] [facility]      Appointment date:  November 14, 2022         CSS informed patient the turnaround time for referral is 5-7 business days. Patient was informed to check their [employer] account for referral status.

## 2022-10-27 NOTE — TELEPHONE ENCOUNTER
Good Morning Dr Karine Mason and staff,    Please review pended referral to Podiatry, Dr Blood Due and sign off.     Thank you    Adam Thomas

## 2022-11-03 ENCOUNTER — OFFICE VISIT (OUTPATIENT)
Dept: ORTHOPEDICS CLINIC | Facility: CLINIC | Age: 62
End: 2022-11-03
Payer: COMMERCIAL

## 2022-11-03 DIAGNOSIS — M75.102 TEAR OF LEFT ROTATOR CUFF, UNSPECIFIED TEAR EXTENT, UNSPECIFIED WHETHER TRAUMATIC: Primary | ICD-10-CM

## 2022-11-03 PROCEDURE — 99213 OFFICE O/P EST LOW 20 MIN: CPT | Performed by: ORTHOPAEDIC SURGERY

## 2022-11-03 RX ORDER — ETODOLAC 200 MG/1
200 CAPSULE ORAL EVERY 8 HOURS
Qty: 40 CAPSULE | Refills: 1 | Status: SHIPPED | OUTPATIENT
Start: 2022-11-03

## 2022-11-04 ENCOUNTER — OFFICE VISIT (OUTPATIENT)
Dept: INTERNAL MEDICINE CLINIC | Facility: CLINIC | Age: 62
End: 2022-11-04
Payer: COMMERCIAL

## 2022-11-04 VITALS
BODY MASS INDEX: 29.22 KG/M2 | DIASTOLIC BLOOD PRESSURE: 78 MMHG | TEMPERATURE: 98 F | HEIGHT: 68 IN | RESPIRATION RATE: 16 BRPM | SYSTOLIC BLOOD PRESSURE: 124 MMHG | HEART RATE: 64 BPM | WEIGHT: 192.81 LBS

## 2022-11-04 DIAGNOSIS — G72.9 MYOPATHY: ICD-10-CM

## 2022-11-04 DIAGNOSIS — I10 ESSENTIAL HYPERTENSION: ICD-10-CM

## 2022-11-04 DIAGNOSIS — M54.16 LUMBAR RADICULOPATHY: ICD-10-CM

## 2022-11-04 DIAGNOSIS — M48.061 LUMBAR FORAMINAL STENOSIS: ICD-10-CM

## 2022-11-04 DIAGNOSIS — H92.01 RIGHT EAR PAIN: Primary | ICD-10-CM

## 2022-11-04 DIAGNOSIS — K21.9 GASTROESOPHAGEAL REFLUX DISEASE WITHOUT ESOPHAGITIS: ICD-10-CM

## 2022-11-04 DIAGNOSIS — R25.2 LEG CRAMPS: ICD-10-CM

## 2022-11-04 DIAGNOSIS — Z23 NEED FOR VACCINATION: ICD-10-CM

## 2022-11-04 PROCEDURE — 90471 IMMUNIZATION ADMIN: CPT | Performed by: INTERNAL MEDICINE

## 2022-11-04 PROCEDURE — 99214 OFFICE O/P EST MOD 30 MIN: CPT | Performed by: INTERNAL MEDICINE

## 2022-11-04 PROCEDURE — 90715 TDAP VACCINE 7 YRS/> IM: CPT | Performed by: INTERNAL MEDICINE

## 2022-11-04 PROCEDURE — 3078F DIAST BP <80 MM HG: CPT | Performed by: INTERNAL MEDICINE

## 2022-11-04 PROCEDURE — 3008F BODY MASS INDEX DOCD: CPT | Performed by: INTERNAL MEDICINE

## 2022-11-04 PROCEDURE — 3074F SYST BP LT 130 MM HG: CPT | Performed by: INTERNAL MEDICINE

## 2022-11-04 RX ORDER — AMLODIPINE BESYLATE 10 MG/1
TABLET ORAL
Qty: 90 TABLET | Refills: 1 | Status: SHIPPED | OUTPATIENT
Start: 2022-11-04

## 2022-11-04 RX ORDER — TRAMADOL HYDROCHLORIDE 50 MG/1
50 TABLET ORAL EVERY 8 HOURS PRN
Qty: 30 TABLET | Refills: 0 | Status: SHIPPED | OUTPATIENT
Start: 2022-11-04

## 2022-11-04 RX ORDER — OMEPRAZOLE 20 MG/1
40 CAPSULE, DELAYED RELEASE ORAL EVERY MORNING
Qty: 180 CAPSULE | Refills: 1 | Status: SHIPPED | OUTPATIENT
Start: 2022-11-04

## 2022-11-05 ENCOUNTER — LAB ENCOUNTER (OUTPATIENT)
Dept: LAB | Facility: HOSPITAL | Age: 62
End: 2022-11-05
Attending: INTERNAL MEDICINE
Payer: COMMERCIAL

## 2022-11-05 DIAGNOSIS — G72.9 MYOPATHY: ICD-10-CM

## 2022-11-05 DIAGNOSIS — I10 ESSENTIAL HYPERTENSION: ICD-10-CM

## 2022-11-05 DIAGNOSIS — R25.2 LEG CRAMPS: ICD-10-CM

## 2022-11-05 DIAGNOSIS — Z23 NEED FOR VACCINATION: ICD-10-CM

## 2022-11-05 LAB
ALBUMIN SERPL-MCNC: 3.5 G/DL (ref 3.4–5)
ALBUMIN/GLOB SERPL: 0.8 {RATIO} (ref 1–2)
ALP LIVER SERPL-CCNC: 116 U/L
ALT SERPL-CCNC: 31 U/L
ANION GAP SERPL CALC-SCNC: 8 MMOL/L (ref 0–18)
AST SERPL-CCNC: 18 U/L (ref 15–37)
BILIRUB SERPL-MCNC: 0.4 MG/DL (ref 0.1–2)
BUN BLD-MCNC: 12 MG/DL (ref 7–18)
BUN/CREAT SERPL: 12.6 (ref 10–20)
CALCIUM BLD-MCNC: 9 MG/DL (ref 8.5–10.1)
CHLORIDE SERPL-SCNC: 108 MMOL/L (ref 98–112)
CHOLEST SERPL-MCNC: 206 MG/DL (ref ?–200)
CK SERPL-CCNC: 312 U/L
CO2 SERPL-SCNC: 28 MMOL/L (ref 21–32)
CREAT BLD-MCNC: 0.95 MG/DL
DEPRECATED RDW RBC AUTO: 41.8 FL (ref 35.1–46.3)
ERYTHROCYTE [DISTWIDTH] IN BLOOD BY AUTOMATED COUNT: 14.4 % (ref 11–15)
FASTING PATIENT LIPID ANSWER: YES
FASTING STATUS PATIENT QL REPORTED: YES
GFR SERPLBLD BASED ON 1.73 SQ M-ARVRAT: 67 ML/MIN/1.73M2 (ref 60–?)
GLOBULIN PLAS-MCNC: 4.3 G/DL (ref 2.8–4.4)
GLUCOSE BLD-MCNC: 108 MG/DL (ref 70–99)
HCT VFR BLD AUTO: 45.2 %
HDLC SERPL-MCNC: 59 MG/DL (ref 40–59)
HGB BLD-MCNC: 14.9 G/DL
LDLC SERPL CALC-MCNC: 127 MG/DL (ref ?–100)
MCH RBC QN AUTO: 26.6 PG (ref 26–34)
MCHC RBC AUTO-ENTMCNC: 33 G/DL (ref 31–37)
MCV RBC AUTO: 80.7 FL
NONHDLC SERPL-MCNC: 147 MG/DL (ref ?–130)
OSMOLALITY SERPL CALC.SUM OF ELEC: 298 MOSM/KG (ref 275–295)
PLATELET # BLD AUTO: 450 10(3)UL (ref 150–450)
POTASSIUM SERPL-SCNC: 3.7 MMOL/L (ref 3.5–5.1)
PROT SERPL-MCNC: 7.8 G/DL (ref 6.4–8.2)
RBC # BLD AUTO: 5.6 X10(6)UL
SODIUM SERPL-SCNC: 144 MMOL/L (ref 136–145)
TRIGL SERPL-MCNC: 112 MG/DL (ref 30–149)
TSI SER-ACNC: 1.64 MIU/ML (ref 0.36–3.74)
VLDLC SERPL CALC-MCNC: 20 MG/DL (ref 0–30)
WBC # BLD AUTO: 10.4 X10(3) UL (ref 4–11)

## 2022-11-05 PROCEDURE — 85060 BLOOD SMEAR INTERPRETATION: CPT

## 2022-11-05 PROCEDURE — 82550 ASSAY OF CK (CPK): CPT

## 2022-11-05 PROCEDURE — 85027 COMPLETE CBC AUTOMATED: CPT

## 2022-11-05 PROCEDURE — 80053 COMPREHEN METABOLIC PANEL: CPT

## 2022-11-05 PROCEDURE — 36415 COLL VENOUS BLD VENIPUNCTURE: CPT

## 2022-11-05 PROCEDURE — 84443 ASSAY THYROID STIM HORMONE: CPT

## 2022-11-05 PROCEDURE — 80061 LIPID PANEL: CPT

## 2022-11-07 ENCOUNTER — HOSPITAL ENCOUNTER (OUTPATIENT)
Dept: MAMMOGRAPHY | Age: 62
Discharge: HOME | End: 2022-11-07
Attending: OBSTETRICS & GYNECOLOGY
Payer: COMMERCIAL

## 2022-11-07 DIAGNOSIS — Z12.31 SCREENING MAMMOGRAM, ENCOUNTER FOR: ICD-10-CM

## 2022-11-07 PROCEDURE — 77067 SCR MAMMO BI INCL CAD: CPT | Performed by: OBSTETRICS & GYNECOLOGY

## 2022-11-07 PROCEDURE — 77063 BREAST TOMOSYNTHESIS BI: CPT | Performed by: OBSTETRICS & GYNECOLOGY

## 2022-11-12 ENCOUNTER — HOSPITAL ENCOUNTER (OUTPATIENT)
Age: 62
Discharge: HOME | End: 2022-11-12
Payer: COMMERCIAL

## 2022-11-12 VITALS
HEART RATE: 68 BPM | TEMPERATURE: 98 F | SYSTOLIC BLOOD PRESSURE: 136 MMHG | OXYGEN SATURATION: 99 % | DIASTOLIC BLOOD PRESSURE: 72 MMHG | RESPIRATION RATE: 18 BRPM

## 2022-11-12 DIAGNOSIS — H92.01 EARACHE ON RIGHT: Primary | ICD-10-CM

## 2022-11-12 PROCEDURE — 99213 OFFICE O/P EST LOW 20 MIN: CPT

## 2022-11-12 RX ORDER — DOXYCYCLINE HYCLATE 100 MG/1
100 CAPSULE ORAL 2 TIMES DAILY
Qty: 14 CAPSULE | Refills: 0 | Status: SHIPPED | OUTPATIENT
Start: 2022-11-12 | End: 2022-11-19

## 2022-11-12 NOTE — ED INITIAL ASSESSMENT (HPI)
Treated for ear infection last month. Pain to right ear returned a few days ago. No fever. No dizziness. Also c/o pain to left side of nose. Tender to touch. Mild redness.

## 2022-11-28 ENCOUNTER — OFFICE VISIT (OUTPATIENT)
Dept: PODIATRY CLINIC | Facility: CLINIC | Age: 62
End: 2022-11-28
Payer: COMMERCIAL

## 2022-11-28 VITALS — DIASTOLIC BLOOD PRESSURE: 62 MMHG | SYSTOLIC BLOOD PRESSURE: 126 MMHG

## 2022-11-28 DIAGNOSIS — M79.675 PAIN IN TOES OF BOTH FEET: ICD-10-CM

## 2022-11-28 DIAGNOSIS — M20.12 VALGUS DEFORMITY OF BOTH GREAT TOES: ICD-10-CM

## 2022-11-28 DIAGNOSIS — M77.42 METATARSALGIA OF BOTH FEET: ICD-10-CM

## 2022-11-28 DIAGNOSIS — G57.81 NEUROMA OF THIRD INTERSPACE OF RIGHT FOOT: Primary | ICD-10-CM

## 2022-11-28 DIAGNOSIS — M79.674 PAIN IN TOES OF BOTH FEET: ICD-10-CM

## 2022-11-28 DIAGNOSIS — M77.41 METATARSALGIA OF BOTH FEET: ICD-10-CM

## 2022-11-28 DIAGNOSIS — L84 CALLUS OF FOOT: ICD-10-CM

## 2022-11-28 DIAGNOSIS — M20.11 VALGUS DEFORMITY OF BOTH GREAT TOES: ICD-10-CM

## 2022-11-28 PROCEDURE — 64455 NJX AA&/STRD PLTR COM DG NRV: CPT | Performed by: PODIATRIST

## 2022-11-28 RX ORDER — TRIAMCINOLONE ACETONIDE 40 MG/ML
20 INJECTION, SUSPENSION INTRA-ARTICULAR; INTRAMUSCULAR ONCE
Status: COMPLETED | OUTPATIENT
Start: 2022-11-28 | End: 2022-11-28

## 2022-11-28 NOTE — PROGRESS NOTES
Per Dr. Niesha Falk draw up 0.5ml of 0.5% Marcaine and 0.5ml of Kenalog 40 for injection to right foot.

## 2022-11-30 ENCOUNTER — OFFICE VISIT (OUTPATIENT)
Dept: OTOLARYNGOLOGY | Facility: CLINIC | Age: 62
End: 2022-11-30
Payer: COMMERCIAL

## 2022-11-30 DIAGNOSIS — H92.01 ACUTE OTALGIA, RIGHT: Primary | ICD-10-CM

## 2022-11-30 DIAGNOSIS — M26.621 ARTHRALGIA OF RIGHT TEMPOROMANDIBULAR JOINT: ICD-10-CM

## 2022-11-30 PROCEDURE — 99213 OFFICE O/P EST LOW 20 MIN: CPT | Performed by: SPECIALIST

## 2022-11-30 RX ORDER — CYCLOBENZAPRINE HCL 5 MG
5 TABLET ORAL 3 TIMES DAILY PRN
Qty: 30 TABLET | Refills: 0 | Status: SHIPPED | OUTPATIENT
Start: 2022-11-30

## 2022-11-30 RX ORDER — IBUPROFEN 600 MG/1
TABLET ORAL
COMMUNITY
Start: 2022-07-09 | End: 2022-11-30

## 2022-11-30 NOTE — PATIENT INSTRUCTIONS
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 These are some things you can try for it. Heat, soft diet (only foods you can cut with a fork), aspirin, advil, aleve, or motrin. No gum chewing. Consider a dental splint if grinding. This is not helpful to you, physical therapy can be considered. Please call me in 2 to 3 weeks time if not resolved.

## 2022-12-01 NOTE — TELEPHONE ENCOUNTER
CARDIOLOGY FOLLOW UP - Dr. Carrera  DATE OF SERVICE: 12/1/22    CC  Pt feeling well today. No complaints. No CP, SOB, orthopnea. Planned for OR Monday.    REVIEW OF SYSTEMS:  CONSTITUTIONAL: No fever, weight loss, or fatigue  RESPIRATORY: No cough, wheezing, chills or hemoptysis; No Shortness of Breath  CARDIOVASCULAR: No chest pain, palpitations, passing out, dizziness, or leg swelling  GASTROINTESTINAL: No abdominal or epigastric pain. No nausea, vomiting, or hematemesis; No diarrhea or constipation. No melena or hematochezia.  VASCULAR: No edema     PHYSICAL EXAM:  T(C): 36.4 (12-01-22 @ 04:36), Max: 36.7 (11-30-22 @ 12:24)  HR: 76 (12-01-22 @ 05:51) (74 - 80)  BP: 102/60 (12-01-22 @ 05:51) (98/61 - 104/62)  RR: 18 (12-01-22 @ 04:36) (18 - 18)  SpO2: 92% (12-01-22 @ 04:36) (92% - 94%)  Wt(kg): --  I&O's Summary    30 Nov 2022 07:01  -  01 Dec 2022 07:00  --------------------------------------------------------  IN: 780 mL / OUT: 1950 mL / NET: -1170 mL        Appearance: Normal	  Cardiovascular: Normal S1 S2,RRR, No JVD, No murmurs  Respiratory: Lungs clear to auscultation	  Gastrointestinal:  Soft, Non-tender, + BS	  Extremities: minimal b/l le edema      Home Medications:  finasteride 5 mg oral tablet: 1 tab(s) orally once a day (24 Nov 2022 10:35)  tamsulosin 0.4 mg oral capsule: 1 cap(s) orally 2 times a day (24 Nov 2022 10:35)  Vitamin C 1000 mg oral tablet: 1 tab(s) orally once a day (24 Nov 2022 10:35)  Vitamin D3 1000 intl units oral tablet: 1 tab(s) orally once a day (24 Nov 2022 10:35)      MEDICATIONS  (STANDING):  albuterol/ipratropium for Nebulization. 3 milliLiter(s) Nebulizer once  allopurinol 200 milliGRAM(s) Oral daily  ascorbic acid 500 milliGRAM(s) Oral daily  buMETAnide Injectable 1 milliGRAM(s) IV Push two times a day  cholecalciferol 1000 Unit(s) Oral daily  enoxaparin Injectable 100 milliGRAM(s) SubCutaneous every 12 hours  finasteride 5 milliGRAM(s) Oral daily  metoprolol succinate ER 50 milliGRAM(s) Oral daily  tamsulosin 0.4 milliGRAM(s) Oral two times a day      TELEMETRY: NSR 70-80s	    ECG:  	  RADIOLOGY:   DIAGNOSTIC TESTING:  [ ] Echocardiogram:  [ ]  Catheterization:  [ ] Stress Test:    OTHER: 	    LABS:	 	                            11.6   8.18  )-----------( 172      ( 30 Nov 2022 07:04 )             35.0     11-30    139  |  100  |  36<H>  ----------------------------<  113<H>  3.9   |  30  |  1.15    Ca    9.3      30 Nov 2022 07:04  Mg     2.2     11-30      PTT - ( 30 Nov 2022 07:04 )  PTT:50.2 sec          JULIANO 11/28: Mod-severe paravalvular leak of the BioAVR and mod MR    A/P  68 y/o male with hx of ascending aortic aneurysm repair, DVT ( two sepearte occasions ) was on Eliquis ( last used 6 months ago ) , sp  AVR ( bovine ) ( repaired twice ?),  s/p PPM (medtronic), HFpEF, AVF of right femoral artery, HTN, BPH, admitted with acute onset dyspnea    #acute on chronic DCHF, pulmonary edema  -recurrent HF, pulmonary edema likely d/t  severe paravalvular AI and moderate MR as seen on JULIANO  -was doing fine for a few days but had 1/2 can of sardines/+++salt intake few hours before onset of dyspnea  -previous CTA chest No pulmonary embolism  -no ACS  -RECENT echo with nl LV sys fx, nl fxn bio avr   -recent cath with luminal LCx/Ramus disease.  Mild proximal LAD disease.   -recent RHC with severely elevated PASP in setting of markedly elevated filling pressures/wedge pressure. Severely elevated PASP likely secondary to increased LA pressure  + RVP recently   -recent bubble study ok  -Repeat chest imaging with improved pulmonary edema  -Volume status improved & improvement in orthopnea  -PPM interrogation 11/28 - noted w/ normal pacemaker fxn  -JULIANO w/ with severe paravalvular AI and moderate MR   -Appreciate CT sx consult  -Plan for redo AVR and Poss MVR on Monday 12/5  -cont iv bumex as ordered bid   -cont toprol 50 mg daily    #Aortic Insufficiency/Moderate MR  -JULIANO results as mentioned above  -Plan for redo AVR and Poss MVR on Monday 12/5    #RLE pseudoaneurysm/AV fistula   -during exam before attempting right femoral vein access for RHC --> incidental finding of bounding/pulsatile thrill  -Right fem artery was NOT accessed during cath   -Right LE Arterial doppler revealed  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 Data within normal pacemaking function. Pt with known afib  -cont eliquis    #Hx dvt  -recent le doppler neg for dvt     #SP AVR   -stable on echo  -JULIANO w/ with severe paravalvular AI and moderate MR    #Sp PPM - Medtronic   -Recent ppm interrogation as mentioned above   Left patient a detailed message

## 2022-12-12 ENCOUNTER — HOSPITAL ENCOUNTER (OUTPATIENT)
Dept: MRI IMAGING | Age: 62
Discharge: HOME | End: 2022-12-12
Attending: ORTHOPAEDIC SURGERY
Payer: COMMERCIAL

## 2022-12-12 DIAGNOSIS — M75.102 TEAR OF LEFT ROTATOR CUFF, UNSPECIFIED TEAR EXTENT, UNSPECIFIED WHETHER TRAUMATIC: ICD-10-CM

## 2022-12-12 PROCEDURE — 73221 MRI JOINT UPR EXTREM W/O DYE: CPT | Performed by: ORTHOPAEDIC SURGERY

## 2022-12-13 ENCOUNTER — TELEPHONE (OUTPATIENT)
Dept: CASE MANAGEMENT | Age: 62
End: 2022-12-13

## 2022-12-13 ENCOUNTER — TELEPHONE (OUTPATIENT)
Dept: INTERNAL MEDICINE CLINIC | Facility: CLINIC | Age: 62
End: 2022-12-13

## 2022-12-13 DIAGNOSIS — Z01.00 ROUTINE EYE EXAM: ICD-10-CM

## 2022-12-13 DIAGNOSIS — I10 ESSENTIAL HYPERTENSION: ICD-10-CM

## 2022-12-13 DIAGNOSIS — M25.512 ACUTE PAIN OF LEFT SHOULDER: Primary | ICD-10-CM

## 2022-12-13 NOTE — TELEPHONE ENCOUNTER
Dr. Sharon Malone,    The patient is requesting referrals to Dr. Kartik Euceda and Dr. Kay Bah. Patient has appointments this week. Pended referral please review diagnosis and sign off if you agree. Thank you.   Dimas Mtz

## 2022-12-13 NOTE — TELEPHONE ENCOUNTER
Duplicate request . See other TE from 12/13/22 . [de-identified] : Data/Imaging: \par Scoliosis x-rays AP and lateral done today demonstrating double curve, top curve 19 degrees, bottom curve at 17 degrees. kyphosis appreciated on lateral films. Patient is Risser 5.\par

## 2022-12-13 NOTE — TELEPHONE ENCOUNTER
Patient requesting referral, for Opthamology Dr. Marcie Correa, & Silvana Hairston for Orthopedic - Patient hasappt scheduled with Dr. Justa Joseph tomorrow 12-14

## 2022-12-14 ENCOUNTER — OFFICE VISIT (OUTPATIENT)
Dept: ORTHOPEDICS CLINIC | Facility: CLINIC | Age: 62
End: 2022-12-14
Payer: COMMERCIAL

## 2022-12-14 DIAGNOSIS — M75.122 NONTRAUMATIC COMPLETE TEAR OF LEFT ROTATOR CUFF: Primary | ICD-10-CM

## 2022-12-14 PROCEDURE — 99213 OFFICE O/P EST LOW 20 MIN: CPT | Performed by: ORTHOPAEDIC SURGERY

## 2022-12-14 RX ORDER — PREDNISONE 5 MG/ML
5 SOLUTION ORAL 2 TIMES DAILY
Qty: 100 ML | Refills: 0 | Status: SHIPPED | OUTPATIENT
Start: 2022-12-14 | End: 2022-12-24

## 2022-12-15 ENCOUNTER — OFFICE VISIT (OUTPATIENT)
Dept: OPHTHALMOLOGY | Facility: CLINIC | Age: 62
End: 2022-12-15
Payer: COMMERCIAL

## 2022-12-15 DIAGNOSIS — H40.1132 PRIMARY OPEN ANGLE GLAUCOMA OF BOTH EYES, MODERATE STAGE: Primary | ICD-10-CM

## 2022-12-15 DIAGNOSIS — H43.393 FLOATER, VITREOUS, BILATERAL: ICD-10-CM

## 2022-12-15 DIAGNOSIS — Z96.1 PSEUDOPHAKIA, LEFT EYE: ICD-10-CM

## 2022-12-15 DIAGNOSIS — H25.11 AGE-RELATED NUCLEAR CATARACT, RIGHT: ICD-10-CM

## 2022-12-15 RX ORDER — LATANOPROST 50 UG/ML
SOLUTION/ DROPS OPHTHALMIC
Qty: 7.5 ML | Refills: 3 | Status: SHIPPED | OUTPATIENT
Start: 2022-12-15

## 2022-12-15 NOTE — ASSESSMENT & PLAN NOTE
Visual field completed today with normal results. OCT completed in office today with stable results. All results were discussed with patient in office today. Photos were taken today to document optic nerves. IOP is stable. Continue Latanoprost at bedtime in both eyes. Return in 4 months for a pressure check.

## 2022-12-15 NOTE — PATIENT INSTRUCTIONS
Primary open angle glaucoma of both eyes, moderate stage  Visual field completed today with normal results. OCT completed in office today with stable results. All results were discussed with patient in office today. Photos were taken today to document optic nerves. IOP is stable. Continue Latanoprost at bedtime in both eyes. Return in 4 months for a pressure check. Age-related nuclear cataract, right  No treatment is needed at this time. Discussed that she could have cataract surgery in the right eye at any time, but it would be her decision as far as the timing. If she went back to [address] [place] [address] Ophthalmology, she would like to see Dr. Remy Reis instead of Dr. Sadi Zimmerman. Patient would need to request a referral from Dr. Preeti Burns, vitreous, bilateral  No treatment. Pseudophakia, left eye  No treatment. Continue same glasses.

## 2022-12-15 NOTE — ASSESSMENT & PLAN NOTE
No treatment is needed at this time. Discussed that she could have cataract surgery in the right eye at any time, but it would be her decision as far as the timing. If she went back to [address] [place] [address] Ophthalmology, she would like to see Dr. Evan Weber instead of Dr. Celena Palmer. Patient would need to request a referral from Dr. Cleo Payne.   Information faxed to [address] [place] [address] Ophthalmology.

## 2022-12-15 NOTE — TELEPHONE ENCOUNTER
Encounter Messages    Read Composed From To  Subject   Y 12/13/2022 10:25 AM GREG Brush  Referral     Referral  Message [id number]  From   GREG Brush To   Estrellita Chopra and Delivered   12/13/2022 10:25 AM   Last Read in MyChart   12/13/2022 11:56 AM by Diana Richardson

## 2022-12-29 ENCOUNTER — OFFICE VISIT (OUTPATIENT)
Dept: ORTHOPEDICS CLINIC | Facility: CLINIC | Age: 62
End: 2022-12-29
Payer: COMMERCIAL

## 2022-12-29 DIAGNOSIS — M75.122 NONTRAUMATIC COMPLETE TEAR OF LEFT ROTATOR CUFF: Primary | ICD-10-CM

## 2022-12-29 PROCEDURE — 99213 OFFICE O/P EST LOW 20 MIN: CPT

## 2022-12-29 RX ORDER — HYDROCODONE BITARTRATE AND ACETAMINOPHEN 5; 325 MG/1; MG/1
1 TABLET ORAL EVERY 6 HOURS PRN
Qty: 20 TABLET | Refills: 0 | Status: SHIPPED | OUTPATIENT
Start: 2022-12-29

## 2023-01-04 ENCOUNTER — OFFICE VISIT (OUTPATIENT)
Dept: INTERNAL MEDICINE CLINIC | Facility: CLINIC | Age: 63
End: 2023-01-04
Payer: COMMERCIAL

## 2023-01-04 VITALS
DIASTOLIC BLOOD PRESSURE: 79 MMHG | SYSTOLIC BLOOD PRESSURE: 134 MMHG | WEIGHT: 197 LBS | HEART RATE: 88 BPM | BODY MASS INDEX: 29.86 KG/M2 | HEIGHT: 68 IN | RESPIRATION RATE: 18 BRPM

## 2023-01-04 DIAGNOSIS — M79.672 BILATERAL FOOT PAIN: ICD-10-CM

## 2023-01-04 DIAGNOSIS — M79.671 BILATERAL FOOT PAIN: ICD-10-CM

## 2023-01-04 DIAGNOSIS — M75.122 COMPLETE TEAR OF LEFT ROTATOR CUFF, UNSPECIFIED WHETHER TRAUMATIC: Primary | ICD-10-CM

## 2023-01-04 DIAGNOSIS — R21 RASH: ICD-10-CM

## 2023-01-04 DIAGNOSIS — I10 ESSENTIAL HYPERTENSION: ICD-10-CM

## 2023-01-04 PROCEDURE — 99214 OFFICE O/P EST MOD 30 MIN: CPT | Performed by: INTERNAL MEDICINE

## 2023-01-04 PROCEDURE — 3008F BODY MASS INDEX DOCD: CPT | Performed by: INTERNAL MEDICINE

## 2023-01-04 PROCEDURE — 3078F DIAST BP <80 MM HG: CPT | Performed by: INTERNAL MEDICINE

## 2023-01-04 PROCEDURE — 3075F SYST BP GE 130 - 139MM HG: CPT | Performed by: INTERNAL MEDICINE

## 2023-01-04 RX ORDER — METHYLPREDNISOLONE 4 MG/1
TABLET ORAL
Qty: 1 EACH | Refills: 0 | Status: SHIPPED | OUTPATIENT
Start: 2023-01-04

## 2023-01-09 ENCOUNTER — TELEPHONE (OUTPATIENT)
Dept: ORTHOPEDICS CLINIC | Facility: CLINIC | Age: 63
End: 2023-01-09

## 2023-01-10 NOTE — TELEPHONE ENCOUNTER
Spoke with Sherwin Calvillo to inform her that we have no sooner dates for surgery.  She will keep her surgery date of 2/27 and she will be added to the waiting list.

## 2023-01-11 ENCOUNTER — TELEPHONE (OUTPATIENT)
Dept: ORTHOPEDICS CLINIC | Facility: CLINIC | Age: 63
End: 2023-01-11

## 2023-01-11 DIAGNOSIS — M75.122 COMPLETE TEAR OF LEFT ROTATOR CUFF, UNSPECIFIED WHETHER TRAUMATIC: Primary | ICD-10-CM

## 2023-01-11 NOTE — TELEPHONE ENCOUNTER
Spoke with Cookie May to offer a sooner surgery date. She accepted 1/25 for her new date. She understands that she must have medical clearance for this surgery.

## 2023-01-11 NOTE — TELEPHONE ENCOUNTER
Type of surgery:  Left shoulder arthroscopy, rotator cuff repair  Date: 1/25/23  Location: [facility]  Medical Clearance:      *Medical: Yes      *Dental: No      *Other:  Prior Authorization Status: Pending  Workers Comp:  Medacta/Perico:  [place]: Yes  POV: 2/9/23

## 2023-01-13 ENCOUNTER — LAB ENCOUNTER (OUTPATIENT)
Dept: LAB | Facility: HOSPITAL | Age: 63
End: 2023-01-13
Attending: INTERNAL MEDICINE
Payer: COMMERCIAL

## 2023-01-13 DIAGNOSIS — M75.122 COMPLETE TEAR OF LEFT ROTATOR CUFF, UNSPECIFIED WHETHER TRAUMATIC: ICD-10-CM

## 2023-01-13 DIAGNOSIS — I10 ESSENTIAL HYPERTENSION: ICD-10-CM

## 2023-01-13 LAB
ALBUMIN SERPL-MCNC: 3.7 G/DL (ref 3.4–5)
ALBUMIN/GLOB SERPL: 1 {RATIO} (ref 1–2)
ALP LIVER SERPL-CCNC: 125 U/L
ALT SERPL-CCNC: 36 U/L
ANION GAP SERPL CALC-SCNC: 7 MMOL/L (ref 0–18)
AST SERPL-CCNC: 16 U/L (ref 15–37)
BASOPHILS # BLD AUTO: 0.06 X10(3) UL (ref 0–0.2)
BASOPHILS NFR BLD AUTO: 0.5 %
BILIRUB SERPL-MCNC: 0.5 MG/DL (ref 0.1–2)
BUN BLD-MCNC: 15 MG/DL (ref 7–18)
BUN/CREAT SERPL: 15.2 (ref 10–20)
CALCIUM BLD-MCNC: 9.2 MG/DL (ref 8.5–10.1)
CHLORIDE SERPL-SCNC: 105 MMOL/L (ref 98–112)
CO2 SERPL-SCNC: 28 MMOL/L (ref 21–32)
CREAT BLD-MCNC: 0.99 MG/DL
DEPRECATED RDW RBC AUTO: 44.9 FL (ref 35.1–46.3)
EOSINOPHIL # BLD AUTO: 0.07 X10(3) UL (ref 0–0.7)
EOSINOPHIL NFR BLD AUTO: 0.6 %
ERYTHROCYTE [DISTWIDTH] IN BLOOD BY AUTOMATED COUNT: 15.2 % (ref 11–15)
FASTING STATUS PATIENT QL REPORTED: NO
GFR SERPLBLD BASED ON 1.73 SQ M-ARVRAT: 64 ML/MIN/1.73M2 (ref 60–?)
GLOBULIN PLAS-MCNC: 3.7 G/DL (ref 2.8–4.4)
GLUCOSE BLD-MCNC: 95 MG/DL (ref 70–99)
HCT VFR BLD AUTO: 45.7 %
HGB BLD-MCNC: 15.2 G/DL
IMM GRANULOCYTES # BLD AUTO: 0.14 X10(3) UL (ref 0–1)
IMM GRANULOCYTES NFR BLD: 1.1 %
LYMPHOCYTES # BLD AUTO: 2.52 X10(3) UL (ref 1–4)
LYMPHOCYTES NFR BLD AUTO: 20.3 %
MCH RBC QN AUTO: 27.1 PG (ref 26–34)
MCHC RBC AUTO-ENTMCNC: 33.3 G/DL (ref 31–37)
MCV RBC AUTO: 81.6 FL
MONOCYTES # BLD AUTO: 0.78 X10(3) UL (ref 0.1–1)
MONOCYTES NFR BLD AUTO: 6.3 %
NEUTROPHILS # BLD AUTO: 8.83 X10 (3) UL (ref 1.5–7.7)
NEUTROPHILS # BLD AUTO: 8.83 X10(3) UL (ref 1.5–7.7)
NEUTROPHILS NFR BLD AUTO: 71.2 %
OSMOLALITY SERPL CALC.SUM OF ELEC: 291 MOSM/KG (ref 275–295)
PLATELET # BLD AUTO: 404 10(3)UL (ref 150–450)
POTASSIUM SERPL-SCNC: 3.4 MMOL/L (ref 3.5–5.1)
PROT SERPL-MCNC: 7.4 G/DL (ref 6.4–8.2)
RBC # BLD AUTO: 5.6 X10(6)UL
SODIUM SERPL-SCNC: 140 MMOL/L (ref 136–145)
WBC # BLD AUTO: 12.4 X10(3) UL (ref 4–11)

## 2023-01-13 PROCEDURE — 36415 COLL VENOUS BLD VENIPUNCTURE: CPT

## 2023-01-13 PROCEDURE — 80053 COMPREHEN METABOLIC PANEL: CPT

## 2023-01-13 PROCEDURE — 85025 COMPLETE CBC W/AUTO DIFF WBC: CPT

## 2023-01-17 ENCOUNTER — OFFICE VISIT (OUTPATIENT)
Dept: PODIATRY CLINIC | Facility: CLINIC | Age: 63
End: 2023-01-17

## 2023-01-17 DIAGNOSIS — M79.672 LEFT FOOT PAIN: Primary | ICD-10-CM

## 2023-01-17 DIAGNOSIS — M20.12 HALLUX VALGUS OF LEFT FOOT: ICD-10-CM

## 2023-01-17 PROCEDURE — 99213 OFFICE O/P EST LOW 20 MIN: CPT | Performed by: PODIATRIST

## 2023-01-19 ENCOUNTER — TELEPHONE (OUTPATIENT)
Dept: OPHTHALMOLOGY | Facility: CLINIC | Age: 63
End: 2023-01-19

## 2023-01-20 ENCOUNTER — OFFICE VISIT (OUTPATIENT)
Dept: INTERNAL MEDICINE CLINIC | Facility: CLINIC | Age: 63
End: 2023-01-20

## 2023-01-20 ENCOUNTER — EKG ENCOUNTER (OUTPATIENT)
Dept: LAB | Age: 63
End: 2023-01-20
Attending: INTERNAL MEDICINE
Payer: COMMERCIAL

## 2023-01-20 ENCOUNTER — TELEPHONE (OUTPATIENT)
Dept: PODIATRY CLINIC | Facility: CLINIC | Age: 63
End: 2023-01-20

## 2023-01-20 ENCOUNTER — LAB ENCOUNTER (OUTPATIENT)
Dept: LAB | Age: 63
End: 2023-01-20
Attending: INTERNAL MEDICINE
Payer: COMMERCIAL

## 2023-01-20 VITALS
SYSTOLIC BLOOD PRESSURE: 134 MMHG | BODY MASS INDEX: 30.25 KG/M2 | HEIGHT: 68 IN | WEIGHT: 199.63 LBS | DIASTOLIC BLOOD PRESSURE: 70 MMHG | HEART RATE: 78 BPM | OXYGEN SATURATION: 98 %

## 2023-01-20 DIAGNOSIS — Z96.1 PSEUDOPHAKIA, LEFT EYE: ICD-10-CM

## 2023-01-20 DIAGNOSIS — M79.672 LEFT FOOT PAIN: Primary | ICD-10-CM

## 2023-01-20 DIAGNOSIS — Z01.818 PREOPERATIVE EXAMINATION, UNSPECIFIED: Primary | ICD-10-CM

## 2023-01-20 DIAGNOSIS — I10 ESSENTIAL HYPERTENSION: ICD-10-CM

## 2023-01-20 DIAGNOSIS — Z01.818 PREOP EXAMINATION: Primary | ICD-10-CM

## 2023-01-20 DIAGNOSIS — M20.12 HALLUX VALGUS OF LEFT FOOT: ICD-10-CM

## 2023-01-20 DIAGNOSIS — K21.9 GASTROESOPHAGEAL REFLUX DISEASE WITHOUT ESOPHAGITIS: ICD-10-CM

## 2023-01-20 DIAGNOSIS — H20.00 ACUTE IRITIS, LEFT EYE: ICD-10-CM

## 2023-01-20 DIAGNOSIS — Z01.818 PREOP EXAMINATION: ICD-10-CM

## 2023-01-20 LAB
ALBUMIN SERPL-MCNC: 3.7 G/DL (ref 3.4–5)
ALBUMIN/GLOB SERPL: 1 {RATIO} (ref 1–2)
ALP LIVER SERPL-CCNC: 99 U/L
ALT SERPL-CCNC: 40 U/L
ANION GAP SERPL CALC-SCNC: 5 MMOL/L (ref 0–18)
APTT PPP: 29.8 SECONDS (ref 23.3–35.6)
AST SERPL-CCNC: 18 U/L (ref 15–37)
ATRIAL RATE: 60 BPM
BILIRUB SERPL-MCNC: 0.4 MG/DL (ref 0.1–2)
BUN BLD-MCNC: 13 MG/DL (ref 7–18)
BUN/CREAT SERPL: 13.7 (ref 10–20)
CALCIUM BLD-MCNC: 9.2 MG/DL (ref 8.5–10.1)
CHLORIDE SERPL-SCNC: 107 MMOL/L (ref 98–112)
CO2 SERPL-SCNC: 29 MMOL/L (ref 21–32)
CREAT BLD-MCNC: 0.95 MG/DL
DEPRECATED RDW RBC AUTO: 45.1 FL (ref 35.1–46.3)
ERYTHROCYTE [DISTWIDTH] IN BLOOD BY AUTOMATED COUNT: 15.3 % (ref 11–15)
FASTING STATUS PATIENT QL REPORTED: NO
GFR SERPLBLD BASED ON 1.73 SQ M-ARVRAT: 67 ML/MIN/1.73M2 (ref 60–?)
GLOBULIN PLAS-MCNC: 3.7 G/DL (ref 2.8–4.4)
GLUCOSE BLD-MCNC: 99 MG/DL (ref 70–99)
HCT VFR BLD AUTO: 43.7 %
HGB BLD-MCNC: 14.5 G/DL
INR BLD: 0.95 (ref 0.85–1.16)
MCH RBC QN AUTO: 27 PG (ref 26–34)
MCHC RBC AUTO-ENTMCNC: 33.2 G/DL (ref 31–37)
MCV RBC AUTO: 81.2 FL
OSMOLALITY SERPL CALC.SUM OF ELEC: 292 MOSM/KG (ref 275–295)
P AXIS: 31 DEGREES
P-R INTERVAL: 154 MS
PLATELET # BLD AUTO: 376 10(3)UL (ref 150–450)
POTASSIUM SERPL-SCNC: 4.1 MMOL/L (ref 3.5–5.1)
PROT SERPL-MCNC: 7.4 G/DL (ref 6.4–8.2)
PROTHROMBIN TIME: 12.6 SECONDS (ref 11.6–14.8)
Q-T INTERVAL: 392 MS
QRS DURATION: 72 MS
QTC CALCULATION (BEZET): 392 MS
R AXIS: 44 DEGREES
RBC # BLD AUTO: 5.38 X10(6)UL
SODIUM SERPL-SCNC: 141 MMOL/L (ref 136–145)
T AXIS: 48 DEGREES
VENTRICULAR RATE: 60 BPM
WBC # BLD AUTO: 10.5 X10(3) UL (ref 4–11)

## 2023-01-20 PROCEDURE — 93005 ELECTROCARDIOGRAM TRACING: CPT

## 2023-01-20 PROCEDURE — 93010 ELECTROCARDIOGRAM REPORT: CPT | Performed by: INTERNAL MEDICINE

## 2023-01-20 PROCEDURE — 87081 CULTURE SCREEN ONLY: CPT

## 2023-01-20 PROCEDURE — 85730 THROMBOPLASTIN TIME PARTIAL: CPT

## 2023-01-20 PROCEDURE — 85027 COMPLETE CBC AUTOMATED: CPT

## 2023-01-20 PROCEDURE — 36415 COLL VENOUS BLD VENIPUNCTURE: CPT

## 2023-01-20 PROCEDURE — 3078F DIAST BP <80 MM HG: CPT | Performed by: INTERNAL MEDICINE

## 2023-01-20 PROCEDURE — 99214 OFFICE O/P EST MOD 30 MIN: CPT | Performed by: INTERNAL MEDICINE

## 2023-01-20 PROCEDURE — 80053 COMPREHEN METABOLIC PANEL: CPT

## 2023-01-20 PROCEDURE — 3008F BODY MASS INDEX DOCD: CPT | Performed by: INTERNAL MEDICINE

## 2023-01-20 PROCEDURE — 3075F SYST BP GE 130 - 139MM HG: CPT | Performed by: INTERNAL MEDICINE

## 2023-01-20 PROCEDURE — 81015 MICROSCOPIC EXAM OF URINE: CPT

## 2023-01-20 PROCEDURE — 85610 PROTHROMBIN TIME: CPT

## 2023-01-20 RX ORDER — OMEPRAZOLE 20 MG/1
40 CAPSULE, DELAYED RELEASE ORAL EVERY MORNING
Qty: 180 CAPSULE | Refills: 1 | Status: SHIPPED | OUTPATIENT
Start: 2023-01-20

## 2023-01-20 RX ORDER — AMLODIPINE BESYLATE 10 MG/1
TABLET ORAL
Qty: 90 TABLET | Refills: 1 | Status: SHIPPED | OUTPATIENT
Start: 2023-01-20

## 2023-01-20 NOTE — TELEPHONE ENCOUNTER
Procedure: First metatarsal phalangeal joint with plate and screw fixation, left foot  CPT code: 73510  Length of Surgery: 1.5 hours  Any Instruments: Mini power, mini fluoroscopy, Sirena first MTP P joint fusion system  Call patient: within 24 hours  Anesthesia: MAC  Location: [facility]  Assistance: none  Pacemaker: No  Anticoagulants: No  Nickel Allergy: No  Latex Allergy: No  Diagnosis/ICD Code:   (M95.862) Left foot pain  (primary encounter diagnosis)  Plan:     (M20.12) Hallux valgus of left foot  Plan:

## 2023-01-21 RX ORDER — ACETAMINOPHEN 500 MG
500 TABLET ORAL EVERY 6 HOURS PRN
COMMUNITY
End: 2023-01-25

## 2023-01-22 ENCOUNTER — LAB ENCOUNTER (OUTPATIENT)
Dept: LAB | Facility: HOSPITAL | Age: 63
End: 2023-01-22
Attending: ORTHOPAEDIC SURGERY
Payer: COMMERCIAL

## 2023-01-22 DIAGNOSIS — Z01.818 PREOP TESTING: ICD-10-CM

## 2023-01-22 LAB — SARS-COV-2 RNA RESP QL NAA+PROBE: NOT DETECTED

## 2023-01-24 NOTE — TELEPHONE ENCOUNTER
Called patient this date and she is requesting surgery be scheduled on 4/12/23 which was done this date at [facility]. She was told I would contact her in about one month once [facility] confirms time and we would review all details at that time.

## 2023-01-25 ENCOUNTER — HOSPITAL ENCOUNTER (OUTPATIENT)
Facility: HOSPITAL | Age: 63
Setting detail: HOSPITAL OUTPATIENT SURGERY
Discharge: HOME | End: 2023-01-25
Attending: ORTHOPAEDIC SURGERY | Admitting: ORTHOPAEDIC SURGERY
Payer: COMMERCIAL

## 2023-01-25 ENCOUNTER — ANESTHESIA (OUTPATIENT)
Dept: SURGERY | Facility: HOSPITAL | Age: 63
End: 2023-01-25
Payer: COMMERCIAL

## 2023-01-25 ENCOUNTER — ANESTHESIA EVENT (OUTPATIENT)
Dept: SURGERY | Facility: HOSPITAL | Age: 63
End: 2023-01-25
Payer: COMMERCIAL

## 2023-01-25 VITALS
RESPIRATION RATE: 16 BRPM | TEMPERATURE: 98 F | OXYGEN SATURATION: 95 % | HEIGHT: 68 IN | HEART RATE: 96 BPM | WEIGHT: 199 LBS | DIASTOLIC BLOOD PRESSURE: 66 MMHG | SYSTOLIC BLOOD PRESSURE: 135 MMHG | BODY MASS INDEX: 30.16 KG/M2

## 2023-01-25 DIAGNOSIS — Z01.818 PREOP TESTING: Primary | ICD-10-CM

## 2023-01-25 PROCEDURE — S0077 INJECTION, CLINDAMYCIN PHOSP: HCPCS

## 2023-01-25 PROCEDURE — 0RNK4ZZ RELEASE LEFT SHOULDER JOINT, PERCUTANEOUS ENDOSCOPIC APPROACH: ICD-10-PCS | Performed by: ORTHOPAEDIC SURGERY

## 2023-01-25 PROCEDURE — 0LM24ZZ REATTACHMENT OF LEFT SHOULDER TENDON, PERCUTANEOUS ENDOSCOPIC APPROACH: ICD-10-PCS | Performed by: ORTHOPAEDIC SURGERY

## 2023-01-25 PROCEDURE — 64415 NJX AA&/STRD BRCH PLXS IMG: CPT | Performed by: ORTHOPAEDIC SURGERY

## 2023-01-25 PROCEDURE — S0077 INJECTION, CLINDAMYCIN PHOSP: HCPCS | Performed by: ANESTHESIOLOGY

## 2023-01-25 DEVICE — SYSTEM ESCP FX 19.1MM 4.75MM: Type: IMPLANTABLE DEVICE | Site: SHOULDER | Status: FUNCTIONAL

## 2023-01-25 RX ORDER — CLINDAMYCIN PHOSPHATE 150 MG/ML
INJECTION, SOLUTION INTRAVENOUS AS NEEDED
Status: DISCONTINUED | OUTPATIENT
Start: 2023-01-25 | End: 2023-01-25 | Stop reason: SURG

## 2023-01-25 RX ORDER — ROPIVACAINE HYDROCHLORIDE 5 MG/ML
INJECTION, SOLUTION EPIDURAL; INFILTRATION; PERINEURAL
Status: COMPLETED | OUTPATIENT
Start: 2023-01-25 | End: 2023-01-25

## 2023-01-25 RX ORDER — MIDAZOLAM HYDROCHLORIDE 1 MG/ML
INJECTION INTRAMUSCULAR; INTRAVENOUS
Status: COMPLETED | OUTPATIENT
Start: 2023-01-25 | End: 2023-01-25

## 2023-01-25 RX ORDER — MORPHINE SULFATE 4 MG/ML
4 INJECTION, SOLUTION INTRAMUSCULAR; INTRAVENOUS EVERY 10 MIN PRN
Status: DISCONTINUED | OUTPATIENT
Start: 2023-01-25 | End: 2023-01-25

## 2023-01-25 RX ORDER — NALOXONE HYDROCHLORIDE 0.4 MG/ML
80 INJECTION, SOLUTION INTRAMUSCULAR; INTRAVENOUS; SUBCUTANEOUS AS NEEDED
Status: DISCONTINUED | OUTPATIENT
Start: 2023-01-25 | End: 2023-01-25

## 2023-01-25 RX ORDER — CLINDAMYCIN PHOSPHATE 600 MG/50ML
600 INJECTION INTRAVENOUS
Status: DISCONTINUED | OUTPATIENT
Start: 2023-01-25 | End: 2023-01-25

## 2023-01-25 RX ORDER — OXYCODONE HCL 10 MG/1
10 TABLET, FILM COATED, EXTENDED RELEASE ORAL
Status: COMPLETED | OUTPATIENT
Start: 2023-01-25 | End: 2023-01-25

## 2023-01-25 RX ORDER — ACETAMINOPHEN 500 MG
1000 TABLET ORAL ONCE
Status: COMPLETED | OUTPATIENT
Start: 2023-01-25 | End: 2023-01-25

## 2023-01-25 RX ORDER — SODIUM CHLORIDE, SODIUM LACTATE, POTASSIUM CHLORIDE, CALCIUM CHLORIDE 600; 310; 30; 20 MG/100ML; MG/100ML; MG/100ML; MG/100ML
INJECTION, SOLUTION INTRAVENOUS CONTINUOUS
Status: DISCONTINUED | OUTPATIENT
Start: 2023-01-25 | End: 2023-01-25

## 2023-01-25 RX ORDER — MORPHINE SULFATE 4 MG/ML
2 INJECTION, SOLUTION INTRAMUSCULAR; INTRAVENOUS EVERY 10 MIN PRN
Status: DISCONTINUED | OUTPATIENT
Start: 2023-01-25 | End: 2023-01-25

## 2023-01-25 RX ORDER — METOCLOPRAMIDE 10 MG/1
10 TABLET ORAL ONCE
Status: COMPLETED | OUTPATIENT
Start: 2023-01-25 | End: 2023-01-25

## 2023-01-25 RX ORDER — DEXAMETHASONE SODIUM PHOSPHATE 4 MG/ML
VIAL (ML) INJECTION AS NEEDED
Status: DISCONTINUED | OUTPATIENT
Start: 2023-01-25 | End: 2023-01-25 | Stop reason: SURG

## 2023-01-25 RX ORDER — ONDANSETRON 2 MG/ML
4 INJECTION INTRAMUSCULAR; INTRAVENOUS EVERY 6 HOURS PRN
Status: DISCONTINUED | OUTPATIENT
Start: 2023-01-25 | End: 2023-01-25

## 2023-01-25 RX ORDER — FAMOTIDINE 20 MG/1
20 TABLET, FILM COATED ORAL ONCE
Status: DISCONTINUED | OUTPATIENT
Start: 2023-01-25 | End: 2023-01-25 | Stop reason: HOSPADM

## 2023-01-25 RX ORDER — HYDROMORPHONE HYDROCHLORIDE 1 MG/ML
0.2 INJECTION, SOLUTION INTRAMUSCULAR; INTRAVENOUS; SUBCUTANEOUS EVERY 5 MIN PRN
Status: DISCONTINUED | OUTPATIENT
Start: 2023-01-25 | End: 2023-01-25

## 2023-01-25 RX ORDER — OXYCODONE HYDROCHLORIDE AND ACETAMINOPHEN 5; 325 MG/1; MG/1
1-2 TABLET ORAL EVERY 4 HOURS PRN
Qty: 30 TABLET | Refills: 0 | Status: SHIPPED | OUTPATIENT
Start: 2023-01-25

## 2023-01-25 RX ORDER — HYDROMORPHONE HYDROCHLORIDE 1 MG/ML
0.6 INJECTION, SOLUTION INTRAMUSCULAR; INTRAVENOUS; SUBCUTANEOUS EVERY 5 MIN PRN
Status: DISCONTINUED | OUTPATIENT
Start: 2023-01-25 | End: 2023-01-25

## 2023-01-25 RX ORDER — ONDANSETRON 2 MG/ML
INJECTION INTRAMUSCULAR; INTRAVENOUS AS NEEDED
Status: DISCONTINUED | OUTPATIENT
Start: 2023-01-25 | End: 2023-01-25 | Stop reason: SURG

## 2023-01-25 RX ORDER — ROCURONIUM BROMIDE 10 MG/ML
INJECTION, SOLUTION INTRAVENOUS AS NEEDED
Status: DISCONTINUED | OUTPATIENT
Start: 2023-01-25 | End: 2023-01-25 | Stop reason: SURG

## 2023-01-25 RX ORDER — HYDROMORPHONE HYDROCHLORIDE 1 MG/ML
0.4 INJECTION, SOLUTION INTRAMUSCULAR; INTRAVENOUS; SUBCUTANEOUS EVERY 5 MIN PRN
Status: DISCONTINUED | OUTPATIENT
Start: 2023-01-25 | End: 2023-01-25

## 2023-01-25 RX ORDER — PROCHLORPERAZINE EDISYLATE 5 MG/ML
5 INJECTION INTRAMUSCULAR; INTRAVENOUS EVERY 8 HOURS PRN
Status: DISCONTINUED | OUTPATIENT
Start: 2023-01-25 | End: 2023-01-25

## 2023-01-25 RX ORDER — MORPHINE SULFATE 10 MG/ML
6 INJECTION, SOLUTION INTRAMUSCULAR; INTRAVENOUS EVERY 10 MIN PRN
Status: DISCONTINUED | OUTPATIENT
Start: 2023-01-25 | End: 2023-01-25

## 2023-01-25 RX ADMIN — MIDAZOLAM HYDROCHLORIDE 2 MG: 1 INJECTION INTRAMUSCULAR; INTRAVENOUS at 07:05:00

## 2023-01-25 RX ADMIN — CLINDAMYCIN PHOSPHATE 600 MG: 150 INJECTION, SOLUTION INTRAVENOUS at 08:05:00

## 2023-01-25 RX ADMIN — DEXAMETHASONE SODIUM PHOSPHATE 4 MG: 4 MG/ML VIAL (ML) INJECTION at 08:48:00

## 2023-01-25 RX ADMIN — ONDANSETRON 4 MG: 2 INJECTION INTRAMUSCULAR; INTRAVENOUS at 08:48:00

## 2023-01-25 RX ADMIN — ROPIVACAINE HYDROCHLORIDE 30 ML: 5 INJECTION, SOLUTION EPIDURAL; INFILTRATION; PERINEURAL at 07:10:00

## 2023-01-25 RX ADMIN — ROCURONIUM BROMIDE 10 MG: 10 INJECTION, SOLUTION INTRAVENOUS at 07:53:00

## 2023-01-25 NOTE — ANESTHESIA PROCEDURE NOTES
Airway  Date/Time: 1/25/2023 7:53 AM  Urgency: Elective    Airway not difficult    General Information and Staff    Patient location during procedure: OR  Anesthesiologist: Heather Her MD  Performed: anesthesiologist     Indications and Patient Condition  Indications for airway management: anesthesia  Sedation level: deep  Preoxygenated: yes  Patient position: sniffing  Mask difficulty assessment: 1 - vent by mask    Final Airway Details  Final airway type: endotracheal airway      Successful airway: ETT  Cuffed: yes   Successful intubation technique: direct laryngoscopy  Endotracheal tube insertion site: oral  Blade: Jack  Blade size: #4  ETT size (mm): 7.0    Cormack-Lehane Classification: grade IIA - partial view of glottis  Placement verified by: chest auscultation and capnometry   Measured from: teeth  ETT to teeth (cm): 20  Number of attempts at approach: 1

## 2023-01-25 NOTE — OPERATIVE REPORT
Operative Note    Patient Name: Sukumar Juarez    Preoperative Diagnosis: Complete tear of left rotator cuff, unspecified whether traumatic [M75.122]    Postoperative Diagnosis: Complete tear of left rotator cuff, unspecified whether traumatic [M75.122], subacromial impingement, labral tear type 1    Primary Surgeon: Christiano Arevalo MD     Assistant: Venu Alberto    Procedures: L shoulder arthroscopy, RCR, SAD, limited chondral debridement    Surgical Findings: above    Anesthesia: General/regional    Complications: none    Specimen: none    Drains: none    Condition: stable to RR    Estimated Blood Loss: Yvon Combs MD

## 2023-01-25 NOTE — OPERATIVE REPORT
[facility]    PATIENT'S NAME: Velasquez Causey   ATTENDING PHYSICIAN: Marcin Arroyo MD   OPERATING PHYSICIAN: Marcin Arroyo MD   PATIENT ACCOUNT#:   [id number]    LOCATION:  [address]  MEDICAL RECORD #:   [id number]       YOB: 1960  ADMISSION DATE:       01/25/2023      OPERATION DATE:  01/25/2023    OPERATIVE REPORT      PREOPERATIVE DIAGNOSIS:  Left shoulder rotator cuff tear. POSTOPERATIVE DIAGNOSIS:    1. Left shoulder rotator cuff tear. 2.   Left shoulder subacromial impingement. 3.   Left shoulder chondral lesion anterior superior labrum. PROCEDURE:    1. Left shoulder arthroscopy. 2.   Arthroscopic rotator cuff repair. 3.   Arthroscopic subacromial decompression. 4.   Arthroscopic limited debridement of labrum. ASSISTANT:  Thor Rose PA-C. ANESTHESIA:  General with regional nerve block. COMPLICATIONS:  None. BLOOD LOSS:  10 mL. SPECIMEN:  None. DRAIN:  None. COMPLICATIONS:  None. INDICATIONS:  The patient is a 58-year-old female with history of progressive left shoulder pain unresponsive to conservative care. Preoperative physical findings and imaging studies were consistent with a full-thickness tear of the rotator cuff. This was an atraumatic tear. She failed conservative treatment including antiinflammatory medications, therapeutic exercise, subacromial injections, and activity modifications. After discussion with the patient of the risks and benefits of proceeding with operative treatment of the left shoulder consisting of a rotator cuff repair, she wished to proceed with surgery. FINDINGS:  On examination under anesthesia, the patient had full passive range of motion when anesthetized. Anterior and posterior load and shift maneuvers were negative. ARTHROSCOPIC FINDINGS:    1. Glenohumeral joint:  The glenoid articular surface had minimal grade 1 degenerative softening.   Humeral head articular surface intact. 2.   Labrum:  The superior anterior labrum had degenerative fraying with fibrillation and displaceable degenerative tissue, which was displaced within the joint. The biceps anchor appeared intact. There was no significant peel back from the superior glenoid. Anterior, posterior, inferior labrum unremarkable otherwise. 3.   Subscapularis tendon:  Intact, unremarkable. 4.   Rotator cuff: The articular surface of the rotator cuff appeared intact. 5.   Biceps tendon:  Intact, unremarkable. 6.   Axillary recess:  Unremarkable. No loose bodies or capsular avulsions. SUBACROMIAL SPACE:    1.   Bursa: Thickened and erythematous consistent with subacromial bursitis. 2.   Acromion:  There was an osteophyte at the anterolateral acromion, measuring approximately 5 mm in size. 3.   Rotator cuff: There was an obvious near full-thickness tear of the anterior supraspinatus diameter. The tear was approximately 12 mm with retraction of approximately 8 to 10 mm. 4.   AC joint:  Not visualized. OPERATIVE TECHNIQUE:  The patient was identified in the preoperative holding area. The appropriate consents were obtained. She was taken to the operating room and placed in the supine position on the operating room table. After adequate general and regional anesthesia was obtained, she was turned to the right lateral decubitus position. An axillary roll was placed under the right axilla. Bony prominences were well padded. SCDs were placed on both lower extremities. Patient was given preoperative IV antibiotics. The left shoulder was then prepped and draped in a sterile fashion. The extremity was suspended from the axial traction device with 10 pounds of axial traction applied. A posterior portal was established. The camera was inserted in the glenohumeral joint and a thorough examination of the joint was performed. The findings were as stated.   Next, an anterior portal was established just inferior to the acromioclavicular joint. A motorized shaver and radiofrequency wand were then used to debride fibrillated chondral tissue from the anterior superior labrum. Camera was then redirected into the subacromial space. A lateral portal was established just lateral to the acromion. A motorized shaver and radiofrequency wand were then used to perform a complete subacromial bursectomy. A motorized bur was used to perform a bony acromioplasty. Approximately 8 mm of the undersurface of the anterolateral acromion was resected to decompress the subacromial space. A rasp was then used to prepare the greater tuberosity for reattachment of the bursal surface rotator cuff tear. Accessary anterolateral and posterolateral portals were established. Two [employer] SwiveLock anchors were placed at the articular margin of the tear, 1 anterior and 1 posterior. We passed 4 strands of FiberWire suture and 4 strands of FiberTape through the free edge of the rotator cuff in a simple fashion. Two knots were tied, 1 anterior and 1 posterior. This adequately secured the medial row of the repair. We then placed 2 lateral row anchors at the lateral margin of the greater tuberosity. These were Ar[employer]rex Bio[employer]posite SwiveLock anchors, 2 strands of FiberWire and 2 strands of FiberTape were passed through each anchor. In this way, a modified bridging technique was performed. Excellent re-creation of the anatomic footprint was noted. There was no persistent defect. Good tension on the sutures and repair was noted. Excess suture was removed. Attention was paid to hemostasis. The subacromial space was suction irrigated. Portals were closed with 4-0 nylon sutures. A sterile dressing was applied including an UltraSling. The patient's anesthesia was reversed. She was extubated and taken to recovery room in stable condition. All sponge and instrument counts were reported as correct.   The attending physician,  Sherrie Aviles, was present and performed all critical portions of the procedure. There were no complications. The first assistant was medically necessary for the surgery. She assisted with patient positioning. She assisted with operation of the arthroscope during portions of the procedure. She also assisted with suture management, hemostasis, and wound closure. Without the aid of the assistant, the surgical procedure would not have been possible. Dictated By Zoila Cool.  Sherrie Aviles MD  d: 01/25/2023 09:17:31  t: 01/25/2023 17:39:58  Job [id number]  DLO/

## 2023-01-25 NOTE — ANESTHESIA PROCEDURE NOTES
Peripheral Block    Date/Time: 1/25/2023 7:05 AM  Performed by: Anne-Marie Fuentes MD  Authorized by: Anne-Marie Fuentes MD       General Information and Staff    Start Time:  1/25/2023 7:05 AM  End Time:  1/25/2023 7:10 AM  Anesthesiologist:  Anne-Marie Fuentes MD  Performed by: Anesthesiologist  Patient Location:  PACU    Block Placement: Pre Induction  Site Identification: real time ultrasound guided and image stored and retrievable      Reason for Block: at surgeon's request and post-op pain management    Preanesthetic Checklist: 2 patient identifers, IV checked, risks and benefits discussed, monitors and equipment checked, pre-op evaluation, timeout performed, anesthesia consent and sterile technique used      Procedure Details    Patient Position:  Sitting  Prep: ChloraPrep    Monitoring:  Cardiac monitor  Block Type:   Interscalene  Laterality:  Left  Injection Technique:  Single-shot    Needle    Needle Type:  Short-bevel  Needle Gauge:  22 G  Needle Length:  50 mm  Needle Localization:  Ultrasound guidance, anatomical landmarks and nerve stimulator  Reason for Ultrasound Use: appropriate spread of the medication was noted in real time and no ultrasound evidence of intravascular and/or intraneural injection      Muscle Twitch Response: flexor carpi radialis response      Assessment    Injection Assessment:  Good spread noted, incremental injection, local visualized surrounding nerve on ultrasound, low pressure, negative aspiration for heme and no pain on injection  Heart Rate Change: No        Medications  1/25/2023 7:05 AM  midazolam (VERSED)injection 2mg/2ml - Intravenous   2 mg - 1/25/2023 7:05:00 AM  ropivacaine (NAROPIN) injection 0.5% - Infiltration   30 mL - 1/25/2023 7:10:00 AM    Additional Comments

## 2023-01-27 ENCOUNTER — TELEPHONE (OUTPATIENT)
Dept: ORTHOPEDICS CLINIC | Facility: CLINIC | Age: 63
End: 2023-01-27

## 2023-02-09 ENCOUNTER — OFFICE VISIT (OUTPATIENT)
Dept: ORTHOPEDICS CLINIC | Facility: CLINIC | Age: 63
End: 2023-02-09

## 2023-02-09 DIAGNOSIS — Z47.89 ORTHOPEDIC AFTERCARE: ICD-10-CM

## 2023-02-09 DIAGNOSIS — M75.102 TEAR OF LEFT ROTATOR CUFF, UNSPECIFIED TEAR EXTENT, UNSPECIFIED WHETHER TRAUMATIC: Primary | ICD-10-CM

## 2023-02-09 PROCEDURE — 99024 POSTOP FOLLOW-UP VISIT: CPT | Performed by: ORTHOPAEDIC SURGERY

## 2023-02-09 RX ORDER — HYDROCODONE BITARTRATE AND ACETAMINOPHEN 5; 325 MG/1; MG/1
1 TABLET ORAL EVERY 4 HOURS PRN
Qty: 20 TABLET | Refills: 0 | Status: SHIPPED | OUTPATIENT
Start: 2023-02-09

## 2023-02-14 ENCOUNTER — TELEPHONE (OUTPATIENT)
Dept: PHYSICAL THERAPY | Facility: HOSPITAL | Age: 63
End: 2023-02-14

## 2023-02-16 ENCOUNTER — OFFICE VISIT (OUTPATIENT)
Dept: PHYSICAL THERAPY | Age: 63
End: 2023-02-16
Payer: COMMERCIAL

## 2023-02-16 ENCOUNTER — TELEPHONE (OUTPATIENT)
Dept: ORTHOPEDICS CLINIC | Facility: CLINIC | Age: 63
End: 2023-02-16

## 2023-02-16 DIAGNOSIS — Z47.89 ORTHOPEDIC AFTERCARE: ICD-10-CM

## 2023-02-16 PROCEDURE — 97162 PT EVAL MOD COMPLEX 30 MIN: CPT

## 2023-02-16 PROCEDURE — 97530 THERAPEUTIC ACTIVITIES: CPT

## 2023-02-16 PROCEDURE — 97110 THERAPEUTIC EXERCISES: CPT

## 2023-02-17 NOTE — TELEPHONE ENCOUNTER
S/P Left shoulder scope 1/25/23   Patient requesting to be seen 3/3/23. Patient added to Rhianna Machado schedule at 8:30AM please advise if you have concerns with this.

## 2023-02-22 ENCOUNTER — OFFICE VISIT (OUTPATIENT)
Dept: PHYSICAL THERAPY | Age: 63
End: 2023-02-22
Payer: COMMERCIAL

## 2023-02-22 PROCEDURE — 97110 THERAPEUTIC EXERCISES: CPT

## 2023-02-22 PROCEDURE — 97140 MANUAL THERAPY 1/> REGIONS: CPT

## 2023-02-23 ENCOUNTER — TELEPHONE (OUTPATIENT)
Dept: PHYSICAL THERAPY | Facility: HOSPITAL | Age: 63
End: 2023-02-23

## 2023-02-24 ENCOUNTER — OFFICE VISIT (OUTPATIENT)
Dept: PHYSICAL THERAPY | Age: 63
End: 2023-02-24
Payer: COMMERCIAL

## 2023-02-24 ENCOUNTER — HOSPITAL ENCOUNTER (OUTPATIENT)
Age: 63
Discharge: HOME | End: 2023-02-24
Payer: COMMERCIAL

## 2023-02-24 VITALS
TEMPERATURE: 98 F | SYSTOLIC BLOOD PRESSURE: 137 MMHG | HEART RATE: 78 BPM | DIASTOLIC BLOOD PRESSURE: 80 MMHG | RESPIRATION RATE: 20 BRPM | OXYGEN SATURATION: 98 %

## 2023-02-24 DIAGNOSIS — J01.90 ACUTE SINUSITIS, RECURRENCE NOT SPECIFIED, UNSPECIFIED LOCATION: Primary | ICD-10-CM

## 2023-02-24 PROCEDURE — 97110 THERAPEUTIC EXERCISES: CPT

## 2023-02-24 PROCEDURE — 97140 MANUAL THERAPY 1/> REGIONS: CPT

## 2023-02-24 PROCEDURE — 99213 OFFICE O/P EST LOW 20 MIN: CPT

## 2023-02-24 PROCEDURE — 99214 OFFICE O/P EST MOD 30 MIN: CPT

## 2023-02-24 RX ORDER — FLUTICASONE PROPIONATE 50 MCG
2 SPRAY, SUSPENSION (ML) NASAL DAILY
Qty: 16 G | Refills: 0 | Status: SHIPPED | OUTPATIENT
Start: 2023-02-24 | End: 2023-03-26

## 2023-02-24 RX ORDER — AZITHROMYCIN 250 MG/1
TABLET, FILM COATED ORAL
Qty: 6 TABLET | Refills: 0 | Status: SHIPPED | OUTPATIENT
Start: 2023-02-24 | End: 2023-03-01

## 2023-02-24 NOTE — DISCHARGE INSTRUCTIONS
You have been given a prescription for an antibiotic and a steroid nasal spray to help with your sinus infection. Please avoid OTC cold and sinus mediation that may raise your blood pressure.

## 2023-02-24 NOTE — ED INITIAL ASSESSMENT (HPI)
Presents with 5 days of sinus congestion and \"green\" drainage. No fever. C/o pain to right side of face and under right eye. No dizziness. No fever. Declines covid testing.

## 2023-02-28 ENCOUNTER — OFFICE VISIT (OUTPATIENT)
Dept: OBGYN CLINIC | Facility: CLINIC | Age: 63
End: 2023-02-28

## 2023-02-28 ENCOUNTER — OFFICE VISIT (OUTPATIENT)
Dept: PHYSICAL THERAPY | Age: 63
End: 2023-02-28
Payer: COMMERCIAL

## 2023-02-28 VITALS
BODY MASS INDEX: 30 KG/M2 | SYSTOLIC BLOOD PRESSURE: 136 MMHG | DIASTOLIC BLOOD PRESSURE: 84 MMHG | WEIGHT: 200 LBS | HEART RATE: 60 BPM

## 2023-02-28 DIAGNOSIS — Z01.419 ENCOUNTER FOR GYNECOLOGICAL EXAMINATION WITHOUT ABNORMAL FINDING: Primary | ICD-10-CM

## 2023-02-28 DIAGNOSIS — M85.852 OSTEOPENIA OF LEFT HIP: ICD-10-CM

## 2023-02-28 DIAGNOSIS — Z12.31 SCREENING MAMMOGRAM FOR BREAST CANCER: ICD-10-CM

## 2023-02-28 PROCEDURE — 97140 MANUAL THERAPY 1/> REGIONS: CPT

## 2023-02-28 PROCEDURE — 3075F SYST BP GE 130 - 139MM HG: CPT | Performed by: OBSTETRICS & GYNECOLOGY

## 2023-02-28 PROCEDURE — 97110 THERAPEUTIC EXERCISES: CPT

## 2023-02-28 PROCEDURE — 3079F DIAST BP 80-89 MM HG: CPT | Performed by: OBSTETRICS & GYNECOLOGY

## 2023-02-28 PROCEDURE — 99396 PREV VISIT EST AGE 40-64: CPT | Performed by: OBSTETRICS & GYNECOLOGY

## 2023-03-02 ENCOUNTER — OFFICE VISIT (OUTPATIENT)
Dept: PHYSICAL THERAPY | Age: 63
End: 2023-03-02
Payer: COMMERCIAL

## 2023-03-02 PROCEDURE — 97110 THERAPEUTIC EXERCISES: CPT

## 2023-03-02 PROCEDURE — 97014 ELECTRIC STIMULATION THERAPY: CPT

## 2023-03-02 PROCEDURE — 97530 THERAPEUTIC ACTIVITIES: CPT

## 2023-03-03 ENCOUNTER — OFFICE VISIT (OUTPATIENT)
Dept: ORTHOPEDICS CLINIC | Facility: CLINIC | Age: 63
End: 2023-03-03

## 2023-03-03 DIAGNOSIS — Z47.89 ORTHOPEDIC AFTERCARE: ICD-10-CM

## 2023-03-03 DIAGNOSIS — M75.122 NONTRAUMATIC COMPLETE TEAR OF LEFT ROTATOR CUFF: Primary | ICD-10-CM

## 2023-03-07 ENCOUNTER — OFFICE VISIT (OUTPATIENT)
Dept: PHYSICAL THERAPY | Age: 63
End: 2023-03-07
Payer: COMMERCIAL

## 2023-03-07 PROCEDURE — 97014 ELECTRIC STIMULATION THERAPY: CPT

## 2023-03-07 PROCEDURE — 97110 THERAPEUTIC EXERCISES: CPT

## 2023-03-07 PROCEDURE — 97140 MANUAL THERAPY 1/> REGIONS: CPT

## 2023-03-09 ENCOUNTER — APPOINTMENT (OUTPATIENT)
Dept: PHYSICAL THERAPY | Age: 63
End: 2023-03-09
Payer: COMMERCIAL

## 2023-03-11 ENCOUNTER — HOSPITAL ENCOUNTER (OUTPATIENT)
Dept: BONE DENSITY | Age: 63
Discharge: HOME | End: 2023-03-11
Attending: OBSTETRICS & GYNECOLOGY
Payer: COMMERCIAL

## 2023-03-11 DIAGNOSIS — M85.852 OSTEOPENIA OF LEFT HIP: ICD-10-CM

## 2023-03-11 PROCEDURE — 77080 DXA BONE DENSITY AXIAL: CPT | Performed by: OBSTETRICS & GYNECOLOGY

## 2023-03-13 ENCOUNTER — TELEPHONE (OUTPATIENT)
Dept: PHYSICAL THERAPY | Facility: HOSPITAL | Age: 63
End: 2023-03-13

## 2023-03-13 ENCOUNTER — APPOINTMENT (OUTPATIENT)
Dept: PHYSICAL THERAPY | Age: 63
End: 2023-03-13
Payer: COMMERCIAL

## 2023-03-14 ENCOUNTER — OFFICE VISIT (OUTPATIENT)
Dept: PODIATRY CLINIC | Facility: CLINIC | Age: 63
End: 2023-03-14

## 2023-03-14 DIAGNOSIS — I87.8 VENOUS STASIS: Primary | ICD-10-CM

## 2023-03-14 PROCEDURE — 99213 OFFICE O/P EST LOW 20 MIN: CPT | Performed by: PODIATRIST

## 2023-03-15 ENCOUNTER — OFFICE VISIT (OUTPATIENT)
Dept: PHYSICAL THERAPY | Age: 63
End: 2023-03-15
Payer: COMMERCIAL

## 2023-03-15 PROCEDURE — 97140 MANUAL THERAPY 1/> REGIONS: CPT

## 2023-03-15 PROCEDURE — 97110 THERAPEUTIC EXERCISES: CPT

## 2023-03-15 PROCEDURE — 97014 ELECTRIC STIMULATION THERAPY: CPT

## 2023-03-17 ENCOUNTER — OFFICE VISIT (OUTPATIENT)
Dept: ORTHOPEDICS CLINIC | Facility: CLINIC | Age: 63
End: 2023-03-17

## 2023-03-17 VITALS — HEART RATE: 74 BPM | DIASTOLIC BLOOD PRESSURE: 82 MMHG | SYSTOLIC BLOOD PRESSURE: 129 MMHG

## 2023-03-17 DIAGNOSIS — M75.122 NONTRAUMATIC COMPLETE TEAR OF LEFT ROTATOR CUFF: Primary | ICD-10-CM

## 2023-03-17 PROCEDURE — 99024 POSTOP FOLLOW-UP VISIT: CPT | Performed by: ORTHOPAEDIC SURGERY

## 2023-03-17 PROCEDURE — 20610 DRAIN/INJ JOINT/BURSA W/O US: CPT

## 2023-03-17 PROCEDURE — 3079F DIAST BP 80-89 MM HG: CPT | Performed by: ORTHOPAEDIC SURGERY

## 2023-03-17 PROCEDURE — 3074F SYST BP LT 130 MM HG: CPT | Performed by: ORTHOPAEDIC SURGERY

## 2023-03-17 RX ORDER — TRIAMCINOLONE ACETONIDE 40 MG/ML
40 INJECTION, SUSPENSION INTRA-ARTICULAR; INTRAMUSCULAR ONCE
Status: COMPLETED | OUTPATIENT
Start: 2023-03-17 | End: 2023-03-17

## 2023-03-17 RX ORDER — IBUPROFEN 200 MG
600 TABLET ORAL EVERY 6 HOURS PRN
Qty: 90 TABLET | Refills: 1 | Status: SHIPPED | OUTPATIENT
Start: 2023-03-17

## 2023-03-17 RX ADMIN — TRIAMCINOLONE ACETONIDE 40 MG: 40 INJECTION, SUSPENSION INTRA-ARTICULAR; INTRAMUSCULAR at 11:38:00

## 2023-03-17 NOTE — PROGRESS NOTES
Per verbal order from Dr. Franck Valladares  draw up 3ml of 0.5% Marcaine & 2ml 1% lidocaine and 1ml of Kenalog 40 for cortisone injection to left shoulder. Antonina Reed    Patient provided education handout for cortisone injection.

## 2023-03-20 ENCOUNTER — APPOINTMENT (OUTPATIENT)
Dept: PHYSICAL THERAPY | Age: 63
End: 2023-03-20
Payer: COMMERCIAL

## 2023-03-22 ENCOUNTER — OFFICE VISIT (OUTPATIENT)
Dept: INTERNAL MEDICINE CLINIC | Facility: CLINIC | Age: 63
End: 2023-03-22

## 2023-03-22 ENCOUNTER — OFFICE VISIT (OUTPATIENT)
Dept: OPHTHALMOLOGY | Facility: CLINIC | Age: 63
End: 2023-03-22

## 2023-03-22 ENCOUNTER — OFFICE VISIT (OUTPATIENT)
Dept: PHYSICAL THERAPY | Age: 63
End: 2023-03-22
Payer: COMMERCIAL

## 2023-03-22 VITALS
OXYGEN SATURATION: 99 % | HEIGHT: 68 IN | SYSTOLIC BLOOD PRESSURE: 116 MMHG | WEIGHT: 201.19 LBS | HEART RATE: 72 BPM | BODY MASS INDEX: 30.49 KG/M2 | DIASTOLIC BLOOD PRESSURE: 78 MMHG | RESPIRATION RATE: 18 BRPM

## 2023-03-22 DIAGNOSIS — J01.01 ACUTE RECURRENT MAXILLARY SINUSITIS: Primary | ICD-10-CM

## 2023-03-22 DIAGNOSIS — J32.9 RECURRENT SINUS INFECTIONS: ICD-10-CM

## 2023-03-22 DIAGNOSIS — H53.9 VISUAL DISTURBANCE: ICD-10-CM

## 2023-03-22 DIAGNOSIS — H40.1132 PRIMARY OPEN ANGLE GLAUCOMA OF BOTH EYES, MODERATE STAGE: Primary | ICD-10-CM

## 2023-03-22 DIAGNOSIS — F41.9 ANXIETY: ICD-10-CM

## 2023-03-22 PROCEDURE — 3008F BODY MASS INDEX DOCD: CPT | Performed by: INTERNAL MEDICINE

## 2023-03-22 PROCEDURE — 3074F SYST BP LT 130 MM HG: CPT | Performed by: INTERNAL MEDICINE

## 2023-03-22 PROCEDURE — 97110 THERAPEUTIC EXERCISES: CPT

## 2023-03-22 PROCEDURE — 3078F DIAST BP <80 MM HG: CPT | Performed by: INTERNAL MEDICINE

## 2023-03-22 PROCEDURE — 99213 OFFICE O/P EST LOW 20 MIN: CPT | Performed by: OPHTHALMOLOGY

## 2023-03-22 PROCEDURE — 99214 OFFICE O/P EST MOD 30 MIN: CPT | Performed by: INTERNAL MEDICINE

## 2023-03-22 PROCEDURE — 97530 THERAPEUTIC ACTIVITIES: CPT

## 2023-03-22 RX ORDER — TRAMADOL HYDROCHLORIDE 50 MG/1
50 TABLET ORAL
COMMUNITY

## 2023-03-22 RX ORDER — AZITHROMYCIN 250 MG/1
TABLET, FILM COATED ORAL
Qty: 6 TABLET | Refills: 0 | Status: SHIPPED | OUTPATIENT
Start: 2023-03-22 | End: 2023-03-27

## 2023-03-22 RX ORDER — LORAZEPAM 0.5 MG/1
0.5 TABLET ORAL ONCE AS NEEDED
Qty: 2 TABLET | Refills: 0 | Status: SHIPPED | OUTPATIENT
Start: 2023-03-22 | End: 2023-03-22

## 2023-03-22 NOTE — ASSESSMENT & PLAN NOTE
IOP is stable; continue Latanoprost at bedtime in both eyes. Will have patient back in 4 months for a pressure check. Due to decreased vision without ocular pathology present, recommend MRI of brain and orbits with and without contrast.  Will defer to Dr. Adam Dominique for ordering and scheduling MRI.

## 2023-03-22 NOTE — PATIENT INSTRUCTIONS
Primary open angle glaucoma of both eyes, moderate stage  IOP is stable; continue Latanoprost at bedtime in both eyes. Will have patient back in 4 months for a pressure check. Due to decreased vision without ocular pathology present, recommend MRI of brain and orbits with and without contrast.  Will defer to Dr. Bhavin Chamberlain for ordering and scheduling MRI.

## 2023-03-23 ENCOUNTER — OFFICE VISIT (OUTPATIENT)
Dept: ORTHOPEDICS CLINIC | Facility: CLINIC | Age: 63
End: 2023-03-23

## 2023-03-23 VITALS — SYSTOLIC BLOOD PRESSURE: 139 MMHG | HEART RATE: 89 BPM | DIASTOLIC BLOOD PRESSURE: 80 MMHG

## 2023-03-23 DIAGNOSIS — M17.0 PRIMARY OSTEOARTHRITIS OF BOTH KNEES: Primary | ICD-10-CM

## 2023-03-23 PROCEDURE — 99213 OFFICE O/P EST LOW 20 MIN: CPT | Performed by: ORTHOPAEDIC SURGERY

## 2023-03-23 PROCEDURE — 20610 DRAIN/INJ JOINT/BURSA W/O US: CPT | Performed by: ORTHOPAEDIC SURGERY

## 2023-03-23 PROCEDURE — 3079F DIAST BP 80-89 MM HG: CPT | Performed by: ORTHOPAEDIC SURGERY

## 2023-03-23 PROCEDURE — 3075F SYST BP GE 130 - 139MM HG: CPT | Performed by: ORTHOPAEDIC SURGERY

## 2023-03-23 RX ORDER — IBUPROFEN 600 MG/1
600 TABLET ORAL EVERY 8 HOURS PRN
Qty: 90 TABLET | Refills: 1 | Status: SHIPPED | OUTPATIENT
Start: 2023-03-23

## 2023-03-23 RX ORDER — TRIAMCINOLONE ACETONIDE 40 MG/ML
40 INJECTION, SUSPENSION INTRA-ARTICULAR; INTRAMUSCULAR ONCE
Status: COMPLETED | OUTPATIENT
Start: 2023-03-23 | End: 2023-03-23

## 2023-03-23 NOTE — PROCEDURES
Per verbal order from Dr. Marie Coad draw up 3ml of 0.5% Marcaine & 2ml 1% lidocaine and 1ml of Kenalog 40 for cortisone injection to right knee.

## 2023-03-27 ENCOUNTER — OFFICE VISIT (OUTPATIENT)
Dept: PHYSICAL THERAPY | Age: 63
End: 2023-03-27
Payer: COMMERCIAL

## 2023-03-27 PROCEDURE — 97110 THERAPEUTIC EXERCISES: CPT

## 2023-03-27 PROCEDURE — 97140 MANUAL THERAPY 1/> REGIONS: CPT

## 2023-03-27 PROCEDURE — 97112 NEUROMUSCULAR REEDUCATION: CPT

## 2023-04-04 ENCOUNTER — APPOINTMENT (OUTPATIENT)
Dept: PHYSICAL THERAPY | Age: 63
End: 2023-04-04
Payer: COMMERCIAL

## 2023-04-06 ENCOUNTER — OFFICE VISIT (OUTPATIENT)
Dept: ORTHOPEDICS CLINIC | Facility: CLINIC | Age: 63
End: 2023-04-06

## 2023-04-06 ENCOUNTER — HOSPITAL ENCOUNTER (OUTPATIENT)
Dept: MRI IMAGING | Age: 63
Discharge: HOME | End: 2023-04-06
Attending: INTERNAL MEDICINE
Payer: COMMERCIAL

## 2023-04-06 ENCOUNTER — OFFICE VISIT (OUTPATIENT)
Dept: PHYSICAL THERAPY | Age: 63
End: 2023-04-06
Payer: COMMERCIAL

## 2023-04-06 DIAGNOSIS — M75.122 NONTRAUMATIC COMPLETE TEAR OF LEFT ROTATOR CUFF: Primary | ICD-10-CM

## 2023-04-06 DIAGNOSIS — H53.9 VISUAL DISTURBANCE: ICD-10-CM

## 2023-04-06 PROCEDURE — 70553 MRI BRAIN STEM W/O & W/DYE: CPT | Performed by: INTERNAL MEDICINE

## 2023-04-06 PROCEDURE — 97110 THERAPEUTIC EXERCISES: CPT

## 2023-04-06 PROCEDURE — A9575 INJ GADOTERATE MEGLUMI 0.1ML: HCPCS | Performed by: INTERNAL MEDICINE

## 2023-04-06 PROCEDURE — 99024 POSTOP FOLLOW-UP VISIT: CPT | Performed by: ORTHOPAEDIC SURGERY

## 2023-04-06 PROCEDURE — 70543 MRI ORBT/FAC/NCK W/O &W/DYE: CPT | Performed by: INTERNAL MEDICINE

## 2023-04-06 PROCEDURE — 97140 MANUAL THERAPY 1/> REGIONS: CPT

## 2023-04-06 RX ORDER — GADOTERATE MEGLUMINE 376.9 MG/ML
20 INJECTION INTRAVENOUS
Status: COMPLETED | OUTPATIENT
Start: 2023-04-06 | End: 2023-04-06

## 2023-04-06 RX ADMIN — GADOTERATE MEGLUMINE 20 ML: 376.9 INJECTION INTRAVENOUS at 19:14:00

## 2023-04-10 ENCOUNTER — OFFICE VISIT (OUTPATIENT)
Dept: OTOLARYNGOLOGY | Facility: CLINIC | Age: 63
End: 2023-04-10

## 2023-04-10 VITALS — BODY MASS INDEX: 30 KG/M2 | WEIGHT: 199 LBS

## 2023-04-10 DIAGNOSIS — J32.0 CHRONIC MAXILLARY SINUSITIS: Primary | ICD-10-CM

## 2023-04-10 DIAGNOSIS — J34.2 NASAL SEPTAL DEVIATION: ICD-10-CM

## 2023-04-10 PROCEDURE — 99214 OFFICE O/P EST MOD 30 MIN: CPT | Performed by: SPECIALIST

## 2023-04-10 RX ORDER — CEFDINIR 300 MG/1
300 CAPSULE ORAL 2 TIMES DAILY
Qty: 20 CAPSULE | Refills: 0 | Status: SHIPPED | OUTPATIENT
Start: 2023-04-10

## 2023-04-10 RX ORDER — FLUTICASONE PROPIONATE 50 MCG
1 SPRAY, SUSPENSION (ML) NASAL 2 TIMES DAILY
Qty: 16 G | Refills: 3 | Status: SHIPPED | OUTPATIENT
Start: 2023-04-10

## 2023-04-10 NOTE — PATIENT INSTRUCTIONS
Your MRI which shows maxillary sinus on the right and probably some thickening on the left. I placed on a trial of Flonase and Omnicef. Please stop this if you get diarrhea. Follow-up in 2 weeks time, sooner if problems. If you continue to have sinus issues, a CT scan and probable surgery will be recommended.

## 2023-04-11 ENCOUNTER — OFFICE VISIT (OUTPATIENT)
Dept: PHYSICAL THERAPY | Age: 63
End: 2023-04-11
Payer: COMMERCIAL

## 2023-04-11 PROCEDURE — 97140 MANUAL THERAPY 1/> REGIONS: CPT

## 2023-04-11 PROCEDURE — 97110 THERAPEUTIC EXERCISES: CPT

## 2023-04-11 NOTE — TELEPHONE ENCOUNTER
HEALTH MAINTENANCE UPDATED TO REFLECT PAPS NO LONGER NEEDED, REMOVED REMINDERS FOR PAPS. PT NOTIFIED.   STATES SHE IS HAVING PCP ADDRESS SOME ABDOMINAL PAIN AND WILL CALL BACK IF SHE NEEDS ANY F/U WITH MAGGIE.
Joe Romero is post hyst and does not need further paps. Can we set reminder to ignore need for pap?
PT AWARE WE WILL CHECK WITH MAGGIE ABOUT WHEN NEXT PAP IS DUE. PT THOUGHT MAGGIE SAID EVERY 3 YEARS BUT LAST PAP WAS 11-23-16 AND NO PAP WAS DONE AT ANNUAL ON 1-29-20.   STATES SHE KEEPS GETTING NOTICES THROUGH MY CHART ABOUT THE PAP AND PCP ALSO WONDERED WHEN N
Pt had annual on 1/29/20, states has a notification on [employer] that she has been over due for her pap since 11/2019.  Please advise pt needs clarification
independent

## 2023-04-12 ENCOUNTER — PROCEDURE (OUTPATIENT)
Dept: SURGERY | Age: 63
End: 2023-04-12

## 2023-04-12 ENCOUNTER — TELEPHONE (OUTPATIENT)
Dept: PODIATRY CLINIC | Facility: CLINIC | Age: 63
End: 2023-04-12

## 2023-04-12 DIAGNOSIS — Z98.890 STATUS POST FOOT SURGERY: Primary | ICD-10-CM

## 2023-04-12 RX ORDER — HYDROCODONE BITARTRATE AND ACETAMINOPHEN 5; 325 MG/1; MG/1
TABLET ORAL
Qty: 30 TABLET | Refills: 0 | Status: SHIPPED | OUTPATIENT
Start: 2023-04-12

## 2023-04-12 RX ORDER — DOXYCYCLINE HYCLATE 100 MG/1
100 CAPSULE ORAL 2 TIMES DAILY
Qty: 14 CAPSULE | Refills: 0 | Status: SHIPPED | OUTPATIENT
Start: 2023-04-12

## 2023-04-12 RX ORDER — METHYLPREDNISOLONE 4 MG/1
TABLET ORAL
Qty: 21 TABLET | Refills: 0 | Status: SHIPPED | OUTPATIENT
Start: 2023-04-12

## 2023-04-13 ENCOUNTER — TELEPHONE (OUTPATIENT)
Dept: PODIATRY CLINIC | Facility: CLINIC | Age: 63
End: 2023-04-13

## 2023-04-13 NOTE — TELEPHONE ENCOUNTER
Procedure date: 4/12/2023  How are you feeling? \"PAIN\"  Any bleeding? NO  Is the dressing dry & intact? YES  Level of pain? 10  Character of pain: CRAMPING  Onset of pain: SINCE GETTING HOME  Aggravating factors:  Duration of pain: CONSTANT  Alleviating factors: NOTHING. ADVISED TO USE IBUPROFEN BETWEEN DOSES OF NORCO. CAN TAKE 400-600 MG IN IBUPROFEN, LOOSEN ACE BANDAGE SLIGHTLY  Are you taking the prescribed medication? YES  methylPREDNISolone 4 MG Oral Tablet Therapy   HYDROcodone-acetaminophen 5-325 MG Oral Tab  Medication Quantity Refills Start End   doxycycline 100 MG Oral Cap           Are you following all of the PO instructions? NO APPETITE, INSTRUCTED TO INCREASE LIQUIDS TO STAY HYDRATED    Other Comments:HAS OUR NUMBER TO CALL IF NEEDED. ADVISED SHE MAY NEED TO LOOSEN ACE BANDAGE IF THE CRAMPING CONTINUES AND TO REMAIN NON WEIGHT BEARING USING ICE    Follow-up appt  date: 4/20/2023    Pt was advised if they have any concerns after hours to call our office and they would be directed to on call physician.    CALL CLINIC IF NEEDED

## 2023-04-14 ENCOUNTER — HOSPITAL ENCOUNTER (EMERGENCY)
Facility: HOSPITAL | Age: 63
Discharge: HOME | End: 2023-04-14
Attending: EMERGENCY MEDICINE
Payer: COMMERCIAL

## 2023-04-14 ENCOUNTER — APPOINTMENT (OUTPATIENT)
Dept: GENERAL RADIOLOGY | Facility: HOSPITAL | Age: 63
End: 2023-04-14
Attending: EMERGENCY MEDICINE
Payer: COMMERCIAL

## 2023-04-14 VITALS
SYSTOLIC BLOOD PRESSURE: 138 MMHG | DIASTOLIC BLOOD PRESSURE: 75 MMHG | HEART RATE: 86 BPM | TEMPERATURE: 97 F | RESPIRATION RATE: 20 BRPM | OXYGEN SATURATION: 98 %

## 2023-04-14 DIAGNOSIS — M79.673 PAIN OF FOOT, UNSPECIFIED LATERALITY: Primary | ICD-10-CM

## 2023-04-14 LAB
ANION GAP SERPL CALC-SCNC: 9 MMOL/L (ref 0–18)
BASOPHILS # BLD AUTO: 0.03 X10(3) UL (ref 0–0.2)
BASOPHILS NFR BLD AUTO: 0.2 %
BUN BLD-MCNC: 13 MG/DL (ref 7–18)
BUN/CREAT SERPL: 13.1 (ref 10–20)
CALCIUM BLD-MCNC: 9.6 MG/DL (ref 8.5–10.1)
CHLORIDE SERPL-SCNC: 108 MMOL/L (ref 98–112)
CO2 SERPL-SCNC: 24 MMOL/L (ref 21–32)
CREAT BLD-MCNC: 0.99 MG/DL
DEPRECATED RDW RBC AUTO: 40.2 FL (ref 35.1–46.3)
EOSINOPHIL # BLD AUTO: 0.01 X10(3) UL (ref 0–0.7)
EOSINOPHIL NFR BLD AUTO: 0.1 %
ERYTHROCYTE [DISTWIDTH] IN BLOOD BY AUTOMATED COUNT: 14 % (ref 11–15)
GFR SERPLBLD BASED ON 1.73 SQ M-ARVRAT: 64 ML/MIN/1.73M2 (ref 60–?)
GLUCOSE BLD-MCNC: 146 MG/DL (ref 70–99)
HCT VFR BLD AUTO: 44.6 %
HGB BLD-MCNC: 15.1 G/DL
IMM GRANULOCYTES # BLD AUTO: 0.06 X10(3) UL (ref 0–1)
IMM GRANULOCYTES NFR BLD: 0.3 %
LYMPHOCYTES # BLD AUTO: 0.97 X10(3) UL (ref 1–4)
LYMPHOCYTES NFR BLD AUTO: 5.1 %
MCH RBC QN AUTO: 26.9 PG (ref 26–34)
MCHC RBC AUTO-ENTMCNC: 33.9 G/DL (ref 31–37)
MCV RBC AUTO: 79.4 FL
MONOCYTES # BLD AUTO: 0.66 X10(3) UL (ref 0.1–1)
MONOCYTES NFR BLD AUTO: 3.5 %
NEUTROPHILS # BLD AUTO: 17.24 X10 (3) UL (ref 1.5–7.7)
NEUTROPHILS # BLD AUTO: 17.24 X10(3) UL (ref 1.5–7.7)
NEUTROPHILS NFR BLD AUTO: 90.8 %
OSMOLALITY SERPL CALC.SUM OF ELEC: 295 MOSM/KG (ref 275–295)
PLATELET # BLD AUTO: 373 10(3)UL (ref 150–450)
POTASSIUM SERPL-SCNC: 3.8 MMOL/L (ref 3.5–5.1)
RBC # BLD AUTO: 5.62 X10(6)UL
SODIUM SERPL-SCNC: 141 MMOL/L (ref 136–145)
WBC # BLD AUTO: 19 X10(3) UL (ref 4–11)

## 2023-04-14 PROCEDURE — 36415 COLL VENOUS BLD VENIPUNCTURE: CPT

## 2023-04-14 PROCEDURE — 99283 EMERGENCY DEPT VISIT LOW MDM: CPT

## 2023-04-14 PROCEDURE — 99285 EMERGENCY DEPT VISIT HI MDM: CPT

## 2023-04-14 PROCEDURE — 80048 BASIC METABOLIC PNL TOTAL CA: CPT | Performed by: EMERGENCY MEDICINE

## 2023-04-14 PROCEDURE — 73630 X-RAY EXAM OF FOOT: CPT | Performed by: EMERGENCY MEDICINE

## 2023-04-14 PROCEDURE — 85025 COMPLETE CBC W/AUTO DIFF WBC: CPT | Performed by: EMERGENCY MEDICINE

## 2023-04-14 RX ORDER — HYDROCODONE BITARTRATE AND ACETAMINOPHEN 5; 325 MG/1; MG/1
2 TABLET ORAL ONCE
Status: COMPLETED | OUTPATIENT
Start: 2023-04-14 | End: 2023-04-14

## 2023-04-14 RX ORDER — HYDROCODONE BITARTRATE AND ACETAMINOPHEN 5; 325 MG/1; MG/1
2 TABLET ORAL EVERY 6 HOURS PRN
Status: DISCONTINUED | OUTPATIENT
Start: 2023-04-14 | End: 2023-04-14

## 2023-04-14 NOTE — PROGRESS NOTES
[facility]     OPERATIVE REPORT    Govind Gutierrez    CSN [id number] MRN [id number]    1960 Age 57year old   Admission Date (Not on file) Operation Date 2023   Attending Physician No att. 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 30 cc via pre and intraoperative blocks. HEMOSTASIS:   Pneumatic ankle tourniquet inflated to 250 mmHg following exsanguination of the Chuckei's bandage right lower extremity     ESTIMATED BLOOD LOSS:   15 cc     INDICATIONS:  This 57year old female presented with this patient suffered from a recurrent painful bunion deformity on the right foot she had a previous second toe amputation and the bunion continued to progress despite all best efforts spacers and conservative management. FINDINGS:   There is degenerative joint disease noted on both the head of the first metatarsal and base of the adjacent phalanx with osteophytic lipping and spurring mostly on the phalanx. Full-thickness erosions were noted over approximately 60% of the joint. SPECIMENS:   None     COMPLICATIONS:  None. DRAINS:   None     OPERATIVE TECHNIQUE:      The patient was brought into the operating with vital signs stable and placed in the supine position on the operating table. The representative from [employer] was in the room along with all implants necessary for doing the procedure. All personnel were present appropriate timeout was taken there were no additions deletions or concerns reported.   Intravenous sedation was administered and the left foot was anesthetized with 20 cc of 0.5% Marcaine and should be noted that an additional 11 cc was used during the course of the procedure. The right foot was then prepped and draped using usual aseptic technique hemostasis was achieved as above. A 7 cm linear incision was made partially in line with her previous bunionectomy incision incision beginning mid shaft of the first metatarsal and carried distally over the head of the proximal phalanx the incision was deepened using both sharp and blunt technique superficial veins were ligated cauterized or retracted as needed deep vital structures were underscored and retracted medially and laterally during the course of the procedure. A capsular periosteal incision was then made and the capsular and periosteal structures were diminished from the first metatarsal head dorsally medially laterally and plantarly and from the base of the phalanx dorsally medially and laterally. This exposed all the osteophytic lipping which was resected utilizing a rongeur's. The guidewire for the reaming system was then placed down the first metatarsal placement was visualized with fluoroscopy was found to be good at this time to the articular cartilage of the first metatarsal head was reamed until subchondral bone was visualized. The guidewire was then removed and placed through the proximal phalanx and utilizing the adjacent reamer the base of the phalanx and the cartilage was resected. There was osteophytic lipping there and around metatarsal head which was then completely resected utilizing a rongeur's. All exposed areas of bone were burred to rest smooth the first metatarsal head as well as the base of the phalanx that the head of the metatarsal as well as the base of the phalanx was then drilled with a 2 mm drill multiple times to create subchondral circulation and drilling. Attention was then directed towards removal of the previous bone screw which turned out to be a Nino screw. This was done with a burden pliers. The subchondral bone of the head of the first metatarsal as well as the base of the proximal phalanx received subchondral drilling with a 2 mm drill that area was packed with DBM the guidewire from the interfragmentary screw was then placed with fluoroscopy adjustments made. And a 36 mm 3.0 mm cannulated cancellous bone screw partially-threaded was then inserted while maintaining the hallux in its corrected position still having a slight valgus. Compression was noted the anatomical plate from the [employer] system was then selected it was aligned properly over the fusion site with a jame. It was temporarily fixated with an olive wire proximally. Fluoroscopy was used to then rotate the plate in appropriate position until the 3 screws were in appropriate position over the base of the phalanx. 3.5 millimeter screws which were locking screws were then inserted into the distal plate. At this point in time a 3.5 millimeters screw was used which was a nonlocking screw in the compression hole and the olive wire was removed before tightening and compression was further achieved at the fusion site. The 2 remaining screws which were again 3.5 mm locking screws were then placed at the base of the plate proximally. Fixation was found to be very good fluoroscopy was used there was still a slight valgus but not severe. This was done so that the toe would not be perfectly straight and provide shoe gear irritation. The area was then flushed with copious amounts of saline and attention was directed towards closure the capsule and periosteal structures were reapproximated and maintained using 3-0 Vicryl suture in a simple interrupted fashion. The pneumatic ankle tourniquet was released the digits return to the uniform pink color and were warm to the touch. The skin edges were reapproximated and maintained using 3-0 nylon suture in a simple interrupted fashion. A sterile postoperative dressing was then applied using 4 inch gauze sponges 3 inch conformer followed by an Ace wrap. Patient tolerated above anesthesia procedure well left the operating with vital signs stable and vascular status of her left foot intact to recovery room via cart.     Andrey Ahumada, DPM

## 2023-04-14 NOTE — ED INITIAL ASSESSMENT (HPI)
Pt to ED for left foot pain after having bone fusion surgery 2 days ago. Pt's foot in in a walking boot, is unable to provide any weight on leg.

## 2023-04-14 NOTE — DISCHARGE INSTRUCTIONS
Return to emergency room for any fevers of 100.4, redness around laceration site, pus from laceration site. Please follow with your podiatrist    The Emergency Department is not intended to be a substitute for an effort to provide complete medical care. The imaging, if any, have often been interpreted on a preliminary basis pending final reading by the radiologist. If your blood pressure was greater than 140/90, please have this blood pressure rechecked by your primary care provider in the next several days.

## 2023-04-17 ENCOUNTER — PATIENT OUTREACH (OUTPATIENT)
Dept: CASE MANAGEMENT | Age: 63
End: 2023-04-17

## 2023-04-18 ENCOUNTER — APPOINTMENT (OUTPATIENT)
Dept: PHYSICAL THERAPY | Age: 63
End: 2023-04-18
Payer: COMMERCIAL

## 2023-04-20 ENCOUNTER — OFFICE VISIT (OUTPATIENT)
Dept: PODIATRY CLINIC | Facility: CLINIC | Age: 63
End: 2023-04-20

## 2023-04-20 DIAGNOSIS — Z98.890 STATUS POST FOOT SURGERY: Primary | ICD-10-CM

## 2023-04-20 PROCEDURE — 99024 POSTOP FOLLOW-UP VISIT: CPT | Performed by: PODIATRIST

## 2023-04-21 DIAGNOSIS — R21 RASH: ICD-10-CM

## 2023-04-22 NOTE — TELEPHONE ENCOUNTER
Dr. Sara Pacheco, please see patient's MyChart message regarding the hydrocortisone cream.     Medication pended for your review and approval.

## 2023-04-25 ENCOUNTER — APPOINTMENT (OUTPATIENT)
Dept: PHYSICAL THERAPY | Age: 63
End: 2023-04-25
Payer: COMMERCIAL

## 2023-04-27 ENCOUNTER — OFFICE VISIT (OUTPATIENT)
Dept: PODIATRY CLINIC | Facility: CLINIC | Age: 63
End: 2023-04-27

## 2023-04-27 DIAGNOSIS — Z98.890 STATUS POST FOOT SURGERY: Primary | ICD-10-CM

## 2023-04-27 PROCEDURE — 99024 POSTOP FOLLOW-UP VISIT: CPT | Performed by: PODIATRIST

## 2023-05-03 ENCOUNTER — APPOINTMENT (OUTPATIENT)
Dept: PHYSICAL THERAPY | Age: 63
End: 2023-05-03
Payer: COMMERCIAL

## 2023-05-10 ENCOUNTER — APPOINTMENT (OUTPATIENT)
Dept: PHYSICAL THERAPY | Age: 63
End: 2023-05-10
Payer: COMMERCIAL

## 2023-05-17 ENCOUNTER — APPOINTMENT (OUTPATIENT)
Dept: PHYSICAL THERAPY | Age: 63
End: 2023-05-17
Payer: COMMERCIAL

## 2023-05-18 ENCOUNTER — OFFICE VISIT (OUTPATIENT)
Dept: ORTHOPEDICS CLINIC | Facility: CLINIC | Age: 63
End: 2023-05-18

## 2023-05-18 ENCOUNTER — OFFICE VISIT (OUTPATIENT)
Dept: PODIATRY CLINIC | Facility: CLINIC | Age: 63
End: 2023-05-18

## 2023-05-18 DIAGNOSIS — M20.12 HALLUX VALGUS OF LEFT FOOT: ICD-10-CM

## 2023-05-18 DIAGNOSIS — M79.672 LEFT FOOT PAIN: ICD-10-CM

## 2023-05-18 DIAGNOSIS — M75.122 NONTRAUMATIC COMPLETE TEAR OF LEFT ROTATOR CUFF: Primary | ICD-10-CM

## 2023-05-18 DIAGNOSIS — Z98.890 STATUS POST FOOT SURGERY: Primary | ICD-10-CM

## 2023-05-18 PROCEDURE — 99213 OFFICE O/P EST LOW 20 MIN: CPT | Performed by: ORTHOPAEDIC SURGERY

## 2023-05-18 PROCEDURE — 99024 POSTOP FOLLOW-UP VISIT: CPT | Performed by: STUDENT IN AN ORGANIZED HEALTH CARE EDUCATION/TRAINING PROGRAM

## 2023-05-18 NOTE — PATIENT INSTRUCTIONS
-Okay to start weightbearing as tolerated in cam boot. Start slowly and let pain be guide with increasing activity. Follow-up in 3 to 4 weeks with Dr. Blood Due or sooner if other concerns arise.

## 2023-05-23 ENCOUNTER — OFFICE VISIT (OUTPATIENT)
Dept: OBGYN CLINIC | Facility: CLINIC | Age: 63
End: 2023-05-23

## 2023-05-23 ENCOUNTER — TELEPHONE (OUTPATIENT)
Dept: CASE MANAGEMENT | Age: 63
End: 2023-05-23

## 2023-05-23 VITALS — SYSTOLIC BLOOD PRESSURE: 142 MMHG | HEART RATE: 93 BPM | DIASTOLIC BLOOD PRESSURE: 83 MMHG

## 2023-05-23 DIAGNOSIS — L73.9 FOLLICULITIS: Primary | ICD-10-CM

## 2023-05-23 DIAGNOSIS — M75.122 COMPLETE TEAR OF LEFT ROTATOR CUFF, UNSPECIFIED WHETHER TRAUMATIC: ICD-10-CM

## 2023-05-23 DIAGNOSIS — G56.20 LESION OF ULNAR NERVE, UNSPECIFIED LATERALITY: Primary | ICD-10-CM

## 2023-05-23 DIAGNOSIS — L70.0 OPEN COMEDONE: ICD-10-CM

## 2023-05-23 PROCEDURE — 3077F SYST BP >= 140 MM HG: CPT | Performed by: OBSTETRICS & GYNECOLOGY

## 2023-05-23 PROCEDURE — 99213 OFFICE O/P EST LOW 20 MIN: CPT | Performed by: OBSTETRICS & GYNECOLOGY

## 2023-05-23 PROCEDURE — 3079F DIAST BP 80-89 MM HG: CPT | Performed by: OBSTETRICS & GYNECOLOGY

## 2023-05-23 RX ORDER — CLINDAMYCIN HYDROCHLORIDE 300 MG/1
300 CAPSULE ORAL 3 TIMES DAILY
Qty: 21 CAPSULE | Refills: 0 | Status: SHIPPED | OUTPATIENT
Start: 2023-05-23 | End: 2023-05-25 | Stop reason: ALTCHOICE

## 2023-05-23 NOTE — TELEPHONE ENCOUNTER
Dr. Long Chowdhury,     Patient is requesting a referral to an in network dermatologist for lesions in vaginal area, she was recommended to see a dermatologist from her gynecologist.     Patient is also requesting follow up referral to Dr. Dorys Dunbar. Pended referral please review diagnosis and sign off if you agree. Thank you.   Dimas Mtz

## 2023-05-24 ENCOUNTER — APPOINTMENT (OUTPATIENT)
Dept: PHYSICAL THERAPY | Age: 63
End: 2023-05-24
Payer: COMMERCIAL

## 2023-05-25 ENCOUNTER — OFFICE VISIT (OUTPATIENT)
Dept: DERMATOLOGY CLINIC | Facility: CLINIC | Age: 63
End: 2023-05-25

## 2023-05-25 DIAGNOSIS — L73.9 FOLLICULITIS: ICD-10-CM

## 2023-05-25 DIAGNOSIS — L82.1 SEBORRHEIC KERATOSIS: Primary | ICD-10-CM

## 2023-05-25 PROCEDURE — 99213 OFFICE O/P EST LOW 20 MIN: CPT | Performed by: PHYSICIAN ASSISTANT

## 2023-05-25 PROCEDURE — 17110 DESTRUCTION B9 LES UP TO 14: CPT | Performed by: PHYSICIAN ASSISTANT

## 2023-05-25 RX ORDER — CLINDAMYCIN PHOSPHATE 10 UG/ML
1 LOTION TOPICAL DAILY
Qty: 60 ML | Refills: 1 | Status: SHIPPED | OUTPATIENT
Start: 2023-05-25

## 2023-05-25 RX ORDER — MOMETASONE FUROATE 1 MG/G
1 OINTMENT TOPICAL DAILY
Qty: 30 G | Refills: 1 | Status: SHIPPED | OUTPATIENT
Start: 2023-05-25

## 2023-05-31 ENCOUNTER — APPOINTMENT (OUTPATIENT)
Dept: PHYSICAL THERAPY | Age: 63
End: 2023-05-31
Payer: COMMERCIAL

## 2023-06-06 ENCOUNTER — OFFICE VISIT (OUTPATIENT)
Dept: DERMATOLOGY CLINIC | Facility: CLINIC | Age: 63
End: 2023-06-06

## 2023-06-06 DIAGNOSIS — L72.9 CYST OF SKIN: Primary | ICD-10-CM

## 2023-06-06 DIAGNOSIS — L82.1 SEBORRHEIC KERATOSIS: ICD-10-CM

## 2023-06-06 PROCEDURE — 99213 OFFICE O/P EST LOW 20 MIN: CPT | Performed by: PHYSICIAN ASSISTANT

## 2023-06-06 PROCEDURE — 17110 DESTRUCTION B9 LES UP TO 14: CPT | Performed by: PHYSICIAN ASSISTANT

## 2023-06-07 ENCOUNTER — OFFICE VISIT (OUTPATIENT)
Dept: INTERNAL MEDICINE CLINIC | Facility: CLINIC | Age: 63
End: 2023-06-07

## 2023-06-07 VITALS
DIASTOLIC BLOOD PRESSURE: 74 MMHG | HEART RATE: 73 BPM | OXYGEN SATURATION: 96 % | WEIGHT: 199 LBS | BODY MASS INDEX: 30.16 KG/M2 | RESPIRATION RATE: 18 BRPM | HEIGHT: 68 IN | SYSTOLIC BLOOD PRESSURE: 128 MMHG

## 2023-06-07 DIAGNOSIS — I10 ESSENTIAL HYPERTENSION: ICD-10-CM

## 2023-06-07 DIAGNOSIS — E55.9 VITAMIN D DEFICIENCY: ICD-10-CM

## 2023-06-07 DIAGNOSIS — Z00.00 PHYSICAL EXAM, ANNUAL: Primary | ICD-10-CM

## 2023-06-07 DIAGNOSIS — K21.9 GASTROESOPHAGEAL REFLUX DISEASE WITHOUT ESOPHAGITIS: ICD-10-CM

## 2023-06-07 PROCEDURE — 3074F SYST BP LT 130 MM HG: CPT | Performed by: INTERNAL MEDICINE

## 2023-06-07 PROCEDURE — 3008F BODY MASS INDEX DOCD: CPT | Performed by: INTERNAL MEDICINE

## 2023-06-07 PROCEDURE — 3078F DIAST BP <80 MM HG: CPT | Performed by: INTERNAL MEDICINE

## 2023-06-07 PROCEDURE — 99396 PREV VISIT EST AGE 40-64: CPT | Performed by: INTERNAL MEDICINE

## 2023-06-07 RX ORDER — AMLODIPINE BESYLATE 10 MG/1
TABLET ORAL
Qty: 90 TABLET | Refills: 3 | Status: SHIPPED | OUTPATIENT
Start: 2023-06-07

## 2023-06-07 RX ORDER — OMEPRAZOLE 20 MG/1
40 CAPSULE, DELAYED RELEASE ORAL EVERY MORNING
Qty: 180 CAPSULE | Refills: 1 | Status: SHIPPED | OUTPATIENT
Start: 2023-06-07

## 2023-06-08 ENCOUNTER — OFFICE VISIT (OUTPATIENT)
Dept: PODIATRY CLINIC | Facility: CLINIC | Age: 63
End: 2023-06-08

## 2023-06-08 ENCOUNTER — LAB ENCOUNTER (OUTPATIENT)
Dept: LAB | Age: 63
End: 2023-06-08
Attending: INTERNAL MEDICINE
Payer: COMMERCIAL

## 2023-06-08 DIAGNOSIS — Z98.890 STATUS POST FOOT SURGERY: Primary | ICD-10-CM

## 2023-06-08 DIAGNOSIS — E55.9 VITAMIN D DEFICIENCY: ICD-10-CM

## 2023-06-08 DIAGNOSIS — Z00.00 PHYSICAL EXAM, ANNUAL: ICD-10-CM

## 2023-06-08 LAB
ALBUMIN SERPL-MCNC: 3.7 G/DL (ref 3.4–5)
ALBUMIN/GLOB SERPL: 0.9 {RATIO} (ref 1–2)
ALP LIVER SERPL-CCNC: 122 U/L
ALT SERPL-CCNC: 30 U/L
ANION GAP SERPL CALC-SCNC: 7 MMOL/L (ref 0–18)
AST SERPL-CCNC: 20 U/L (ref 15–37)
BILIRUB SERPL-MCNC: 0.5 MG/DL (ref 0.1–2)
BUN BLD-MCNC: 12 MG/DL (ref 7–18)
BUN/CREAT SERPL: 13.6 (ref 10–20)
CALCIUM BLD-MCNC: 9.2 MG/DL (ref 8.5–10.1)
CHLORIDE SERPL-SCNC: 108 MMOL/L (ref 98–112)
CHOLEST SERPL-MCNC: 226 MG/DL (ref ?–200)
CO2 SERPL-SCNC: 28 MMOL/L (ref 21–32)
CREAT BLD-MCNC: 0.88 MG/DL
DEPRECATED RDW RBC AUTO: 42.5 FL (ref 35.1–46.3)
ERYTHROCYTE [DISTWIDTH] IN BLOOD BY AUTOMATED COUNT: 14.6 % (ref 11–15)
FASTING PATIENT LIPID ANSWER: YES
FASTING STATUS PATIENT QL REPORTED: YES
GFR SERPLBLD BASED ON 1.73 SQ M-ARVRAT: 73 ML/MIN/1.73M2 (ref 60–?)
GLOBULIN PLAS-MCNC: 3.9 G/DL (ref 2.8–4.4)
GLUCOSE BLD-MCNC: 105 MG/DL (ref 70–99)
HCT VFR BLD AUTO: 45.2 %
HDLC SERPL-MCNC: 57 MG/DL (ref 40–59)
HGB BLD-MCNC: 14.9 G/DL
LDLC SERPL CALC-MCNC: 138 MG/DL (ref ?–100)
MAGNESIUM SERPL-MCNC: 2.1 MG/DL (ref 1.6–2.6)
MCH RBC QN AUTO: 26.4 PG (ref 26–34)
MCHC RBC AUTO-ENTMCNC: 33 G/DL (ref 31–37)
MCV RBC AUTO: 80 FL
NONHDLC SERPL-MCNC: 169 MG/DL (ref ?–130)
OSMOLALITY SERPL CALC.SUM OF ELEC: 296 MOSM/KG (ref 275–295)
PLATELET # BLD AUTO: 394 10(3)UL (ref 150–450)
POTASSIUM SERPL-SCNC: 3.7 MMOL/L (ref 3.5–5.1)
PROT SERPL-MCNC: 7.6 G/DL (ref 6.4–8.2)
RBC # BLD AUTO: 5.65 X10(6)UL
SODIUM SERPL-SCNC: 143 MMOL/L (ref 136–145)
TRIGL SERPL-MCNC: 176 MG/DL (ref 30–149)
TSI SER-ACNC: 1.78 MIU/ML (ref 0.36–3.74)
VIT D+METAB SERPL-MCNC: 26 NG/ML (ref 30–100)
VLDLC SERPL CALC-MCNC: 33 MG/DL (ref 0–30)
WBC # BLD AUTO: 8.8 X10(3) UL (ref 4–11)

## 2023-06-08 PROCEDURE — 85027 COMPLETE CBC AUTOMATED: CPT

## 2023-06-08 PROCEDURE — 83735 ASSAY OF MAGNESIUM: CPT

## 2023-06-08 PROCEDURE — 80053 COMPREHEN METABOLIC PANEL: CPT

## 2023-06-08 PROCEDURE — 36415 COLL VENOUS BLD VENIPUNCTURE: CPT

## 2023-06-08 PROCEDURE — 99024 POSTOP FOLLOW-UP VISIT: CPT | Performed by: PODIATRIST

## 2023-06-08 PROCEDURE — 80061 LIPID PANEL: CPT

## 2023-06-08 PROCEDURE — 82306 VITAMIN D 25 HYDROXY: CPT

## 2023-06-08 PROCEDURE — 84443 ASSAY THYROID STIM HORMONE: CPT

## 2023-06-12 ENCOUNTER — PATIENT MESSAGE (OUTPATIENT)
Dept: INTERNAL MEDICINE CLINIC | Facility: CLINIC | Age: 63
End: 2023-06-12

## 2023-06-12 ENCOUNTER — OFFICE VISIT (OUTPATIENT)
Dept: SURGERY | Facility: CLINIC | Age: 63
End: 2023-06-12

## 2023-06-12 DIAGNOSIS — L72.3 INFLAMED EPIDERMOID CYST OF SKIN: Primary | ICD-10-CM

## 2023-06-12 DIAGNOSIS — K62.9 RECTAL LESION: ICD-10-CM

## 2023-06-12 PROCEDURE — 99203 OFFICE O/P NEW LOW 30 MIN: CPT | Performed by: SURGERY

## 2023-06-13 ENCOUNTER — HOSPITAL ENCOUNTER (OUTPATIENT)
Facility: HOSPITAL | Age: 63
Setting detail: HOSPITAL OUTPATIENT SURGERY
Discharge: HOME | End: 2023-06-13
Attending: SURGERY | Admitting: SURGERY
Payer: COMMERCIAL

## 2023-06-13 ENCOUNTER — ANESTHESIA EVENT (OUTPATIENT)
Dept: SURGERY | Facility: HOSPITAL | Age: 63
End: 2023-06-13
Payer: COMMERCIAL

## 2023-06-13 ENCOUNTER — ANESTHESIA (OUTPATIENT)
Dept: SURGERY | Facility: HOSPITAL | Age: 63
End: 2023-06-13
Payer: COMMERCIAL

## 2023-06-13 VITALS
SYSTOLIC BLOOD PRESSURE: 124 MMHG | DIASTOLIC BLOOD PRESSURE: 58 MMHG | TEMPERATURE: 98 F | WEIGHT: 190 LBS | HEART RATE: 70 BPM | RESPIRATION RATE: 18 BRPM | HEIGHT: 68 IN | OXYGEN SATURATION: 94 % | BODY MASS INDEX: 28.79 KG/M2

## 2023-06-13 DIAGNOSIS — K62.9 RECTAL LESION: ICD-10-CM

## 2023-06-13 DIAGNOSIS — L72.3 INFLAMED EPIDERMOID CYST OF SKIN: ICD-10-CM

## 2023-06-13 PROCEDURE — 11422 EXC H-F-NK-SP B9+MARG 1.1-2: CPT | Performed by: SURGERY

## 2023-06-13 PROCEDURE — 0HB9XZZ EXCISION OF PERINEUM SKIN, EXTERNAL APPROACH: ICD-10-PCS | Performed by: SURGERY

## 2023-06-13 PROCEDURE — 11421 EXC H-F-NK-SP B9+MARG 0.6-1: CPT | Performed by: SURGERY

## 2023-06-13 RX ORDER — HYDROMORPHONE HYDROCHLORIDE 1 MG/ML
0.6 INJECTION, SOLUTION INTRAMUSCULAR; INTRAVENOUS; SUBCUTANEOUS EVERY 5 MIN PRN
Status: DISCONTINUED | OUTPATIENT
Start: 2023-06-13 | End: 2023-06-13

## 2023-06-13 RX ORDER — MORPHINE SULFATE 4 MG/ML
4 INJECTION, SOLUTION INTRAMUSCULAR; INTRAVENOUS EVERY 10 MIN PRN
Status: DISCONTINUED | OUTPATIENT
Start: 2023-06-13 | End: 2023-06-13

## 2023-06-13 RX ORDER — CEFAZOLIN SODIUM/WATER 2 G/20 ML
2 SYRINGE (ML) INTRAVENOUS ONCE
Status: COMPLETED | OUTPATIENT
Start: 2023-06-13 | End: 2023-06-13

## 2023-06-13 RX ORDER — NALOXONE HYDROCHLORIDE 0.4 MG/ML
80 INJECTION, SOLUTION INTRAMUSCULAR; INTRAVENOUS; SUBCUTANEOUS AS NEEDED
Status: DISCONTINUED | OUTPATIENT
Start: 2023-06-13 | End: 2023-06-13

## 2023-06-13 RX ORDER — MORPHINE SULFATE 10 MG/ML
6 INJECTION, SOLUTION INTRAMUSCULAR; INTRAVENOUS EVERY 10 MIN PRN
Status: DISCONTINUED | OUTPATIENT
Start: 2023-06-13 | End: 2023-06-13

## 2023-06-13 RX ORDER — ACETAMINOPHEN AND CODEINE PHOSPHATE 300; 30 MG/1; MG/1
1-2 TABLET ORAL EVERY 4 HOURS PRN
Qty: 10 TABLET | Refills: 0 | Status: SHIPPED | OUTPATIENT
Start: 2023-06-13

## 2023-06-13 RX ORDER — PROCHLORPERAZINE EDISYLATE 5 MG/ML
5 INJECTION INTRAMUSCULAR; INTRAVENOUS EVERY 8 HOURS PRN
Status: DISCONTINUED | OUTPATIENT
Start: 2023-06-13 | End: 2023-06-13

## 2023-06-13 RX ORDER — ACETAMINOPHEN AND CODEINE PHOSPHATE 300; 30 MG/1; MG/1
1 TABLET ORAL ONCE AS NEEDED
Status: DISCONTINUED | OUTPATIENT
Start: 2023-06-13 | End: 2023-06-13

## 2023-06-13 RX ORDER — MORPHINE SULFATE 4 MG/ML
2 INJECTION, SOLUTION INTRAMUSCULAR; INTRAVENOUS EVERY 10 MIN PRN
Status: DISCONTINUED | OUTPATIENT
Start: 2023-06-13 | End: 2023-06-13

## 2023-06-13 RX ORDER — ATORVASTATIN CALCIUM 10 MG/1
10 TABLET, FILM COATED ORAL NIGHTLY
Qty: 90 TABLET | Refills: 1 | Status: SHIPPED | OUTPATIENT
Start: 2023-06-13

## 2023-06-13 RX ORDER — HYDROMORPHONE HYDROCHLORIDE 1 MG/ML
0.4 INJECTION, SOLUTION INTRAMUSCULAR; INTRAVENOUS; SUBCUTANEOUS EVERY 5 MIN PRN
Status: DISCONTINUED | OUTPATIENT
Start: 2023-06-13 | End: 2023-06-13

## 2023-06-13 RX ORDER — BUPIVACAINE HYDROCHLORIDE 2.5 MG/ML
INJECTION, SOLUTION EPIDURAL; INFILTRATION; INTRACAUDAL AS NEEDED
Status: DISCONTINUED | OUTPATIENT
Start: 2023-06-13 | End: 2023-06-13

## 2023-06-13 RX ORDER — ONDANSETRON 2 MG/ML
INJECTION INTRAMUSCULAR; INTRAVENOUS AS NEEDED
Status: DISCONTINUED | OUTPATIENT
Start: 2023-06-13 | End: 2023-06-13 | Stop reason: SURG

## 2023-06-13 RX ORDER — SODIUM CHLORIDE, SODIUM LACTATE, POTASSIUM CHLORIDE, CALCIUM CHLORIDE 600; 310; 30; 20 MG/100ML; MG/100ML; MG/100ML; MG/100ML
INJECTION, SOLUTION INTRAVENOUS CONTINUOUS
Status: DISCONTINUED | OUTPATIENT
Start: 2023-06-13 | End: 2023-06-13

## 2023-06-13 RX ORDER — ONDANSETRON 2 MG/ML
4 INJECTION INTRAMUSCULAR; INTRAVENOUS EVERY 6 HOURS PRN
Status: DISCONTINUED | OUTPATIENT
Start: 2023-06-13 | End: 2023-06-13

## 2023-06-13 RX ORDER — DEXAMETHASONE SODIUM PHOSPHATE 4 MG/ML
VIAL (ML) INJECTION AS NEEDED
Status: DISCONTINUED | OUTPATIENT
Start: 2023-06-13 | End: 2023-06-13 | Stop reason: SURG

## 2023-06-13 RX ORDER — HYDROMORPHONE HYDROCHLORIDE 1 MG/ML
0.2 INJECTION, SOLUTION INTRAMUSCULAR; INTRAVENOUS; SUBCUTANEOUS EVERY 5 MIN PRN
Status: DISCONTINUED | OUTPATIENT
Start: 2023-06-13 | End: 2023-06-13

## 2023-06-13 RX ORDER — LIDOCAINE HYDROCHLORIDE 10 MG/ML
INJECTION, SOLUTION EPIDURAL; INFILTRATION; INTRACAUDAL; PERINEURAL AS NEEDED
Status: DISCONTINUED | OUTPATIENT
Start: 2023-06-13 | End: 2023-06-13 | Stop reason: SURG

## 2023-06-13 RX ORDER — ACETAMINOPHEN 500 MG
1000 TABLET ORAL ONCE
Status: COMPLETED | OUTPATIENT
Start: 2023-06-13 | End: 2023-06-13

## 2023-06-13 RX ADMIN — SODIUM CHLORIDE, SODIUM LACTATE, POTASSIUM CHLORIDE, CALCIUM CHLORIDE: 600; 310; 30; 20 INJECTION, SOLUTION INTRAVENOUS at 09:34:00

## 2023-06-13 RX ADMIN — DEXAMETHASONE SODIUM PHOSPHATE 8 MG: 4 MG/ML VIAL (ML) INJECTION at 09:25:00

## 2023-06-13 RX ADMIN — LIDOCAINE HYDROCHLORIDE 50 MG: 10 INJECTION, SOLUTION EPIDURAL; INFILTRATION; INTRACAUDAL; PERINEURAL at 09:18:00

## 2023-06-13 RX ADMIN — ONDANSETRON 4 MG: 2 INJECTION INTRAMUSCULAR; INTRAVENOUS at 09:32:00

## 2023-06-13 RX ADMIN — CEFAZOLIN SODIUM/WATER 2 G: 2 G/20 ML SYRINGE (ML) INTRAVENOUS at 09:25:00

## 2023-06-13 RX ADMIN — SODIUM CHLORIDE, SODIUM LACTATE, POTASSIUM CHLORIDE, CALCIUM CHLORIDE: 600; 310; 30; 20 INJECTION, SOLUTION INTRAVENOUS at 09:14:00

## 2023-06-13 RX ADMIN — SODIUM CHLORIDE, SODIUM LACTATE, POTASSIUM CHLORIDE, CALCIUM CHLORIDE: 600; 310; 30; 20 INJECTION, SOLUTION INTRAVENOUS at 09:27:00

## 2023-06-13 NOTE — INTERVAL H&P NOTE
Pre-op Diagnosis: Inflamed epidermoid cyst of skin [L72.3]  Rectal lesion [K62.9]    The above referenced H&P was reviewed by Slick Story MD on 6/13/2023, the patient was examined and no significant changes have occurred in the patient's condition since the H&P was performed. I discussed with the patient and/or legal representative the potential benefits, risks and side effects of this procedure; the likelihood of the patient achieving goals; and potential problems that might occur during recuperation. I discussed reasonable alternatives to the procedure, including risks, benefits and side effects related to the alternatives and risks related to not receiving this procedure. We will proceed with procedure as planned.

## 2023-06-13 NOTE — DISCHARGE INSTRUCTIONS
Ice pack as needed. May shower in 24 hours. Keep sutures covered with a Band-Aid to prevent rubbing on your skin. Follow-up with Pari Sethi to have your sutures removed in 13 days  Ibuprofen or Tylenol 3 for pain     HOME INSTRUCTIONS  AMBSURG HOME CARE INSTRUCTIONS: POST-OP ANESTHESIA  The medication that you received for sedation or general anesthesia can last up to 24 hours. Your judgment and reflexes may be altered, even if you feel like your normal self. We Recommend:   Do not drive any motor vehicle or bicycle   Avoid mowing the lawn, playing sports, or working with power tools/applicances (power saws, electric knives or mixers)   That you have someone stay with you on your first night home   Do not drink alcohol or take sleeping pills or tranquilizers   Do not sign legal documents within 24 hours of your procedure   If you had a nerve block for your surgery, take extra care not to put any pressure on your arm or hand for 24 hours    It is normal:  For you to have a sore throat if you had a breathing tube during surgery (while you were asleep!). The sore throat should get better within 48 hours. You can gargle with warm salt water (1/2 tsp in 4 oz warm water) or use a throat lozenge for comfort  To feel muscle aches or soreness especially in the abdomen, chest or neck. The achy feeling should go away in the next 24 hours  To feel weak, sleepy or \"wiped out\". Your should start feeling better in the next 24 hours. To experience mild discomforts such as sore lip or tongue, headache, cramps, gas pains or a bloated feeling in your abdomen. To experience mild back pain or soreness for a day or two if you had spinal or epidural anesthesia. If you had laparoscopic surgery, to feel shoulder pain or discomfort on the day of surgery. For some patients to have nausea after surgery/anesthesia    If you feel nausea or experience vomiting:   Try to move around less.    Eat less than usual or drink only liquids until the next morning   Nausea should resolve in about 24 hours    If you have a problem when you are at home:    Call your surgeons office

## 2023-06-13 NOTE — TELEPHONE ENCOUNTER
From: Elvin Dowell  To:  Forrest Crooks MD  Sent: 6/12/2023 9:14 AM CDT  Subject: Medication:    Per my results about my cholesterol, I would be open to cholesterol medication

## 2023-06-13 NOTE — ANESTHESIA PROCEDURE NOTES
Airway  Date/Time: 6/13/2023 9:20 AM  Urgency: Elective      General Information and Staff    Patient location during procedure: OR  Anesthesiologist: Grace Fleming DO  Performed: anesthesiologist   Performed by: Grace Fleming DO  Authorized by: Grace Fleming DO      Indications and Patient Condition  Indications for airway management: anesthesia  Sedation level: deep  Preoxygenated: yes  Patient position: sniffing  Mask difficulty assessment: 1 - vent by mask    Final Airway Details  Final airway type: supraglottic airway      Successful airway: classic  Size 5       Number of attempts at approach: 1  Number of other approaches attempted: 0

## 2023-06-13 NOTE — OPERATIVE REPORT
[facility]    PATIENT'S NAME: Shana Moritz   ATTENDING PHYSICIAN: Chantel Curtis MD   OPERATING PHYSICIAN: Chantel Curtis MD   PATIENT ACCOUNT#:   [de-identified]    LOCATION:  [facility] 11 [facility] 10  MEDICAL RECORD #:   [id number]       YOB: 1960  ADMISSION DATE:       06/13/2023      OPERATION DATE:  06/13/2023    OPERATIVE REPORT    PREOPERATIVE DIAGNOSIS:    1. Left perineal cyst x2, first measuring 2 cm, second measuring 1 cm. 2.   Right perineal cyst measuring 1 cm. POSTOPERATIVE DIAGNOSIS:    1. Left perineal cyst x2, first measuring 2 cm, second measuring 1 cm. 2.   Right perineal cyst measuring 1 cm. PROCEDURE:  Excision perineal cysts with intermediate layered closure. ASSISTANT:  Aneesh Vázquez CSA. ESTIMATED BLOOD LOSS:  Minimal.    COMPLICATIONS:  None. ANESTHESIA:  General.      DISPOSITION:  To Recovery, tolerated well. INDICATIONS:  Patient is 57 with symptomatic cysts. Consent obtained. OPERATIVE TECHNIQUE:  She was taken to surgery, was prepped and draped in usual sterile fashion. She is in lithotomy. Elliptical incision is made. The 3 cysts were completely excised. Hemostasis maintained with electrocautery. Incision closed in layered fashion using 3-0 Vicryl, 3-0 Prolene. Sterile dressings applied. She tolerated this well. Dictated By Chantel Curtis MD  d: 06/13/2023 09:41:19  t: 06/13/2023 11:57:18  Job [id number]  BV/    cc:  Chon Wright MD

## 2023-06-13 NOTE — TELEPHONE ENCOUNTER
Low-dose atorvastatin sent in since she is very sensitive and has muscle aches he can even try taking it every other day

## 2023-06-20 ENCOUNTER — TELEPHONE (OUTPATIENT)
Dept: SURGERY | Facility: CLINIC | Age: 63
End: 2023-06-20

## 2023-06-20 NOTE — TELEPHONE ENCOUNTER
Advised patient to add Motrin to her pain medication. Also advised patient to take sitz baths and apply ice. Patient verbalized understanding.

## 2023-06-26 ENCOUNTER — OFFICE VISIT (OUTPATIENT)
Dept: SURGERY | Facility: CLINIC | Age: 63
End: 2023-06-26

## 2023-06-26 DIAGNOSIS — Z48.89 ENCOUNTER FOR POSTOPERATIVE CARE: Primary | ICD-10-CM

## 2023-06-26 PROCEDURE — 99024 POSTOP FOLLOW-UP VISIT: CPT

## 2023-06-26 NOTE — PROGRESS NOTES
S:  Cortney Dow is a 57year old female sp perineal cyst excision with Dr. Jeovany Templeton on 6/13/23. Doing well, no fevers, no chills, tolerating a general diet, generally normal bowel movements, no calf tenderness nor lower extremity edema, no shortness of breath, no chest pain. O:  There were no vitals taken for this visit. GEN:  No acute distress  L: nonlabored respirations  H: reg rate  Abd:  Soft, NT,ND. Skin: Incision C D I, no eryth. Sutures removed, area . Extr: No edema, no calf tenderness    Path:  Reviewed w pt    Assessment   Encounter for postoperative care  (primary encounter diagnosis)    Doing well post op  Continue to keep incision clean and dry. Maintain a healthy diet. Maintain good hydration. F/u prn.          Torres Mcgee PA-C

## 2023-07-06 ENCOUNTER — OFFICE VISIT (OUTPATIENT)
Dept: PODIATRY CLINIC | Facility: CLINIC | Age: 63
End: 2023-07-06

## 2023-07-06 DIAGNOSIS — M79.675 PAIN OF TOE OF LEFT FOOT: ICD-10-CM

## 2023-07-06 DIAGNOSIS — M77.50 CAPSULITIS OF METATARSOPHALANGEAL (MTP) JOINT: ICD-10-CM

## 2023-07-06 DIAGNOSIS — L60.0 ONYCHOCRYPTOSIS: ICD-10-CM

## 2023-07-06 DIAGNOSIS — Z98.890 STATUS POST FOOT SURGERY: Primary | ICD-10-CM

## 2023-07-09 NOTE — PROGRESS NOTES
Naseem Brown is a 70year old female. Patient presents with:  Post-Op: Post op Left foot- Sx 4/12. - Pain 5/10 in  left hallux. Discoloration from right hallux to center of left leg. HPI:   Returns to the clinic she is now almost 3 full months postoperative. Still gets a little pain discomfort in the left hallux. But not severe. Also complains of a slight nail irritation she points to the medial nail border of her left hallux as well as some discomfort in the right forefoot. At today's visit reviewed nurse's history as taken above, allergies medications and medical history as documented below. All changes duly noted  Allergies: Celecoxib, Sulfa Antibiotics, Erythromycin, and Amoxicillin   Current Outpatient Medications   Medication Sig Dispense Refill    acetaminophen-codeine 300-30 MG Oral Tab Take 1-2 tablets by mouth every 4 (four) hours as needed for Pain. 10 tablet 0    atorvastatin 10 MG Oral Tab Take 1 tablet (10 mg total) by mouth nightly. 90 tablet 1    amLODIPine 10 MG Oral Tab TAKE 1 TABLET BY MOUTH EVERY DAY (Patient taking differently: at bedtime. TAKE 1 TABLET BY MOUTH EVERY DAY) 90 tablet 3    omeprazole 20 MG Oral Capsule Delayed Release Take 2 capsules (40 mg total) by mouth every morning. 180 capsule 1    ibuprofen 600 MG Oral Tab Take 1 tablet (600 mg total) by mouth every 8 (eight) hours as needed for Pain. 90 tablet 1    latanoprost 0.005 % Ophthalmic Solution INSTILL 1 DROP IN BOTH EYES EVERY night 7.5 mL 3    MAGNESIUM OR Take by mouth.         Past Medical History:   Diagnosis Date    Abscess of left thigh     Acute meniscal tear of knee     Age-related nuclear cataract of both eyes 02/10/2015    Anal sphincter incontinence 04/28/2014    Arthritis     Back problem     Chondromalacia     Colon polyps     Degenerative disc disease     Diverticular disease     Esophageal reflux     Glaucoma     Hammertoe     High blood pressure     Insomnia     Neuropathy     Right Foot Osteoarthritis     Primary open angle glaucoma of both eyes 05/19/2015    Diagnosis of glaucoma OU; Started Latanoprost qhs after abnormal VF and OCT 2/25/16;  6/5/18 consult with Dr. Geni Coburn progress note    Small bowel obstruction ([address]     Tendinitis     Unspecified essential hypertension     Visual impairment     Reraders      Past Surgical History:   Procedure Laterality Date    ANAL SPHINCTEROTOMY      03/12/2013 [place]    BLEPHAROPLASTY ANESTHESIA Bilateral 03/05/2020    Dr Chung Gonzales Ophthalmology     CATARACT EXTRACTION W/  INTRAOCULAR LENS IMPLANT Left 05/07/2018    L PC IOL with Dr. Do Erps @ [address] [place] [address]    COLONOSCOPY N/A 11/11/2016    Procedure: COLONOSCOPY;  Surgeon: Naida Kaur MD;  Location: [facility] ENDOSCOPY    COLONOSCOPY N/A 09/06/2019    Procedure: COLONOSCOPY;  Surgeon: Girma Singleton MD;  Location: [facility] ENDOSCOPY    EXCISION OF CHALAZION, SINGLE - OD - RIGHT EYE Right 6.27/2017    [facility]    HC ARTHROCENTESIS OR INJECT MAJOR JOINT W/O US      HYSTERECTOMY  1996    KAI    OTHER      Lap Nissen Fundoplication surgery    OTHER SURGICAL HISTORY  2005,2007,2008    LAPAROSCOPIC LYSIS OF ADHESION    OTHER SURGICAL HISTORY      right foot bunionectomy    OTHER SURGICAL HISTORY  11/14/2014    right superficial anterior peroneal nerve biopsy and right proximal thigh muscle biopsy.     OTHER SURGICAL HISTORY  06/13/2023    Excision and Biopsy of two  perineal cysts    UPPER GI ENDOSCOPY PERFORMED  05/2015    YAG CAPSULOTOMY - OS - LEFT EYE Left 12/19/2018    RJM      Family History   Problem Relation Age of Onset    Hypertension Father     Hypertension Mother     Hypertension Sister     Cancer Sister         liver cancer    Hypertension Brother     Diabetes Neg     Glaucoma Neg     Macular degeneration Neg     Breast Cancer Neg     Ovarian Cancer Neg       Social History    Socioeconomic History      Marital status:     Tobacco Use      Smoking status: Never      Smokeless tobacco: Never    Vaping Use      Vaping Use: Never used    Substance and Sexual Activity      Alcohol use: Never        Comment: None. Drug use: Never      Sexual activity: Yes        Birth control/protection: Hysterectomy    Other Topics      Concerns:        Caffeine Concern: Yes          occasional soda        Exercise: Yes        Pt has a pacemaker: No        Pt has a defibrillator: No        Breast feeding: No        Reaction to local anesthetic: No        Right Handed: Yes          REVIEW OF SYSTEMS:   Today reviewed systens as documented below  GENERAL HEALTH: feels well otherwise  SKIN: Refer to exam below  RESPIRATORY: denies shortness of breath with exertion  CARDIOVASCULAR: denies chest pain on exertion  GI: denies abdominal pain and denies heartburn  NEURO: denies headaches    EXAM:   There were no vitals taken for this visit. GENERAL: well developed, well nourished, in no apparent distress  EXTREMITIES:   1. Integument: The skin on both feet was examined the incision line is well-healed there is only minimal erythema edema noted no sign of infection. The medial nail border of the left hallux has a slight irritation that was trimmed and debrided to relief. 2. Vascular: The patient has palpable pulses bilateral   3. Neurologic: Patient has intact sensorium   4. Musculoskeletal: The patient has only minimal discomfort there is no motion noted at the fusion site. She does have pain on palpation of the third interspace area she has had a cortisone injection before but the cortisone has caused some hypopigmentation so she does not wish any further cortisone injections. She has symptoms characteristic of not only capsulitis of maybe the third MTP joint but also a neuroma of the third interspace on her right foot.   X-rays were taken AP and lateral on both feet showing the fusion site to be well approximated healing well in good position no abnormalities were noted on the right no fracture no dislocation maybe mild hammertoe of the third and fourth toes    ASSESSMENT AND PLAN:   Diagnoses and all orders for this visit:    Status post foot surgery    Onychocryptosis    Pain of toe of left foot    Capsulitis of metatarsophalangeal (MTP) joint    Other orders  -     EEH AMB POD XR - LT FOOT 2 VIEWS(AP, LATERAL)WT BEARING  -     EEH AMB POD XR - RT FOOT 3 VIEWS(AP,LAT,OBLIQUE)WT BEARING        Plan: At today's visit I would like to get the patient into shoes she was told were comfortable stability shoe she can walk as tolerated if she notices any problems with the left foot she can return to the boot otherwise I will see her in follow-up in 3 to 4 weeks. The patient indicates understanding of these issues and agrees to the plan.     Kenan Mackenzie DPM

## 2023-07-27 ENCOUNTER — OFFICE VISIT (OUTPATIENT)
Dept: ORTHOPEDICS CLINIC | Facility: CLINIC | Age: 63
End: 2023-07-27

## 2023-07-27 VITALS — SYSTOLIC BLOOD PRESSURE: 132 MMHG | DIASTOLIC BLOOD PRESSURE: 89 MMHG | HEART RATE: 72 BPM

## 2023-07-27 DIAGNOSIS — M75.02 ADHESIVE CAPSULITIS OF LEFT SHOULDER: Primary | ICD-10-CM

## 2023-07-27 DIAGNOSIS — M75.122 NONTRAUMATIC COMPLETE TEAR OF LEFT ROTATOR CUFF: ICD-10-CM

## 2023-07-27 PROCEDURE — 99213 OFFICE O/P EST LOW 20 MIN: CPT | Performed by: ORTHOPAEDIC SURGERY

## 2023-07-27 PROCEDURE — 20610 DRAIN/INJ JOINT/BURSA W/O US: CPT | Performed by: ORTHOPAEDIC SURGERY

## 2023-07-27 RX ORDER — TRIAMCINOLONE ACETONIDE 40 MG/ML
40 INJECTION, SUSPENSION INTRA-ARTICULAR; INTRAMUSCULAR ONCE
Status: COMPLETED | OUTPATIENT
Start: 2023-07-27 | End: 2023-07-27

## 2023-07-27 NOTE — PROCEDURES
Per verbal order from Dr. Kay Bah draw up 3ml of 0.5% Marcaine & 2ml 1% lidocaine and 1ml of Kenalog 40 for cortisone injection to left shoulder Randal Porsha, CARLOS    Patient provided education handout for cortisone injection.

## 2023-07-27 NOTE — PROGRESS NOTES
NURSING INTAKE COMMENTS: Patient presents with:  Shoulder Pain: S/p L shoulder from 1/25/23 f/u - had cortisone inj on 3/17/23 - still has problems with the shoulder - still has some pain with certain movements and pressure at night rated as 7/10 on and off       HPI: This 70year old female presents today with complaints of left shoulder follow-up. She is now 6 months postoperative from a rotator cuff repair. She continues to have difficulty lifting the arm. She has pain when reaching behind her back. She has mild pain at rest.  She finished her physical therapy. She has been doing some home exercises but minimal stretching. She takes ibuprofen on an infrequent basis.     Past Medical History:   Diagnosis Date    Abscess of left thigh     Acute meniscal tear of knee     Age-related nuclear cataract of both eyes 02/10/2015    Anal sphincter incontinence 04/28/2014    Arthritis     Back problem     Chondromalacia     Colon polyps     Degenerative disc disease     Diverticular disease     Esophageal reflux     Glaucoma     Hammertoe     High blood pressure     Insomnia     Neuropathy     Right Foot    Osteoarthritis     Primary open angle glaucoma of both eyes 05/19/2015    Diagnosis of glaucoma OU; Started Latanoprost qhs after abnormal VF and OCT 2/25/16;  6/5/18 consult with Dr. Lau Double progress note    Small bowel obstruction ([address]     Tendinitis     Unspecified essential hypertension     Visual impairment     Reraders     Past Surgical History:   Procedure Laterality Date    ANAL SPHINCTEROTOMY      03/12/2013 [place]    BLEPHAROPLASTY ANESTHESIA Bilateral 03/05/2020    Dr Jacob Kessler Ophthalmology     CATARACT EXTRACTION W/  INTRAOCULAR LENS IMPLANT Left 05/07/2018    L PC IOL with Dr. Anamika Garcia @ [facility] [address] N/A 11/11/2016    Procedure: COLONOSCOPY;  Surgeon: Riri Pierre MD;  Location: [address] [place] [address] ENDOSCOPY    COLONOSCOPY N/A 09/06/2019    Procedure: COLONOSCOPY;  Surgeon: Nayeli Edmond Filiberto Jorge MD;  Location: [facility] ENDOSCOPY    EXCISION OF CHALAZION, SINGLE - OD - RIGHT EYE Right 6.27/2017    [place]    HC ARTHROCENTESIS OR INJECT MAJOR JOINT W/O US      HYSTERECTOMY  1996    KAI    OTHER      Lap Nissen Fundoplication surgery    OTHER SURGICAL HISTORY  2005,2007,2008    LAPAROSCOPIC LYSIS OF ADHESION    OTHER SURGICAL HISTORY      right foot bunionectomy    OTHER SURGICAL HISTORY  11/14/2014    right superficial anterior peroneal nerve biopsy and right proximal thigh muscle biopsy. OTHER SURGICAL HISTORY  06/13/2023    Excision and Biopsy of two  perineal cysts    UPPER GI ENDOSCOPY PERFORMED  05/2015    YAG CAPSULOTOMY - OS - LEFT EYE Left 12/19/2018    RJM     Current Outpatient Medications   Medication Sig Dispense Refill    acetaminophen-codeine 300-30 MG Oral Tab Take 1-2 tablets by mouth every 4 (four) hours as needed for Pain. 10 tablet 0    atorvastatin 10 MG Oral Tab Take 1 tablet (10 mg total) by mouth nightly. 90 tablet 1    amLODIPine 10 MG Oral Tab TAKE 1 TABLET BY MOUTH EVERY DAY (Patient taking differently: at bedtime. TAKE 1 TABLET BY MOUTH EVERY DAY) 90 tablet 3    omeprazole 20 MG Oral Capsule Delayed Release Take 2 capsules (40 mg total) by mouth every morning. 180 capsule 1    ibuprofen 600 MG Oral Tab Take 1 tablet (600 mg total) by mouth every 8 (eight) hours as needed for Pain. 90 tablet 1    latanoprost 0.005 % Ophthalmic Solution INSTILL 1 DROP IN BOTH EYES EVERY night 7.5 mL 3    MAGNESIUM OR Take by mouth.          Celecoxib               TONGUE SWELLING    Comment:Other reaction(s): CELECOXIB  Sulfa Antibiotics       TONGUE SWELLING    Comment:Other reaction(s): SULFA (SULFONAMIDE ANTIBIOTICS)  Erythromycin            PAIN  Amoxicillin             DIARRHEA  Family History   Problem Relation Age of Onset    Hypertension Father     Hypertension Mother     Hypertension Sister     Cancer Sister         liver cancer    Hypertension Brother     Diabetes Neg     Glaucoma Neg Macular degeneration Neg     Breast Cancer Neg     Ovarian Cancer Neg        Social History    Occupational History      Not on file    Tobacco Use      Smoking status: Never      Smokeless tobacco: Never    Vaping Use      Vaping Use: Never used    Substance and Sexual Activity      Alcohol use: Never        Comment: None. Drug use: Never      Sexual activity: Yes        Birth control/protection: Hysterectomy       Review of Systems:  GENERAL: denies fevers, chills, night sweats, fatigue, unintentional weight loss/gain  SKIN: denies skin lesions, open sores, rash  HEENT:denies recent vision change, new nasal congestion,hearing loss, tinnitus, sore throat, headaches  RESPIRATORY: denies new shortness of breath, cough, asthma, wheezing  CARDIOVASCULAR: denies chest pain, leg cramps with exertion, palpitations, leg swelling  GI: denies abdominal pain, nausea, vomiting, diarrhea, constipation, hematochezia, worsening heartburn or stomach ulcers  : denies dysuria, hematuria, incontinence, increased frequency, urgency, difficulty urinating  MUSCULOSKELETAL: denies musculoskeletal complaints other than in HPI  NEURO: denies numbness, tingling, weakness, balance issues, dizziness, memory loss  PSYCHIATRIC: denies Hx of depression, anxiety, other psychiatric disorders  HEMATOLOGIC: denies blood clots, anemia, blood clotting disorders, blood transfusion  ENDOCRINE: denies autoimmune disease, thyroid issues, or diabetes  ALLERGY: denies asthma, seasonal allergies    Physical Examination:    /89 (BP Location: Right arm, Patient Position: Sitting, Cuff Size: adult)   Pulse 72   Constitutional: appears well hydrated, alert and responsive, no acute distress noted  Extremities: Left shoulder mildly tender over the anterior glenohumeral joint line. Active forward flexion is to 120 degrees. Active external rotation 30 degrees. Passive external rotation 45 degrees.   Passive and active internal rotation 70 degrees. Abduction strength 4+ out of 5. External rotation strength 5 out of 5. Saldana impingement sign mildly positive. Neurological: Unchanged    Imaging:   No results found. Labs:  Lab Results   Component Value Date    WBC 8.8 06/08/2023    HGB 14.9 06/08/2023    .0 06/08/2023      Lab Results   Component Value Date     (H) 06/08/2023    BUN 12 06/08/2023    CREATSERUM 0.88 06/08/2023    GFR 46 (L) 04/02/2016    GFRNAA 66 05/21/2022    GFRAA 76 05/21/2022        Assessment and Plan:  Diagnoses and all orders for this visit:    Adhesive capsulitis of left shoulder  -     DRAIN/INJECT LARGE JOINT/BURSA  -     triamcinolone acetonide (Kenalog-40) 40 MG/ML injection 40 mg    Nontraumatic complete tear of left rotator cuff        Assessment: Healing left shoulder after rotator cuff repair, ongoing left shoulder capsulitis    Plan: I recommended continued home exercises for capsular stretching. Gradual return to activities were recommended. I suggested an repeat injection of Kenalog today. The left glenohumeral joint was injected with 40 mg of Kenalog using an anterior approach. Advised icing and continued use of ibuprofen. Follow-up again in 6 weeks. Consider manipulation under anesthesia if shoulder stiffness persist.    Follow Up: Return in about 6 weeks (around 9/7/2023).     Naida Estrada MD

## 2023-07-31 ENCOUNTER — LAB ENCOUNTER (OUTPATIENT)
Dept: LAB | Age: 63
End: 2023-07-31
Attending: INTERNAL MEDICINE
Payer: COMMERCIAL

## 2023-07-31 DIAGNOSIS — E78.5 HYPERLIPIDEMIA WITH TARGET LDL LESS THAN 130: Primary | ICD-10-CM

## 2023-07-31 PROCEDURE — 36415 COLL VENOUS BLD VENIPUNCTURE: CPT

## 2023-07-31 PROCEDURE — 83695 ASSAY OF LIPOPROTEIN(A): CPT

## 2023-08-01 ENCOUNTER — OFFICE VISIT (OUTPATIENT)
Dept: INTERNAL MEDICINE CLINIC | Facility: CLINIC | Age: 63
End: 2023-08-01

## 2023-08-01 VITALS
SYSTOLIC BLOOD PRESSURE: 134 MMHG | OXYGEN SATURATION: 99 % | DIASTOLIC BLOOD PRESSURE: 74 MMHG | HEIGHT: 68 IN | HEART RATE: 72 BPM | BODY MASS INDEX: 30.01 KG/M2 | WEIGHT: 198 LBS

## 2023-08-01 DIAGNOSIS — J32.9 RECURRENT SINUSITIS: Primary | ICD-10-CM

## 2023-08-01 DIAGNOSIS — Z23 NEED FOR VACCINATION: ICD-10-CM

## 2023-08-01 LAB — LIPOPROTEIN (A): 25.4 NMOL/L

## 2023-08-01 PROCEDURE — 90677 PCV20 VACCINE IM: CPT | Performed by: INTERNAL MEDICINE

## 2023-08-01 PROCEDURE — 99214 OFFICE O/P EST MOD 30 MIN: CPT | Performed by: INTERNAL MEDICINE

## 2023-08-01 PROCEDURE — G0009 ADMIN PNEUMOCOCCAL VACCINE: HCPCS | Performed by: INTERNAL MEDICINE

## 2023-08-01 RX ORDER — TRIAMCINOLONE ACETONIDE 40 MG/ML
INJECTION, SUSPENSION INTRA-ARTICULAR; INTRAMUSCULAR
COMMUNITY
Start: 2023-07-28

## 2023-08-01 RX ORDER — PREDNISONE 20 MG/1
40 TABLET ORAL DAILY
Qty: 10 TABLET | Refills: 0 | Status: SHIPPED | OUTPATIENT
Start: 2023-08-01 | End: 2023-08-06

## 2023-08-01 RX ORDER — AZITHROMYCIN 250 MG/1
TABLET, FILM COATED ORAL
Qty: 6 TABLET | Refills: 0 | Status: SHIPPED | OUTPATIENT
Start: 2023-08-01 | End: 2023-08-05

## 2023-08-02 ENCOUNTER — HOSPITAL ENCOUNTER (OUTPATIENT)
Dept: CT IMAGING | Age: 63
Discharge: HOME | End: 2023-08-02
Attending: INTERNAL MEDICINE
Payer: COMMERCIAL

## 2023-08-02 DIAGNOSIS — J32.9 RECURRENT SINUSITIS: ICD-10-CM

## 2023-08-02 PROCEDURE — 70486 CT MAXILLOFACIAL W/O DYE: CPT | Performed by: INTERNAL MEDICINE

## 2023-08-03 ENCOUNTER — OFFICE VISIT (OUTPATIENT)
Dept: OTOLARYNGOLOGY | Facility: CLINIC | Age: 63
End: 2023-08-03

## 2023-08-03 VITALS — WEIGHT: 198 LBS | HEIGHT: 68 IN | BODY MASS INDEX: 30.01 KG/M2

## 2023-08-03 DIAGNOSIS — J34.89 SINUS PRESSURE: ICD-10-CM

## 2023-08-03 DIAGNOSIS — J34.3 NASAL TURBINATE HYPERTROPHY: ICD-10-CM

## 2023-08-03 DIAGNOSIS — J34.2 NASAL SEPTAL DEVIATION: Primary | ICD-10-CM

## 2023-08-03 PROCEDURE — 99213 OFFICE O/P EST LOW 20 MIN: CPT | Performed by: SPECIALIST

## 2023-08-03 RX ORDER — AZELASTINE 1 MG/ML
2 SPRAY, METERED NASAL 2 TIMES DAILY
Qty: 30 ML | Refills: 5 | Status: SHIPPED | OUTPATIENT
Start: 2023-08-03

## 2023-08-03 NOTE — PATIENT INSTRUCTIONS
No appointment scheduled yet.  Please call again next week and let me know.  Very important for patient to see surgeon.   We reviewed your CT of your sinuses together which did not show any sinusitis. I placed you on Astelin nasal spray 2 sprays each nostril twice daily and Flonase nasal spray 1 spray to each nostril twice daily. If you continue to have sinus headaches, consider other sources of headache such as migraines.

## 2023-08-07 ENCOUNTER — OFFICE VISIT (OUTPATIENT)
Dept: PODIATRY CLINIC | Facility: CLINIC | Age: 63
End: 2023-08-07

## 2023-08-07 DIAGNOSIS — M79.672 ACUTE POSTOPERATIVE PAIN OF FOOT, LEFT: Primary | ICD-10-CM

## 2023-08-07 DIAGNOSIS — G89.18 ACUTE POSTOPERATIVE PAIN OF FOOT, LEFT: Primary | ICD-10-CM

## 2023-08-07 PROCEDURE — 99213 OFFICE O/P EST LOW 20 MIN: CPT | Performed by: PODIATRIST

## 2023-08-07 RX ORDER — METHYLPREDNISOLONE 4 MG/1
TABLET ORAL
Qty: 21 TABLET | Refills: 0 | Status: SHIPPED | OUTPATIENT
Start: 2023-08-07

## 2023-08-07 NOTE — PROGRESS NOTES
Amor Ojeda is a 70year old female. Patient presents with: Follow - Up: Follow up bilateral feet. Right foot white line running up foot and leg. Left foot pain 7/10. HPI:   This pleasant patient is here for follow-up on both of her feet the right one is doing fairly well but the left 1 where the surgery was done she is having some foot pain she points to the side of the great toe joint area where it was fused. Pain can be as bad as 7 out of 10. At today's visit reviewed nurse's history as taken above, allergies medications and medical history as documented below. All changes duly noted  Allergies: Celecoxib, Sulfa Antibiotics, Erythromycin, and Amoxicillin   Current Outpatient Medications   Medication Sig Dispense Refill    methylPREDNISolone 4 MG Oral Tablet Therapy Pack Take per package insert (instructions). 21 tablet 0    azelastine 0.1 % Nasal Solution 2 sprays by Nasal route 2 (two) times daily. 30 mL 5    triamcinolone acetonide 40 MG/ML Injection Suspension       acetaminophen-codeine 300-30 MG Oral Tab Take 1-2 tablets by mouth every 4 (four) hours as needed for Pain. 10 tablet 0    atorvastatin 10 MG Oral Tab Take 1 tablet (10 mg total) by mouth nightly. 90 tablet 1    amLODIPine 10 MG Oral Tab TAKE 1 TABLET BY MOUTH EVERY DAY (Patient taking differently: at bedtime. TAKE 1 TABLET BY MOUTH EVERY DAY) 90 tablet 3    omeprazole 20 MG Oral Capsule Delayed Release Take 2 capsules (40 mg total) by mouth every morning. 180 capsule 1    ibuprofen 600 MG Oral Tab Take 1 tablet (600 mg total) by mouth every 8 (eight) hours as needed for Pain. 90 tablet 1    latanoprost 0.005 % Ophthalmic Solution INSTILL 1 DROP IN BOTH EYES EVERY night 7.5 mL 3    MAGNESIUM OR Take by mouth.         Past Medical History:   Diagnosis Date    Abscess of left thigh     Acute meniscal tear of knee     Age-related nuclear cataract of both eyes 02/10/2015    Anal sphincter incontinence 04/28/2014    Arthritis Back problem     Chondromalacia     Colon polyps     Degenerative disc disease     Diverticular disease     Esophageal reflux     Glaucoma     Hammertoe     High blood pressure     Insomnia     Neuropathy     Right Foot    Osteoarthritis     Primary open angle glaucoma of both eyes 05/19/2015    Diagnosis of glaucoma OU; Started Latanoprost qhs after abnormal VF and OCT 2/25/16;  6/5/18 consult with Dr. Keaton Ayala progress note    Small bowel obstruction ([address]     Tendinitis     Unspecified essential hypertension     Visual impairment     Reraders      Past Surgical History:   Procedure Laterality Date    ANAL SPHINCTEROTOMY      03/12/2013 [place]    BLEPHAROPLASTY ANESTHESIA Bilateral 03/05/2020    Dr Bree Reed Ophthalmology     CATARACT EXTRACTION W/  INTRAOCULAR LENS IMPLANT Left 05/07/2018    L PC IOL with Dr. Monnie Goodpasture @ [address] [place] [address]    COLONOSCOPY N/A 11/11/2016    Procedure: COLONOSCOPY;  Surgeon: Shahana Oreilly MD;  Location: [address] [place] [address] ENDOSCOPY    COLONOSCOPY N/A 09/06/2019    Procedure: COLONOSCOPY;  Surgeon: Rachelle Dennis MD;  Location: [facility] ENDOSCOPY    EXCISION OF CHALAZION, SINGLE - OD - RIGHT EYE Right 6.27/2017    [place]    HC ARTHROCENTESIS OR INJECT MAJOR JOINT W/O US      HYSTERECTOMY  1996    KAI    OTHER      Lap Nissen Fundoplication surgery    OTHER SURGICAL HISTORY  2005,2007,2008    LAPAROSCOPIC LYSIS OF ADHESION    OTHER SURGICAL HISTORY      right foot bunionectomy    OTHER SURGICAL HISTORY  11/14/2014    right superficial anterior peroneal nerve biopsy and right proximal thigh muscle biopsy.     OTHER SURGICAL HISTORY  06/13/2023    Excision and Biopsy of two  perineal cysts    UPPER GI ENDOSCOPY PERFORMED  05/2015    YAG CAPSULOTOMY - OS - LEFT EYE Left 12/19/2018    RJM      Family History   Problem Relation Age of Onset    Hypertension Father     Hypertension Mother     Hypertension Sister     Cancer Sister         liver cancer    Hypertension Brother     Diabetes Neg     Glaucoma Neg     Macular degeneration Neg     Breast Cancer Neg     Ovarian Cancer Neg       Social History    Socioeconomic History      Marital status:     Tobacco Use      Smoking status: Never      Smokeless tobacco: Never    Vaping Use      Vaping Use: Never used    Substance and Sexual Activity      Alcohol use: Never        Comment: None. Drug use: Never      Sexual activity: Yes        Birth control/protection: Hysterectomy    Other Topics      Concerns:        Caffeine Concern: Yes          occasional soda        Exercise: Yes        Pt has a pacemaker: No        Pt has a defibrillator: No        Breast feeding: No        Reaction to local anesthetic: No        Right Handed: Yes          REVIEW OF SYSTEMS:   Today reviewed systens as documented below  GENERAL HEALTH: feels well otherwise  SKIN: Refer to exam below  RESPIRATORY: denies shortness of breath with exertion  CARDIOVASCULAR: denies chest pain on exertion  GI: denies abdominal pain and denies heartburn  NEURO: denies headaches    EXAM:   There were no vitals taken for this visit. GENERAL: well developed, well nourished, in no apparent distress  EXTREMITIES:   1. Integument: The skin on both feet was evaluated. She still has a little white streak from the cortisone injection on the dorsal aspect of her right foot but there is no pain in the right foot. The left foot shows a surgical scar to be well-healed and edema is significantly reduced since last time she was in.   2. Vascular: The patient has palpable pulses dorsalis pedis and posterior tibial bilateral   3. Neurologic: Patient has intact sensorium there are no deficits noted   4. Musculoskeletal: Fusion site was stressed there is no pain on stressing the fusion site there is no motion noted.   X-rays were taken 3 views with the medial oblique instead of the lateral oblique and they showed no osseous abnormality in the fusion site is progressing towards full clinic goal and radiographic union.  Fractures or dislocations noted. No long screws to irritate the joints. ASSESSMENT AND PLAN:   Diagnoses and all orders for this visit:    Acute postoperative pain of foot, left  -     methylPREDNISolone 4 MG Oral Tablet Therapy Pack; Take per package insert (instructions). Other orders  -     EEH AMB POD XR - LT FOOT 3 VIEWS(AP,LAT,OBLIQUE) WT BEARING        Plan: Prescribed a Medrol Dosepak to see if this would take care of her temporary pain she will wear a well-padded gym shoe with a firm sole and then follow-up with us again in 2 to 3 weeks. The patient indicates understanding of these issues and agrees to the plan.     Jenny Hawkins DPM

## 2023-08-10 ENCOUNTER — OFFICE VISIT (OUTPATIENT)
Dept: INTERNAL MEDICINE CLINIC | Facility: CLINIC | Age: 63
End: 2023-08-10

## 2023-08-10 VITALS
SYSTOLIC BLOOD PRESSURE: 122 MMHG | DIASTOLIC BLOOD PRESSURE: 82 MMHG | BODY MASS INDEX: 30.31 KG/M2 | WEIGHT: 200 LBS | HEART RATE: 86 BPM | HEIGHT: 68 IN

## 2023-08-10 DIAGNOSIS — R51.9 BILATERAL HEADACHES: Primary | ICD-10-CM

## 2023-08-10 PROCEDURE — 3074F SYST BP LT 130 MM HG: CPT | Performed by: INTERNAL MEDICINE

## 2023-08-10 PROCEDURE — 3079F DIAST BP 80-89 MM HG: CPT | Performed by: INTERNAL MEDICINE

## 2023-08-10 PROCEDURE — 99214 OFFICE O/P EST MOD 30 MIN: CPT | Performed by: INTERNAL MEDICINE

## 2023-08-10 PROCEDURE — 3008F BODY MASS INDEX DOCD: CPT | Performed by: INTERNAL MEDICINE

## 2023-08-10 RX ORDER — AMITRIPTYLINE HYDROCHLORIDE 10 MG/1
10 TABLET, FILM COATED ORAL NIGHTLY
Qty: 30 TABLET | Refills: 0 | Status: SHIPPED | OUTPATIENT
Start: 2023-08-10

## 2023-08-10 RX ORDER — ACETAMINOPHEN 160 MG
2000 TABLET,DISINTEGRATING ORAL DAILY
COMMUNITY

## 2023-08-15 ENCOUNTER — HOSPITAL ENCOUNTER (OUTPATIENT)
Dept: CT IMAGING | Age: 63
Discharge: HOME | End: 2023-08-15
Attending: INTERNAL MEDICINE

## 2023-08-15 DIAGNOSIS — Z13.6 SCREENING FOR CARDIOVASCULAR CONDITION: ICD-10-CM

## 2023-08-15 NOTE — PROGRESS NOTES
Date of Service 8/15/2023    Manuel Whitt  Date of Birth 7/2/1960    Patient Age: 70year old    PCP: Phill Ireland MD  [address] [facility] [place] [address]    Heart Scan Consult  Preliminary Heart Scan Score: 68.9    Previous Screening  Heart Scan Completed Previously: No                   Risk Factors  Personal Risk Factors  Alterable Risk Factors: Pre-Diabetes; High Blood Pressure; Abnormal Cholesterol      Body Mass Index  There is no height or weight on file to calculate BMI. Blood Pressure  There were no vitals taken for this visit. 122/82 on medication. (Normal =< 120/80,  Elevated = 120-129/ >80,  High Stage1 130-139/80-89 , Stage2 >140/>90)    Lipid Profile  Cholesterol: 226, done on 6/8/2023. HDL Cholesterol: 57, done on 6/8/2023. LDL Cholesterol: 138, done on 6/8/2023. TriGlycerides 176, done on 6/8/2023. We discussed cholesterol and the saturated fats. She is not on cholesterol med. Cholesterol Goals  Value   Total  =< 200   HDL  = > 45 Men = > 55 Women   LDL   =< 100   Triglycerides  =< 150       Glucose and Hemoglobin A1C  Last A1c that I can find is from 2021. This falls into the Prediabetes range. Ask Dr. Rodriguez Anderson to order a Hemoglobin A1c. Prediabetes is 5.7 - 6.4. Your last result was 6.0 which is in the Pre diabetes range. Lab Results   Component Value Date     (H) 06/08/2023    A1C 6.0 (H) 08/26/2021     (Normal Fasting Glucose < 100mg/dl )    Nurse Review  Risk factor information and results reviewed with Nurse: Yes    Recommended Follow Up:  Consult your physician regarding[de-identified] Final Heart Scan Report; Discuss potential for Incidental Finding      Recommendations for Change:  Nutrition Changes: Low Saturated Fat; Increase Fiber    Cholesterol Modification (goal of therapy depends upon your risk): Decrease LDL (Lousy/Bad) Ideal <100;Decrease Triglycerides (Ugly) Normal <150    Exercise: Enhance Current Program    Smoking Cessation: No Change Needed    Weight Management: Decrease Current Weight    Stress Management: Adopt Stress Management Techniques    Repeat Heart Scan: 3 Years if Calcium Score is > 0. 0; Discuss with your Physician              Edward-Gardena Recommended Resources:  Recommended Resources: Upcoming Classes, Medical Services and Health Library www. [facility]. Candi Potter RN        Please Contact the Nurse Heart Line with any Questions or Concerns [phone].

## 2023-09-09 DIAGNOSIS — K21.9 GASTROESOPHAGEAL REFLUX DISEASE WITHOUT ESOPHAGITIS: ICD-10-CM

## 2023-09-10 RX ORDER — OMEPRAZOLE 20 MG/1
40 CAPSULE, DELAYED RELEASE ORAL EVERY MORNING
Qty: 180 CAPSULE | Refills: 3 | Status: SHIPPED | OUTPATIENT
Start: 2023-09-10

## 2023-09-10 NOTE — TELEPHONE ENCOUNTER
Refill passed per [place] [employer] [place], [facility] protocol.     Requested Prescriptions   Pending Prescriptions Disp Refills    OMEPRAZOLE 20 MG Oral Capsule Delayed Release [Pharmacy Med Name: OMEPRAZOLE 20MG CAPSULES] 180 capsule 1     Sig: TAKE 2 CAPSULES(40 MG) BY MOUTH EVERY MORNING       Gastrointestional Medication Protocol Passed - 9/9/2023  8:57 AM        Passed - In person appointment or virtual visit in the past 12 mos or appointment in next 3 mos     Recent Outpatient Visits              1 month ago Bilateral headaches    [facility] [facility], Lizy Rocha MD    Office Visit    1 month ago Acute postoperative pain of foot, left    [facility], [address], [place], Rennis Bring, [place]    Office Visit    1 month ago Nasal septal deviation    [address], [address], Shreya Guerrero MD    Office Visit    1 month ago Recurrent sinusitis    [facility]    Office Visit    1 month ago Adhesive capsulitis of left shoulder    [address], [address], Araceli Woodall MD    Office Visit          Future Appointments         Provider Department Appt Notes    In 2 months [facility]; [facility]                    Future Appointments         Provider Department Appt Notes    In 2 months [facility]1; [facility] [address]            Recent Outpatient Visits              1 month ago Bilateral headaches    [address] [facility], Lizy Rocha MD    Office Visit    1 month ago Acute postoperative pain of foot, left    [facility], [address] [place], [place]    Office Visit    1 month ago Nasal septal deviation    [address], [address], Reid Junior MD Office Visit    1 month ago Recurrent sinusitis    Jeison Rios MD    Office Visit    1 month ago Adhesive capsulitis of left shoulder    Jose C Sheppard Sindy Daniels, MD    Office Visit

## 2023-09-21 ENCOUNTER — HOSPITAL ENCOUNTER (OUTPATIENT)
Dept: GENERAL RADIOLOGY | Facility: HOSPITAL | Age: 63
Discharge: HOME | End: 2023-09-21
Attending: INTERNAL MEDICINE
Payer: COMMERCIAL

## 2023-09-21 ENCOUNTER — OFFICE VISIT (OUTPATIENT)
Dept: INTERNAL MEDICINE CLINIC | Facility: CLINIC | Age: 63
End: 2023-09-21

## 2023-09-21 VITALS
OXYGEN SATURATION: 99 % | DIASTOLIC BLOOD PRESSURE: 77 MMHG | HEART RATE: 83 BPM | HEIGHT: 68 IN | RESPIRATION RATE: 16 BRPM | BODY MASS INDEX: 30.31 KG/M2 | WEIGHT: 200 LBS | SYSTOLIC BLOOD PRESSURE: 125 MMHG

## 2023-09-21 DIAGNOSIS — M54.2 CERVICALGIA: Primary | ICD-10-CM

## 2023-09-21 DIAGNOSIS — M54.2 CERVICALGIA: ICD-10-CM

## 2023-09-21 DIAGNOSIS — Z23 NEED FOR VACCINATION: ICD-10-CM

## 2023-09-21 PROCEDURE — G0008 ADMIN INFLUENZA VIRUS VAC: HCPCS | Performed by: INTERNAL MEDICINE

## 2023-09-21 PROCEDURE — 90662 IIV NO PRSV INCREASED AG IM: CPT | Performed by: INTERNAL MEDICINE

## 2023-09-21 PROCEDURE — 72050 X-RAY EXAM NECK SPINE 4/5VWS: CPT | Performed by: INTERNAL MEDICINE

## 2023-09-21 PROCEDURE — 99213 OFFICE O/P EST LOW 20 MIN: CPT | Performed by: INTERNAL MEDICINE

## 2023-09-21 RX ORDER — ROSUVASTATIN CALCIUM 5 MG/1
5 TABLET, COATED ORAL DAILY
COMMUNITY

## 2023-09-21 RX ORDER — METHYLPREDNISOLONE 4 MG/1
TABLET ORAL
Qty: 1 EACH | Refills: 0 | Status: SHIPPED | OUTPATIENT
Start: 2023-09-21

## 2023-09-28 NOTE — ASSESSMENT & PLAN NOTE
09/28/2023   [facility] [place]  N/A  For questions about this resource list or additional care needs, please contact your primary care clinic or care manager.  Phone: [phone]   Email: N/A   Address: [facility] [place] [place] [address]   Hours: N/A        Transportation       Free or low-cost transportation  1  [facility] Distance: 7.43 miles      In-Person   [address] [place] [address]  Language: English  Hours: Mon - Wed 9:30 AM - 12:00 PM , Mon - Wed 1:00 PM - 2:00 PM Appt. Only  Fees: Free   Phone: (924) 151-6053 Email: info@[employer].org Website: http://www.[employer].org/     2  [facility] Distance: 7.76 miles      In-Person   [address] [place] [address]  Language: English  Hours: Tue 9:30 AM - 11:30 AM , Thu 9:30 AM - 11:30 AM  Fees: Free   Phone: (181) 449-1776 Email: info@[employer] Website: http://www.[employer]/     Transportation to medical appointments  3  [facility]  [facility] Distance: 3.59 miles      In-Person   [address] [place] [address]  Language: Georgian, Urdu, English, Gujarati, Rajesh, Amharic, Sinhala, Japanese, French, Macedonian  Hours: Mon - Wed 9:00 AM - 5:00 PM , Thu 12:00 PM - 6:00 PM , Fri 9:00 AM - 5:00 PM , Sun 10:30 AM - 2:00 PM Appt. Only  Fees: Free   Phone: (529) 923-3357 Email: info@[facility]-aif[facility].org Website: https://www.sewa-aifw.org/     4  [facility] Distance: 5.99 miles      In-Person   [address] [address]  Language: English  Hours: Mon - Sun Open 24 Hours  Fees: Insurance, Self Pay   Phone: (549) 599-1556 Email: sophia@[employer] Website: https://www.[employer]/midmetro/          Important Numbers & Websites       Emergency Services   911  [facility]   311  Poison Control   (890) 257-8812  Suicide Prevention Lifeline   (599) 424-4872 (TALK)  Child Abuse Hotline   (185)  No treatment. 023-4422 (4-A-Child)  Sexual Assault Hotline   (371) 838-2765 (HOPE)  National Runaway Safeline   (573) 623-9220 (RUNAWAY)  All-Options Talkline   (917) 516-3489  Substance Abuse Referral   (795) 168-5828 (HELP)

## 2023-10-05 ENCOUNTER — OFFICE VISIT (OUTPATIENT)
Dept: INTERNAL MEDICINE CLINIC | Facility: CLINIC | Age: 63
End: 2023-10-05

## 2023-10-05 VITALS
BODY MASS INDEX: 30.62 KG/M2 | RESPIRATION RATE: 18 BRPM | OXYGEN SATURATION: 96 % | HEIGHT: 68 IN | SYSTOLIC BLOOD PRESSURE: 128 MMHG | DIASTOLIC BLOOD PRESSURE: 72 MMHG | HEART RATE: 77 BPM | WEIGHT: 202 LBS

## 2023-10-05 DIAGNOSIS — R23.3 EASY BRUISING: ICD-10-CM

## 2023-10-05 DIAGNOSIS — E78.2 MIXED HYPERLIPIDEMIA: ICD-10-CM

## 2023-10-05 DIAGNOSIS — S20.01XA CONTUSION OF RIGHT BREAST, INITIAL ENCOUNTER: Primary | ICD-10-CM

## 2023-10-05 PROCEDURE — 3078F DIAST BP <80 MM HG: CPT | Performed by: INTERNAL MEDICINE

## 2023-10-05 PROCEDURE — 3074F SYST BP LT 130 MM HG: CPT | Performed by: INTERNAL MEDICINE

## 2023-10-05 PROCEDURE — 99214 OFFICE O/P EST MOD 30 MIN: CPT | Performed by: INTERNAL MEDICINE

## 2023-10-05 PROCEDURE — 3008F BODY MASS INDEX DOCD: CPT | Performed by: INTERNAL MEDICINE

## 2023-10-06 ENCOUNTER — HOSPITAL ENCOUNTER (OUTPATIENT)
Dept: ULTRASOUND IMAGING | Facility: HOSPITAL | Age: 63
Discharge: HOME | End: 2023-10-06
Attending: INTERNAL MEDICINE
Payer: COMMERCIAL

## 2023-10-06 ENCOUNTER — HOSPITAL ENCOUNTER (OUTPATIENT)
Dept: MAMMOGRAPHY | Facility: HOSPITAL | Age: 63
Discharge: HOME | End: 2023-10-06
Attending: INTERNAL MEDICINE
Payer: COMMERCIAL

## 2023-10-06 ENCOUNTER — LAB ENCOUNTER (OUTPATIENT)
Dept: LAB | Facility: HOSPITAL | Age: 63
End: 2023-10-06
Attending: INTERNAL MEDICINE
Payer: COMMERCIAL

## 2023-10-06 DIAGNOSIS — R23.3 EASY BRUISING: ICD-10-CM

## 2023-10-06 DIAGNOSIS — S20.01XA CONTUSION OF RIGHT BREAST, INITIAL ENCOUNTER: ICD-10-CM

## 2023-10-06 DIAGNOSIS — E78.2 MIXED HYPERLIPIDEMIA: ICD-10-CM

## 2023-10-06 LAB
APTT PPP: 31.1 SECONDS (ref 23.3–35.6)
CHOLEST SERPL-MCNC: 179 MG/DL (ref ?–200)
FASTING PATIENT LIPID ANSWER: YES
HDLC SERPL-MCNC: 67 MG/DL (ref 40–59)
INR BLD: 1 (ref 0.85–1.16)
LDLC SERPL CALC-MCNC: 87 MG/DL (ref ?–100)
NONHDLC SERPL-MCNC: 112 MG/DL (ref ?–130)
PROTHROMBIN TIME: 13.8 SECONDS (ref 11.6–14.8)
TRIGL SERPL-MCNC: 144 MG/DL (ref 30–149)
VLDLC SERPL CALC-MCNC: 23 MG/DL (ref 0–30)

## 2023-10-06 PROCEDURE — 85730 THROMBOPLASTIN TIME PARTIAL: CPT

## 2023-10-06 PROCEDURE — 36415 COLL VENOUS BLD VENIPUNCTURE: CPT

## 2023-10-06 PROCEDURE — 77066 DX MAMMO INCL CAD BI: CPT | Performed by: INTERNAL MEDICINE

## 2023-10-06 PROCEDURE — 80061 LIPID PANEL: CPT

## 2023-10-06 PROCEDURE — 76642 ULTRASOUND BREAST LIMITED: CPT | Performed by: INTERNAL MEDICINE

## 2023-10-06 PROCEDURE — 77062 BREAST TOMOSYNTHESIS BI: CPT | Performed by: INTERNAL MEDICINE

## 2023-10-06 PROCEDURE — 85610 PROTHROMBIN TIME: CPT

## 2023-10-08 DIAGNOSIS — R92.8 ABNORMAL MAMMOGRAM: Primary | ICD-10-CM

## 2023-10-10 ENCOUNTER — TELEPHONE (OUTPATIENT)
Dept: ORTHOPEDICS CLINIC | Facility: CLINIC | Age: 63
End: 2023-10-10

## 2023-10-10 DIAGNOSIS — M17.0 PRIMARY OSTEOARTHRITIS OF BOTH KNEES: Primary | ICD-10-CM

## 2023-10-10 NOTE — TELEPHONE ENCOUNTER
S/w pt and she states she is having pain in both knees/swelling and has upcoming appts on 10/26 and 11/2 for her knees. Pt states she had cortisone injection in right knee on 3/23/23 and it only helped for 1 month. Pt states she had had a gel injection before but it was several years ago. Pt requesting to get gel injections at next visit. Do you approve Durolane?  If so I will submit a prior auth request.

## 2023-10-16 ENCOUNTER — TELEPHONE (OUTPATIENT)
Dept: ADMINISTRATIVE | Age: 63
End: 2023-10-16

## 2023-10-16 NOTE — TELEPHONE ENCOUNTER
Hello    Please advise how many units of Durolane are being requested for approval.  Once advised we can forward the request to Luisa for review / approval.     Thank you   Martha Dwyer

## 2023-10-21 ENCOUNTER — OFFICE VISIT (OUTPATIENT)
Dept: OBGYN CLINIC | Facility: CLINIC | Age: 63
End: 2023-10-21
Payer: COMMERCIAL

## 2023-10-21 VITALS
DIASTOLIC BLOOD PRESSURE: 78 MMHG | WEIGHT: 200 LBS | BODY MASS INDEX: 30 KG/M2 | SYSTOLIC BLOOD PRESSURE: 121 MMHG | HEART RATE: 89 BPM

## 2023-10-21 DIAGNOSIS — N89.8 VAGINAL DISCHARGE: Primary | ICD-10-CM

## 2023-10-21 DIAGNOSIS — N90.89 VULVAR IRRITATION: ICD-10-CM

## 2023-10-21 DIAGNOSIS — R10.2 PELVIC PAIN: ICD-10-CM

## 2023-10-21 PROCEDURE — 99213 OFFICE O/P EST LOW 20 MIN: CPT | Performed by: NURSE PRACTITIONER

## 2023-10-21 RX ORDER — NYSTATIN 100000 [USP'U]/G
1 POWDER TOPICAL 2 TIMES DAILY
Qty: 1 EACH | Refills: 0 | Status: SHIPPED | OUTPATIENT
Start: 2023-10-21

## 2023-10-23 ENCOUNTER — TELEPHONE (OUTPATIENT)
Dept: OBGYN CLINIC | Facility: CLINIC | Age: 63
End: 2023-10-23

## 2023-10-23 LAB
GENITAL VAGINOSIS SCREEN: NEGATIVE
TRICHOMONAS SCREEN: NEGATIVE

## 2023-10-23 NOTE — TELEPHONE ENCOUNTER
----- Message from BRIONNA Nielsen sent at 10/23/2023  3:32 PM CDT -----  +yeast on vaginal culture. Please rx terazol vaginal cream x 7 nights.     BRIONNA Nielsen

## 2023-10-26 ENCOUNTER — HOSPITAL ENCOUNTER (OUTPATIENT)
Dept: GENERAL RADIOLOGY | Facility: HOSPITAL | Age: 63
Discharge: HOME | End: 2023-10-26
Attending: ORTHOPAEDIC SURGERY

## 2023-10-26 ENCOUNTER — OFFICE VISIT (OUTPATIENT)
Dept: ORTHOPEDICS CLINIC | Facility: CLINIC | Age: 63
End: 2023-10-26

## 2023-10-26 VITALS — HEART RATE: 79 BPM | DIASTOLIC BLOOD PRESSURE: 80 MMHG | SYSTOLIC BLOOD PRESSURE: 132 MMHG

## 2023-10-26 DIAGNOSIS — M25.569 KNEE PAIN, UNSPECIFIED CHRONICITY, UNSPECIFIED LATERALITY: ICD-10-CM

## 2023-10-26 DIAGNOSIS — M17.0 PRIMARY OSTEOARTHRITIS OF BOTH KNEES: Primary | ICD-10-CM

## 2023-10-26 PROCEDURE — 73564 X-RAY EXAM KNEE 4 OR MORE: CPT | Performed by: ORTHOPAEDIC SURGERY

## 2023-10-26 PROCEDURE — 99213 OFFICE O/P EST LOW 20 MIN: CPT | Performed by: ORTHOPAEDIC SURGERY

## 2023-10-26 PROCEDURE — 3075F SYST BP GE 130 - 139MM HG: CPT | Performed by: ORTHOPAEDIC SURGERY

## 2023-10-26 PROCEDURE — 20610 DRAIN/INJ JOINT/BURSA W/O US: CPT | Performed by: ORTHOPAEDIC SURGERY

## 2023-10-26 PROCEDURE — 3079F DIAST BP 80-89 MM HG: CPT | Performed by: ORTHOPAEDIC SURGERY

## 2023-10-26 NOTE — PROGRESS NOTES
NURSING INTAKE COMMENTS: Patient presents with:  Knee Pain: R knee f/u- rates pain 10/10 most of the time- here for Durolane inj today      HPI: This 70year old female presents today with complaints of bilateral knee pain follow-up. She had progressive pain in both knees mainly over the lateral aspect. Minimal swelling noted. No recent injuries to the knees. She requests an injection today.     Past Medical History:   Diagnosis Date    Abscess of left thigh     Acute meniscal tear of knee     Age-related nuclear cataract of both eyes 02/10/2015    Anal sphincter incontinence 04/28/2014    Arthritis     Back problem     Chondromalacia     Colon polyps     Degenerative disc disease     Diverticular disease     Esophageal reflux     Glaucoma     Hammertoe     High blood pressure     Insomnia     Neuropathy     Right Foot    Osteoarthritis     Primary open angle glaucoma of both eyes 05/19/2015    Diagnosis of glaucoma OU; Started Latanoprost qhs after abnormal VF and OCT 2/25/16;  6/5/18 consult with Dr. Aldo Sierra progress note    Small bowel obstruction ([address]     Tendinitis     Unspecified essential hypertension     Visual impairment     Reraders     Past Surgical History:   Procedure Laterality Date    ANAL SPHINCTEROTOMY      03/12/2013 G[facility]    BLEPHAROPLASTY ANESTHESIA Bilateral 03/05/2020    Dr Francisca Crump [facility]     CATARACT EXTRACTION W/  INTRAOCULAR LENS IMPLANT Left 05/07/2018    L PC IOL with Dr. Stanley Belleville @ [address] [place] [address]    COLONOSCOPY N/A 11/11/2016    Procedure: COLONOSCOPY;  Surgeon: John Gilliam MD;  Location: [address] [place] [address] ENDOSCOPY    COLONOSCOPY N/A 09/06/2019    Procedure: COLONOSCOPY;  Surgeon: Asiya Serrano MD;  Location: [facility] ENDOSCOPY    EXCISION OF CHALAZION, SINGLE - OD - RIGHT EYE Right 6.27/2017    Nasally    HC ARTHROCENTESIS OR INJECT MAJOR JOINT W/O US      HYSTERECTOMY  1996    KAI    OTHER      Lap Nissen Fundoplication surgery    OTHER SURGICAL HISTORY  [id number] LAPAROSCOPIC LYSIS OF ADHESION    OTHER SURGICAL HISTORY      right foot bunionectomy    OTHER SURGICAL HISTORY  11/14/2014    right superficial anterior peroneal nerve biopsy and right proximal thigh muscle biopsy. OTHER SURGICAL HISTORY  06/13/2023    Excision and Biopsy of two  perineal cysts    UPPER GI ENDOSCOPY PERFORMED  05/2015    YAG CAPSULOTOMY - OS - LEFT EYE Left 12/19/2018    RJM     Current Outpatient Medications   Medication Sig Dispense Refill    teraconazole 0.4 % Vaginal Cream Place 1 applicator vaginally nightly for 7 days. 45 g 0    Nystatin 303032 UNIT/GM External Powder Apply 1 Application topically 2 (two) times daily. For 2 weeks 1 each 0    rosuvastatin 5 MG Oral Tab Take 1 tablet (5 mg total) by mouth daily. omeprazole 20 MG Oral Capsule Delayed Release Take 2 capsules (40 mg total) by mouth every morning. 180 capsule 3    cholecalciferol 50 MCG (2000 UT) Oral Cap Take 1 capsule (2,000 Units total) by mouth daily. amLODIPine 10 MG Oral Tab TAKE 1 TABLET BY MOUTH EVERY DAY (Patient taking differently: at bedtime. TAKE 1 TABLET BY MOUTH EVERY DAY) 90 tablet 3    ibuprofen 600 MG Oral Tab Take 1 tablet (600 mg total) by mouth every 8 (eight) hours as needed for Pain. 90 tablet 1    latanoprost 0.005 % Ophthalmic Solution INSTILL 1 DROP IN BOTH EYES EVERY night 7.5 mL 3    MAGNESIUM OR Take by mouth.          Celecoxib               TONGUE SWELLING    Comment:Other reaction(s): CELECOXIB  Sulfa Antibiotics       TONGUE SWELLING    Comment:Other reaction(s): SULFA (SULFONAMIDE ANTIBIOTICS)  Erythromycin            PAIN  Amoxicillin             DIARRHEA  Family History   Problem Relation Age of Onset    Hypertension Father     Hypertension Mother     Hypertension Sister     Cancer Sister         liver cancer    Hypertension Brother     Diabetes Neg     Glaucoma Neg     Macular degeneration Neg     Breast Cancer Neg     Ovarian Cancer Neg        Social History    Occupational History Not on file    Tobacco Use      Smoking status: Never      Smokeless tobacco: Never    Vaping Use      Vaping Use: Never used    Substance and Sexual Activity      Alcohol use: No        Comment: None. Drug use: No      Sexual activity: Yes        Birth control/protection: Hysterectomy       Review of Systems:  GENERAL: denies fevers, chills, night sweats, fatigue, unintentional weight loss/gain  SKIN: denies skin lesions, open sores, rash  HEENT:denies recent vision change, new nasal congestion,hearing loss, tinnitus, sore throat, headaches  RESPIRATORY: denies new shortness of breath, cough, asthma, wheezing  CARDIOVASCULAR: denies chest pain, leg cramps with exertion, palpitations, leg swelling  GI: denies abdominal pain, nausea, vomiting, diarrhea, constipation, hematochezia, worsening heartburn or stomach ulcers  : denies dysuria, hematuria, incontinence, increased frequency, urgency, difficulty urinating  MUSCULOSKELETAL: denies musculoskeletal complaints other than in HPI  NEURO: denies numbness, tingling, weakness, balance issues, dizziness, memory loss  PSYCHIATRIC: denies Hx of depression, anxiety, other psychiatric disorders  HEMATOLOGIC: denies blood clots, anemia, blood clotting disorders, blood transfusion  ENDOCRINE: denies autoimmune disease, thyroid issues, or diabetes  ALLERGY: denies asthma, seasonal allergies    Physical Examination:    /80   Pulse 79   Constitutional: appears well hydrated, alert and responsive, no acute distress noted  Extremities: Mild valgus deformities of both knees. Trace effusion of the right knee no effusion the left knee.   Neurological: Unchanged    Imaging:   [facility] 2D+3D DIAGNOSTIC BRITTANY  BILAT (CPT=77066/21876)    Result Date: 10/6/2023  PROCEDURE: [facility] 2D+3D DIAGNOSTIC BRITTANY BILAT (CPT=77066/47038) US BREAST RIGHT LIMITED (MAC=[id number])  COMPARISON: [facility] for [place], [facility] ROMINA 2D+3D SCREENING BILAT ([id number]), 10/13/2020, 4:11 PM.  [facility], [facility] ROMINA 2D+3D Dora  ([id number]), 10/30/2021, 8:13 AM.  [facility] 2D+3D DIAGNOSTIC ADDL VWS  LEFT (UEX=[id number]/09867), 11/05/2021, 7:53 AM.  [facility] 2D+3D SCREENING BILAT ([id number]), 11/07/2022, 5:00 PM.  INDICATIONS: S20.01XA Contusion of right breast, initial encounter  TECHNIQUE:  Digital diagnostic mammography was performed and images were reviewed with the R2 GREGORY [de-identified] CAD device. 3D tomosynthesis was performed and reviewed. Targeted right breast ultrasound performed. BREAST COMPOSITION:   Category b-Scattered areas fibroglandular density. FINDINGS: There is an asymmetry in the anterior outer right breast deep to the skin surface corresponding to the area of concern. This localizes inferiorly with tomosynthesis. Otherwise no suspicious mass, calcification or distortion seen in either breast.   Targeted ultrasound right breast performed. A benign incidental cyst is seen at 8 o'clock 5 cm from nipple measuring 0.7 by 0.1 x 0.5 cm. There is an adjacent oval echogenic lesion without internal vascularity measuring 0.6 x 0.3 x 0.4 cm deep to the skin surface. This is probably benign and may reflect a hematoma given history. CONCLUSION:     Probably benign echogenic lesion right breast 8 o'clock corresponding to the area of right breast bruising and also likely corresponding to the mammographic asymmetry. Given history, this is thought to represent a small hematoma. Recommend six-month follow-up right diagnostic mammogram and ultrasound. BI-RADS CATEGORY:   DIAGNOSTIC CATEGORY 3--PROBABLY BENIGN FINDING. RECOMMENDATIONS:  SHORT TERM FOLLOW-UP DIAGNOSTIC MAMMOGRAM RIGHT BREAST IN 6 MONTHS. SHORT TERM FOLLOW-UP ULTRASOUND RIGHT BREAST IN 6 MONTHS. PLEASE NOTE: NORMAL MAMMOGRAM DOES NOT EXCLUDE THE POSSIBILITY OF BREAST CANCER.   A CLINICALLY SUSPICIOUS PALPABLE LUMP SHOULD BE BIOPSIED. For patients over the age of 36, the target due date for the patient's next mammogram has been entered into a reminder system. Patient received a discharge summary from the technologist after completion of exam.  Breast marker legend used on images  Triangle = Palpable lump [place] = Skin tag or mole BB = Nipple Linear jame = Scar Square = Pain    Dictated by (CST): Chan Nash MD on 10/06/2023 at 7:56 AM     Finalized by (CST): Chan Nash MD on 10/06/2023 at 8:57 AM          James Estrada (VCC=[id number])    Result Date: 10/6/2023         PROCEDURE: US BREAST RIGHT LIMITED (FFD=22134)  COMPARISON: None. INDICATIONS: Contusion of right breast, initial encounter  TECHNIQUE:  Breast ultrasound was performed with evaluation only on specific areas of concern. FINDINGS: Ultrasound report is dictated under same-day diagnostic mammogram report. Please refer to the separately dictated same day diagnostic mammogram report for findings and recommendations. Dictated by (CST): Chan Nash MD on 10/06/2023 at 8:51 AM     Finalized by (CST): Chan Nash MD on 10/06/2023 at 8:53 AM             X-rays of both knees were obtained in the office today. They demonstrate severe degenerative change lateral compartment right knee moderate to severe degenerative change lateral compartment left knee. No soft tissue calcifications or fracture.     Labs:  Lab Results   Component Value Date    WBC 8.8 06/08/2023    HGB 14.9 06/08/2023    .0 06/08/2023      Lab Results   Component Value Date     (H) 06/08/2023    BUN 12 06/08/2023    CREATSERUM 0.88 06/08/2023    GFR 46 (L) 04/02/2016    GFRNAA 66 05/21/2022    GFRAA 76 05/21/2022        Assessment and Plan:  Diagnoses and all orders for this visit:    Primary osteoarthritis of both knees  -     arthrocentesis major joint  -     sodium hyaluronate (DUROLANE) 60 mg    Knee pain, unspecified chronicity, unspecified laterality  -     XR KNEE COMPLETE BILAT EM(CPT=73564-50); Future        Assessment: Bilateral knee osteoarthritis, primary, lateral compartment    Plan: I discussed operative and nonoperative treatment options. She like to continue with nonoperative care for now. She requested a Durolane injection today. The right knee was aspirated and injected with 60 mg of Durolane. 5 cc of clear yellow fluid was retrieved. Recommend follow-up in 1 week for left knee Durolane injection. Follow Up: Return in about 1 week (around 11/2/2023).     Arlin Nath MD

## 2023-11-06 ENCOUNTER — OFFICE VISIT (OUTPATIENT)
Dept: INTERNAL MEDICINE CLINIC | Facility: CLINIC | Age: 63
End: 2023-11-06
Payer: COMMERCIAL

## 2023-11-06 VITALS
SYSTOLIC BLOOD PRESSURE: 123 MMHG | RESPIRATION RATE: 18 BRPM | DIASTOLIC BLOOD PRESSURE: 78 MMHG | WEIGHT: 202.38 LBS | BODY MASS INDEX: 30.67 KG/M2 | HEIGHT: 68 IN | HEART RATE: 80 BPM

## 2023-11-06 DIAGNOSIS — M48.02 CERVICAL STENOSIS OF SPINAL CANAL: ICD-10-CM

## 2023-11-06 DIAGNOSIS — R04.0 EPISTAXIS: Primary | ICD-10-CM

## 2023-11-06 DIAGNOSIS — M79.651 RIGHT THIGH PAIN: ICD-10-CM

## 2023-11-06 PROCEDURE — 99214 OFFICE O/P EST MOD 30 MIN: CPT | Performed by: INTERNAL MEDICINE

## 2023-11-06 PROCEDURE — 3074F SYST BP LT 130 MM HG: CPT | Performed by: INTERNAL MEDICINE

## 2023-11-06 PROCEDURE — 3078F DIAST BP <80 MM HG: CPT | Performed by: INTERNAL MEDICINE

## 2023-11-06 PROCEDURE — 3008F BODY MASS INDEX DOCD: CPT | Performed by: INTERNAL MEDICINE

## 2023-11-06 RX ORDER — IBUPROFEN 600 MG/1
600 TABLET ORAL DAILY PRN
Qty: 90 TABLET | Refills: 1 | Status: SHIPPED | OUTPATIENT
Start: 2023-11-06

## 2023-11-08 ENCOUNTER — OFFICE VISIT (OUTPATIENT)
Dept: ORTHOPEDICS CLINIC | Facility: CLINIC | Age: 63
End: 2023-11-08
Payer: COMMERCIAL

## 2023-11-08 VITALS — HEART RATE: 73 BPM | SYSTOLIC BLOOD PRESSURE: 119 MMHG | DIASTOLIC BLOOD PRESSURE: 77 MMHG

## 2023-11-08 DIAGNOSIS — M17.12 PRIMARY OSTEOARTHRITIS OF LEFT KNEE: Primary | ICD-10-CM

## 2023-11-08 PROCEDURE — 3078F DIAST BP <80 MM HG: CPT

## 2023-11-08 PROCEDURE — 3074F SYST BP LT 130 MM HG: CPT

## 2023-11-08 PROCEDURE — 20610 DRAIN/INJ JOINT/BURSA W/O US: CPT

## 2023-11-08 NOTE — PROGRESS NOTES
NURSING INTAKE COMMENTS:   Chief Complaint   Patient presents with    Knee Pain     L knee f/u- rates pain 6/10 on and off- here for Durolane injection        HPI: This 70year old female presents today with complaints of left knee follow up. She is here for left knee Durolane injection today. States she has not noticed improvement in her pain in the right knee since her Durolane injection two weeks ago. She would like to proceed with left knee today.      Past Medical History:   Diagnosis Date    Abscess of left thigh     Acute meniscal tear of knee     Age-related nuclear cataract of both eyes 02/10/2015    Anal sphincter incontinence 04/28/2014    Arthritis     Back problem     Chondromalacia     Colon polyps     Degenerative disc disease     Diverticular disease     Esophageal reflux     Glaucoma     Hammertoe     High blood pressure     Insomnia     Neuropathy     Right Foot    Osteoarthritis     Primary open angle glaucoma of both eyes 05/19/2015    Diagnosis of glaucoma OU; Started Latanoprost qhs after abnormal VF and OCT 2/25/16;  6/5/18 consult with Dr. Rodri Osborn progress note    Small bowel obstruction ([address]     Tendinitis     Unspecified essential hypertension     Visual impairment     Reraders     Past Surgical History:   Procedure Laterality Date    ANAL SPHINCTEROTOMY      03/12/2013 [place]    BLEPHAROPLASTY ANESTHESIA Bilateral 03/05/2020    Dr Cristhian Borjas Ophthalmology     CATARACT EXTRACTION W/  INTRAOCULAR LENS IMPLANT Left 05/07/2018    L PC IOL with Dr. Celena Palmer @ [address] [place] [address]    COLONOSCOPY N/A 11/11/2016    Procedure: COLONOSCOPY;  Surgeon: Sophy Mccallum MD;  Location: [address] [place] [address] ENDOSCOPY    COLONOSCOPY N/A 09/06/2019    Procedure: COLONOSCOPY;  Surgeon: Lyndsey Valdes MD;  Location: [facility] ENDOSCOPY    EXCISION OF CHALAZION, SINGLE - OD - RIGHT EYE Right 6.27/2017    [facility]    HC ARTHROCENTESIS OR INJECT MAJOR JOINT W/O US      HYSTERECTOMY  1996    KAI    OTHER      Lap Nissen Fundoplication surgery    OTHER SURGICAL HISTORY  2005,2007,2008    LAPAROSCOPIC LYSIS OF ADHESION    OTHER SURGICAL HISTORY      right foot bunionectomy    OTHER SURGICAL HISTORY  11/14/2014    right superficial anterior peroneal nerve biopsy and right proximal thigh muscle biopsy. OTHER SURGICAL HISTORY  06/13/2023    Excision and Biopsy of two  perineal cysts    UPPER GI ENDOSCOPY PERFORMED  05/2015    YAG CAPSULOTOMY - OS - LEFT EYE Left 12/19/2018    RJM     Current Outpatient Medications   Medication Sig Dispense Refill    ibuprofen 600 MG Oral Tab Take 1 tablet (600 mg total) by mouth daily as needed for Pain. 90 tablet 1    Nystatin 642874 UNIT/GM External Powder Apply 1 Application topically 2 (two) times daily. For 2 weeks 1 each 0    rosuvastatin 5 MG Oral Tab Take 1 tablet (5 mg total) by mouth daily. omeprazole 20 MG Oral Capsule Delayed Release Take 2 capsules (40 mg total) by mouth every morning. 180 capsule 3    cholecalciferol 50 MCG (2000 UT) Oral Cap Take 1 capsule (2,000 Units total) by mouth daily. amLODIPine 10 MG Oral Tab TAKE 1 TABLET BY MOUTH EVERY DAY (Patient taking differently: at bedtime. TAKE 1 TABLET BY MOUTH EVERY DAY) 90 tablet 3    latanoprost 0.005 % Ophthalmic Solution INSTILL 1 DROP IN BOTH EYES EVERY night 7.5 mL 3    MAGNESIUM OR Take by mouth.        Allergies   Allergen Reactions    Celecoxib TONGUE SWELLING     Other reaction(s): CELECOXIB    Sulfa Antibiotics TONGUE SWELLING     Other reaction(s): SULFA (SULFONAMIDE ANTIBIOTICS)    Erythromycin PAIN    Amoxicillin DIARRHEA     Family History   Problem Relation Age of Onset    Hypertension Father     Hypertension Mother     Hypertension Sister     Cancer Sister         liver cancer    Hypertension Brother     Diabetes Neg     Glaucoma Neg     Macular degeneration Neg     Breast Cancer Neg     Ovarian Cancer Neg        Social History     Occupational History    Not on file   Tobacco Use    Smoking status: Never Passive exposure: Never    Smokeless tobacco: Never   Vaping Use    Vaping Use: Never used   Substance and Sexual Activity    Alcohol use: No     Comment: None. Drug use: No    Sexual activity: Yes     Birth control/protection: Hysterectomy        Review of Systems:  GENERAL: denies fevers, chills, night sweats, fatigue, unintentional weight loss/gain  SKIN: denies skin lesions, open sores, rash  HEENT:denies recent vision change, new nasal congestion,hearing loss, tinnitus, sore throat, headaches  RESPIRATORY: denies new shortness of breath, cough, asthma, wheezing  CARDIOVASCULAR: denies chest pain, leg cramps with exertion, palpitations, leg swelling  GI: denies abdominal pain, nausea, vomiting, diarrhea, constipation, hematochezia, worsening heartburn or stomach ulcers  : denies dysuria, hematuria, incontinence, increased frequency, urgency, difficulty urinating  MUSCULOSKELETAL: denies musculoskeletal complaints other than in HPI  NEURO: denies numbness, tingling, weakness, balance issues, dizziness, memory loss  PSYCHIATRIC: denies Hx of depression, anxiety, other psychiatric disorders  HEMATOLOGIC: denies blood clots, anemia, blood clotting disorders, blood transfusion  ENDOCRINE: denies autoimmune disease, thyroid issues, or diabetes  ALLERGY: denies asthma, seasonal allergies    Physical Examination:    /77   Pulse 73   Constitutional: appears well hydrated, alert and responsive, no acute distress noted  Extremities: Left knee exam is unchanged. There is no effusion of the left knee. No calf tenderness or swelling. No erythema or palpable warmth. No numbness. Neurological: Unchanged     Imaging:   XR KNEE COMPLETE BILAT EM(CPT=73564-50)    Result Date: 10/26/2023  PROCEDURE: XR KNEE COMPLETE BILAT EM  COMPARISON: None.   INDICATIONS: M25.569 Knee pain, unspecified chronicity, unspecified laterality  TECHNIQUE: 5 views FINDINGS:   Right knee:  Severe tibial femoral narrowing most pronounced in the lateral compartment with subchondral sclerosis and tiny cyst.  Small tricompartmental spurs. No patellofemoral narrowing. Normal alignment with no fracture or effusion  Left knee: Moderate tibial femoral compartment narrowing. No patellofemoral narrowing. Tiny tricompartmental spurs. Normal alignment with no fracture or effusion          CONCLUSION:   Right knee:  Severe narrowing tibial femoral compartment most pronounced laterally  Left knee: Moderate tibial femoral compartment narrowing    Dictated by (CST): Mynor Alvarado MD on 10/26/2023 at 5:12 PM     Finalized by (CST): Mynor Alvarado MD on 10/26/2023 at 5:14 PM             Labs:  Lab Results   Component Value Date    WBC 8.8 06/08/2023    HGB 14.9 06/08/2023    .0 06/08/2023      Lab Results   Component Value Date     (H) 06/08/2023    BUN 12 06/08/2023    CREATSERUM 0.88 06/08/2023    GFR 46 (L) 04/02/2016    GFRNAA 66 05/21/2022    GFRAA 76 05/21/2022        Assessment and Plan:  Diagnoses and all orders for this visit:    Primary osteoarthritis of left knee  -     Drain/Inject Large Joint/Bursa  -     sodium hyaluronate (DUROLANE) 60 mg        Assessment: Left knee osteoarthritis, primary     Plan: Using sterile technique and superior lateral parapatellar approach, the left knee was injected with Durolane. Patient tolerated the procedure well. Advised icing, Tylenol, and activity modifications. Follow up as needed for bilateral knee pain. Follow Up: Return if symptoms worsen or fail to improve.     Werner Escalona PA-C

## 2023-11-09 ENCOUNTER — HOSPITAL ENCOUNTER (OUTPATIENT)
Dept: ULTRASOUND IMAGING | Age: 63
Discharge: HOME | End: 2023-11-09
Attending: NURSE PRACTITIONER
Payer: COMMERCIAL

## 2023-11-09 DIAGNOSIS — R10.2 PELVIC PAIN: ICD-10-CM

## 2023-11-09 PROCEDURE — 76856 US EXAM PELVIC COMPLETE: CPT | Performed by: NURSE PRACTITIONER

## 2023-11-09 PROCEDURE — 76830 TRANSVAGINAL US NON-OB: CPT | Performed by: NURSE PRACTITIONER

## 2023-11-13 ENCOUNTER — OFFICE VISIT (OUTPATIENT)
Dept: PODIATRY CLINIC | Facility: CLINIC | Age: 63
End: 2023-11-13
Payer: MEDICARE

## 2023-11-13 DIAGNOSIS — L84 CALLUS OF FOOT: Primary | ICD-10-CM

## 2023-11-13 DIAGNOSIS — M77.42 METATARSALGIA OF BOTH FEET: ICD-10-CM

## 2023-11-13 DIAGNOSIS — M77.41 METATARSALGIA OF BOTH FEET: ICD-10-CM

## 2023-11-13 DIAGNOSIS — B35.1 ONYCHOMYCOSIS: ICD-10-CM

## 2023-11-13 PROCEDURE — 99213 OFFICE O/P EST LOW 20 MIN: CPT | Performed by: PODIATRIST

## 2023-11-15 NOTE — PROGRESS NOTES
Sukumar Juarez is a 70year old female. Chief Complaint   Patient presents with    Follow - Up     Bilateral ingrown hallux. Pain 10/10. HPI:   Presents complaining of painful calluses. She also has ingrown toenails that are bothering her pain can be as bad as 10 out of 10. Bother her in enclosed shoe gear this prevents unrestricted ambulation at today's visit reviewed nurse's history as taken above, allergies medications and medical history as documented below. All changes duly noted  Allergies: Celecoxib, Sulfa antibiotics, Erythromycin, and Amoxicillin   Current Outpatient Medications   Medication Sig Dispense Refill    ibuprofen 600 MG Oral Tab Take 1 tablet (600 mg total) by mouth daily as needed for Pain. 90 tablet 1    Nystatin 334713 UNIT/GM External Powder Apply 1 Application topically 2 (two) times daily. For 2 weeks 1 each 0    rosuvastatin 5 MG Oral Tab Take 1 tablet (5 mg total) by mouth daily. omeprazole 20 MG Oral Capsule Delayed Release Take 2 capsules (40 mg total) by mouth every morning. 180 capsule 3    cholecalciferol 50 MCG (2000 UT) Oral Cap Take 1 capsule (2,000 Units total) by mouth daily. amLODIPine 10 MG Oral Tab TAKE 1 TABLET BY MOUTH EVERY DAY (Patient taking differently: at bedtime. TAKE 1 TABLET BY MOUTH EVERY DAY) 90 tablet 3    latanoprost 0.005 % Ophthalmic Solution INSTILL 1 DROP IN BOTH EYES EVERY night 7.5 mL 3    MAGNESIUM OR Take by mouth.         Past Medical History:   Diagnosis Date    Abscess of left thigh     Acute meniscal tear of knee     Age-related nuclear cataract of both eyes 02/10/2015    Anal sphincter incontinence 04/28/2014    Arthritis     Back problem     Chondromalacia     Colon polyps     Degenerative disc disease     Diverticular disease     Esophageal reflux     Glaucoma     Hammertoe     High blood pressure     Insomnia     Neuropathy     Right Foot    Osteoarthritis     Primary open angle glaucoma of both eyes 05/19/2015 Diagnosis of glaucoma OU; Started Latanoprost qhs after abnormal VF and OCT 2/25/16;  6/5/18 consult with Dr. Jamarcus Cardoso progress note    Small bowel obstruction ([address]     Tendinitis     Unspecified essential hypertension     Visual impairment     Reraders      Past Surgical History:   Procedure Laterality Date    ANAL SPHINCTEROTOMY      03/12/2013 [place]    BLEPHAROPLASTY ANESTHESIA Bilateral 03/05/2020    Dr Sophy Roberts Ophthalmology     CATARACT EXTRACTION W/  INTRAOCULAR LENS IMPLANT Left 05/07/2018    L PC IOL with Dr. Gretel Weiss @ [address] [place] [address]    COLONOSCOPY N/A 11/11/2016    Procedure: COLONOSCOPY;  Surgeon: Marcie Bull MD;  Location: [facility] ENDOSCOPY    COLONOSCOPY N/A 09/06/2019    Procedure: COLONOSCOPY;  Surgeon: Zaki Lara MD;  Location: [facility] ENDOSCOPY    EXCISION OF CHALAZION, SINGLE - OD - RIGHT EYE Right 6.27/2017    [place]    HC ARTHROCENTESIS OR INJECT MAJOR JOINT W/O US      HYSTERECTOMY  1996    KAI    OTHER      Lap Nissen Fundoplication surgery    OTHER SURGICAL HISTORY  2005,2007,2008    LAPAROSCOPIC LYSIS OF ADHESION    OTHER SURGICAL HISTORY      right foot bunionectomy    OTHER SURGICAL HISTORY  11/14/2014    right superficial anterior peroneal nerve biopsy and right proximal thigh muscle biopsy.     OTHER SURGICAL HISTORY  06/13/2023    Excision and Biopsy of two  perineal cysts    UPPER GI ENDOSCOPY PERFORMED  05/2015    YAG CAPSULOTOMY - OS - LEFT EYE Left 12/19/2018    RJM      Family History   Problem Relation Age of Onset    Hypertension Father     Hypertension Mother     Hypertension Sister     Cancer Sister         liver cancer    Hypertension Brother     Diabetes Neg     Glaucoma Neg     Macular degeneration Neg     Breast Cancer Neg     Ovarian Cancer Neg       Social History     Socioeconomic History    Marital status:    Tobacco Use    Smoking status: Never     Passive exposure: Never    Smokeless tobacco: Never   Vaping Use    Vaping Use: Never used Substance and Sexual Activity    Alcohol use: No     Comment: None. Drug use: No    Sexual activity: Yes     Birth control/protection: Hysterectomy   Other Topics Concern    Caffeine Concern Yes     Comment: occasional soda    Exercise Yes    Pt has a pacemaker No    Pt has a defibrillator No    Breast feeding No    Reaction to local anesthetic No    Right Handed Yes           REVIEW OF SYSTEMS:   Today reviewed systens as documented below  GENERAL HEALTH: feels well otherwise  SKIN: Refer to exam below  RESPIRATORY: denies shortness of breath with exertion  CARDIOVASCULAR: denies chest pain on exertion  GI: denies abdominal pain and denies heartburn  NEURO: denies headaches    EXAM:   There were no vitals taken for this visit. GENERAL: well developed, well nourished, in no apparent distress  EXTREMITIES:   1. Integument: The skin on her feet shows that her incision lines from the surgeries are well-healed. She has hyperkeratoses underneath the second third fourth metatarsal heads on both feet with some nucleated centers underneath the fifth metatarsal head on the right foot as well. The medial lateral nail borders of the hallux on both feet are incurvated with hyperkeratotic impactions and painful. 2. Vascular: Patient has palpable pulses   3. Neurologic: Patient has intact sensorium there is no deficits noted she has metatarsalgia and pain   4. Musculoskeletal: Patient has metatarsalgia of the first MTP joint fusion on the left side which is stable. She has a second toe amputation on the left as well. ASSESSMENT AND PLAN:   Diagnoses and all orders for this visit:    Callus of foot    Metatarsalgia of both feet    Onychomycosis        Plan: Today using a nail nippers were trimmed and debrided toenails 1-5 right foot and 1, 3-5 on the left manually and mechanically in girth and width as far down to healthy tissue as possible on both feet. This was done uneventfully there was no hemorrhage.   There is mild incurvation of the medial and lateral nail borders of the hallux which using a slant back technique were removed and they would not get ingrown. Using a 15 blade trimmed down debrided all hyperkeratoses all of this was done to the patient's relief. Today reviewed proper fitting shoes well-padded shoes athletic shoes would be best.  A little bit of a stiffer sole. Discussed home care filing down the lesions with a pumice stone  Discussed proper moisturizing of the feet. Recommended follow-up in 2 to 3 months or as needed for care. The patient indicates understanding of these issues and agrees to the plan.     Harry Estrella DPM

## 2023-11-21 ENCOUNTER — HOSPITAL ENCOUNTER (OUTPATIENT)
Dept: GENERAL RADIOLOGY | Facility: HOSPITAL | Age: 63
Discharge: HOME | End: 2023-11-21
Attending: INTERNAL MEDICINE
Payer: COMMERCIAL

## 2023-11-21 ENCOUNTER — OFFICE VISIT (OUTPATIENT)
Dept: INTERNAL MEDICINE CLINIC | Facility: CLINIC | Age: 63
End: 2023-11-21
Payer: COMMERCIAL

## 2023-11-21 ENCOUNTER — EKG ENCOUNTER (OUTPATIENT)
Dept: LAB | Age: 63
End: 2023-11-21
Attending: INTERNAL MEDICINE
Payer: COMMERCIAL

## 2023-11-21 ENCOUNTER — LAB ENCOUNTER (OUTPATIENT)
Dept: LAB | Age: 63
End: 2023-11-21
Attending: INTERNAL MEDICINE
Payer: COMMERCIAL

## 2023-11-21 VITALS
SYSTOLIC BLOOD PRESSURE: 132 MMHG | OXYGEN SATURATION: 98 % | HEIGHT: 68 IN | HEART RATE: 68 BPM | BODY MASS INDEX: 30.62 KG/M2 | DIASTOLIC BLOOD PRESSURE: 79 MMHG | WEIGHT: 202 LBS | RESPIRATION RATE: 18 BRPM

## 2023-11-21 DIAGNOSIS — R00.2 PALPITATIONS: Primary | ICD-10-CM

## 2023-11-21 DIAGNOSIS — R07.9 CHEST PAIN, UNSPECIFIED TYPE: ICD-10-CM

## 2023-11-21 DIAGNOSIS — R00.2 PALPITATIONS: ICD-10-CM

## 2023-11-21 LAB
ATRIAL RATE: 55 BPM
P AXIS: 53 DEGREES
P-R INTERVAL: 162 MS
Q-T INTERVAL: 440 MS
QRS DURATION: 82 MS
QTC CALCULATION (BEZET): 420 MS
R AXIS: 44 DEGREES
T AXIS: 54 DEGREES
T3FREE SERPL-MCNC: 3.37 PG/ML (ref 2.4–4.2)
T4 FREE SERPL-MCNC: 1.4 NG/DL (ref 0.8–1.7)
TSI SER-ACNC: 1.12 MIU/ML (ref 0.55–4.78)
VENTRICULAR RATE: 55 BPM

## 2023-11-21 PROCEDURE — 84481 FREE ASSAY (FT-3): CPT

## 2023-11-21 PROCEDURE — 93010 ELECTROCARDIOGRAM REPORT: CPT | Performed by: INTERNAL MEDICINE

## 2023-11-21 PROCEDURE — 84443 ASSAY THYROID STIM HORMONE: CPT

## 2023-11-21 PROCEDURE — 84439 ASSAY OF FREE THYROXINE: CPT

## 2023-11-21 PROCEDURE — 99214 OFFICE O/P EST MOD 30 MIN: CPT | Performed by: INTERNAL MEDICINE

## 2023-11-21 PROCEDURE — 36415 COLL VENOUS BLD VENIPUNCTURE: CPT

## 2023-11-21 PROCEDURE — 71046 X-RAY EXAM CHEST 2 VIEWS: CPT | Performed by: INTERNAL MEDICINE

## 2023-11-21 PROCEDURE — 93005 ELECTROCARDIOGRAM TRACING: CPT

## 2023-11-29 ENCOUNTER — ORDER TRANSCRIPTION (OUTPATIENT)
Dept: ADMINISTRATIVE | Facility: HOSPITAL | Age: 63
End: 2023-11-29

## 2023-11-29 DIAGNOSIS — R07.9 CHEST PAIN: Primary | ICD-10-CM

## 2023-11-29 DIAGNOSIS — R74.8 ELEVATED CPK: ICD-10-CM

## 2023-11-29 DIAGNOSIS — I10 HTN (HYPERTENSION): ICD-10-CM

## 2023-12-01 ENCOUNTER — OFFICE VISIT (OUTPATIENT)
Dept: OTOLARYNGOLOGY | Facility: CLINIC | Age: 63
End: 2023-12-01
Payer: COMMERCIAL

## 2023-12-01 VITALS — HEIGHT: 68 IN | WEIGHT: 198 LBS | BODY MASS INDEX: 30.01 KG/M2

## 2023-12-01 DIAGNOSIS — R04.0 ANTERIOR EPISTAXIS: Primary | ICD-10-CM

## 2023-12-01 PROCEDURE — 99213 OFFICE O/P EST LOW 20 MIN: CPT | Performed by: OTOLARYNGOLOGY

## 2023-12-01 NOTE — PROGRESS NOTES
NEW PATIENT PROGRESS NOTE  OTOLOGY/OTOLARYNGOLOGY    REF MD:  No referring provider defined for this encounter. PCP: Yolanda Blackmon MD    CHIEF COMPLAINT:    Chief Complaint   Patient presents with    Nose Problem     Patient reports nosebleeds on and off. HISTORY OF PRESENT ILLNESS: Marci Pat is a 70year old female who presents for evaluation of several months of recurrent nosebleeds. Saw PCP on 11/06/23 for this problem and advised to use humidifier. In last 2 weeks, patient had 4 days of nosebleeds, sometimes tissue gets saturated. Patient plugs her nose with tissue and says bleeding stops after 10 minutes.        PAST MEDICAL HISTORY:    Past Medical History:   Diagnosis Date    Abscess of left thigh     Acute meniscal tear of knee     Age-related nuclear cataract of both eyes 02/10/2015    Anal sphincter incontinence 04/28/2014    Arthritis     Back problem     Chondromalacia     Colon polyps     Degenerative disc disease     Diverticular disease     Esophageal reflux     Glaucoma     Hammertoe     High blood pressure     Insomnia     Neuropathy     Right Foot    Obstructive sleep apnea syndrome 03/21/2018    Osteoarthritis     Primary open angle glaucoma of both eyes 05/19/2015    Diagnosis of glaucoma OU; Started Latanoprost qhs after abnormal VF and OCT 2/25/16;  6/5/18 consult with Dr. Jaime Right progress note    Small bowel obstruction ([address]     Tendinitis     Unspecified essential hypertension     Visual impairment     Reraders       PAST SURGICAL HISTORY:    Past Surgical History:   Procedure Laterality Date    ANAL SPHINCTEROTOMY      03/12/2013 [place]    BLEPHAROPLASTY ANESTHESIA Bilateral 03/05/2020    Dr Jesse Ackerman Ophthalmology     CATARACT EXTRACTION W/  INTRAOCULAR LENS IMPLANT Left 05/07/2018    L PC IOL with Dr. Fariba Abdi @ [address] [place] [address]    COLONOSCOPY N/A 11/11/2016    Procedure: COLONOSCOPY;  Surgeon: Jessie Christianson MD;  Location: [facility] ENDOSCOPY    COLONOSCOPY N/A 09/06/2019    Procedure: COLONOSCOPY;  Surgeon: Lcuien Correia MD;  Location: [facility] ENDOSCOPY    EXCISION OF CHALAZION, SINGLE - OD - RIGHT EYE Right 6.27/2017    [place]    HC ARTHROCENTESIS OR INJECT MAJOR JOINT W/O US      HYSTERECTOMY  1996    KAI    OTHER      Lap Nissen Fundoplication surgery    OTHER SURGICAL HISTORY  2005,2007,2008    LAPAROSCOPIC LYSIS OF ADHESION    OTHER SURGICAL HISTORY      right foot bunionectomy    OTHER SURGICAL HISTORY  11/14/2014    right superficial anterior peroneal nerve biopsy and right proximal thigh muscle biopsy. OTHER SURGICAL HISTORY  06/13/2023    Excision and Biopsy of two  perineal cysts    UPPER GI ENDOSCOPY PERFORMED  05/2015    YAG CAPSULOTOMY - OS - LEFT EYE Left 12/19/2018    RJM       Current Outpatient Medications on File Prior to Visit   Medication Sig Dispense Refill    metoprolol tartrate 25 MG Oral Tab metoprolol tartrate 25 mg tablet, [RxNorm: 890137]      rosuvastatin 5 MG Oral Tab Take 1 tablet (5 mg total) by mouth daily. omeprazole 20 MG Oral Capsule Delayed Release Take 2 capsules (40 mg total) by mouth every morning. 180 capsule 3    cholecalciferol 50 MCG (2000 UT) Oral Cap Take 1 capsule (2,000 Units total) by mouth daily. amLODIPine 10 MG Oral Tab TAKE 1 TABLET BY MOUTH EVERY DAY 90 tablet 3    latanoprost 0.005 % Ophthalmic Solution INSTILL 1 DROP IN BOTH EYES EVERY night 7.5 mL 3    MAGNESIUM OR Take by mouth. No current facility-administered medications on file prior to visit. Allergies: Allergies   Allergen Reactions    Celecoxib TONGUE SWELLING     Other reaction(s): CELECOXIB    Sulfa Antibiotics TONGUE SWELLING     Other reaction(s): SULFA (SULFONAMIDE ANTIBIOTICS)    Erythromycin PAIN    Amoxicillin DIARRHEA       SOCIAL HISTORY:    Social History     Tobacco Use    Smoking status: Never     Passive exposure: Never    Smokeless tobacco: Never   Substance Use Topics    Alcohol use: No     Comment: None.         FAMILY HISTORY: Denies known family history of hearing loss, tinnitus, vertigo, or migraine. Denies known family history of head and neck cancer, thyroid cancer, bleeding disorders. REVIEW OF SYSTEMS:   Positives are in bold  Neuro: Headache, facial weakness, facial numbness, neck pain, vertigo  ENT: Hearing change, tinnitus, otorrhea, otalgia, aural fullness, ear pressure, vertigo, imbalance  Sinus pressure, rhinorrhea, congestion, epistaxis, facial pain, jaw pain, dysphagia, odynophagia, sore throat, voice changes, shortness of breath    EXAMINATION:  I washed my hands with an alcohol-based hand gel prior to examination  Constitutional:   --Vitals: Height 5' 8\" (1.727 m), weight 198 lb (89.8 kg), not currently breastfeeding. General: no apparent distress, well-developed, conversant  Psych: affect pleasant and appropriate for age, alert and oriented  Respiratory: No stridor, stertor or increased work of breathing  ENT:  --Nose: no external nasal deformity, anterior rhinoscopy: Septum midline with hypervascularity on left and another prominent vessel on the right, silver nitrate cautery applied in limited fashion to both of these areas, no inferior turbinate hypertrophy, mucosa healthy, no rhinorrhea  --OC/OP: No trismus. No masses or lesions noted over the gingiva, buccal mucosa, tongue, FOM, hard/soft palate, tonsillar pillars, posterior pharyngeal wall. Tonsils are 1-2+ and soft. FOM/BOT are soft. --Neck: No palpable cervical lymphadenopathy, no thyromegaly, no masses or lesions over the bilateral submandibular or parotid glands  --Ear: (bilateral ears were examined under binocular microscopy)  Right ear microscopic exam:  Pinna: Normal, no lesions or masses. Mastoid: Nontender on palpation. External auditory canal: Clear, no masses or lesions. Tympanic membrane: Intact, no lesions, normal landmarks. Middle ear: Aerated. Left ear microscopic exam:  Pinna: Normal, no lesions or masses.   Mastoid: Nontender on palpation. External auditory canal: Clear, no masses or lesions. Tympanic membrane: Intact, no lesions, normal landmarks. Middle ear: Aerated. ASSESSMENT/PLAN:  Cortney Dow is a 70year old female with     ICD-10-CM   1. Anterior epistaxis  R04.0        IMPRESSION:  Recurrent anterior epistaxis s/p silver nitrate cauterization on 12/01/23    PLAN:  Epistaxis precautions were discussed with patient:  - Purchase afrin nasal spray to use only at the time of epistaxis; instructed patient on proper nasal pressure application for episodes in the future  - Recommend increasing nasal moisture - humidifier, nasal saline sprays PRN  - Discussed that having 1-2 nosebleeds after cauterization is normal  -Follow-up as needed     Situation reviewed with the patient in detail. Attention: This note has been scribed by Leif Paniagua under the supervision of Jacqueline Brown MD.      Jacqueline Brown MD  Otology/Otolaryngology  [facility]   [address] [address] [facility]  Phone [phone]  Fax [phone]     I have personally performed the services described in this documentation. All medical record entries made by the scribe were at my direction and in my presence. I have reviewed the chart and agree that the medical record reflects my personal performance and is accurate and complete.

## 2023-12-08 ENCOUNTER — MED REC SCAN ONLY (OUTPATIENT)
Dept: INTERNAL MEDICINE CLINIC | Facility: CLINIC | Age: 63
End: 2023-12-08

## 2023-12-12 ENCOUNTER — IMMUNIZATION (OUTPATIENT)
Dept: LAB | Age: 63
End: 2023-12-12
Attending: EMERGENCY MEDICINE
Payer: COMMERCIAL

## 2023-12-12 DIAGNOSIS — Z23 NEED FOR VACCINATION: Primary | ICD-10-CM

## 2023-12-12 PROCEDURE — 90480 ADMN SARSCOV2 VAC 1/ONLY CMP: CPT

## 2023-12-19 ENCOUNTER — OFFICE VISIT (OUTPATIENT)
Dept: INTERNAL MEDICINE CLINIC | Facility: CLINIC | Age: 63
End: 2023-12-19
Payer: COMMERCIAL

## 2023-12-19 VITALS
BODY MASS INDEX: 30.1 KG/M2 | HEART RATE: 69 BPM | SYSTOLIC BLOOD PRESSURE: 130 MMHG | WEIGHT: 198.63 LBS | HEIGHT: 68 IN | DIASTOLIC BLOOD PRESSURE: 74 MMHG | RESPIRATION RATE: 18 BRPM

## 2023-12-19 DIAGNOSIS — H81.10 BENIGN PAROXYSMAL POSITIONAL VERTIGO, UNSPECIFIED LATERALITY: Primary | ICD-10-CM

## 2023-12-19 DIAGNOSIS — R09.81 NASAL CONGESTION: ICD-10-CM

## 2023-12-19 PROCEDURE — 99214 OFFICE O/P EST MOD 30 MIN: CPT | Performed by: INTERNAL MEDICINE

## 2023-12-21 ENCOUNTER — OFFICE VISIT (OUTPATIENT)
Dept: ORTHOPEDICS CLINIC | Facility: CLINIC | Age: 63
End: 2023-12-21
Payer: COMMERCIAL

## 2023-12-21 VITALS — DIASTOLIC BLOOD PRESSURE: 86 MMHG | HEART RATE: 75 BPM | SYSTOLIC BLOOD PRESSURE: 137 MMHG

## 2023-12-21 DIAGNOSIS — M17.0 PRIMARY OSTEOARTHRITIS OF BOTH KNEES: Primary | ICD-10-CM

## 2023-12-21 PROCEDURE — 99213 OFFICE O/P EST LOW 20 MIN: CPT | Performed by: ORTHOPAEDIC SURGERY

## 2023-12-21 PROCEDURE — 20610 DRAIN/INJ JOINT/BURSA W/O US: CPT

## 2023-12-21 RX ORDER — TRIAMCINOLONE ACETONIDE 40 MG/ML
40 INJECTION, SUSPENSION INTRA-ARTICULAR; INTRAMUSCULAR
Status: COMPLETED | OUTPATIENT
Start: 2023-12-21 | End: 2023-12-21

## 2023-12-21 RX ADMIN — TRIAMCINOLONE ACETONIDE 40 MG: 40 INJECTION, SUSPENSION INTRA-ARTICULAR; INTRAMUSCULAR at 11:35:00

## 2023-12-21 RX ADMIN — TRIAMCINOLONE ACETONIDE 40 MG: 40 INJECTION, SUSPENSION INTRA-ARTICULAR; INTRAMUSCULAR at 11:34:00

## 2023-12-21 NOTE — PROGRESS NOTES
NURSING INTAKE COMMENTS:   Chief Complaint   Patient presents with    Knee Pain     Bilateral knees f/u- had bilateral durolane injections w/ some improvement- rates pain 7/10 all the time- has swelling       HPI: This 70year old female presents today with complaints of bilateral knee pain follow-up. She has known osteoarthritis of both knees. She had Durolane injections in both knees back in November. The injections did not help significantly. She feels swelling primarily in the right knee. She takes occasional ibuprofen. No recent injury to either knee.     Past Medical History:   Diagnosis Date    Abscess of left thigh     Acute meniscal tear of knee     Age-related nuclear cataract of both eyes 02/10/2015    Anal sphincter incontinence 04/28/2014    Arthritis     Back problem     Chondromalacia     Colon polyps     Degenerative disc disease     Diverticular disease     Esophageal reflux     Glaucoma     [place]     High blood pressure     Insomnia     Neuropathy     Right Foot    Obstructive sleep apnea syndrome 03/21/2018    Osteoarthritis     Primary open angle glaucoma of both eyes 05/19/2015    Diagnosis of glaucoma OU; Started Latanoprost qhs after abnormal VF and OCT 2/25/16;  6/5/18 consult with Dr. Hardy Flynn progress note    Small bowel obstruction ([address]     Tendinitis     Unspecified essential hypertension     Visual impairment     Reraders     Past Surgical History:   Procedure Laterality Date    ANAL SPHINCTEROTOMY      03/12/2013 [place]    BLEPHAROPLASTY ANESTHESIA Bilateral 03/05/2020    Dr Max Herrera [facility]     CATARACT EXTRACTION W/  INTRAOCULAR LENS IMPLANT Left 05/07/2018    L PC IOL with Dr. Avitia Abt @ [facility]    COLONOSCOPY N/A 11/11/2016    Procedure: COLONOSCOPY;  Surgeon: Ofelia Belcher MD;  Location: [facility] ENDOSCOPY    COLONOSCOPY N/A 09/06/2019    Procedure: COLONOSCOPY;  Surgeon: Lucien Correia MD;  Location: [facility] ENDOSCOPY    EXCISION OF CHALAZION, SINGLE - OD - RIGHT EYE Right 6.27/2017    [facility]    HC ARTHROCENTESIS OR INJECT MAJOR JOINT W/O US      HYSTERECTOMY  1996    [facility]    OTHER      Lap Nissen Fundoplication surgery    OTHER SURGICAL HISTORY  2005,2007,2008    LAPAROSCOPIC LYSIS OF ADHESION    OTHER SURGICAL HISTORY      right foot bunionectomy    OTHER SURGICAL HISTORY  11/14/2014    right superficial anterior peroneal nerve biopsy and right proximal thigh muscle biopsy. OTHER SURGICAL HISTORY  06/13/2023    Excision and Biopsy of two  perineal cysts    UPPER GI ENDOSCOPY PERFORMED  05/2015    YAG CAPSULOTOMY - OS - LEFT EYE Left 12/19/2018    [facility]     Current Outpatient Medications   Medication Sig Dispense Refill    metoprolol tartrate 25 MG Oral Tab metoprolol tartrate 25 mg tablet, [RxNorm: 163471]      rosuvastatin 5 MG Oral Tab Take 1 tablet (5 mg total) by mouth daily. omeprazole 20 MG Oral Capsule Delayed Release Take 2 capsules (40 mg total) by mouth every morning. 180 capsule 3    cholecalciferol 50 MCG (2000 UT) Oral Cap Take 1 capsule (2,000 Units total) by mouth daily. amLODIPine 10 MG Oral Tab TAKE 1 TABLET BY MOUTH EVERY DAY 90 tablet 3    latanoprost 0.005 % Ophthalmic Solution INSTILL 1 DROP IN BOTH EYES EVERY night 7.5 mL 3    MAGNESIUM OR Take by mouth.        Allergies   Allergen Reactions    Celecoxib TONGUE SWELLING     Other reaction(s): CELECOXIB    Sulfa Antibiotics TONGUE SWELLING     Other reaction(s): SULFA (SULFONAMIDE ANTIBIOTICS)    Erythromycin PAIN    Amoxicillin DIARRHEA     Family History   Problem Relation Age of Onset    Hypertension Father     Hypertension Mother     Hypertension Sister     Cancer Sister         liver cancer    Hypertension Brother     Diabetes Neg     Glaucoma Neg     Macular degeneration Neg     Breast Cancer Neg     Ovarian Cancer Neg        Social History     Occupational History    Not on file   Tobacco Use    Smoking status: Never     Passive exposure: Never    Smokeless tobacco: Never Vaping Use    Vaping Use: Never used   Substance and Sexual Activity    Alcohol use: No     Comment: None. Drug use: No    Sexual activity: Yes     Birth control/protection: Hysterectomy        Review of Systems:  GENERAL: denies fevers, chills, night sweats, fatigue, unintentional weight loss/gain  SKIN: denies skin lesions, open sores, rash  HEENT:denies recent vision change, new nasal congestion,hearing loss, tinnitus, sore throat, headaches  RESPIRATORY: denies new shortness of breath, cough, asthma, wheezing  CARDIOVASCULAR: denies chest pain, leg cramps with exertion, palpitations, leg swelling  GI: denies abdominal pain, nausea, vomiting, diarrhea, constipation, hematochezia, worsening heartburn or stomach ulcers  : denies dysuria, hematuria, incontinence, increased frequency, urgency, difficulty urinating  MUSCULOSKELETAL: denies musculoskeletal complaints other than in HPI  NEURO: denies numbness, tingling, weakness, balance issues, dizziness, memory loss  PSYCHIATRIC: denies Hx of depression, anxiety, other psychiatric disorders  HEMATOLOGIC: denies blood clots, anemia, blood clotting disorders, blood transfusion  ENDOCRINE: denies autoimmune disease, thyroid issues, or diabetes  ALLERGY: denies asthma, seasonal allergies    Physical Examination:    /86   Pulse 75   Constitutional: appears well hydrated, alert and responsive, no acute distress noted  Extremities: Bilateral knees with trace effusion. Mild valgus deformity right knee. Neurological: Unchanged    Imaging:   XR CHEST PA + LAT CHEST (CPT=71046)    Result Date: 11/21/2023  PROCEDURE: XR CHEST PA + LAT CHEST (CPT=71046)  COMPARISON: [facility], XR CHEST PA + LAT CHEST (CPT=71046), 7/22/2021, 5:39 PM.  INDICATIONS: Chronic mid chest pain. TECHNIQUE:   Two views.    FINDINGS:  Normal heart size, clear lungs, normal pleura         CONCLUSION: Normal    Dictated by (CST): Ashley Walker MD on 11/21/2023 at 1:31 PM     Finalized by (CST): Rajendra Herrera MD on 11/21/2023 at 1:32 PM             Labs:  Lab Results   Component Value Date    WBC 8.8 06/08/2023    HGB 14.9 06/08/2023    .0 06/08/2023      Lab Results   Component Value Date     (H) 06/08/2023    BUN 12 06/08/2023    CREATSERUM 0.88 06/08/2023    GFR 46 (L) 04/02/2016    GFRNAA 66 05/21/2022    GFRAA 76 05/21/2022        Assessment and Plan:  Diagnoses and all orders for this visit:    Primary osteoarthritis of both knees  -     arthrocentesis major joint  -     triamcinolone acetonide (Kenalog-40) 40 MG/ML injection 40 mg        Assessment: Bilateral knee osteoarthritis, primary    Plan: I discussed operative and nonoperative treatment options. I advised consideration of total knee arthroplasty in the future. Risk and benefits of surgery were discussed. Questions were answered. No guarantees were made. She would like to continue with nonoperative care for now. She requested Kenalog injections today. Both knees were injected with 40 mg of Kenalog using a superolateral parapatellar approach. Recommend follow-up again as needed. Advised icing, oral anti-inflammatories, activity modifications and home exercises.  was present for the visit today. Follow Up: Return if symptoms worsen or fail to improve.     Monica Morgan MD

## 2023-12-21 NOTE — PROGRESS NOTES
Per verbal order from Dr. Jayna Kim draw up 3ml of 0.5% Marcaine & 2ml 1% lidocaine and 1ml of Kenalog 40 for cortisone injection to bilateral knee. Laura Dietz MA    Patient provided education handout for cortisone injection.

## 2023-12-27 ENCOUNTER — NURSE TRIAGE (OUTPATIENT)
Dept: INTERNAL MEDICINE CLINIC | Facility: CLINIC | Age: 63
End: 2023-12-27

## 2023-12-27 ENCOUNTER — LAB ENCOUNTER (OUTPATIENT)
Dept: LAB | Facility: REFERENCE LAB | Age: 63
End: 2023-12-27
Attending: INTERNAL MEDICINE
Payer: COMMERCIAL

## 2023-12-27 ENCOUNTER — OFFICE VISIT (OUTPATIENT)
Facility: LOCATION | Age: 63
End: 2023-12-27
Payer: COMMERCIAL

## 2023-12-27 VITALS
BODY MASS INDEX: 30.01 KG/M2 | HEIGHT: 68 IN | OXYGEN SATURATION: 94 % | HEART RATE: 72 BPM | DIASTOLIC BLOOD PRESSURE: 73 MMHG | SYSTOLIC BLOOD PRESSURE: 136 MMHG | WEIGHT: 198 LBS

## 2023-12-27 DIAGNOSIS — T14.8XXA BRUISING: Primary | ICD-10-CM

## 2023-12-27 DIAGNOSIS — R53.83 FATIGUE, UNSPECIFIED TYPE: ICD-10-CM

## 2023-12-27 DIAGNOSIS — T14.8XXA BRUISING: ICD-10-CM

## 2023-12-27 DIAGNOSIS — I83.12 VARICOSE VEINS OF BOTH LOWER EXTREMITIES WITH INFLAMMATION: ICD-10-CM

## 2023-12-27 DIAGNOSIS — I83.11 VARICOSE VEINS OF BOTH LOWER EXTREMITIES WITH INFLAMMATION: ICD-10-CM

## 2023-12-27 LAB
ALBUMIN SERPL-MCNC: 4.6 G/DL (ref 3.2–4.8)
ALBUMIN/GLOB SERPL: 1.4 {RATIO} (ref 1–2)
ALP LIVER SERPL-CCNC: 134 U/L
ALT SERPL-CCNC: 25 U/L
ANION GAP SERPL CALC-SCNC: 7 MMOL/L (ref 0–18)
AST SERPL-CCNC: 18 U/L (ref ?–34)
BASOPHILS # BLD AUTO: 0.08 X10(3) UL (ref 0–0.2)
BASOPHILS NFR BLD AUTO: 0.5 %
BILIRUB SERPL-MCNC: 0.5 MG/DL (ref 0.2–1.1)
BUN BLD-MCNC: 15 MG/DL (ref 9–23)
BUN/CREAT SERPL: 16 (ref 10–20)
CALCIUM BLD-MCNC: 9.5 MG/DL (ref 8.7–10.4)
CHLORIDE SERPL-SCNC: 106 MMOL/L (ref 98–112)
CO2 SERPL-SCNC: 28 MMOL/L (ref 21–32)
CREAT BLD-MCNC: 0.94 MG/DL
DEPRECATED RDW RBC AUTO: 40.7 FL (ref 35.1–46.3)
EGFRCR SERPLBLD CKD-EPI 2021: 67 ML/MIN/1.73M2 (ref 60–?)
EOSINOPHIL # BLD AUTO: 0.05 X10(3) UL (ref 0–0.7)
EOSINOPHIL NFR BLD AUTO: 0.3 %
ERYTHROCYTE [DISTWIDTH] IN BLOOD BY AUTOMATED COUNT: 14.5 % (ref 11–15)
FASTING STATUS PATIENT QL REPORTED: YES
GLOBULIN PLAS-MCNC: 3.2 G/DL (ref 2.8–4.4)
GLUCOSE BLD-MCNC: 92 MG/DL (ref 70–99)
HCT VFR BLD AUTO: 43.2 %
HGB BLD-MCNC: 14.8 G/DL
IMM GRANULOCYTES # BLD AUTO: 0.13 X10(3) UL (ref 0–1)
IMM GRANULOCYTES NFR BLD: 0.8 %
INR BLD: 0.95 (ref 0.8–1.2)
LYMPHOCYTES # BLD AUTO: 2.38 X10(3) UL (ref 1–4)
LYMPHOCYTES NFR BLD AUTO: 14.4 %
MCH RBC QN AUTO: 26.9 PG (ref 26–34)
MCHC RBC AUTO-ENTMCNC: 34.3 G/DL (ref 31–37)
MCV RBC AUTO: 78.4 FL
MONOCYTES # BLD AUTO: 1.15 X10(3) UL (ref 0.1–1)
MONOCYTES NFR BLD AUTO: 7 %
NEUTROPHILS # BLD AUTO: 12.69 X10 (3) UL (ref 1.5–7.7)
NEUTROPHILS # BLD AUTO: 12.69 X10(3) UL (ref 1.5–7.7)
NEUTROPHILS NFR BLD AUTO: 77 %
OSMOLALITY SERPL CALC.SUM OF ELEC: 292 MOSM/KG (ref 275–295)
PLATELET # BLD AUTO: 415 10(3)UL (ref 150–450)
POTASSIUM SERPL-SCNC: 3.5 MMOL/L (ref 3.5–5.1)
PROT SERPL-MCNC: 7.8 G/DL (ref 5.7–8.2)
PROTHROMBIN TIME: 13.2 SECONDS (ref 11.6–14.8)
RBC # BLD AUTO: 5.51 X10(6)UL
SODIUM SERPL-SCNC: 141 MMOL/L (ref 136–145)
WBC # BLD AUTO: 16.5 X10(3) UL (ref 4–11)

## 2023-12-27 PROCEDURE — 99214 OFFICE O/P EST MOD 30 MIN: CPT | Performed by: INTERNAL MEDICINE

## 2023-12-27 PROCEDURE — 85610 PROTHROMBIN TIME: CPT

## 2023-12-27 PROCEDURE — 36415 COLL VENOUS BLD VENIPUNCTURE: CPT

## 2023-12-27 PROCEDURE — 85025 COMPLETE CBC W/AUTO DIFF WBC: CPT

## 2023-12-27 PROCEDURE — 80053 COMPREHEN METABOLIC PANEL: CPT

## 2023-12-27 NOTE — TELEPHONE ENCOUNTER
Action Requested: Summary for Provider     []  Critical Lab, Recommendations Needed  [] Need Additional Advice  [x]   FYI    []   Need Orders  [] Need Medications Sent to Pharmacy  []  Other     SUMMARY: Patient scheduled for an appointment today at 4:20pm with Santos Chicas. Patient states she has bruises on both legs with unknown cause. 1 bruise is 1/2 dollar size and other is dime sized. The bruising has been happening for a couple of months. Bruises appear and then go away. They do not roberto or hurt when touched. Patient states she had a little dizziness last week, but not now. Patient denies being on blood thinners,taking aspirin or ibuprofen. No chest pain or shortness of breath.      Reason for call: Bruising  Onset: 2 months  Reason for Disposition   Purple or blood-colored spots or dots that are not from injury or friction (no fever and sounds well to triager)    Protocols used: Bruises-A-[place]

## 2024-01-02 ENCOUNTER — HOSPITAL ENCOUNTER (OUTPATIENT)
Dept: CT IMAGING | Facility: HOSPITAL | Age: 64
Discharge: HOME | End: 2024-01-02
Attending: INTERNAL MEDICINE
Payer: MEDICARE

## 2024-01-02 VITALS
WEIGHT: 198 LBS | BODY MASS INDEX: 30.01 KG/M2 | RESPIRATION RATE: 16 BRPM | DIASTOLIC BLOOD PRESSURE: 74 MMHG | HEART RATE: 53 BPM | SYSTOLIC BLOOD PRESSURE: 135 MMHG | HEIGHT: 68 IN

## 2024-01-02 DIAGNOSIS — R74.8 ELEVATED CPK: ICD-10-CM

## 2024-01-02 DIAGNOSIS — R07.9 CHEST PAIN: ICD-10-CM

## 2024-01-02 DIAGNOSIS — I10 HTN (HYPERTENSION): ICD-10-CM

## 2024-01-02 PROCEDURE — 75574 CT ANGIO HRT W/3D IMAGE: CPT | Performed by: INTERNAL MEDICINE

## 2024-01-02 RX ORDER — METOPROLOL TARTRATE 1 MG/ML
5 INJECTION, SOLUTION INTRAVENOUS SEE ADMIN INSTRUCTIONS
Status: DISCONTINUED | OUTPATIENT
Start: 2024-01-02 | End: 2024-01-04

## 2024-01-02 RX ORDER — DILTIAZEM HYDROCHLORIDE 5 MG/ML
5 INJECTION INTRAVENOUS SEE ADMIN INSTRUCTIONS
Status: DISCONTINUED | OUTPATIENT
Start: 2024-01-02 | End: 2024-01-04

## 2024-01-02 RX ORDER — NITROGLYCERIN 0.4 MG/1
0.4 TABLET SUBLINGUAL ONCE
Status: COMPLETED | OUTPATIENT
Start: 2024-01-02 | End: 2024-01-02

## 2024-01-02 RX ORDER — NITROGLYCERIN 0.4 MG/1
TABLET SUBLINGUAL
Status: DISPENSED
Start: 2024-01-02 | End: 2024-01-02

## 2024-01-02 RX ADMIN — NITROGLYCERIN 0.4 MG: 0.4 TABLET SUBLINGUAL at 08:33:00

## 2024-01-02 NOTE — IMAGING NOTE
TO RAD HOLDING AT 0755       HX TAKEN: heart palpitations     PT CONSENTED AT 0812     BASELINE VITAL SIGNS: HR 53  /74      CTA ORDERED BY DR. BRADSHAW WAS PT GIVEN CTA PREMEDS NO, IF YES  25 MG PO METOPROLOL X2 DOSES    18 GAUGE IV STARTED AT 0820 SERUM TESTING COMPLETED ON 12/27/23 GFR = >60   CREATINE = 0.94    TO CT TABLE @ 0830    CONNECT TO MONITOR  HR 54  /75      NITROGLYCERIN 0.4 MILLIGRAMS SUBLINGUAL GIVEN AT 0833     CALCIUM SCORE COMPLETED AT 0835     INJECTION STARTED AT 0838  HR 48 DURING SCAN PROCEDURE COMPLETE    POST SCAN HR 62  /76  AT 0841    0857 PT TO HOLDING AREA  VS  HR 52  /63     AVS  PROVIDED      0902 DISCHARGED HOME  COMFORTABLY WITH .

## 2024-01-02 NOTE — DISCHARGE INSTRUCTIONS
Computed Tomography Angiography (CTA)  Computed tomography angiography (CTA) is an imaging test. It uses X-rays and computer technology to make detailed pictures of your arteries (blood vessels). Before the test, an X-ray dye (contrast medium) is injected into your vein. The dye makes it easier to see your blood vessels on the X-ray. Pictures are then taken with the CT scanner. A computer turns the images into 2-D and 3-D pictures.      CTA can make 3D images, such as the carotid arteries shown here.     Why CTA is done  CTA may be used to:  Check arteries in your belly, neck, lungs, pelvis, kidneys, or brain.  Look for a ballooning of the blood vessel wall (aneurysm) or a tear (dissection).  Check if a tube (stent) used to keep an artery open is working well.  Find damage to your arteries due to injuries.  Collect details on blood vessels that supply blood to tumors.  Getting ready for your test  Tell your healthcare provider if you:   Have diabetes  Have kidney disease  Are allergic to X-ray dye or other medicines  Are pregnant, think you may be pregnant, or are breastfeeding  Are taking any medicines, herbs, or supplements, including prescription medicines, illegal drugs, and over-the-counter medicines such as aspirin or ibuprofen  Follow any directions you are given for not eating or drinking before the CTA. Follow any other instructions from your healthcare provider.   During your test  You will be asked to remove any hair clips, jewelry, false teeth, or other metal items that could show up on the X-ray.  You will lie down on the scanning table. An IV line will be put in a vein in your arm or hand.  The scanning table will be properly placed. The part of your body being checked will be inside the doughnut-shaped CT scanner.  One image may be taken first to be sure you are in the proper position for the test.  The IV will be hooked up to an automatic injection machine. This controls how often and how fast the  X-ray dye is injected. The injection may continue during part of the exam.  The dye will be put into your vein through the IV line. You may feel warmth through your body when the dye is injected.  You can’t move while the X-rays are being taken. Pillows and foam pads may be used to help you stay still. You will be told to hold your breath for 10 to 25 seconds at a time.  A single scan may take several minutes. You may need more than one scan.    After your test  Drink plenty of fluids to help flush the X-ray dye from your body.  You may eat as soon as you want to.    Possible risks  All procedures have some risks. A CTA has some possible risks. These include:   Problems due to the X-ray dye, such as an allergic reaction or kidney damage  Skin damage from leaking X-ray dye near where the IV was put in  [employer] last reviewed this educational content on 8/1/2022  © 4258-3089 [employer]. All rights reserved. This information is not intended as a substitute for professional medical care. Always follow your healthcare professional's instructions.         O-T Advancement Flap Text: The defect edges were debeveled with a #15 scalpel blade.  Given the location of the defect, shape of the defect and the proximity to free margins an O-T advancement flap was deemed most appropriate.  Using a sterile surgical marker, an appropriate advancement flap was drawn incorporating the defect and placing the expected incisions within the relaxed skin tension lines where possible.    The area thus outlined was incised deep to adipose tissue with a #15 scalpel blade.  The skin margins were undermined to an appropriate distance in all directions utilizing iris scissors.

## 2024-01-04 ENCOUNTER — MED REC SCAN ONLY (OUTPATIENT)
Dept: INTERNAL MEDICINE CLINIC | Facility: CLINIC | Age: 64
End: 2024-01-04

## 2024-01-08 ENCOUNTER — TELEPHONE (OUTPATIENT)
Dept: INTERNAL MEDICINE CLINIC | Facility: CLINIC | Age: 64
End: 2024-01-08

## 2024-01-08 NOTE — TELEPHONE ENCOUNTER
Noted in chart patient had office appts on 23 for dizziness/vertigo and on 23.    Called patient (name and  verified) and she scheduled the appt through [facility] as a follow up for the same ongoing symptoms for her vertigo. Patient denies any new symptoms. Patient states that since she found the  appt we could cancel the the  appt.     Future Appointments   Date Time Provider Department Center   2024 11:00 AM [facility]   2024  1:00 PM Smith Gorman MD [facility]   3/7/2024  1:40 PM Srinivasan Mendoza DO [facility]  [place]

## 2024-01-08 NOTE — TELEPHONE ENCOUNTER
Patient has appt scheduled via [email or web address] for the following concerns:    01/11/2024: [place]adao- Possibly meant headache    02/14/2024: Lightheaded

## 2024-01-11 ENCOUNTER — TELEPHONE (OUTPATIENT)
Dept: PHYSICAL THERAPY | Age: 64
End: 2024-01-11

## 2024-01-11 ENCOUNTER — OFFICE VISIT (OUTPATIENT)
Dept: INTERNAL MEDICINE CLINIC | Facility: CLINIC | Age: 64
End: 2024-01-11
Payer: COMMERCIAL

## 2024-01-11 ENCOUNTER — TELEPHONE (OUTPATIENT)
Dept: HEMATOLOGY/ONCOLOGY | Facility: HOSPITAL | Age: 64
End: 2024-01-11

## 2024-01-11 ENCOUNTER — LAB ENCOUNTER (OUTPATIENT)
Dept: LAB | Age: 64
End: 2024-01-11
Attending: INTERNAL MEDICINE
Payer: MEDICARE

## 2024-01-11 VITALS
HEART RATE: 67 BPM | WEIGHT: 198.81 LBS | SYSTOLIC BLOOD PRESSURE: 131 MMHG | HEIGHT: 68 IN | RESPIRATION RATE: 16 BRPM | DIASTOLIC BLOOD PRESSURE: 83 MMHG | BODY MASS INDEX: 30.13 KG/M2

## 2024-01-11 DIAGNOSIS — R42 LIGHTHEADEDNESS: Primary | ICD-10-CM

## 2024-01-11 DIAGNOSIS — D72.829 LEUKOCYTOSIS, UNSPECIFIED TYPE: ICD-10-CM

## 2024-01-11 DIAGNOSIS — T14.8XXA BRUISING: ICD-10-CM

## 2024-01-11 LAB
BASOPHILS # BLD AUTO: 0.03 X10(3) UL (ref 0–0.2)
BASOPHILS NFR BLD AUTO: 0.3 %
DEPRECATED RDW RBC AUTO: 43.6 FL (ref 35.1–46.3)
EOSINOPHIL # BLD AUTO: 0.01 X10(3) UL (ref 0–0.7)
EOSINOPHIL NFR BLD AUTO: 0.1 %
ERYTHROCYTE [DISTWIDTH] IN BLOOD BY AUTOMATED COUNT: 14.9 % (ref 11–15)
HCT VFR BLD AUTO: 46.5 %
HGB BLD-MCNC: 15.2 G/DL
IMM GRANULOCYTES # BLD AUTO: 0.03 X10(3) UL (ref 0–1)
IMM GRANULOCYTES NFR BLD: 0.3 %
LYMPHOCYTES # BLD AUTO: 2.37 X10(3) UL (ref 1–4)
LYMPHOCYTES NFR BLD AUTO: 20.4 %
MCH RBC QN AUTO: 26.3 PG (ref 26–34)
MCHC RBC AUTO-ENTMCNC: 32.7 G/DL (ref 31–37)
MCV RBC AUTO: 80.3 FL
MONOCYTES # BLD AUTO: 0.7 X10(3) UL (ref 0.1–1)
MONOCYTES NFR BLD AUTO: 6 %
NEUTROPHILS # BLD AUTO: 8.47 X10 (3) UL (ref 1.5–7.7)
NEUTROPHILS # BLD AUTO: 8.47 X10(3) UL (ref 1.5–7.7)
NEUTROPHILS NFR BLD AUTO: 72.9 %
PLATELET # BLD AUTO: 357 10(3)UL (ref 150–450)
RBC # BLD AUTO: 5.79 X10(6)UL
WBC # BLD AUTO: 11.6 X10(3) UL (ref 4–11)

## 2024-01-11 PROCEDURE — 3075F SYST BP GE 130 - 139MM HG: CPT | Performed by: INTERNAL MEDICINE

## 2024-01-11 PROCEDURE — 3008F BODY MASS INDEX DOCD: CPT | Performed by: INTERNAL MEDICINE

## 2024-01-11 PROCEDURE — 36415 COLL VENOUS BLD VENIPUNCTURE: CPT

## 2024-01-11 PROCEDURE — 99214 OFFICE O/P EST MOD 30 MIN: CPT | Performed by: INTERNAL MEDICINE

## 2024-01-11 PROCEDURE — 3079F DIAST BP 80-89 MM HG: CPT | Performed by: INTERNAL MEDICINE

## 2024-01-11 PROCEDURE — 85025 COMPLETE CBC W/AUTO DIFF WBC: CPT

## 2024-01-11 RX ORDER — MECLIZINE HCL 12.5 MG/1
12.5 TABLET ORAL 2 TIMES DAILY PRN
Qty: 30 TABLET | Refills: 0 | Status: SHIPPED | OUTPATIENT
Start: 2024-01-11

## 2024-01-11 NOTE — TELEPHONE ENCOUNTER
Please call patient for a consult with Dr Rivera or Dr James for low wbc. Referred by Dr FRANCHESKA Rogers.

## 2024-01-11 NOTE — PROGRESS NOTES
Kerry Hsu is a 63 year old female.  Chief Complaint   Patient presents with    Lightheadedness       HPI:   Pt comes for f/u  C/c lightheadedness   C/o still since last visit was seen by another provider for bruising and noted that the blood test came back normal except that the white count was elevated  Bruising is better -comes and goes  Noted she was  not on any steroids to make the bruising or the white count go elevated               HISTORY    palpitations for the past couple weeks at least and it is now scaring her because she can feel it when she is just lying down or just sitting there not doing anything and it is not on it but only on exertion, comes and goes   Also has some retrosternal chest pains 5/10  Better ? --took ppi and it didn't help   Worse -with deep inspirations  No increase in caffeine  Chest pain and palpitations happen at the same time and not associated with any diaphoresis  She did have an EKG done in January and a calcium score in August of this year           HISTORY  Saw dr rothman ent and ct was neg SO NO  sinus SURG NEEDED     She thinks its her eyes   Feels her vision is bad even after glasses and thinks it happended after cataract surg   HAS SEEN   saw dr mckeon--  neurology   Saw cards dr genao and will go for heart scan   Saw many eye drs and tried drops but no help , eye drops now burns                     HISTORY  6/2023 visit     will see the surgeon for a cyst that she has in her vaginal area, she had already seen the dermatologist and the GYN doctor  Hopefully will come out of the boot for her left leg  Saw ENT and may is sinus surgery            HISTORY   right wrist pain after her rotator cuff surg x 2 weeks -was first like an electric shock and now only gets that once in a while but now more sore   Going for PT   She also has some left thigh pain - from buttocks to the knees         History  3/2022  right shoulder surgery on 3/7/2022   Requested by   shabnam Garcia - can be seen in epic   Can walk up one flight of stars without feeling short of breath   Right shoulder radiates to the neck on that side   She is right handed , cant use her hand and arm as much            HISTORY   10/2021   She has been seeing Ortho for the neuropathy and also received shots to the shoulder-right-and knees and was given Tylenol No. 3  Also sees the foot doctor  saw dr mike lucas and consider valium but she feels this will affect her ability to work   She complains of \"nerve pain\" in the feet   She states that valium knocks her out -even if she takes it in the evening she still feels the effects in the am , foot dr gave gabpentin but that too makes her sleepy \"im very sensitive   Also c/o left flank pain  X couple weeks -- ua done in office and does show blood with uti    8/10 , sharp and stabbing ,   Worse with standing after sitting a long time   Better - ?   Comes and goesbut now more frequent      Not taking trazodone - not helping and she is not sure what meds cuases what so she doesn't want to be on meds   Know she has had CTs and ultrasounds of her kidney done before and has seen urology as well- dr moreno -she did have a 3 mm nonobstructing stone in the right kidney  Still has her chronic cough and try the Tessalon Perles which did not help that much, has an inhaler so she will try that, the codien cough syrup just put her to sleep as well      Finished doxy yest taht she got from the  ,didn't help          HISTORY 3/2021   cramps is all over but mostly in the legs-stretching makes it works and the meloxicam is not helping and she did see the rheumatologist and was started on a new medication metaxalone 800 mg 3 times a day but it makes her sleepy so she is only taking it at night--it still wakes her up at night so does not feel that that is working was recommended to follow-up at an academic center     Still has a cough and the codeine cough syrup did not help much  and noted that she is on and PPI and the chest x-ray was normal which was ordered last time   she has noticed that the rescue inhaler does help                       HISTORY  History from August 2020   Has seen the rheumatologist, physiatry and gone for physical therapy  Has tried Cymbalta given by physiatry but in January when I had seen her she had stated that she was not taking it and and cyclobenzaprine was started but she does not think that that was helpful  She states that she has had an EMG in the past as well a very long time ago  She states that she drinks a lot of water as well  2015 emg report noted in chart - normal   Also noted in 2015 she saw the neurologist Dr. Enamorado and was recommended to go to the pain doctors--Flexeril was tried  Needs to go back to see Dr. Hays the podiatrist for her orthotics                 History   Last seen in February and since then in March she saw Dr. Delgadillo and got knee injections for her osteoarthritis of the knees   had an endoscopic procedure and was found to have gastric polyp by Dr. Pereira  In June she saw the foot doctor and received a shot in her foot for the neuroma  In July she saw Dr. Bird the eye doctor for her glaucoma and will return in 4 months and then           History  1/11/2020 visit   Dr clay- ortho -- left hand tendonitiis -got shot and feeks better   Would like to see dr berger   Not taking cymbalta - takes T#3 one a week , ibuprofen \"seldom\"- once a mn   Muscle cramps coming back - legs thighs and feet , no RLS               History- 11/19  C/o right shoulder has a tear  And she does admit she uses the left more   Used to see dr berger and now sees dr haque and got a shot to the right shoulder   Needs a referral to see pain clinic   Needs referral to see podiatrist --corns and calluses right foot / toe and also for ingrown toenails   Pain is 10/10 -- iburprofen does helps but doesn't like taking it too much --takes T#3 but t just  puts her to sleep and then she will wake up with pain -- unable ot sratch her back   No falls trauma or injury that she is aware of      History   She has had both muscle and nerve biopsies by dr hernandez   She gets these cramps every day   Steroids do not help either  Noted that she had try cyclobenzaprine 10 mg in July 2019--no help   Since the last visit she did see her orthopedic doctor and received a short of a cortisone shot to her right shoulder  Also c/o left hand tender swelling x few weeks   She also states that she gets rashes underneath her breasts and the triamcinolone helps with this and needs a refill        hisotry -8/19 first visit   C/c htn   C/o fell on July 8th and has some left rib pain and back pains   Had some ct scans and would like to go through it  Needs referrals    Usually gets shots to her knees and shoulders- was told to follow-up with a new doctor     [facility]  gerd daily ppi --h/o nissens fundoplication  ?2006  HTN  Hip pain and back pain - T#3 - prn 2 tabs a week, also takes ibuprofen   bm hematuria   Non obstructing kid stones   elmer was on cpap   Glaucoma suspect   Right shoulder rotator cuff tear s/p surg 3/2022   Osteoarthritis knees  Hepatic and renal cysts similar to 8/19 and CT 5/20  H/o left toe OM s/p amputation      Dr peters - eye dr Dr moreno - urologist   Dr. Craft- rheum   Dr. Delgadillo- ortho   Dr lorraine hall        Current Outpatient Medications   Medication Sig Dispense Refill    meclizine 12.5 MG Oral Tab Take 1 tablet (12.5 mg total) by mouth 2 (two) times daily as needed. 30 tablet 0    rosuvastatin 5 MG Oral Tab Take 1 tablet (5 mg total) by mouth daily.      omeprazole 20 MG Oral Capsule Delayed Release Take 2 capsules (40 mg total) by mouth every morning. 180 capsule 3    cholecalciferol 50 MCG (2000 UT) Oral Cap Take 1 capsule (2,000 Units total) by mouth daily.      amLODIPine 10 MG Oral Tab TAKE 1 TABLET BY MOUTH EVERY DAY 90 tablet 3    latanoprost  0.005 % Ophthalmic Solution INSTILL 1 DROP IN BOTH EYES EVERY night 7.5 mL 3    MAGNESIUM OR Take by mouth.        Past Medical History:   Diagnosis Date    Abscess of left thigh     Acute meniscal tear of knee     Age-related nuclear cataract of both eyes 02/10/2015    Anal sphincter incontinence 04/28/2014    Arthritis     Back problem     Chondromalacia     Colon polyps     Degenerative disc disease     Diverticular disease     Esophageal reflux     Glaucoma     Hammertoe     High blood pressure     Insomnia     Neuropathy     Right Foot    Obstructive sleep apnea syndrome 03/21/2018    Osteoarthritis     Primary open angle glaucoma of both eyes 05/19/2015    Diagnosis of glaucoma OU; Started Latanoprost qhs after abnormal VF and OCT 2/25/16;  6/5/18 consult with Dr. Madeline Chappell-see progress note    Small bowel obstruction (HCC)     Tendinitis     Unspecified essential hypertension     Visual impairment     Reraders      Past Surgical History:   Procedure Laterality Date    Anal sphincterotomy      03/12/2013 [place]    Blepharoplasty anesthesia Bilateral 03/05/2020    Dr Suresh [facility] Ophthalmology     Cataract extraction w/  intraocular lens implant Left 05/07/2018    L PC IOL with Dr. Suresh @ [facility]    Colonoscopy N/A 11/11/2016    Procedure: COLONOSCOPY;  Surgeon: Sabrina Cazares MD;  Location: [facility] ENDOSCOPY    Colonoscopy N/A 09/06/2019    Procedure: COLONOSCOPY;  Surgeon: Edwin Sterling MD;  Location: [facility] ENDOSCOPY    Excision of chalazion, single - od - right eye Right 6.27/2017    Nasally    Hc arthrocentesis or inject major joint w/o us      Hysterectomy  1996    [facility]    Other      Lap Nissen Fundoplication surgery    Other surgical history  2005,2007,2008    LAPAROSCOPIC LYSIS OF ADHESION    Other surgical history      right foot bunionectomy    Other surgical history  11/14/2014    right superficial anterior peroneal nerve biopsy and right proximal thigh muscle biopsy.    Other surgical  history  06/13/2023    Excision and Biopsy of two  perineal cysts    Upper gi endoscopy performed  05/2015    Yag capsulotomy - os - left eye Left 12/19/2018    RJM      Social History:  Social History     Socioeconomic History    Marital status:    Tobacco Use    Smoking status: Never     Passive exposure: Never    Smokeless tobacco: Never   Vaping Use    Vaping Use: Never used   Substance and Sexual Activity    Alcohol use: No     Comment: None.     Drug use: No    Sexual activity: Yes     Birth control/protection: Hysterectomy   Other Topics Concern    Caffeine Concern Yes     Comment: occasional soda    Exercise Yes    Pt has a pacemaker No    Pt has a defibrillator No    Breast feeding No    Reaction to local anesthetic No    Right Handed Yes   Social History Narrative    Work - banking        REVIEW OF SYSTEMS:   GENERAL HEALTH: No fevers, chills, sweats, fatigue  HEENT: No decreased hearing ear pain nasal congestion or sore throat  RESPIRATORY: denies shortness of breath, cough, wheezing  CARDIOVASCULAR: denies chest pain on exertion, palpitations, swelling in feet  NEURO: denies headaches , anxiety, depression    EXAM:   /83 (BP Location: Right arm, Patient Position: Sitting, Cuff Size: adult)   Pulse 67   Resp 16   Ht 5' 8\" (1.727 m)   Wt 198 lb 12.8 oz (90.2 kg)   BMI 30.23 kg/m²   GENERAL: well developed, well nourished,in no apparent distress  SKIN: no rashes,no suspicious lesions  HEENT: atraumatic, normocephalic  NECK: supple,no adenopathy,  LUNGS: clear to auscultation, no wheeze  CARDIO: RRR without murmur  EXTREMITIES: no  edema    ASSESSMENT AND PLAN:   Diagnoses and all orders for this visit:    Lightheadedness  -     meclizine 12.5 MG Oral Tab; Take 1 tablet (12.5 mg total) by mouth 2 (two) times daily as needed.  Meclizine reminded patient to make appointment with physical therapist    Leukocytosis, unspecified type  -     CBC With Differential With Platelet; Future  -      Cancel: Oncology/Hematology Referral - In Network  -     Oncology/Hematology Referral - In Network  And  Bruising  -     Oncology/Hematology Referral - In Network    Recheck CBC  Refer to heme-          Preventative medicine  Colonoscopy done  9/19  Dr. hall rpt in yrs ie 2024  Mammogram-10/2023  Pap 2016 dr chin -hina ,saw Dr. Witt in February 2021 and sees mina jaffe   Labs 12/2023   10/2023  reviewed   EKG January 2020 normal     The patient indicates understanding of these issues and agrees to the plan.  No follow-ups on file.

## 2024-01-12 ENCOUNTER — TELEPHONE (OUTPATIENT)
Dept: ADMINISTRATIVE | Age: 64
End: 2024-01-12

## 2024-01-12 NOTE — TELEPHONE ENCOUNTER
Hello  Please advise name of referred to provider so we may confirm network status.  Once updated, we can confirm if authorization is required.  Thank you  Martha

## 2024-01-15 ENCOUNTER — OFFICE VISIT (OUTPATIENT)
Dept: HEMATOLOGY/ONCOLOGY | Facility: HOSPITAL | Age: 64
End: 2024-01-15
Attending: STUDENT IN AN ORGANIZED HEALTH CARE EDUCATION/TRAINING PROGRAM
Payer: MEDICARE

## 2024-01-15 ENCOUNTER — LAB ENCOUNTER (OUTPATIENT)
Dept: LAB | Facility: HOSPITAL | Age: 64
End: 2024-01-15
Attending: STUDENT IN AN ORGANIZED HEALTH CARE EDUCATION/TRAINING PROGRAM
Payer: MEDICARE

## 2024-01-15 VITALS
BODY MASS INDEX: 30.16 KG/M2 | OXYGEN SATURATION: 99 % | SYSTOLIC BLOOD PRESSURE: 134 MMHG | HEIGHT: 68 IN | TEMPERATURE: 98 F | HEART RATE: 80 BPM | WEIGHT: 199 LBS | RESPIRATION RATE: 16 BRPM | DIASTOLIC BLOOD PRESSURE: 77 MMHG

## 2024-01-15 DIAGNOSIS — D72.9 NEUTROPHILIA: Primary | ICD-10-CM

## 2024-01-15 DIAGNOSIS — D72.9 NEUTROPHILIA: ICD-10-CM

## 2024-01-15 DIAGNOSIS — D72.829 LEUKOCYTOSIS, UNSPECIFIED TYPE: ICD-10-CM

## 2024-01-15 PROBLEM — D72.828 NEUTROPHILIA: Status: ACTIVE | Noted: 2024-01-15

## 2024-01-15 LAB
ALBUMIN SERPL-MCNC: 4.5 G/DL (ref 3.2–4.8)
ALBUMIN/GLOB SERPL: 1.6 {RATIO} (ref 1–2)
ALP LIVER SERPL-CCNC: 118 U/L
ALT SERPL-CCNC: 26 U/L
ANION GAP SERPL CALC-SCNC: 3 MMOL/L (ref 0–18)
AST SERPL-CCNC: 20 U/L (ref ?–34)
BASOPHILS # BLD AUTO: 0.03 X10(3) UL (ref 0–0.2)
BASOPHILS NFR BLD AUTO: 0.3 %
BILIRUB SERPL-MCNC: 0.8 MG/DL (ref 0.2–1.1)
BUN BLD-MCNC: 11 MG/DL (ref 9–23)
BUN/CREAT SERPL: 10.5 (ref 10–20)
CALCIUM BLD-MCNC: 9.5 MG/DL (ref 8.7–10.4)
CHLORIDE SERPL-SCNC: 106 MMOL/L (ref 98–112)
CO2 SERPL-SCNC: 30 MMOL/L (ref 21–32)
CREAT BLD-MCNC: 1.05 MG/DL
DEPRECATED RDW RBC AUTO: 42.5 FL (ref 35.1–46.3)
EGFRCR SERPLBLD CKD-EPI 2021: 59 ML/MIN/1.73M2 (ref 60–?)
EOSINOPHIL # BLD AUTO: 0.03 X10(3) UL (ref 0–0.7)
EOSINOPHIL NFR BLD AUTO: 0.3 %
ERYTHROCYTE [DISTWIDTH] IN BLOOD BY AUTOMATED COUNT: 14.8 % (ref 11–15)
ERYTHROCYTE [SEDIMENTATION RATE] IN BLOOD: 21 MM/HR
FASTING STATUS PATIENT QL REPORTED: NO
GLOBULIN PLAS-MCNC: 2.9 G/DL (ref 2.8–4.4)
GLUCOSE BLD-MCNC: 94 MG/DL (ref 70–99)
HAV AB SER QL IA: NONREACTIVE
HBV CORE AB SERPL QL IA: NONREACTIVE
HBV SURFACE AB SER QL: NONREACTIVE
HBV SURFACE AB SERPL IA-ACNC: <3.1 MIU/ML
HBV SURFACE AG SERPL QL IA: NONREACTIVE
HCT VFR BLD AUTO: 45.7 %
HCV AB SERPL QL IA: NONREACTIVE
HGB BLD-MCNC: 15.4 G/DL
IMM GRANULOCYTES # BLD AUTO: 0.03 X10(3) UL (ref 0–1)
IMM GRANULOCYTES NFR BLD: 0.3 %
LDH SERPL L TO P-CCNC: 236 U/L
LYMPHOCYTES # BLD AUTO: 2.49 X10(3) UL (ref 1–4)
LYMPHOCYTES NFR BLD AUTO: 24.3 %
MCH RBC QN AUTO: 27 PG (ref 26–34)
MCHC RBC AUTO-ENTMCNC: 33.7 G/DL (ref 31–37)
MCV RBC AUTO: 80.2 FL
MONOCYTES # BLD AUTO: 0.64 X10(3) UL (ref 0.1–1)
MONOCYTES NFR BLD AUTO: 6.2 %
NEUTROPHILS # BLD AUTO: 7.04 X10 (3) UL (ref 1.5–7.7)
NEUTROPHILS # BLD AUTO: 7.04 X10(3) UL (ref 1.5–7.7)
NEUTROPHILS NFR BLD AUTO: 68.6 %
OSMOLALITY SERPL CALC.SUM OF ELEC: 287 MOSM/KG (ref 275–295)
PLATELET # BLD AUTO: 357 10(3)UL (ref 150–450)
POTASSIUM SERPL-SCNC: 3.7 MMOL/L (ref 3.5–5.1)
PROT SERPL-MCNC: 7.4 G/DL (ref 5.7–8.2)
RBC # BLD AUTO: 5.7 X10(6)UL
SODIUM SERPL-SCNC: 139 MMOL/L (ref 136–145)
WBC # BLD AUTO: 10.3 X10(3) UL (ref 4–11)

## 2024-01-15 PROCEDURE — 80053 COMPREHEN METABOLIC PANEL: CPT

## 2024-01-15 PROCEDURE — 85652 RBC SED RATE AUTOMATED: CPT

## 2024-01-15 PROCEDURE — 87340 HEPATITIS B SURFACE AG IA: CPT

## 2024-01-15 PROCEDURE — 85025 COMPLETE CBC W/AUTO DIFF WBC: CPT

## 2024-01-15 PROCEDURE — 83615 LACTATE (LD) (LDH) ENZYME: CPT

## 2024-01-15 PROCEDURE — 81207 BCR/ABL1 GENE MINOR BP: CPT

## 2024-01-15 PROCEDURE — 86706 HEP B SURFACE ANTIBODY: CPT

## 2024-01-15 PROCEDURE — 80503 PATH CLIN CONSLTJ SF 5-20: CPT

## 2024-01-15 PROCEDURE — 36415 COLL VENOUS BLD VENIPUNCTURE: CPT

## 2024-01-15 PROCEDURE — 86708 HEPATITIS A ANTIBODY: CPT

## 2024-01-15 PROCEDURE — 86704 HEP B CORE ANTIBODY TOTAL: CPT

## 2024-01-15 PROCEDURE — 85060 BLOOD SMEAR INTERPRETATION: CPT

## 2024-01-15 PROCEDURE — 86803 HEPATITIS C AB TEST: CPT

## 2024-01-15 PROCEDURE — 81206 BCR/ABL1 GENE MAJOR BP: CPT

## 2024-01-15 PROCEDURE — 87389 HIV-1 AG W/HIV-1&-2 AB AG IA: CPT

## 2024-01-15 PROCEDURE — 81208 BCR/ABL1 GENE OTHER BP: CPT

## 2024-01-15 PROCEDURE — 99204 OFFICE O/P NEW MOD 45 MIN: CPT | Performed by: STUDENT IN AN ORGANIZED HEALTH CARE EDUCATION/TRAINING PROGRAM

## 2024-01-15 NOTE — CONSULTS
[facility]  Initial Hematology Clinic Consult Note    Patient Name: Kerry Hsu   YOB: 1960   Medical Record Number: [id number]    Reason for consultation: Intermittent neutrophilic leukocytosis    Subjective:   Kerry Hsu is a 63 year old female with a history of hypertension, diabetes, GERD, hyperlipidemia, obesity who presents to hematology regarding intermittent and isolated neutrophilic leukocytosis.  The patient follows with her primary care physician, Dr. Bella Rogers and recent blood work showed a white blood cell count of 11.3, neutrophil count of 8.4 and otherwise normal differential normal hemoglobin and normal platelet count.  Upon chart review, the patient's white blood cell count has been intermittently elevated dating as far back to 2019.  Specifically, in April 2023 white blood cell count was elevated to 19.  She also has an intermittently elevated RBC count and MCV around 80.    Clinically, the patient feels well.  At baseline she has chronic muscular cramps which are debilitating for her, because difficulty sleeping at night.  These have been extensively worked up and remain idiopathic.  Denies fevers, night sweats, poor appetite, unintentional weight loss, significant fatigue or other systemic symptoms.  Denies tobacco, chronic infections or illness.    Review of Systems:  Hematology/Oncology ROS performed and negative except as above in HPI    History/Other:   Past Medical History:  Past Medical History:   Diagnosis Date    Abscess of left thigh     Acute meniscal tear of knee     Age-related nuclear cataract of both eyes 02/10/2015    Anal sphincter incontinence 04/28/2014    Arthritis     Back problem     Chondromalacia     Colon polyps     Degenerative disc disease     Diverticular disease     Esophageal reflux     Glaucoma     Hammertoe     High blood pressure     Insomnia     Neuropathy     Right Foot    Obstructive sleep apnea syndrome  03/21/2018    Osteoarthritis     Primary open angle glaucoma of both eyes 05/19/2015    Diagnosis of glaucoma OU; Started Latanoprost qhs after abnormal VF and OCT 2/25/16;  6/5/18 consult with Dr. Madeline Chappell-see progress note    Small bowel obstruction (HCC)     Tendinitis     Unspecified essential hypertension     Visual impairment     Reraders       Past Surgical History:  Past Surgical History:   Procedure Laterality Date    ANAL SPHINCTEROTOMY      03/12/2013 [place]    BLEPHAROPLASTY ANESTHESIA Bilateral 03/05/2020    Dr Kerwin Bello Ophthalmology     CATARACT EXTRACTION W/  INTRAOCULAR LENS IMPLANT Left 05/07/2018    L PC IOL with Dr. Suresh @ [facility]    COLONOSCOPY N/A 11/11/2016    Procedure: COLONOSCOPY;  Surgeon: Sabrina Cazares MD;  Location: [facility] ENDOSCOPY    COLONOSCOPY N/A 09/06/2019    Procedure: COLONOSCOPY;  Surgeon: Edwin Sterling MD;  Location: [facility] ENDOSCOPY    EXCISION OF CHALAZION, SINGLE - OD - RIGHT EYE Right 6.27/2017    Nasally    HC ARTHROCENTESIS OR INJECT MAJOR JOINT W/O US      HYSTERECTOMY  1996    [facility]    OTHER      Lap Nissen Fundoplication surgery    OTHER SURGICAL HISTORY  2005,2007,2008    LAPAROSCOPIC LYSIS OF ADHESION    OTHER SURGICAL HISTORY      right foot bunionectomy    OTHER SURGICAL HISTORY  11/14/2014    right superficial anterior peroneal nerve biopsy and right proximal thigh muscle biopsy.    OTHER SURGICAL HISTORY  06/13/2023    Excision and Biopsy of two  perineal cysts    UPPER GI ENDOSCOPY PERFORMED  05/2015    YAG CAPSULOTOMY - OS - LEFT EYE Left 12/19/2018    RJM       Current Medications:   meclizine 12.5 MG Oral Tab Take 1 tablet (12.5 mg total) by mouth 2 (two) times daily as needed. 30 tablet 0    rosuvastatin 5 MG Oral Tab Take 1 tablet (5 mg total) by mouth daily.      omeprazole 20 MG Oral Capsule Delayed Release Take 2 capsules (40 mg total) by mouth every morning. 180 capsule 3    cholecalciferol 50 MCG (2000 UT) Oral Cap Take 1 capsule (2,000  Units total) by mouth daily.      amLODIPine 10 MG Oral Tab TAKE 1 TABLET BY MOUTH EVERY DAY 90 tablet 3    latanoprost 0.005 % Ophthalmic Solution INSTILL 1 DROP IN BOTH EYES EVERY night 7.5 mL 3    MAGNESIUM OR Take by mouth.        [COMPLETED] triamcinolone acetonide (Kenalog-40) 40 MG/ML injection 40 mg  40 mg Intra-articular 2 now doses Marcin Delgadillo MD   40 mg at 12/21/23 1365       Allergies:   Allergies   Allergen Reactions    Celecoxib TONGUE SWELLING     Other reaction(s): CELECOXIB    Sulfa Antibiotics TONGUE SWELLING     Other reaction(s): SULFA (SULFONAMIDE ANTIBIOTICS)    Erythromycin PAIN    Amoxicillin DIARRHEA       Family Medical History:  Family History   Problem Relation Age of Onset    Hypertension Father     Hypertension Mother     Hypertension Sister     Cancer Sister         liver cancer    Hypertension Brother     Diabetes Neg     Glaucoma Neg     Macular degeneration Neg     Breast Cancer Neg     Ovarian Cancer Neg        Social History:  Social History     Socioeconomic History    Marital status:      Spouse name: Not on file    Number of children: Not on file    Years of education: Not on file    Highest education level: Not on file   Occupational History    Not on file   Tobacco Use    Smoking status: Never     Passive exposure: Never    Smokeless tobacco: Never   Vaping Use    Vaping Use: Never used   Substance and Sexual Activity    Alcohol use: No     Comment: None.     Drug use: No    Sexual activity: Yes     Birth control/protection: Hysterectomy   Other Topics Concern     Service Not Asked    Blood Transfusions Not Asked    Caffeine Concern Yes     Comment: occasional soda    Occupational Exposure Not Asked    Hobby Hazards Not Asked    Sleep Concern Not Asked    Stress Concern Not Asked    Weight Concern Not Asked    Special Diet Not Asked    Back Care Not Asked    Exercise Yes    Bike Helmet Not Asked    Seat Belt Not Asked    Self-Exams Not Asked    Grew up  on a farm Not Asked    History of tanning Not Asked    Outdoor occupation Not Asked    Pt has a pacemaker No    Pt has a defibrillator No    Breast feeding No    Reaction to local anesthetic No    Left Handed Not Asked    Right Handed Yes    Currently spends a great deal of time in the sun Not Asked    Past Sunlamp Treatments for Acne Not Asked    Hx of Spending Great Deal of Time in Sun Not Asked    Bad sunburns in the past Not Asked    Tanning Salons in the Past Not Asked    Hx of Radiation Treatments Not Asked    Regular use of sun block Not Asked   Social History Narrative    Work - banking     Social Determinants of Health     Financial Resource Strain: Not on file   Food Insecurity: Not on file   Transportation Needs: Not on file   Physical Activity: Not on file   Stress: Not on file   Social Connections: Not on file   Housing Stability: Not on file       Objective:   Blood pressure 134/77, pulse 80, temperature 98.1 °F (36.7 °C), temperature source Oral, resp. rate 16, height 1.727 m (5' 8\"), weight 90.3 kg (199 lb), SpO2 99%, not currently breastfeeding.  Physical Exam:  General: A&Ox3, NAD  HEENT: PERRL, OP clear  CV: RRR, no murmurs, + pulses  Pulm: CTA b/l, no w/r/r, normal effort  Abd: soft, ntnd, normal bowel sounds, no HSM or masses  Lymph: no palpable lymphadenopathy   Extremities: no edema  Neurological: grossly intact    Results:   Labs:  Lab Results   Component Value Date    WBC 11.6 (H) 01/11/2024    HGB 15.2 01/11/2024    HCT 46.5 01/11/2024    .0 01/11/2024    CREATSERUM 0.94 12/27/2023    BUN 15 12/27/2023     12/27/2023    K 3.5 12/27/2023     12/27/2023    CO2 28.0 12/27/2023    GLU 92 12/27/2023    CA 9.5 12/27/2023    ALB 4.6 12/27/2023    ALKPHO 134 (H) 12/27/2023    BILT 0.5 12/27/2023    TP 7.8 12/27/2023    AST 18 12/27/2023    ALT 25 12/27/2023    PTT 31.1 10/06/2023    INR 0.95 12/27/2023    T4F 1.4 11/21/2023    TSH 1.122 11/21/2023    ESRML 11 05/21/2022    CRP  <0.29 05/21/2022    MG 2.1 06/08/2023     (H) 11/05/2022    B12 329 01/27/2022     Assessment & Plan:   Kerry Hsu is a 63 year old female with a history of hypertension, diabetes, GERD, hyperlipidemia, obesity who presents to hematology regarding intermittent and isolated neutrophilic leukocytosis.    We reviewed the available records and discussed common causes of leukocytosis, initial workup, and general clinical management. Common causes of leukocytosis, particularly driven by neutrophilia, include but are not limited to recent infections or chronic infections (HIV, hepatitis), inflammatory conditions (smoking, obesity, stress, RA, UC, Crohn's), medications (G-CSF, steroids, catecholamines, lithium), asplenia, inherited disorders (leukocyte adhesion deficiency, etc) and more sinister causes such as MPN's or malignancy.   -low suspicion for a more sinister cause of neutrophilia, though we will check a CBC w/ diff, CMP, LDH, peripheral blood smear, HIV, Hepatitis screen, ESR, and BCR-ABL screen  -if workup is negative, could be related to underlying stress or possibly obesity  -we will discuss results over the phone when they return     MDM Moderate: undiagnosed new problem with uncertain prognosis; review of notes and tests, ordering of tests, independent interpretation of tests; low risk of morbidity from additional testing/treatment    Jeison Rivera DO    [facility]  [facility]    This note was created using a voice-recognition transcribing system. Incorrect words or phrases may have been missed during proofreading. Please interpret accordingly.

## 2024-01-21 NOTE — H&P
Pt was seen and examined by vascular team. PT expressed that the Fistula was bleeding after Hd, the nurse had held pressure for 30 mins but the bleeding had stopped completely. She states that HD session was successful and finished in totality. PT states that she never had issues with flow. Pt was examined at bedside, palpable thrill, pulse and palpable radial pulse. She states that is some tingling but that has always been there and was the same and prior. No weakness, and good motor function. Please consult if there was issues with the fistula. [phone] [facility]    PATIENT'S NAME: Laure Andrade   ATTENDING PHYSICIAN: Ronny Shah MD   PATIENT ACCOUNT#:   [de-identified]    LOCATION:  [address] [place] [address] #:   [id number]       YOB: 1960  ADMISSION DATE:       02/ anal sphincter incontinence; chondromalacia; colonic polyps; degenerative disc disease in her neck and low back for which she has undergone multiple epidural steroid injections; diverticular disease with no episodes of diverticulitis in the past; gastroeso EXAMINATION:    VITAL SIGNS:  Temperature 98, pulse 65, respiratory rate 16, blood pressure 139/76, O2 saturation 94% on room air. HEENT:  Normocephalic, atraumatic. Pupils equal, reactive to light. Left pupil postsurgical.  Sclerae anicteric.   There is Pt was seen and examined by vascular team. PT expressed that the Fistula was bleeding after Hd, the nurse had held pressure for 30 mins but the bleeding had stopped completely. She states that HD session was successful and finished in totality. PT states that she never had issues with flow. Pt was examined at bedside, palpable thrill, pulse and palpable radial pulse. She states that is some tingling but that has always been there and was the same and prior. No weakness, and good motor function. The fistula is currently not bleeding at the time. Please continue Hd via the AVF. Please consult if there was issues with the fistula. [phone] medicine until it is clear that her blood pressure is stable to elevated. 3.   Glaucoma:  Continue regular eye drops. 4.   Sleep apnea: The patient did not wish the CPAP machine; order was placed so that if the patient changes her mind it can be used.

## 2024-01-23 LAB — INTERP BCRABL: NEGATIVE

## 2024-01-30 ENCOUNTER — OFFICE VISIT (OUTPATIENT)
Dept: OPHTHALMOLOGY | Facility: CLINIC | Age: 64
End: 2024-01-30

## 2024-01-30 DIAGNOSIS — H43.393 FLOATER, VITREOUS, BILATERAL: ICD-10-CM

## 2024-01-30 DIAGNOSIS — H40.1132 PRIMARY OPEN ANGLE GLAUCOMA OF BOTH EYES, MODERATE STAGE: Primary | ICD-10-CM

## 2024-01-30 DIAGNOSIS — Z96.1 PSEUDOPHAKIA, LEFT EYE: ICD-10-CM

## 2024-01-30 DIAGNOSIS — H25.11 AGE-RELATED NUCLEAR CATARACT, RIGHT: ICD-10-CM

## 2024-01-30 PROCEDURE — 92014 COMPRE OPH EXAM EST PT 1/>: CPT | Performed by: OPHTHALMOLOGY

## 2024-01-30 PROCEDURE — 92015 DETERMINE REFRACTIVE STATE: CPT | Performed by: OPHTHALMOLOGY

## 2024-01-30 RX ORDER — LATANOPROST 50 UG/ML
SOLUTION/ DROPS OPHTHALMIC
Qty: 3 EACH | Refills: 3 | Status: SHIPPED | OUTPATIENT
Start: 2024-01-30

## 2024-01-30 NOTE — PROGRESS NOTES
Kerry Hsu is a 63 year old female.    HPI:     HPI    Pt is here for complete eye exam.  Pt states vision in left eye has gotten worse since cataract surgery. She also complains of bump left upper eyelid for the past 6 months. She has used warm compresses and it has not gone away.  Pt was seen at [facility] in October 2023 and got new prescription for glasses.  Pt is taking Latanoprost at night in both eyes.     Last edited by Silvana Dejesus OT on 1/30/2024  1:42 PM.        Patient History:  Past Medical History:   Diagnosis Date    Abscess of left thigh     Acute meniscal tear of knee     Age-related nuclear cataract of both eyes 02/10/2015    Anal sphincter incontinence 04/28/2014    Arthritis     Back problem     Chondromalacia     Colon polyps     Degenerative disc disease     Diverticular disease     Esophageal reflux     Glaucoma     Hammertoe     High blood pressure     Insomnia     Neuropathy     Right Foot    Obstructive sleep apnea syndrome 03/21/2018    Osteoarthritis     Primary open angle glaucoma of both eyes 05/19/2015    Diagnosis of glaucoma OU; Started Latanoprost qhs after abnormal VF and OCT 2/25/16;  6/5/18 consult with Dr. Madeline Chappell-see progress note    Small bowel obstruction (HCC)     Tendinitis     Unspecified essential hypertension     Visual impairment     Reraders       Surgical History: Kerry Hsu has a past surgical history that includes hysterectomy (1996) ([facility]); anal sphincterotomy (03/12/2013 Gely); hc arthrocentesis or inject major joint w/o us; upper gi endoscopy performed (05/2015); colonoscopy (N/A, 11/11/2016) (Procedure: COLONOSCOPY;  Surgeon: Sabrina Cazares MD;  Location: [facility] ENDOSCOPY); Excision of Chalazion, Single - OD - Right Eye (Right, 6.27/2017) (Nasally); Cataract extraction w/  intraocular lens implant (Left, 05/07/2018) (L PC IOL with Dr. Suresh @ [facility]); Yag Capsulotomy - OS - Left Eye (Left, 12/19/2018) (CHENCHO); other  (Lap Nissen Fundoplication surgery); colonoscopy (N/A, 09/06/2019) (Procedure: COLONOSCOPY;  Surgeon: Edwin Sterling MD;  Location: [facility] ENDOSCOPY); other surgical history (2005,2007,2008) (LAPAROSCOPIC LYSIS OF ADHESION); other surgical history (right foot bunionectomy); other surgical history (11/14/2014) (right superficial anterior peroneal nerve biopsy and right proximal thigh muscle biopsy.); blepharoplasty anesthesia (Bilateral, 03/05/2020) ([facility] ); and other surgical history (06/13/2023) (Excision and Biopsy of two  perineal cysts).    Family History   Problem Relation Age of Onset    Hypertension Father     Hypertension Mother     Hypertension Sister     Cancer Sister         liver cancer    Hypertension Brother     Diabetes Neg     Glaucoma Neg     Macular degeneration Neg     Breast Cancer Neg     Ovarian Cancer Neg        Social History:   Social History     Socioeconomic History    Marital status:    Tobacco Use    Smoking status: Never     Passive exposure: Never    Smokeless tobacco: Never   Vaping Use    Vaping Use: Never used   Substance and Sexual Activity    Alcohol use: No     Comment: None.     Drug use: No    Sexual activity: Yes     Birth control/protection: Hysterectomy   Other Topics Concern    Caffeine Concern Yes     Comment: occasional soda    Exercise Yes    Pt has a pacemaker No    Pt has a defibrillator No    Breast feeding No    Reaction to local anesthetic No    Right Handed Yes   Social History Narrative    Work - banking       Medications:  Current Outpatient Medications   Medication Sig Dispense Refill    latanoprost 0.005 % Ophthalmic Solution INSTILL 1 DROP IN BOTH EYES EVERY night 3 each 3    meclizine 12.5 MG Oral Tab Take 1 tablet (12.5 mg total) by mouth 2 (two) times daily as needed. 30 tablet 0    rosuvastatin 5 MG Oral Tab Take 1 tablet (5 mg total) by mouth daily.      omeprazole 20 MG Oral Capsule Delayed Release Take 2 capsules (40 mg  total) by mouth every morning. 180 capsule 3    cholecalciferol 50 MCG (2000 UT) Oral Cap Take 1 capsule (2,000 Units total) by mouth daily.      amLODIPine 10 MG Oral Tab TAKE 1 TABLET BY MOUTH EVERY DAY 90 tablet 3    MAGNESIUM OR Take by mouth.         Allergies:  Allergies   Allergen Reactions    Celecoxib TONGUE SWELLING     Other reaction(s): CELECOXIB    Sulfa Antibiotics TONGUE SWELLING     Other reaction(s): SULFA (SULFONAMIDE ANTIBIOTICS)    Erythromycin PAIN    Amoxicillin DIARRHEA       ROS:       PHYSICAL EXAM:     Base Eye Exam       Visual Acuity (Snellen - Linear)         Right Left    Dist cc 20/25 -1 20/50 +2    Dist ph cc  NI    Near cc 20/30 20/60      Correction: Glasses              Tonometry (Applanation, 1:27 PM)         Right Left    Pressure 15 16              Pachymetry (3/22/2023)         Right Left    Thickness 515/+2 509/+2.5              Pupils         Pupils    Right PERRL    Left PERRL              Visual Fields         Left Right     Full Full              Extraocular Movement         Right Left     Full, Ortho Full, Ortho              Neuro/Psych       Oriented x3: Yes    Mood/Affect: Normal              Dilation       Both eyes: 1.0% Mydriacyl and 2.5% Brayan Synephrine @ 1:27 PM              Dilation #2       Both eyes: 1.0% Mydriacyl and 2.5% Brayan Synephrine @ 1:27 PM                  Slit Lamp and Fundus Exam       External Exam         Right Left    External Normal Normal              Slit Lamp Exam         Right Left    Lids/Lashes Dermatochalasis, Meibomian gland dysfunction Dermatochalasis, Meibomian gland dysfunction    Conjunctiva/Sclera Normal Normal    Cornea Clear- no K spindles Clear- no K spindles    Anterior Chamber Deep and quiet Deep and quiet    Iris  No transillumination defects No transillumination defects    Lens 2+ Nuclear sclerosis PC IOL with YAG    Vitreous Vitreous floaters Vitreous floaters              Fundus Exam         Right Left    Disc Good rim,  Temporal crescent Good rim, Temporal crescent    C/D Ratio 0.7 0.5    Macula Normal Normal    Vessels Normal Normal    Periphery Normal Normal                  Refraction       Wearing Rx         Sphere Cylinder Axis Add    Right -0.75 +1.50 150 +2.50    Left -0.50 Sphere  +2.50      Age: 3m    Type: Progressive bifocal              Manifest Refraction         Sphere Cylinder [place] Dist VA Add Near VA    Right -0.75 +1.50 150 20/25 +3.00 20/20    Left [place] Sphere  20/30+ +3.00 20/20              Final Rx         Sphere Cylinder [place] Dist VA Add Near VA    Right -0.75 +1.50 150 20/25 +3.00 20/20    Left [place] Sphere  20/30+ +3.00 20/20      Type: Progressive bifocal                     ASSESSMENT/PLAN:     Diagnoses and Plan:     Primary open angle glaucoma of both eyes, moderate stage   IOP is stable.   Continue Latanoprost at bedtime in both eyes.         Return for next available visual field and OCT with no MD, then 4 months for a pressure check.     Age-related nuclear cataract, right  No treatment is needed at this time.   Discussed that she could have cataract surgery in the right eye at any time, but it would be her decision as far as the timing.   If she went back to [facility], she would like to see Dr. Schaefer instead of Dr. Suresh.    New glasses Rx given today, update as needed    Pseudophakia, left eye  No treatment.      Floater, vitreous, bilateral   There is no evidence of retinal pathology.  All signs and symptoms of retinal detachment/tears explained in detail.    Patient instructed to call the office if they experience increase in floaters, increase in flashes of light, loss of vision or curtain or veil effect.       Orders Placed This Encounter   Procedures    OCT, Optic Nerve - OU - Both Eyes    [place] Visual Field - OU - Both Eyes       Meds This Visit:  Requested Prescriptions     Signed Prescriptions Disp Refills    latanoprost 0.005 % Ophthalmic Solution 3 each 3     Sig: INSTILL 1  DROP IN BOTH EYES EVERY night        Follow up instructions:  Return for Next Avail VF, OCT with no MD then 4 months IOP check.    1/30/2024  Scribed by: Smith Gorman MD

## 2024-01-30 NOTE — ASSESSMENT & PLAN NOTE
IOP is stable.   Continue Latanoprost at bedtime in both eyes.         Return for next available visual field and OCT with no MD, then 4 months for a pressure check.

## 2024-01-30 NOTE — ASSESSMENT & PLAN NOTE
No treatment is needed at this time.   Discussed that she could have cataract surgery in the right eye at any time, but it would be her decision as far as the timing.   If she went back to [facility], she would like to see Dr. Schaefer instead of Dr. Suresh.    New glasses Rx given today, update as needed

## 2024-02-02 ENCOUNTER — TELEPHONE (OUTPATIENT)
Dept: HEMATOLOGY/ONCOLOGY | Facility: HOSPITAL | Age: 64
End: 2024-02-02

## 2024-02-02 DIAGNOSIS — D72.9 NEUTROPHILIA: Primary | ICD-10-CM

## 2024-02-02 NOTE — TELEPHONE ENCOUNTER
I called Kerry to discuss her blood work which was unremarkable.  White blood cell count 10.3, ANC normal at 7.  I suspect intermittent isolated neutrophilia may be related to obesity or stress.  More sinister causes have been effectively ruled out.  Mildly elevated RBC count is not concerning either.    She was reassured and thanked me for the call.  She may follow-up with hematology on an as-needed basis.    Jeison Rivera DO  [facility]  Lisa ORNELAS [facility]

## 2024-02-03 ENCOUNTER — NURSE ONLY (OUTPATIENT)
Dept: OPHTHALMOLOGY | Facility: CLINIC | Age: 64
End: 2024-02-03

## 2024-02-03 DIAGNOSIS — H40.1132 PRIMARY OPEN ANGLE GLAUCOMA OF BOTH EYES, MODERATE STAGE: ICD-10-CM

## 2024-02-03 PROCEDURE — 92133 CPTRZD OPH DX IMG PST SGM ON: CPT | Performed by: OPHTHALMOLOGY

## 2024-02-03 PROCEDURE — 92083 EXTENDED VISUAL FIELD XM: CPT | Performed by: OPHTHALMOLOGY

## 2024-02-05 DIAGNOSIS — R42 LIGHTHEADEDNESS: Primary | ICD-10-CM

## 2024-02-05 DIAGNOSIS — H81.10 BENIGN PAROXYSMAL POSITIONAL VERTIGO, UNSPECIFIED LATERALITY: ICD-10-CM

## 2024-02-06 ENCOUNTER — OFFICE VISIT (OUTPATIENT)
Dept: PHYSICAL THERAPY | Age: 64
End: 2024-02-06
Attending: INTERNAL MEDICINE
Payer: MEDICARE

## 2024-02-06 DIAGNOSIS — H81.10 BENIGN PAROXYSMAL POSITIONAL VERTIGO, UNSPECIFIED LATERALITY: Primary | ICD-10-CM

## 2024-02-06 PROCEDURE — 97161 PT EVAL LOW COMPLEX 20 MIN: CPT | Performed by: PHYSICAL THERAPIST

## 2024-02-06 NOTE — ASSESSMENT & PLAN NOTE
Normal visual field, right eye.  Abnormal visual field, left eye.  Abnormal OCT, both eyes.  Continue Latanoprost, 1 drop, both eyes, at bedtime.

## 2024-02-06 NOTE — PROGRESS NOTES
Kerry Hsu is a 63 year old female.    HPI:     HPI    Patient is here for a glaucoma work up with HVF and OCT, no M.D.  Last edited by Daisy Barnhart on 2/3/2024 11:56 AM.        Patient History:  Past Medical History:   Diagnosis Date    Abscess of left thigh     Acute meniscal tear of knee     Age-related nuclear cataract of both eyes 02/10/2015    Anal sphincter incontinence 04/28/2014    Arthritis     Back problem     Chondromalacia     Colon polyps     Degenerative disc disease     Diverticular disease     Esophageal reflux     Glaucoma     Hammertoe     High blood pressure     Insomnia     Neuropathy     Right Foot    Obstructive sleep apnea syndrome 03/21/2018    Osteoarthritis     Primary open angle glaucoma of both eyes 05/19/2015    Diagnosis of glaucoma OU; Started Latanoprost qhs after abnormal VF and OCT 2/25/16;  6/5/18 consult with Dr. Madeline Chappell-see progress note    Small bowel obstruction (HCC)     Tendinitis     Unspecified essential hypertension     Visual impairment     Reraders       Surgical History: Kerry Hsu has a past surgical history that includes hysterectomy (1996) ([facility]); anal sphincterotomy (03/12/2013 Gely); hc arthrocentesis or inject major joint w/o us; upper gi endoscopy performed (05/2015); colonoscopy (N/A, 11/11/2016) (Procedure: COLONOSCOPY;  Surgeon: Sabrina Cazares MD;  Location: [facility] ENDOSCOPY); Excision of Chalazion, Single - OD - Right Eye (Right, 6.27/2017) (Nasally); Cataract extraction w/  intraocular lens implant (Left, 05/07/2018) (L PC IOL with Dr. Suresh @ [facility]); Yag Capsulotomy - OS - Left Eye (Left, 12/19/2018) ([facility]); other (Lap Nissen Fundoplication surgery); colonoscopy (N/A, 09/06/2019) (Procedure: COLONOSCOPY;  Surgeon: Edwin Sterling MD;  Location: [facility] ENDOSCOPY); other surgical history (2005,2007,2008) (LAPAROSCOPIC LYSIS OF ADHESION); other surgical history (right foot bunionectomy); other surgical history (11/14/2014)  (right superficial anterior peroneal nerve biopsy and right proximal thigh muscle biopsy.); blepharoplasty anesthesia (Bilateral, 03/05/2020) (Dr [facility] ); and other surgical history (06/13/2023) (Excision and Biopsy of two  perineal cysts).    Family History   Problem Relation Age of Onset    Hypertension Father     Hypertension Mother     Hypertension Sister     Cancer Sister         liver cancer    Hypertension Brother     Diabetes Neg     Glaucoma Neg     Macular degeneration Neg     Breast Cancer Neg     Ovarian Cancer Neg        Social History:   Social History     Socioeconomic History    Marital status:    Tobacco Use    Smoking status: Never     Passive exposure: Never    Smokeless tobacco: Never   Vaping Use    Vaping Use: Never used   Substance and Sexual Activity    Alcohol use: No     Comment: None.     Drug use: No    Sexual activity: Yes     Birth control/protection: Hysterectomy   Other Topics Concern    Caffeine Concern Yes     Comment: occasional soda    Exercise Yes    Pt has a pacemaker No    Pt has a defibrillator No    Breast feeding No    Reaction to local anesthetic No    Right Handed Yes   Social History Narrative    Work - banking       Medications:  Current Outpatient Medications   Medication Sig Dispense Refill    latanoprost 0.005 % Ophthalmic Solution INSTILL 1 DROP IN BOTH EYES EVERY night 3 each 3    meclizine 12.5 MG Oral Tab Take 1 tablet (12.5 mg total) by mouth 2 (two) times daily as needed. 30 tablet 0    rosuvastatin 5 MG Oral Tab Take 1 tablet (5 mg total) by mouth daily.      omeprazole 20 MG Oral Capsule Delayed Release Take 2 capsules (40 mg total) by mouth every morning. 180 capsule 3    cholecalciferol 50 MCG (2000 UT) Oral Cap Take 1 capsule (2,000 Units total) by mouth daily.      amLODIPine 10 MG Oral Tab TAKE 1 TABLET BY MOUTH EVERY DAY 90 tablet 3    MAGNESIUM OR Take by mouth.         Allergies:  Allergies   Allergen Reactions    Celecoxib  TONGUE SWELLING     Other reaction(s): CELECOXIB    Sulfa Antibiotics TONGUE SWELLING     Other reaction(s): SULFA (SULFONAMIDE ANTIBIOTICS)    Erythromycin PAIN    Amoxicillin DIARRHEA       ROS:       PHYSICAL EXAM:   Not recorded          ASSESSMENT/PLAN:     Diagnoses and Plan:     Primary open angle glaucoma of both eyes, moderate stage  Normal visual field, right eye.  Abnormal visual field, left eye.  Abnormal OCT, both eyes.  Continue Latanoprost, 1 drop, both eyes, at bedtime.    No orders of the defined types were placed in this encounter.      Meds This Visit:  Requested Prescriptions      No prescriptions requested or ordered in this encounter        Follow up instructions:  Return 6/20/2024 at 11:15 AM for IOP check.    2/6/2024  Scribed by: Smith Gorman MD

## 2024-02-06 NOTE — PROGRESS NOTES
VESTIBULAR EVALUATION:     Diagnosis:   Lightheadedness (R42)  Benign paroxysmal positional vertigo, unspecified laterality (H81.10)         Referring Provider: Ken  Date of Evaluation:    2/6/2024    Precautions:  None Next MD visit:   none scheduled  Date of Surgery: n/a     PATIENT SUMMARY   Kerry Hsu is a 63 year old female who presents to therapy today with reports of feeling lightheadedness. The feeling will linger for days. She notices that when she lays down, everything will start spinning. However, there is no noticeable pattern. The last bout of vertigo lasted over 1 week at the end of January. Vertigo like symptoms started years ago.       Falls: No  Hx of migraines: No  Hx of vision issue: Yes: patient had cataract removal in the L eye 2018, and ever since then she has felt her vision has been off.   Hx of hearing issues: tinnitus possibly the right    Dizziness: Current 5/10, Best 5/10, Worst >10/10  Frequency/Duration:  everyday, and varies in duration  Aggravates: Unsure  Relieves: Unsure    Dizziness Handicap Inventory (DHI): NA today     Current functional limitations include all ADLs when she has a dizziness spell.   Social history:  Pt lives with  at home.   Kerry describes prior level of function no dizziness.  Pt goals include no lightheadedness/dizziness   Past medical history was reviewed with Kerry. Significant findings include HTN     ASSESSMENT  Kerry presents to physical therapy evaluation with primary c/o vertigo. The results of the objective tests and measures show +Head thrust R indicating potential hypofunctional of R vestibular.  Functional deficits include but are not limited to all ADLS.  Signs and symptoms are consistent with diagnosis of vertigo. Pt and PT discussed evaluation findings, pathology, POC and HEP.  Pt voiced understanding and performs HEP correctly. Skilled Physical Therapy is medically necessary to address the above impairments and reach  functional goals.    OBJECTIVE:   Physical Exam:  Posture/Observation: Pt sits with upright posture   Neuro Screen: Sensation: intact       Cervical spine ROM: WFL  Adverse neuro signs with ROM: yes no: no     Occulomotor & Vestibulo-Ocular Exam:  Spontaneous Nystagmus: Neg  Smooth Pursuit: Negative  Saccades: Negative  Gaze Evoked Nystagmus:  Negative  Head Thrust: Positive R  VOR screen:  NEg      Head Shaking Nystagmus: Negative  Dynamic Visual Acuity:  Negative    Positional Testing:   Omero-Hallpike: R neg, L neg   Roll Test (HC): neg     Postural Control:   Tandem Stance: R back EO 10 sec, EC 3 sec; L back EO 10 sec, EC 3 sec   SLS: R EO 10 sec, EC 12 sec; L EO 10 sec, EC 9 sec     Functional Mobility:   Gait: pt ambulates on level ground with normal mechanics.       Today's Treatment and Response:   Pt education was provided on exam findings, treatment diagnosis, treatment plan, expectations, and prognosis. Pt was also provided recommendations for possible soreness after evaluation.   Patient was instructed in and issued a HEP for: Valeriy Chatterjee    Charges: PT Eval Low Complexity,       Total Timed Treatment: 0 min     Total Treatment Time: 40 min     Based on clinical rationale and outcome measures, this evaluation involved Low Complexity decision making due to 1-2 personal factors/comorbidities  PLAN OF CARE:    Goals: (to be met in 10 visits)  Patient to be independent with HEP.  Patient to have no c/o dizziness for 1 week.   Patient to increase score on the DHI by 9 points to show improvement with QOL.   Patient able to look side to side and up and down quickly without c/o dizziness.       Frequency / Duration: Patient will be seen for 2 x/week or a total of 10 visits over a 90 day period. Treatment will include: home exercise program development and instruction, patient/family education, balance training, canalith repositioning maneuver, eye/head coordination exercises, exertion training, manual therapy,  and strengthening.     Education or treatment limitation: None   Rehab Potential: good     Patient/Family/Caregiver was advised of these findings, precautions, and treatment options and has agreed to actively participate in planning and for this course of care.     Thank you for your referral. Please co-sign or sign and return this letter via fax as soon as possible to [phone]. If you have any questions, please contact me at Dept: [phone]    Sincerely,  Electronically signed by therapist: Martha Birmingham PT  Physician's certification required: Yes  I certify the need for these services furnished under this plan of treatment and while under my care.    X___________________________________________________ Date____________________    Certification From: 2/6/2024  To:5/6/2024

## 2024-02-14 ENCOUNTER — OFFICE VISIT (OUTPATIENT)
Dept: PHYSICAL THERAPY | Age: 64
End: 2024-02-14
Attending: INTERNAL MEDICINE
Payer: MEDICARE

## 2024-02-14 ENCOUNTER — NURSE TRIAGE (OUTPATIENT)
Dept: INTERNAL MEDICINE CLINIC | Facility: CLINIC | Age: 64
End: 2024-02-14

## 2024-02-14 PROCEDURE — 97112 NEUROMUSCULAR REEDUCATION: CPT | Performed by: PHYSICAL THERAPIST

## 2024-02-14 NOTE — TELEPHONE ENCOUNTER
Patient scheduled an appt via [employer] for the following:    Dizziness   
Verified name and .    Patient states that she has been seeing her physical therapist for ongoing dizziness and that her physical therapist advised that she schedule a follow up appointment with the PCP.    She states that her physical therapist advised that she will be send a message directly to Dr. Rogers regarding this.      Future Appointments   Date Time Provider Department [place]   2024 10:40 AM Bella Rogers MD [facility]   2024 10:15 AM Martha Birmingham, PT [facility] [place]   2024  8:45 AM Martha Birmingham, PT [facility] [place]   3/5/2024  8:45 AM Martha Birmingham, PT [facility] [place]   3/7/2024  9:40 AM [facility]   3/12/2024  8:45 AM Martha Birmingham, PT [facility] [place]   3/19/2024  9:30 AM Martha Birmingham, PT [facility] [place]   2024 11:15 AM [facility]       
CAD (coronary artery disease)
Hyponatremia
Hypothyroidism

## 2024-02-14 NOTE — PROGRESS NOTES
Diagnosis:   Lightheadedness (R42)  Benign paroxysmal positional vertigo, unspecified laterality (H81.10)      Referring Provider: Ken  Date of Evaluation:    2/6/24    Precautions:  None Next MD visit:   none scheduled  Date of Surgery: n/a   Insurance Primary/Secondary: MEDICARE / [employer]BS [place] INDEMNITY     # Auth Visits: NA            Subjective: Pt attempted santos doroff, but felt too dizzy to continue. She was also watching her granddaughter, so she didn't want to feel dizzy then.     Pain: 0/10      Objective: see outpatient daily record.       Assessment: Pt has increased symptoms with horizontal head movements versus vertical. She was able to do Cawthorne's exercises 1-3 successfully. Pt would need a few minutes to recover from horizontal head movements.       Goals:   Patient to be independent with HEP.  Patient to have no c/o dizziness for 1 week.   Patient to increase score on the DHI by 9 points to show improvement with QOL.   Patient able to look side to side and up and down quickly without c/o dizziness.     Plan: cont with poc. Assess effects of Cawthorne's  Date: 2/14/2024  TX#: 2/10 Date:                 TX#: 3/ Date:                 TX#: 4/ Date:                 TX#: 5/ Date:   Tx#: 6/   Seated VOR x1 in quiet room with letter A: horizontal x3, vertical x3    Review and practice Cawthorne's exercises step 1, 2,3  SLS EC in the corner of the room - 20 sec x 2 B                            HEP: Issued Cawthorne's exercises 1-3  Charges: 3 NeuroEd x40 min       Total Timed Treatment: 40 min  Total Treatment Time: 40 min

## 2024-02-15 ENCOUNTER — TELEPHONE (OUTPATIENT)
Facility: CLINIC | Age: 64
End: 2024-02-15

## 2024-02-15 ENCOUNTER — OFFICE VISIT (OUTPATIENT)
Dept: INTERNAL MEDICINE CLINIC | Facility: CLINIC | Age: 64
End: 2024-02-15
Payer: COMMERCIAL

## 2024-02-15 VITALS
DIASTOLIC BLOOD PRESSURE: 76 MMHG | SYSTOLIC BLOOD PRESSURE: 129 MMHG | WEIGHT: 200 LBS | HEIGHT: 68 IN | BODY MASS INDEX: 30.31 KG/M2 | HEART RATE: 76 BPM

## 2024-02-15 DIAGNOSIS — R25.2 CRAMPS OF LOWER EXTREMITY: ICD-10-CM

## 2024-02-15 DIAGNOSIS — R42 DIZZINESS: Primary | ICD-10-CM

## 2024-02-15 DIAGNOSIS — H53.2 DOUBLE VISION: ICD-10-CM

## 2024-02-15 DIAGNOSIS — Z86.010 HX OF COLONIC POLYPS: Primary | ICD-10-CM

## 2024-02-15 DIAGNOSIS — Z12.11 COLON CANCER SCREENING: ICD-10-CM

## 2024-02-15 DIAGNOSIS — H53.8 BLURRY VISION: ICD-10-CM

## 2024-02-15 PROCEDURE — 3074F SYST BP LT 130 MM HG: CPT | Performed by: INTERNAL MEDICINE

## 2024-02-15 PROCEDURE — 99214 OFFICE O/P EST MOD 30 MIN: CPT | Performed by: INTERNAL MEDICINE

## 2024-02-15 PROCEDURE — 3078F DIAST BP <80 MM HG: CPT | Performed by: INTERNAL MEDICINE

## 2024-02-15 PROCEDURE — 3008F BODY MASS INDEX DOCD: CPT | Performed by: INTERNAL MEDICINE

## 2024-02-15 NOTE — PROGRESS NOTES
Kerry Hsu is a 63 year old female.  Chief Complaint   Patient presents with    Dizziness     Follow up       HPI:   Pt comes for f/u  C/c follow up   C/o going for PT for the dizziness but she is feeling worse after the            HISTORY    palpitations for the past couple weeks at least and it is now scaring her because she can feel it when she is just lying down or just sitting there not doing anything and it is not on it but only on exertion, comes and goes   Also has some retrosternal chest pains 5/10  Better ? --took ppi and it didn't help   Worse -with deep inspirations  No increase in caffeine  Chest pain and palpitations happen at the same time and not associated with any diaphoresis  She did have an EKG done in January and a calcium score in August of this year           HISTORY  Saw dr rothmna ent and ct was neg SO NO  sinus SURG NEEDED     She thinks its her eyes   Feels her vision is bad even after glasses and thinks it happended after cataract surg   HAS SEEN   saw dr mckeon--  neurology   Saw cards dr genao and will go for heart scan   Saw many eye drs and tried drops but no help , eye drops now burns                     HISTORY  6/2023 visit     will see the surgeon for a cyst that she has in her vaginal area, she had already seen the dermatologist and the GYN doctor  Hopefully will come out of the boot for her left leg  Saw ENT and may is sinus surgery            HISTORY   right wrist pain after her rotator cuff surg x 2 weeks -was first like an electric shock and now only gets that once in a while but now more sore   Going for PT   She also has some left thigh pain - from buttocks to the knees         History  3/2022  right shoulder surgery on 3/7/2022   Requested by dr haque  Fajhonatan - can be seen in epic   Can walk up one flight of stars without feeling short of breath   Right shoulder radiates to the neck on that side   She is right handed , cant use her hand and arm as much             HISTORY   10/2021   She has been seeing Ortho for the neuropathy and also received shots to the shoulder-right-and knees and was given Tylenol No. 3  Also sees the foot doctor  saw dr mike lucas and consider valium but she feels this will affect her ability to work   She complains of \"nerve pain\" in the feet   She states that valium knocks her out -even if she takes it in the evening she still feels the effects in the am , foot dr gave gabpentin but that too makes her sleepy \"im very sensitive   Also c/o left flank pain  X couple weeks -- ua done in office and does show blood with uti    8/10 , sharp and stabbing ,   Worse with standing after sitting a long time   Better - ?   Comes and goesbut now more frequent      Not taking trazodone - not helping and she is not sure what meds cuases what so she doesn't want to be on meds   Know she has had CTs and ultrasounds of her kidney done before and has seen urology as well- dr moreno -she did have a 3 mm nonobstructing stone in the right kidney  Still has her chronic cough and try the Tessalon Perles which did not help that much, has an inhaler so she will try that, the codien cough syrup just put her to sleep as well      Finished doxy yest taht she got from the  ,didn't help          HISTORY 3/2021   cramps is all over but mostly in the legs-stretching makes it works and the meloxicam is not helping and she did see the rheumatologist and was started on a new medication metaxalone 800 mg 3 times a day but it makes her sleepy so she is only taking it at night--it still wakes her up at night so does not feel that that is working was recommended to follow-up at an academic center     Still has a cough and the codeine cough syrup did not help much and noted that she is on and PPI and the chest x-ray was normal which was ordered last time   she has noticed that the rescue inhaler does help                       HISTORY  History from August 2020   Has seen the  rheumatologist, physiatry and gone for physical therapy  Has tried Cymbalta given by physiatry but in January when I had seen her she had stated that she was not taking it and and cyclobenzaprine was started but she does not think that that was helpful  She states that she has had an EMG in the past as well a very long time ago  She states that she drinks a lot of water as well  2015 emg report noted in chart - normal   Also noted in 2015 she saw the neurologist Dr. Enamorado and was recommended to go to the pain doctors--Flexeril was tried  Needs to go back to see Dr. Hays the podiatrist for her orthotics                 History   Last seen in February and since then in March she saw Dr. Delgadillo and got knee injections for her osteoarthritis of the knees   had an endoscopic procedure and was found to have gastric polyp by Dr. Pereira  In June she saw the foot doctor and received a shot in her foot for the neuroma  In July she saw Dr. Bird the eye doctor for her glaucoma and will return in 4 months and then           History  1/11/2020 visit   Dr clay- ortho -- left hand tendonitiis -got shot and feeks better   Would like to see dr berger   Not taking cymbalta - takes T#3 one a week , ibuprofen \"seldom\"- once a mn   Muscle cramps coming back - legs thighs and feet , no RLS               History- 11/19  C/o right shoulder has a tear  And she does admit she uses the left more   Used to see dr berger and now sees dr haque and got a shot to the right shoulder   Needs a referral to see pain clinic   Needs referral to see podiatrist --corns and calluses right foot / toe and also for ingrown toenails   Pain is 10/10 -- iburprofen does helps but doesn't like taking it too much --takes T#3 but t just puts her to sleep and then she will wake up with pain -- unable ot sratch her back   No falls trauma or injury that she is aware of      History   She has had both muscle and nerve biopsies by dr hernandez   She gets  these cramps every day   Steroids do not help either  Noted that she had try cyclobenzaprine 10 mg in July 2019--no help   Since the last visit she did see her orthopedic doctor and received a short of a cortisone shot to her right shoulder  Also c/o left hand tender swelling x few weeks   She also states that she gets rashes underneath her breasts and the triamcinolone helps with this and needs a refill        hisotry -8/19 first visit   C/c htn   C/o fell on July 8th and has some left rib pain and back pains   Had some ct scans and would like to go through it  Needs referrals    Usually gets shots to her knees and shoulders- was told to follow-up with a new doctor     PMH  gerd daily ppi --h/o nissens fundoplication  ?2006  HTN  Hip pain and back pain - T#3 - prn 2 tabs a week, also takes ibuprofen   bm hematuria   Non obstructing kid stones   elmer was on cpap   Glaucoma suspect   Right shoulder rotator cuff tear s/p surg 3/2022   Osteoarthritis knees  Hepatic and renal cysts similar to 8/19 and CT 5/20  H/o left toe OM s/p amputation      Dr peters - eye dr Dr moreno - urologist   Dr. Craft- rheum   Dr. Delgadillo- ortho   Dr lorraine hall         Current Outpatient Medications   Medication Sig Dispense Refill    latanoprost 0.005 % Ophthalmic Solution INSTILL 1 DROP IN BOTH EYES EVERY night 3 each 3    rosuvastatin 5 MG Oral Tab Take 1 tablet (5 mg total) by mouth daily.      omeprazole 20 MG Oral Capsule Delayed Release Take 2 capsules (40 mg total) by mouth every morning. 180 capsule 3    cholecalciferol 50 MCG (2000 UT) Oral Cap Take 1 capsule (2,000 Units total) by mouth daily.      amLODIPine 10 MG Oral Tab TAKE 1 TABLET BY MOUTH EVERY DAY 90 tablet 3    MAGNESIUM OR Take by mouth.        Past Medical History:   Diagnosis Date    Abscess of left thigh     Acute meniscal tear of knee     Age-related nuclear cataract of both eyes 02/10/2015    Anal sphincter incontinence 04/28/2014    Arthritis      Back problem     Chondromalacia     Colon polyps     Degenerative disc disease     Diverticular disease     Esophageal reflux     Glaucoma     Hammertoe     High blood pressure     Insomnia     Neuropathy     Right Foot    Obstructive sleep apnea syndrome 03/21/2018    Osteoarthritis     Primary open angle glaucoma of both eyes 05/19/2015    Diagnosis of glaucoma OU; Started Latanoprost qhs after abnormal VF and OCT 2/25/16;  6/5/18 consult with Dr. Madeline Chappell-see progress note    Small bowel obstruction (HCC)     Tendinitis     Unspecified essential hypertension     Visual impairment     Reraders      Past Surgical History:   Procedure Laterality Date    Anal sphincterotomy      03/12/2013 Gely    Blepharoplasty anesthesia Bilateral 03/05/2020    Dr Kerwin Bello Ophthalmology     Cataract extraction w/  intraocular lens implant Left 05/07/2018    L PC IOL with Dr. Suresh @ [facility]    Colonoscopy N/A 11/11/2016    Procedure: COLONOSCOPY;  Surgeon: Sabrina Cazares MD;  Location: [facility] ENDOSCOPY    Colonoscopy N/A 09/06/2019    Procedure: COLONOSCOPY;  Surgeon: Edwin Sterling MD;  Location: [facility] ENDOSCOPY    Excision of chalazion, single - od - right eye Right 6.27/2017    Nasally    Hc arthrocentesis or inject major joint w/o us      Hysterectomy  1996    KAI    Other      Lap Nissen Fundoplication surgery    Other surgical history  2005,2007,2008    LAPAROSCOPIC LYSIS OF ADHESION    Other surgical history      right foot bunionectomy    Other surgical history  11/14/2014    right superficial anterior peroneal nerve biopsy and right proximal thigh muscle biopsy.    Other surgical history  06/13/2023    Excision and Biopsy of two  perineal cysts    Upper gi endoscopy performed  05/2015    Yag capsulotomy - os - left eye Left 12/19/2018    RJM      Social History:  Social History     Socioeconomic History    Marital status:    Tobacco Use    Smoking status: Never     Passive exposure: Never     Smokeless tobacco: Never   Vaping Use    Vaping Use: Never used   Substance and Sexual Activity    Alcohol use: No     Comment: None.     Drug use: No    Sexual activity: Yes     Birth control/protection: Hysterectomy   Other Topics Concern    Caffeine Concern Yes     Comment: occasional soda    Exercise Yes    Pt has a pacemaker No    Pt has a defibrillator No    Breast feeding No    Reaction to local anesthetic No    Right Handed Yes   Social History Narrative    Work - banking        REVIEW OF SYSTEMS:   GENERAL HEALTH: No fevers, chills, sweats, fatigue  VISION: No recent vision problems, blurry vision or double vision- but has diffuculty - sees dr drew and was seen 1/30/2023-has seen dr price and dr david as well   RESPIRATORY: denies shortness of breath, cough, wheezing  CARDIOVASCULAR: denies chest pain on exertion, palpitations, swelling in feet  MUS: No back pain, joint pain, muscle pain--just sore   NEURO: denies headaches , anxiety, depression    EXAM:   /76 (BP Location: Right arm, Patient Position: Sitting, Cuff Size: adult)   Pulse 76   Ht 5' 8\" (1.727 m)   Wt 200 lb (90.7 kg)   BMI 30.41 kg/m²   GENERAL: well developed, well nourished,in no apparent distress  SKIN: no rashes,no suspicious lesions  HEENT: atraumatic, normocephalic  NECK: supple,no adenopathy  LUNGS: clear to auscultation, no wheeze  CARDIO: RRR without murmur  EXTREMITIES: no cyanosis, or edema    ASSESSMENT AND PLAN:   Diagnoses and all orders for this visit:    Dizziness  -     Audiology Referral - [place] ([facility])  D/w PT dennis liu    provides info on the vestibular system and it's function. I feel like her vestibular system isn't functioning as well as it should be.  She also advised the patient to keep a dizziness journal to see if there is any pattern which so far they could not comment on    Cramps of lower extremity  Can try taking her cholesterol medication every other day to see if this will make a  difference    Colon cancer screening  -     Gastro Referral - In Network  -     [facility] GI Telephone Colon Screen  Refer     Blurry vision  -     Ophthalmology Referral - In Network  And   Double vision  -     Ophthalmology Referral - In Network  Will refer for sec opinion             Preventative medicine  Colonoscopy done  9/19  Dr. hall rpt in yrs ie 2024  Mammogram-10/2023  Pap 2016 dr chin -normal ,saw Dr. Witt in February 2021 and sees mina jaffe   Labs 12/2023   10/2023  reviewed   EKG January 2020 normal       The patient indicates understanding of these issues and agrees to the plan.  No follow-ups on file.

## 2024-02-16 NOTE — TELEPHONE ENCOUNTER
Dr. Sterling,   Please review 5 year recall and give orders when able. Pt due in 2024. Had seen cards recently and included their recommendations below.  Thank you,  Lesa    Last Procedure, Date, MD:  Colon 2019 Dr. Sterling  Last Diagnosis:  TA, HP  Recalled (mth/yrs): 5 years  Sedation Used Previously:  MAC  Last Prep Used (if known):  colyte or miralax?  Quality Of Prep (if known): good with washing  Anticoagulants: No  Diuretics: No  Diabetic Med's (PO/Injectables): No  Weight loss Med's: No  Iron/Herbal/Multivitamin Supplements (RX/OTC): Yes  Marijuana/Vaping/CBD: No  Height & Weight: 5'8\"/200lbs  BMI: 30.42  Hx of Cardiac/CVA Issues/(MI/Stroke): No  Devices Pacemaker/Defibrillator/Stents: No  Respiratory Issues/Oxygen Use/KB/COPD: KB but does not need c-pap  Issues w/ Anesthesia: No    Symptoms (Y/N): No  Symptoms Details: No    Special Comments/Notes: Pt saw Dr. Garrett for chest pain and palpitations. Heart monitor placed Dec, f/u with cards on 24 note: CT results reviewed, minimal disease  Continue medical therapy  Results of the venous duplex reviewed  Continue conservative therapy with compression stockings and leg elevations  Offered her to change amlodipine to metoprolol for atrial tachycardia, patient would like to continue amlodipine  Follow-up with me in 12 months or sooner if needed     Please advise on orders and prep. Meds, allergies, and pharmacy verified with pt.    Thank you!      Case Time: Procedures: Surgeons:   2019 11:52 AM COLONOSCOPY    Edwin Sterling MD               Signed         COLONOSCOPY REPORT           Kerry Hsu      1960 Age 59 year old   PCP Marcellus Rogers MD Endoscopist Edwin Sterling MD      Date of procedure: 19     Procedure: Colonoscopy w/ biopsy and cold snare polypectomy     Pre-operative diagnosis: polyp history     Post-operative diagnosis: polyps, hemorrhoids and diverticulosis     Medications: MAC sedation  Withdrawal time: 16 minutes      Procedure:  Informed consent was obtained from the patient after the risks of the procedure were discussed, including but not limited to bleeding, perforation, aspiration, infection, or possibility of a missed lesion. After discussions of the risks/benefits and alternatives to this procedure, as well as the planned sedation, the patient was placed in the left lateral decubitus position and begun on continuous blood pressure pulse oximetry and EKG monitoring and this was maintained throughout the procedure. Once an adequate level of sedation was obtained a digital rectal exam was completed. Then the lubricated tip of the [id number] diagnostic video colonoscope was inserted and advanced without difficulty to the cecum using the CO2 insufflation technique. The cecum was identified by localizing the trifold, the appendix and the ileocecal valve. Withdrawal was begun with thorough washing and careful examination of the colonic walls and folds. A routine second examination of the cecum/ascending colon was performed. Photodocumentation was obtained. The bowel prep was good. Views of the colon were good with washing. I then carefully withdrew the instrument from the patient who tolerated the procedure well.      Complications: none.     Findings:   1. 3 polyp(s) noted as follows:      A. 4-6 mm polyps x2 in the Descending colon; flat morphology; larger removed with cold snare and smaller with cold biopsy polypectomy and retrieved.      B. 7 mm polyp in the rectum; flat morphology; cold snare polypectomy and retrieved.     2. Diverticulosis: pandiverticulosis.     3. Terminal ileum: the visualized mucosa appeared normal.     4. A retroflexed view of the rectum revealed hemorrhoids.     5. The colonic mucosa throughout the colon showed normal vascular pattern, without evidence of angioectasias or inflammation.      6. SUNNI: normal rectal tone, no masses palpated.      Impression:   3 polyps removed as described  above  Given history repeat colonoscopy in 3 years     Recommend:  Await pathology. Repeat 3 years but the interval for the next colonoscopy will be confirmed after reviewing pathology. If new signs or symptoms develop, colonoscopy may need to be repeated sooner.   High fiber diet.  Monitor for blood in the stool. If having more than just tinge of blood, call office or go to the ER.        >>>If tissue was obtained and you have not received your pathology results either by phone or letter within 2 weeks, please call our office at [phone].     Specimens: descending polyps and rectal polyp                 Final Diagnosis:      A. Descending colon polyps x2:  Multiple fragments of tubular adenoma.     B. Rectal polyp x1:  Fragments of hyperplastic polyp.

## 2024-02-21 ENCOUNTER — OFFICE VISIT (OUTPATIENT)
Dept: PHYSICAL THERAPY | Age: 64
End: 2024-02-21
Attending: INTERNAL MEDICINE
Payer: MEDICARE

## 2024-02-21 PROCEDURE — 97110 THERAPEUTIC EXERCISES: CPT | Performed by: PHYSICAL THERAPIST

## 2024-02-21 NOTE — PROGRESS NOTES
Diagnosis:   Lightheadedness (R42)  Benign paroxysmal positional vertigo, unspecified laterality (H81.10)      Referring Provider: Ken  Date of Evaluation:    2/6/24    Precautions:  None Next MD visit:   none scheduled  Date of Surgery: n/a   Insurance Primary/Secondary: MEDICARE / [employer]BS [place] INDEMNITY     # Auth Visits: NA            Subjective: Pt has a VNG scheduled for April. She has not had flare up of vertigo unless she is doing her PT exercises which lasts the whole day.     Pain: 0/10      Objective: see outpatient daily record.       Assessment: Trial patient of 1 week without HEP since HEP is the only thing that seems to flare her up. Patient will cancel next week's appointment if no vertigo symptoms persist, and come in the following week. Trial of just balance exercises today.      Goals:   Patient to be independent with HEP.  Patient to have no c/o dizziness for 1 week.   Patient to increase score on the DHI by 9 points to show improvement with QOL.   Patient able to look side to side and up and down quickly without c/o dizziness.     Plan: Hold on HEP  Date: 2/14/2024  TX#: 2/10 Date: 2/21/24                TX#: 3/10 Date:                 TX#: 4/ Date:                 TX#: 5/ Date:   Tx#: 6/   Seated VOR x1 in quiet room with letter A: horizontal x3, vertical x3    Review and practice Cawthorne's exercises step 1, 2,3  SLS EC in the corner of the room - 20 sec x 2 B TherEx:  SLS on Ariex EO 30 sec x4 B  SLS on Airex EC 30 sec x 4  Balance board taps AP ML x30  Dynadisc tandem stance with disc under back leg- stride balance 2x10 up/down and cw/ccw B  Tandem balance EC 30 sec x 3 B                             HEP: Issued Cawthorne's exercises 1-3  Charges: 3 TherEx x40 min       Total Timed Treatment: 40 min  Total Treatment Time: 40 min

## 2024-02-27 ENCOUNTER — APPOINTMENT (OUTPATIENT)
Dept: PHYSICAL THERAPY | Age: 64
End: 2024-02-27
Attending: INTERNAL MEDICINE
Payer: MEDICARE

## 2024-02-28 NOTE — TELEPHONE ENCOUNTER
Schedulers: Please route back to GI RN once pt is scheduled so I can obtain cardiac clearance. Thank you!

## 2024-02-28 NOTE — TELEPHONE ENCOUNTER
GI RNS-   Patient scheduled for 9/16 at [facility]. Please obtain cardiac clearance for procedure.   Thank you!

## 2024-02-28 NOTE — TELEPHONE ENCOUNTER
Scheduled for:  Colonoscopy [id number]   Provider Name:  Dr. Sterling   Date:  9/16/2024  Location:    [facility]   Sedation:  Mac  Time:  7:30 (patient is aware arrival time is 6:30)  Prep:  Miralax and Gatorade   Meds/Allergies Reconciled?:  Physician reviewed   Diagnosis with codes:  Hx of colon polyps Z86.010  Was patient informed to call insurance with codes (Y/N):  Yes, I confirmed BCBS  insurance with the patient.   Referral sent?: Referral was sent at the time of electronic surgical scheduling.  [facility] or [facility] notified?:  I sent an electronic request to Endo Scheduling and received a confirmation today.   Medication Orders:  This patient verbally confirmed that she is not taking:   Iron, blood thinners, BP meds, and is not diabetic   Not latex allergy, Not PCN allergy and does not have a pacemaker  Misc Orders:  I discussed the prep instructions with the patient which she verbally understood and is aware that I will mychart the instructions today.  Further instructions given by staff:

## 2024-02-28 NOTE — TELEPHONE ENCOUNTER
I would obtain cardiac clearance    1. Schedule colonoscopy with MAC [Diagnosis: history of polyps]    2. Split miralax prep    3. Continue all medications for procedure except    ** If MAC @ [facility]:    - NO alcohol, recreational drugs nor erectile dysfunction mediations 72 hours before procedure(s)   - NO herbal supplements or weight loss medications x 7 days prior to the procedure(s)    ** If MAC @ [facility] or IV twilit - continue all medications as prescribed    4. Read all bowel prep instructions carefully    5. AVOID seeds, nuts, popcorn, raw fruits and vegetables (cooked is okay) for 5 days before procedure        >>>Please note: if you were prescribed Suprep for the bowel prep and it is too expensive or not covered by insurance, it is okay to substitute Trilyte (or any similar generic prep). This can be done by notifying the pharmacy or calling our office.

## 2024-03-01 ENCOUNTER — OFFICE VISIT (OUTPATIENT)
Dept: INTERNAL MEDICINE CLINIC | Facility: CLINIC | Age: 64
End: 2024-03-01
Payer: COMMERCIAL

## 2024-03-01 ENCOUNTER — TELEPHONE (OUTPATIENT)
Dept: INTERNAL MEDICINE CLINIC | Facility: CLINIC | Age: 64
End: 2024-03-01

## 2024-03-01 VITALS
DIASTOLIC BLOOD PRESSURE: 77 MMHG | HEART RATE: 74 BPM | SYSTOLIC BLOOD PRESSURE: 137 MMHG | HEIGHT: 68 IN | OXYGEN SATURATION: 96 % | RESPIRATION RATE: 18 BRPM | BODY MASS INDEX: 30.1 KG/M2 | WEIGHT: 198.63 LBS

## 2024-03-01 DIAGNOSIS — J06.9 VIRAL UPPER RESPIRATORY TRACT INFECTION: Primary | ICD-10-CM

## 2024-03-01 DIAGNOSIS — R06.2 WHEEZE: ICD-10-CM

## 2024-03-01 DIAGNOSIS — J06.9 VIRAL UPPER RESPIRATORY TRACT INFECTION: ICD-10-CM

## 2024-03-01 PROCEDURE — 3008F BODY MASS INDEX DOCD: CPT | Performed by: INTERNAL MEDICINE

## 2024-03-01 PROCEDURE — 3075F SYST BP GE 130 - 139MM HG: CPT | Performed by: INTERNAL MEDICINE

## 2024-03-01 PROCEDURE — 3078F DIAST BP <80 MM HG: CPT | Performed by: INTERNAL MEDICINE

## 2024-03-01 PROCEDURE — 99214 OFFICE O/P EST MOD 30 MIN: CPT | Performed by: INTERNAL MEDICINE

## 2024-03-01 RX ORDER — BENZONATATE 100 MG/1
100 CAPSULE ORAL 2 TIMES DAILY PRN
Qty: 30 CAPSULE | Refills: 0 | Status: SHIPPED | OUTPATIENT
Start: 2024-03-01

## 2024-03-01 RX ORDER — FLUTICASONE FUROATE AND VILANTEROL 100; 25 UG/1; UG/1
1 POWDER RESPIRATORY (INHALATION) DAILY
Qty: 1 EACH | Refills: 0 | Status: SHIPPED | OUTPATIENT
Start: 2024-03-01 | End: 2024-03-01

## 2024-03-01 RX ORDER — FLUTICASONE PROPIONATE AND SALMETEROL 100; 50 UG/1; UG/1
1 POWDER RESPIRATORY (INHALATION) 2 TIMES DAILY
Qty: 1 EACH | Refills: 0 | Status: SHIPPED | OUTPATIENT
Start: 2024-03-01

## 2024-03-01 RX ORDER — PREDNISONE 20 MG/1
20 TABLET ORAL DAILY
Qty: 7 TABLET | Refills: 0 | Status: SHIPPED | OUTPATIENT
Start: 2024-03-01 | End: 2024-03-08

## 2024-03-01 NOTE — TELEPHONE ENCOUNTER
Patient stated that the Breo is not covered through the insurance and it is around $800. Patient can not afford that. Wanted to get something else prescribed. Advised patient that would call the pharmacy to see what is coming up.     Called [facility] pharmacy [facility] and she indicated that the Breo is covered but patient has to meet her deductible for her insurance first. States that $352 is being applied to her deductible of $402. Patient advised. Also advised patient that can use Get[employer]Guide coupon through [employer] would be $162.15, but that is not going through her insurance, so would not meet her deductible.  Patient indicated that will contact her insurance and will call us back if there is a cheaper alternative.     Pharmacy    [id number] - [place], [place] - [facility] [address] AT [facility] [address], [phone], [phone]      Disp Refills Start End    fluticasone furoate-vilanterol (BREO ELLIPTA) 100-25 MCG/ACT Inhalation Aerosol Powder, Breath Activated 1 each 0 3/1/2024 --    Sig - Route: Inhale 1 puff into the lungs daily. - Inhalation    Sent to pharmacy as: Fluticasone Furoate-Vilanterol 100-25 MCG/ACT Inhalation Aerosol Powder Breath Activated (Breo Ellipta)    E-Prescribing Status: Receipt confirmed by pharmacy (3/1/2024 11:25 AM CST)

## 2024-03-01 NOTE — TELEPHONE ENCOUNTER
Will try advair and sent tessalon perles --pls let pt know   If inhaler is not covered and pharm doesn't know which is covered then she can just tke the steroid and cough capsules

## 2024-03-01 NOTE — TELEPHONE ENCOUNTER
Patient needs a call back to discuss what the insurance company has told her about the breo inhaler.     Please call back at [phone]

## 2024-03-01 NOTE — TELEPHONE ENCOUNTER
Dr. Rogers- patient calling that she can not get the Breo as it is not covered by her insurance and would like you to send in the cough medicine.      [id number] - [place], [place] - [address] AT [facility] [address], [phone], [phone] [address]  [place] [address]  Phone: [phone] Fax: [phone]

## 2024-03-01 NOTE — PROGRESS NOTES
Kerry Hsu is a 63 year old female.  Chief Complaint   Patient presents with    Cough       HPI:   Urgent visit   C/c Cough x 2 weeks   C/o tried robitussin and mucinex and some mucus came up         HISTORY    palpitations for the past couple weeks at least and it is now scaring her because she can feel it when she is just lying down or just sitting there not doing anything and it is not on it but only on exertion, comes and goes   Also has some retrosternal chest pains 5/10  Better ? --took ppi and it didn't help   Worse -with deep inspirations  No increase in caffeine  Chest pain and palpitations happen at the same time and not associated with any diaphoresis  She did have an EKG done in January and a calcium score in August of this year           HISTORY  Saw dr rothman ent and ct was neg SO NO  sinus SURG NEEDED     She thinks its her eyes   Feels her vision is bad even after glasses and thinks it happended after cataract surg   HAS SEEN   saw dr mckeon--  neurology   Saw cards dr genao and will go for heart scan   Saw many eye drs and tried drops but no help , eye drops now burns                     HISTORY  6/2023 visit     will see the surgeon for a cyst that she has in her vaginal area, she had already seen the dermatologist and the GYN doctor  Hopefully will come out of the boot for her left leg  Saw ENT and may is sinus surgery            HISTORY   right wrist pain after her rotator cuff surg x 2 weeks -was first like an electric shock and now only gets that once in a while but now more sore   Going for PT   She also has some left thigh pain - from buttocks to the knees         History  3/2022  right shoulder surgery on 3/7/2022   Requested by dr haque  Fax - can be seen in epic   Can walk up one flight of stars without feeling short of breath   Right shoulder radiates to the neck on that side   She is right handed , cant use her hand and arm as much            HISTORY   10/2021   She has  been seeing Ortho for the neuropathy and also received shots to the shoulder-right-and knees and was given Tylenol No. 3  Also sees the foot doctor  saw dr mike lucas and consider valium but she feels this will affect her ability to work   She complains of \"nerve pain\" in the feet   She states that valium knocks her out -even if she takes it in the evening she still feels the effects in the am , foot dr gave gabpentin but that too makes her sleepy \"im very sensitive   Also c/o left flank pain  X couple weeks -- ua done in office and does show blood with uti    8/10 , sharp and stabbing ,   Worse with standing after sitting a long time   Better - ?   Comes and goesbut now more frequent      Not taking trazodone - not helping and she is not sure what meds cuases what so she doesn't want to be on meds   Know she has had CTs and ultrasounds of her kidney done before and has seen urology as well- dr moreno -she did have a 3 mm nonobstructing stone in the right kidney  Still has her chronic cough and try the Tessalon Perles which did not help that much, has an inhaler so she will try that, the codien cough syrup just put her to sleep as well      Finished doxy yest taht she got from the  ,didn't help          HISTORY 3/2021   cramps is all over but mostly in the legs-stretching makes it works and the meloxicam is not helping and she did see the rheumatologist and was started on a new medication metaxalone 800 mg 3 times a day but it makes her sleepy so she is only taking it at night--it still wakes her up at night so does not feel that that is working was recommended to follow-up at an academic center     Still has a cough and the codeine cough syrup did not help much and noted that she is on and PPI and the chest x-ray was normal which was ordered last time   she has noticed that the rescue inhaler does help                       HISTORY  History from August 2020   Has seen the rheumatologist, physiatry and  gone for physical therapy  Has tried Cymbalta given by physiatry but in January when I had seen her she had stated that she was not taking it and and cyclobenzaprine was started but she does not think that that was helpful  She states that she has had an EMG in the past as well a very long time ago  She states that she drinks a lot of water as well  2015 emg report noted in chart - normal   Also noted in 2015 she saw the neurologist Dr. Enamorado and was recommended to go to the pain doctors--Flexeril was tried  Needs to go back to see Dr. Hays the podiatrist for her orthotics                 History   Last seen in February and since then in March she saw Dr. Delgadillo and got knee injections for her osteoarthritis of the knees   had an endoscopic procedure and was found to have gastric polyp by Dr. Pereira  In June she saw the foot doctor and received a shot in her foot for the neuroma  In July she saw Dr. Bird the eye doctor for her glaucoma and will return in 4 months and then           History  1/11/2020 visit   Dr clay- ortho -- left hand tendonitiis -got shot and feeks better   Would like to see dr berger   Not taking cymbalta - takes T#3 one a week , ibuprofen \"seldom\"- once a mn   Muscle cramps coming back - legs thighs and feet , no RLS               History- 11/19  C/o right shoulder has a tear  And she does admit she uses the left more   Used to see dr berger and now sees dr haque and got a shot to the right shoulder   Needs a referral to see pain clinic   Needs referral to see podiatrist --corns and calluses right foot / toe and also for ingrown toenails   Pain is 10/10 -- iburprofen does helps but doesn't like taking it too much --takes T#3 but t just puts her to sleep and then she will wake up with pain -- unable ot sratch her back   No falls trauma or injury that she is aware of      History   She has had both muscle and nerve biopsies by dr hernandez   She gets these cramps every day   Steroids  do not help either  Noted that she had try cyclobenzaprine 10 mg in July 2019--no help   Since the last visit she did see her orthopedic doctor and received a short of a cortisone shot to her right shoulder  Also c/o left hand tender swelling x few weeks   She also states that she gets rashes underneath her breasts and the triamcinolone helps with this and needs a refill        hisotry -8/19 first visit   C/c htn   C/o fell on July 8th and has some left rib pain and back pains   Had some ct scans and would like to go through it  Needs referrals    Usually gets shots to her knees and shoulders- was told to follow-up with a new doctor     [facility]  gerd daily ppi --h/o nissens fundoplication  ?2006  HTN  Hip pain and back pain - T#3 - prn 2 tabs a week, also takes ibuprofen   bm hematuria   Non obstructing kid stones   elmer was on cpap   Glaucoma suspect   Right shoulder rotator cuff tear s/p surg 3/2022   Osteoarthritis knees  Hepatic and renal cysts similar to 8/19 and CT 5/20  H/o left toe OM s/p amputation      Dr peters - eye dr Dr moreno - urologist   Dr. Craft- rheum   Dr. Delgadillo- ortho   Dr lorraine hall      Current Outpatient Medications   Medication Sig Dispense Refill    predniSONE 20 MG Oral Tab Take 1 tablet (20 mg total) by mouth daily for 7 days. 7 tablet 0    fluticasone furoate-vilanterol (BREO ELLIPTA) 100-25 MCG/ACT Inhalation Aerosol Powder, Breath Activated Inhale 1 puff into the lungs daily. 1 each 0    latanoprost 0.005 % Ophthalmic Solution INSTILL 1 DROP IN BOTH EYES EVERY night 3 each 3    rosuvastatin 5 MG Oral Tab Take 1 tablet (5 mg total) by mouth daily.      omeprazole 20 MG Oral Capsule Delayed Release Take 2 capsules (40 mg total) by mouth every morning. 180 capsule 3    cholecalciferol 50 MCG (2000 UT) Oral Cap Take 1 capsule (2,000 Units total) by mouth daily.      amLODIPine 10 MG Oral Tab TAKE 1 TABLET BY MOUTH EVERY DAY 90 tablet 3    MAGNESIUM OR Take by mouth.         Past Medical History:   Diagnosis Date    Abscess of left thigh     Acute meniscal tear of knee     Age-related nuclear cataract of both eyes 02/10/2015    Anal sphincter incontinence 04/28/2014    Arthritis     Back problem     Chondromalacia     Colon polyps     Degenerative disc disease     Diverticular disease     Esophageal reflux     Glaucoma     Hammertoe     High blood pressure     Insomnia     Neuropathy     Right Foot    Obstructive sleep apnea syndrome 03/21/2018    Osteoarthritis     Primary open angle glaucoma of both eyes 05/19/2015    Diagnosis of glaucoma OU; Started Latanoprost qhs after abnormal VF and OCT 2/25/16;  6/5/18 consult with Dr. Madeline Chappell-see progress note    Small bowel obstruction (HCC)     Tendinitis     Unspecified essential hypertension     Visual impairment     Reraders      Past Surgical History:   Procedure Laterality Date    Anal sphincterotomy      03/12/2013 Gely    Blepharoplasty anesthesia Bilateral 03/05/2020    Dr Suresh [facility]hthal[facility]     Cataract extraction w/  intraocular lens implant Left 05/07/2018    L PC IOL with Dr. Suresh @ [facility]    Colonoscopy N/A 11/11/2016    Procedure: COLONOSCOPY;  Surgeon: Sabrina Cazares MD;  Location: [facility] ENDOSCOPY    Colonoscopy N/A 09/06/2019    Procedure: COLONOSCOPY;  Surgeon: Edwin Sterling MD;  Location: [facility] ENDOSCOPY    Excision of chalazion, single - od - right eye Right 6.27/2017    Nasally    Hc arthrocentesis or inject major joint w/o us      Hysterectomy  1996    KAI    Other      Lap Nissen Fundoplication surgery    Other surgical history  2005,2007,2008    LAPAROSCOPIC LYSIS OF ADHESION    Other surgical history      right foot bunionectomy    Other surgical history  11/14/2014    right superficial anterior peroneal nerve biopsy and right proximal thigh muscle biopsy.    Other surgical history  06/13/2023    Excision and Biopsy of two  perineal cysts    Upper gi endoscopy performed  05/2015    Yag  capsulotomy - os - left eye Left 12/19/2018    RJM      Social History:  Social History     Socioeconomic History    Marital status:    Tobacco Use    Smoking status: Never     Passive exposure: Never    Smokeless tobacco: Never   Vaping Use    Vaping Use: Never used   Substance and Sexual Activity    Alcohol use: No     Comment: None.     Drug use: No    Sexual activity: Yes     Birth control/protection: Hysterectomy   Other Topics Concern    Caffeine Concern Yes     Comment: occasional soda    Exercise Yes    Pt has a pacemaker No    Pt has a defibrillator No    Breast feeding No    Reaction to local anesthetic No    Right Handed Yes   Social History Narrative    Work - banking        REVIEW OF SYSTEMS:   GENERAL HEALTH: No fevers, + chills, no sweats, + fatigue  HEENT: No decreased hearing ear pain + nasal congestion , no  sore throat  RESPIRATORY: denies shortness of breath, + cough,+  wheezing  CARDIOVASCULAR: denies chest pain on exertion from coughing so much,no palpitations and swelling in feet  NEURO: denies headaches     EXAM:   /77 (BP Location: Right arm, Patient Position: Sitting, Cuff Size: adult)   Pulse 74   Resp 18   Ht 5' 8\" (1.727 m)   Wt 198 lb 9.6 oz (90.1 kg)   SpO2 96%   BMI 30.20 kg/m²   GENERAL: well developed, well nourished,in no apparent distress  SKIN: no rashes,no suspicious lesions  HEENT: atraumatic, normocephalic  NECK: supple,no adenopathy  LUNGS: clear to auscultation, + ext  wheeze  CARDIO: RRR without murmur  EXTREMITIES: no cyanosis, or edema    ASSESSMENT AND PLAN:   Diagnoses and all orders for this visit:    Viral upper respiratory tract infection  -     predniSONE 20 MG Oral Tab; Take 1 tablet (20 mg total) by mouth daily for 7 days.  -     fluticasone furoate-vilanterol (BREO ELLIPTA) 100-25 MCG/ACT Inhalation Aerosol Powder, Breath Activated; Inhale 1 puff into the lungs daily.    Wheeze  -     predniSONE 20 MG Oral Tab; Take 1 tablet (20 mg total) by  mouth daily for 7 days.  -     fluticasone furoate-vilanterol (BREO ELLIPTA) 100-25 MCG/ACT Inhalation Aerosol Powder, Breath Activated; Inhale 1 puff into the lungs daily.    Plan   ML  VIRAL  Will give steroids and inhaler   If not better then she is aware to call back and I can give her the codeine cough syrup which she has taken in the past      Preventative medicine  Colonoscopy done  9/19  Dr. hall rpt in yrs ie 2024  Mammogram-10/2023  Pap 2016 dr chin -hina ,saw Dr. Witt in February 2021 and sees mina jaffe   Labs 12/2023   10/2023  reviewed          The patient indicates understanding of these issues and agrees to the plan.  No follow-ups on file.

## 2024-03-04 RX ORDER — FLUTICASONE FUROATE AND VILANTEROL 100; 25 UG/1; UG/1
1 POWDER RESPIRATORY (INHALATION) DAILY
Qty: 180 EACH | Refills: 0 | OUTPATIENT
Start: 2024-03-04

## 2024-03-04 RX ORDER — FLUTICASONE PROPIONATE AND SALMETEROL 100; 50 UG/1; UG/1
1 POWDER RESPIRATORY (INHALATION) 2 TIMES DAILY
Qty: 180 EACH | Refills: 0 | OUTPATIENT
Start: 2024-03-04

## 2024-03-04 NOTE — TELEPHONE ENCOUNTER
Delma Higuera discontinued, not covered by insurance. Advair was sent on 03/01/2024. See encounter from 03/01/2024.    Refills denied.

## 2024-03-05 ENCOUNTER — APPOINTMENT (OUTPATIENT)
Dept: PHYSICAL THERAPY | Age: 64
End: 2024-03-05
Attending: INTERNAL MEDICINE
Payer: MEDICARE

## 2024-03-07 ENCOUNTER — OFFICE VISIT (OUTPATIENT)
Dept: OBGYN CLINIC | Facility: CLINIC | Age: 64
End: 2024-03-07
Payer: COMMERCIAL

## 2024-03-07 VITALS
HEART RATE: 67 BPM | DIASTOLIC BLOOD PRESSURE: 77 MMHG | WEIGHT: 198 LBS | SYSTOLIC BLOOD PRESSURE: 121 MMHG | BODY MASS INDEX: 30 KG/M2

## 2024-03-07 DIAGNOSIS — R92.8 ABNORMAL MAMMOGRAM OF RIGHT BREAST: ICD-10-CM

## 2024-03-07 DIAGNOSIS — Z01.419 ENCOUNTER FOR GYNECOLOGICAL EXAMINATION WITHOUT ABNORMAL FINDING: Primary | ICD-10-CM

## 2024-03-07 PROCEDURE — 3074F SYST BP LT 130 MM HG: CPT | Performed by: OBSTETRICS & GYNECOLOGY

## 2024-03-07 PROCEDURE — 99397 PER PM REEVAL EST PAT 65+ YR: CPT | Performed by: OBSTETRICS & GYNECOLOGY

## 2024-03-07 PROCEDURE — 3078F DIAST BP <80 MM HG: CPT | Performed by: OBSTETRICS & GYNECOLOGY

## 2024-03-12 ENCOUNTER — APPOINTMENT (OUTPATIENT)
Dept: PHYSICAL THERAPY | Age: 64
End: 2024-03-12
Attending: INTERNAL MEDICINE
Payer: MEDICARE

## 2024-03-19 ENCOUNTER — APPOINTMENT (OUTPATIENT)
Dept: PHYSICAL THERAPY | Age: 64
End: 2024-03-19
Attending: INTERNAL MEDICINE
Payer: MEDICARE

## 2024-03-19 ENCOUNTER — TELEPHONE (OUTPATIENT)
Dept: OBGYN CLINIC | Facility: CLINIC | Age: 64
End: 2024-03-19

## 2024-03-19 RX ORDER — CEPHALEXIN 500 MG/1
500 CAPSULE ORAL 4 TIMES DAILY
Qty: 28 CAPSULE | Refills: 0 | Status: SHIPPED | OUTPATIENT
Start: 2024-03-19 | End: 2024-03-26

## 2024-03-19 NOTE — TELEPHONE ENCOUNTER
Per MAGGIE, OK to proceed with ordering Rx due to pt tolerating Ancef in the past. Preferred pharmacy verified and Rx sent. Pt verbalized understanding. Reviewed s/s of allergic reaction with pt; pt will call should she have any issues, including a reaction to medication of symptoms of concern. Pt verbalized understanding.

## 2024-03-19 NOTE — TELEPHONE ENCOUNTER
Attempted to order Keflex PO, but warning pops up stating: \"Allergy/ Contraindication: Amoxicillin.\"    Sent back to MAGGIE to sign off that this is still OK to be ordered. Thanks!

## 2024-03-19 NOTE — TELEPHONE ENCOUNTER
Pt was seen on 3/7 for a cyst was told if it became inflamed to call and MAGGIE would send a prescription to the pharmacy. Please advise

## 2024-04-03 PROBLEM — R92.8 ABNORMAL MAMMOGRAM OF RIGHT BREAST: Status: ACTIVE | Noted: 2024-04-03

## 2024-04-03 NOTE — PROGRESS NOTES
Subjective:   Patient ID: Kerry Hsu is a 63 year old female.    HPI   and . She is post hysterectomy for benign disease. No new personal or family medical issues. Only issue on ROS is a recurrent skin abscess to right side of vulva. It is in same spot.     History/Other:   Review of Systems   Constitutional:  Negative for appetite change, fatigue and unexpected weight change.   Eyes:  Negative for visual disturbance.   Respiratory:  Negative for cough and shortness of breath.    Cardiovascular:  Negative for chest pain, palpitations and leg swelling.   Gastrointestinal:  Negative for abdominal distention, abdominal pain, blood in stool, constipation and diarrhea.   Genitourinary:  Negative for dyspareunia, dysuria, frequency, pelvic pain and urgency.   Musculoskeletal:  Negative for arthralgias and myalgias.   Skin:  Negative for rash.   Neurological:  Negative for weakness, numbness and headaches.   Psychiatric/Behavioral:  Negative for dysphoric mood. The patient is not nervous/anxious.      Current Outpatient Medications   Medication Sig Dispense Refill    latanoprost 0.005 % Ophthalmic Solution INSTILL 1 DROP IN BOTH EYES EVERY night 3 each 3    rosuvastatin 5 MG Oral Tab Take 1 tablet (5 mg total) by mouth daily.      omeprazole 20 MG Oral Capsule Delayed Release Take 2 capsules (40 mg total) by mouth every morning. 180 capsule 3    cholecalciferol 50 MCG (2000 UT) Oral Cap Take 1 capsule (2,000 Units total) by mouth daily.      amLODIPine 10 MG Oral Tab TAKE 1 TABLET BY MOUTH EVERY DAY 90 tablet 3    MAGNESIUM OR Take by mouth.      fluticasone-salmeterol (ADVAIR DISKUS) 100-50 MCG/ACT Inhalation Aerosol Powder, Breath Activated Inhale 1 puff into the lungs 2 (two) times daily. (Patient not taking: Reported on 3/7/2024) 1 each 0    benzonatate 100 MG Oral Cap Take 1 capsule (100 mg total) by mouth 2 (two) times daily as needed. (Patient not taking: Reported on 3/7/2024) 30 capsule 0      Allergies:  Allergies   Allergen Reactions    Celecoxib TONGUE SWELLING     Other reaction(s): CELECOXIB    Sulfa Antibiotics TONGUE SWELLING     Other reaction(s): SULFA (SULFONAMIDE ANTIBIOTICS)    Erythromycin PAIN    Amoxicillin DIARRHEA       Objective:   Physical Exam  Constitutional:       General: She is not in acute distress.     Appearance: She is well-developed. She is not diaphoretic.   Neck:      Thyroid: No thyromegaly.   Cardiovascular:      Rate and Rhythm: Normal rate and regular rhythm.      Heart sounds: Normal heart sounds. No murmur heard.  Pulmonary:      Effort: Pulmonary effort is normal.      Breath sounds: Normal breath sounds. No wheezing or rales.   Chest:   Breasts:     Right: Normal. No mass, nipple discharge, skin change or tenderness.      Left: Normal. No mass, nipple discharge, skin change or tenderness.      Comments:     Abdominal:      General: There is no distension.      Palpations: Abdomen is soft. There is no mass.      Tenderness: There is no abdominal tenderness. There is no guarding or rebound.   Genitourinary:     Labia:         Right: No rash or lesion.         Left: No rash or lesion.       Vagina: Normal. No vaginal discharge.      Uterus: Absent.       Adnexa:         Right: No mass, tenderness or fullness.          Left: No mass, tenderness or fullness.        Comments: 1 cm lump postero-lateral right w/o inflammation. Appears as a healed follicular abscess vs subQ cyst. Non-tender and no drainage.   Musculoskeletal:         General: No tenderness.      Cervical back: Neck supple.   Lymphadenopathy:      Cervical: No cervical adenopathy.      Upper Body:      Right upper body: No supraclavicular, axillary or pectoral adenopathy.      Left upper body: No supraclavicular, axillary or pectoral adenopathy.   Neurological:      Mental Status: She is alert.         Assessment & Plan:   1. Encounter for gynecological examination without abnormal finding    2. Abnormal  mammogram of right breast        She has f/u mammo order for right side. We discussed PO antibiotic with next abscess occurrence. If no full resolution, then consider excision after antibiotics.     Meds This Visit:  Requested Prescriptions      No prescriptions requested or ordered in this encounter       Imaging & Referrals:  None

## 2024-04-08 ENCOUNTER — HOSPITAL ENCOUNTER (OUTPATIENT)
Dept: MAMMOGRAPHY | Facility: HOSPITAL | Age: 64
Discharge: HOME | End: 2024-04-08
Payer: MEDICARE

## 2024-04-08 ENCOUNTER — HOSPITAL ENCOUNTER (OUTPATIENT)
Dept: ULTRASOUND IMAGING | Facility: HOSPITAL | Age: 64
Discharge: HOME | End: 2024-04-08
Payer: MEDICARE

## 2024-04-08 DIAGNOSIS — R92.8 ABNORMAL MAMMOGRAM OF RIGHT BREAST: Primary | ICD-10-CM

## 2024-04-08 DIAGNOSIS — R92.8 ABNORMAL MAMMOGRAM: ICD-10-CM

## 2024-04-08 PROCEDURE — 77065 DX MAMMO INCL CAD UNI: CPT

## 2024-04-08 PROCEDURE — 76642 ULTRASOUND BREAST LIMITED: CPT

## 2024-04-08 PROCEDURE — 77061 BREAST TOMOSYNTHESIS UNI: CPT

## 2024-04-17 ENCOUNTER — OFFICE VISIT (OUTPATIENT)
Dept: AUDIOLOGY | Facility: CLINIC | Age: 64
End: 2024-04-17
Payer: COMMERCIAL

## 2024-04-17 DIAGNOSIS — H90.3 SENSORINEURAL HEARING LOSS, BILATERAL: Primary | ICD-10-CM

## 2024-04-17 PROCEDURE — 92557 COMPREHENSIVE HEARING TEST: CPT | Performed by: AUDIOLOGIST

## 2024-04-17 PROCEDURE — 92567 TYMPANOMETRY: CPT | Performed by: AUDIOLOGIST

## 2024-04-19 ENCOUNTER — OFFICE VISIT (OUTPATIENT)
Dept: INTERNAL MEDICINE CLINIC | Facility: CLINIC | Age: 64
End: 2024-04-19
Payer: COMMERCIAL

## 2024-04-19 VITALS
HEIGHT: 68 IN | BODY MASS INDEX: 30.28 KG/M2 | SYSTOLIC BLOOD PRESSURE: 121 MMHG | WEIGHT: 199.81 LBS | DIASTOLIC BLOOD PRESSURE: 76 MMHG | HEART RATE: 84 BPM

## 2024-04-19 DIAGNOSIS — H04.123 DRY EYES: ICD-10-CM

## 2024-04-19 DIAGNOSIS — B37.2 CANDIDA INFECTION OF FLEXURAL SKIN: Primary | ICD-10-CM

## 2024-04-19 PROCEDURE — 3074F SYST BP LT 130 MM HG: CPT | Performed by: INTERNAL MEDICINE

## 2024-04-19 PROCEDURE — 3078F DIAST BP <80 MM HG: CPT | Performed by: INTERNAL MEDICINE

## 2024-04-19 PROCEDURE — 3008F BODY MASS INDEX DOCD: CPT | Performed by: INTERNAL MEDICINE

## 2024-04-19 PROCEDURE — 99214 OFFICE O/P EST MOD 30 MIN: CPT | Performed by: INTERNAL MEDICINE

## 2024-04-19 RX ORDER — KETOCONAZOLE 20 MG/G
1 CREAM TOPICAL DAILY
Qty: 30 G | Refills: 0 | Status: SHIPPED | OUTPATIENT
Start: 2024-04-19

## 2024-04-19 NOTE — PROGRESS NOTES
Kerry Hsu is a 63 year old female.  Chief Complaint   Patient presents with    Follow - Up       HPI:   Pt comes for f/u with her    C/C rash x couple weeks   C/o _ itches   Would like to to take the Crestor every other day as it is affecting her neuropathy in her legs  Tried the pwder she had at home -no help  She also feels like something is blue into her left eye yesterday and would like that looked at          HISTORY    palpitations for the past couple weeks at least and it is now scaring her because she can feel it when she is just lying down or just sitting there not doing anything and it is not on it but only on exertion, comes and goes   Also has some retrosternal chest pains 5/10  Better ? --took ppi and it didn't help   Worse -with deep inspirations  No increase in caffeine  Chest pain and palpitations happen at the same time and not associated with any diaphoresis  She did have an EKG done in January and a calcium score in August of this year           HISTORY  Saw dr rothman ent and ct was neg SO NO  sinus SURG NEEDED     She thinks its her eyes   Feels her vision is bad even after glasses and thinks it happended after cataract surg   HAS SEEN   saw dr mckeon--  neurology   Saw cards dr genao and will go for heart scan   Saw many eye drs and tried drops but no help , eye drops now burns                     HISTORY  6/2023 visit     will see the surgeon for a cyst that she has in her vaginal area, she had already seen the dermatologist and the GYN doctor  Hopefully will come out of the boot for her left leg  Saw ENT and may is sinus surgery            HISTORY   right wrist pain after her rotator cuff surg x 2 weeks -was first like an electric shock and now only gets that once in a while but now more sore   Going for PT   She also has some left thigh pain - from buttocks to the knees         History  3/2022  right shoulder surgery on 3/7/2022   Requested by dr haque  Fax - can be  seen in epic   Can walk up one flight of stars without feeling short of breath   Right shoulder radiates to the neck on that side   She is right handed , cant use her hand and arm as much            HISTORY   10/2021   She has been seeing Ortho for the neuropathy and also received shots to the shoulder-right-and knees and was given Tylenol No. 3  Also sees the foot doctor  saw dr mike lucas and consider valium but she feels this will affect her ability to work   She complains of \"nerve pain\" in the feet   She states that valium knocks her out -even if she takes it in the evening she still feels the effects in the am , foot dr gave gabpentin but that too makes her sleepy \"im very sensitive   Also c/o left flank pain  X couple weeks -- ua done in office and does show blood with uti    8/10 , sharp and stabbing ,   Worse with standing after sitting a long time   Better - ?   Comes and goesbut now more frequent      Not taking trazodone - not helping and she is not sure what meds cuases what so she doesn't want to be on meds   Know she has had CTs and ultrasounds of her kidney done before and has seen urology as well- dr moreno -she did have a 3 mm nonobstructing stone in the right kidney  Still has her chronic cough and try the Tessalon Perles which did not help that much, has an inhaler so she will try that, the codien cough syrup just put her to sleep as well      Finished doxy yest taht she got from the  ,didn't help          HISTORY 3/2021   cramps is all over but mostly in the legs-stretching makes it works and the meloxicam is not helping and she did see the rheumatologist and was started on a new medication metaxalone 800 mg 3 times a day but it makes her sleepy so she is only taking it at night--it still wakes her up at night so does not feel that that is working was recommended to follow-up at an academic center     Still has a cough and the codeine cough syrup did not help much and noted that she is  on and PPI and the chest x-ray was normal which was ordered last time   she has noticed that the rescue inhaler does help                       HISTORY  History from August 2020   Has seen the rheumatologist, physiatry and gone for physical therapy  Has tried Cymbalta given by physiatry but in January when I had seen her she had stated that she was not taking it and and cyclobenzaprine was started but she does not think that that was helpful  She states that she has had an EMG in the past as well a very long time ago  She states that she drinks a lot of water as well  2015 emg report noted in chart - normal   Also noted in 2015 she saw the neurologist Dr. Enamorado and was recommended to go to the pain doctors--Flexeril was tried  Needs to go back to see Dr. Hays the podiatrist for her orthotics                 History   Last seen in February and since then in March she saw Dr. Delgadillo and got knee injections for her osteoarthritis of the knees   had an endoscopic procedure and was found to have gastric polyp by Dr. Pereira  In June she saw the foot doctor and received a shot in her foot for the neuroma  In July she saw Dr. Bird the eye doctor for her glaucoma and will return in 4 months and then           History  1/11/2020 visit   Dr clay- ortho -- left hand tendonitiis -got shot and feeks better   Would like to see dr berger   Not taking cymbalta - takes T#3 one a week , ibuprofen \"seldom\"- once a mn   Muscle cramps coming back - legs thighs and feet , no RLS               History- 11/19  C/o right shoulder has a tear  And she does admit she uses the left more   Used to see dr berger and now sees dr haque and got a shot to the right shoulder   Needs a referral to see pain clinic   Needs referral to see podiatrist --corns and calluses right foot / toe and also for ingrown toenails   Pain is 10/10 -- iburprofen does helps but doesn't like taking it too much --takes T#3 but t just puts her to sleep and  then she will wake up with pain -- unable ot sratch her back   No falls trauma or injury that she is aware of      History   She has had both muscle and nerve biopsies by dr hernandez   She gets these cramps every day   Steroids do not help either  Noted that she had try cyclobenzaprine 10 mg in July 2019--no help   Since the last visit she did see her orthopedic doctor and received a short of a cortisone shot to her right shoulder  Also c/o left hand tender swelling x few weeks   She also states that she gets rashes underneath her breasts and the triamcinolone helps with this and needs a refill        hisotry -8/19 first visit   C/c htn   C/o fell on July 8th and has some left rib pain and back pains   Had some ct scans and would like to go through it  Needs referrals    Usually gets shots to her knees and shoulders- was told to follow-up with a new doctor     PMH  gerd daily ppi --h/o nissens fundoplication  ?2006  HTN  Hip pain and back pain - T#3 - prn 2 tabs a week, also takes ibuprofen   bm hematuria   Non obstructing kid stones   elmer was on cpap   Glaucoma suspect   Right shoulder rotator cuff tear s/p surg 3/2022   Osteoarthritis knees  Hepatic and renal cysts similar to 8/19 and CT 5/20  H/o left toe OM s/p amputation      Dr peters - eye dr Dr moreno - urologist   Dr. Craft- rheum   Dr. Delgadillo- ortho   Dr lorraine hall     Current Outpatient Medications   Medication Sig Dispense Refill    ketoconazole 2 % External Cream Apply 1 Application topically daily. 30 g 0    fluticasone-salmeterol (ADVAIR DISKUS) 100-50 MCG/ACT Inhalation Aerosol Powder, Breath Activated Inhale 1 puff into the lungs 2 (two) times daily. 1 each 0    latanoprost 0.005 % Ophthalmic Solution INSTILL 1 DROP IN BOTH EYES EVERY night 3 each 3    rosuvastatin 5 MG Oral Tab Take 1 tablet (5 mg total) by mouth daily.      omeprazole 20 MG Oral Capsule Delayed Release Take 2 capsules (40 mg total) by mouth every morning. 180  capsule 3    cholecalciferol 50 MCG (2000 UT) Oral Cap Take 1 capsule (2,000 Units total) by mouth daily.      amLODIPine 10 MG Oral Tab TAKE 1 TABLET BY MOUTH EVERY DAY 90 tablet 3    MAGNESIUM OR Take by mouth.      benzonatate 100 MG Oral Cap Take 1 capsule (100 mg total) by mouth 2 (two) times daily as needed. (Patient not taking: Reported on 3/7/2024) 30 capsule 0      Past Medical History:    Abscess of left thigh    Acute meniscal tear of knee    Age-related nuclear cataract of both eyes    Anal sphincter incontinence    Arthritis    Back problem    Chondromalacia    Colon polyps    Degenerative disc disease    Diverticular disease    Esophageal reflux    Glaucoma    Hammertoe    High blood pressure    Insomnia    Neuropathy    Right Foot    Obstructive sleep apnea syndrome    Osteoarthritis    Primary open angle glaucoma of both eyes    Diagnosis of glaucoma OU; Started Latanoprost qhs after abnormal VF and OCT 2/25/16;  6/5/18 consult with Dr. Madeline Chappell-see progress note    Small bowel obstruction (HCC)    Tendinitis    Unspecified essential hypertension    Visual impairment    Reraders      Past Surgical History:   Procedure Laterality Date    Anal sphincterotomy      03/12/2013 [place]    Blepharoplasty anesthesia Bilateral 03/05/2020    Dr Suresh [facility] [place] [facility]     Cataract extraction w/  intraocular lens implant Left 05/07/2018    L PC IOL with Dr. Suresh @ [facility]    Colonoscopy N/A 11/11/2016    Procedure: COLONOSCOPY;  Surgeon: Sabrina Cazares MD;  Location: [facility] ENDOSCOPY    Colonoscopy N/A 09/06/2019    Procedure: COLONOSCOPY;  Surgeon: Edwin Sterling MD;  Location: [facility] ENDOSCOPY    Excision of chalazion, single - od - right eye Right 6.27/2017    Nasally    Hc arthrocentesis or inject major joint w/o us      Hysterectomy  1996    [facility]AI    Other      Lap Nissen Fundoplication surgery    Other surgical history  2005,2007,2008    LAPAROSCOPIC LYSIS OF ADHESION    Other surgical history       right foot bunionectomy    Other surgical history  11/14/2014    right superficial anterior peroneal nerve biopsy and right proximal thigh muscle biopsy.    Other surgical history  06/13/2023    Excision and Biopsy of two  perineal cysts    Upper gi endoscopy performed  05/2015    Yag capsulotomy - os - left eye Left 12/19/2018    RJM      Social History:  Social History     Socioeconomic History    Marital status:    Tobacco Use    Smoking status: Never     Passive exposure: Never    Smokeless tobacco: Never   Vaping Use    Vaping status: Never Used   Substance and Sexual Activity    Alcohol use: No     Comment: None.     Drug use: No    Sexual activity: Yes     Birth control/protection: Hysterectomy   Other Topics Concern    Caffeine Concern Yes     Comment: occasional soda    Exercise Yes    Pt has a pacemaker No    Pt has a defibrillator No    Breast feeding No    Reaction to local anesthetic No    Right Handed Yes   Social History Narrative    Work - banking     Social Determinants of Health      [facility]    Social Connections    Received from [facility], [facility]    Housing Stability        REVIEW OF SYSTEMS:   GENERAL HEALTH: No fevers, chills, sweats, fatigue  NEURO: denies headaches , anxiety, depression    EXAM:   /76 (BP Location: Right arm, Patient Position: Sitting, Cuff Size: adult)   Pulse 84   Ht 5' 8\" (1.727 m)   Wt 199 lb 12.8 oz (90.6 kg)   BMI 30.38 kg/m²   GENERAL: well developed, well nourished,in no apparent distress  SKIN: left groin + redness   HEENT: atraumatic, right eye mild conj injection   NECK: supple  Abd - right groin with mild redness but noted that the left groin has a large area of redness  EXTREMITIES:  edema    ASSESSMENT AND PLAN:   Diagnoses and all orders for this visit:    Candida infection of flexural skin  -     ketoconazole 2 % External Cream;  Apply 1 Application topically daily.    Dry eyes    Plan  Will try ketoconazole cream and for her eyes did advise her to flush her eyes with cool water, may be dry eyes so she can try artificial tears          Preventative medicine  Colonoscopy done  9/19  Dr. hall rpt in yrs ie 2024  Mammogram-10/2023  Pap 2016 dr chin -hina ,saw Dr. Witt in February 2021 and sees mina jaffe   Labs 12/2023   10/2023 1/2024  reviewed      The patient indicates understanding of these issues and agrees to the plan.  No follow-ups on file.

## 2024-04-25 ENCOUNTER — TELEPHONE (OUTPATIENT)
Dept: INTERNAL MEDICINE CLINIC | Facility: CLINIC | Age: 64
End: 2024-04-25

## 2024-04-25 DIAGNOSIS — M25.559 HIP PAIN, UNSPECIFIED LATERALITY: Primary | ICD-10-CM

## 2024-04-25 NOTE — TELEPHONE ENCOUNTER
Dr. Rogers,     Patient called requesting referral to Dr. Delgadillo.    Pended referral please review diagnosis and sign off if you agree.    Thank you.  Marleny Chappell  [facility]

## 2024-04-25 NOTE — TELEPHONE ENCOUNTER
Patient calling to request a referral for orthopedics for her right hip.    Please send patient a LoggedInt message once referral has been made available.

## 2024-05-02 ENCOUNTER — HOSPITAL ENCOUNTER (OUTPATIENT)
Dept: GENERAL RADIOLOGY | Facility: HOSPITAL | Age: 64
Discharge: HOME | End: 2024-05-02
Attending: ORTHOPAEDIC SURGERY
Payer: COMMERCIAL

## 2024-05-02 ENCOUNTER — OFFICE VISIT (OUTPATIENT)
Dept: ORTHOPEDICS CLINIC | Facility: CLINIC | Age: 64
End: 2024-05-02
Payer: MEDICARE

## 2024-05-02 VITALS — WEIGHT: 201.81 LBS | HEIGHT: 68 IN | BODY MASS INDEX: 30.59 KG/M2

## 2024-05-02 DIAGNOSIS — M25.559 HIP PAIN, UNSPECIFIED LATERALITY: ICD-10-CM

## 2024-05-02 DIAGNOSIS — M54.16 LUMBAR RADICULOPATHY: Primary | ICD-10-CM

## 2024-05-02 PROCEDURE — 99214 OFFICE O/P EST MOD 30 MIN: CPT | Performed by: ORTHOPAEDIC SURGERY

## 2024-05-02 PROCEDURE — 73502 X-RAY EXAM HIP UNI 2-3 VIEWS: CPT | Performed by: ORTHOPAEDIC SURGERY

## 2024-05-02 RX ORDER — TRAMADOL HYDROCHLORIDE 50 MG/1
50 TABLET ORAL EVERY 6 HOURS PRN
Qty: 25 TABLET | Refills: 0 | Status: SHIPPED | OUTPATIENT
Start: 2024-05-02

## 2024-05-02 RX ORDER — METHYLPREDNISOLONE 4 MG/1
TABLET ORAL
Qty: 1 EACH | Refills: 0 | Status: SHIPPED | OUTPATIENT
Start: 2024-05-02

## 2024-05-02 NOTE — PROGRESS NOTES
NURSING INTAKE COMMENTS:   Chief Complaint   Patient presents with    Hip Pain     R hip- onset- a month ago- denies injury- rates pain 10/10 all the time       HPI: This 63 year old female presents today with complaints of right hip and flank pain.  She has had progressive symptoms over the past month.  No history of injury.  She does have some lower back pain.  She feels increased pain when forward bending at the waist and attempting to tie her shoes.  She has some discomfort with prolonged walking.  She has occasional pain at night.  No mechanical clicking or popping in the hip joint.  Minimal groin pain.  She has tried ibuprofen with minimal improvement.    Past Medical History:    Abscess of left thigh    Acute meniscal tear of knee    Age-related nuclear cataract of both eyes    Anal sphincter incontinence    Arthritis    Back problem    Chondromalacia    Colon polyps    Degenerative disc disease    Diverticular disease    Esophageal reflux    Glaucoma    Hammertoe    High blood pressure    Insomnia    Neuropathy    Right Foot    Obstructive sleep apnea syndrome    Osteoarthritis    Primary open angle glaucoma of both eyes    Diagnosis of glaucoma OU; Started Latanoprost qhs after abnormal VF and OCT 2/25/16;  6/5/18 consult with Dr. Madeline Chappell-see progress note    Small bowel obstruction (HCC)    Tendinitis    Unspecified essential hypertension    Visual impairment    Reraders     Past Surgical History:   Procedure Laterality Date    Anal sphincterotomy      03/12/2013 [facility]    Blepharoplasty anesthesia Bilateral 03/05/2020    [place] Suresh [facility]     Cataract extraction w/  intraocular lens implant Left 05/07/2018    L PC IOL with Dr. Suresh @ [facility]    Colonoscopy N/A 11/11/2016    Procedure: COLONOSCOPY;  Surgeon: Sabrina Cazares MD;  Location: [facility] ENDOSCOPY    Colonoscopy N/A 09/06/2019    Procedure: COLONOSCOPY;  Surgeon: Edwin Sterling MD;  Location: [facility] ENDOSCOPY    Excision of  lien, single - od - right eye Right 6.27/2017    Nasally    Hc arthrocentesis or inject major joint w/o us      Hysterectomy  1996    KAI    Other      Lap Nissen Fundoplication surgery    Other surgical history  2005,2007,2008    LAPAROSCOPIC LYSIS OF ADHESION    Other surgical history      right foot bunionectomy    Other surgical history  11/14/2014    right superficial anterior peroneal nerve biopsy and right proximal thigh muscle biopsy.    Other surgical history  06/13/2023    Excision and Biopsy of two  perineal cysts    Upper gi endoscopy performed  05/2015    Yag capsulotomy - os - left eye Left 12/19/2018    [facility]     Current Outpatient Medications   Medication Sig Dispense Refill    methylPREDNISolone 4 MG Oral Tablet Therapy Pack Take as per instruction sheet. Do not take anti-inflammatory meds such as ibuprofen or naproxen while on this med. 1 each 0    ketoconazole 2 % External Cream Apply 1 Application topically daily. 30 g 0    fluticasone-salmeterol (ADVAIR DISKUS) 100-50 MCG/ACT Inhalation Aerosol Powder, Breath Activated Inhale 1 puff into the lungs 2 (two) times daily. 1 each 0    benzonatate 100 MG Oral Cap Take 1 capsule (100 mg total) by mouth 2 (two) times daily as needed. (Patient not taking: Reported on 3/7/2024) 30 capsule 0    latanoprost 0.005 % Ophthalmic Solution INSTILL 1 DROP IN BOTH EYES EVERY night 3 each 3    rosuvastatin 5 MG Oral Tab Take 1 tablet (5 mg total) by mouth daily.      omeprazole 20 MG Oral Capsule Delayed Release Take 2 capsules (40 mg total) by mouth every morning. 180 capsule 3    cholecalciferol 50 MCG (2000 UT) Oral Cap Take 1 capsule (2,000 Units total) by mouth daily.      amLODIPine 10 MG Oral Tab TAKE 1 TABLET BY MOUTH EVERY DAY 90 tablet 3    MAGNESIUM OR Take by mouth.       Allergies   Allergen Reactions    Celecoxib TONGUE SWELLING     Other reaction(s): CELECOXIB    Sulfa Antibiotics TONGUE SWELLING     Other reaction(s): SULFA (SULFONAMIDE  ANTIBIOTICS)    Erythromycin PAIN    Amoxicillin DIARRHEA     Family History   Problem Relation Age of Onset    Hypertension Father     Hypertension Mother     Hypertension Sister     Cancer Sister         liver cancer    Hypertension Brother     Diabetes Neg     Glaucoma Neg     Macular degeneration Neg     Breast Cancer Neg     Ovarian Cancer Neg        Social History     Occupational History    Not on file   Tobacco Use    Smoking status: Never     Passive exposure: Never    Smokeless tobacco: Never   Vaping Use    Vaping status: Never Used   Substance and Sexual Activity    Alcohol use: No     Comment: None.     Drug use: No    Sexual activity: Yes     Birth control/protection: Hysterectomy        Review of Systems:  GENERAL: denies fevers, chills, night sweats, fatigue, unintentional weight loss/gain  SKIN: denies skin lesions, open sores, rash  HEENT:denies recent vision change, new nasal congestion,hearing loss, tinnitus, sore throat, headaches  RESPIRATORY: denies new shortness of breath, cough, asthma, wheezing  CARDIOVASCULAR: denies chest pain, leg cramps with exertion, palpitations, leg swelling  GI: denies abdominal pain, nausea, vomiting, diarrhea, constipation, hematochezia, worsening heartburn or stomach ulcers  : denies dysuria, hematuria, incontinence, increased frequency, urgency, difficulty urinating  MUSCULOSKELETAL: denies musculoskeletal complaints other than in HPI  NEURO: denies numbness, tingling, weakness, balance issues, dizziness, memory loss  PSYCHIATRIC: denies Hx of depression, anxiety, other psychiatric disorders  HEMATOLOGIC: denies blood clots, anemia, blood clotting disorders, blood transfusion  ENDOCRINE: denies autoimmune disease, thyroid issues, or diabetes  ALLERGY: denies asthma, seasonal allergies    Physical Examination:    Ht 5' 8\" (1.727 m)   Wt 201 lb 12.8 oz (91.5 kg)   BMI 30.68 kg/m²   Constitutional: appears well hydrated, alert and responsive, no acute distress  noted  Extremities: She walks with slight antalgia on the right.  Further exam of the right hip reveals mild tenderness over the greater trochanter.  She is moderately tender in the right flank and sciatic notch.  Passive straight leg raising is strongly positive.  Active straight leg raising produces mild pain.  Passive flexion and 90 degrees produces no significant pain in the hip.  Passive internal rotation to 30 degrees produces minimal pain.  Passive external rotation to 70 or 80 degrees produces minimal pain.  Axial loading of the femur produces no pain.  No calf tenderness or swelling.  Neurological: Light touch and pinprick sensation intact throughout the lower extremities.  Ankle dorsiflexion plantarflexion EHL knee extension and hip flexion strength are 5 out of 5 bilaterally.  No clonus.    Imaging:   [facility] ROMINA 2D+3D DIAGNOSTIC [facility] RIGHT (CPT=77065/79612)    Result Date: 4/8/2024  PROCEDURE: [facility] ROMINA 2D+3D DIAGNOSTIC [facility] RIGHT (CPT=77065/46804)  COMPARISON: [facility] BREAST RIGHT LIMITED (CPT=76642), 4/08/2024, 9:27 AM.  [facility] BREAST RIGHT LIMITED (CPT=76642), 10/06/2023, 8:35 AM.  [facility] ROMINA 2D+3D SCREENING BILAT ([id number]), 11/07/2022, 5:00 PM.  [facility] ROMINA 2D+3D DIAGNOSTIC BRITTANY BILAT (QVL=51469/49301), 10/06/2023, 7:30 AM.  INDICATIONS: R92.8 Abnormal mammogram  TECHNIQUE:  Digital diagnostic mammography was performed and images were reviewed with the [employer] GREGORY 1.5.1.5 CAD device.  3D tomosynthesis was performed and reviewed. Breast ultrasound was performed with evaluation of only the specific areas of concern.  Static images were recorded by the technologist for review by the radiologist.   BREAST COMPOSITION:   Category b-Scattered areas fibroglandular density.   FINDINGS:   MAMMOGRAM:  The previously described asymmetry in the anterior outer right breast is less conspicuous on this study.  There is an ovoid  asymmetry involving the medial right breast.  This asymmetry effaces on spot compression views and is compatible with benign overlying fibroglandular tissue.  Otherwise, the parenchyma pattern is stable with no new suspicious asymmetry, mass, architectural distortion, or microcalcifications identified in the right breast  ULTRASOUND:  A targeted ultrasound of the right breast was performed.  At the 8 o'clock position 5 cm from the nipple in the right breast, there is a probably benign 4 x 2 x 4 mm lesion that is just deep to the cutaneous tissues.  The lesion demonstrates a peripheral rim of echogenicity with mixed internal areas of echogenicity.  The lesion is parallel and well-circumscribed.  No Doppler flow is appreciated to the lesion.  Previously, there was a 6 x 3 x 4 mm hyperechoic lesion in this region on the prior ultrasound dated 10/06/2023.         CONCLUSION:     Probably benign lesion at the 8 o'clock position 5 cm from the nipple in the right breast.  This lesion may reflect a small hematoma or complex cyst.  Recommend follow-up diagnostic right breast mammogram and right breast ultrasound in 6 months to monitor for stability.  At that time, the patient will be due for a screening left breast mammogram.  BI-RADS CATEGORY:   DIAGNOSTIC CATEGORY 3--PROBABLY BENIGN FINDING.   RECOMMENDATIONS:  SHORT TERM FOLLOW-UP DIAGNOSTIC MAMMOGRAM BILATERAL BREASTS IN 6 MONTHS.   SHORT TERM FOLLOW-UP ULTRASOUND RIGHT BREAST IN 6 MONTHS.       PLEASE NOTE: NORMAL MAMMOGRAM DOES NOT EXCLUDE THE POSSIBILITY OF BREAST CANCER.  A CLINICALLY SUSPICIOUS PALPABLE LUMP SHOULD BE BIOPSIED.   For patients over the age of 40, the target due date for the patient's next mammogram has been entered into a reminder system.   Patient received a discharge summary from the technologist after completion of exam.  Breast marker legend used on images  Triangle = Palpable lump [place] = Skin tag or mole BB = Nipple Linear jame = [place] =  Pain    Dictated by (CST): Jefferson Grier MD on 4/08/2024 at 8:54 AM     Finalized by (CST): Jefferson Grier MD on 4/08/2024 at 9:58 AM          [facility] BREAST RIGHT LIMITED (CPT=76642)    Result Date: 4/8/2024  PROCEDURE: [facility] BREAST RIGHT LIMITED (CPT=76642)  COMPARISON: [facility], [facility] BREAST RIGHT LIMITED (CPT=76642), 10/06/2023, 8:35 AM.  INDICATIONS: Inconclusive mammogram  TECHNIQUE:  Breast ultrasound was performed with evaluation only on specific areas of concern.   FINDINGS:  Ultrasound report is dictated under same-day diagnostic mammogram report.  Please refer to the separately dictated same day diagnostic mammogram report for findings and recommendations.         CONCLUSION:     Ultrasound report is dictated under same-day diagnostic mammogram report.  Please refer to the separately dictated same day diagnostic mammogram report for findings and recommendations.       Dictated by (CST): Jefferson Grier MD on 4/08/2024 at 9:56 AM     Finalized by (CST): Jefferson Grier MD on 4/08/2024 at 9:57 AM           \  X-rays of the right hip and pelvis are unremarkable.  No soft tissue calcification or fracture.  No significant hip degenerative change noted.    Labs:  Lab Results   Component Value Date    WBC 10.3 01/15/2024    HGB 15.4 01/15/2024    .0 01/15/2024      Lab Results   Component Value Date    GLU 94 01/15/2024    BUN 11 01/15/2024    CREATSERUM 1.05 (H) 01/15/2024    GFR 46 (L) 04/02/2016    GFRNAA 66 05/21/2022    GFRAA 76 05/21/2022        Assessment and Plan:  Diagnoses and all orders for this visit:    Lumbar radiculopathy  -     PHYSICAL THERAPY - INTERNAL    Hip pain, unspecified laterality  -     XR HIP W OR WO PELVIS 2 OR 3 VIEWS, RIGHT (CPT=73502); Future    Other orders  -     methylPREDNISolone 4 MG Oral Tablet Therapy Pack; Take as per instruction sheet. Do not take anti-inflammatory meds such as ibuprofen or naproxen while on this med.        Assessment: Right hip  pain, findings consistent with lumbar radiculopathy    Plan: I recommended nonoperative treatment.  I provided referral for outpatient physical therapy.  I prescribed tramadol and Medrol Dosepak.  Suggested avoidance of heavy lifting pushing and pulling.  Consider physiatry consultation if symptoms continue.  Follow-up again in 4 to 6 weeks.   was present for the visit today.    Follow Up: Return in about 6 weeks (around 6/13/2024).    CONG LOZANO MD

## 2024-05-06 ENCOUNTER — TELEPHONE (OUTPATIENT)
Dept: PHYSICAL THERAPY | Facility: HOSPITAL | Age: 64
End: 2024-05-06

## 2024-05-08 NOTE — PROGRESS NOTES
SPINE EVALUATION:     Diagnosis:   Lumbar radiculopathy (M54.16)         R>L Referring Provider: Marcin Delgadillo MD Date of Evaluation:    5/10/2024    Precautions:  None    Per MD: Core strengthening, hamstring stretching    Next MD visit:   none scheduled  Date of Surgery: n/a     PATIENT SUMMARY   Kerry Hsu is a 63 year old female who presents to therapy today with complaints of R hip and lumbar pain/radiculopathy that started 1 month ago. Pt reports her pain has gotten progressively worse since then. Pt reports right sided pain is worse than left. The pain on the R side starts from her back and travels down the lateral leg and stops before it hits the knee. L sided pain only travels into the buttock. Pt denies tingling/numbness.     Pt describes pain level current 10/10, at best 4/10, at worst 10/10.   Current functional limitations include sitting and laying down for any period of time as well as standing for any period of time to wash dishes or cook.    Kerry describes prior level of function as good. Pt goals include sitting and laying down for any period of time as well as standing for any period of time to wash dishes or cook.  Past medical history was reviewed with Kerry.  Pt denies diplopia, dysarthria, dysphasia, dizziness, drop attacks, bowel/bladder changes, saddle anesthesia, and CECIL LE N/T.    ASSESSMENT  Kerry presents to physical therapy evaluation with primary c/o R hip and lumbar pain/radiculopathy. The results of the objective tests and measures show decreased lumbar ROM, core, LE/hip stabilizer strength, LE muscle length and lumbar joint mobility restrictions, abnormal posture. Functional deficits include but are not limited to sitting and laying down for any period of time as well as standing for any period of time to wash dishes or cook.  Signs and symptoms are consistent with diagnosis of Lumbar radiculopathy (M54.16). Pt and PT discussed evaluation findings, pathology, POC  and HEP.  Pt voiced understanding and performs HEP correctly without reported pain. Skilled Physical Therapy is medically necessary to address the above impairments and reach functional goals.     OBJECTIVE:   Observation/Posture: FHP with rounded shoulders, antalgic gait  Neuro Screen: WNL    Lumbar AROM: (* denotes performed with pain)  Flexion: 30*/80  Extension: 20/25  Sidebending: R 20*/35; L 20*/35  Rotation: R 30*/45; L 30*/45    Accessory motion: Moderate hypomobility of the L3-L4, L4-L5, L5-S1 segments in the P/A direction  Palpation: TTP: R>L  Thoracolumbar Fascia  Lumbar Paraspinals  Piriformis/Glut Max/Glut Med  Quadratus Lumborum  Hamstrings    Strength: (* denotes performed with pain)  LE   Hip flexion (L2): R 3+*/5; L 3+*/5  Hip abduction: R 3+/5; L 3+/5  Hip Extension: R 3+/5; L 3+/5   Hip ER: R 4-/5; L 4-/5  Hip IR: R 3*/5; L 3+*/5  Knee Flexion: R 4/5; L 4/5   Knee extension (L3): R 4/5; L 4/5   DF (L4): R 4/5; L 4/5  Great Toe Ext (L5): R 4/5, L 4/5  PF (S1): R 4/5; L 4/5     Flexibility: LE/Lumbar     R L   Hamstrings moderately restricted moderately restricted   Piriformis moderately restricted moderately restricted   Hip Flexor moderately restricted moderately restricted   TFL moderately restricted moderately restricted   Quads moderately restricted moderately restricted   Gastrocs moderately restricted moderately restricted       Special tests:   Slump Test: (+)  Thigh Thrust Test: (-)    Gait: pt ambulates on level ground with antalgia and trendelenburg/waddle.  Balance: SLS R 7 sec, L 4 sec    Today’s Treatment and Response:   Pt education was provided on exam findings, treatment diagnosis, treatment plan, expectations, and prognosis. Pt was also provided recommendations for possible soreness after evaluation and modalities as needed [ice/heat]  Patient was instructed in and issued a HEP for:     Access Code: [email or web address]  URL: [email or web address]  Date: 05/10/2024  Prepared  by: Sherita Johnson    Exercises  - Prone Press Up On Elbows  - 1 x daily - 7 x weekly - 2 sets - 10 reps  - Supine Sciatic Nerve Glide  - 1 x daily - 7 x weekly - 2 sets - 10 reps  - Supine Piriformis Stretch with Foot on Ground  - 1 x daily - 7 x weekly - 2 sets - 30 second hold  - Supine Single Knee to Chest Stretch  - 1 x daily - 7 x weekly - 3 sets - 30 second hold  - Supine Posterior Pelvic Tilt  - 1 x daily - 7 x weekly - 3 sets - 10 reps    Charges: PT Eval Low Complexity        Total Timed Treatment: 40 min           Total Treatment Time: 40 min   Therex: 0 minutes  Theract: 20 minutes  NMR: 0 minutes  Manual Therapy: 0 minutes    Based on clinical rationale and outcome measures, this evaluation involved Low Complexity decision making due to 1-2 personal factors/comorbidities, body structures involved/activity limitations, and unstable symptoms including changing pain levels.  PLAN OF CARE:    Goals: (to be met in 8-12 visits)   Pt will improve transversus abdominis recruitment to perform proper isometric contraction without requiring verbal or tactile cuing to promote advancement of therex   Pt will demonstrate good understanding of proper posture and body mechanics to decrease pain and improve spinal safety   Pt will improve lumbar spine AROM flexion to >90 deg to allow increase ease with bending forward to don shoes   Pt will report improved symptom centralization and absence of radicular symptoms for 3 consecutive days to improve function with ADL   Pt will have decreased paraspinal mm tension to tolerate standing >30 minutes for work and home activities   Pt will demonstrate improved core strength to be able to perform walking with <3/10 pain   Pt will be independent and compliant with comprehensive HEP to maintain progress achieved in PT       Frequency / Duration: Patient will be seen for 2 x/week or a total of 8-12 visits over a 90 day period. Treatment will include: Manual Therapy, Neuromuscular  Re-education, Self-Care Home Management, Therapeutic Activities, Therapeutic Exercise, and Home Exercise Program instruction    Education or treatment limitation: None  Rehab Potential:fair    Patient/Family/Caregiver was advised of these findings, precautions, and treatment options and has agreed to actively participate in planning and for this course of care.    Thank you for your referral. Please co-sign or sign and return this letter via fax as soon as possible to [phone]. If you have any questions, please contact me at Dept: [phone]    Sincerely,  Electronically signed by therapist: Sherita Johnson PT    Physician's certification required: Yes  I certify the need for these services furnished under this plan of treatment and while under my care.    X___________________________________________________ Date____________________    Certification From: 5/8/2024  To:8/6/2024

## 2024-05-09 ENCOUNTER — TELEPHONE (OUTPATIENT)
Dept: PHYSICAL THERAPY | Facility: HOSPITAL | Age: 64
End: 2024-05-09

## 2024-05-10 ENCOUNTER — OFFICE VISIT (OUTPATIENT)
Dept: PHYSICAL THERAPY | Age: 64
End: 2024-05-10
Attending: ORTHOPAEDIC SURGERY
Payer: MEDICARE

## 2024-05-10 DIAGNOSIS — M54.16 LUMBAR RADICULOPATHY: Primary | ICD-10-CM

## 2024-05-10 PROCEDURE — 97161 PT EVAL LOW COMPLEX 20 MIN: CPT | Performed by: PHYSICAL THERAPIST

## 2024-05-10 PROCEDURE — 97530 THERAPEUTIC ACTIVITIES: CPT | Performed by: PHYSICAL THERAPIST

## 2024-05-14 ENCOUNTER — OFFICE VISIT (OUTPATIENT)
Dept: PHYSICAL THERAPY | Age: 64
End: 2024-05-14
Attending: ORTHOPAEDIC SURGERY
Payer: MEDICARE

## 2024-05-14 DIAGNOSIS — M54.16 LUMBAR RADICULOPATHY: Primary | ICD-10-CM

## 2024-05-14 PROCEDURE — 97530 THERAPEUTIC ACTIVITIES: CPT | Performed by: PHYSICAL THERAPIST

## 2024-05-14 PROCEDURE — 97110 THERAPEUTIC EXERCISES: CPT | Performed by: PHYSICAL THERAPIST

## 2024-05-14 NOTE — PROGRESS NOTES
Diagnosis:   Lumbar radiculopathy (M54.16)         R>L      Referring Provider: Marcin Delgadillo MD Date of Evaluation:   5/10/2024     Precautions:  None    Per MD: Core strengthening, hamstring stretching  Next MD visit:   none scheduled  Date of Surgery: n/a   Insurance Primary/Secondary: [employer] [place] HMO / MEDICARE     # Auth Visits: (#2/5; 5/9/24-8/9/24)            Subjective: Pt reports \"the back feels the same so far in terms of symptoms - still the right side more than the left side - exercises are going well at home; however, the prone press up one started to become a little uncomfortable.\"    Pain: 5/10      Objective: See table below.      Assessment: Pt responded well to therapeutic interventions with no increase in pain. Added core exercises to help with stabilization and strength. Pt instructed to discontinue prone press-ups due to pain. No pain noted with L piriformis stretch which has improved from the first session.      Goals:   (to be met in 8-12 visits)   Pt will improve transversus abdominis recruitment to perform proper isometric contraction without requiring verbal or tactile cuing to promote advancement of therex   Pt will demonstrate good understanding of proper posture and body mechanics to decrease pain and improve spinal safety   Pt will improve lumbar spine AROM flexion to >90 deg to allow increase ease with bending forward to don shoes   Pt will report improved symptom centralization and absence of radicular symptoms for 3 consecutive days to improve function with ADL   Pt will have decreased paraspinal mm tension to tolerate standing >30 minutes for work and home activities   Pt will demonstrate improved core strength to be able to perform walking with <3/10 pain   Pt will be independent and compliant with comprehensive HEP to maintain progress achieved in PT     Plan: Progress skilled PT as tolerated to include core strengthening and HS stretching.  Date: 5/14/2024  TX#: 2/8-12 Date:                  TX#: 3/ Date:                 TX#: 4/ Date:                 TX#: 5/ Date:   Tx#: 6/   Therex: x10'  NuStep: x5'  SKTC Stretch: 3x30\" R/L  HS Stretch: 3x30\" R/L manually  Piriformis Stretch: 3x30\" R/L       Theract: x30'  Sciatic Nerve Flossing: 1x15 R/L   Pelvic Tilt: 3x10  Hip Adduction Squeeze: 2x10 with 3\" hold with pelvic tilt  Supine Marching: 2x10 with pelvic tilt  Hip Abduction: 2x10 with pelvic ring with 2\" hold and pelvic tilt  Lumbar Rotation: 2x10 with pelvic tilt       NMR:       Manual Therapy:       HEP:   Exercises  - Supine Sciatic Nerve Glide  - 1 x daily - 7 x weekly - 2 sets - 10 reps  - Supine Piriformis Stretch with Foot on Ground  - 1 x daily - 7 x weekly - 2 sets - 30 second hold  - Supine Single Knee to Chest Stretch  - 1 x daily - 7 x weekly - 3 sets - 30 second hold  - Supine Posterior Pelvic Tilt  - 1 x daily - 7 x weekly - 3 sets - 10 reps    Charges: Therex: 10 minutes  Theract: 30 minutes  NMR: 0 minutes  Manual Therapy: 0 minutes  Total Timed Treatment: 40 min  Total Treatment Time: 40 min

## 2024-05-15 ENCOUNTER — LAB ENCOUNTER (OUTPATIENT)
Dept: LAB | Age: 64
End: 2024-05-15
Attending: INTERNAL MEDICINE

## 2024-05-15 ENCOUNTER — OFFICE VISIT (OUTPATIENT)
Dept: INTERNAL MEDICINE CLINIC | Facility: CLINIC | Age: 64
End: 2024-05-15

## 2024-05-15 VITALS
HEIGHT: 68 IN | DIASTOLIC BLOOD PRESSURE: 84 MMHG | RESPIRATION RATE: 18 BRPM | WEIGHT: 200 LBS | HEART RATE: 88 BPM | OXYGEN SATURATION: 96 % | SYSTOLIC BLOOD PRESSURE: 134 MMHG | BODY MASS INDEX: 30.31 KG/M2

## 2024-05-15 DIAGNOSIS — R74.8 ELEVATED CPK: ICD-10-CM

## 2024-05-15 DIAGNOSIS — R25.2 MUSCLE CRAMPS: ICD-10-CM

## 2024-05-15 DIAGNOSIS — M54.16 LUMBAR RADICULOPATHY: ICD-10-CM

## 2024-05-15 DIAGNOSIS — H53.8 BLURRY VISION, LEFT EYE: ICD-10-CM

## 2024-05-15 DIAGNOSIS — K21.9 GASTROESOPHAGEAL REFLUX DISEASE WITHOUT ESOPHAGITIS: Primary | ICD-10-CM

## 2024-05-15 DIAGNOSIS — G60.3 IDIOPATHIC PROGRESSIVE NEUROPATHY: ICD-10-CM

## 2024-05-15 DIAGNOSIS — H53.2 DOUBLE VISION: ICD-10-CM

## 2024-05-15 LAB — CK SERPL-CCNC: 309 U/L

## 2024-05-15 PROCEDURE — 82550 ASSAY OF CK (CPK): CPT

## 2024-05-15 PROCEDURE — 99214 OFFICE O/P EST MOD 30 MIN: CPT | Performed by: INTERNAL MEDICINE

## 2024-05-15 PROCEDURE — 83655 ASSAY OF LEAD: CPT

## 2024-05-15 PROCEDURE — 36415 COLL VENOUS BLD VENIPUNCTURE: CPT

## 2024-05-15 PROCEDURE — G2211 COMPLEX E/M VISIT ADD ON: HCPCS | Performed by: INTERNAL MEDICINE

## 2024-05-15 NOTE — PROGRESS NOTES
Kerry Hsu is a 63 year old female.  Chief Complaint   Patient presents with    Heartburn     On going for 3 weeks         HPI:   Urgent visit --here with    C/c acid reflux and getting worse   C/o wondering if she is getting immune to the ppi , no change in diet , no stress etoh etc     Saw Dr. Delgadillo the orthopedic doctor and was sent for physical therapy which she is doing now for her back but needs to see the back doctor--reviewed note and noted it was suggested she see physiatry       Had  the left cataract removed  approx 3 yrs ago and since then has had blurry vision and double vision In the left eye      She still does get occasional cramping and takes magnesium for it and has numbness and tingling in bilateral feet worse on the right, noted in the past her cpk was always elevated and had this worked up but did read that lead might have something to do with it so we will have this checked    HISTORY    palpitations for the past couple weeks at least and it is now scaring her because she can feel it when she is just lying down or just sitting there not doing anything and it is not on it but only on exertion, comes and goes   Also has some retrosternal chest pains 5/10  Better ? --took ppi and it didn't help   Worse -with deep inspirations  No increase in caffeine  Chest pain and palpitations happen at the same time and not associated with any diaphoresis  She did have an EKG done in January and a calcium score in August of this year           HISTORY  Saw dr rothman ent and ct was neg SO NO  sinus SURG NEEDED     She thinks its her eyes   Feels her vision is bad even after glasses and thinks it happended after cataract surg   HAS SEEN   saw dr mckeon--  neurology   Saw cards dr genao and will go for heart scan   Saw many eye drs and tried drops but no help , eye drops now burns                     HISTORY  6/2023 visit     will see the surgeon for a cyst that she has in her vaginal area,  she had already seen the dermatologist and the GYN doctor  Hopefully will come out of the boot for her left leg  Saw ENT and may is sinus surgery            HISTORY   right wrist pain after her rotator cuff surg x 2 weeks -was first like an electric shock and now only gets that once in a while but now more sore   Going for PT   She also has some left thigh pain - from buttocks to the knees         History  3/2022  right shoulder surgery on 3/7/2022   Requested by dr shabnam Garcia - can be seen in epic   Can walk up one flight of stars without feeling short of breath   Right shoulder radiates to the neck on that side   She is right handed , cant use her hand and arm as much            HISTORY   10/2021   She has been seeing Ortho for the neuropathy and also received shots to the shoulder-right-and knees and was given Tylenol No. 3  Also sees the foot doctor  saw dr mike lucas and consider valium but she feels this will affect her ability to work   She complains of \"nerve pain\" in the feet   She states that valium knocks her out -even if she takes it in the evening she still feels the effects in the am , foot dr gave gabpentin but that too makes her sleepy \"im very sensitive   Also c/o left flank pain  X couple weeks -- ua done in office and does show blood with uti    8/10 , sharp and stabbing ,   Worse with standing after sitting a long time   Better - ?   Comes and goesbut now more frequent      Not taking trazodone - not helping and she is not sure what meds cuases what so she doesn't want to be on meds   Know she has had CTs and ultrasounds of her kidney done before and has seen urology as well- dr moreno -she did have a 3 mm nonobstructing stone in the right kidney  Still has her chronic cough and try the Tessalon Perles which did not help that much, has an inhaler so she will try that, the codien cough syrup just put her to sleep as well      Finished doxy yest taht she got from the  ,didn't help           HISTORY 3/2021   cramps is all over but mostly in the legs-stretching makes it works and the meloxicam is not helping and she did see the rheumatologist and was started on a new medication metaxalone 800 mg 3 times a day but it makes her sleepy so she is only taking it at night--it still wakes her up at night so does not feel that that is working was recommended to follow-up at an academic center     Still has a cough and the codeine cough syrup did not help much and noted that she is on and PPI and the chest x-ray was normal which was ordered last time   she has noticed that the rescue inhaler does help                       HISTORY  History from August 2020   Has seen the rheumatologist, physiatry and gone for physical therapy  Has tried Cymbalta given by physiatry but in January when I had seen her she had stated that she was not taking it and and cyclobenzaprine was started but she does not think that that was helpful  She states that she has had an EMG in the past as well a very long time ago  She states that she drinks a lot of water as well  2015 emg report noted in chart - normal   Also noted in 2015 she saw the neurologist Dr. Enamorado and was recommended to go to the pain doctors--Flexeril was tried  Needs to go back to see Dr. Hays the podiatrist for her orthotics                 History   Last seen in February and since then in March she saw Dr. Delgadillo and got knee injections for her osteoarthritis of the knees   had an endoscopic procedure and was found to have gastric polyp by Dr. Pereira  In June she saw the foot doctor and received a shot in her foot for the neuroma  In July she saw Dr. Bird the eye doctor for her glaucoma and will return in 4 months and then           History  1/11/2020 visit   Dr clay- ortho -- left hand tendonitiis -got shot and feeks better   Would like to see dr berger   Not taking cymbalta - takes T#3 one a week , ibuprofen \"seldom\"- once a mn   Muscle cramps coming  back - legs thighs and feet , no RLS               History- 11/19  C/o right shoulder has a tear  And she does admit she uses the left more   Used to see dr berger and now sees dr haque and got a shot to the right shoulder   Needs a referral to see pain clinic   Needs referral to see podiatrist --corns and calluses right foot / toe and also for ingrown toenails   Pain is 10/10 -- iburprofen does helps but doesn't like taking it too much --takes T#3 but t just puts her to sleep and then she will wake up with pain -- unable ot sratch her back   No falls trauma or injury that she is aware of      History   She has had both muscle and nerve biopsies by dr hernandez   She gets these cramps every day   Steroids do not help either  Noted that she had try cyclobenzaprine 10 mg in July 2019--no help   Since the last visit she did see her orthopedic doctor and received a short of a cortisone shot to her right shoulder  Also c/o left hand tender swelling x few weeks   She also states that she gets rashes underneath her breasts and the triamcinolone helps with this and needs a refill        hisotry -8/19 first visit   C/c htn   C/o fell on July 8th and has some left rib pain and back pains   Had some ct scans and would like to go through it  Needs referrals    Usually gets shots to her knees and shoulders- was told to follow-up with a new doctor     [facility]  gerd daily ppi --h/o nissens fundoplication  ?2006  HTN  Hip pain and back pain - T#3 - prn 2 tabs a week, also takes ibuprofen   bm hematuria   Non obstructing kid stones   elmer was on cpap   Glaucoma suspect   Right shoulder rotator cuff tear s/p surg 3/2022   Osteoarthritis knees  Hepatic and renal cysts similar to 8/19 and CT 5/20  H/o left toe OM s/p amputation      Dr peters - eye dr Dr moreno - urologist   Dr. Craft- rheum    O'Terrence- ortho   Dr lorraine hall        Current Outpatient Medications   Medication Sig Dispense Refill    traMADol 50 MG  Oral Tab Take 1 tablet (50 mg total) by mouth every 6 (six) hours as needed for Pain. No alcohol or driving on this med. Stop if lethargic or hallucinating. 25 tablet 0    ketoconazole 2 % External Cream Apply 1 Application topically daily. 30 g 0    fluticasone-salmeterol (ADVAIR DISKUS) 100-50 MCG/ACT Inhalation Aerosol Powder, Breath Activated Inhale 1 puff into the lungs 2 (two) times daily. 1 each 0    latanoprost 0.005 % Ophthalmic Solution INSTILL 1 DROP IN BOTH EYES EVERY night 3 each 3    rosuvastatin 5 MG Oral Tab Take 1 tablet (5 mg total) by mouth daily.      omeprazole 20 MG Oral Capsule Delayed Release Take 2 capsules (40 mg total) by mouth every morning. 180 capsule 3    cholecalciferol 50 MCG (2000 UT) Oral Cap Take 1 capsule (2,000 Units total) by mouth daily.      amLODIPine 10 MG Oral Tab TAKE 1 TABLET BY MOUTH EVERY DAY 90 tablet 3    MAGNESIUM OR Take by mouth.      benzonatate 100 MG Oral Cap Take 1 capsule (100 mg total) by mouth 2 (two) times daily as needed. (Patient not taking: Reported on 3/7/2024) 30 capsule 0      Past Medical History:    Abscess of left thigh    Acute meniscal tear of knee    Age-related nuclear cataract of both eyes    Anal sphincter incontinence    Arthritis    Back problem    Chondromalacia    Colon polyps    Degenerative disc disease    Diverticular disease    Esophageal reflux    Glaucoma    Hammertoe    High blood pressure    Insomnia    Neuropathy    Right Foot    Obstructive sleep apnea syndrome    Osteoarthritis    Primary open angle glaucoma of both eyes    Diagnosis of glaucoma OU; Started Latanoprost qhs after abnormal VF and OCT 2/25/16;  6/5/18 consult with Dr. Madeline Chappell-see progress note    Small bowel obstruction (HCC)    Tendinitis    Unspecified essential hypertension    Visual impairment    Reraders      Past Surgical History:   Procedure Laterality Date    Anal sphincterotomy      03/12/2013 Gely    Blepharoplasty anesthesia Bilateral  03/05/2020    Dr Kerwin Bello Ophthalmology     Cataract extraction w/  intraocular lens implant Left 05/07/2018    L PC IOL with Dr. Suresh @ [facility]    Colonoscopy N/A 11/11/2016    Procedure: COLONOSCOPY;  Surgeon: Sabrina Cazares MD;  Location: [facility] ENDOSCOPY    Colonoscopy N/A 09/06/2019    Procedure: COLONOSCOPY;  Surgeon: Edwin Sterling MD;  Location: [facility] ENDOSCOPY    Excision of chalazion, single - od - right eye Right 6.27/2017    Nasally    Hc arthrocentesis or inject major joint w/o us      Hysterectomy  1996    KAI    Other      Lap Nissen Fundoplication surgery    Other surgical history  2005,2007,2008    LAPAROSCOPIC LYSIS OF ADHESION    Other surgical history      right foot bunionectomy    Other surgical history  11/14/2014    right superficial anterior peroneal nerve biopsy and right proximal thigh muscle biopsy.    Other surgical history  06/13/2023    Excision and Biopsy of two  perineal cysts    Upper gi endoscopy performed  05/2015    Yag capsulotomy - os - left eye Left 12/19/2018    RJM      Social History:  Social History     Socioeconomic History    Marital status:    Tobacco Use    Smoking status: Never     Passive exposure: Never    Smokeless tobacco: Never   Vaping Use    Vaping status: Never Used   Substance and Sexual Activity    Alcohol use: No     Comment: None.     Drug use: No    Sexual activity: Yes     Birth control/protection: Hysterectomy   Other Topics Concern    Caffeine Concern Yes     Comment: occasional soda    Exercise Yes    Pt has a pacemaker No    Pt has a defibrillator No    Breast feeding No    Reaction to local anesthetic No    Right Handed Yes   Social History Narrative    Work - banking     Social Determinants of Health      [facility]    Social Connections    [facility]    Housing Stability        REVIEW OF SYSTEMS:    GENERAL HEALTH: No fevers, chills, sweats, fatigue  RESPIRATORY: denies shortness of breath, cough, wheezing  CARDIOVASCULAR: denies chest pain on exertion, palpitations, swelling in feet  GI: denies abdominal pain  + heartburn, nausea or vomiting, +burping   NEURO: denies headaches , anxiety, depression    EXAM:   /84 (BP Location: Right arm, Patient Position: Sitting, Cuff Size: adult)   Pulse 88   Resp 18   Ht 5' 8\" (1.727 m)   Wt 200 lb (90.7 kg)   SpO2 96%   BMI 30.41 kg/m²   GENERAL: well developed, well nourished,in no apparent distress  SKIN: no rashes,no suspicious lesions  HEENT: atraumatic, normocephalic  NECK: supple,no adenopathy  LUNGS: clear to auscultation, no wheeze  CARDIO: RRR without murmur  GI: good BS's,no masses or tenderness, no guarding or rigidity  EXTREMITIES: no cyanosis, or edema    ASSESSMENT AND PLAN:   Diagnoses and all orders for this visit:    Gastroesophageal reflux disease without esophagitis  -     H. Pylori Stool Ag, EIA [E]; Future  -     Gastro Referral - In Network  Will refer back to GI, ordered H. pylori and she can get this done if she can stop the PPI for 2 weeks  Declined changing omeprazole to pantoprazole due to possible coverage issues  Can try simethicone  Muscle cramps  -     CK (Creatine Kinase) (Not Creatinine) (E); Future  -     Lead, blood [E]; Future  Recheck labs  Blurry vision, left eye  And   Double vision  refer to ophthalmology  Lumbar radiculopathy  -     PHYSIATRY - INTERNAL  Continue with physical therapy and physiatry referral placed  Idiopathic progressive neuropathy  -     Lead, blood [E]; Future  Will check     Elevated cpk  Recheck               Preventative medicine  Colonoscopy done  9/19  Dr. hall rpt in yrs ie 2024- has apt   Mammogram-10/2023  Pap 2016 dr chin -normal ,saw Dr. Witt in February 2021 and sees mina jaffe   Labs 12/2023   10/2023 1/2024  reviewed     The patient indicates understanding of these issues and  agrees to the plan.  No follow-ups on file.

## 2024-05-16 ENCOUNTER — APPOINTMENT (OUTPATIENT)
Dept: PHYSICAL THERAPY | Age: 64
End: 2024-05-16
Attending: ORTHOPAEDIC SURGERY
Payer: MEDICARE

## 2024-05-16 LAB — LEAD BLOOD ADULT: 1.1 UG/DL

## 2024-05-17 ENCOUNTER — LAB ENCOUNTER (OUTPATIENT)
Dept: LAB | Facility: HOSPITAL | Age: 64
End: 2024-05-17
Attending: INTERNAL MEDICINE

## 2024-05-17 DIAGNOSIS — K21.9 GASTROESOPHAGEAL REFLUX DISEASE WITHOUT ESOPHAGITIS: ICD-10-CM

## 2024-05-17 PROCEDURE — 87338 HPYLORI STOOL AG IA: CPT

## 2024-05-19 DIAGNOSIS — I10 ESSENTIAL HYPERTENSION: ICD-10-CM

## 2024-05-20 ENCOUNTER — OFFICE VISIT (OUTPATIENT)
Dept: PHYSICAL THERAPY | Age: 64
End: 2024-05-20
Attending: ORTHOPAEDIC SURGERY
Payer: MEDICARE

## 2024-05-20 PROCEDURE — 97530 THERAPEUTIC ACTIVITIES: CPT

## 2024-05-20 PROCEDURE — 97110 THERAPEUTIC EXERCISES: CPT

## 2024-05-20 NOTE — PROGRESS NOTES
Diagnosis:   Lumbar radiculopathy (M54.16)         R>L      Referring Provider: Marcin Delgadillo MD Date of Evaluation:   5/10/2024     Precautions:  None    Per MD: Core strengthening, hamstring stretching  Next MD visit:   none scheduled  Date of Surgery: n/a   Insurance Primary/Secondary: [employer] HMO / MEDICARE     # Auth Visits: (#3/5; 5/9/24-8/9/24)            Subjective: Pt reports that she had a bad night last night with pain going down the right LE.  Pain last night was a 20.  Doing okay with her HEP but did not do it yesterday because she was having more leg pain all day yesterday.      Pain: 7/10 current      Objective: See table below.      Assessment: Tried manual traction to right LE today and pt reported feeling fine with this.  She did have reports of decrease of right LE symptoms while doing supine activities.  Discussed doing HEP even on days she has more pain as long as symptoms do not worsen.         Goals:   (to be met in 8-12 visits)   Pt will improve transversus abdominis recruitment to perform proper isometric contraction without requiring verbal or tactile cuing to promote advancement of therex   Pt will demonstrate good understanding of proper posture and body mechanics to decrease pain and improve spinal safety   Pt will improve lumbar spine AROM flexion to >90 deg to allow increase ease with bending forward to don shoes   Pt will report improved symptom centralization and absence of radicular symptoms for 3 consecutive days to improve function with ADL   Pt will have decreased paraspinal mm tension to tolerate standing >30 minutes for work and home activities   Pt will demonstrate improved core strength to be able to perform walking with <3/10 pain   Pt will be independent and compliant with comprehensive HEP to maintain progress achieved in PT     Plan: Progress skilled PT as tolerated to include core strengthening and HS stretching.  Date: 5/14/2024  TX#: 2/8-12 Date:  5/20/24                TX#: 3/8-12  Date:                 TX#: 4/ Date:                 TX#: 5/ Date:   Tx#: 6/   Therex: x10'  NuStep: x5'  SKTC Stretch: 3x30\" R/L  HS Stretch: 3x30\" R/L manually  Piriformis Stretch: 3x30\" R/L Therex: x10'  NuStep: x5', L4, UE and LE  SKTC Stretch: 3x30\" R/L  HS Stretch: 3x30\" R/L manually  Piriformis Stretch: 3x30\" R/L manually      Theract: x30'  Sciatic Nerve Flossing: 1x15 R/L   Pelvic Tilt: 3x10  Hip Adduction Squeeze: 2x10 with 3\" hold with pelvic tilt  Supine Marching: 2x10 with pelvic tilt  Hip Abduction: 2x10 with pelvic ring with 2\" hold and pelvic tilt  Lumbar Rotation: 2x10 with pelvic tilt Theract: x30'  Sciatic Nerve Flossing: 1x15 R/L   Pelvic Tilt: 3x10  Hip Adduction Squeeze: 2x10 with 3\" hold with pelvic tilt  Supine Marching: 2x10 with pelvic tilt  Hip Abduction: 2x10 with pelvic ring with 2\" hold and pelvic tilt  Lumbar Rotation: 2x10 with pelvic tilt      NMR:       Manual Therapy: Manual Therapy: x 3'  Right long leg traction with mobilization belt sustained 1 min x3      HEP:   Exercises  - Supine Sciatic Nerve Glide  - 1 x daily - 7 x weekly - 2 sets - 10 reps  - Supine Piriformis Stretch with Foot on Ground  - 1 x daily - 7 x weekly - 2 sets - 30 second hold  - Supine Single Knee to Chest Stretch  - 1 x daily - 7 x weekly - 3 sets - 30 second hold  - Supine Posterior Pelvic Tilt  - 1 x daily - 7 x weekly - 3 sets - 10 reps    Charges: Therex: 10 minutes  Theract:  30 minutes  NMR: 0 minutes  Manual Therapy: 3 minutes  Total Timed Treatment: 43 min  Total Treatment Time: 43 min

## 2024-05-21 LAB — H PYLORI AG STL QL IA: NEGATIVE

## 2024-05-21 RX ORDER — AMLODIPINE BESYLATE 10 MG/1
TABLET ORAL
Qty: 90 TABLET | Refills: 3 | Status: SHIPPED | OUTPATIENT
Start: 2024-05-21

## 2024-05-21 NOTE — TELEPHONE ENCOUNTER
REFILL PASSED PER [facility] PROTOCOLS    Requested Prescriptions   Pending Prescriptions Disp Refills    AMLODIPINE 10 MG Oral Tab [Pharmacy Med Name: AMLODIPINE BESYLATE 10MG TABLETS] 90 tablet 3     Sig: TAKE 1 TABLET BY MOUTH EVERY DAY       Hypertension Medications Protocol Passed - 5/19/2024  8:50 AM        Passed - CMP or BMP in past 12 months        Passed - Last BP reading less than 140/90     BP Readings from Last 1 Encounters:   05/15/24 134/84               Passed - In person appointment or virtual visit in the past 12 mos or appointment in next 3 mos     Recent Outpatient Visits              Yesterday     [facility] [place] Maliha Quinn, PT    Office Visit    6 days ago Gastroesophageal reflux disease without esophagitis    [facility] Bella Rogers MD    Office Visit    1 week ago Lumbar radiculopathy    [facility] [place] KarpuzSherita whyte, PT    Office Visit    1 week ago Lumbar radiculopathy    [facility] [place] KarpuzSherita whyte, PT    Office Visit    2 weeks ago Lumbar radiculopathy    [facility] Marcin Delgadillo MD    Office Visit          Future Appointments         Provider Department Appt Notes    Tomorrow Willie Baptiste Y, DO [facility] rfd Dr. Rogers/ lumbar radiculopathy/ Medicare: pt states medicare is primary & bcbs supp    In 2 days Sherita Johnson, PT [facility] [place] 5 visits 5/9 to 8/9  BCBS HMO/Medicare/Supp  no c/p, 60 visits    In 1 week Sherita Johnson, PT [facility] [place] auth?  BCBS HMO/Medicare/Supp  no c/p, 60 visits    In 2 weeks Sherita Johnson, PT [facility] [place]     In 2 weeks Sherita Johnson, PT [facility] [place]     In 2 weeks Bella Rogers MD [facility] Annual physical    In 1 month  Smith Gorman MD [facility] EP/4 months IOP check    In 1 month Kylah Rowland APRN [facility] Acid Reflux  (Policy Informed)    In 3 months ADENIKE, PROCEDURE [facility] Colon w/ mac @ EM    In 4 months [address]                     Passed - EGFRCR or GFRAA > 50     GFR Evaluation  EGFRCR: 59 , resulted on 1/15/2024               Future Appointments         Provider Department Appt Notes    Tomorrow Willie Baptiste, DO [facility] rfd Dr. Rogers/ lumbar radiculopathy/ Medicare: pt states medicare is primary & bcbs supp    In 2 days Sherita Johnson, PT [facility] [place] 5 visits 5/9 to 8/9  BCBS HMO/Medicare/Supp  no c/p, 60 visits    In 1 week Sherita Johnson, PT [facility] [place] auth?  BCBS HMO/Medicare/Supp  no c/p, 60 visits    In 2 weeks Sherita Johnson, PT [facility] [place]     In 2 weeks Sherita Johnson, PT [facility] [place]     In 2 weeks Bella Rogers MD [facility] Annual physical    In 1 month Smith Gorman MD [facility] EP/4 months IOP check    In 1 month Kylah Rowland APRN [facility] Acid Reflux  (Policy Informed)    In 3 months ADENIKE, PROCEDURE [facility] Colon w/ mac @ EM    In 4 months [address]           Recent Outpatient Visits              Yesterday     [facility] [place] Quinn, Maliha, PT    Office Visit    6 days ago Gastroesophageal reflux disease without esophagitis    [facility] Bella Rogers MD    Office Visit     1 week ago Lumbar radiculopathy    [facility] [place]    Office Visit    1 week ago Lumbar radiculopathy    [facility] [place]    Office Visit    2 weeks ago Lumbar radiculopathy    [facility] [place] Marcin Delgadillo MD    Office Visit

## 2024-05-22 ENCOUNTER — HOSPITAL ENCOUNTER (OUTPATIENT)
Dept: GENERAL RADIOLOGY | Facility: HOSPITAL | Age: 64
Discharge: HOME | End: 2024-05-22
Attending: PHYSICAL MEDICINE & REHABILITATION

## 2024-05-22 ENCOUNTER — OFFICE VISIT (OUTPATIENT)
Dept: PHYSICAL MEDICINE AND REHAB | Facility: CLINIC | Age: 64
End: 2024-05-22

## 2024-05-22 VITALS — WEIGHT: 200 LBS | HEIGHT: 68 IN | BODY MASS INDEX: 30.31 KG/M2

## 2024-05-22 DIAGNOSIS — M54.16 RIGHT LUMBAR RADICULOPATHY: ICD-10-CM

## 2024-05-22 DIAGNOSIS — M54.16 RIGHT LUMBAR RADICULOPATHY: Primary | ICD-10-CM

## 2024-05-22 PROCEDURE — 99214 OFFICE O/P EST MOD 30 MIN: CPT | Performed by: PHYSICAL MEDICINE & REHABILITATION

## 2024-05-22 PROCEDURE — 72114 X-RAY EXAM L-S SPINE BENDING: CPT | Performed by: PHYSICAL MEDICINE & REHABILITATION

## 2024-05-22 RX ORDER — GABAPENTIN 300 MG/1
CAPSULE ORAL
Qty: 90 CAPSULE | Refills: 0 | Status: SHIPPED | OUTPATIENT
Start: 2024-05-22

## 2024-05-22 RX ORDER — METHYLPREDNISOLONE 4 MG/1
TABLET ORAL
Qty: 1 EACH | Refills: 0 | Status: SHIPPED | OUTPATIENT
Start: 2024-05-22

## 2024-05-22 NOTE — PROGRESS NOTES
[facility]  NEW PATIENT EVALUATION    Consultation as a request of Dr. Rogers      HISTORY OF PRESENT ILLNESS:     Chief Complaint   Patient presents with    New Patient     New right hand dominant presents pain for low back pain. Pain radiates down the lateral side of her right leg down to the foot. Pain 10/10. Denies weakness. Admits she has N/T in her right foot. Patient completed PT with no relief. Takes Tramadol once a day for pain. XR 5/2/24.        The patient is a 63 year old female with significant past medical history of osteoarthritis, degenerative disc disease, GERD, glaucoma, hypertension, insomnia, small bowel obstruction who presents with low back pain with radiation in the right lower extremity.  Ongoing for the last several weeks more so.  Pain is rated 10 out of 10.  Patient denies any injury or trauma.  Pain is constant.  Pain radiates along the posterolateral aspect of the leg into the dorsum of the foot with numbness tingling sensation.  Pain is aggravated with standing and walking.  She is taking tramadol once a day for the pain.  She has had x-ray imaging and has had physical therapy as well with no relief.  Noticed some weakness with knee giving away sensation.  She denies any fevers chills or weight loss.  She denies any loss of bowel bladder control, any saddle anesthesia.    PHYSICAL EXAM:   Ht 68\"   Wt 200 lb (90.7 kg)   BMI 30.41 kg/m²     Gait  Able to toe walk and heel walk without any difficulty    LUMBAR SPINE:  Inspection: no erythema, swelling, or obvious deformity.  Their iliac crest and shoulder heights are symmetrical.     Palpation: Non tender to palpation of the spinous process.   ROM: Restricted in forward flexion with reproduction of pain  Strength: 5/5 in bilateral lower extremities except 4 out of 5 right EHL  Sensation: Intact to light touch in all dermatomes of the lower extremities except decreased to light touch in  the right L5 dermatome  Reflexes: 2/4 at L4 and S1  Facet Loading: no specific facet pain  Straight leg raise: negative for radicular pain symptoms  Slump test: Positive for pain symptoms for radicular pain symptoms    IMAGING:     None    All imaging results were reviewed and discussed with patient.      ASSESSMENT/PLAN:     1. Right lumbar radiculopathy        Kerry Hsu is a pleasant 63-year-old female presenting today for relation of right lumbar radiculopathy.  She has severe pain despite PT and oral meds including steroids.  She has weakness in the L5 myotome with decreased sensation in the L5 dermatome.  Recommend x-ray and MRI imaging of the lumbar spine for further evaluation.  I recommend she start gabapentin 300 mg at nighttime and slowly increase to 3 times daily and also repeat Medrol Dosepak.  She will continue PT with home exercises and follow-up with me after MRI imaging is completed so we can discuss possible epidural steroid injection versus neurosurgical intervention.      The patient verbalized understanding with the plan and was in agreement. All questions/concerns were addressed and there were no barriers to learning.  Please note Dragon dictation software was used to dictate this note and may result in inadvertent typos.    Willie Baptiste DO, FAAPMR & CAQSM  Physical Medicine and Rehabilitation  Sports and Spine Medicine    PAST MEDICAL HISTORY:     Past Medical History:    Abscess of left thigh    Acute meniscal tear of knee    Age-related nuclear cataract of both eyes    Anal sphincter incontinence    Arthritis    Back problem    Chondromalacia    Colon polyps    Degenerative disc disease    Diverticular disease    Esophageal reflux    Glaucoma    Hammertoe    High blood pressure    Insomnia    Neuropathy    Right Foot    Obstructive sleep apnea syndrome    Osteoarthritis    Primary open angle glaucoma of both eyes    Diagnosis of glaucoma OU; Started Latanoprost qhs after abnormal  VF and OCT 2/25/16;  6/5/18 consult with Dr. Madeline Chappell-see progress note    Small bowel obstruction (HCC)    Tendinitis    Unspecified essential hypertension    Visual impairment    Reraders         PAST SURGICAL HISTORY:     Past Surgical History:   Procedure Laterality Date    Anal sphincterotomy      03/12/2013 [place]    Blepharoplasty anesthesia Bilateral 03/05/2020    Dr Kerwin Bello [facility]     Cataract extraction w/  intraocular lens implant Left 05/07/2018    L PC IOL with Dr. Suresh @ [facility]    Colonoscopy N/A 11/11/2016    Procedure: COLONOSCOPY;  Surgeon: Sabrina Cazares MD;  Location: [facility] ENDOSCOPY    Colonoscopy N/A 09/06/2019    Procedure: COLONOSCOPY;  Surgeon: Edwin Sterling MD;  Location: [facility] ENDOSCOPY    Excision of chalazion, single - od - right eye Right 6.27/2017    [place]    Hc arthrocentesis or inject major joint w/o us      Hysterectomy  1996    [facility]AI    Other      Lap Nissen Fundoplication surgery    Other surgical history  2005,2007,2008    LAPAROSCOPIC LYSIS OF ADHESION    Other surgical history      right foot bunionectomy    Other surgical history  11/14/2014    right superficial anterior peroneal nerve biopsy and right proximal thigh muscle biopsy.    Other surgical history  06/13/2023    Excision and Biopsy of two  perineal cysts    Upper gi endoscopy performed  05/2015    Yag capsulotomy - os - left eye Left 12/19/2018    [facility]         CURRENT MEDICATIONS:     Current Outpatient Medications   Medication Sig Dispense Refill    gabapentin 300 MG Oral Cap Start with night time dose only. If well tolerated, increase to two times daily. If well tolerated, increase to three times daily. 90 capsule 0    methylPREDNISolone 4 MG Oral Tablet Therapy Pack As directed 1 each 0    amLODIPine 10 MG Oral Tab TAKE 1 TABLET BY MOUTH EVERY DAY 90 tablet 3    traMADol 50 MG Oral Tab Take 1 tablet (50 mg total) by mouth every 6 (six) hours as needed for Pain. No alcohol or driving on this  med. Stop if lethargic or hallucinating. 25 tablet 0    ketoconazole 2 % External Cream Apply 1 Application topically daily. 30 g 0    fluticasone-salmeterol (ADVAIR DISKUS) 100-50 MCG/ACT Inhalation Aerosol Powder, Breath Activated Inhale 1 puff into the lungs 2 (two) times daily. 1 each 0    benzonatate 100 MG Oral Cap Take 1 capsule (100 mg total) by mouth 2 (two) times daily as needed. (Patient not taking: Reported on 3/7/2024) 30 capsule 0    latanoprost 0.005 % Ophthalmic Solution INSTILL 1 DROP IN BOTH EYES EVERY night 3 each 3    rosuvastatin 5 MG Oral Tab Take 1 tablet (5 mg total) by mouth daily.      omeprazole 20 MG Oral Capsule Delayed Release Take 2 capsules (40 mg total) by mouth every morning. 180 capsule 3    cholecalciferol 50 MCG (2000 UT) Oral Cap Take 1 capsule (2,000 Units total) by mouth daily.      MAGNESIUM OR Take by mouth.           ALLERGIES:     Allergies   Allergen Reactions    Celecoxib TONGUE SWELLING     Other reaction(s): CELECOXIB    Sulfa Antibiotics TONGUE SWELLING     Other reaction(s): SULFA (SULFONAMIDE ANTIBIOTICS)    Erythromycin PAIN    Amoxicillin DIARRHEA         FAMILY HISTORY:     Family History   Problem Relation Age of Onset    Hypertension Father     Hypertension Mother     Hypertension Sister     Cancer Sister         liver cancer    Hypertension Brother     Diabetes Neg     Glaucoma Neg     Macular degeneration Neg     Breast Cancer Neg     Ovarian Cancer Neg           SOCIAL HISTORY:     Social History     Socioeconomic History    Marital status:    Tobacco Use    Smoking status: Never     Passive exposure: Never    Smokeless tobacco: Never   Vaping Use    Vaping status: Never Used   Substance and Sexual Activity    Alcohol use: No     Comment: None.     Drug use: No    Sexual activity: Yes     Birth control/protection: Hysterectomy   Other Topics Concern    Caffeine Concern Yes     Comment: occasional soda    Exercise Yes    Pt has a pacemaker No    Pt has  a defibrillator No    Breast feeding No    Reaction to local anesthetic No    Right Handed Yes   Social History Narrative    Work - banking     Social Determinants of Health      [facility] from [facility], [facility]    Social Connections    Received from [facility], [facility]    Housing Stability          REVIEW OF SYSTEMS:   No patient-reported data collected this visit.      PHYSICAL EXAM:   General: No immediate distress  Head: Normocephalic/ Atraumatic  Eyes: Extra-occular movements intact.   Ears: No auricular hematoma or deformities  Mouth: No lesions or ulcerations  Heart: peripheral pulses intact. Normal capillary refill.   Lungs: Non-labored respirations  Abdomen: No abdominal guarding  Extremities: No lower extremity edema bilaterally   Skin: No lesions noted   Cognition: alert & oriented x 3, attentive, able to follow 2 step commands, comprehention intact, spontaneous speech intact  Psychiatric: Mood and affect appropriate      LABS:     Lab Results   Component Value Date     08/26/2021    A1C 6.0 (H) 08/26/2021     Lab Results   Component Value Date    WBC 10.3 01/15/2024    RBC 5.70 (H) 01/15/2024    HGB 15.4 01/15/2024    HCT 45.7 01/15/2024    MCV 80.2 01/15/2024    MCH 27.0 01/15/2024    MCHC 33.7 01/15/2024    RDW 14.8 01/15/2024    .0 01/15/2024    MPV 7.7 09/07/2017     Lab Results   Component Value Date    GLU 94 01/15/2024    BUN 11 01/15/2024    BUNCREA 10.5 01/15/2024    CREATSERUM 1.05 (H) 01/15/2024    ANIONGAP 3 01/15/2024    GFR 46 (L) 04/02/2016    GFRNAA 66 05/21/2022    GFRAA 76 05/21/2022    CA 9.5 01/15/2024    OSMOCALC 287 01/15/2024    ALKPHO 118 01/15/2024    AST 20 01/15/2024    ALT 26 01/15/2024    ALKPHOS 88 08/27/2016    BILT 0.8 01/15/2024    TP 7.4 01/15/2024    ALB 4.5 01/15/2024    GLOBULIN 2.9 01/15/2024    AGRATIO 1.2 08/27/2016     01/15/2024    K 3.7 01/15/2024      01/15/2024    CO2 30.0 01/15/2024     Lab Results   Component Value Date    PTP 13.2 12/27/2023    INR 0.95 12/27/2023     Lab Results   Component Value Date    VITD 26.0 (L) 06/08/2023    VIUX85EH 36.0 01/22/2015

## 2024-05-22 NOTE — PATIENT INSTRUCTIONS
-Start medrol dose pack   -Gabapentin 300mg three times daily  -MRI of the lumbar spine and follow up after

## 2024-05-23 ENCOUNTER — OFFICE VISIT (OUTPATIENT)
Dept: PHYSICAL THERAPY | Age: 64
End: 2024-05-23
Attending: ORTHOPAEDIC SURGERY
Payer: MEDICARE

## 2024-05-23 DIAGNOSIS — M54.16 LUMBAR RADICULOPATHY: Primary | ICD-10-CM

## 2024-05-23 PROCEDURE — 97110 THERAPEUTIC EXERCISES: CPT | Performed by: PHYSICAL THERAPIST

## 2024-05-23 PROCEDURE — 97530 THERAPEUTIC ACTIVITIES: CPT | Performed by: PHYSICAL THERAPIST

## 2024-05-23 NOTE — PROGRESS NOTES
Diagnosis:   Lumbar radiculopathy (M54.16)         R>L      Referring Provider: Marcin Delgadillo MD Date of Evaluation:   5/10/2024     Precautions:  None    Per MD: Core strengthening, hamstring stretching  Next MD visit:   none scheduled  Date of Surgery: n/a   Insurance Primary/Secondary: [employer] HMO / MEDICARE     # Auth Visits: (#4/5; 5/9/24-8/9/24)            Subjective: Pt reports \"the pain down the right leg is bad today, the pain is doing down into the foot and toes - sleeping is very difficult - I saw Dr. Willie Baptiste who gave me steroids, Gabapentin and wants me to get an MRI done with a F/U and possible steroid injection.\"    Pain: 7/10 current      Objective: See table below.      Assessment: Pt responded well to therapeutic interventions with no increase in pain. Continued with core strengthening exercises as well as sciatic nerve flossing to help with radiculopathy.         Goals:   (to be met in 8-12 visits)   Pt will improve transversus abdominis recruitment to perform proper isometric contraction without requiring verbal or tactile cuing to promote advancement of therex   Pt will demonstrate good understanding of proper posture and body mechanics to decrease pain and improve spinal safety   Pt will improve lumbar spine AROM flexion to >90 deg to allow increase ease with bending forward to don shoes   Pt will report improved symptom centralization and absence of radicular symptoms for 3 consecutive days to improve function with ADL   Pt will have decreased paraspinal mm tension to tolerate standing >30 minutes for work and home activities   Pt will demonstrate improved core strength to be able to perform walking with <3/10 pain   Pt will be independent and compliant with comprehensive HEP to maintain progress achieved in PT     Plan: Progress skilled PT as tolerated to include core strengthening and HS stretching.  Date: 5/14/2024  TX#: 2/8-12 Date:  5/20/2024               TX#: 3/8-12  Date:  5/23/2024                TX#: 4/8-12 Date:                 TX#: 5/ Date:   Tx#: 6/   Therex: x10'  NuStep: x5'  SKTC Stretch: 3x30\" R/L  HS Stretch: 3x30\" R/L manually  Piriformis Stretch: 3x30\" R/L Therex: x10'  NuStep: x5', L4, UE and LE  SKTC Stretch: 3x30\" R/L  HS Stretch: 3x30\" R/L manually  Piriformis Stretch: 3x30\" R/L manually Therex: x10'  NuStep: x5', L4, UE and LE  SKTC Stretch: 3x30\" R/L  HS Stretch: 3x30\" R/L manually  Piriformis Stretch: 3x30\" R/L manually     Theract: x30'  Sciatic Nerve Flossing: 1x15 R/L   Pelvic Tilt: 3x10  Hip Adduction Squeeze: 2x10 with 3\" hold with pelvic tilt  Supine Marching: 2x10 with pelvic tilt  Hip Abduction: 2x10 with pelvic ring with 2\" hold and pelvic tilt  Lumbar Rotation: 2x10 with pelvic tilt Theract: x30'  Sciatic Nerve Flossing: 1x15 R/L   Pelvic Tilt: 3x10  Hip Adduction Squeeze: 2x10 with 3\" hold with pelvic tilt  Supine Marching: 2x10 with pelvic tilt  Hip Abduction: 2x10 with pelvic ring with 2\" hold and pelvic tilt  Lumbar Rotation: 2x10 with pelvic tilt Theract: x30'  Sciatic Nerve Flossing: 1x20 R/L   Pelvic Tilt: 3x10  Hip Adduction Squeeze: 2x10 with 3\" hold with pelvic tilt  Supine Marching: 2x10 with pelvic tilt  Hip Abduction: 2x10 with pelvic ring with 2\" hold and pelvic tilt  Lumbar Rotation: 2x10 with pelvic tilt     NMR: NMR:  NMR:     Manual Therapy: Manual Therapy: Manual Therapy:     HEP:   Exercises  - Supine Sciatic Nerve Glide  - 1 x daily - 7 x weekly - 2 sets - 10 reps  - Supine Piriformis Stretch with Foot on Ground  - 1 x daily - 7 x weekly - 2 sets - 30 second hold  - Supine Single Knee to Chest Stretch  - 1 x daily - 7 x weekly - 3 sets - 30 second hold  - Supine Posterior Pelvic Tilt  - 1 x daily - 7 x weekly - 3 sets - 10 reps    Charges: Therex: 10 minutes  Theract:  30 minutes  NMR: 0 minutes  Manual Therapy: 0 minutes  Total Timed Treatment: 40 min  Total Treatment Time: 40 min

## 2024-05-30 ENCOUNTER — TELEPHONE (OUTPATIENT)
Dept: PHYSICAL MEDICINE AND REHAB | Facility: CLINIC | Age: 64
End: 2024-05-30

## 2024-05-30 ENCOUNTER — OFFICE VISIT (OUTPATIENT)
Dept: PHYSICAL MEDICINE AND REHAB | Facility: CLINIC | Age: 64
End: 2024-05-30

## 2024-05-30 ENCOUNTER — OFFICE VISIT (OUTPATIENT)
Dept: PHYSICAL THERAPY | Age: 64
End: 2024-05-30
Attending: ORTHOPAEDIC SURGERY
Payer: MEDICARE

## 2024-05-30 VITALS — WEIGHT: 200 LBS | HEIGHT: 68 IN | BODY MASS INDEX: 30.31 KG/M2 | RESPIRATION RATE: 18 BRPM

## 2024-05-30 DIAGNOSIS — M54.16 RIGHT LUMBAR RADICULOPATHY: Primary | ICD-10-CM

## 2024-05-30 DIAGNOSIS — M54.16 LUMBAR RADICULOPATHY: Primary | ICD-10-CM

## 2024-05-30 PROCEDURE — 99214 OFFICE O/P EST MOD 30 MIN: CPT | Performed by: PHYSICAL MEDICINE & REHABILITATION

## 2024-05-30 PROCEDURE — 97110 THERAPEUTIC EXERCISES: CPT | Performed by: PHYSICAL THERAPIST

## 2024-05-30 PROCEDURE — 97530 THERAPEUTIC ACTIVITIES: CPT | Performed by: PHYSICAL THERAPIST

## 2024-05-30 NOTE — TELEPHONE ENCOUNTER
LMTCB 1st attempt- surgical case pended.    Per Dr. Baptiste add on for 6/3/24- patient in severe pain.

## 2024-05-30 NOTE — TELEPHONE ENCOUNTER
[place], per CMS LCD limitations-Use of Moderate or Deep Sedation, General Anesthesia, or Monitored Anesthesia Care (MAC) is usually unnecessary or rarely indicated for these procedures and therefore, is not considered medically reasonable and necessary. Even in patients with a needle phobia and anxiety, typically oral anxiolytics suffice. In exceptional and unique cases, documentation must clearly establish the need for such sedation in the specific patient.    Per CMS Guidelines -no authorization is required for Right L4 and L5 Transforaminal Epidural Steroid Injection under fluoroscopy guidance CPT 06006, 88891     Status: Authorization is not required based on medical necessity however is not a guarantee of payment and may be subject to review once claim is submitted-Covered Benefit

## 2024-05-30 NOTE — PATIENT INSTRUCTIONS
-My office will call once injection is approved  -Continue Gabapentin 300mg at nighttime  -Hold PT until after injection

## 2024-05-30 NOTE — PROGRESS NOTES
Diagnosis:   Lumbar radiculopathy (M54.16)         R>L      Referring Provider: Marcin Delgadillo MD Date of Evaluation:   5/10/2024     Precautions:  None    Per MD: Core strengthening, hamstring stretching  Next MD visit:   none scheduled  Date of Surgery: n/a   Insurance Primary/Secondary: [employer] / MEDICARE     # Auth Visits: (#5/5; 5/9/24-8/9/24)            Subjective: Pt reports \"some days the pain down the leg is okay, this whole week it has been really bad - the hip is also very painful - I got an MRI yesterday, the gabapentin has been helping me sleep, I am seeing Dr. Willie Baptiste today for physiatry.\"    Pain: 7-8/10 current     Progress Summary/Assessment  Pt has attended 5 visits in Physical Therapy. Pt has made progress with lumbar range of motion; however, continues to experience pain with flexion and extension. Pt's pain limits her from functional activities such as walking for any period of time and exhibits an antalgic gait. Recommended that pt complete bout of physical therapy and sees physiatry for pain management. Continued with core strengthening and stretching with NWB exercises.     OBJECTIVE:   Observation/Posture: FHP with rounded shoulders, antalgic gait  Neuro Screen: WNL     Lumbar AROM: (* denotes performed with pain)  Flexion: 50*/80  Extension: 25*/25  Sidebending: R 30/35; L 30/35  Rotation: R 30/45; L 30/45     Accessory motion: Moderate hypomobility of the L3-L4, L4-L5, L5-S1 segments in the P/A direction  Palpation: TTP: R>L  Thoracolumbar Fascia  Lumbar Paraspinals  Piriformis/Glut Max/Glut Med  Quadratus Lumborum  Hamstrings     Strength: (* denotes performed with pain)  LE   Hip flexion (L2): R 3+*/5; L 3+*/5  Hip abduction: R 4-/5; L 4-/5  Hip Extension: R 3+/5; L 3+/5            Hip ER: R 4/5; L 4/5  Hip IR: R 4*/5; L 4/5  Knee Flexion: R 5/5; L 5/5            Knee extension (L3): R 5/5; L 5/5            DF (L4): R 4/5; L 4/5  Great Toe Ext (L5): R 4+/5, L 4+/5  PF (S1): R  4+/5; L 4+/5      Flexibility: LE/Lumbar       R L   Hamstrings mod restricted moderately restricted   Piriformis mod restricted moderately restricted   Hip Flexor min restricted min restricted   TFL min restricted min restricted   Quads moderately restricted moderately restricted   Gastrocs min restricted min restricted         Special tests:   Slump Test: (+)  Thigh Thrust Test: (-)     Gait: pt ambulates on level ground with antalgia and trendelenburg/waddle.  Balance: SLS R 7 sec, L 4 sec    See table below for flowsheet.    Goals:   (to be met in 8-12 visits)   Pt will improve transversus abdominis recruitment to perform proper isometric contraction without requiring verbal or tactile cuing to promote advancement of therex   Pt will demonstrate good understanding of proper posture and body mechanics to decrease pain and improve spinal safety   Pt will improve lumbar spine AROM flexion to >90 deg to allow increase ease with bending forward to don shoes   Pt will report improved symptom centralization and absence of radicular symptoms for 3 consecutive days to improve function with ADL   Pt will have decreased paraspinal mm tension to tolerate standing >30 minutes for work and home activities   Pt will demonstrate improved core strength to be able to perform walking with <3/10 pain   Pt will be independent and compliant with comprehensive HEP to maintain progress achieved in PT     Plan: Progress skilled PT as tolerated to include core strengthening and HS stretching.  Date: 5/14/2024  TX#: 2/8-12 Date:  5/20/2024               TX#: 3/8-12  Date: 5/23/2024                TX#: 4/8-12 Date: 5/30/2024                TX#: 5/8-12 Date:   Tx#: 6/   Therex: x10'  NuStep: x5'  SKTC Stretch: 3x30\" R/L  HS Stretch: 3x30\" R/L manually  Piriformis Stretch: 3x30\" R/L Therex: x10'  NuStep: x5', L4, UE and LE  SKTC Stretch: 3x30\" R/L  HS Stretch: 3x30\" R/L manually  Piriformis Stretch: 3x30\" R/L manually Therex: x10'  NuStep:  x5', L4, UE and LE  SKTC Stretch: 3x30\" R/L  HS Stretch: 3x30\" R/L manually  Piriformis Stretch: 3x30\" R/L manually Therex: x10'  NuStep: x5', L4, UE and LE  SKTC Stretch: 3x30\" R/L  HS Stretch: 3x30\" R/L manually  Piriformis Stretch: 3x30\" R/L manually    Theract: x30'  Sciatic Nerve Flossing: 1x15 R/L   Pelvic Tilt: 3x10  Hip Adduction Squeeze: 2x10 with 3\" hold with pelvic tilt  Supine Marching: 2x10 with pelvic tilt  Hip Abduction: 2x10 with pelvic ring with 2\" hold and pelvic tilt  Lumbar Rotation: 2x10 with pelvic tilt Theract: x30'  Sciatic Nerve Flossing: 1x15 R/L   Pelvic Tilt: 3x10  Hip Adduction Squeeze: 2x10 with 3\" hold with pelvic tilt  Supine Marching: 2x10 with pelvic tilt  Hip Abduction: 2x10 with pelvic ring with 2\" hold and pelvic tilt  Lumbar Rotation: 2x10 with pelvic tilt Theract: x30'  Sciatic Nerve Flossing: 1x20 R/L   Pelvic Tilt: 3x10  Hip Adduction Squeeze: 2x10 with 3\" hold with pelvic tilt  Supine Marching: 2x10 with pelvic tilt  Hip Abduction: 2x10 with pelvic ring with 2\" hold and pelvic tilt  Lumbar Rotation: 2x10 with pelvic tilt Theract: x30'  Sciatic Nerve Flossing: 1x20 R/L   Pelvic Tilt: 3x10  Hip Adduction Squeeze: 2x10 with 3\" hold with pelvic tilt  Supine Marching: 2x10 with pelvic tilt  Hip Abduction: 2x10 with pelvic ring with 2\" hold and pelvic tilt  Lumbar Rotation: 2x10 with pelvic tilt    NMR: NMR:  NMR: NMR:    Manual Therapy: Manual Therapy: Manual Therapy: Manual Therapy:    HEP:   Exercises  - Supine Sciatic Nerve Glide  - 1 x daily - 7 x weekly - 2 sets - 10 reps  - Supine Piriformis Stretch with Foot on Ground  - 1 x daily - 7 x weekly - 2 sets - 30 second hold  - Supine Single Knee to Chest Stretch  - 1 x daily - 7 x weekly - 3 sets - 30 second hold  - Supine Posterior Pelvic Tilt  - 1 x daily - 7 x weekly - 3 sets - 10 reps    Charges: Therex: 10 minutes  Theract:  30 minutes  NMR: 0 minutes  Manual Therapy: 0 minutes  Total Timed Treatment: 40 min  Total  Treatment Time: 40 min    Plan: Continue skilled Physical Therapy 2 x/week or a total of 8 visits over a 90 day period. Treatment will include: Manual Therapy, Neuromuscular Re-education, Self-Care Home Management, Therapeutic Activities, Therapeutic Exercise, and Home Exercise Program instruction        Patient/Family/Caregiver was advised of these findings, precautions, and treatment options and has agreed to actively participate in planning and for this course of care.    Thank you for your referral. If you have any questions, please contact me at Dept: [phone].    Sincerely,  Electronically signed by therapist: Sherita Johnson PT     Physician's certification required:  No  Please co-sign or sign and return this letter via fax as soon as possible to [phone].   I certify the need for these services furnished under this plan of treatment and while under my care.    X___________________________________________________ Date____________________    Certification From: 5/29/2024  To:8/27/2024

## 2024-05-30 NOTE — PROGRESS NOTES
[facility]  OFFICE FOLLOW UP EVALUATION      HISTORY OF PRESENT ILLNESS:     Chief Complaint   Patient presents with    Follow - Up     Returning patient. LOV 5/22/24. Here to review MRI reuslts. Denies change in symptoms. CPL 8/10.       Patient is following up for right sided low back pain and leg pain.  She had MRI imaging completed outside institution at [facility] which she has brought with her today.  She continues to have pain with prolonged sitting and it is rated 8 out of 10.  This is affecting her ablative function and stay active.  She has been doing PT and home exercises.  She is taking gabapentin at nighttime but cannot increase it to 3 times daily due to side effects of drowsiness.  Pain radiates along the posterolateral aspect of the right leg    PHYSICAL EXAM:   Resp 18   Ht 68\"   Wt 200 lb (90.7 kg)   BMI 30.41 kg/m²     Gait  Able to toe walk and heel walk without any difficulty     LUMBAR SPINE:  Inspection: no erythema, swelling, or obvious deformity.  Their iliac crest and shoulder heights are symmetrical.     Palpation: Non tender to palpation of the spinous process.   ROM: Restricted in forward flexion with reproduction of pain  Strength: 5/5 in bilateral lower extremities except 4 out of 5 right EHL  Sensation: Intact to light touch in all dermatomes of the lower extremities except decreased to light touch in the right L5 dermatome  Reflexes: 2/4 at L4 and S1  Facet Loading: no specific facet pain  Straight leg raise: negative for radicular pain symptoms  Slump test: Positive for pain symptoms for radicular pain symptoms    IMAGING:     MRI lumbar spine completed outside institution on 5/29/2024 was personally reviewed which is notable for grade 1 anterolisthesis of L4 on L5.  There is minimal disc bulging at L3-4 with no significant spinal or foraminal narrowing.  At L4-5 there is mild diffuse foraminal narrowing.  There is a  minimal bulging disc at L5-S1 with mild facet arthropathy.  There is no significant spinal or foraminal narrowing.    All imaging results were reviewed and discussed with patient.      ASSESSMENT/PLAN:     1. Right lumbar radiculopathy        Kerry Hsu is a 63 year old female following up for right lumbar radiculopathy.  I recommended a right L4 and L5 transforaminal epidural steroid injection nephroscopy guidance under  IV conscious sedation given her situational anxiety.  Advised patient my office reach out to her once the injections approved.  She will continue gabapentin 300 mg at nighttime and hold on physical therapy now until after the epidural procedure.      The patient verbalized understanding with the plan and was in agreement. All questions/concerns were addressed and there were no barriers to learning.  Please note Dragon dictation software was used to dictate this note and may result in inadvertent typos.    Willie Baptiste DO, FAAPMR & CAQSM  Physical Medicine and Rehabilitation  Sports and Spine Medicine    PAST MEDICAL HISTORY:     Past Medical History:    Abscess of left thigh    Acute meniscal tear of knee    Age-related nuclear cataract of both eyes    Anal sphincter incontinence    Arthritis    Back problem    Chondromalacia    Colon polyps    Degenerative disc disease    Diverticular disease    Esophageal reflux    Glaucoma    Hammertoe    High blood pressure    Insomnia    Neuropathy    Right Foot    Obstructive sleep apnea syndrome    Osteoarthritis    Primary open angle glaucoma of both eyes    Diagnosis of glaucoma OU; Started Latanoprost qhs after abnormal VF and OCT 2/25/16;  6/5/18 consult with Dr. Madeline Chappell-see progress note    Small bowel obstruction (HCC)    Tendinitis    Unspecified essential hypertension    Visual impairment    Reraders         PAST SURGICAL HISTORY:     Past Surgical History:   Procedure Laterality Date    Anal sphincterotomy      03/12/2013 [facility]     Blepharoplasty anesthesia Bilateral 03/05/2020    Dr Kerwin Bello [facility]     Cataract extraction w/  intraocular lens implant Left 05/07/2018    L PC IOL with Dr. Suresh @ [facility]    Colonoscopy N/A 11/11/2016    Procedure: COLONOSCOPY;  Surgeon: Sabrina Cazares MD;  Location: [facility] ENDOSCOPY    Colonoscopy N/A 09/06/2019    Procedure: COLONOSCOPY;  Surgeon: Edwin Sterling MD;  Location: [facility] ENDOSCOPY    Excision of chalazion, single - od - right eye Right 6.27/2017    Nasally    Hc arthrocentesis or inject major joint w/o us      Hysterectomy  1996    KAI    Other      Lap Nissen Fundoplication surgery    Other surgical history  2005,2007,2008    LAPAROSCOPIC LYSIS OF ADHESION    Other surgical history      right foot bunionectomy    Other surgical history  11/14/2014    right superficial anterior peroneal nerve biopsy and right proximal thigh muscle biopsy.    Other surgical history  06/13/2023    Excision and Biopsy of two  perineal cysts    Upper gi endoscopy performed  05/2015    Yag capsulotomy - os - left eye Left 12/19/2018    [facility]         CURRENT MEDICATIONS:     Current Outpatient Medications   Medication Sig Dispense Refill    gabapentin 300 MG Oral Cap Start with night time dose only. If well tolerated, increase to two times daily. If well tolerated, increase to three times daily. 90 capsule 0    amLODIPine 10 MG Oral Tab TAKE 1 TABLET BY MOUTH EVERY DAY 90 tablet 3    traMADol 50 MG Oral Tab Take 1 tablet (50 mg total) by mouth every 6 (six) hours as needed for Pain. No alcohol or driving on this med. Stop if lethargic or hallucinating. 25 tablet 0    ketoconazole 2 % External Cream Apply 1 Application topically daily. 30 g 0    fluticasone-salmeterol (ADVAIR DISKUS) 100-50 MCG/ACT Inhalation Aerosol Powder, Breath Activated Inhale 1 puff into the lungs 2 (two) times daily. 1 each 0    latanoprost 0.005 % Ophthalmic Solution INSTILL 1 DROP IN BOTH EYES EVERY night 3 each 3    rosuvastatin 5  MG Oral Tab Take 1 tablet (5 mg total) by mouth daily.      omeprazole 20 MG Oral Capsule Delayed Release Take 2 capsules (40 mg total) by mouth every morning. 180 capsule 3    cholecalciferol 50 MCG (2000 UT) Oral Cap Take 1 capsule (2,000 Units total) by mouth daily.      MAGNESIUM OR Take by mouth.           ALLERGIES:     Allergies   Allergen Reactions    Celecoxib TONGUE SWELLING     Other reaction(s): CELECOXIB    Sulfa Antibiotics TONGUE SWELLING     Other reaction(s): SULFA (SULFONAMIDE ANTIBIOTICS)    Erythromycin PAIN    Amoxicillin DIARRHEA         FAMILY HISTORY:     Family History   Problem Relation Age of Onset    Hypertension Father     Hypertension Mother     Hypertension Sister     Cancer Sister         liver cancer    Hypertension Brother     Diabetes Neg     Glaucoma Neg     Macular degeneration Neg     Breast Cancer Neg     Ovarian Cancer Neg           SOCIAL HISTORY:     Social History     Socioeconomic History    Marital status:    Tobacco Use    Smoking status: Never     Passive exposure: Never    Smokeless tobacco: Never   Vaping Use    Vaping status: Never Used   Substance and Sexual Activity    Alcohol use: No     Comment: None.     Drug use: No    Sexual activity: Yes     Birth control/protection: Hysterectomy   Other Topics Concern    Caffeine Concern Yes     Comment: occasional soda    Exercise Yes    Pt has a pacemaker No    Pt has a defibrillator No    Breast feeding No    Reaction to local anesthetic No    Right Handed Yes   Social History Narrative    Work - banking     Social Determinants of Health      [facility]    Social Connections    [facility], [facility]    Housing Stability          REVIEW OF SYSTEMS:   A comprehensive 10 point review of systems was completed.  Pertinent positives and negatives noted in the the HPI.      LABS:     Lab Results    Component Value Date     08/26/2021    A1C 6.0 (H) 08/26/2021     Lab Results   Component Value Date    WBC 10.3 01/15/2024    RBC 5.70 (H) 01/15/2024    HGB 15.4 01/15/2024    HCT 45.7 01/15/2024    MCV 80.2 01/15/2024    MCH 27.0 01/15/2024    MCHC 33.7 01/15/2024    RDW 14.8 01/15/2024    .0 01/15/2024    MPV 7.7 09/07/2017     Lab Results   Component Value Date    GLU 94 01/15/2024    BUN 11 01/15/2024    BUNCREA 10.5 01/15/2024    CREATSERUM 1.05 (H) 01/15/2024    ANIONGAP 3 01/15/2024    GFR 46 (L) 04/02/2016    GFRNAA 66 05/21/2022    GFRAA 76 05/21/2022    CA 9.5 01/15/2024    OSMOCALC 287 01/15/2024    ALKPHO 118 01/15/2024    AST 20 01/15/2024    ALT 26 01/15/2024    ALKPHOS 88 08/27/2016    BILT 0.8 01/15/2024    TP 7.4 01/15/2024    ALB 4.5 01/15/2024    GLOBULIN 2.9 01/15/2024    AGRATIO 1.2 08/27/2016     01/15/2024    K 3.7 01/15/2024     01/15/2024    CO2 30.0 01/15/2024     Lab Results   Component Value Date    PTP 13.2 12/27/2023    INR 0.95 12/27/2023     Lab Results   Component Value Date    VITD 26.0 (L) 06/08/2023    [id number] 36.0 01/22/2015

## 2024-05-31 PROBLEM — E78.5 HYPERLIPIDEMIA WITH TARGET LOW DENSITY LIPOPROTEIN (LDL) CHOLESTEROL LESS THAN 130 MG/DL: Status: ACTIVE | Noted: 2023-07-28

## 2024-05-31 NOTE — TELEPHONE ENCOUNTER
Patient has been scheduled for Right L4 and L5 Transforaminal Epidural Steroid Injection under fluoroscopy guidance on 6/3/24 at the [facility] with Dr. Baptiste.   Anesthesia type:  IVCS  Please note: The [facility] will call the business day prior to discuss the exact time/arrival and additional instructions for your appointment.  Patient was advised that if he/she does receive the covid vaccine it needs to be at least 2 weeks before or after the injection.  Medications and allergies reviewed.  Educated to hold NSAIDS (Aleve, Ibuprofen, Motrin, Advil) and anti-inflammatories (Meloxicam, Naproxen, Diclofenac, Celebrex) and for cervical injections must hold Multi-Vitamins, Vitamin E, Fish Oil/Omega-3.  If patient is receiving MAC/IVCS, weight loss oral/injectable medications will need to be held for 7 days prior to injection.  Patient informed to fast 8 hours prior to procedure and 10-12 hours prior to procedure with IVCS/MAC if patient is on a weight loss medication.   If on blood thinner, clearance has been received and approved to hold this medication by provider.   Patient informed of [facility]'s  policy:  he/she will need a  to and from procedure and must be on site for their entirety of their visit, if their ride is unable to the procedure will be cancelled.   [facility] is located in the [facility] 1st floor [address] [place] [address].   may park in the yellow/purple parking lot.  Patient verbalized understanding and agrees with plan.  Scheduled in Epic: Yes  Scheduled in Surgical Case: Yes  Follow up appointment made: NOV: Visit date not found

## 2024-06-03 ENCOUNTER — APPOINTMENT (OUTPATIENT)
Dept: SURGERY | Facility: CLINIC | Age: 64
End: 2024-06-03
Payer: COMMERCIAL

## 2024-06-10 ENCOUNTER — OFFICE VISIT (OUTPATIENT)
Dept: INTERNAL MEDICINE CLINIC | Facility: CLINIC | Age: 64
End: 2024-06-10

## 2024-06-10 VITALS
HEIGHT: 68 IN | DIASTOLIC BLOOD PRESSURE: 74 MMHG | OXYGEN SATURATION: 96 % | BODY MASS INDEX: 30.5 KG/M2 | WEIGHT: 201.25 LBS | HEART RATE: 82 BPM | SYSTOLIC BLOOD PRESSURE: 120 MMHG

## 2024-06-10 DIAGNOSIS — K21.9 GASTROESOPHAGEAL REFLUX DISEASE WITHOUT ESOPHAGITIS: ICD-10-CM

## 2024-06-10 DIAGNOSIS — M85.852 OSTEOPENIA OF LEFT HIP: ICD-10-CM

## 2024-06-10 DIAGNOSIS — M79.18 MYOFASCIAL PAIN: ICD-10-CM

## 2024-06-10 DIAGNOSIS — E04.9 GOITER: ICD-10-CM

## 2024-06-10 DIAGNOSIS — G89.29 CHRONIC BILATERAL LOW BACK PAIN WITH BILATERAL SCIATICA: ICD-10-CM

## 2024-06-10 DIAGNOSIS — R25.2 CRAMPS, MUSCLE, GENERAL: ICD-10-CM

## 2024-06-10 DIAGNOSIS — R12 HEARTBURN: ICD-10-CM

## 2024-06-10 DIAGNOSIS — M19.049 HAND ARTHRITIS: ICD-10-CM

## 2024-06-10 DIAGNOSIS — E78.2 MIXED HYPERLIPIDEMIA: ICD-10-CM

## 2024-06-10 DIAGNOSIS — I10 ESSENTIAL HYPERTENSION: ICD-10-CM

## 2024-06-10 DIAGNOSIS — M22.41 CHONDROMALACIA OF RIGHT PATELLA: ICD-10-CM

## 2024-06-10 DIAGNOSIS — Z96.1 PSEUDOPHAKIA, LEFT EYE: ICD-10-CM

## 2024-06-10 DIAGNOSIS — R73.03 PREDIABETES: ICD-10-CM

## 2024-06-10 DIAGNOSIS — M54.42 CHRONIC BILATERAL LOW BACK PAIN WITH BILATERAL SCIATICA: ICD-10-CM

## 2024-06-10 DIAGNOSIS — M75.121 NONTRAUMATIC COMPLETE TEAR OF RIGHT ROTATOR CUFF: ICD-10-CM

## 2024-06-10 DIAGNOSIS — Z00.00 ENCOUNTER FOR ANNUAL HEALTH EXAMINATION: Primary | ICD-10-CM

## 2024-06-10 DIAGNOSIS — K57.90 DIVERTICULOSIS: ICD-10-CM

## 2024-06-10 DIAGNOSIS — K64.8 INTERNAL HEMORRHOID: ICD-10-CM

## 2024-06-10 DIAGNOSIS — M54.16 LUMBAR RADICULOPATHY: Chronic | ICD-10-CM

## 2024-06-10 DIAGNOSIS — M48.061 LUMBAR FORAMINAL STENOSIS: ICD-10-CM

## 2024-06-10 DIAGNOSIS — R25.2 LEG CRAMPS: ICD-10-CM

## 2024-06-10 DIAGNOSIS — G47.33 OBSTRUCTIVE SLEEP APNEA SYNDROME: ICD-10-CM

## 2024-06-10 DIAGNOSIS — D72.9 NEUTROPHILIA: ICD-10-CM

## 2024-06-10 DIAGNOSIS — G62.9 NEUROPATHY: ICD-10-CM

## 2024-06-10 DIAGNOSIS — G72.9 MYOPATHY: ICD-10-CM

## 2024-06-10 DIAGNOSIS — R05.3 CHRONIC COUGH: ICD-10-CM

## 2024-06-10 DIAGNOSIS — R74.8 ELEVATED CPK: ICD-10-CM

## 2024-06-10 DIAGNOSIS — M54.41 CHRONIC BILATERAL LOW BACK PAIN WITH BILATERAL SCIATICA: ICD-10-CM

## 2024-06-10 DIAGNOSIS — M54.2 CERVICALGIA: ICD-10-CM

## 2024-06-10 DIAGNOSIS — Q61.9 CONGENITAL CYSTIC KIDNEY DISEASE: ICD-10-CM

## 2024-06-10 DIAGNOSIS — F41.1 ANXIETY STATE: ICD-10-CM

## 2024-06-10 DIAGNOSIS — M51.36 DDD (DEGENERATIVE DISC DISEASE), LUMBAR: ICD-10-CM

## 2024-06-10 DIAGNOSIS — R31.29 MICROSCOPIC HEMATURIA: ICD-10-CM

## 2024-06-10 DIAGNOSIS — M51.26 HERNIATION OF INTERVERTEBRAL DISC BETWEEN L4 AND L5: ICD-10-CM

## 2024-06-10 DIAGNOSIS — M48.02 CERVICAL STENOSIS OF SPINAL CANAL: ICD-10-CM

## 2024-06-10 DIAGNOSIS — H02.403 PTOSIS OF BOTH EYELIDS: ICD-10-CM

## 2024-06-10 DIAGNOSIS — B37.2 CANDIDAL INTERTRIGO: ICD-10-CM

## 2024-06-10 DIAGNOSIS — Z98.890 STATUS POST NISSEN FUNDOPLICATION: ICD-10-CM

## 2024-06-10 DIAGNOSIS — R42 DIZZINESS: ICD-10-CM

## 2024-06-10 DIAGNOSIS — E55.9 VITAMIN D DEFICIENCY: ICD-10-CM

## 2024-06-10 DIAGNOSIS — L84 CALLUS OF FOOT: ICD-10-CM

## 2024-06-10 PROBLEM — H43.393 FLOATER, VITREOUS, BILATERAL: Status: RESOLVED | Noted: 2018-02-10 | Resolved: 2024-06-10

## 2024-06-10 PROBLEM — E78.5 HYPERLIPIDEMIA WITH TARGET LOW DENSITY LIPOPROTEIN (LDL) CHOLESTEROL LESS THAN 130 MG/DL: Status: RESOLVED | Noted: 2023-07-28 | Resolved: 2024-06-10

## 2024-06-10 PROBLEM — R92.8 ABNORMAL MAMMOGRAM OF RIGHT BREAST: Status: RESOLVED | Noted: 2024-04-03 | Resolved: 2024-06-10

## 2024-06-10 PROBLEM — L30.4 INTERTRIGO: Status: RESOLVED | Noted: 2019-04-01 | Resolved: 2024-06-10

## 2024-06-10 PROBLEM — H20.00 ACUTE IRITIS, LEFT EYE: Status: RESOLVED | Noted: 2020-12-03 | Resolved: 2024-06-10

## 2024-06-10 PROBLEM — M77.42 METATARSALGIA OF BOTH FEET: Status: RESOLVED | Noted: 2020-08-24 | Resolved: 2024-06-10

## 2024-06-10 PROBLEM — M20.11 VALGUS DEFORMITY OF BOTH GREAT TOES: Status: RESOLVED | Noted: 2020-08-24 | Resolved: 2024-06-10

## 2024-06-10 PROBLEM — M77.41 METATARSALGIA OF BOTH FEET: Status: RESOLVED | Noted: 2020-08-24 | Resolved: 2024-06-10

## 2024-06-10 PROBLEM — M20.12 VALGUS DEFORMITY OF BOTH GREAT TOES: Status: RESOLVED | Noted: 2020-08-24 | Resolved: 2024-06-10

## 2024-06-10 PROBLEM — H40.1132 PRIMARY OPEN ANGLE GLAUCOMA OF BOTH EYES, MODERATE STAGE: Status: RESOLVED | Noted: 2018-02-10 | Resolved: 2024-06-10

## 2024-06-10 PROBLEM — G56.31 NEUROPATHY OF RIGHT RADIAL NERVE: Status: RESOLVED | Noted: 2022-05-05 | Resolved: 2024-06-10

## 2024-06-10 PROCEDURE — 3074F SYST BP LT 130 MM HG: CPT | Performed by: INTERNAL MEDICINE

## 2024-06-10 PROCEDURE — 3008F BODY MASS INDEX DOCD: CPT | Performed by: INTERNAL MEDICINE

## 2024-06-10 PROCEDURE — 3078F DIAST BP <80 MM HG: CPT | Performed by: INTERNAL MEDICINE

## 2024-06-10 PROCEDURE — G0402 INITIAL PREVENTIVE EXAM: HCPCS | Performed by: INTERNAL MEDICINE

## 2024-06-10 PROCEDURE — 99214 OFFICE O/P EST MOD 30 MIN: CPT | Performed by: INTERNAL MEDICINE

## 2024-06-10 RX ORDER — DIAZEPAM 5 MG/1
2.5 TABLET ORAL 2 TIMES DAILY PRN
Qty: 20 TABLET | Refills: 0 | Status: SHIPPED | OUTPATIENT
Start: 2024-06-10

## 2024-06-10 NOTE — PATIENT INSTRUCTIONS
Kerry Hsu's SCREENING SCHEDULE   Tests on this list are recommended by your physician but may not be covered, or covered at this frequency, by your insurer.   Please check with your insurance carrier before scheduling to verify coverage.   PREVENTATIVE SERVICES FREQUENCY &  COVERAGE DETAILS LAST COMPLETION DATE   Diabetes Screening    Fasting Blood Sugar /  Glucose    One screening every 12 months if never tested or if previously tested but not diagnosed with pre-diabetes   One screening every 6 months if diagnosed with pre-diabetes Lab Results   Component Value Date    GLU 94 01/15/2024        Cardiovascular Disease Screening    Lipid Panel  Cholesterol  Lipoprotein (HDL)  Triglycerides Covered every 5 years for all Medicare beneficiaries without apparent signs or symptoms of cardiovascular disease Lab Results   Component Value Date    CHOLEST 179 10/06/2023    HDL 67 (H) 10/06/2023    LDL 87 10/06/2023    TRIG 144 10/06/2023         Electrocardiogram (EKG)   Covered if needed at Welcome to Medicare, and non-screening if indicated for medical reasons 11/21/2023      Ultrasound Screening for Abdominal Aortic Aneurysm (AAA) Covered once in a lifetime for one of the following risk factors   • Men who are 65-75 years old and have ever smoked   • Anyone with a family history -     Colorectal Cancer Screening  Covered for ages 50-85; only need ONE of the following:    Colonoscopy   Covered every 10 years    Covered every 2 years if patient is at high risk or previous colonoscopy was abnormal 09/06/2019    Health Maintenance   Topic Date Due   • Colorectal Cancer Screening  09/06/2024       Flexible Sigmoidoscopy   Covered every 4 years -    Fecal Occult Blood Test Covered annually -   Bone Density Screening    Bone density screening    Covered every 2 years after age 65 if diagnosed with risk of osteoporosis or estrogen deficiency.    Covered yearly for long-term glucocorticoid medication use (Steroids) Last  Dexa Scan:    XR DEXA BONE DENSITOMETRY (CPT=77080) 03/13/2023      No recommendations at this time   Pap and Pelvic    Pap   Covered every 2 years for women at normal risk; Annually if at high risk -  No recommendations at this time    Chlamydia Annually if high risk -  No recommendations at this time   Screening Mammogram    Mammogram     Recommend annually for all female patients aged 40 and older    One baseline mammogram covered for patients aged 35-39 04/08/2024    Health Maintenance   Topic Date Due   • Mammogram  04/08/2025       Immunizations    Influenza Covered once per flu season  Please get every year 09/21/2023  No recommendations at this time    Pneumococcal Each vaccine ([id number] & [id number]) covered once after 65 Prevnar 13: -    [id number]: -     No recommendations at this time    Hepatitis B One screening covered for patients with certain risk factors   -  No recommendations at this time    Tetanus Toxoid Not covered by Medicare Part B unless medically necessary (cut with metal); may be covered with your pharmacy prescription benefits -    Tetanus, Diptheria and Pertusis TD and TDaP Not covered by Medicare Part B -  No recommendations at this time    Zoster Not covered by Medicare Part B; may be covered with your pharmacy  prescription benefits -  No recommendations at this time     Annual Monitoring of Persistent Medications (ACE/ARB, digoxin diuretics, anticonvulsants)    Potassium Annually Lab Results   Component Value Date    K 3.7 01/15/2024         Creatinine   Annually Lab Results   Component Value Date    CREATSERUM 1.05 (H) 01/15/2024         BUN Annually Lab Results   Component Value Date    BUN 11 01/15/2024       Drug Serum Conc Annually No results found for: \"DIGOXIN\", \"DIG\", \"VALP\"

## 2024-06-10 NOTE — PROGRESS NOTES
Subjective:   Kerry Hsu is a 63 year old female who presents for a Medicare Initial Preventative Physical Exam (Welcome to Medicare- < 12 months on Medicare) and scheduled follow up of multiple significant but stable problems.   Patient comes with her  for her very first Medicare physical  Getting the cramps again , restarted in the upper arms \"its back and more severe \"   Wondering if she has stiff persons synd -would like Anti-glutamic acid decarboxylase (NICOLE) antibody testing , looked at the chart and noted that there was a NICOLE 65 antibody testing that is available in the chart but I am not sure if this is the same as the antiglutamic acid decarboxylase so she will follow-up with rheumatology  Had emg and muscle biopsy in 2014 by dr hernandez  a few yrs ago at that area left leg area does get swollen and hard   Has apt with GI this week , H. pylori test is negative  Got injection to the back and although it still hurting it does take time so she is being patient with that  Still has rash under her breasts and also the groin and has tried ketoconazole cream as well as the nystatin powder      HISTORY  Saw Dr. Delgadillo the orthopedic doctor and was sent for physical therapy which she is doing now for her back but needs to see the back doctor--reviewed note and noted it was suggested she see physiatry         Had  the left cataract removed  approx 3 yrs ago and since then has had blurry vision and double vision In the left eye        She still does get occasional cramping and takes magnesium for it and has numbness and tingling in bilateral feet worse on the right, noted in the past her cpk was always elevated and had this worked up but did read that lead might have something to do with it so we will have this checked    HISTORY    palpitations for the past couple weeks at least and it is now scaring her because she can feel it when she is just lying down or just sitting there not doing anything and  it is not on it but only on exertion, comes and goes   Also has some retrosternal chest pains 5/10  Better ? --took ppi and it didn't help   Worse -with deep inspirations  No increase in caffeine  Chest pain and palpitations happen at the same time and not associated with any diaphoresis  She did have an EKG done in January and a calcium score in August of this year           HISTORY  Saw dr rothman ent and ct was neg SO NO  sinus SURG NEEDED     She thinks its her eyes   Feels her vision is bad even after glasses and thinks it happended after cataract surg   HAS SEEN   saw dr mckeon--  neurology   Saw cards dr genao and will go for heart scan   Saw many eye drs and tried drops but no help , eye drops now burns                     HISTORY  6/2023 visit     will see the surgeon for a cyst that she has in her vaginal area, she had already seen the dermatologist and the GYN doctor  Hopefully will come out of the boot for her left leg  Saw ENT and may is sinus surgery            HISTORY   right wrist pain after her rotator cuff surg x 2 weeks -was first like an electric shock and now only gets that once in a while but now more sore   Going for PT   She also has some left thigh pain - from buttocks to the knees         History  3/2022  right shoulder surgery on 3/7/2022   Requested by dr shabnam Garcia - can be seen in epic   Can walk up one flight of stars without feeling short of breath   Right shoulder radiates to the neck on that side   She is right handed , cant use her hand and arm as much            HISTORY   10/2021   She has been seeing Ortho for the neuropathy and also received shots to the shoulder-right-and knees and was given Tylenol No. 3  Also sees the foot doctor  saw dr mckeon nuerology and consider valium but she feels this will affect her ability to work   She complains of \"nerve pain\" in the feet   She states that valium knocks her out -even if she takes it in the evening she still feels the effects  in the am , foot  gave gabpentin but that too makes her sleepy \"im very sensitive   Also c/o left flank pain  X couple weeks -- ua done in office and does show blood with uti    8/10 , sharp and stabbing ,   Worse with standing after sitting a long time   Better - ?   Comes and goesbut now more frequent      Not taking trazodone - not helping and she is not sure what meds cuases what so she doesn't want to be on meds   Know she has had CTs and ultrasounds of her kidney done before and has seen urology as well- dr moreno -she did have a 3 mm nonobstructing stone in the right kidney  Still has her chronic cough and try the Tessalon Perles which did not help that much, has an inhaler so she will try that, the codien cough syrup just put her to sleep as well      Finished doxy yest taht she got from the  ,didn't help          HISTORY 3/2021   cramps is all over but mostly in the legs-stretching makes it works and the meloxicam is not helping and she did see the rheumatologist and was started on a new medication metaxalone 800 mg 3 times a day but it makes her sleepy so she is only taking it at night--it still wakes her up at night so does not feel that that is working was recommended to follow-up at an academic center     Still has a cough and the codeine cough syrup did not help much and noted that she is on and PPI and the chest x-ray was normal which was ordered last time   she has noticed that the rescue inhaler does help                       HISTORY  History from August 2020   Has seen the rheumatologist, physiatry and gone for physical therapy  Has tried Cymbalta given by physiatry but in January when I had seen her she had stated that she was not taking it and and cyclobenzaprine was started but she does not think that that was helpful  She states that she has had an EMG in the past as well a very long time ago  She states that she drinks a lot of water as well  2015 emg report noted in chart - normal   Also  noted in 2015 she saw the neurologist Dr. Enamorado and was recommended to go to the pain doctors--Flexeril was tried  Needs to go back to see Dr. Hays the podiatrist for her orthotics                 History   Last seen in February and since then in March she saw Dr. Delgadillo and got knee injections for her osteoarthritis of the knees   had an endoscopic procedure and was found to have gastric polyp by Dr. Pereira  In June she saw the foot doctor and received a shot in her foot for the neuroma  In July she saw Dr. Bird the eye doctor for her glaucoma and will return in 4 months and then           History  1/11/2020 visit   Dr clay- ortho -- left hand tendonitiis -got shot and feeks better   Would like to see dr berger   Not taking cymbalta - takes T#3 one a week , ibuprofen \"seldom\"- once a mn   Muscle cramps coming back - legs thighs and feet , no RLS               History- 11/19  C/o right shoulder has a tear  And she does admit she uses the left more   Used to see dr berger and now sees dr haque and got a shot to the right shoulder   Needs a referral to see pain clinic   Needs referral to see podiatrist --corns and calluses right foot / toe and also for ingrown toenails   Pain is 10/10 -- iburprofen does helps but doesn't like taking it too much --takes T#3 but t just puts her to sleep and then she will wake up with pain -- unable ot sratch her back   No falls trauma or injury that she is aware of      History   She has had both muscle and nerve biopsies by dr hernandez   She gets these cramps every day   Steroids do not help either  Noted that she had try cyclobenzaprine 10 mg in July 2019--no help   Since the last visit she did see her orthopedic doctor and received a short of a cortisone shot to her right shoulder  Also c/o left hand tender swelling x few weeks   She also states that she gets rashes underneath her breasts and the triamcinolone helps with this and needs a refill        hisotry -8/19  first visit   C/c htn   C/o fell on July 8th and has some left rib pain and back pains   Had some ct scans and would like to go through it  Needs referrals    Usually gets shots to her knees and shoulders- was told to follow-up with a new doctor     [facility]  gerd daily ppi --h/o nissens fundoplication  ?2006  HTN  Hip pain and back pain - T#3 - prn 2 tabs a week, also takes ibuprofen   bm hematuria   Non obstructing kid stones   elmer was on cpap   Glaucoma suspect   Right shoulder rotator cuff tear s/p surg 3/2022   Osteoarthritis knees  Hepatic and renal cysts similar to 8/19 and CT 5/20  H/o left toe OM s/p amputation      Dr peters - eye dr Dr moreno - urologist   Dr. Craft- rheum   Dr. Delgadillo- ortho   Dr lorraine hall     History/Other:   Fall Risk Assessment:   She has been screened for Falls and is low risk.      Cognitive Assessment:   She had a completely normal cognitive assessment - see flowsheet entries       Functional Ability/Status:   Kerry Hsu has some abnormal functions as listed below:  She has difficulties Affording Meds based on screening of functional status. She has Vision problems based on screening of functional status.       Depression Screening (PHQ-2/PHQ-9): PHQ-2 SCORE: 0  , done 6/5/2024             Advanced Directives:   She does NOT have a Living Will. [Do you have a living will?: No]  She does NOT have a Power of  for Health Care. [Do you have a healthcare power of ?: No]  Discussed Advance Care Planning with patient (and family/surrogate if present). Standard forms made available to patient in After Visit Summary.      Patient Active Problem List   Diagnosis    Essential hypertension    Hand arthritis    Status post Nissen fundoplication    GERD (gastroesophageal reflux disease)    Neuropathy    Myopathy    Diverticulosis    Internal hemorrhoid    Chronic cough    Cramps, muscle, general    Vitamin D deficiency    Ptosis of both eyelids     Obstructive sleep apnea syndrome    Cervical stenosis of spinal canal    Lumbar radiculopathy    Pseudophakia, left eye    Elevated CPK    Lumbar foraminal stenosis    Herniation of intervertebral disc between L4 and L5    DDD (degenerative disc disease), lumbar    Chronic bilateral low back pain with bilateral sciatica    Myofascial pain    Nontraumatic complete tear of right rotator cuff    Leg cramps    Callus of foot    Prediabetes    Osteopenia of left hip    Neutrophilia    Anxiety state    Cervicalgia    Chondromalacia of patella    Congenital cystic kidney disease    Dizziness    Heartburn    Microscopic hematuria    Goiter    Hyperlipidemia     Allergies:  She is allergic to celecoxib, sulfa antibiotics, erythromycin, and amoxicillin.    Current Medications:  Outpatient Medications Marked as Taking for the 6/10/24 encounter (Office Visit) with Bella Rogers MD   Medication Sig    hydrocortisone 2.5 % External Ointment Apply 1 Application topically 2 (two) times daily for 7 days.    diazePAM 5 MG Oral Tab Take 0.5 tablets (2.5 mg total) by mouth 2 (two) times daily as needed for Anxiety.    gabapentin 300 MG Oral Cap Start with night time dose only. If well tolerated, increase to two times daily. If well tolerated, increase to three times daily.    amLODIPine 10 MG Oral Tab TAKE 1 TABLET BY MOUTH EVERY DAY (Patient taking differently: at bedtime. TAKE 1 TABLET BY MOUTH EVERY DAY)    ketoconazole 2 % External Cream Apply 1 Application topically daily.    latanoprost 0.005 % Ophthalmic Solution INSTILL 1 DROP IN BOTH EYES EVERY night    rosuvastatin 5 MG Oral Tab Take 1 tablet (5 mg total) by mouth daily.    [DISCONTINUED] omeprazole 20 MG Oral Capsule Delayed Release Take 2 capsules (40 mg total) by mouth every morning.    cholecalciferol 50 MCG (2000 UT) Oral Cap Take 1 capsule (2,000 Units total) by mouth daily.    MAGNESIUM OR Take by mouth.       Medical History:  She  has a past medical history of  Abscess of left thigh, Acute meniscal tear of knee, Age-related nuclear cataract of both eyes (02/10/2015), Anal sphincter incontinence (04/28/2014), Anxiety state (12/15/2012), Arthritis, Back problem, Chondromalacia, Colon polyps, Degenerative disc disease, Diverticular disease, Esophageal reflux, Glaucoma, Hammertoe, High blood pressure, High cholesterol, Hyperlipidemia with target low density lipoprotein (LDL) cholesterol less than 130 mg/dL (07/28/2023), Insomnia, Neuropathy, Obstructive sleep apnea syndrome (03/21/2018), Osteoarthritis, Primary open angle glaucoma of both eyes (05/19/2015), Small bowel obstruction (HCC), Tendinitis, Unspecified essential hypertension, and Visual impairment.  Surgical History:  She  has a past surgical history that includes hysterectomy (1996); anal sphincterotomy; hc arthrocentesis or inject major joint w/o us; upper gi endoscopy performed (05/2015); colonoscopy (N/A, 11/11/2016); Excision of Chalazion, Single - OD - Right Eye (Right, 6.27/2017); Cataract extraction w/  intraocular lens implant (Left, 05/07/2018); Yag Capsulotomy - OS - Left Eye (Left, 12/19/2018); other; colonoscopy (N/A, 09/06/2019); other surgical history (2005,2007,2008); other surgical history; other surgical history (11/14/2014); blepharoplasty anesthesia (Bilateral, 03/05/2020); and other surgical history (06/13/2023).   Family History:  Her family history includes Cancer in her sister; Hypertension in her brother, father, mother, and sister.  Social History:  She  reports that she has never smoked. She has never been exposed to tobacco smoke. She has never used smokeless tobacco. She reports that she does not drink alcohol and does not use drugs.    Tobacco:  She has never smoked tobacco.    CAGE Alcohol Screen:   CAGE screening score of 0 on 6/5/2024, showing low risk of alcohol abuse.      Patient Care Team:  Bella Rogers MD as PCP - General (Internal Medicine)  Allan Abdullahi DO (INFECTIOUS  DISEASES)  Bella Rogers MD as PCP (Internal Medicine)  Joni Dueñas DO (OBSTETRICS & GYNECOLOGY)  Srinivasan Mendoza DO (OBSTETRICS & GYNECOLOGY)  Dodie Asif APRN (Nurse Practitioner)    Review of Systems  GENERAL: feels well otherwise but has  chronic issues that well   SKIN: denies any unusual skin lesions  EYES: denies blurred vision or double vision--sees the eye    HEENT: denies nasal congestion, sinus pain or ST  LUNGS: denies shortness of breath with exertion  CARDIOVASCULAR: denies chest pain on exertion  GI: denies abdominal pain, denies heartburn--stable on meds but has flares   : denies dysuria, vaginal discharge or itching, no complaint of urinary incontinence   MUSCULOSKELETAL: + back pain  NEURO: denies headaches  PSYCHE: denies depression or anxiety  HEMATOLOGIC: denies hx of anemia  ENDOCRINE: denies thyroid history  ALL/ASTHMA: denies hx of allergy or asthma    Objective:   Physical Exam  GENERAL: well developed, well nourished,in no apparent distress having active cramps during the visit  SKIN: no rashes,no suspicious lesions  HEENT: atraumatic, normocephalic,ears and throat are clear, no frontal or maxillary sinus tenderness, pupils equal reactive to light bilaterally, extraocular muscles intact  NECK: supple,no adenopathy,nontender   LUNGS: clear to auscultation, no wheeze  CARDIO: RRR without murmur  GI: good BS's,no masses or tenderness  EXTREMITIES: no cyanosis, or edema  BREAST: Bilateral breasts without any lumps, bilateral axilla without any lymphadenopathy, no nipple retraction or  discharge        /74 (BP Location: Left arm, Patient Position: Sitting)   Pulse 82   Ht 5' 8\" (1.727 m)   Wt 201 lb 4 oz (91.3 kg)   SpO2 96%   BMI 30.60 kg/m²  Estimated body mass index is 30.6 kg/m² as calculated from the following:    Height as of this encounter: 5' 8\" (1.727 m).    Weight as of this encounter: 201 lb 4 oz (91.3 kg).    Medicare Hearing Assessment:   Hearing  Screening    Screening Method: Questionnaire  I have a problem hearing over the telephone: No I have trouble following the conversations when two or more people are talking at the same time: No   I have trouble understanding things on the TV: No I have to strain to understand conversations: No   I have to worry about missing the telephone ring or doorbell: No I have trouble hearing conversations in a noisy background such as a crowded room or restaurant: No   I get confused about where sounds come from: No I misunderstand some words in a sentence and need to ask people to repeat themselves: No   I especially have trouble understanding the speech of women and children: No I have trouble understanding the speaker in a large room such as at a meeting or place of Tenri[employer]: No   Many people I talk to seem to mumble (or don't speak clearly): No People get annoyed because I misunderstand what they say: No   I misunderstand what others are saying and make inappropriate responses: No I avoid social activities because I cannot hear well and fear I will reply improperly: No   Family members and friends have told me they think I may have hearing loss: No             Visual Acuity:   Right Eye Visual Acuity: Corrected Right Eye Chart Acuity: 20/30   Left Eye Visual Acuity: Corrected Left Eye Chart Acuity: 20/30   Both Eyes Visual Acuity: Corrected Both Eyes Chart Acuity: 20/25   Able To Tolerate Visual Acuity: Yes        Assessment & Plan:   Kerry Hsu is a 63 year old female who presents for a Medicare Assessment.     1. Encounter for annual health examination (Primary)  Advised patient to watch what she eats exercise as tolerated , seatbelt use no texting driving, sunscreen use advised   2. Neuropathy  -     Rheumatology Referral  Refer to rheum, u/p done in the past ,will try diazepam   3. Myopathy  -     Rheumatology Referral  -     diazePAM; Take 0.5 tablets (2.5 mg total) by mouth 2 (two) times daily as needed  for Anxiety.  Dispense: 20 tablet; Refill: 0  Refer to rheum, u/p done in the past ,will try diazepam   4. Myofascial pain  -     Rheumatology Referral  -     diazePAM; Take 0.5 tablets (2.5 mg total) by mouth 2 (two) times daily as needed for Anxiety.  Dispense: 20 tablet; Refill: 0  Refer to rheum, u/p done in the past ,will try diazepam   5. Leg cramps  Due to statins , taking sparingly   6. Cramps, muscle, general  -     Rheumatology Referral  -     diazePAM; Take 0.5 tablets (2.5 mg total) by mouth 2 (two) times daily as needed for Anxiety.  Dispense: 20 tablet; Refill: 0  Refer to rheum, u/p done in the past ,will try diazepam   7. Osteopenia of left hip  Stable   8. Chronic bilateral low back pain with bilateral sciatica  Stable   9. Lumbar foraminal stenosis  Stable   10. DDD (degenerative disc disease), lumbar  Stable   11. Herniation of intervertebral disc between L4 and L5  Stable   12. Lumbar radiculopathy  -     Rheumatology Referral  Stable currently but has exacerbations   13. Elevated CPK  -     Rheumatology Referral  -     diazePAM; Take 0.5 tablets (2.5 mg total) by mouth 2 (two) times daily as needed for Anxiety.  Dispense: 20 tablet; Refill: 0  Chronic , refe rto rheum for eval  14. Candidal intertrigo  -     Hydrocortisone; Apply 1 Application topically 2 (two) times daily for 7 days.  Dispense: 30 g; Refill: 0  -     Derm Referral - In Network  Will give hydrocortisone   15. Neutrophilia  Monitor   16. Obstructive sleep apnea syndrome  Assessment & Plan:  Not using cpap  Orders:  -     Home Sleep Apnea Test (Adult pt only) - Sleep consult required for Medicare pts  -     General sleep study; Future; Expected date: 07/10/2024  Stable   17. Dizziness  Assessment & Plan:  On and off , goes away on its own  Stable currently  18. Mixed hyperlipidemia  Follow a low fat low cholesterol diet and exercise as tolerated , unable to statin   19. Essential hypertension  Advised pt to follow a low salt , low  sodium (including fast foods and processed foods), can look up DASH diet, exercise 30 min a day , monitor bp ,cont meds     20. Chronic cough  Assessment & Plan:  On and off   Stable     21. Vitamin D deficiency  Monitor   22. Prediabetes  -     Hemoglobin A1C; Future; Expected date: 06/10/2024  Monitor , recheck aic , follow a low sugar low carb diet   23. Goiter  Stable ,sees physiatry   24. Cervical stenosis of spinal canal  Stable ,sees physiatry   25. Cervicalgia  Stable ,sees physiatry   26. Anxiety state  Stable   27. Internal hemorrhoid  Stable   28. Heartburn  Stable ,sees physiatry   29. Gastroesophageal reflux disease without esophagitis  Stable  30. Status post Nissen fundoplication  Stable  31. Diverticulosis  Stable  32. Callus of foot  Assessment & Plan:  Dr chiu   Stable  33. Ptosis of both eyelids  Stable  34. Pseudophakia, left eye  Stable  35. Congenital cystic kidney disease  Stable  36. Microscopic hematuria  Stable  37. Chondromalacia of right patella  Stable  38. Nontraumatic complete tear of right rotator cuff  Stable  39. Hand arthritis  Stable      Preventative medicine  Colonoscopy done  9/19  Dr. hall rpt in yrs ie 2024- has apt   Mammogram-10/2023  Pap 2016 dr chin -hina ,saw Dr. Witt in February 2021 and sees mina jaffe   Labs 12/2023   10/2023 1/2024  reviewed          The patient indicates understanding of these issues and agrees to the plan.  Reinforced healthy diet, lifestyle, and exercise.      Return in 3 months (on 9/10/2024).     Bella Rogers MD, 6/10/2024     Supplementary Documentation:   General Health:  In the past six months, have you lost more than 10 pounds without trying?: 2 - No  Has your appetite been poor?: No  Type of Diet: Balanced  How would you describe your daily physical activity?: Light  How would you describe your current health state?: Good  How do you maintain positive mental well-being?: Social Interaction;Puzzles;Visiting Family  On a scale  of 0 to 10, with 0 being no pain and 10 being severe pain, what is your pain level?: 0 - (None)  In the past six months, have you experienced urine leakage?: 0-No  At any time do you feel concerned for the safety/well-being of yourself and/or your children, in your home or elsewhere?: No  Have you had any immunizations at another office such as Influenza, Hepatitis B, Tetanus, or Pneumococcal?: No         Kerry Hsu's SCREENING SCHEDULE   Tests on this list are recommended by your physician but may not be covered, or covered at this frequency, by your insurer.   Please check with your insurance carrier before scheduling to verify coverage.   PREVENTATIVE SERVICES FREQUENCY &  COVERAGE DETAILS LAST COMPLETION DATE   Diabetes Screening    Fasting Blood Sugar /  Glucose    One screening every 12 months if never tested or if previously tested but not diagnosed with pre-diabetes   One screening every 6 months if diagnosed with pre-diabetes Lab Results   Component Value Date    GLU 94 01/15/2024        Cardiovascular Disease Screening    Lipid Panel  Cholesterol  Lipoprotein (HDL)  Triglycerides Covered every 5 years for all Medicare beneficiaries without apparent signs or symptoms of cardiovascular disease Lab Results   Component Value Date    CHOLEST 179 10/06/2023    HDL 67 (H) 10/06/2023    LDL 87 10/06/2023    TRIG 144 10/06/2023         Electrocardiogram (EKG)   Covered if needed at Welcome to Medicare, and non-screening if indicated for medical reasons 11/21/2023      Ultrasound Screening for Abdominal Aortic Aneurysm (AAA) Covered once in a lifetime for one of the following risk factors    Men who are 65-75 years old and have ever smoked    Anyone with a family history -     Colorectal Cancer Screening  Covered for ages 50-85; only need ONE of the following:    Colonoscopy   Covered every 10 years    Covered every 2 years if patient is at high risk or previous colonoscopy was abnormal  09/06/2019    Health Maintenance   Topic Date Due    Colorectal Cancer Screening  09/06/2024       Flexible Sigmoidoscopy   Covered every 4 years -    Fecal Occult Blood Test Covered annually -   Bone Density Screening    Bone density screening    Covered every 2 years after age 65 if diagnosed with risk of osteoporosis or estrogen deficiency.    Covered yearly for long-term glucocorticoid medication use (Steroids) Last Dexa Scan:    XR DEXA BONE DENSITOMETRY (CPT=77080) 03/13/2023      No recommendations at this time   Pap and Pelvic    Pap   Covered every 2 years for women at normal risk; Annually if at high risk -  No recommendations at this time    Chlamydia Annually if high risk -  No recommendations at this time   Screening Mammogram    Mammogram     Recommend annually for all female patients aged 40 and older    One baseline mammogram covered for patients aged 35-39 04/08/2024    Health Maintenance   Topic Date Due    Mammogram  04/08/2025       Immunizations    Influenza Covered once per flu season  Please get every year 09/21/2023  No recommendations at this time    Pneumococcal Each vaccine ([id number] & Vizvozdhu19) covered once after 65 Prevnar 13: -    [id number]: -     No recommendations at this time    Hepatitis B One screening covered for patients with certain risk factors   -  No recommendations at this time    Tetanus Toxoid Not covered by Medicare Part B unless medically necessary (cut with metal); may be covered with your pharmacy prescription benefits -    Tetanus, Diptheria and Pertusis TD and TDaP Not covered by Medicare Part B -  No recommendations at this time    Zoster Not covered by Medicare Part B; may be covered with your pharmacy  prescription benefits -  No recommendations at this time     Annual Monitoring of Persistent Medications (ACE/ARB, digoxin diuretics, anticonvulsants)    Potassium Annually Lab Results   Component Value Date    K 3.7 01/15/2024         Creatinine   Annually  Lab Results   Component Value Date    CREATSERUM 1.05 (H) 01/15/2024         BUN Annually Lab Results   Component Value Date    BUN 11 01/15/2024       Drug Serum Conc Annually No results found for: \"DIGOXIN\", \"DIG\", \"VALP\"

## 2024-06-11 ENCOUNTER — OFFICE VISIT (OUTPATIENT)
Dept: DERMATOLOGY CLINIC | Facility: CLINIC | Age: 64
End: 2024-06-11
Payer: COMMERCIAL

## 2024-06-11 DIAGNOSIS — L40.9 PSORIASIS: Primary | ICD-10-CM

## 2024-06-11 PROCEDURE — 99213 OFFICE O/P EST LOW 20 MIN: CPT | Performed by: PHYSICIAN ASSISTANT

## 2024-06-11 RX ORDER — MOMETASONE FUROATE 1 MG/G
1 OINTMENT TOPICAL DAILY
Qty: 30 G | Refills: 1 | Status: SHIPPED | OUTPATIENT
Start: 2024-06-11

## 2024-06-11 NOTE — PROGRESS NOTES
HPI:    Patient ID: Kerry Hsu is a 63 year old female.    Patient presents with itchy rash under breasts for the past few weeks. Using ketoconazole cream BID but rash is not going away. No draining or tenderness noted. Hx of allergies to medications noted. No new products noted. This rash has happened before.         Review of Systems   Constitutional:  Negative for chills and fever.   Musculoskeletal:  Negative for arthralgias and myalgias.   Skin:  Positive for rash. Negative for color change and wound.            Current Outpatient Medications   Medication Sig Dispense Refill    mometasone 0.1 % External Ointment Apply 1 Application topically daily. 30 g 1    hydrocortisone 2.5 % External Ointment Apply 1 Application topically 2 (two) times daily for 7 days. 30 g 0    diazePAM 5 MG Oral Tab Take 0.5 tablets (2.5 mg total) by mouth 2 (two) times daily as needed for Anxiety. 20 tablet 0    gabapentin 300 MG Oral Cap Start with night time dose only. If well tolerated, increase to two times daily. If well tolerated, increase to three times daily. 90 capsule 0    amLODIPine 10 MG Oral Tab TAKE 1 TABLET BY MOUTH EVERY DAY (Patient taking differently: at bedtime. TAKE 1 TABLET BY MOUTH EVERY DAY) 90 tablet 3    ketoconazole 2 % External Cream Apply 1 Application topically daily. 30 g 0    latanoprost 0.005 % Ophthalmic Solution INSTILL 1 DROP IN BOTH EYES EVERY night 3 each 3    rosuvastatin 5 MG Oral Tab Take 1 tablet (5 mg total) by mouth daily.      omeprazole 20 MG Oral Capsule Delayed Release Take 2 capsules (40 mg total) by mouth every morning. 180 capsule 3    cholecalciferol 50 MCG (2000 UT) Oral Cap Take 1 capsule (2,000 Units total) by mouth daily.      MAGNESIUM OR Take by mouth.       Allergies:  Allergies   Allergen Reactions    Celecoxib TONGUE SWELLING     Other reaction(s): CELECOXIB    Sulfa Antibiotics TONGUE SWELLING     Other reaction(s): SULFA (SULFONAMIDE ANTIBIOTICS)    Erythromycin  PAIN    Amoxicillin DIARRHEA      There were no vitals taken for this visit.  There is no height or weight on file to calculate BMI.  PHYSICAL EXAM:   Physical Exam  Constitutional:       General: She is not in acute distress.     Appearance: Normal appearance.   Skin:     General: Skin is warm and dry.      Findings: Rash present.      Comments: Erythematous scaling areas under the breasts and groin area. No draining or tenderness noted. No scaling noted.    Neurological:      Mental Status: She is alert and oriented to person, place, and time.                ASSESSMENT/PLAN:   1. Psoriasis  -After discussion with patient, advised the following:  -Started mometasone  -Educated to apply 2 times per day   -Continue with ketoconazole  -Educated to apply 2 times per day   -Give update in 2 weeks.   -To call or follow-up with worsening symptoms or concerns.   -Pt was agreeable to plan and will comply with discussion above.         No orders of the defined types were placed in this encounter.      Meds This Visit:  Requested Prescriptions     Signed Prescriptions Disp Refills    mometasone 0.1 % External Ointment 30 g 1     Sig: Apply 1 Application topically daily.       Imaging & Referrals:  None         ID#[id number]

## 2024-06-12 ENCOUNTER — OFFICE VISIT (OUTPATIENT)
Dept: GASTROENTEROLOGY | Facility: CLINIC | Age: 64
End: 2024-06-12
Payer: COMMERCIAL

## 2024-06-12 VITALS
WEIGHT: 199.81 LBS | HEIGHT: 68 IN | BODY MASS INDEX: 30.28 KG/M2 | DIASTOLIC BLOOD PRESSURE: 81 MMHG | HEART RATE: 76 BPM | SYSTOLIC BLOOD PRESSURE: 136 MMHG

## 2024-06-12 DIAGNOSIS — K91.89 SLIPPED NISSEN FUNDOPLICATION: ICD-10-CM

## 2024-06-12 DIAGNOSIS — K21.9 GASTROESOPHAGEAL REFLUX DISEASE, UNSPECIFIED WHETHER ESOPHAGITIS PRESENT: Primary | ICD-10-CM

## 2024-06-12 PROCEDURE — 99214 OFFICE O/P EST MOD 30 MIN: CPT | Performed by: INTERNAL MEDICINE

## 2024-06-12 RX ORDER — OMEPRAZOLE 40 MG/1
40 CAPSULE, DELAYED RELEASE ORAL DAILY
Qty: 90 CAPSULE | Refills: 3 | Status: SHIPPED | OUTPATIENT
Start: 2024-06-12

## 2024-06-12 RX ORDER — POLYETHYLENE GLYCOL 3350, SODIUM CHLORIDE, SODIUM BICARBONATE, POTASSIUM CHLORIDE 420; 11.2; 5.72; 1.48 G/4L; G/4L; G/4L; G/4L
POWDER, FOR SOLUTION ORAL
Qty: 1 EACH | Refills: 0 | Status: SHIPPED | OUTPATIENT
Start: 2024-06-12 | End: 2024-06-14

## 2024-06-12 RX ORDER — FAMOTIDINE 20 MG/1
20 TABLET, FILM COATED ORAL NIGHTLY PRN
Qty: 90 TABLET | Refills: 3 | Status: SHIPPED | OUTPATIENT
Start: 2024-06-12

## 2024-06-12 NOTE — PROGRESS NOTES
[facility] - Gastroenterology  Clinic Follow-up Visit    Chief Complaint   Patient presents with    Heartburn     Scheduled for colon at [facility] on 9/16 @0730 Am      Subjective/HPI:   Kerry Hsu is a 63 year old year-old female, patient of  with a history Nissen fundoplication 2004 with a granuloma of the epigastrium 2011, sphincteroplasty 2014 for incontinence, hemorrhoids, diverticulosis, GERD, colon polyps, anal sphincterotomy and rectocele,  presenting for EGD for atypical chest pain    Past Visit(s):  Seen in clinic 7/1/2019:  \"Kerry Hsu is a 58 year old year-old female with history of Nissen fundoplication, hemorrhoids, diverticulosis, GERD, colon polyps, anal sphincterotomy and rectocele,  presenting for surveillance colonoscopy for history of adenomatous polyps.     Patient denies any GI symptoms of nausea, vomiting, dyspepsia, dysphagia, hematemesis, abdominal pain, change in bowel habits, thin stools, hematochezia, or melena.  Additionally there is no weight loss and no reported history of chest pain or shortness of breath.  - Denies family history of colon cancer.  - Denies significant constipation issues.      Seen 4/2020  Per patient she has bloating. Not waking her up but feels it when she wakes up or when she lays down. It seems to be all day now not just off and on. Pantoprazole does not help anymore. Burns in the chest down to the lower pelvis. Currently bowels are regular and daily.  H pylori checked by primary. Even when not eating has symptoms. No nausea, vomiting, change in bowel habits. Was lightheaded a couple of days ago. No black stool or blood in the stool. No change in appetite. Maybe 5 lb weight loss. Has shoulder pain from rotator cuff issues but no pain in the chest.     Avoid coffee, tries to avoid spicy but does. Does not eat late. Keeps head of the bed elevated. No chocolate but does mint occasionally.     01/26/22-- seen for follow up for reflux and more  bloating  Per patient more beltching and gas  Has more bloating  Bowel movements are regular  Doing omeprazole 40 mg before breakfast and currently carafate after meals but no helping  Still burning in the chest and having beltching    06/12/24 presents for follow up for acid reflux  Saw surgery for perineal cyst  Did not see consult for nissen   Has had multiple issues in 2 years so put up with the reflux  Currently on omeprazole 20 mg two tablets in the morning and has breakthrough at night  Had a repeat CT cardiac scan and had thickness distal esophagus and hiatal hernia seen      CT cardiac overread 1/2/2024  FINDINGS:     CARDIAC: Please see the cardiologist report for further details.   VASCULATURE: The visualized portions of the thoracic aorta are grossly unremarkable in appearance.    LUNGS/PLEURA: A 0.3 x 0.5 cm nodule is seen in the lateral subpleural periphery of the left lower lobe (series 16, image 37). There is dependent subsegmental atelectasis bilaterally. Additional scattered ground-glass and reticular opacities are present   and may be atelectatic in origin. No airspace consolidation, pleural effusion, or pneumothorax is detected in the imaged region.    AIRWAYS: The distal airways appear grossly unremarkable.   MEDIASTINUM/ERICA: No mass or lymphadenopathy within the imaged volume.    CHEST WALL: No gross abnormalities are detected in the imaged volume.    LIMITED ABDOMEN: Within the parameters of the phase of contrast-enhancement, the included portion of the upper abdomen is notable for heterogeneous low density of the hepatic parenchyma of the right hepatic lobe, and may represent underlying geographic hepatic steatosis.     BONES: Slight scoliosis and multilevel degenerative changes of the included portion of thoracic spine are apparent.   OTHER: A small hiatal hernia is evident. Distal esophageal thickening is seen.         Wt Readings from Last 20 Encounters:   06/12/24 199 lb 12.8 oz (90.6  kg)   06/10/24 201 lb 4 oz (91.3 kg)   05/31/24 200 lb (90.7 kg)   05/30/24 200 lb (90.7 kg)   05/22/24 200 lb (90.7 kg)   05/15/24 200 lb (90.7 kg)   05/02/24 201 lb 12.8 oz (91.5 kg)   04/19/24 199 lb 12.8 oz (90.6 kg)   03/07/24 198 lb (89.8 kg)   03/01/24 198 lb 9.6 oz (90.1 kg)   02/15/24 200 lb (90.7 kg)   01/15/24 199 lb (90.3 kg)   01/11/24 198 lb 12.8 oz (90.2 kg)   01/02/24 198 lb (89.8 kg)   12/27/23 198 lb (89.8 kg)   12/19/23 198 lb 9.6 oz (90.1 kg)   12/01/23 198 lb (89.8 kg)   11/21/23 202 lb (91.6 kg)   11/06/23 202 lb 6.4 oz (91.8 kg)   10/21/23 200 lb (90.7 kg)     Prior endoscopies:  Last colonoscopy 2016 5/2020 EGD-- loose wrap  Colonoscopy 9/2019-- repeat 5 years     Soc:  -No smoking/etoh    Pertinent Surgical Hx:  Nissen fundoplication  - See additional surgical hx below  - No known issues with sedation.  - No known history of sleep apnea.      History, Medications, Allergies, ROS:      Past Medical History:    Abscess of left thigh    Acute meniscal tear of knee    Age-related nuclear cataract of both eyes    Anal sphincter incontinence    Anxiety state    Arthritis    Back problem    Chondromalacia    Colon polyps    Degenerative disc disease    Diverticular disease    Esophageal reflux    Glaucoma    Hammertoe    High blood pressure    High cholesterol    Hyperlipidemia with target low density lipoprotein (LDL) cholesterol less than 130 mg/dL    Insomnia    Neuropathy    Right Foot    Obstructive sleep apnea syndrome    Osteoarthritis    Primary open angle glaucoma of both eyes    Diagnosis of glaucoma OU; Started Latanoprost qhs after abnormal VF and OCT 2/25/16;  6/5/18 consult with Dr. Madeline Chappell-see progress note    Small bowel obstruction (HCC)    Tendinitis    Unspecified essential hypertension    Visual impairment    Reraders      Past Surgical History:   Procedure Laterality Date    Anal sphincterotomy      03/12/2013 [place]    Blepharoplasty anesthesia Bilateral 03/05/2020     Dr Kerwin Bello [facility]     Cataract extraction w/  intraocular lens implant Left 05/07/2018    L PC IOL with Dr. Suresh @ [facility]    Colonoscopy N/A 11/11/2016    Procedure: COLONOSCOPY;  Surgeon: Sabrina Cazares MD;  Location: [facility] ENDOSCOPY    Colonoscopy N/A 09/06/2019    Procedure: COLONOSCOPY;  Surgeon: Edwin Sterling MD;  Location: [facility] ENDOSCOPY    Excision of chalazion, single - od - right eye Right 6.27/2017    [place]ally    Hc arthrocentesis or inject major joint w/o us      Hysterectomy  1996    KAI    Other      Lap Nissen Fundoplication surgery    Other surgical history  2005,2007,2008    LAPAROSCOPIC LYSIS OF ADHESION    Other surgical history      right foot bunionectomy    Other surgical history  11/14/2014    right superficial anterior peroneal nerve biopsy and right proximal thigh muscle biopsy.    Other surgical history  06/13/2023    Excision and Biopsy of two  perineal cysts    Upper gi endoscopy performed  05/2015    Yag capsulotomy - os - left eye Left 12/19/2018    RJM      Family History   Problem Relation Age of Onset    Hypertension Father     Hypertension Mother     Hypertension Sister     Cancer Sister         liver cancer    Hypertension Brother     Diabetes Neg     Glaucoma Neg     Macular degeneration Neg     Breast Cancer Neg     Ovarian Cancer Neg       Social History:   Social History     Socioeconomic History    Marital status:    Tobacco Use    Smoking status: Never     Passive exposure: Never    Smokeless tobacco: Never   Vaping Use    Vaping status: Never Used   Substance and Sexual Activity    Alcohol use: No     Comment: None.     Drug use: No    Sexual activity: Yes     Birth control/protection: Hysterectomy   Other Topics Concern    Caffeine Concern Yes     Comment: occasional soda    Exercise Yes    Pt has a pacemaker No    Pt has a defibrillator No    Breast feeding No    Reaction to local anesthetic No    Right Handed Yes   Social History Narrative     Work - banking     Social Determinants of Health      [facility], [facility]    Social Connections    Received from [facility], [facility]    Housing Stability        Medications (Active prior to today's visit):  Current Outpatient Medications   Medication Sig Dispense Refill    mometasone 0.1 % External Ointment Apply 1 Application topically daily. 30 g 1    hydrocortisone 2.5 % External Ointment Apply 1 Application topically 2 (two) times daily for 7 days. 30 g 0    diazePAM 5 MG Oral Tab Take 0.5 tablets (2.5 mg total) by mouth 2 (two) times daily as needed for Anxiety. 20 tablet 0    gabapentin 300 MG Oral Cap Start with night time dose only. If well tolerated, increase to two times daily. If well tolerated, increase to three times daily. 90 capsule 0    amLODIPine 10 MG Oral Tab TAKE 1 TABLET BY MOUTH EVERY DAY (Patient taking differently: at bedtime. TAKE 1 TABLET BY MOUTH EVERY DAY) 90 tablet 3    ketoconazole 2 % External Cream Apply 1 Application topically daily. 30 g 0    latanoprost 0.005 % Ophthalmic Solution INSTILL 1 DROP IN BOTH EYES EVERY night 3 each 3    rosuvastatin 5 MG Oral Tab Take 1 tablet (5 mg total) by mouth daily.      omeprazole 20 MG Oral Capsule Delayed Release Take 2 capsules (40 mg total) by mouth every morning. 180 capsule 3    cholecalciferol 50 MCG (2000 UT) Oral Cap Take 1 capsule (2,000 Units total) by mouth daily.      MAGNESIUM OR Take by mouth.         Allergies:  Allergies   Allergen Reactions    Celecoxib TONGUE SWELLING     Other reaction(s): CELECOXIB    Sulfa Antibiotics TONGUE SWELLING     Other reaction(s): SULFA (SULFONAMIDE ANTIBIOTICS)    Erythromycin PAIN    Amoxicillin DIARRHEA       ROS:   CONSTITUTIONAL:  negative for fevers, chills  EYES:  negative for change in vision  RESPIRATORY:  negative for  shortness of breath  CARDIOVASCULAR:  negative for  chest  pain  GASTROINTESTINAL:  see HPI  GENITOURINARY:  negative for dysuria and hematuria   SKIN:  negative for  rash  ALLERGIC/IMMUNOLOGIC:  negative for hay fever allergies  ENDOCRINE:  negative for cold intolerance and heat intolerance  MUSCULOSKELETAL:  negative for  joint stiffness and joint swelling  BEHAVIOR/PSYCH:  negative for depressed mood    PHYSICAL EXAM:   Blood pressure 136/81, pulse 76, height 5' 8\" (1.727 m), weight 199 lb 12.8 oz (90.6 kg), not currently breastfeeding.    Gen: patient appears comfortable and in no acute distress present with   HEENT: conjunctiva pink, the sclera appears anicteric, oropharynx clear, mucus membranes appear moist  Skin: dry, warm, no jaundice  Ext: no cyanosis  Neuro: Alert and oriented x4, and patient is having movements of all 4 extremities   Psych: Pt has a normal mood and affect, behavior is normal    Nursing note and vitals reviewed      Labs/Imaging:     Patient's labs and imaging were reviewed and discussed with patient today.  See HPI and A&P for further details.     .  ASSESSMENT/PLAN:   Kerry Hsu is a 63 year old year-old female, patient of  with a history Nissen fundoplication, hemorrhoids, diverticulosis, GERD, colon polyps, anal sphincterotomy and rectocele,  presenting for atypical chest pain    1. Worsening bloating and reflux  2. Atypical chest pain, not with eating  EGD 2020 and cardiac workup negative  Given severity of pain and no etiology found   Had CT chest in 2/2021-- no etiology found  CT abd/plevis 11/2021-- no etiology found    Recommend:  Weight loss advised  PPI and carafate not helping, ok to continue if helps  Repeat 9/2024 for colonoscopy--- at that time will try to add EGD  Low FODMAP  Avoid milk products    Here are some reflux precautions:   - Avoid trigger foods  - Anti-reflux measures: raising the head of the bed at night, avoiding tight clothing or belts, avoiding eating late at night and not lying down  shortly after mealtime and achieving weight loss   - Avoid NSAID's, caffeine, peppermints, alcohol, tobacco and foods that incite symptoms     Add EGD to colonoscopy in September  Same orders  Discussed seeing surgery about nissen-- Dr. Berrios    Orders This Visit:  No orders of the defined types were placed in this encounter.      Meds This Visit:  Requested Prescriptions      No prescriptions requested or ordered in this encounter       Imaging & Referrals:  None

## 2024-06-12 NOTE — PATIENT INSTRUCTIONS
1. Worsening bloating and reflux  2. Atypical chest pain, not with eating  EGD 2020 and cardiac workup negative  Given severity of pain and no etiology found   Had CT chest in 2/2021-- no etiology found  CT abd/plevis 11/2021-- no etiology found    Recommend:  Weight loss advised  PPI and carafate not helping, ok to continue if helps  Repeat 9/2024 for colonoscopy--- at that time will try to add EGD  Low FODMAP  Avoid milk products    Here are some reflux precautions:   - Avoid trigger foods  - Anti-reflux measures: raising the head of the bed at night, avoiding tight clothing or belts, avoiding eating late at night and not lying down shortly after mealtime and achieving weight loss   - Avoid NSAID's, caffeine, peppermints, alcohol, tobacco and foods that incite symptoms     Add EGD to colonoscopy in September  Same orders  Discussed seeing surgery about nissen-- Dr. Berrios

## 2024-06-14 ENCOUNTER — OFFICE VISIT (OUTPATIENT)
Dept: PHYSICAL MEDICINE AND REHAB | Facility: CLINIC | Age: 64
End: 2024-06-14
Payer: COMMERCIAL

## 2024-06-14 VITALS — RESPIRATION RATE: 18 BRPM | HEIGHT: 68 IN | BODY MASS INDEX: 30.16 KG/M2 | WEIGHT: 199 LBS

## 2024-06-14 DIAGNOSIS — M54.16 RIGHT LUMBAR RADICULOPATHY: Primary | ICD-10-CM

## 2024-06-14 PROCEDURE — 3008F BODY MASS INDEX DOCD: CPT | Performed by: PHYSICAL MEDICINE & REHABILITATION

## 2024-06-14 PROCEDURE — 99214 OFFICE O/P EST MOD 30 MIN: CPT | Performed by: PHYSICAL MEDICINE & REHABILITATION

## 2024-06-14 NOTE — PROGRESS NOTES
[facility]  OFFICE FOLLOW UP EVALUATION      HISTORY OF PRESENT ILLNESS:     Chief Complaint   Patient presents with    Follow - Up     Returning patient following up after 6/3/24 Right L4 and L5 transforaminal epidural steroid injection. Reports some relief, 60%. Now able to walk with less pain.  Still with low back discomfort and N/T to bilateral feet, R>L. Intermittent pain radiation from low back down R leg. PL 6/10. PT on hold. Continues gabapentin and advil (prn).       Patient is following up for right-sided low back pain.  She states that the epidural injection provided near 100% relief for the first few days however the pain is started to slowly is returned.  Pain is located in the right low back radiating down the right leg.  Pain is still 60% improved overall however patient is wondering if she could have some further treatments as the pain is still limiting her function.  She still has low back pain bilaterally as well.  She continues gabapentin at nighttime and taking Advil as needed.  She has not had any recent physical therapy.    PHYSICAL EXAM:   Resp 18   Ht 68\"   Wt 199 lb (90.3 kg)   BMI 30.26 kg/m²       Gait  Able to toe walk and heel walk without any difficulty     LUMBAR SPINE:  Inspection: no erythema, swelling, or obvious deformity.  Their iliac crest and shoulder heights are symmetrical.     Palpation: Non tender to palpation of the spinous process.   ROM: Restricted in forward flexion with reproduction of pain  Strength: 5/5 in bilateral lower extremities except 4 out of 5 right EHL  Sensation: Intact to light touch in all dermatomes of the lower extremities except decreased to light touch in the right L5 dermatome  Reflexes: 2/4 at L4 and S1  Facet Loading: no specific facet pain  Straight leg raise: negative for radicular pain symptoms  Slump test: Positive for pain symptoms for radicular pain symptoms    IMAGING:     MRI lumbar spine  completed outside institution on 5/29/2024 was personally reviewed which is notable for grade 1 anterolisthesis of L4 on L5. There is minimal disc bulging at L3-4 with no significant spinal or foraminal narrowing. At L4-5 there is mild diffuse foraminal narrowing. There is a minimal bulging disc at L5-S1 with mild facet arthropathy. There is no significant spinal or foraminal narrowing.     All imaging results were reviewed and discussed with patient.      ASSESSMENT/PLAN:     1. Right lumbar radiculopathy        Kerry Hsu is a 63 year old female following up for right lumbar radiculopathy.  She has responded well to the epidural injection although it seems to be wearing off and its effectiveness.  Given this I have recommended repeating the injection 1 more time to see if we can further improve her functional status.  Recommend continuing home exercises but also starting PT program now and continuing gabapentin 300 mg at nighttime.  If she continues to be limited with her progress she may need neurosurgical evaluation.      The patient verbalized understanding with the plan and was in agreement. All questions/concerns were addressed and there were no barriers to learning.  Please note Dragon dictation software was used to dictate this note and may result in inadvertent typos.    Willie Baptiste DO, FAAPMR & CAQSM  Physical Medicine and Rehabilitation  Sports and Spine Medicine    PAST MEDICAL HISTORY:     Past Medical History:    Abscess of left thigh    Acute meniscal tear of knee    Age-related nuclear cataract of both eyes    Anal sphincter incontinence    Anxiety state    Arthritis    Back problem    Chondromalacia    Colon polyps    Degenerative disc disease    Diverticular disease    Esophageal reflux    Glaucoma    Hammertoe    High blood pressure    High cholesterol    Hyperlipidemia with target low density lipoprotein (LDL) cholesterol less than 130 mg/dL    Insomnia    Neuropathy    Right Foot     Obstructive sleep apnea syndrome    Osteoarthritis    Primary open angle glaucoma of both eyes    Diagnosis of glaucoma OU; Started Latanoprost qhs after abnormal VF and OCT 2/25/16;  6/5/18 consult with Dr. Madeline Chappell-see progress note    Small bowel obstruction (HCC)    Tendinitis    Unspecified essential hypertension    Visual impairment    Reraders         PAST SURGICAL HISTORY:     Past Surgical History:   Procedure Laterality Date    Anal sphincterotomy      03/12/2013 [place]    Blepharoplasty anesthesia Bilateral 03/05/2020    Dr Kerwin Bello [facility]hthal[facility]     Cataract extraction w/  intraocular lens implant Left 05/07/2018    L PC IOL with Dr. Suresh @ [facility]    Colonoscopy N/A 11/11/2016    Procedure: COLONOSCOPY;  Surgeon: Sabrina Cazares MD;  Location: [facility] ENDOSCOPY    Colonoscopy N/A 09/06/2019    Procedure: COLONOSCOPY;  Surgeon: Edwin Sterling MD;  Location: [facility] ENDOSCOPY    Excision of chalazion, single - od - right eye Right 6.27/2017    N[facility]ally    Hc arthrocentesis or inject major joint w/o us      Hysterectomy  1996    [facility]AI    Other      Lap Nissen Fundoplication surgery    Other surgical history  2005,2007,2008    LAPAROSCOPIC LYSIS OF ADHESION    Other surgical history      right foot bunionectomy    Other surgical history  11/14/2014    right superficial anterior peroneal nerve biopsy and right proximal thigh muscle biopsy.    Other surgical history  06/13/2023    Excision and Biopsy of two  perineal cysts    Upper gi endoscopy performed  05/2015    Yag capsulotomy - os - left eye Left 12/19/2018    [facility]         CURRENT MEDICATIONS:     Current Outpatient Medications   Medication Sig Dispense Refill    Omeprazole 40 MG Oral Capsule Delayed Release Take 1 capsule (40 mg total) by mouth daily. Take 1 capsule by mouth daily before breakfast. 90 capsule 3    famotidine (PEPCID) 20 MG Oral Tab Take 1 tablet (20 mg total) by mouth nightly as needed for Heartburn. 90 tablet 3    mometasone  0.1 % External Ointment Apply 1 Application topically daily. 30 g 1    hydrocortisone 2.5 % External Ointment Apply 1 Application topically 2 (two) times daily for 7 days. 30 g 0    diazePAM 5 MG Oral Tab Take 0.5 tablets (2.5 mg total) by mouth 2 (two) times daily as needed for Anxiety. 20 tablet 0    gabapentin 300 MG Oral Cap Start with night time dose only. If well tolerated, increase to two times daily. If well tolerated, increase to three times daily. 90 capsule 0    amLODIPine 10 MG Oral Tab TAKE 1 TABLET BY MOUTH EVERY DAY (Patient taking differently: at bedtime. TAKE 1 TABLET BY MOUTH EVERY DAY) 90 tablet 3    ketoconazole 2 % External Cream Apply 1 Application topically daily. 30 g 0    latanoprost 0.005 % Ophthalmic Solution INSTILL 1 DROP IN BOTH EYES EVERY night 3 each 3    rosuvastatin 5 MG Oral Tab Take 1 tablet (5 mg total) by mouth daily.      cholecalciferol 50 MCG (2000 UT) Oral Cap Take 1 capsule (2,000 Units total) by mouth daily.      MAGNESIUM OR Take by mouth.           ALLERGIES:     Allergies   Allergen Reactions    Celecoxib TONGUE SWELLING     Other reaction(s): CELECOXIB    Sulfa Antibiotics TONGUE SWELLING     Other reaction(s): SULFA (SULFONAMIDE ANTIBIOTICS)    Erythromycin PAIN    Amoxicillin DIARRHEA         FAMILY HISTORY:     Family History   Problem Relation Age of Onset    Hypertension Father     Hypertension Mother     Hypertension Sister     Cancer Sister         liver cancer    Hypertension Brother     Diabetes Neg     Glaucoma Neg     Macular degeneration Neg     Breast Cancer Neg     Ovarian Cancer Neg           SOCIAL HISTORY:     Social History     Socioeconomic History    Marital status:    Tobacco Use    Smoking status: Never     Passive exposure: Never    Smokeless tobacco: Never   Vaping Use    Vaping status: Never Used   Substance and Sexual Activity    Alcohol use: No     Comment: None.     Drug use: No    Sexual activity: Yes     Birth control/protection:  Hysterectomy   Other Topics Concern    Caffeine Concern Yes     Comment: occasional soda    Exercise Yes    Pt has a pacemaker No    Pt has a defibrillator No    Breast feeding No    Reaction to local anesthetic No    Right Handed Yes   Social History Narrative    Work - banking     Social Determinants of Health      Received from [facility], [facility]    Social Connections    Received from [facility], [facility]    Housing Stability          REVIEW OF SYSTEMS:   A comprehensive 10 point review of systems was completed.  Pertinent positives and negatives noted in the the HPI.      LABS:     Lab Results   Component Value Date     08/26/2021    A1C 6.0 (H) 08/26/2021     Lab Results   Component Value Date    WBC 10.3 01/15/2024    RBC 5.70 (H) 01/15/2024    HGB 15.4 01/15/2024    HCT 45.7 01/15/2024    MCV 80.2 01/15/2024    MCH 27.0 01/15/2024    MCHC 33.7 01/15/2024    RDW 14.8 01/15/2024    .0 01/15/2024    MPV 7.7 09/07/2017     Lab Results   Component Value Date    GLU 94 01/15/2024    BUN 11 01/15/2024    BUNCREA 10.5 01/15/2024    CREATSERUM 1.05 (H) 01/15/2024    ANIONGAP 3 01/15/2024    GFR 46 (L) 04/02/2016    GFRNAA 66 05/21/2022    GFRAA 76 05/21/2022    CA 9.5 01/15/2024    OSMOCALC 287 01/15/2024    ALKPHO 118 01/15/2024    AST 20 01/15/2024    ALT 26 01/15/2024    ALKPHOS 88 08/27/2016    BILT 0.8 01/15/2024    TP 7.4 01/15/2024    ALB 4.5 01/15/2024    GLOBULIN 2.9 01/15/2024    AGRATIO 1.2 08/27/2016     01/15/2024    K 3.7 01/15/2024     01/15/2024    CO2 30.0 01/15/2024     Lab Results   Component Value Date    PTP 13.2 12/27/2023    INR 0.95 12/27/2023     Lab Results   Component Value Date    VITD 26.0 (L) 06/08/2023    BDDS48RB 36.0 01/22/2015

## 2024-06-14 NOTE — PATIENT INSTRUCTIONS
-My office will call once injection is approved  -Continue Gabapentin at nightime  -Start PT and home exercises

## 2024-06-17 ENCOUNTER — TELEPHONE (OUTPATIENT)
Dept: PHYSICAL MEDICINE AND REHAB | Facility: CLINIC | Age: 64
End: 2024-06-17

## 2024-06-17 DIAGNOSIS — M54.16 LUMBAR RADICULOPATHY: Primary | ICD-10-CM

## 2024-06-17 NOTE — TELEPHONE ENCOUNTER
PATIENT NAME:  AINSLEY HARTLEY/ [phone] (home)/ There is no work phone number on file.  PATIENT :  1960  REFERRAL ID #:  [id number]  REFERRAL STATUS:  Authorized [1]  REVIEW REFERRAL NOTES FOR MORE INFORMATION:  DATE AUTHORIZED:  2024 // EXPIRATION DATE: 2025   REFERRED BY:  EVA PLASCENCIA MD (TEL: [phone])  REFERRED TO: No referral provider, MD (TEL: No Referred to Provider on Referral)     No vendor specified on referral. / No vendor phone number known.  No facility specified on referral  REFERRED FOR:    Diagnoses:    M54.16 (ICD-10-CM) - 724.4 (ICD-9-CM) - Right lumbar radiculopathy  Procedures:     [id number] - SPECIALTY (OTHER) - INTERNAL   NUMBER OF VISITS AUTH: 1    Approved: Right L4 and L5 Transforaminal Epidural Steroid Injection under fluoroscopy guidance under IV conscious sedation    LMTCB 1st attempt

## 2024-06-17 NOTE — TELEPHONE ENCOUNTER
Patient has been scheduled for Right L4 and L5 Transforaminal Epidural Steroid Injection under fluoroscopy guidance under IV conscious sedation on 7/8/24 at the [facility] with Dr. Baptiste.   Anesthesia type:  IVCS  Please note: The [facility] will call the business day prior to discuss the exact time/arrival and additional instructions for your appointment.  Patient was advised that if he/she does receive the covid vaccine it needs to be at least 2 weeks before or after the injection.  Medications and allergies reviewed.  Educated to hold NSAIDS (Aleve, Ibuprofen, Motrin, Advil) and anti-inflammatories (Meloxicam, Naproxen, Diclofenac, Celebrex) and for cervical injections must hold Multi-Vitamins, Vitamin E, Fish Oil/Omega-3.  If patient is receiving MAC/IVCS, weight loss oral/injectable medications will need to be held for 7 days prior to injection.  Patient informed to fast 8 hours prior to procedure and 10-12 hours prior to procedure with IVCS/MAC if patient is on a weight loss medication.   If on blood thinner, clearance has been received and approved to hold this medication by provider.   Patient informed of [facility]'s  policy:  he/she will need a  to and from procedure and must be on site for their entirety of their visit, if their ride is unable to the procedure will be cancelled.   [facility] is located in the [facility] 1st floor [address] [place] [address].   may park in the yellow/purple parking lot.  Patient verbalized understanding and agrees with plan.  Scheduled in Epic: Yes  Scheduled in Surgical Case: Yes  Follow up appointment made: NOV: Visit date not found

## 2024-06-18 ENCOUNTER — OFFICE VISIT (OUTPATIENT)
Dept: SLEEP CENTER | Age: 64
End: 2024-06-18
Attending: INTERNAL MEDICINE

## 2024-06-18 DIAGNOSIS — G47.33 OBSTRUCTIVE SLEEP APNEA SYNDROME: ICD-10-CM

## 2024-06-18 PROCEDURE — 95806 SLEEP STUDY UNATT&RESP EFFT: CPT

## 2024-06-20 ENCOUNTER — OFFICE VISIT (OUTPATIENT)
Dept: OPHTHALMOLOGY | Facility: CLINIC | Age: 64
End: 2024-06-20

## 2024-06-20 ENCOUNTER — TELEPHONE (OUTPATIENT)
Dept: INTERNAL MEDICINE CLINIC | Facility: CLINIC | Age: 64
End: 2024-06-20

## 2024-06-20 DIAGNOSIS — H40.9 GLAUCOMA OF BOTH EYES, UNSPECIFIED GLAUCOMA TYPE: Primary | ICD-10-CM

## 2024-06-20 DIAGNOSIS — H40.1132 PRIMARY OPEN ANGLE GLAUCOMA OF BOTH EYES, MODERATE STAGE: Primary | ICD-10-CM

## 2024-06-20 PROCEDURE — 99213 OFFICE O/P EST LOW 20 MIN: CPT | Performed by: OPHTHALMOLOGY

## 2024-06-20 NOTE — PATIENT INSTRUCTIONS
Primary open angle glaucoma of both eyes, moderate stage   IOP is stable.   Continue Latanoprost at bedtime in both eyes.         Return in 4 months for a pressure check.

## 2024-06-20 NOTE — ASSESSMENT & PLAN NOTE
IOP is stable.   Continue Latanoprost at bedtime in both eyes.         Return in 4 months for a pressure check.

## 2024-06-20 NOTE — TELEPHONE ENCOUNTER
Dr Rogers: Patient requesting referral to ophthalmologist   Dr Suresh. She has seen him in the past.     Glaucoma of both eyes.    You had previously gave referral to Dr Mcknight, but patient would like to see Dr Suresh.

## 2024-06-20 NOTE — PROGRESS NOTES
Kerry Hsu is a 63 year old female.    HPI:     HPI    Pt is here for an IOP check. Pt is taking Latanoprost both eyes at bedtime as directed.    Last edited by Miroslava Pimentel OT on 6/20/2024 11:06 AM.        Patient History:  Past Medical History:    Abscess of left thigh    Acute meniscal tear of knee    Age-related nuclear cataract of both eyes    Anal sphincter incontinence    Anxiety state    Arthritis    Back problem    Chondromalacia    Colon polyps    Degenerative disc disease    Diverticular disease    Esophageal reflux    Glaucoma    Hammertoe    High blood pressure    High cholesterol    Hyperlipidemia with target low density lipoprotein (LDL) cholesterol less than 130 mg/dL    Insomnia    Neuropathy    Right Foot    Obstructive sleep apnea syndrome    Osteoarthritis    Primary open angle glaucoma of both eyes    Diagnosis of glaucoma OU; Started Latanoprost qhs after abnormal VF and OCT 2/25/16;  6/5/18 consult with Dr. Madeline Chappell-see progress note    Small bowel obstruction (HCC)    Tendinitis    Unspecified essential hypertension    Visual impairment    Reraders       Surgical History: Kerry Hsu has a past surgical history that includes hysterectomy (1996) ([facility]); anal sphincterotomy (03/12/2013 [place]); hc arthrocentesis or inject major joint w/o us; upper gi endoscopy performed (05/2015); colonoscopy (N/A, 11/11/2016) (Procedure: COLONOSCOPY;  Surgeon: Sabrina Cazares MD;  Location: [facility] ENDOSCOPY); Excision of Chalazion, Single - OD - Right Eye (Right, 6.27/2017) ([facility]); Cataract extraction w/  intraocular lens implant (Left, 05/07/2018) (L PC IOL with Dr. Suresh @ [facility]); Yag Capsulotomy - OS - Left Eye (Left, 12/19/2018) (RJ); other (Lap Nissen Fundoplication surgery); colonoscopy (N/A, 09/06/2019) (Procedure: COLONOSCOPY;  Surgeon: Edwin Sterling MD;  Location: [facility] ENDOSCOPY); other surgical history (2005,2007,2008) (LAPAROSCOPIC LYSIS OF ADHESION);  other surgical history (right foot bunionectomy); other surgical history (11/14/2014) (right superficial anterior peroneal nerve biopsy and right proximal thigh muscle biopsy.); blepharoplasty anesthesia (Bilateral, 03/05/2020) (Dr Kerwin Bello Ophthal[facility] ); and other surgical history (06/13/2023) (Excision and Biopsy of two  perineal cysts).    Family History   Problem Relation Age of Onset    Hypertension Father     Hypertension Mother     Hypertension Sister     Cancer Sister         liver cancer    Hypertension Brother     Diabetes Neg     Glaucoma Neg     Macular degeneration Neg     Breast Cancer Neg     Ovarian Cancer Neg        Social History:   Social History     Socioeconomic History    Marital status:    Tobacco Use    Smoking status: Never     Passive exposure: Never    Smokeless tobacco: Never   Vaping Use    Vaping status: Never Used   Substance and Sexual Activity    Alcohol use: No     Comment: None.     Drug use: No    Sexual activity: Yes     Birth control/protection: Hysterectomy   Other Topics Concern    Caffeine Concern Yes     Comment: occasional soda    Exercise Yes    Pt has a pacemaker No    Pt has a defibrillator No    Breast feeding No    Reaction to local anesthetic No    Right Handed Yes   Social History Narrative    Work - banking     Social Determinants of Health      Received from [facility], [facility]    Social Connections    Received from [facility], [facility]    Housing Stability       Medications:  Current Outpatient Medications   Medication Sig Dispense Refill    Omeprazole 40 MG Oral Capsule Delayed Release Take 1 capsule (40 mg total) by mouth daily. Take 1 capsule by mouth daily before breakfast. 90 capsule 3    famotidine (PEPCID) 20 MG Oral Tab Take 1 tablet (20 mg total) by mouth nightly as needed for Heartburn. 90 tablet 3    mometasone 0.1 % External Ointment Apply 1 Application  topically daily. 30 g 1    diazePAM 5 MG Oral Tab Take 0.5 tablets (2.5 mg total) by mouth 2 (two) times daily as needed for Anxiety. 20 tablet 0    gabapentin 300 MG Oral Cap Start with night time dose only. If well tolerated, increase to two times daily. If well tolerated, increase to three times daily. 90 capsule 0    amLODIPine 10 MG Oral Tab TAKE 1 TABLET BY MOUTH EVERY DAY (Patient taking differently: at bedtime. TAKE 1 TABLET BY MOUTH EVERY DAY) 90 tablet 3    ketoconazole 2 % External Cream Apply 1 Application topically daily. 30 g 0    latanoprost 0.005 % Ophthalmic Solution INSTILL 1 DROP IN BOTH EYES EVERY night 3 each 3    rosuvastatin 5 MG Oral Tab Take 1 tablet (5 mg total) by mouth daily.      cholecalciferol 50 MCG (2000 UT) Oral Cap Take 1 capsule (2,000 Units total) by mouth daily.      MAGNESIUM OR Take by mouth.         Allergies:  Allergies   Allergen Reactions    Celecoxib TONGUE SWELLING     Other reaction(s): CELECOXIB    Sulfa Antibiotics TONGUE SWELLING     Other reaction(s): SULFA (SULFONAMIDE ANTIBIOTICS)    Erythromycin PAIN    Amoxicillin DIARRHEA       ROS:     ROS    Positive for: Eyes  Last edited by Miroslava Pimentel OT on 6/20/2024 11:06 AM.          PHYSICAL EXAM:     Base Eye Exam       Visual Acuity (Snellen - Linear)         Right Left    Dist cc 20/25 -2 20/30    Dist ph cc  NI      Correction: Glasses              Tonometry (Applanation, 11:12 AM)         Right Left    Pressure 16 16              Pachymetry (3/22/2023)         Right Left    Thickness 515/+2 509/+2.5              Pupils         Pupils    Right PERRL    Left PERRL              Neuro/Psych       Oriented x3: Yes    Mood/Affect: Normal                  Slit Lamp and Fundus Exam       External Exam         Right Left    External Normal Normal              Slit Lamp Exam         Right Left    Lids/Lashes Dermatochalasis, Meibomian gland dysfunction Dermatochalasis, Meibomian gland dysfunction     Conjunctiva/Sclera Trace Injection Trace Injection    Cornea Clear- no K spindles Clear- no K spindles    Anterior Chamber Deep and quiet Deep and quiet    Iris  No transillumination defects No transillumination defects    Lens  PC IOL with YAG              Fundus Exam         Right Left    Disc Good rim, Temporal crescent Good rim, Temporal crescent    C/D Ratio 0.7 0.5                  Refraction       Wearing Rx         Sphere Cylinder Axis Add    Right -0.75 +1.50 150 +3.00    Left [place] Sphere  +3.00      Age: 6m    Type: Progressive bifocal                     ASSESSMENT/PLAN:     Diagnoses and Plan:     Primary open angle glaucoma of both eyes, moderate stage   IOP is stable.   Continue Latanoprost at bedtime in both eyes.         Return in 4 months for a pressure check.     No orders of the defined types were placed in this encounter.      Meds This Visit:  Requested Prescriptions      No prescriptions requested or ordered in this encounter        Follow up instructions:  Return in about 4 months (around 10/20/2024) for a pressure check.    6/20/2024  Scribed by: Smith Gorman MD

## 2024-06-21 NOTE — PROCEDURES
[facility]  Accredited by the American Academy of Sleep Medicine (AASM)    PATIENT'S NAME: AINSLEY HARTLEY   ATTENDING PHYSICIAN: Bella Rogers MD   REFERRING PHYSICIAN: Bella Rogers MD   PATIENT ACCOUNT #: [id number] LOCATION: Sleep Center   MEDICAL RECORD #: [id number] YOB: 1960   DATE OF STUDY: 06/18/2024       SLEEP STUDY REPORT    STUDY TYPE:  Home sleep test.    INDICATION:  Suspected obstructive sleep apnea (ICD-10 code G47.33) in patient with witnessed apneic events, snoring, fatigue, hypertension, GERD, chronic cough, an Slade Sleepiness Scale score of 2/24, and a body mass index 30.8.    RESULTS:  The patient underwent home sleep test with measurement of her nasal air flow, nasal air pressure, snoring, chest and abdominal wall motion oximetry, and body position.  I have reviewed the entirety of the raw data of this study.  During the study, total recording time is 530 minutes.  The lights-out clock time is 9:56 p.m., the lights-on clock time is 6:47 a.m.  The apnea plus hypopnea index is 14.4 events per hour.  The supine apnea plus hypopnea index is 15.6 events per hour.  The average oxygen saturation is 94%, the lowest oxygen saturation is 88%, and the patient spent 1.6% of the test with saturations 88% or less.  The average heart rate is 68 beats per minute and the patient spent 75% of the test in the supine position.    INTERPRETATION:  The data generated from this study is consistent with moderate obstructive sleep apnea (ICD-10 code G47.33).    RECOMMENDATIONS:  1.   CPAP titration.  2.   Weight loss.  3.   Avoid alcohol.  4.   Avoid sedating drug.  5.   Patient should not drive if at all sleepy.    Please do not hesitate to contact me if there is any question whatsoever regarding interpretation of this study.    Dictated By Cirilo Eden MD  d: 06/20/2024 18:30:20  t: 06/20/2024 19:19:09  [facility] [id number]  PJC/    cc: Bella Rogers MD

## 2024-06-29 DIAGNOSIS — G47.33 OSA (OBSTRUCTIVE SLEEP APNEA): Primary | ICD-10-CM

## 2024-07-08 ENCOUNTER — APPOINTMENT (OUTPATIENT)
Dept: SURGERY | Facility: CLINIC | Age: 64
End: 2024-07-08
Payer: COMMERCIAL

## 2024-07-11 ENCOUNTER — LAB ENCOUNTER (OUTPATIENT)
Dept: LAB | Facility: REFERENCE LAB | Age: 64
End: 2024-07-11
Attending: INTERNAL MEDICINE
Payer: MEDICARE

## 2024-07-11 DIAGNOSIS — R73.03 PREDIABETES: ICD-10-CM

## 2024-07-11 LAB
EST. AVERAGE GLUCOSE BLD GHB EST-MCNC: 131 MG/DL (ref 68–126)
HBA1C MFR BLD: 6.2 % (ref ?–5.7)

## 2024-07-11 PROCEDURE — 36415 COLL VENOUS BLD VENIPUNCTURE: CPT

## 2024-07-11 PROCEDURE — 83036 HEMOGLOBIN GLYCOSYLATED A1C: CPT

## 2024-07-16 NOTE — TELEPHONE ENCOUNTER
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 Do you approve? Next appt is 4/14/22. none

## 2024-07-18 ENCOUNTER — MED REC SCAN ONLY (OUTPATIENT)
Dept: PHYSICAL MEDICINE AND REHAB | Facility: CLINIC | Age: 64
End: 2024-07-18

## 2024-07-22 ENCOUNTER — OFFICE VISIT (OUTPATIENT)
Dept: PHYSICAL MEDICINE AND REHAB | Facility: CLINIC | Age: 64
End: 2024-07-22
Payer: MEDICARE

## 2024-07-22 DIAGNOSIS — M54.16 RIGHT LUMBAR RADICULOPATHY: Primary | ICD-10-CM

## 2024-07-22 PROCEDURE — 99214 OFFICE O/P EST MOD 30 MIN: CPT | Performed by: PHYSICAL MEDICINE & REHABILITATION

## 2024-07-22 RX ORDER — PREGABALIN 75 MG/1
75 CAPSULE ORAL 2 TIMES DAILY
Qty: 60 CAPSULE | Refills: 0 | Status: SHIPPED | OUTPATIENT
Start: 2024-07-22

## 2024-07-22 NOTE — PATIENT INSTRUCTIONS
-Lyrica 75mg twice daily  -Stop Gabapentin   -See neurosurgery  -My office will call once injection is approved

## 2024-07-22 NOTE — PROGRESS NOTES
[facility]  OFFICE FOLLOW UP EVALUATION      HISTORY OF PRESENT ILLNESS:     Chief Complaint   Patient presents with    Follow - Up     Right L4 and L5 Transforaminal Epidural Steroid Injection under fluoroscopy guidance 7/8/24. Patient states she felt a 20% relief for 24 hours. Pain 10/10. Admits N/T on right leg down to right foot. Denies weakness. Takes Tylenol for pain with no relief.        Patient is following up for right-sided low back pain with radiation in the right lower extremity.  She states the pain is worsened at nighttime with pain radiating into the buttock and along the posterolateral aspect of the right leg.  She states she has not noted any significant improvement after the recent epidural.  She had about 20% improvement but rates her pain to be 10 out of 10 and difficulty with sleeping and functioning.  She takes Tylenol as well as gabapentin at nighttime but cannot tolerate 3 times daily dosing due to side effects of drowsiness.  She denies any new falls or instability or changes in strength.  She states she is doing home exercises whenever she could tolerate it.    PHYSICAL EXAM:   There were no vitals taken for this visit.    Gait  Able to toe walk and heel walk without any difficulty     LUMBAR SPINE:  Inspection: no erythema, swelling, or obvious deformity.  Their iliac crest and shoulder heights are symmetrical.     Palpation: Non tender to palpation of the spinous process.   ROM: Restricted in forward flexion with reproduction of pain  Strength: 5/5 in bilateral lower extremities except 4 out of 5 right EHL  Sensation: Intact to light touch in all dermatomes of the lower extremities except decreased to light touch in the right L5 dermatome  Reflexes: 2/4 at L4 and S1  Facet Loading: no specific facet pain  Straight leg raise: negative for radicular pain symptoms  Slump test: Positive for pain symptoms for radicular pain symptoms    IMAGING:      MRI lumbar spine completed outside institution on 5/29/2024 was personally reviewed which is notable for grade 1 anterolisthesis of L4 on L5. There is minimal disc bulging at L3-4 with no significant spinal or foraminal narrowing. At L4-5 there is mild diffuse foraminal narrowing. There is a minimal bulging disc at L5-S1 with mild facet arthropathy. There is no significant spinal or foraminal narrowing.        All imaging results were reviewed and discussed with patient.      ASSESSMENT/PLAN:     1. Right lumbar radiculopathy        Kerry Hsu is a 64 year old female following up for persistent right lumbar radiculopathy symptoms.  She had a repeat epidural transforaminal He which did not provide any significant improvement.  We discussed today a trial interlaminar epidural route to see if this form of administration helps better.  Recommend that she see neurosurgery as well for consultation.  Recommend she start Lyrica 75 mg twice daily and discontinue gabapentin continue her home exercise program.  I advised patient my office reach out to her once the injections approved      The patient verbalized understanding with the plan and was in agreement. All questions/concerns were addressed and there were no barriers to learning.  Please note Dragon dictation software was used to dictate this note and may result in inadvertent typos.    Willie Baptiste DO, FAAPMR & CAQSM  Physical Medicine and Rehabilitation  Sports and Spine Medicine    PAST MEDICAL HISTORY:     Past Medical History:    Abscess of left thigh    Acute meniscal tear of knee    Age-related nuclear cataract of both eyes    Anal sphincter incontinence    Anxiety state    Arthritis    Back problem    Chondromalacia    Colon polyps    Degenerative disc disease    Diverticular disease    Esophageal reflux    Glaucoma    Hammertoe    High blood pressure    High cholesterol    Hyperlipidemia with target low density lipoprotein (LDL) cholesterol less  than 130 mg/dL    Insomnia    Neuropathy    Right Foot    Obstructive sleep apnea syndrome    Osteoarthritis    Primary open angle glaucoma of both eyes    Diagnosis of glaucoma OU; Started Latanoprost qhs after abnormal VF and OCT 2/25/16;  6/5/18 consult with Dr. Madeline Chappell-see progress note    Small bowel obstruction (HCC)    Tendinitis    Unspecified essential hypertension    Visual impairment    Reraders         PAST SURGICAL HISTORY:     Past Surgical History:   Procedure Laterality Date    Anal sphincterotomy      03/12/2013 [place]    Blepharoplasty anesthesia Bilateral 03/05/2020    Dr Suresh [facility]     Cataract extraction w/  intraocular lens implant Left 05/07/2018    L PC IOL with Dr. Suresh @ [facility]    Colonoscopy N/A 11/11/2016    Procedure: COLONOSCOPY;  Surgeon: Sabrina Cazares MD;  Location: [facility] ENDOSCOPY    Colonoscopy N/A 09/06/2019    Procedure: COLONOSCOPY;  Surgeon: Edwin Sterling MD;  Location: [facility] ENDOSCOPY    Excision of chalazion, single - od - right eye Right 6.27/2017    Nasally    Hc arthrocentesis or inject major joint w/o us      Hysterectomy  1996    KAI    Other      Lap Nissen Fundoplication surgery    Other surgical history  2005,2007,2008    LAPAROSCOPIC LYSIS OF ADHESION    Other surgical history      right foot bunionectomy    Other surgical history  11/14/2014    right superficial anterior peroneal nerve biopsy and right proximal thigh muscle biopsy.    Other surgical history  06/13/2023    Excision and Biopsy of two  perineal cysts    Upper gi endoscopy performed  05/2015    Yag capsulotomy - os - left eye Left 12/19/2018    RJM         CURRENT MEDICATIONS:     Current Outpatient Medications   Medication Sig Dispense Refill    pregabalin (LYRICA) 75 MG Oral Cap Take 1 capsule (75 mg total) by mouth 2 (two) times daily. 60 capsule 0    Omeprazole 40 MG Oral Capsule Delayed Release Take 1 capsule (40 mg total) by mouth daily. Take 1 capsule by mouth daily  before breakfast. 90 capsule 3    famotidine (PEPCID) 20 MG Oral Tab Take 1 tablet (20 mg total) by mouth nightly as needed for Heartburn. 90 tablet 3    mometasone 0.1 % External Ointment Apply 1 Application topically daily. (Patient not taking: Reported on 6/24/2024) 30 g 1    diazePAM 5 MG Oral Tab Take 0.5 tablets (2.5 mg total) by mouth 2 (two) times daily as needed for Anxiety. (Patient not taking: Reported on 6/24/2024) 20 tablet 0    amLODIPine 10 MG Oral Tab TAKE 1 TABLET BY MOUTH EVERY DAY (Patient taking differently: at bedtime. TAKE 1 TABLET BY MOUTH EVERY DAY) 90 tablet 3    ketoconazole 2 % External Cream Apply 1 Application topically daily. 30 g 0    latanoprost 0.005 % Ophthalmic Solution INSTILL 1 DROP IN BOTH EYES EVERY night 3 each 3    rosuvastatin 5 MG Oral Tab Take 1 tablet (5 mg total) by mouth daily.      cholecalciferol 50 MCG (2000 UT) Oral Cap Take 1 capsule (2,000 Units total) by mouth daily.      MAGNESIUM OR Take by mouth.           ALLERGIES:     Allergies   Allergen Reactions    Celecoxib TONGUE SWELLING     Other reaction(s): CELECOXIB    Sulfa Antibiotics TONGUE SWELLING     Other reaction(s): SULFA (SULFONAMIDE ANTIBIOTICS)    Erythromycin PAIN    Amoxicillin DIARRHEA         FAMILY HISTORY:     Family History   Problem Relation Age of Onset    Hypertension Father     Hypertension Mother     Hypertension Sister     Cancer Sister         liver cancer    Hypertension Brother     Diabetes Neg     Glaucoma Neg     Macular degeneration Neg     Breast Cancer Neg     Ovarian Cancer Neg           SOCIAL HISTORY:     Social History     Socioeconomic History    Marital status:    Tobacco Use    Smoking status: Never     Passive exposure: Never    Smokeless tobacco: Never   Vaping Use    Vaping status: Never Used   Substance and Sexual Activity    Alcohol use: No     Comment: None.     Drug use: No    Sexual activity: Yes     Birth control/protection: Hysterectomy   Other Topics  Concern    Caffeine Concern Yes     Comment: occasional soda    Exercise Yes    Pt has a pacemaker No    Pt has a defibrillator No    Breast feeding No    Reaction to local anesthetic No    Right Handed Yes   Social History Narrative    Work - banking     Social Determinants of Health      [facility]    Social Connections    [facility] from [facility], [facility]    Housing Stability          REVIEW OF SYSTEMS:   A comprehensive 10 point review of systems was completed.  Pertinent positives and negatives noted in the the HPI.      LABS:     Lab Results   Component Value Date     (H) 07/11/2024    A1C 6.2 (H) 07/11/2024     Lab Results   Component Value Date    WBC 10.3 01/15/2024    RBC 5.70 (H) 01/15/2024    HGB 15.4 01/15/2024    HCT 45.7 01/15/2024    MCV 80.2 01/15/2024    MCH 27.0 01/15/2024    MCHC 33.7 01/15/2024    RDW 14.8 01/15/2024    .0 01/15/2024    MPV 7.7 09/07/2017     Lab Results   Component Value Date    GLU 94 01/15/2024    BUN 11 01/15/2024    BUNCREA 10.5 01/15/2024    CREATSERUM 1.05 (H) 01/15/2024    ANIONGAP 3 01/15/2024    GFR 46 (L) 04/02/2016    GFRNAA 66 05/21/2022    GFRAA 76 05/21/2022    CA 9.5 01/15/2024    OSMOCALC 287 01/15/2024    ALKPHO 118 01/15/2024    AST 20 01/15/2024    ALT 26 01/15/2024    ALKPHOS 88 08/27/2016    BILT 0.8 01/15/2024    TP 7.4 01/15/2024    ALB 4.5 01/15/2024    GLOBULIN 2.9 01/15/2024    AGRATIO 1.2 08/27/2016     01/15/2024    K 3.7 01/15/2024     01/15/2024    CO2 30.0 01/15/2024     Lab Results   Component Value Date    PTP 13.2 12/27/2023    INR 0.95 12/27/2023     Lab Results   Component Value Date    VITD 26.0 (L) 06/08/2023    PJMP11CJ 36.0 01/22/2015

## 2024-07-24 ENCOUNTER — TELEPHONE (OUTPATIENT)
Dept: PHYSICAL MEDICINE AND REHAB | Facility: CLINIC | Age: 64
End: 2024-07-24

## 2024-07-24 ENCOUNTER — OFFICE VISIT (OUTPATIENT)
Dept: SURGERY | Facility: CLINIC | Age: 64
End: 2024-07-24
Payer: MEDICARE

## 2024-07-24 VITALS
SYSTOLIC BLOOD PRESSURE: 120 MMHG | DIASTOLIC BLOOD PRESSURE: 82 MMHG | HEART RATE: 81 BPM | WEIGHT: 199 LBS | BODY MASS INDEX: 30.16 KG/M2 | HEIGHT: 68 IN

## 2024-07-24 DIAGNOSIS — M54.16 RIGHT LUMBAR RADICULOPATHY: Primary | ICD-10-CM

## 2024-07-24 DIAGNOSIS — M47.816 LUMBAR SPONDYLOSIS: Primary | ICD-10-CM

## 2024-07-24 DIAGNOSIS — M25.551 RIGHT HIP PAIN: ICD-10-CM

## 2024-07-24 PROCEDURE — 99215 OFFICE O/P EST HI 40 MIN: CPT | Performed by: STUDENT IN AN ORGANIZED HEALTH CARE EDUCATION/TRAINING PROGRAM

## 2024-07-24 NOTE — PROGRESS NOTES
Most recent imaging dated on: 5/29/24 - MRI Spine Lumbar via PAC's.   Pain Level: 10/10. Patient states that they are having pain located on their lower back and right buttock that radiates down right leg.   Numbness / Tingling: Patient reports numbness and tingling on right leg that radiates down to right foot   Physical Therapy / Injections: Patient has been compliant and completed Physical Therapy / Injections, 2 with Dr. Baptiste

## 2024-07-24 NOTE — TELEPHONE ENCOUNTER
Per CMS Guidelines -no authorization is required forL5-S1 Interlaminar (Right paramedian approach) Epidural Steroid Injection under fluoroscopy guidance under local anesthesia  CPT code: 37542 Dx: M54.16     Status: Authorization is not required based on medical necessity however is not a guarantee of payment and may be subject to review once claim is submitted-Covered Benefit.

## 2024-07-24 NOTE — TELEPHONE ENCOUNTER
Patient has been scheduled for L5-S1 Interlaminar (Right paramedian approach) Epidural Steroid Injection under fluoroscopy on 8/5/24 at the [facility] with Dr. Baptiste.   Anesthesia type:  Local  Please note: The [facility] will call the business day prior to discuss the exact time/arrival and additional instructions for your appointment.  Patient was advised that if he/she does receive the covid vaccine it needs to be at least 2 weeks before or after the injection.  Medications and allergies reviewed.  Educated to hold NSAIDS (Aleve, Ibuprofen, Motrin, Advil) and anti-inflammatories (Meloxicam, Naproxen, Diclofenac, Celebrex) and for cervical injections must hold Multi-Vitamins, Vitamin E, Fish Oil/Omega-3.  If patient is receiving MAC/IVCS, weight loss oral/injectable medications will need to be held for 7 days prior to injection.  Patient informed to fast 8 hours prior to procedure and 10-12 hours prior to procedure with IVCS/MAC if patient is on a weight loss medication.   If on blood thinner, clearance has been received and approved to hold this medication by provider.   Patient informed of [facility]'s  policy:  he/she will need a  to and from procedure and must be on site for their entirety of their visit, if their ride is unable to the procedure will be cancelled.   [facility] is located in the [facility] 1st floor [address] [place] [address].   may park in the yellow/purple parking lot.  Patient verbalized understanding and agrees with plan.  Scheduled in Epic: Yes  Scheduled in Surgical Case: Yes  Follow up appointment made: NOV: Visit date not found

## 2024-07-24 NOTE — PATIENT INSTRUCTIONS
Refill policies:    Allow 2-3 business days for refills; controlled substances may take longer.  Contact your pharmacy at least 5 days prior to running out of medication and have them send an electronic request or submit request through the “request refill” option in your [employer] account.  Refills are not addressed on weekends; covering physicians do not authorize routine medications on weekends.  No narcotics or controlled substances are refilled after noon on Fridays or by on call physicians.  By law, narcotics must be electronically prescribed.  A 30 day supply with no refills is the maximum allowed.  If your prescription is due for a refill, you may be due for a follow up appointment.  To best provide you care, patients receiving routine medications need to be seen at least once a year.  Patients receiving narcotic/controlled substance medications need to be seen at least once every 3 months.  In the event that your preferred pharmacy does not have the requested medication in stock (e.g. Backordered), it is your responsibility to find another pharmacy that has the requested medication available.  We will gladly send a new prescription to that pharmacy at your request.    Scheduling Tests:    If your physician has ordered radiology tests such as MRI or CT scans, please contact Central Scheduling at [phone] right away to schedule the test.  Once scheduled, the [facility] Centralized Referral Team will work with your insurance carrier to obtain pre-certification or prior authorization.  Depending on your insurance carrier, approval may take 3-10 days.  It is highly recommended patients assure they have received an authorization before having a test performed.  If test is done without insurance authorization, patient may be responsible for the entire amount billed.      Precertification and Prior Authorizations:  If your physician has recommended that you have a procedure or additional testing performed the [facility]  Centralized Referral Team will contact your insurance carrier to obtain pre-certification or prior authorization.    You are strongly encouraged to contact your insurance carrier to verify that your procedure/test has been approved and is a COVERED benefit.  Although the [facility] Centralized Referral Team does its due diligence, the insurance carrier gives the disclaimer that \"Although the procedure is authorized, this does not guarantee payment.\"    Ultimately the patient is responsible for payment.   Thank you for your understanding in this matter.  Paperwork Completion:  If you require FMLA or disability paperwork for your recovery, please make sure to either drop it off or have it faxed to our office at [phone]. Be sure the form has your name and date of birth on it.  The form will be faxed to our Forms Department and they will complete it for you.  There is a 25$ fee for all forms that need to be filled out.  Please be aware there is a 10-14 day turnaround time.  You will need to sign a release of information (MILTON) form if your paperwork does not come with one.  You may call the Forms Department with any questions at [phone].  Their fax number is [phone].

## 2024-07-24 NOTE — H&P
[facility]  Neurological Surgery New Patient Clinic Note    Kerry Hsu  7/2/1960  [id number]  PCP: Bella Rogers MD  Referring Provider: Willie Baptiste DO    REASON FOR VISIT:  Back pain    HISTORY OF PRESENT ILLNESS:  Kerry Hsu is a(n) 64 year old female who presents for evaluation of back pain with radiation.  She reports ongoing pain for the last few months involving her lower back that radiates to the right lower extremity.  She denies any inciting injury or trauma.  Pain is constant.  It radiates along the posterior aspect of the leg.  Pain is aggravated by standing, walking, any activity.  She also has noted pain with external and internal rotation of the hip.  She has a hard time getting her shoes on.  She takes tramadol which somewhat helps.  She has had physical therapy with no relief.  She noticed some weakness of the knee and giveaway sensation.  She has occasionally numbness and tingling involving her foot.  She has tried a trial of steroids, gabapentin, with no improvement.  Most recently, she had right L4 and L5 transforaminal epidural steroid injections with Dr. Baptiste on 6/3/2024 and again on 7/8/2024.  She has not had any significant relief from either of these.  She has now stopped gabapentin and has been started on Lyrica.  There are current plans for a possible interlaminar epidural steroid injection.    PAST MEDICAL HISTORY:  Past Medical History:    Abscess of left thigh    Acute meniscal tear of knee    Age-related nuclear cataract of both eyes    Anal sphincter incontinence    Anxiety state    Arthritis    Back problem    Chondromalacia    Colon polyps    Degenerative disc disease    Diverticular disease    Esophageal reflux    Glaucoma    Hammertoe    High blood pressure    High cholesterol    Hyperlipidemia with target low density lipoprotein (LDL) cholesterol less than 130 mg/dL    Insomnia    Neuropathy    Right Foot    Obstructive  sleep apnea syndrome    Osteoarthritis    Primary open angle glaucoma of both eyes    Diagnosis of glaucoma OU; Started Latanoprost qhs after abnormal VF and OCT 2/25/16;  6/5/18 consult with Dr. Madeline Chappell-see progress note    Small bowel obstruction (HCC)    Tendinitis    Unspecified essential hypertension    Visual impairment    Reraders     PAST SURGICAL HISTORY:  Past Surgical History:   Procedure Laterality Date    Anal sphincterotomy      03/12/2013 [place]    Blepharoplasty anesthesia Bilateral 03/05/2020    Dr Kerwin Bello [facility]     Cataract extraction w/  intraocular lens implant Left 05/07/2018    L PC IOL with Dr. Suresh @ [facility]    Colonoscopy N/A 11/11/2016    Procedure: COLONOSCOPY;  Surgeon: Sabrina Cazares MD;  Location: [facility] ENDOSCOPY    Colonoscopy N/A 09/06/2019    Procedure: COLONOSCOPY;  Surgeon: Edwin Sterling MD;  Location: [facility] ENDOSCOPY    Excision of chalazion, single - od - right eye Right 6.27/2017    Nasally    Hc arthrocentesis or inject major joint w/o us      Hysterectomy  1996    KAI    Other      Lap Nissen Fundoplication surgery    Other surgical history  2005,2007,2008    LAPAROSCOPIC LYSIS OF ADHESION    Other surgical history      right foot bunionectomy    Other surgical history  11/14/2014    right superficial anterior peroneal nerve biopsy and right proximal thigh muscle biopsy.    Other surgical history  06/13/2023    Excision and Biopsy of two  perineal cysts    Upper gi endoscopy performed  05/2015    Yag capsulotomy - os - left eye Left 12/19/2018    RJFRANCHESKA     FAMILY HISTORY:  family history includes Cancer in her sister; Hypertension in her brother, father, mother, and sister.    SOCIAL HISTORY:   reports that she has never smoked. She has never been exposed to tobacco smoke. She has never used smokeless tobacco. She reports that she does not drink alcohol and does not use drugs.    ALLERGIES:  Allergies   Allergen Reactions    Celecoxib TONGUE SWELLING      Other reaction(s): CELECOXIB    Sulfa Antibiotics TONGUE SWELLING     Other reaction(s): SULFA (SULFONAMIDE ANTIBIOTICS)    Erythromycin PAIN    Amoxicillin DIARRHEA     MEDICATIONS:  Current Outpatient Medications on File Prior to Visit   Medication Sig Dispense Refill    pregabalin (LYRICA) 75 MG Oral Cap Take 1 capsule (75 mg total) by mouth 2 (two) times daily. 60 capsule 0    Omeprazole 40 MG Oral Capsule Delayed Release Take 1 capsule (40 mg total) by mouth daily. Take 1 capsule by mouth daily before breakfast. 90 capsule 3    famotidine (PEPCID) 20 MG Oral Tab Take 1 tablet (20 mg total) by mouth nightly as needed for Heartburn. 90 tablet 3    amLODIPine 10 MG Oral Tab TAKE 1 TABLET BY MOUTH EVERY DAY (Patient taking differently: at bedtime. TAKE 1 TABLET BY MOUTH EVERY DAY) 90 tablet 3    latanoprost 0.005 % Ophthalmic Solution INSTILL 1 DROP IN BOTH EYES EVERY night 3 each 3    rosuvastatin 5 MG Oral Tab Take 1 tablet (5 mg total) by mouth daily.      cholecalciferol 50 MCG (2000 UT) Oral Cap Take 1 capsule (2,000 Units total) by mouth daily.      MAGNESIUM OR Take by mouth.      mometasone 0.1 % External Ointment Apply 1 Application topically daily. (Patient not taking: Reported on 6/24/2024) 30 g 1    diazePAM 5 MG Oral Tab Take 0.5 tablets (2.5 mg total) by mouth 2 (two) times daily as needed for Anxiety. (Patient not taking: Reported on 6/24/2024) 20 tablet 0    ketoconazole 2 % External Cream Apply 1 Application topically daily. 30 g 0     No current facility-administered medications on file prior to visit.     REVIEW OF SYSTEMS:  All other systems were reviewed and were negative except for those previously mentioned in the HPI    PHYSICAL EXAMINATION:  General: No acute distress.  Respiratory: Non-labored respirations bilaterally. No audible wheezing  Cardiovascular: Extremities warm and well-perfused.  Abdomen: Soft, nontender, nondistended.   Musculoskeletal: Moves all extremities well,  symmetrically.  Extremities: No edema.    NEUROLOGIC EXAMINATION:  Mental status: Alert and oriented x 3  Speech: Clear, fluent  Cranial nerves: PERRLA, EOMI, face symmetric, with normal strength and sensation, tongue and palate midline, SCM 5/5 bilaterally  Motor:     RIGHT  Delt 5/5   Bic 5/5  Tri 5/5   HI 5/5    5/5  IP 5/5   Quad 5/5   Ham 5/5   AT 5/5   EHL 5/5 Kelvin 5/5     LEFT    Delt 5/5   Bic 5/5  Tri 5/5   HI 5/5    5/5  IP 5/5   Quad 5/5   Ham 5/5   AT 5/5   EHL 5/5 Kelvin 5/5   No pronator drift  Tone: Normal  Atrophy/Fasciculations: None  Sensation: Normal to light touch, symmetric, no neglect  Cerebellar: Normal finger nose finger  Gait: Normal, nondistressed heel toe tandem gait      Reflexes: 2+ throughout, symmetric, no Flor's  Painful palpation of the right hip, painful rotation.    IMAGING:  MR lumbar 5/29/2024: Relatively changes throughout the lumbar spine, most prominent at L4-5 with there is a disc bulge.  However, there is no evidence of neural element compromise at any level with no significant compression of any nerve root to explain her presenting complaints.  Of particular interest, coronal view available on her MRI demonstrates a clear path of the right L5 nerve root with no apparent compression.      ASSESSMENT:  Ms. Hsu presents for evaluation of back pain with radiation to her right groin and down the back of her thigh.  I believe her presentation to be most consistent with musculoskeletal back pain from referred hip pain.  She has positive signs with hip rotation.  Her hip x-rays only demonstrated mild osteoarthritis.  However, I believe that she may warrant further investigation with a hip MRI.  I do not see any pathology in her lumbar MRI that could explain a lumbar radiculopathy, and certainly not to warrant a spinal operation.  I reviewed this imaging in detail with her, as well as reviewed the natural history of musculoskeletal back pain and mitigation strategies.  I  advised her to return to see Dr. Baptiste to further investigate her complaints.    Plan:  RTC as needed    Darrell Vallejo MD  Neurological Surgery    [address], [address]  [place] [address]  [phone]  Pager [phone]  7/24/2024 12:21 PM      This note was created using a voice-recognition transcribing system. Incorrect words or phrases may have been missed during proofreading. Please interpret accordingly.    Total Time    New Patient Total Time       60  minutes.      Activities       Preparing to see the patient (chart/tests/imaging review).       Obtaining and/or reviewing separately obtained history.       Performing a medically appropriate examination and/or evaluation.       Counseling and educating the patient/family/caregiver.       Ordering medications, tests, or procedures.       Referring and communicating with other health care professionals (when not separately reported).       Documenting clincal information in the electronic or other health record.       Independently interpreting results (not separately reported).    Communicating results to the patient/family/caregiver.    Care coordination (not separately reported).

## 2024-07-25 ENCOUNTER — OFFICE VISIT (OUTPATIENT)
Dept: RHEUMATOLOGY | Facility: CLINIC | Age: 64
End: 2024-07-25
Payer: MEDICARE

## 2024-07-25 VITALS
DIASTOLIC BLOOD PRESSURE: 78 MMHG | BODY MASS INDEX: 30.77 KG/M2 | SYSTOLIC BLOOD PRESSURE: 129 MMHG | HEIGHT: 68 IN | WEIGHT: 203 LBS | HEART RATE: 88 BPM

## 2024-07-25 DIAGNOSIS — R74.8 ELEVATED CK: ICD-10-CM

## 2024-07-25 DIAGNOSIS — M62.838 MUSCLE SPASM: Primary | ICD-10-CM

## 2024-07-25 DIAGNOSIS — K76.9 LIVER LESION: ICD-10-CM

## 2024-07-25 DIAGNOSIS — N28.9 KIDNEY LESION: ICD-10-CM

## 2024-07-25 PROCEDURE — 99215 OFFICE O/P EST HI 40 MIN: CPT | Performed by: INTERNAL MEDICINE

## 2024-07-25 NOTE — PROGRESS NOTES
RHEUMATOLOGY CLINIC- NEW PATIENT    Kerry Hsu is a 64 year old female.    ASSESSMENT/PLAN:       ICD-10-CM    1. Muscle spasm  M62.838 Neuro Referral - In Network      2. Elevated CK  R74.8 Neuro Referral - In Network      3. Kidney lesion  N28.9       4. Liver lesion  K76.9         DISCUSSION:  Patient presents as a new outpatient referral for evaluation of persistently elevated CK in the setting of muscle spasms for over a decade.  She has had an extensive workup in the past by neurology as well as rheumatology with: Muscle biopsy without inflammatory changes, negative myositis antibody profile, negative LAN, negative RF, negative CCP, normal inflammatory markers.  She has trialed multiple different muscle relaxants as well as neuropathic-type medications without much relief of symptoms.  We reviewed her prior lab work today at the appointment with her , and I did do not think additional autoimmune serologies would be high yield at this time especially given her negative myositis antibody profile and biopsy.  In reviewing these test, also noted incidentally found kidney and liver lesions.  Will discuss with patient's PCP.  Otherwise, discussed possible second opinion from neurology to which the patient is agreeable.    PLAN:  -Prior lab work, pathology, and imaging reviewed at length with patient and  today.  - Referral to neurology  - Consult/evaluation communicated with referring physician/provider.    Patient may follow-up as needed    Ector Courtney DO  7/25/2024   3:05 PM    HPI:     7/25/2024 Initial Consult: I had the pleasure of seeing Kerry Hsu on 7/25/2024 as a new outpatient consultation for Lumbar radiculopathy, neuropathy, myopathy, myofascial pain, muscle cramps, elevated CK. The patient was originally referred by Dr. Bella Rogers.     64 year old female w/ PMH HTN, HLD, vitamin D deficiency, cervical DDD, lumbar DDD, GERD, diverticulosis, KB who presents to  clinic today.    Of note, patient has been seen by rheumatology in the past for evaluation of muscle cramps.  Most recently, [facility], Dr. Willa Morris, on 4/30/21.  During that appointment, patient presenting for evaluation of elevated CK in the setting of muscle cramps for about a decade involving different parts of her body.  Had a history of a negative EMG and muscle biopsy and myositis panel.  She was also seen by neurology who tried various agents including Lyrica, Elavil, muscle relaxants without much relief symptoms.  She was referred to neuromuscular neurology, Dr. Jj.  No evidence of inflammatory myopathy.    During the current appointment, patient reporting \"whole body \"soreness throughout her body.  She describes her symptoms similar to muscle spasms.  The only thing that has helped her symptoms is a heating pad and symptoms can occur at rest as well which makes it difficult to sleep at night.  No unexplained fever, chills, unintentional weight loss, Raynaud's phenomenon, history of serositis, oral/nasal ulcers, history of uveitis/iritis, hematuria.  No family history of autoimmune disease such as gout, lupus, rheumatoid arthritis, psoriasis.      Current Medications:  N/A    Medication History:  N/A    Interval History:   See Above    HISTORY:  Past Medical History:    Abscess of left thigh    Acute meniscal tear of knee    Age-related nuclear cataract of both eyes    Anal sphincter incontinence    Anxiety state    Arthritis    Back problem    Chondromalacia    Colon polyps    Degenerative disc disease    Diverticular disease    Esophageal reflux    Glaucoma    Hammertoe    High blood pressure    High cholesterol    Hyperlipidemia with target low density lipoprotein (LDL) cholesterol less than 130 mg/dL    Insomnia    Neuropathy    Right Foot    Obstructive sleep apnea syndrome    Osteoarthritis    Primary open angle glaucoma  of both eyes    Diagnosis of glaucoma OU; Started Latanoprost qhs after abnormal VF and OCT 2/25/16;  6/5/18 consult with Dr. Madeline Chappell-see progress note    Small bowel obstruction (HCC)    Tendinitis    Unspecified essential hypertension    Visual impairment    Reraders      Social Hx Reviewed   Family Hx Reviewed     Medications (Active prior to today's visit):  Current Outpatient Medications   Medication Sig Dispense Refill    pregabalin (LYRICA) 75 MG Oral Cap Take 1 capsule (75 mg total) by mouth 2 (two) times daily. 60 capsule 0    Omeprazole 40 MG Oral Capsule Delayed Release Take 1 capsule (40 mg total) by mouth daily. Take 1 capsule by mouth daily before breakfast. 90 capsule 3    famotidine (PEPCID) 20 MG Oral Tab Take 1 tablet (20 mg total) by mouth nightly as needed for Heartburn. 90 tablet 3    mometasone 0.1 % External Ointment Apply 1 Application topically daily. (Patient not taking: Reported on 6/24/2024) 30 g 1    diazePAM 5 MG Oral Tab Take 0.5 tablets (2.5 mg total) by mouth 2 (two) times daily as needed for Anxiety. (Patient not taking: Reported on 6/24/2024) 20 tablet 0    amLODIPine 10 MG Oral Tab TAKE 1 TABLET BY MOUTH EVERY DAY (Patient taking differently: at bedtime. TAKE 1 TABLET BY MOUTH EVERY DAY) 90 tablet 3    ketoconazole 2 % External Cream Apply 1 Application topically daily. 30 g 0    latanoprost 0.005 % Ophthalmic Solution INSTILL 1 DROP IN BOTH EYES EVERY night 3 each 3    rosuvastatin 5 MG Oral Tab Take 1 tablet (5 mg total) by mouth daily.      cholecalciferol 50 MCG (2000 UT) Oral Cap Take 1 capsule (2,000 Units total) by mouth daily.      MAGNESIUM OR Take by mouth.         Allergies:  Allergies   Allergen Reactions    Celecoxib TONGUE SWELLING     Other reaction(s): CELECOXIB    Sulfa Antibiotics TONGUE SWELLING     Other reaction(s): SULFA (SULFONAMIDE ANTIBIOTICS)    Erythromycin PAIN    Amoxicillin DIARRHEA         ROS:   Review of Systems   Constitutional:  Negative  for chills and fever.   HENT:  Negative for congestion, hearing loss, mouth sores, nosebleeds and trouble swallowing.    Eyes:  Negative for photophobia, pain, redness and visual disturbance.   Respiratory:  Negative for cough and shortness of breath.    Cardiovascular:  Negative for chest pain, palpitations and leg swelling.   Gastrointestinal:  Negative for abdominal pain, blood in stool, diarrhea and nausea.   Endocrine: Negative for cold intolerance and heat intolerance.   Genitourinary:  Negative for dysuria, frequency and hematuria.   Musculoskeletal:  Positive for arthralgias, back pain, gait problem and myalgias. Negative for joint swelling, neck pain and neck stiffness.   Skin:  Negative for color change and rash.   Neurological:  Negative for dizziness, numbness and headaches.   Psychiatric/Behavioral:  Negative for confusion and dysphoric mood.         PHYSICAL EXAM:     Constitutional:  Well developed, Well nourished, No acute distress  HENT:  Normocephalic, Atraumatic, Bilateral external ears normal, Oropharynx moist, No oral exudates.  Neck: Normal range of motion, No tenderness, Supple, No stridor.  Eyes:  PERRL, EOMI, Conjunctiva normal, No discharge.  Respiratory:  Normal breath sounds, No respiratory distress, No wheezing.  Cardiovascular:  Normal heart rate, Normal rhythm, No murmurs, No rubs, No gallops.  GI:  Bowel sounds normal, Soft, No tenderness, No masses, No pulsatile masses.  : No CVA tenderness.  Musculoskeletal:  A comprehensive 28 count joint exam was done and was negative for swelling or tenderness except as noted. Inspections for misalignment, asymmetry, crepitation, defects, tenderness, masses, nodules, effusions, range of motion, and stability in the upper and lower extremities bilaterally are all normal unless noted.           Integument:  Warm, Dry, No erythema, No rash.  Lymphatic:  No lymphadenopathy noted.  Neurologic:  Alert & oriented x 3, Normal motor function, Normal  sensory function, No focal deficits noted.  Psychiatric:  Affect normal, Judgment normal, Mood normal.        MANUAL MUSCLE TESTING                      RIGHT               LEFT   INTEROSSEUS                      5                 5   FINGER FLEX                     5                 5   FINGER EXT                     5                 5   WRIST EXT                     5                 5   WRIST FLEX                     5                 5   DELTOID                     5                 5   BICEPS                     5                 5   TRICEPS                     5                 5   NECK EXT                     5                 5   NECK FLEX                     5                 5   NECK ROT                     5                5        HIP FLEX                    4 (pt reporting hip pain)                5   HIP EXT                     5                5   KNEE EXT                     5                5   KNEE FLEX                     5                5   ANKLE EXT                     5                5   ANKLE FLEX                     5                5   1ST TOE EXT                     5                5   1ST TOE FLEX                     5                5     GRADES  5 Normal strength  4 FROM against gravity and resistance  3 FROM against gravity  2 FROM with gravity eliminated  1 Flicker contraction  0 No contraction present  NE Not examined      LABS:     Prior lab work reviewed and notable for:     5/15/2024:   (slightly high)    1/15/2024:   WNL  ESR 21 WNL  Hepatitis A/B/C profile negative  CMP with BUN 11, CR 1.05 (slightly high), LFTs nonelevated, no gamma gap noted  CBC with WBC 10.3, Hg 15.4,     11/21/23:  TSH 1.122 WNL    5/1/2022:  CCP 2.0 WNL, RF less than 10 WNL  ESR 11 WNL, CRP less than 0.29 WNL  LAN negative  CK3 195 slightly high  UA negative blood, 100 protein    8/26/2021:   slightly high    10/23/14   Myositis Ab Panel:   DRISCOLL TEST  () See Comment   Comment:  Test                       Result            Flag  Unit  RefValue  -----------------------------------------------------------------  Myositis Ab 2 Panel  GRACE-1                     Negative                      Negative  -------------------ADDITIONAL INFORMATION-------------------  Negative               <20 units  Weak Positive        20-39 units  Moderate Positive    40-80 units  Strong Positive        >80 units  MI-2                     Negative                      Negative  PL-7                     Negative                      Negative  PL-12                    Negative                      Negative  EJ                       Negative                      Negative  OJ                       Negative                      Negative  SRP                      Negative                      Negative  KU                       Negative                      Negative  PM/SCL                   Negative                      Negative  U2 SN RNP                Negative                      Negative  -------------------ADDITIONAL INFORMATION-------------------  EIA Interpretation:  Negative             <20 Units  Weak Positive      20-39 Units  Moderate Positive  40-80 Units  Strong Positive      >80 Units  The immunoprecipitation (IPP) tests were developed and their  performance characteristics validated by [facility]. The FDA has  determined that approval for these tests is not necessary.  These are analyte specific reagent (ASR) tests.     11/14/2014:  Skeletal muscle and peripheral nerve biopsies  Final pathologic diagnosis    A.  Right anterior peroneal nerve:  - No significant histomorphologic changes    B.  Right quadriceps:  - Mild atrophic changes      Comment  The submitted peripheral nerve tissue shows no more than mild loss of large myelinated axons and overall no significant histomorphologic alterations.  The skeletal muscle tissue shows mild atrophic changes.  To some extent those could be reflective of mild type II fiber  atrophy.  If you myofibers show subtle accentuation of subsarcolemmal staining suggestive of mitochondria.  This is still thought to be within normal limits.  There are no significant other changes.  In particular, there is no evidence of any ongoing active myopathic damage or inflammatory changes.  Imagin/29/2024 MRI lumbar spine:    Impression:  1.  Mild lumbar spondylosis is most pronounced at L4-5 where there is slight grade 1 anteriorolisthesis, disc bulge and bony degenerative changes resulting in minimal to mild diffuse stenosis.  There is diffuse degenerative disc disease, most pronounced at L4-5.  2.  T2 hyperintense foci are seen within both kidneys and right lobe of the liver.  These are incompletely characterized/indeterminate and further evaluation with pre and postcontrast MRI of the abdomen to include dynamic postinfusion imaging of the liver is recommended.  3.  Diverticulosis    24 XR Lumbar:   Narrative   PROCEDURE: XR LUMBAR SPINE COMPLETE W/FLEX + EXT (CPT=72114)     COMPARISON: [facility], XR LUMBAR SPINE (MIN 4 VIEWS) (CPT=72110), 2022, 1:01 PM.  [facility], X LUMBAR SPINE AP LAT OBLS, 2008, 4:13 PM.     INDICATIONS: Right lumbar articular of the ongoing for 1 month.     TECHNIQUE: Lumbar spine radiographs (minimum 6 views), with flexion and extension views.     FINDINGS:  ALIGNMENT:   The anatomic lumbar lordosis is preserved.  VERTEBRAL BODIES:   A total of 5 non-rib-bearing lumbar-type vertebral bodies are identified. No fracture, subluxation, or bony lesion is visible. Trace anterior osteophyte formation is appreciated.  DISC SPACES: There is slight intervertebral disc space degeneration at L4-L5.  SACROILIAC JOINTS: Unremarkable.    OBLIQUE VIEWS: No defects of the pars interarticularis are identified. There is mild facet arthrosis of the lower lumbar levels.  FLEXION/EXTENSION: There is no evidence of provoked  subluxation on dynamic views.  OTHER: Scattered calcifications projecting over the pelvis likely represent phleboliths.                  Impression   CONCLUSION:  1. Mild degenerative disc disease, particularly at L4-L5.     2. No evidence of instability on dynamic views.     3. No radiographically visible acute osseous injury of the lumbar spine.     5/2/24 R Hip XR:     Impression   CONCLUSION: Mild right hip osteoarthritis without acute fracture or dislocation.     10/26/23 Bilateral Knee XR:   Impression   CONCLUSION:     Right knee:  Severe narrowing tibial femoral compartment most pronounced laterally     Left knee:  Moderate tibial femoral compartment narrowing     9/21/23 Cervical Spine XR:     Impression   CONCLUSION: Minimal disc narrowing and endplate DJD C5-C6

## 2024-08-05 ENCOUNTER — APPOINTMENT (OUTPATIENT)
Dept: SURGERY | Facility: CLINIC | Age: 64
End: 2024-08-05
Payer: MEDICARE

## 2024-08-14 ENCOUNTER — TELEPHONE (OUTPATIENT)
Facility: CLINIC | Age: 64
End: 2024-08-14

## 2024-08-15 ENCOUNTER — TELEPHONE (OUTPATIENT)
Dept: ORTHOPEDICS CLINIC | Facility: CLINIC | Age: 64
End: 2024-08-15

## 2024-08-15 ENCOUNTER — OFFICE VISIT (OUTPATIENT)
Dept: ORTHOPEDICS CLINIC | Facility: CLINIC | Age: 64
End: 2024-08-15
Payer: MEDICARE

## 2024-08-15 DIAGNOSIS — M25.551 PAIN OF RIGHT HIP: Primary | ICD-10-CM

## 2024-08-15 PROCEDURE — 99213 OFFICE O/P EST LOW 20 MIN: CPT | Performed by: ORTHOPAEDIC SURGERY

## 2024-08-15 NOTE — PROGRESS NOTES
NURSING INTAKE COMMENTS:   Chief Complaint   Patient presents with    Hip Pain     F/u Right hip pain 10/10,  did PT for Lumbar it did not relieved pain. Patient states did not get any medication on LOV.       HPI: This 64 year old female presents today with complaints of progressive right hip and lower back pain.  She has had progressive symptoms since her last visit 3 months ago.  She has been treating with physiatry without any improvement.  She had 3 lumbar epidural injections which helped only slightly for a few days.  She saw neurosurgery who suggested her pain was coming from her hip.  She did have an MRI of her lower back.  She does feel pain in her lower back extending to the buttock on the right.  Pain is worse with prolonged standing and walking.  She has pain when lying supine.  She has been taking ibuprofen and Lyrica with minimal improvements.    Past Medical History:    Abscess of left thigh    Acute meniscal tear of knee    Age-related nuclear cataract of both eyes    Anal sphincter incontinence    Anxiety state    Arthritis    Back problem    Chondromalacia    Colon polyps    Degenerative disc disease    Diverticular disease    Esophageal reflux    Glaucoma    Hammertoe    High blood pressure    High cholesterol    Hyperlipidemia with target low density lipoprotein (LDL) cholesterol less than 130 mg/dL    Insomnia    Kidney lesion    Liver lesion    Neuropathy    Right Foot    Obstructive sleep apnea syndrome    Osteoarthritis    Primary open angle glaucoma of both eyes    Diagnosis of glaucoma OU; Started Latanoprost qhs after abnormal VF and OCT 2/25/16;  6/5/18 consult with Dr. Madeline Chappell-see progress note    Sleep apnea    Small bowel obstruction (HCC)    Tendinitis    Unspecified essential hypertension    Visual impairment    Reraders     Past Surgical History:   Procedure Laterality Date    Anal sphincterotomy      03/12/2013 [facility]    Blepharoplasty anesthesia Bilateral 03/05/2020      [facility]     Cataract extraction w/  intraocular lens implant Left 05/07/2018    L PC IOL with Dr. Suresh @ [facility]    Colonoscopy N/A 11/11/2016    Procedure: COLONOSCOPY;  Surgeon: Sabrina Cazares MD;  Location: [facility] ENDOSCOPY    Colonoscopy N/A 09/06/2019    Procedure: COLONOSCOPY;  Surgeon: Edwin Sterling MD;  Location: [facility] ENDOSCOPY    Excision of chalazion, single - od - right eye Right 6.27/2017    Nasally    Hc arthrocentesis or inject major joint w/o us      Hysterectomy  1996    KAI    Other      Lap Nissen Fundoplication surgery    Other surgical history  2005,2007,2008    LAPAROSCOPIC LYSIS OF ADHESION    Other surgical history      right foot bunionectomy    Other surgical history  11/14/2014    right superficial anterior peroneal nerve biopsy and right proximal thigh muscle biopsy.    Other surgical history  06/13/2023    Excision and Biopsy of two  perineal cysts    Upper gi endoscopy performed  05/2015    Yag capsulotomy - os - left eye Left 12/19/2018    RJM     Current Outpatient Medications   Medication Sig Dispense Refill    pregabalin (LYRICA) 75 MG Oral Cap Take 1 capsule (75 mg total) by mouth 2 (two) times daily. 60 capsule 0    Omeprazole 40 MG Oral Capsule Delayed Release Take 1 capsule (40 mg total) by mouth daily. Take 1 capsule by mouth daily before breakfast. 90 capsule 3    famotidine (PEPCID) 20 MG Oral Tab Take 1 tablet (20 mg total) by mouth nightly as needed for Heartburn. 90 tablet 3    amLODIPine 10 MG Oral Tab TAKE 1 TABLET BY MOUTH EVERY DAY (Patient taking differently: at bedtime. TAKE 1 TABLET BY MOUTH EVERY DAY) 90 tablet 3    latanoprost 0.005 % Ophthalmic Solution INSTILL 1 DROP IN BOTH EYES EVERY night 3 each 3    rosuvastatin 5 MG Oral Tab Take 1 tablet (5 mg total) by mouth daily.      cholecalciferol 50 MCG (2000 UT) Oral Cap Take 1 capsule (2,000 Units total) by mouth daily.      MAGNESIUM OR Take by mouth.      diazePAM 5 MG Oral Tab Take 0.5  tablets (2.5 mg total) by mouth 2 (two) times daily as needed for Anxiety. (Patient not taking: Reported on 8/5/2024) 20 tablet 0     Allergies   Allergen Reactions    Celecoxib TONGUE SWELLING     Other reaction(s): CELECOXIB    Sulfa Antibiotics TONGUE SWELLING     Other reaction(s): SULFA (SULFONAMIDE ANTIBIOTICS)    Erythromycin PAIN    Amoxicillin DIARRHEA     Family History   Problem Relation Age of Onset    Hypertension Father     Hypertension Mother     Hypertension Sister     Cancer Sister         liver cancer    Hypertension Brother     Diabetes Neg     Glaucoma Neg     Macular degeneration Neg     Breast Cancer Neg     Ovarian Cancer Neg        Social History     Occupational History    Not on file   Tobacco Use    Smoking status: Never     Passive exposure: Never    Smokeless tobacco: Never   Vaping Use    Vaping status: Never Used   Substance and Sexual Activity    Alcohol use: No     Comment: None.     Drug use: No    Sexual activity: Yes     Birth control/protection: Hysterectomy        Review of Systems:  GENERAL: denies fevers, chills, night sweats, fatigue, unintentional weight loss/gain  SKIN: denies skin lesions, open sores, rash  HEENT:denies recent vision change, new nasal congestion,hearing loss, tinnitus, sore throat, headaches  RESPIRATORY: denies new shortness of breath, cough, asthma, wheezing  CARDIOVASCULAR: denies chest pain, leg cramps with exertion, palpitations, leg swelling  GI: denies abdominal pain, nausea, vomiting, diarrhea, constipation, hematochezia, worsening heartburn or stomach ulcers  : denies dysuria, hematuria, incontinence, increased frequency, urgency, difficulty urinating  MUSCULOSKELETAL: denies musculoskeletal complaints other than in HPI  NEURO: denies numbness, tingling, weakness, balance issues, dizziness, memory loss  PSYCHIATRIC: denies Hx of depression, anxiety, other psychiatric disorders  HEMATOLOGIC: denies blood clots, anemia, blood clotting disorders,  blood transfusion  ENDOCRINE: denies autoimmune disease, thyroid issues, or diabetes  ALLERGY: denies asthma, seasonal allergies    Physical Examination:    There were no vitals taken for this visit.  Constitutional: appears well hydrated, alert and responsive, no acute distress noted  Extremities: She walks with obvious discomfort affecting the right hip and buttock and groin.  She is tender over the greater trochanter.  Mildly tender in the right flank and sciatic notch.  Active straight leg raising produces significant pain.  Passive straight leg raising also produces pain.  There is pain with any range of motion of the hip.  Unable to flex the hip to 90 degrees with pain.  Passive internal rotation to 30 degrees produces significant pain.  Passive external rotation to 70 degrees also produces severe pain.  Axial loading of the femur produces minimal pain.  Neurological: Unchanged    Imaging:   MRI report for lumbar spine demonstrates degenerative changes with multilevel stenosis.    Labs:  Lab Results   Component Value Date    WBC 10.3 01/15/2024    HGB 15.4 01/15/2024    .0 01/15/2024      Lab Results   Component Value Date    GLU 94 01/15/2024    BUN 11 01/15/2024    CREATSERUM 1.05 (H) 01/15/2024    GFR 46 (L) 04/02/2016    GFRNAA 66 05/21/2022    GFRAA 76 05/21/2022        Assessment and Plan:  Diagnoses and all orders for this visit:    Pain of right hip  -     Cancel: MRI HIPS, RIGHT (CPT=73721); Future  -     MRI HIPS, RIGHT (CPT=73721); Future        Assessment: Lumbar spinal stenosis and degenerative disc disease, hip pain, possible intrinsic hip pathology    Plan: Given her ongoing symptoms despite treatment for her lower back, I advised an MRI of the right hip to evaluate for osteonecrosis or significant chondral damage.  Would also look for extrinsic signs of impingement.  Follow-up again after the MRI.  Advised to continue use of the medications as prescribed.  Advised continued follow-up  with physiatry.    Follow Up: Return for follow-up visit after ordered tests completed.    CONG LOZANO MD

## 2024-08-15 NOTE — TELEPHONE ENCOUNTER
S/w patient- Booked her for 3pm appointmnet on 8/22/24. She accepted and had no further questions

## 2024-08-15 NOTE — TELEPHONE ENCOUNTER
Patient calling was told by Dr Delgadillo to schedule an appointment in one week from today to go over the MRI results. No openings until September.

## 2024-08-16 ENCOUNTER — HOSPITAL ENCOUNTER (OUTPATIENT)
Dept: MRI IMAGING | Facility: HOSPITAL | Age: 64
Discharge: HOME | End: 2024-08-16
Attending: ORTHOPAEDIC SURGERY
Payer: MEDICARE

## 2024-08-16 DIAGNOSIS — M25.551 PAIN OF RIGHT HIP: ICD-10-CM

## 2024-08-16 PROCEDURE — 73721 MRI JNT OF LWR EXTRE W/O DYE: CPT | Performed by: ORTHOPAEDIC SURGERY

## 2024-08-19 ENCOUNTER — OFFICE VISIT (OUTPATIENT)
Dept: PHYSICAL MEDICINE AND REHAB | Facility: CLINIC | Age: 64
End: 2024-08-19
Payer: MEDICARE

## 2024-08-19 ENCOUNTER — OFFICE VISIT (OUTPATIENT)
Dept: INTERNAL MEDICINE CLINIC | Facility: CLINIC | Age: 64
End: 2024-08-19
Payer: MEDICARE

## 2024-08-19 VITALS
WEIGHT: 200.81 LBS | DIASTOLIC BLOOD PRESSURE: 79 MMHG | BODY MASS INDEX: 30.44 KG/M2 | HEART RATE: 75 BPM | HEIGHT: 68 IN | SYSTOLIC BLOOD PRESSURE: 123 MMHG

## 2024-08-19 VITALS — HEIGHT: 68 IN | BODY MASS INDEX: 30.31 KG/M2 | WEIGHT: 200 LBS

## 2024-08-19 DIAGNOSIS — N28.9 KIDNEY LESION: ICD-10-CM

## 2024-08-19 DIAGNOSIS — G72.9 MYOPATHY: ICD-10-CM

## 2024-08-19 DIAGNOSIS — K76.9 LIVER LESION: ICD-10-CM

## 2024-08-19 DIAGNOSIS — M54.16 RIGHT LUMBAR RADICULOPATHY: Primary | ICD-10-CM

## 2024-08-19 DIAGNOSIS — M79.18 MYOFASCIAL PAIN: ICD-10-CM

## 2024-08-19 DIAGNOSIS — S73.191A TEAR OF RIGHT ACETABULAR LABRUM, INITIAL ENCOUNTER: ICD-10-CM

## 2024-08-19 DIAGNOSIS — R74.8 ELEVATED CPK: ICD-10-CM

## 2024-08-19 DIAGNOSIS — R25.2 CRAMPS, MUSCLE, GENERAL: ICD-10-CM

## 2024-08-19 PROCEDURE — 99214 OFFICE O/P EST MOD 30 MIN: CPT | Performed by: INTERNAL MEDICINE

## 2024-08-19 PROCEDURE — G2211 COMPLEX E/M VISIT ADD ON: HCPCS | Performed by: INTERNAL MEDICINE

## 2024-08-19 PROCEDURE — 99214 OFFICE O/P EST MOD 30 MIN: CPT | Performed by: PHYSICAL MEDICINE & REHABILITATION

## 2024-08-19 RX ORDER — DIAZEPAM 2 MG/1
2 TABLET ORAL NIGHTLY PRN
Qty: 20 TABLET | Refills: 0 | Status: SHIPPED | OUTPATIENT
Start: 2024-08-19

## 2024-08-19 RX ORDER — MELOXICAM 15 MG/1
15 TABLET ORAL DAILY
Qty: 14 TABLET | Refills: 0 | Status: SHIPPED | OUTPATIENT
Start: 2024-08-19 | End: 2024-09-02

## 2024-08-19 NOTE — PROGRESS NOTES
[facility]  OFFICE FOLLOW UP EVALUATION      HISTORY OF PRESENT ILLNESS:     Chief Complaint   Patient presents with    Follow - Up     LOV: 8/5/24 Patient f/u on L5/S1 ILESI with 50% improvement for a few days. C/o right low back that radiates into hip/glute and to foot with N/T. Takes Lyrica and OTC PRN. Rates pain 10/10.       Patient is following up for right-sided low back pain with radiation in the right lower extremity.  Patient states that the epidural injection has provided improvement in the leg pain however she continues to have persistent axial low back pain in the lumbar spine bilaterally with radiation in the buttocks.  She also has moderate to severe pain in the right anterior thigh and groin.  She had MRI imaging of the hip recently as ordered by orthopedics.  She rates her pain to be 10 out of 10.  She is taking Lyrica once a day instead of twice daily.  She denies any changes in strength or bowel bladder.  Pain can be aggravated with any prolonged activity.    PHYSICAL EXAM:   Ht 68\"   Wt 200 lb (90.7 kg)   BMI 30.41 kg/m²     Gait  Able to toe walk and heel walk without any difficulty     LUMBAR SPINE:  Inspection: no erythema, swelling, or obvious deformity.  Their iliac crest and shoulder heights are symmetrical.     Palpation: Non tender to palpation of the spinous process.   ROM: Restricted in forward flexion with reproduction of pain  Strength: 5/5 in bilateral lower extremities except 4 out of 5 right EHL  Sensation: Intact to light touch in all dermatomes of the lower extremities except decreased to light touch in the right L5 dermatome  Reflexes: 2/4 at L4 and S1  Facet Loading: no specific facet pain  Straight leg raise: negative for radicular pain symptoms  Slump test: Positive for pain symptoms for radicular pain symptoms    IMAGING:     MRI lumbar spine completed outside institution on 5/29/2024 was personally reviewed which is notable  for grade 1 anterolisthesis of L4 on L5. There is minimal disc bulging at L3-4 with no significant spinal or foraminal narrowing. At L4-5 there is mild diffuse foraminal narrowing. There is a minimal bulging disc at L5-S1 with mild facet arthropathy. There is no significant spinal or foraminal narrowing.        All imaging results were reviewed and discussed with patient.      ASSESSMENT/PLAN:     1. Right lumbar radiculopathy    2. Tear of right acetabular labrum, initial encounter          Kerry Hsu is a 64 year old female following up for persistent right lumbar radiculopathy symptoms.  Had improvement in the radicular symptoms overall but has persistent axial low back pain from facet arthropathy as well as right hip anterior thigh pain with mild osteoarthritis and acetabular labral tear as noted on her recent MRI imaging of the hip.  I recommend she follow-up with orthopedic surgery to see if there is any plans for any surgical intervention as they are the ones who ordered the MRI.  However, I do believe she may benefit from a hip joint injection as well as lumbar facet joint injections however given that she has had 3 recent procedures with me with steroid in the last 3 months I do not recommend any further injections at this time as this may cause osteonecrosis or even a compression fracture.  Recommend she manage her symptoms with oral medications and home exercises including meloxicam 15 mg daily which was ordered for her and topical treatment with ice and heat as tolerated.      The patient verbalized understanding with the plan and was in agreement. All questions/concerns were addressed and there were no barriers to learning.  Please note Dragon dictation software was used to dictate this note and may result in inadvertent typos.    Willie Baptiste DO, FAAPMR & CAQSM  Physical Medicine and Rehabilitation  Sports and Spine Medicine    PAST MEDICAL HISTORY:     Past Medical History:    Abscess of  left thigh    Acute meniscal tear of knee    Age-related nuclear cataract of both eyes    Anal sphincter incontinence    Anxiety state    Arthritis    Back problem    Chondromalacia    Colon polyps    Degenerative disc disease    Diverticular disease    Esophageal reflux    Glaucoma    Hammertoe    High blood pressure    High cholesterol    Hyperlipidemia with target low density lipoprotein (LDL) cholesterol less than 130 mg/dL    Insomnia    Kidney lesion    Liver lesion    Neuropathy    Right Foot    Obstructive sleep apnea syndrome    Osteoarthritis    Primary open angle glaucoma of both eyes    Diagnosis of glaucoma OU; Started Latanoprost qhs after abnormal VF and OCT 2/25/16;  6/5/18 consult with Dr. Madeline Chappell-see progress note    Sleep apnea    Small bowel obstruction (HCC)    Tendinitis    Unspecified essential hypertension    Visual impairment    Reraders         PAST SURGICAL HISTORY:     Past Surgical History:   Procedure Laterality Date    Anal sphincterotomy      03/12/2013 [facility]    Blepharoplasty anesthesia Bilateral 03/05/2020    Dr [facility]h [facility]     Cataract extraction w/  intraocular lens implant Left 05/07/2018    L PC IOL with Dr. Suresh @ [facility]    Colonoscopy N/A 11/11/2016    Procedure: COLONOSCOPY;  Surgeon: Sabrina Cazares MD;  Location: [facility] ENDOSCOPY    Colonoscopy N/A 09/06/2019    Procedure: COLONOSCOPY;  Surgeon: Edwin Sterling MD;  Location: [facility] ENDOSCOPY    Excision of chalazion, single - od - right eye Right 6.27/2017    Nasally    Hc arthrocentesis or inject major joint w/o us      Hysterectomy  1996    KAI    Other      Lap Nissen Fundoplication surgery    Other surgical history  2005,2007,2008    LAPAROSCOPIC LYSIS OF ADHESION    Other surgical history      right foot bunionectomy    Other surgical history  11/14/2014    right superficial anterior peroneal nerve biopsy and right proximal thigh muscle biopsy.    Other surgical history  06/13/2023     Excision and Biopsy of two  perineal cysts    Upper gi endoscopy performed  05/2015    Yag capsulotomy - os - left eye Left 12/19/2018    [facility]         CURRENT MEDICATIONS:     Current Outpatient Medications   Medication Sig Dispense Refill    diazePAM 2 MG Oral Tab Take 1 tablet (2 mg total) by mouth nightly as needed. 20 tablet 0    pregabalin (LYRICA) 75 MG Oral Cap Take 1 capsule (75 mg total) by mouth 2 (two) times daily. 60 capsule 0    Omeprazole 40 MG Oral Capsule Delayed Release Take 1 capsule (40 mg total) by mouth daily. Take 1 capsule by mouth daily before breakfast. 90 capsule 3    famotidine (PEPCID) 20 MG Oral Tab Take 1 tablet (20 mg total) by mouth nightly as needed for Heartburn. 90 tablet 3    amLODIPine 10 MG Oral Tab TAKE 1 TABLET BY MOUTH EVERY DAY (Patient taking differently: at bedtime. TAKE 1 TABLET BY MOUTH EVERY DAY) 90 tablet 3    latanoprost 0.005 % Ophthalmic Solution INSTILL 1 DROP IN BOTH EYES EVERY night 3 each 3    rosuvastatin 5 MG Oral Tab Take 1 tablet (5 mg total) by mouth daily.      cholecalciferol 50 MCG (2000 UT) Oral Cap Take 1 capsule (2,000 Units total) by mouth daily.      MAGNESIUM OR Take by mouth.           ALLERGIES:     Allergies   Allergen Reactions    Celecoxib TONGUE SWELLING     Other reaction(s): CELECOXIB    Sulfa Antibiotics TONGUE SWELLING     Other reaction(s): SULFA (SULFONAMIDE ANTIBIOTICS)    Erythromycin PAIN    Amoxicillin DIARRHEA         FAMILY HISTORY:     Family History   Problem Relation Age of Onset    Hypertension Father     Hypertension Mother     Hypertension Sister     Cancer Sister         liver cancer    Hypertension Brother     Diabetes Neg     Glaucoma Neg     Macular degeneration Neg     Breast Cancer Neg     Ovarian Cancer Neg           SOCIAL HISTORY:     Social History     Socioeconomic History    Marital status:    Tobacco Use    Smoking status: Never     Passive exposure: Never    Smokeless tobacco: Never   Vaping Use    Vaping  status: Never Used   Substance and Sexual Activity    Alcohol use: No     Comment: None.     Drug use: No    Sexual activity: Yes     Birth control/protection: Hysterectomy   Other Topics Concern    Caffeine Concern Yes     Comment: occasional soda    Exercise Yes    Pt has a pacemaker No    Pt has a defibrillator No    Breast feeding No    Reaction to local anesthetic No    Right Handed Yes   Social History Narrative    Work - banking     Social Determinants of Health      [facility], [facility]    Social Connections    Received from [facility], [facility]    Housing Stability          REVIEW OF SYSTEMS:   A comprehensive 10 point review of systems was completed.  Pertinent positives and negatives noted in the the HPI.      LABS:     Lab Results   Component Value Date     (H) 07/11/2024    A1C 6.2 (H) 07/11/2024     Lab Results   Component Value Date    WBC 10.3 01/15/2024    RBC 5.70 (H) 01/15/2024    HGB 15.4 01/15/2024    HCT 45.7 01/15/2024    MCV 80.2 01/15/2024    MCH 27.0 01/15/2024    MCHC 33.7 01/15/2024    RDW 14.8 01/15/2024    .0 01/15/2024    MPV 7.7 09/07/2017     Lab Results   Component Value Date    GLU 94 01/15/2024    BUN 11 01/15/2024    BUNCREA 10.5 01/15/2024    CREATSERUM 1.05 (H) 01/15/2024    ANIONGAP 3 01/15/2024    GFR 46 (L) 04/02/2016    GFRNAA 66 05/21/2022    GFRAA 76 05/21/2022    CA 9.5 01/15/2024    OSMOCALC 287 01/15/2024    ALKPHO 118 01/15/2024    AST 20 01/15/2024    ALT 26 01/15/2024    ALKPHOS 88 08/27/2016    BILT 0.8 01/15/2024    TP 7.4 01/15/2024    ALB 4.5 01/15/2024    GLOBULIN 2.9 01/15/2024    AGRATIO 1.2 08/27/2016     01/15/2024    K 3.7 01/15/2024     01/15/2024    CO2 30.0 01/15/2024     Lab Results   Component Value Date    PTP 13.2 12/27/2023    INR 0.95 12/27/2023     Lab Results   Component Value Date    VITD 26.0 (L) 06/08/2023    [id number]  36.0 01/22/2015

## 2024-08-19 NOTE — PATIENT INSTRUCTIONS
Mobic for 7 to 10 days  Ice and heat  Continue home exercises  Continue Lyrica once a day  Follow-up in 3 months  May consider lumbar facet joint injections or hip joint injection next.

## 2024-08-19 NOTE — PROGRESS NOTES
Kerry Hsu is a 64 year old female.  Chief Complaint   Patient presents with    Hip Pain       HPI:   Urgent visit  C/C hip pain R>L   C/o saw the neurosurgeon and was reassured that it was not her back so saw the Ortho doctor and MRI of the hip were done  Not taking diazepam   Foot is numb and hips and lower back hurt   Noted that in 2017 she was given duloxetine 20 30 40 and 60 most likely is a increased titrating up and it was not helping  Also has some questions regarding her insurance and Medicare and confirming she does not have HMO in the chart-confirmed that I do not see this in her chart        HISTORY  restarted in the upper arms \"its back and more severe \"   Wondering if she has stiff persons synd -would like Anti-glutamic acid decarboxylase (NICOLE) antibody testing , looked at the chart and noted that there was a NICOLE 65 antibody testing that is available in the chart but I am not sure if this is the same as the antiglutamic acid decarboxylase so she will follow-up with rheumatology  Had emg and muscle biopsy in 2014 by dr hernandez  a few yrs ago at that area left leg area does get swollen and hard   Has apt with GI this week , H. pylori test is negative  Got injection to the back and although it still hurting it does take time so she is being patient with that  Still has rash under her breasts and also the groin and has tried ketoconazole cream as well as the nystatin powder        HISTORY  Saw Dr. Delgadillo the orthopedic doctor and was sent for physical therapy which she is doing now for her back but needs to see the back doctor--reviewed note and noted it was suggested she see physiatry         Had  the left cataract removed  approx 3 yrs ago and since then has had blurry vision and double vision In the left eye        She still does get occasional cramping and takes magnesium for it and has numbness and tingling in bilateral feet worse on the right, noted in the past her cpk was always  elevated and had this worked up but did read that lead might have something to do with it so we will have this checked    HISTORY    palpitations for the past couple weeks at least and it is now scaring her because she can feel it when she is just lying down or just sitting there not doing anything and it is not on it but only on exertion, comes and goes   Also has some retrosternal chest pains 5/10  Better ? --took ppi and it didn't help   Worse -with deep inspirations  No increase in caffeine  Chest pain and palpitations happen at the same time and not associated with any diaphoresis  She did have an EKG done in January and a calcium score in August of this year           HISTORY  Saw dr rothman ent and ct was neg SO NO  sinus SURG NEEDED     She thinks its her eyes   Feels her vision is bad even after glasses and thinks it happended after cataract surg   HAS SEEN   saw dr mckeon--  neurology   Saw cards dr genao and will go for heart scan   Saw many eye drs and tried drops but no help , eye drops now burns                     HISTORY  6/2023 visit     will see the surgeon for a cyst that she has in her vaginal area, she had already seen the dermatologist and the GYN doctor  Hopefully will come out of the boot for her left leg  Saw ENT and may is sinus surgery            HISTORY   right wrist pain after her rotator cuff surg x 2 weeks -was first like an electric shock and now only gets that once in a while but now more sore   Going for PT   She also has some left thigh pain - from buttocks to the knees         History  3/2022  right shoulder surgery on 3/7/2022   Requested by dr shabnam Garcia - can be seen in epic   Can walk up one flight of stars without feeling short of breath   Right shoulder radiates to the neck on that side   She is right handed , cant use her hand and arm as much            HISTORY   10/2021   She has been seeing Ortho for the neuropathy and also received shots to the shoulder-right-and  knees and was given Tylenol No. 3  Also sees the foot doctor  saw dr mike lucas and consider valium but she feels this will affect her ability to work   She complains of \"nerve pain\" in the feet   She states that valium knocks her out -even if she takes it in the evening she still feels the effects in the am , foot dr gave gabpentin but that too makes her sleepy \"im very sensitive   Also c/o left flank pain  X couple weeks -- ua done in office and does show blood with uti    8/10 , sharp and stabbing ,   Worse with standing after sitting a long time   Better - ?   Comes and goesbut now more frequent      Not taking trazodone - not helping and she is not sure what meds cuases what so she doesn't want to be on meds   Know she has had CTs and ultrasounds of her kidney done before and has seen urology as well- dr moreno -she did have a 3 mm nonobstructing stone in the right kidney  Still has her chronic cough and try the Tessalon Perles which did not help that much, has an inhaler so she will try that, the codien cough syrup just put her to sleep as well      Finished doxy yest taht she got from the  ,didn't help          HISTORY 3/2021   cramps is all over but mostly in the legs-stretching makes it works and the meloxicam is not helping and she did see the rheumatologist and was started on a new medication metaxalone 800 mg 3 times a day but it makes her sleepy so she is only taking it at night--it still wakes her up at night so does not feel that that is working was recommended to follow-up at an academic center     Still has a cough and the codeine cough syrup did not help much and noted that she is on and PPI and the chest x-ray was normal which was ordered last time   she has noticed that the rescue inhaler does help                       HISTORY  History from August 2020   Has seen the rheumatologist, physiatry and gone for physical therapy  Has tried Cymbalta given by physiatry but in January when I had  seen her she had stated that she was not taking it and and cyclobenzaprine was started but she does not think that that was helpful  She states that she has had an EMG in the past as well a very long time ago  She states that she drinks a lot of water as well  2015 emg report noted in chart - normal   Also noted in 2015 she saw the neurologist Dr. Enamorado and was recommended to go to the pain doctors--Flexeril was tried  Needs to go back to see Dr. Hays the podiatrist for her orthotics                 History   Last seen in February and since then in March she saw Dr. Delgadillo and got knee injections for her osteoarthritis of the knees   had an endoscopic procedure and was found to have gastric polyp by Dr. Pereira  In June she saw the foot doctor and received a shot in her foot for the neuroma  In July she saw Dr. Bird the eye doctor for her glaucoma and will return in 4 months and then           History  1/11/2020 visit   Dr clay- ortho -- left hand tendonitiis -got shot and feeks better   Would like to see dr berger   Not taking cymbalta - takes T#3 one a week , ibuprofen \"seldom\"- once a mn   Muscle cramps coming back - legs thighs and feet , no RLS               History- 11/19  C/o right shoulder has a tear  And she does admit she uses the left more   Used to see dr berger and now sees dr haque and got a shot to the right shoulder   Needs a referral to see pain clinic   Needs referral to see podiatrist --corns and calluses right foot / toe and also for ingrown toenails   Pain is 10/10 -- iburprofen does helps but doesn't like taking it too much --takes T#3 but t just puts her to sleep and then she will wake up with pain -- unable ot sratch her back   No falls trauma or injury that she is aware of      History   She has had both muscle and nerve biopsies by dr hernandez   She gets these cramps every day   Steroids do not help either  Noted that she had try cyclobenzaprine 10 mg in July 2019--no help    Since the last visit she did see her orthopedic doctor and received a short of a cortisone shot to her right shoulder  Also c/o left hand tender swelling x few weeks   She also states that she gets rashes underneath her breasts and the triamcinolone helps with this and needs a refill        hisotry -8/19 first visit   C/c htn   C/o fell on July 8th and has some left rib pain and back pains   Had some ct scans and would like to go through it  Needs referrals    Usually gets shots to her knees and shoulders- was told to follow-up with a new doctor     [facility]  gerd daily ppi --h/o nissens fundoplication  ?2006  HTN  Hip pain and back pain - T#3 - prn 2 tabs a week, also takes ibuprofen   bm hematuria   Non obstructing kid stones   elmer was on cpap   Glaucoma suspect   Right shoulder rotator cuff tear s/p surg 3/2022   Osteoarthritis knees  Hepatic and renal cysts similar to 8/19 and CT 5/20  H/o left toe OM s/p amputation      Dr peters - eye dr Dr moreno - urologist   Dr. Craft- rheum   Dr. Delgadillo- ortho   Dr lorraine hall     ----------------------------------------------------------------------------------------------------------------          Current Outpatient Medications   Medication Sig Dispense Refill    diazePAM 2 MG Oral Tab Take 1 tablet (2 mg total) by mouth nightly as needed. 20 tablet 0    pregabalin (LYRICA) 75 MG Oral Cap Take 1 capsule (75 mg total) by mouth 2 (two) times daily. 60 capsule 0    Omeprazole 40 MG Oral Capsule Delayed Release Take 1 capsule (40 mg total) by mouth daily. Take 1 capsule by mouth daily before breakfast. 90 capsule 3    famotidine (PEPCID) 20 MG Oral Tab Take 1 tablet (20 mg total) by mouth nightly as needed for Heartburn. 90 tablet 3    amLODIPine 10 MG Oral Tab TAKE 1 TABLET BY MOUTH EVERY DAY (Patient taking differently: at bedtime. TAKE 1 TABLET BY MOUTH EVERY DAY) 90 tablet 3    latanoprost 0.005 % Ophthalmic Solution INSTILL 1 DROP IN BOTH EYES EVERY  night 3 each 3    rosuvastatin 5 MG Oral Tab Take 1 tablet (5 mg total) by mouth daily.      cholecalciferol 50 MCG (2000 UT) Oral Cap Take 1 capsule (2,000 Units total) by mouth daily.      MAGNESIUM OR Take by mouth.        Past Medical History:    Abscess of left thigh    Acute meniscal tear of knee    Age-related nuclear cataract of both eyes    Anal sphincter incontinence    Anxiety state    Arthritis    Back problem    Chondromalacia    Colon polyps    Degenerative disc disease    Diverticular disease    Esophageal reflux    Glaucoma    Hammertoe    High blood pressure    High cholesterol    Hyperlipidemia with target low density lipoprotein (LDL) cholesterol less than 130 mg/dL    Insomnia    Kidney lesion    Liver lesion    Neuropathy    Right Foot    Obstructive sleep apnea syndrome    Osteoarthritis    Primary open angle glaucoma of both eyes    Diagnosis of glaucoma OU; Started Latanoprost qhs after abnormal VF and OCT 2/25/16;  6/5/18 consult with Dr. Madeline Chappell-see progress note    Sleep apnea    Small bowel obstruction (HCC)    Tendinitis    Unspecified essential hypertension    Visual impairment    Reraders      Past Surgical History:   Procedure Laterality Date    Anal sphincterotomy      03/12/2013 [place]    Blepharoplasty anesthesia Bilateral 03/05/2020    Dr Suresh [facility]     Cataract extraction w/  intraocular lens implant Left 05/07/2018    L PC IOL with Dr. Suresh @ [facility]    Colonoscopy N/A 11/11/2016    Procedure: COLONOSCOPY;  Surgeon: Sabrina Cazares MD;  Location: [facility] ENDOSCOPY    Colonoscopy N/A 09/06/2019    Procedure: COLONOSCOPY;  Surgeon: Edwin Sterling MD;  Location: [facility] ENDOSCOPY    Excision of chalazion, single - od - right eye Right 6.27/2017    Nasally    Hc arthrocentesis or inject major joint w/o us      Hysterectomy  1996    KAI    Other      Lap Nissen Fundoplication surgery    Other surgical history  2005,2007,2008    LAPAROSCOPIC LYSIS OF ADHESION     Other surgical history      right foot bunionectomy    Other surgical history  11/14/2014    right superficial anterior peroneal nerve biopsy and right proximal thigh muscle biopsy.    Other surgical history  06/13/2023    Excision and Biopsy of two  perineal cysts    Upper gi endoscopy performed  05/2015    Yag capsulotomy - os - left eye Left 12/19/2018    RJFRANCHESKA      Social History:  Social History     Socioeconomic History    Marital status:    Tobacco Use    Smoking status: Never     Passive exposure: Never    Smokeless tobacco: Never   Vaping Use    Vaping status: Never Used   Substance and Sexual Activity    Alcohol use: No     Comment: None.     Drug use: No    Sexual activity: Yes     Birth control/protection: Hysterectomy   Other Topics Concern    Caffeine Concern Yes     Comment: occasional soda    Exercise Yes    Pt has a pacemaker No    Pt has a defibrillator No    Breast feeding No    Reaction to local anesthetic No    Right Handed Yes   Social History Narrative    Work - banking     Social Determinants of Health      Received from [facility], [facility]    Social Connections    Received from [facility], [facility]    Housing Stability        REVIEW OF SYSTEMS:   GENERAL HEALTH: + fatigue  RESPIRATORY: denies shortness of breath, cough, wheezing  CARDIOVASCULAR: denies chest pain on exertion, palpitations, swelling in feet  MUS: +  back pain,+ joint pain, + muscle pain    EXAM:   /79   Pulse 75   Ht 5' 8\" (1.727 m)   Wt 200 lb 12.8 oz (91.1 kg)   BMI 30.53 kg/m²   GENERAL: well developed, well nourished,in no apparent distress  SKIN: no rashes,no suspicious lesions  HEENT: atraumatic, normocephalic  NECK: supple,no adenopathy  LUNGS: clear to auscultation, no wheeze  CARDIO: RRR without murmur  EXTREMITIES: no edema    ASSESSMENT AND PLAN:   Diagnoses and all orders for this visit:    Liver lesion  -      MRI ABDOMEN (W+WO) (CPT=74183); Future  And   Kidney lesion  -     MRI ABDOMEN (W+WO) (CPT=74183); Future  Will do pre and post contrast mri   Myopathy  -     diazePAM 2 MG Oral Tab; Take 1 tablet (2 mg total) by mouth nightly as needed.  And   Myofascial pain  -     diazePAM 2 MG Oral Tab; Take 1 tablet (2 mg total) by mouth nightly as needed.  And   Cramps, muscle, general  -     diazePAM 2 MG Oral Tab; Take 1 tablet (2 mg total) by mouth nightly as needed.  And   Elevated CPK  -     diazePAM 2 MG Oral Tab; Take 1 tablet (2 mg total) by mouth nightly as needed.  Advised patient to take the Lyrica and the diazepam at night to see if this will help and get her some restful sleep, she will see Dr. Baptiste today  Discuss with him to see if an injection to the lower back would help                 Preventative medicine  Colonoscopy done  9/19  Dr. hall rpt in yrs ie 2024- has apt   Mammogram-10/2023  Pap 2016 dr chin -hina ,saw Dr. Witt in February 2021 and sees mina jaffe   Labs 12/2023   10/2023 1/2024  reviewed       The patient indicates understanding of these issues and agrees to the plan.  No follow-ups on file.

## 2024-08-22 ENCOUNTER — OFFICE VISIT (OUTPATIENT)
Dept: ORTHOPEDICS CLINIC | Facility: CLINIC | Age: 64
End: 2024-08-22
Payer: MEDICARE

## 2024-08-22 DIAGNOSIS — S73.191D TEAR OF RIGHT ACETABULAR LABRUM, SUBSEQUENT ENCOUNTER: Primary | ICD-10-CM

## 2024-08-22 PROCEDURE — 99213 OFFICE O/P EST LOW 20 MIN: CPT | Performed by: ORTHOPAEDIC SURGERY

## 2024-08-22 NOTE — PROGRESS NOTES
NURSING INTAKE COMMENTS:   Chief Complaint   Patient presents with    Hip Pain     R hip f/u- Pt her to go over MRI results. Pt rates pain 10/10.        HPI: This 64 year old female presents today with complaints of right hip and groin pain follow-up.  She reports no change in her symptoms.  She does continue to have some posterior hip pain as well.  Majority the pain is over the anterolateral hip.  She had the MRI and is here for the results.    Past Medical History:    Abscess of left thigh    Acute meniscal tear of knee    Age-related nuclear cataract of both eyes    Anal sphincter incontinence    Anxiety state    Arthritis    Back problem    Chondromalacia    Colon polyps    Degenerative disc disease    Diverticular disease    Esophageal reflux    Glaucoma    Hammertoe    High blood pressure    High cholesterol    Hyperlipidemia with target low density lipoprotein (LDL) cholesterol less than 130 mg/dL    Insomnia    Kidney lesion    Liver lesion    Neuropathy    Right Foot    Obstructive sleep apnea syndrome    Osteoarthritis    Primary open angle glaucoma of both eyes    Diagnosis of glaucoma OU; Started Latanoprost qhs after abnormal VF and OCT 2/25/16;  6/5/18 consult with Dr. Madeline Chappell-see progress note    Sleep apnea    Small bowel obstruction (HCC)    Tendinitis    Unspecified essential hypertension    Visual impairment    Reraders     Past Surgical History:   Procedure Laterality Date    Anal sphincterotomy      03/12/2013 [place]    Blepharoplasty anesthesia Bilateral 03/05/2020    Dr Suresh [facility]     Cataract extraction w/  intraocular lens implant Left 05/07/2018    L PC IOL with Dr. Suresh @ [facility]    Colonoscopy N/A 11/11/2016    Procedure: COLONOSCOPY;  Surgeon: Sabrina Cazares MD;  Location: [facility] ENDOSCOPY    Colonoscopy N/A 09/06/2019    Procedure: COLONOSCOPY;  Surgeon: Edwin Sterling MD;  Location: [facility] ENDOSCOPY    Excision of chalazion, single - od - right eye Right  6.27/2017    Nasally    Hc arthrocentesis or inject major joint w/o us      Hysterectomy  1996    KAI    Other      Lap Nissen Fundoplication surgery    Other surgical history  2005,2007,2008    LAPAROSCOPIC LYSIS OF ADHESION    Other surgical history      right foot bunionectomy    Other surgical history  11/14/2014    right superficial anterior peroneal nerve biopsy and right proximal thigh muscle biopsy.    Other surgical history  06/13/2023    Excision and Biopsy of two  perineal cysts    Upper gi endoscopy performed  05/2015    Yag capsulotomy - os - left eye Left 12/19/2018    RJFRANCHESKA     Current Outpatient Medications   Medication Sig Dispense Refill    diazePAM 2 MG Oral Tab Take 1 tablet (2 mg total) by mouth nightly as needed. 20 tablet 0    Meloxicam (MOBIC) 15 MG Oral Tab Take 1 tablet (15 mg total) by mouth daily for 14 days. 14 tablet 0    pregabalin (LYRICA) 75 MG Oral Cap Take 1 capsule (75 mg total) by mouth 2 (two) times daily. 60 capsule 0    Omeprazole 40 MG Oral Capsule Delayed Release Take 1 capsule (40 mg total) by mouth daily. Take 1 capsule by mouth daily before breakfast. 90 capsule 3    famotidine (PEPCID) 20 MG Oral Tab Take 1 tablet (20 mg total) by mouth nightly as needed for Heartburn. 90 tablet 3    amLODIPine 10 MG Oral Tab TAKE 1 TABLET BY MOUTH EVERY DAY (Patient taking differently: at bedtime. TAKE 1 TABLET BY MOUTH EVERY DAY) 90 tablet 3    latanoprost 0.005 % Ophthalmic Solution INSTILL 1 DROP IN BOTH EYES EVERY night 3 each 3    rosuvastatin 5 MG Oral Tab Take 1 tablet (5 mg total) by mouth daily.      cholecalciferol 50 MCG (2000 UT) Oral Cap Take 1 capsule (2,000 Units total) by mouth daily.      MAGNESIUM OR Take by mouth.       Allergies   Allergen Reactions    Celecoxib TONGUE SWELLING     Other reaction(s): CELECOXIB    Sulfa Antibiotics TONGUE SWELLING     Other reaction(s): SULFA (SULFONAMIDE ANTIBIOTICS)    Erythromycin PAIN    Amoxicillin DIARRHEA     Family History    Problem Relation Age of Onset    Hypertension Father     Hypertension Mother     Hypertension Sister     Cancer Sister         liver cancer    Hypertension Brother     Diabetes Neg     Glaucoma Neg     Macular degeneration Neg     Breast Cancer Neg     Ovarian Cancer Neg        Social History     Occupational History    Not on file   Tobacco Use    Smoking status: Never     Passive exposure: Never    Smokeless tobacco: Never   Vaping Use    Vaping status: Never Used   Substance and Sexual Activity    Alcohol use: No     Comment: None.     Drug use: No    Sexual activity: Yes     Birth control/protection: Hysterectomy        Review of Systems:  GENERAL: denies fevers, chills, night sweats, fatigue, unintentional weight loss/gain  SKIN: denies skin lesions, open sores, rash  HEENT:denies recent vision change, new nasal congestion,hearing loss, tinnitus, sore throat, headaches  RESPIRATORY: denies new shortness of breath, cough, asthma, wheezing  CARDIOVASCULAR: denies chest pain, leg cramps with exertion, palpitations, leg swelling  GI: denies abdominal pain, nausea, vomiting, diarrhea, constipation, hematochezia, worsening heartburn or stomach ulcers  : denies dysuria, hematuria, incontinence, increased frequency, urgency, difficulty urinating  MUSCULOSKELETAL: denies musculoskeletal complaints other than in HPI  NEURO: denies numbness, tingling, weakness, balance issues, dizziness, memory loss  PSYCHIATRIC: denies Hx of depression, anxiety, other psychiatric disorders  HEMATOLOGIC: denies blood clots, anemia, blood clotting disorders, blood transfusion  ENDOCRINE: denies autoimmune disease, thyroid issues, or diabetes  ALLERGY: denies asthma, seasonal allergies    Physical Examination:    There were no vitals taken for this visit.  Constitutional: appears well hydrated, alert and responsive, no acute distress noted  Extremities: Right hip examination unchanged.  There is pain with passive flexion and internal  rotation of the limb.  Neurological: Unchanged    Imaging:   MRI HIPS, RIGHT (CPT=73721)    Result Date: 8/19/2024  PROCEDURE: MRI HIPS, RIGHT (CPT=73721)  COMPARISON: [facility], CT UROGRAM(W+WO) W/3D SH(CPT=74178/97437), 12/10/2021, 6:01 PM.  INDICATIONS: M25.551 Pain of right hip  TECHNIQUE: A comprehensive examination was performed utilizing a variety of imaging planes and imaging parameters to optimize visualization of suspected pathology.  Images were performed without contrast.  FINDINGS: HIP JOINT:   The femoral heads are well-formed and seated within well-formed acetabula. There is no femoral avascular necrosis, fracture or significant osteoarthritis. LABRUM: The anterior superior right hip joint labrum demonstrate intermediate intrasubstance signal and is slightly irregular in contour about the 2 o'clock position. OTHER BONES:  There is stable mild pubic symphysis osteoarthritis.  There is a subcentimeter T2 hyperintense Tarlov cyst in the right S2 foramen.  Disc degeneration is seen in the visualized lower lumbar spine.  The sacroiliac joints are normal. There is  otherwise no marrow signal abnormality or osseous malalignment. EFFUSIONS: None.  No synovitis or loose bodies. BURSAE: There is mild right trochanteric bursitis.  No evidence of iliopsoas bursitis. TENDONS: Normal gluteal and iliopsoas tendons.  There is mild bilateral hamstring origin tendinosis.  Normal tendinous origins of the quadriceps and adductor muscle groups. MUSCLES: No tear or strain.  No inappropriate atrophy. OTHER: The uterus is again noted to be surgically absent.         CONCLUSION:  1. Mild right trochanteric bursitis.  No associated tear or significant tendinosis involving right gluteal tendons. 2. Chronic degenerative-type tear of the anterior superior right hip joint labrum at 2 o'clock.  No significant right hip joint osteoarthritis. 3. Mild bilateral hamstring origin tendinosis. 4. Stable  mild pubic symphysis osteoarthritis.    Dictated by (CST): Jaspreet Garibay MD on 8/19/2024 at 11:53 AM     Finalized by (CST): Jaspreet Garibay MD on 8/19/2024 at 12:00 PM             Labs:  Lab Results   Component Value Date    WBC 10.3 01/15/2024    HGB 15.4 01/15/2024    .0 01/15/2024      Lab Results   Component Value Date    GLU 94 01/15/2024    BUN 11 01/15/2024    CREATSERUM 1.05 (H) 01/15/2024    GFR 46 (L) 04/02/2016    GFRNAA 66 05/21/2022    GFRAA 76 05/21/2022        Assessment and Plan:  Diagnoses and all orders for this visit:    Tear of right acetabular labrum, subsequent encounter  -     Ortho Referral - External        Assessment: Right hip anterior labral tear    Plan: Given the lack of radiographic degenerative changes, I feel she may benefit from an arthroscopic treatment for the hip.  I recommended follow-up with a hip arthroscopist.  Follow-up with me again as needed.  Provided the names of Dr. Weaver, Dr. Quiroz.    Follow Up: Return if symptoms worsen or fail to improve.    CONG LOZANO MD

## 2024-08-29 ENCOUNTER — LAB ENCOUNTER (OUTPATIENT)
Dept: LAB | Facility: HOSPITAL | Age: 64
End: 2024-08-29
Attending: Other
Payer: MEDICARE

## 2024-08-29 ENCOUNTER — OFFICE VISIT (OUTPATIENT)
Dept: NEUROLOGY | Facility: CLINIC | Age: 64
End: 2024-08-29
Payer: MEDICARE

## 2024-08-29 VITALS — WEIGHT: 198 LBS | BODY MASS INDEX: 30.01 KG/M2 | HEIGHT: 68 IN

## 2024-08-29 DIAGNOSIS — M62.838 MUSCLE SPASM: Primary | ICD-10-CM

## 2024-08-29 DIAGNOSIS — M62.838 MUSCLE SPASM: ICD-10-CM

## 2024-08-29 PROCEDURE — 86596 VOLTAGE-GTD CA CHNL ANTB EA: CPT

## 2024-08-29 PROCEDURE — 36415 COLL VENOUS BLD VENIPUNCTURE: CPT

## 2024-08-29 PROCEDURE — 86341 ISLET CELL ANTIBODY: CPT

## 2024-08-29 PROCEDURE — 86255 FLUORESCENT ANTIBODY SCREEN: CPT

## 2024-08-29 PROCEDURE — 99204 OFFICE O/P NEW MOD 45 MIN: CPT | Performed by: OTHER

## 2024-08-29 PROCEDURE — 86051 AQUAPORIN-4 ANTB ELISA: CPT

## 2024-08-29 RX ORDER — TIZANIDINE HYDROCHLORIDE 2 MG/1
2 CAPSULE, GELATIN COATED ORAL 3 TIMES DAILY
Qty: 270 CAPSULE | Refills: 0 | Status: SHIPPED | OUTPATIENT
Start: 2024-08-29 | End: 2024-11-27

## 2024-08-29 NOTE — PROGRESS NOTES
[facility]  [address], [address]  [facility] [address]  [phone]          [facility]  NEW PATIENT EVALUATION  Reason for Admission/Consultation:  spasms    Requested by:   PCP: Bella Rogers MD  Chief Complaint: Neurologic Problem (New patient presents with muscle cramping UE and LE.Pt first notice these symptom since 2014 she also has completed a muscle and nerve Bx 2014. Pt is here to establish care, and evalution.)      HPI:  Kerry Hsu is a 64 year old  female who presents for  spasms/cramps ongoing for 10 yrs.     Getting worse  Afterwards she has bruising in the site that spasms  Last 15 minutes.  Has intense pain with spasms.  No diaphoresis.  No tachycardia.    Reports she can \"turn a certain way and will get cramps throughout her body.\" She will be sore in those areas for days.    Reports she can't walk when she has an attack.   She has an attack 1x or 2x a week  Previously it was daily over the last year it improved.  Its better now but she still gets them. Since 2014 there have been periods when it spontaneously improves.    Once it starts it will spread to other extremities and location.s  Her left arm will cramp, swell, and then become red. Then it will be sore for days after. \"Like it is inflamed.\"  Never gets a cramp in her right arm.  Sites involved:  Left arm  Both hands  Lower abdomen  Low back and thoracic spine  Both legs and both feet    Spares her chesst and right arm. Spares neck and face.    When she has a cramp in one leg it seems like it can tigger the contralateral leg    Failed medications/prior medications:  1. Baclofen  2. Flexeril  3.tizanidine       Review and summation of prior records  Prior neurology note from 2015 \"Ms. Hsu presents to clinic. She has cramps throughout her body. These are more like holger horses in her cla,f but throughout the both. It is in her arms, back legs and thighs. She just had a nerve  and muscle biopsy done in November 14 which had difficulty healing and had infection. She had to go for antibiotic treatmtents. These were reportedly normal. But her Ck levels were elevated. Her cramping has been for around two years or so. It has been worsening during that time. Often in the morning and wakes up from sleep but it can be in the afternoon as well. They last for 10 -15 minutes and go away. Her CK in October was 600 and she had a muscle biopsy in the right thigh and nerve in the right leg in the sural. These were reportedly normal. She had a EMG as well, which was reportedly normal.. This was November of 2013. She has had no MRIs. She was started with some pain medication, which did not help. She was tried on Gabapentin which did not help. Aldolase did not help either. Her hands and feet feel like they are sleeping all the time, She also feels her right foot is more numb than her left since the biopsy. The Pathology shows rare rounded deposits in the basement membrane consistent with obliterated capillary vessels. Otherwise it was within normal limits and showed no evidence of active inflammatory myopathy \"    ROS:  Pertinent positive and negatives per HPI.  All others were reviewed and negative.    Past Medical History:    Abscess of left thigh    Acute meniscal tear of knee    Age-related nuclear cataract of both eyes    Anal sphincter incontinence    Anxiety state    Arthritis    Back problem    Chondromalacia    Colon polyps    Degenerative disc disease    Diverticular disease    Esophageal reflux    Glaucoma    Hammertoe    High blood pressure    High cholesterol    Hyperlipidemia with target low density lipoprotein (LDL) cholesterol less than 130 mg/dL    Insomnia    Kidney lesion    Liver lesion    Neuropathy    Right Foot    Obstructive sleep apnea syndrome    Osteoarthritis    Primary open angle glaucoma of both eyes    Diagnosis of glaucoma OU; Started Latanoprost qhs after abnormal VF and  OCT 2/25/16;  6/5/18 consult with Dr. aMdeline Chappell-see progress note    Sleep apnea    Small bowel obstruction (HCC)    Tendinitis    Unspecified essential hypertension    Visual impairment    Reraders         Current Outpatient Medications:     diazePAM 2 MG Oral Tab, Take 1 tablet (2 mg total) by mouth nightly as needed., Disp: 20 tablet, Rfl: 0    Meloxicam (MOBIC) 15 MG Oral Tab, Take 1 tablet (15 mg total) by mouth daily for 14 days., Disp: 14 tablet, Rfl: 0    pregabalin (LYRICA) 75 MG Oral Cap, Take 1 capsule (75 mg total) by mouth 2 (two) times daily., Disp: 60 capsule, Rfl: 0    Omeprazole 40 MG Oral Capsule Delayed Release, Take 1 capsule (40 mg total) by mouth daily. Take 1 capsule by mouth daily before breakfast., Disp: 90 capsule, Rfl: 3    famotidine (PEPCID) 20 MG Oral Tab, Take 1 tablet (20 mg total) by mouth nightly as needed for Heartburn., Disp: 90 tablet, Rfl: 3    amLODIPine 10 MG Oral Tab, TAKE 1 TABLET BY MOUTH EVERY DAY (Patient taking differently: at bedtime. TAKE 1 TABLET BY MOUTH EVERY DAY), Disp: 90 tablet, Rfl: 3    latanoprost 0.005 % Ophthalmic Solution, INSTILL 1 DROP IN BOTH EYES EVERY night, Disp: 3 each, Rfl: 3    rosuvastatin 5 MG Oral Tab, Take 1 tablet (5 mg total) by mouth daily., Disp: , Rfl:     cholecalciferol 50 MCG (2000 UT) Oral Cap, Take 1 capsule (2,000 Units total) by mouth daily., Disp: , Rfl:     MAGNESIUM OR, Take by mouth., Disp: , Rfl:     Allergies   Allergen Reactions    Celecoxib TONGUE SWELLING     Other reaction(s): CELECOXIB    Sulfa Antibiotics TONGUE SWELLING     Other reaction(s): SULFA (SULFONAMIDE ANTIBIOTICS)    Erythromycin PAIN    Amoxicillin DIARRHEA       Past Surgical History:   Procedure Laterality Date    Anal sphincterotomy      03/12/2013 [place]    Blepharoplasty anesthesia Bilateral 03/05/2020    Dr Kerwin Bello Ophthalmology     Cataract extraction w/  intraocular lens implant Left 05/07/2018    L PC IOL with Dr. Suresh @ [facility]     Colonoscopy N/A 11/11/2016    Procedure: COLONOSCOPY;  Surgeon: Sabrina Cazares MD;  Location: [facility] ENDOSCOPY    Colonoscopy N/A 09/06/2019    Procedure: COLONOSCOPY;  Surgeon: Edwin Sterling MD;  Location: [facility] ENDOSCOPY    Excision of chalazion, single - od - right eye Right 6.27/2017    Nasally    Hc arthrocentesis or inject major joint w/o us      Hysterectomy  1996    KAI    Other      Lap Nissen Fundoplication surgery    Other surgical history  2005,2007,2008    LAPAROSCOPIC LYSIS OF ADHESION    Other surgical history      right foot bunionectomy    Other surgical history  11/14/2014    right superficial anterior peroneal nerve biopsy and right proximal thigh muscle biopsy.    Other surgical history  06/13/2023    Excision and Biopsy of two  perineal cysts    Upper gi endoscopy performed  05/2015    Yag capsulotomy - os - left eye Left 12/19/2018    RJM       Family History   Problem Relation Age of Onset    Hypertension Father     Hypertension Mother     Hypertension Sister     Cancer Sister         liver cancer    Hypertension Brother     Diabetes Neg     Glaucoma Neg     Macular degeneration Neg     Breast Cancer Neg     Ovarian Cancer Neg        Social history:  History   Smoking Status    Never   Smokeless Tobacco    Never     History   Alcohol Use No     Comment: None.      History   Drug Use No       Family History   Problem Relation Age of Onset    Hypertension Father     Hypertension Mother     Hypertension Sister     Cancer Sister         liver cancer    Hypertension Brother     Diabetes Neg     Glaucoma Neg     Macular degeneration Neg     Breast Cancer Neg     Ovarian Cancer Neg        Objective:  Vitals  Height 68\", weight 198 lb (89.8 kg), not currently breastfeeding.  Ht 68\"   Wt 198 lb (89.8 kg)   BMI 30.11 kg/m²   Body mass index is 30.11 kg/m².  There were no vitals filed for this visit.     Exam:  - General: appears stated age and no distress     - Pulmonary: CTAB. No signs of  respiratory distress.    Neurologic Exam  - Mental Status: Alert and attentive. .  Speech is spontaneous, fluent, and prosodic. Comprehension and repetition intact. Phrase length and rate are normal. No paraphasic errors, neologisms, anomia, acalculia, apraxia, anosognosia, or R/L confusion.   - Cranial Nerves: No gaze preference. Visual fields:normal.  Pupils are equally round and reactive to light  in a well lit room. EOMI. No nystagmus. No ptosis. V1 to V3 intact to LT and temperature in all 3 branches. No pathological facial asymmetry. No flattening of the nasolabial fold. .  Hearing grossly intact.  Tongue midline. No atrophy or fasiculations of the tongue noted. Palate and uvula elevate symmetrically.  Shoulder shrug symmetric.  - Fundoscopic exam:normal w/o hemorrhages, exudates, or papilledema.No attenuation. No pallor.  - Motor:  normal tone, normal bulk. No interosseous wasting. No flattening of hypothenar eminences.       Right Left     Motor Strength   Deltoids 5 5  Triceps 5 5  Biceps 5 5  Wrist Extensors 5 5   5 5   Hip Flexors 5 5   Knee extensors 5 5  Knee flexors 5 5  Plantar flexion 5 5  Dorsiflexion 5 5      Pronator drift: No pronator drift   Arm Rolling: No orbiting.   Finger Taps: Finger taps are symmetric in rate and amplitude.    Rapid movements: Rapid/fine movements are symmetric. As expected their dominant hand is slightly faster.   Foot Taps: Foot taps are symmetric.      Asterixis: No asterixis noted.   Tremor:       Reflexes:    C5 C6 C7  L4 S1   R 2+ 3+ 1+ 2+ 2+   L 2+ 2+ 2+ 2+ 2+   Adductor Spread: No adductor spread noted.    Frontal release signs:Not assessed.    Jaw Jerk:    Flor's sign:absent   Nonsustained clonus: Absent   Sustained clonus: Absent   - Sensory:   Light touch: normal  Temperature:normal  Pinprick: normal  Vibration: normal  Proprioception:      Sensory level:      Romberg:   - Cerebellum: No truncal ataxia. No titubations. No dysmetria, no  dysdiadochokinesis. No overshoot.   - Gait/station: Normal gait and station. Symmetric arm swing.   - Toe walking:  - Heel walking:  - Plantar response: flexor bilaterally    Data reviewed    Test results/Imaging:   Lab Results   Component Value Date    TSH 1.122 11/21/2023     Lab Results   Component Value Date    HDL 67 (H) 10/06/2023    LDL 87 10/06/2023    TRIG 144 10/06/2023     Lab Results   Component Value Date    HGB 15.4 01/15/2024    HCT 45.7 01/15/2024    MCV 80.2 01/15/2024    WBC 10.3 01/15/2024    .0 01/15/2024      Lab Results   Component Value Date    BUN 11 01/15/2024    CREAT 1.2 04/02/2016    CA 9.5 01/15/2024    ALT 26 01/15/2024    AST 20 01/15/2024    ALKPHOS 88 08/27/2016    ALB 4.5 01/15/2024     01/15/2024    K 3.7 01/15/2024     01/15/2024    CO2 30.0 01/15/2024      Component      Latest Ref Rng 8/24/2020 12/4/2020 6/14/2021 8/26/2021 5/21/2022 11/5/2022 5/15/2024   CK      34 - 145 U/L 384 (H)  291 (H)  366 (H)  298 (H)  395 (H)  312 (H)  309 (H)       Legend:  (H) High    I have reviewed labs.    Prior EMG    This is an abnormal study.   There is no electrodiagnostic evidence of peripheral neuropathy.  There is electrodiagnostic evidence to suggest a mild chronic right C5/6 radiculopathy.    Performed an independent visualization of:  mri brain from 4/6/23  Imaging revealed:   agree w read                Muscle spasms   Differential Diagnosis:   Peripheral Hyperexcitability Syndromes    1. Muscle spasm  - Autoimmune Neurology/Comprehensive Paraneoplastic Profile; Future  - EMG (at [facility]); Future      No orders of the defined types were placed in this encounter.          Education/Instructions given to: patient   Barriers to Learning:None  Content: Refer to note above. Evaluation/Outcome: Verbalized understanding    This document is not intended to support charting by exception.  Sections left blank in a completed note should be presumed not to have been  done.    Education and counseling provided to patient. Instructed patient to call my office or seek medical attention immediately if symptoms worsen.  All questions were answered. All side effects of drugs were discussed.          Return to clinic in: Return in about 2 months (around 10/29/2024).    Aaron Caro DO  Staff Vascular & General Neurology   08/29/24  11:53 AM

## 2024-09-03 LAB
AGNA-1: NEGATIVE
AMPA-R1 ANTIBODY: NEGATIVE
AMPA-R2 ANTIBODY: NEGATIVE
AMPHIPHYSIN ANTIBODY: NEGATIVE
ANTI-HU AB: NEGATIVE
ANTI-RI AB: NEGATIVE
ANTI-YO AB: NEGATIVE
ANTINERUONAL NUCLEAR AB TYPE 3: NEGATIVE
AQUAPORIN 4 ANTIBODY: NEGATIVE
CASPR2 ANTIBODY,CELL-BASED IFA: NEGATIVE
CRMP-5 IGG: NEGATIVE
DNER ANTIBODY: NEGATIVE
DPPX ANTIBODY: NEGATIVE
GABA-B-R ANTIBODY: NEGATIVE
GAD65 ANTIBODY: NEGATIVE
IGLON5 ANTIBODY: NEGATIVE
INTERPRETATION: NEGATIVE
ITPR1 ANTIBODY: NEGATIVE
LGI1 ANTIBODY, CELL-BASED IFA: NEGATIVE
MA2/TA ANTIBODY: NEGATIVE
MGLUR1 ANTIBODY: NEGATIVE
NMDA-R ANTIBODY: NEGATIVE
PURKINJE CELL CYTO AB TYPE 2: NEGATIVE
PURKINJE CELL CYTO AB TYPE TR: NEGATIVE
VGCC ANTIBODY: <1 PMOL/L
ZIC4 ANTIBODY: NEGATIVE

## 2024-09-05 ENCOUNTER — HOSPITAL ENCOUNTER (OUTPATIENT)
Age: 64
Discharge: HOME | End: 2024-09-05
Attending: EMERGENCY MEDICINE
Payer: MEDICARE

## 2024-09-05 ENCOUNTER — APPOINTMENT (OUTPATIENT)
Dept: GENERAL RADIOLOGY | Age: 64
End: 2024-09-05
Attending: EMERGENCY MEDICINE
Payer: MEDICARE

## 2024-09-05 VITALS
HEART RATE: 90 BPM | DIASTOLIC BLOOD PRESSURE: 66 MMHG | RESPIRATION RATE: 20 BRPM | SYSTOLIC BLOOD PRESSURE: 138 MMHG | TEMPERATURE: 98 F | OXYGEN SATURATION: 98 %

## 2024-09-05 DIAGNOSIS — R05.3 PERSISTENT COUGH: Primary | ICD-10-CM

## 2024-09-05 DIAGNOSIS — R05.8 PRODUCTIVE COUGH: ICD-10-CM

## 2024-09-05 LAB
S PYO AG THROAT QL IA.RAPID: NEGATIVE
SARS-COV-2 RNA RESP QL NAA+PROBE: NOT DETECTED

## 2024-09-05 PROCEDURE — 71046 X-RAY EXAM CHEST 2 VIEWS: CPT | Performed by: EMERGENCY MEDICINE

## 2024-09-05 PROCEDURE — 99214 OFFICE O/P EST MOD 30 MIN: CPT

## 2024-09-05 PROCEDURE — 87651 STREP A DNA AMP PROBE: CPT | Performed by: EMERGENCY MEDICINE

## 2024-09-05 RX ORDER — BENZONATATE 100 MG/1
100 CAPSULE ORAL 3 TIMES DAILY PRN
Qty: 30 CAPSULE | Refills: 0 | Status: SHIPPED | OUTPATIENT
Start: 2024-09-05 | End: 2024-10-05

## 2024-09-05 RX ORDER — DOXYCYCLINE 100 MG/1
100 CAPSULE ORAL 2 TIMES DAILY
Qty: 14 CAPSULE | Refills: 0 | Status: SHIPPED | OUTPATIENT
Start: 2024-09-05 | End: 2024-09-12

## 2024-09-05 NOTE — ED PROVIDER NOTES
Patient Seen in: [facility]      History     Chief Complaint   Patient presents with    Cough/URI     Stated Complaint: Cough, congestion, sore throat    Subjective:   HPI    The patient is a 64-year-old female with past history of hypertension who presents now with cough, congestion.  Patient states that this has been going on for \"a couple of weeks\".  Patient denies any fever.  The patient denies any shortness of breath.  Patient states that she has had a persistent cough.  The patient states that his cough was somewhat productive but is now become less so.  Patient denies any chest pain at rest but states that she does have some chest pain when she coughs.  Patient states she did a COVID test at home last night that was reportedly negative.    Objective:   Past Medical History:    Abscess of left thigh    Acute meniscal tear of knee    Age-related nuclear cataract of both eyes    Anal sphincter incontinence    Anxiety state    Arthritis    Back problem    Chondromalacia    Colon polyps    Degenerative disc disease    Diverticular disease    Esophageal reflux    Glaucoma    Hammertoe    High blood pressure    High cholesterol    Hyperlipidemia with target low density lipoprotein (LDL) cholesterol less than 130 mg/dL    Insomnia    Kidney lesion    Liver lesion    Neuropathy    Right Foot    Obstructive sleep apnea syndrome    Osteoarthritis    Primary open angle glaucoma of both eyes    Diagnosis of glaucoma OU; Started Latanoprost qhs after abnormal VF and OCT 2/25/16;  6/5/18 consult with Dr. Madeline Chappell-see progress note    Sleep apnea    Small bowel obstruction (HCC)    Tendinitis    Unspecified essential hypertension    Visual impairment    Reraders              Past Surgical History:   Procedure Laterality Date    Anal sphincterotomy      03/12/2013 [place]    Blepharoplasty anesthesia Bilateral 03/05/2020    Dr Kerwin Bello Ophthalmology     Cataract extraction w/  intraocular lens implant  Left 05/07/2018    L PC IOL with Dr. Suresh @ [facility]    Colonoscopy N/A 11/11/2016    Procedure: COLONOSCOPY;  Surgeon: Sabrina Cazares MD;  Location: [facility] ENDOSCOPY    Colonoscopy N/A 09/06/2019    Procedure: COLONOSCOPY;  Surgeon: Edwin Sterling MD;  Location: [facility] ENDOSCOPY    Excision of chalazion, single - od - right eye Right 6.27/2017    Nasally    Hc arthrocentesis or inject major joint w/o us      Hysterectomy  1996    KAI    Other      Lap Nissen Fundoplication surgery    Other surgical history  2005,2007,2008    LAPAROSCOPIC LYSIS OF ADHESION    Other surgical history      right foot bunionectomy    Other surgical history  11/14/2014    right superficial anterior peroneal nerve biopsy and right proximal thigh muscle biopsy.    Other surgical history  06/13/2023    Excision and Biopsy of two  perineal cysts    Upper gi endoscopy performed  05/2015    Yag capsulotomy - os - left eye Left 12/19/2018    RJM                Social History     Socioeconomic History    Marital status:    Tobacco Use    Smoking status: Never     Passive exposure: Never    Smokeless tobacco: Never   Vaping Use    Vaping status: Never Used   Substance and Sexual Activity    Alcohol use: No     Comment: None.     Drug use: No    Sexual activity: Yes     Birth control/protection: Hysterectomy   Other Topics Concern    Caffeine Concern Yes     Comment: occasional soda    Exercise Yes    Pt has a pacemaker No    Pt has a defibrillator No    Breast feeding No    Reaction to local anesthetic No    Right Handed Yes   Social History Narrative    Work - banking     Social Determinants of Health      [facility]    Social Connections    [facility], [facility]    Housing Stability              Review of Systems    Positive for stated Chief Complaint: Cough/URI    Other systems are as noted in HPI.  Constitutional  and vital signs reviewed.      All other systems reviewed and negative except as noted above.    Physical Exam     ED Triage Vitals [09/05/24 1127]   /66   Pulse 90   Resp 20   Temp 98.3 °F (36.8 °C)   Temp src Temporal   SpO2 98 %   O2 Device None (Room air)       Current Vitals:   Vital Signs  BP: 138/66  Pulse: 90  Resp: 20  Temp: 98.3 °F (36.8 °C)  Temp src: Temporal    Oxygen Therapy  SpO2: 98 %  O2 Device: None (Room air)            Physical Exam    Constitutional: Well-developed well-nourished in no acute distress  Head: Normocephalic, no swelling or tenderness  Eyes: Nonicteric sclera, no conjunctival injection  ENT: TMs are clear and flat bilaterally.  There is mild posterior pharyngeal erythema  Chest: Clear to auscultation, no tenderness  Cardiovascular: Regular rate and rhythm without murmur  Abdomen: Soft, nontender and nondistended  Neurologic: Patient is awake, alert and oriented ×3.  The patient's motor strength is 5 out of 5 and symmetric in the upper and lower extremities bilaterally  Extremities: No focal swelling or tenderness  Skin: No pallor, no redness or warmth to the touch      ED Course     Labs Reviewed   RAPID STREP A - Normal   RAPID SARS-COV-2 BY PCR - Normal             Pulse ox is 98% on room air, normal.  Vital signs are stable     Patient's chest x-ray was independently reviewed by myself.  There is no acute infiltrate noted.    Patient's negative strep, negative COVID, unremarkable chest x-ray were reviewed with the patient.  On repeat exam at 12 PM, there is no wheezing or rhonchi noted.  Patient has had symptoms for 2+ weeks with some productivity of the cough.  The risk and benefits of antibiotics were discussed.  The patient prefers treatment.  Will initiate doxycycline.  Will also provide with Tessalon for cough.    MDM      Viral URI with cough versus pneumonia versus COVID                                   Medical Decision Making      Disposition and Plan     Clinical  Impression:  1. Persistent cough    2. Productive cough         Disposition:  Discharge  9/5/2024 12:07 pm    Follow-up:  Bella Rogers MD  [address] [place] [id number]  [phone]    In 3 days  If symptoms worsen          Medications Prescribed:  Current Discharge Medication List        START taking these medications    Details   benzonatate 100 MG Oral Cap Take 1 capsule (100 mg total) by mouth 3 (three) times daily as needed for cough.  Qty: 30 capsule, Refills: 0      doxycycline 100 MG Oral Cap Take 1 capsule (100 mg total) by mouth 2 (two) times daily for 7 days.  Qty: 14 capsule, Refills: 0

## 2024-09-11 ENCOUNTER — TELEPHONE (OUTPATIENT)
Dept: NEUROLOGY | Facility: CLINIC | Age: 64
End: 2024-09-11

## 2024-09-11 RX ORDER — TIZANIDINE HYDROCHLORIDE 2 MG/1
2 CAPSULE, GELATIN COATED ORAL 3 TIMES DAILY
Qty: 270 CAPSULE | Refills: 0 | Status: CANCELLED | OUTPATIENT
Start: 2024-09-11 | End: 2024-12-10

## 2024-09-11 NOTE — TELEPHONE ENCOUNTER
Medication Quantity Refills Start End   tiZANidine HCl 2 MG Oral Cap 270 capsule 0 8/29/2024 11/27/2024   Sig:   Take 1 capsule (2 mg total) by mouth 3 (three) times daily.     Route:   Oral     Order #:   [id number]         Dr. Douglass-Please review and sign if appropriate     LOV:08/29/24  NOV: 12/04/24    Last refill/[place]: 08/29/24(#270 /0 refills)

## 2024-09-12 NOTE — PAT NURSING NOTE
Patient instructed to hold vitamins and supplements prior to procedure per the Pre-surgical testing policy.

## 2024-09-16 ENCOUNTER — ANESTHESIA EVENT (OUTPATIENT)
Dept: ENDOSCOPY | Facility: HOSPITAL | Age: 64
End: 2024-09-16
Payer: MEDICARE

## 2024-09-16 ENCOUNTER — HOSPITAL ENCOUNTER (OUTPATIENT)
Facility: HOSPITAL | Age: 64
Setting detail: HOSPITAL OUTPATIENT SURGERY
Discharge: HOME | End: 2024-09-16
Attending: INTERNAL MEDICINE | Admitting: INTERNAL MEDICINE
Payer: MEDICARE

## 2024-09-16 ENCOUNTER — ANESTHESIA (OUTPATIENT)
Dept: ENDOSCOPY | Facility: HOSPITAL | Age: 64
End: 2024-09-16
Payer: MEDICARE

## 2024-09-16 VITALS
DIASTOLIC BLOOD PRESSURE: 67 MMHG | RESPIRATION RATE: 20 BRPM | WEIGHT: 198 LBS | HEIGHT: 68 IN | BODY MASS INDEX: 30.01 KG/M2 | SYSTOLIC BLOOD PRESSURE: 118 MMHG | HEART RATE: 66 BPM | OXYGEN SATURATION: 98 % | TEMPERATURE: 98 F

## 2024-09-16 DIAGNOSIS — Z86.010 HX OF COLONIC POLYPS: ICD-10-CM

## 2024-09-16 DIAGNOSIS — R12 HEARTBURN: ICD-10-CM

## 2024-09-16 DIAGNOSIS — K21.9 GASTROESOPHAGEAL REFLUX DISEASE, UNSPECIFIED WHETHER ESOPHAGITIS PRESENT: ICD-10-CM

## 2024-09-16 PROCEDURE — 43239 EGD BIOPSY SINGLE/MULTIPLE: CPT | Performed by: INTERNAL MEDICINE

## 2024-09-16 PROCEDURE — 0DBL8ZX EXCISION OF TRANSVERSE COLON, VIA NATURAL OR ARTIFICIAL OPENING ENDOSCOPIC, DIAGNOSTIC: ICD-10-PCS | Performed by: INTERNAL MEDICINE

## 2024-09-16 PROCEDURE — 0DBN8ZX EXCISION OF SIGMOID COLON, VIA NATURAL OR ARTIFICIAL OPENING ENDOSCOPIC, DIAGNOSTIC: ICD-10-PCS | Performed by: INTERNAL MEDICINE

## 2024-09-16 PROCEDURE — 45385 COLONOSCOPY W/LESION REMOVAL: CPT | Performed by: INTERNAL MEDICINE

## 2024-09-16 PROCEDURE — 0DB98ZX EXCISION OF DUODENUM, VIA NATURAL OR ARTIFICIAL OPENING ENDOSCOPIC, DIAGNOSTIC: ICD-10-PCS | Performed by: INTERNAL MEDICINE

## 2024-09-16 PROCEDURE — 0DB78ZX EXCISION OF STOMACH, PYLORUS, VIA NATURAL OR ARTIFICIAL OPENING ENDOSCOPIC, DIAGNOSTIC: ICD-10-PCS | Performed by: INTERNAL MEDICINE

## 2024-09-16 PROCEDURE — 0DBK8ZX EXCISION OF ASCENDING COLON, VIA NATURAL OR ARTIFICIAL OPENING ENDOSCOPIC, DIAGNOSTIC: ICD-10-PCS | Performed by: INTERNAL MEDICINE

## 2024-09-16 RX ORDER — SODIUM CHLORIDE, SODIUM LACTATE, POTASSIUM CHLORIDE, CALCIUM CHLORIDE 600; 310; 30; 20 MG/100ML; MG/100ML; MG/100ML; MG/100ML
INJECTION, SOLUTION INTRAVENOUS CONTINUOUS
Status: DISCONTINUED | OUTPATIENT
Start: 2024-09-16 | End: 2024-09-16

## 2024-09-16 RX ORDER — NALOXONE HYDROCHLORIDE 0.4 MG/ML
0.08 INJECTION, SOLUTION INTRAMUSCULAR; INTRAVENOUS; SUBCUTANEOUS ONCE AS NEEDED
Status: DISCONTINUED | OUTPATIENT
Start: 2024-09-16 | End: 2024-09-16

## 2024-09-16 RX ORDER — LIDOCAINE HYDROCHLORIDE 10 MG/ML
INJECTION, SOLUTION EPIDURAL; INFILTRATION; INTRACAUDAL; PERINEURAL AS NEEDED
Status: DISCONTINUED | OUTPATIENT
Start: 2024-09-16 | End: 2024-09-16 | Stop reason: SURG

## 2024-09-16 RX ADMIN — SODIUM CHLORIDE, SODIUM LACTATE, POTASSIUM CHLORIDE, CALCIUM CHLORIDE: 600; 310; 30; 20 INJECTION, SOLUTION INTRAVENOUS at 08:15:00

## 2024-09-16 RX ADMIN — LIDOCAINE HYDROCHLORIDE 25 MG: 10 INJECTION, SOLUTION EPIDURAL; INFILTRATION; INTRACAUDAL; PERINEURAL at 07:40:00

## 2024-09-16 NOTE — DISCHARGE INSTRUCTIONS
Home Care Instructions for Colonoscopy and/or Gastroscopy with Sedation    Diet:  - Resume your regular diet as tolerated unless otherwise instructed.  - Start with light meals to minimize bloating.  - Do not drink alcohol today.    Medication:  - If you have questions about resuming your normal medications, please contact your Primary Care Physician.    Activities:  - Take it easy today. Do not return to work today.  - Do not drive today.  - Do not operate any machinery today (including kitchen equipment).    Colonoscopy:  - You may notice some rectal \"spotting\" (a little blood on the toilet tissue) for a day or two after the exam. This is normal.  - If you experience any rectal bleeding (not spotting), persistent tenderness or sharp severe abdominal pains, oral temperature over 100 degrees Fahrenheit, light-headedness or dizziness, or any other problems, contact your doctor.    Gastroscopy:  - You may have a sore throat for 2-3 days following the exam. This is normal. Gargling with warm salt water (1/2 tsp salt to 1 glass warm water) or using throat lozenges will help.  - If you experience any sharp pain in your neck, abdomen or chest, vomiting of blood, oral temperature over 100 degrees Fahrenheit, light-headedness or dizziness, or any other problems, contact your doctor.    **If unable to reach your doctor, please go to the [facility] Emergency Room**    - Your referring physician will receive a full report of your examination.  - If you do not hear from your doctor's office within two weeks of your biopsy, please call them for your results.    You may be able to see your laboratory results in [employer] between 4 and 7 business days.  In some cases, your physician may not have viewed the results before they are released to [employer].  If you have questions regarding your results contact the physician who ordered the test/exam by phone or via [employer] by choosing \"Ask a Medical Question.\"

## 2024-09-16 NOTE — ANESTHESIA POSTPROCEDURE EVALUATION
Patient: Kerry Hsu    Procedure Summary       Date: 09/16/24 Room / Location: [facility] ENDOSCOPY 03 / EM ENDOSCOPY    Anesthesia Start: 0736 Anesthesia Stop: 0817    Procedures:       COLONOSCOPY      ESOPHAGOGASTRODUODENOSCOPY (EGD) Diagnosis:       Hx of colonic polyps      Gastroesophageal reflux disease, unspecified whether esophagitis present      Heartburn      (small hiatal hernia, gastric polyps, colon polyps, diverticulosis, hemorrhoids)    Surgeons: Edwin Sterling MD Anesthesiologist: Keira Herbert CRNA    Anesthesia Type: MAC, general ASA Status: 3            Anesthesia Type: No value filed.    Vitals Value Taken Time   /70 09/16/24 0815   Temp 98 °F (36.7 °C) 09/16/24 0815   Pulse 80 09/16/24 0815   Resp 20 09/16/24 0815   SpO2 99 % 09/16/24 0815       [facility] AN Post Evaluation:   Patient Evaluated in PACU  Patient Participation: complete - patient participated  Level of Consciousness: awake  Pain Management: adequate  Airway Patency:patent  Dental exam unchanged from preop  Yes    Cardiovascular Status: acceptable  Respiratory Status: acceptable  Postoperative Hydration acceptable      Keira Herbert CRNA  9/16/2024 8:17 AM

## 2024-09-16 NOTE — H&P
Pre Procedure History & Physical Examination    Patient Name: Kerry Hsu  MRN: [id number]  [facility]: [id number]  YOB: 1960    Diagnosis: abnormal CT esophagus, reflux, surveillance for history of polyps    Medications Prior to Admission   Medication Sig Dispense Refill Last Dose    Omeprazole 40 MG Oral Capsule Delayed Release Take 1 capsule (40 mg total) by mouth daily. Take 1 capsule by mouth daily before breakfast. 90 capsule 3 2024 at 0530    amLODIPine 10 MG Oral Tab TAKE 1 TABLET BY MOUTH EVERY DAY (Patient taking differently: at bedtime. TAKE 1 TABLET BY MOUTH EVERY DAY) 90 tablet 3 9/15/2024    latanoprost 0.005 % Ophthalmic Solution INSTILL 1 DROP IN BOTH EYES EVERY night 3 each 3 9/15/2024    rosuvastatin 5 MG Oral Tab Take 1 tablet (5 mg total) by mouth daily.   9/15/2024    MAGNESIUM OR Take by mouth.   2024    benzonatate 100 MG Oral Cap Take 1 capsule (100 mg total) by mouth 3 (three) times daily as needed for cough. (Patient not taking: Reported on 2024) 30 capsule 0 Not Taking    [] doxycycline 100 MG Oral Cap Take 1 capsule (100 mg total) by mouth 2 (two) times daily for 7 days. (Patient not taking: Reported on 2024) 14 capsule 0 Not Taking    tiZANidine HCl 2 MG Oral Cap Take 1 capsule (2 mg total) by mouth 3 (three) times daily. (Patient not taking: Reported on 2024) 270 capsule 0 Not Taking    diazePAM 2 MG Oral Tab Take 1 tablet (2 mg total) by mouth nightly as needed. (Patient not taking: Reported on 2024) 20 tablet 0 Not Taking    [] Meloxicam (MOBIC) 15 MG Oral Tab Take 1 tablet (15 mg total) by mouth daily for 14 days. (Patient not taking: Reported on 2024) 14 tablet 0 Not Taking    pregabalin (LYRICA) 75 MG Oral Cap Take 1 capsule (75 mg total) by mouth 2 (two) times daily. (Patient not taking: Reported on 2024) 60 capsule 0 Not Taking    famotidine (PEPCID) 20 MG Oral Tab Take 1 tablet (20 mg total) by mouth nightly as  needed for Heartburn. (Patient not taking: Reported on 9/12/2024) 90 tablet 3 Not Taking    cholecalciferol 50 MCG (2000 UT) Oral Cap Take 1 capsule (2,000 Units total) by mouth daily. (Patient not taking: Reported on 9/12/2024)   Not Taking     Current Facility-Administered Medications   Medication Dose Route Frequency    lactated ringers infusion   Intravenous Continuous       Allergies:   Allergies   Allergen Reactions    Celecoxib TONGUE SWELLING     Other reaction(s): CELECOXIB    Sulfa Antibiotics TONGUE SWELLING     Other reaction(s): SULFA (SULFONAMIDE ANTIBIOTICS)    Erythromycin PAIN    Amoxicillin DIARRHEA       Past Medical History:    Abscess of left thigh    Acute meniscal tear of knee    Age-related nuclear cataract of both eyes    Anal sphincter incontinence    Arthritis    Back problem    Chondromalacia    Colon polyps    Degenerative disc disease    Diverticular disease    Esophageal reflux    Glaucoma    Hammertoe    High blood pressure    High cholesterol    Hyperlipidemia with target low density lipoprotein (LDL) cholesterol less than 130 mg/dL    Insomnia    Kidney lesion    Liver lesion    Neuropathy    Right Foot    Obstructive sleep apnea syndrome    Osteoarthritis    Primary open angle glaucoma of both eyes    Diagnosis of glaucoma OU; Started Latanoprost qhs after abnormal VF and OCT 2/25/16;  6/5/18 consult with Dr. Madeline Chappell-see progress note    Sleep apnea    Small bowel obstruction (HCC)    Tendinitis    Unspecified essential hypertension    Visual impairment    Reraders     Past Surgical History:   Procedure Laterality Date    Anal sphincterotomy      03/12/2013 G[facility]    Blepharoplasty anesthesia Bilateral 03/05/2020    Dr Kerwin Bello [facility]hthal[facility]     Cataract extraction w/  intraocular lens implant Left 05/07/2018    L PC IOL with Dr. Suresh @ [facility]    Colonoscopy N/A 11/11/2016    Procedure: COLONOSCOPY;  Surgeon: Sabrina Cazares MD;  Location: [facility] ENDOSCOPY     Colonoscopy N/A 09/06/2019    Procedure: COLONOSCOPY;  Surgeon: Edwin Sterling MD;  Location: [place] ENDOSCOPY    Excision of chalazion, single - od - right eye Right 6.27/2017    Nasally    Hc arthrocentesis or inject major joint w/o us      Hysterectomy  1996    KAI    Other      Lap Nissen Fundoplication surgery    Other surgical history  2005,2007,2008    LAPAROSCOPIC LYSIS OF ADHESION    Other surgical history      right foot bunionectomy    Other surgical history  11/14/2014    right superficial anterior peroneal nerve biopsy and right proximal thigh muscle biopsy.    Other surgical history  06/13/2023    Excision and Biopsy of two  perineal cysts    Upper gi endoscopy performed  05/2015    Yag capsulotomy - os - left eye Left 12/19/2018    RJM     Family History   Problem Relation Age of Onset    Hypertension Father     Hypertension Mother     Hypertension Sister     Cancer Sister         liver cancer    Hypertension Brother     Diabetes Neg     Glaucoma Neg     Macular degeneration Neg     Breast Cancer Neg     Ovarian Cancer Neg      Social History     Tobacco Use    Smoking status: Never     Passive exposure: Never    Smokeless tobacco: Never   Substance Use Topics    Alcohol use: No     Comment: None.          ROS:   CONSTITUTIONAL:  negative for fevers, rigors  EYES:  negative for diplopia   RESPIRATORY:  negative for severe shortness of breath  CARDIOVASCULAR:  negative for crushing sub-sternal chest pain  GASTROINTESTINAL:  see HPI  GENITOURINARY:  negative for dysuria or gross hematuria  INTEGUMENT/BREAST:  SKIN:  negative for jaundice   ALLERGIC/IMMUNOLOGIC:  negative for hay fever  ENDOCRINE:  negative for cold intolerance and heat intolerance  MUSCULOSKELETAL:  negative for joint effusion/severe erythema  BEHAVIOR/PSYCH:  negative for psychotic behavior      PHYSICAL EXAM:   /73 (BP Location: Left arm)   Pulse 66   Temp 98 °F (36.7 °C) (Oral)   Resp 22   Ht 5' 8\" (1.727 m)   Wt 198 lb (89.8  kg)   SpO2 100%   BMI 30.11 kg/m²       Gen: Patient appears comfortable and in no acute discomfort  HEENT: the sclera appears anicteric, oropharynx clear, mucus membranes appear moist  CV: regular rate and rhythm, the extremities are warm and well perfused   Lung: Moves air well; No labored breathing  Abdomen: soft, non-tender exam in all quadrants without rigidity or guarding, non-distended, no abnormal bowel sounds noted, no masses are palpated  Skin: No jaundice  Ext: no cyanosis, clubbing or edema is evident.   Neuro: Alert and interactive, and gross movements of extremities normal    I have discussed the risks and benefits and alternatives of the procedure with the patient/family.  They understand and agree to proceed with plan of care.   I have reviewed recent H&P/clinic notes  Edwin Sterling MD  [facility] - Gastroenterology  9/16/2024  7:34 AM

## 2024-09-16 NOTE — ANESTHESIA PREPROCEDURE EVALUATION
Anesthesia PreOp Note    HPI:     Kerry Hsu is a 64 year old female who presents for preoperative consultation requested by: Edwin Sterling MD    Date of Surgery: 9/16/2024    Procedure(s):  COLONOSCOPY  ESOPHAGOGASTRODUODENOSCOPY (EGD)  Indication: Hx of colonic polyps/Gastroesophageal reflux disease, unspecified whether esophagitis present/ Heartburn    Relevant Problems   No relevant active problems       NPO:  Last Liquid Consumption Date: 09/16/24  Last Liquid Consumption Time: 0530 (<1oz for medications)  Last Solid Consumption Date: 09/14/24  Last Solid Consumption Time: 2000  Last Liquid Consumption Date: 09/16/24          History Review:  Patient Active Problem List    Diagnosis Date Noted    Kidney lesion 07/25/2024    Liver lesion 07/25/2024    Neutrophilia 01/15/2024    Osteopenia of left hip 02/28/2023    Callus of foot 08/24/2020    Prediabetes 08/24/2020    Nontraumatic complete tear of right rotator cuff 01/11/2020    Leg cramps 01/11/2020    Lumbar foraminal stenosis 10/24/2019    Herniation of intervertebral disc between L4 and L5 10/24/2019    DDD (degenerative disc disease), lumbar 10/24/2019    Chronic bilateral low back pain with bilateral sciatica 10/24/2019    Myofascial pain 10/24/2019    Elevated CPK 08/21/2019    Pseudophakia, left eye 12/04/2018    Lumbar radiculopathy 10/31/2018    Cervical stenosis of spinal canal 05/21/2018    Obstructive sleep apnea syndrome 03/21/2018    Primary open angle glaucoma of both eyes, moderate stage 02/10/2018    Ptosis of both eyelids 02/10/2018    Cramps, muscle, general 11/20/2017    Vitamin D deficiency 11/20/2017    Chronic cough     Diverticulosis 11/11/2016    Internal hemorrhoid 11/11/2016    Neuropathy 06/30/2015    Myopathy 06/30/2015    Status post Nissen fundoplication 04/21/2015    GERD (gastroesophageal reflux disease) 04/21/2015    Hand arthritis 09/02/2014    Essential hypertension     Microscopic hematuria 12/05/2013     Cervicalgia 11/02/2013    Congenital cystic kidney disease 09/26/2013    Anxiety state 12/15/2012    Chondromalacia of patella 10/17/2011    Goiter 08/13/2011    Hyperlipidemia 07/30/2010    Sleep disturbance 10/02/2009    Dizziness 03/17/2007    Heartburn 06/06/2006       Past Medical History:    Abscess of left thigh    Acute meniscal tear of knee    Age-related nuclear cataract of both eyes    Anal sphincter incontinence    Arthritis    Back problem    Chondromalacia    Colon polyps    Degenerative disc disease    Diverticular disease    Esophageal reflux    Glaucoma    Hammertoe    High blood pressure    High cholesterol    Hyperlipidemia with target low density lipoprotein (LDL) cholesterol less than 130 mg/dL    Insomnia    Kidney lesion    Liver lesion    Neuropathy    Right Foot    Obstructive sleep apnea syndrome    Osteoarthritis    Primary open angle glaucoma of both eyes    Diagnosis of glaucoma OU; Started Latanoprost qhs after abnormal VF and OCT 2/25/16;  6/5/18 consult with Dr. Madeline Chappell-see progress note    Sleep apnea    Small bowel obstruction (HCC)    Tendinitis    Unspecified essential hypertension    Visual impairment    Reraders       Past Surgical History:   Procedure Laterality Date    Anal sphincterotomy      03/12/2013 Gely    Blepharoplasty anesthesia Bilateral 03/05/2020    Dr Suresh [facility]mology     Cataract extraction w/  intraocular lens implant Left 05/07/2018    L PC IOL with Dr. Suresh @ [facility]    Colonoscopy N/A 11/11/2016    Procedure: COLONOSCOPY;  Surgeon: Sabrina Cazares MD;  Location: [facility] ENDOSCOPY    Colonoscopy N/A 09/06/2019    Procedure: COLONOSCOPY;  Surgeon: Edwin Sterling MD;  Location: [facility] ENDOSCOPY    Excision of chalazion, single - od - right eye Right 6.27/2017    Nasally    Hc arthrocentesis or inject major joint w/o us      Hysterectomy  1996    KAI    Other      Lap Nissen Fundoplication surgery    Other surgical history  2005,2007,2008     LAPAROSCOPIC LYSIS OF ADHESION    Other surgical history      right foot bunionectomy    Other surgical history  2014    right superficial anterior peroneal nerve biopsy and right proximal thigh muscle biopsy.    Other surgical history  2023    Excision and Biopsy of two  perineal cysts    Upper gi endoscopy performed  2015    Yag capsulotomy - os - left eye Left 2018    [facility]       Medications Prior to Admission   Medication Sig Dispense Refill Last Dose    Omeprazole 40 MG Oral Capsule Delayed Release Take 1 capsule (40 mg total) by mouth daily. Take 1 capsule by mouth daily before breakfast. 90 capsule 3 2024 at 0530    amLODIPine 10 MG Oral Tab TAKE 1 TABLET BY MOUTH EVERY DAY (Patient taking differently: at bedtime. TAKE 1 TABLET BY MOUTH EVERY DAY) 90 tablet 3 9/15/2024    latanoprost 0.005 % Ophthalmic Solution INSTILL 1 DROP IN BOTH EYES EVERY night 3 each 3 9/15/2024    rosuvastatin 5 MG Oral Tab Take 1 tablet (5 mg total) by mouth daily.   9/15/2024    MAGNESIUM OR Take by mouth.   2024    benzonatate 100 MG Oral Cap Take 1 capsule (100 mg total) by mouth 3 (three) times daily as needed for cough. (Patient not taking: Reported on 2024) 30 capsule 0 Not Taking    [] doxycycline 100 MG Oral Cap Take 1 capsule (100 mg total) by mouth 2 (two) times daily for 7 days. (Patient not taking: Reported on 2024) 14 capsule 0 Not Taking    tiZANidine HCl 2 MG Oral Cap Take 1 capsule (2 mg total) by mouth 3 (three) times daily. (Patient not taking: Reported on 2024) 270 capsule 0 Not Taking    diazePAM 2 MG Oral Tab Take 1 tablet (2 mg total) by mouth nightly as needed. (Patient not taking: Reported on 2024) 20 tablet 0 Not Taking    [] Meloxicam (MOBIC) 15 MG Oral Tab Take 1 tablet (15 mg total) by mouth daily for 14 days. (Patient not taking: Reported on 2024) 14 tablet 0 Not Taking    pregabalin (LYRICA) 75 MG Oral Cap Take 1 capsule (75 mg total) by  mouth 2 (two) times daily. (Patient not taking: Reported on 9/12/2024) 60 capsule 0 Not Taking    famotidine (PEPCID) 20 MG Oral Tab Take 1 tablet (20 mg total) by mouth nightly as needed for Heartburn. (Patient not taking: Reported on 9/12/2024) 90 tablet 3 Not Taking    cholecalciferol 50 MCG (2000 UT) Oral Cap Take 1 capsule (2,000 Units total) by mouth daily. (Patient not taking: Reported on 9/12/2024)   Not Taking     Current Facility-Administered Medications Ordered in Epic   Medication Dose Route Frequency Provider Last Rate Last Admin    lactated ringers infusion   Intravenous Continuous Edwin Sterling MD 20 mL/hr at 09/16/24 0711 New Bag at 09/16/24 0711     No current [facility]-ordered outpatient medications on file.       Allergies   Allergen Reactions    Celecoxib TONGUE SWELLING     Other reaction(s): CELECOXIB    Sulfa Antibiotics TONGUE SWELLING     Other reaction(s): SULFA (SULFONAMIDE ANTIBIOTICS)    Erythromycin PAIN    Amoxicillin DIARRHEA       Family History   Problem Relation Age of Onset    Hypertension Father     Hypertension Mother     Hypertension Sister     Cancer Sister         liver cancer    Hypertension Brother     Diabetes Neg     Glaucoma Neg     Macular degeneration Neg     Breast Cancer Neg     Ovarian Cancer Neg      Social History     Socioeconomic History    Marital status:    Tobacco Use    Smoking status: Never     Passive exposure: Never    Smokeless tobacco: Never   Vaping Use    Vaping status: Never Used   Substance and Sexual Activity    Alcohol use: No     Comment: None.     Drug use: No    Sexual activity: Yes     Birth control/protection: Hysterectomy   Other Topics Concern    Caffeine Concern Yes     Comment: occasional soda    Exercise Yes    Pt has a pacemaker No    Pt has a defibrillator No    Breast feeding No    Reaction to local anesthetic No    Right Handed Yes       Available pre-op labs reviewed.             Vital Signs:  Body mass index is 30.11 kg/m².    height is 1.727 m (5' 8\") and weight is 89.8 kg (198 lb). Her oral temperature is 98 °F (36.7 °C). Her blood pressure is 123/73 and her pulse is 66. Her respiration is 22 and oxygen saturation is 100%.   Vitals:    09/12/24 1138 09/16/24 0700   BP:  123/73   Pulse:  66   Resp:  22   Temp:  98 °F (36.7 °C)   TempSrc:  Oral   SpO2:  100%   Weight: 89.8 kg (198 lb)    Height: 1.727 m (5' 8\")         Anesthesia Evaluation     Patient summary reviewed    History of anesthetic complications   Airway   Mallampati: II  TM distance: >3 FB  Neck ROM: full  Dental    (+) upper dentures    Pulmonary - normal exam   (+) sleep apnea  Cardiovascular - normal exam  Exercise tolerance: good  (+) hypertension well controlled    Neuro/Psych    (+)  neuromuscular disease, anxiety/panic attacks,        GI/Hepatic/Renal    (+) GERD, liver disease, bowel prep    Endo/Other    Abdominal  - normal exam                 Anesthesia Plan:   ASA:  3  Plan:   MAC and general  Informed Consent Plan and Risks Discussed With:  Patient  Discussed plan with:  CRNA and surgeon      I have informed Kerry Hsu and/or legal guardian or family member of the nature of the anesthetic plan, benefits, risks including possible dental damage if relevant, major complications, and any alternative forms of anesthetic management.   All of the patient's questions were answered to the best of my ability. The patient desires the anesthetic management as planned.  Keira Herbert CRNA  9/16/2024 7:26 AM  Present on Admission:  **None**

## 2024-09-16 NOTE — OPERATIVE REPORT
ESOPHAGOGASTRODUODENOSCOPY (EGD) & COLONOSCOPY REPORT    Kerry Hsu     1960 Age 64 year old   PCP Bella Rogers MD Endoscopist Edwin Sterling MD     Date of procedure: 24    Procedure: EGD w/biopsy polypectomy & Colonoscopy w/cold biopsy and cold snare polypectomy    Pre-operative diagnosis: reflux and abnormal CT esophagus, surveillance colon polyps    Post-operative diagnosis: gastric polyps, slipped nissen, colon polyps, diverticulosis and hemorrhoids    Medications: MAC sedation    Withdrawal time: 14 minutes    Complications: none    Procedure: Informed consent was obtained from the patient after the risks of the procedure were discussed, including but not limited to bleeding, perforation, aspiration, infection, or possibility of a missed lesion. We discussed the risks/benefits and alternatives to this procedure, as well as the planned sedation. EGD procedure: The patient was placed in the left lateral decubitus position and begun on continuous blood pressure pulse oximetry and EKG monitoring and this was maintained throughout the procedure. Once an adequate level of sedation was obtained a bite block was placed. Then the lubricated tip of the [id number] diagnostic video upper endoscope was inserted and advanced using direct visualization into the posterior pharynx and ultimately into the esophagus. Colonoscopy procedure: Once an adequate level of sedation was obtained a digital rectal exam was completed. Then the lubricated tip of the [id number] diagnostic video colonoscope was inserted and advanced without difficulty to the cecum using the CO2 insufflation technique. The cecum was identified by localizing the trifold, the appendix and the ileocecal valve. A routine second examination of the cecum/ascending colon was performed. Withdrawal was begun with thorough washing and careful examination of the colonic walls and folds. Photodocumentation was obtained. The bowel prep was  good. Views of the colon were good with washing. I then carefully withdrew the instrument from the patient who tolerated the procedure well.     Complications: None    EGD findings:      Findings:      1. Esophagus: The squamocolumnar junction was noted at 41 cm and appeared regular. The GE junction was noted at 41 cm from the incisors. No significant hiatal hernia. The esophageal mucosa appeared normal. There was no endoscopic findings of esophagitis, stricture or Iyer's esophagus.    2. Stomach: The stomach distended normally. Normal rugal folds were seen. The pylorus was patent. The gastric mucosa appeared erythematous and there were a few polyps. Sampled two gastric polyps removed with cold biopsy. Retroflexion revealed a normal fundus. There was a fundoplication but it was loose and there appeared to be a small hernia. Otherwise a normal cardia.     3. Duodenum: The duodenal mucosa appeared normal in the 1st and 2nd portion of the duodenum. Biopsies taken for celiac      Colonoscopy findings:    1. SUNNI: normal rectal tone, no masses palpated.   2. 6 polyp(s) noted as follows:      A. 3-4 mm polyps x2 in the ascending colon; flat morphology; cold biopsy polypectomy and retrieved.      B. 5-6 mm polyps x2 in the transverse colon; flat morphology; cold snare polypectomy and retrieved.      C. 3-4 mm polyps x2 in the rectosigmoid colon; flat hyperplastic appearing morphology; cold biopsy polypectomy and retrieved.  3. Terminal ileum: the visualized mucosa appeared normal.  4. The colonic mucosa throughout the colon showed normal vascular pattern, without evidence of angioectasias or inflammation.   5. Diverticulosis: pandiverticulosis.  6. A retroflexed view of the rectum revealed hemorrhoids.      Impression:  1. Chest pain and regurgitation possibly from loose nissen-- consider evaulation by surgery  2. Polyps in the colon    Recommend:  1. Eat sitting up  2. Eat small meals and do not lay down for 3-4 hours  after eating  3. Reflux precautions  4. Follow up with surgery  5. Continue omeprazole  6. Await pathology, likely repeat colonoscopy in 3 years pending pathology. The interval for the next colonoscopy will be determined after reviewing pathology. If new signs or symptoms develop, colonoscopy may need to be repeated sooner.   7. High fiber diet.    >>>If tissue was obtained and you have not received your pathology results either by phone or letter within 2 weeks, please call our office at [phone].    Specimens: gastric polyps, ascending polyps, transverse polyps and rectosigmoid polyps

## 2024-09-17 ENCOUNTER — IMMUNIZATION (OUTPATIENT)
Dept: LAB | Age: 64
End: 2024-09-17
Attending: EMERGENCY MEDICINE
Payer: MEDICARE

## 2024-09-17 DIAGNOSIS — Z23 NEED FOR VACCINATION: Primary | ICD-10-CM

## 2024-09-17 PROCEDURE — 90480 ADMN SARSCOV2 VAC 1/ONLY CMP: CPT

## 2024-09-18 ENCOUNTER — TELEPHONE (OUTPATIENT)
Dept: GASTROENTEROLOGY | Facility: CLINIC | Age: 64
End: 2024-09-18

## 2024-09-18 NOTE — TELEPHONE ENCOUNTER
Message from insurance that pepcid interacts with tizanidine  Called patent and states she is not on the pepcid and taking omeprazole  Discussed interaction and will make sure not to take pepcid unless discussed prior    Edwin Sterling MD

## 2024-09-23 ENCOUNTER — IMMUNIZATION (OUTPATIENT)
Dept: LAB | Age: 64
End: 2024-09-23
Attending: EMERGENCY MEDICINE
Payer: MEDICARE

## 2024-09-23 DIAGNOSIS — Z23 NEED FOR VACCINATION: Primary | ICD-10-CM

## 2024-09-23 PROCEDURE — 90662 IIV NO PRSV INCREASED AG IM: CPT

## 2024-09-23 PROCEDURE — 90471 IMMUNIZATION ADMIN: CPT

## 2024-09-24 ENCOUNTER — OFFICE VISIT (OUTPATIENT)
Dept: SURGERY | Facility: CLINIC | Age: 64
End: 2024-09-24
Payer: MEDICARE

## 2024-09-24 VITALS
DIASTOLIC BLOOD PRESSURE: 68 MMHG | HEART RATE: 79 BPM | BODY MASS INDEX: 30 KG/M2 | SYSTOLIC BLOOD PRESSURE: 124 MMHG | WEIGHT: 198 LBS

## 2024-09-24 DIAGNOSIS — R12 HEARTBURN: Primary | ICD-10-CM

## 2024-09-24 PROCEDURE — 99214 OFFICE O/P EST MOD 30 MIN: CPT | Performed by: SURGERY

## 2024-09-24 NOTE — H&P
HPI:    Patient ID: Kerry Hsu is a 64 year old female presenting with   Chief Complaint   Patient presents with    Abdominal Pain     Patient states she had an endoscopy and was advised her band is loose from her shelea fundoplication.  States she has constant reflux and it is very painful.  Denies nausea/vomiting.   .    Abdominal Pain        Past Medical History:    Abscess of left thigh    Acute meniscal tear of knee    Age-related nuclear cataract of both eyes    Anal sphincter incontinence    Arthritis    Back problem    Chondromalacia    Colon polyps    Degenerative disc disease    Diverticular disease    Esophageal reflux    Glaucoma    Hammertoe    High blood pressure    High cholesterol    Hyperlipidemia with target low density lipoprotein (LDL) cholesterol less than 130 mg/dL    Insomnia    Kidney lesion    Liver lesion    Neuropathy    Right Foot    Obstructive sleep apnea syndrome    Osteoarthritis    Primary open angle glaucoma of both eyes    Diagnosis of glaucoma OU; Started Latanoprost qhs after abnormal VF and OCT 2/25/16;  6/5/18 consult with Dr. Madeline Chappell-see progress note    Sleep apnea    Small bowel obstruction (HCC)    Tendinitis    Unspecified essential hypertension    Visual impairment    Reraders     Past Surgical History:   Procedure Laterality Date    Anal sphincterotomy      03/12/2013 [facility]    Blepharoplasty anesthesia Bilateral 03/05/2020    Dr Suresh [facility]     Cataract extraction w/  intraocular lens implant Left 05/07/2018    L PC IOL with Dr. Suresh @ [facility]    Colonoscopy N/A 11/11/2016    Procedure: COLONOSCOPY;  Surgeon: Sabrina Cazares MD;  Location: [facility]    Colonoscopy N/A 09/06/2019    Procedure: COLONOSCOPY;  Surgeon: Edwin Sterling MD;  Location: [facility] ENDOSCOPY    Colonoscopy  09/16/2024    Dr. Sterling; colon polyps, diverticulosis, hemorrhoids    Colonoscopy N/A 9/16/2024    Procedure: COLONOSCOPY;  Surgeon: Edwin Sterling MD;   Location: [facility] ENDOSCOPY    Egd  09/16/2024    Dr. Sterling; gastric polyps, small hiatal hernia    Excision of chalazion, single - od - right eye Right 6.27/2017    Nasally    Hc arthrocentesis or inject major joint w/o us      Hysterectomy  1996    [facility]    Other      Lap Nissen Fundoplication surgery    Other surgical history  2005,2007,2008    LAPAROSCOPIC LYSIS OF ADHESION    Other surgical history      right foot bunionectomy    Other surgical history  11/14/2014    right superficial anterior peroneal nerve biopsy and right proximal thigh muscle biopsy.    Other surgical history  06/13/2023    Excision and Biopsy of two  perineal cysts    Upper gi endoscopy performed  05/2015    Yag capsulotomy - os - left eye Left 12/19/2018    RJM     Family History   Problem Relation Age of Onset    Hypertension Father     Hypertension Mother     Hypertension Sister     Cancer Sister         liver cancer    Hypertension Brother     Diabetes Neg     Glaucoma Neg     Macular degeneration Neg     Breast Cancer Neg     Ovarian Cancer Neg      Social History     Socioeconomic History    Marital status:      Spouse name: Not on file    Number of children: Not on file    Years of education: Not on file    Highest education level: Not on file   Occupational History    Not on file   Tobacco Use    Smoking status: Never     Passive exposure: Never    Smokeless tobacco: Never   Vaping Use    Vaping status: Never Used   Substance and Sexual Activity    Alcohol use: No     Comment: None.     Drug use: No    Sexual activity: Yes     Birth control/protection: Hysterectomy   Other Topics Concern     Service Not Asked    Blood Transfusions Not Asked    Caffeine Concern Yes     Comment: occasional soda    Occupational Exposure Not Asked    Hobby Hazards Not Asked    Sleep Concern Not Asked    Stress Concern Not Asked    Weight Concern Not Asked    Special Diet Not Asked    Back Care Not Asked    Exercise Yes    Bike Helmet Not Asked     Seat Belt Not Asked    Self-Exams Not Asked    Grew up on a farm Not Asked    History of tanning Not Asked    Outdoor occupation Not Asked    Pt has a pacemaker No    Pt has a defibrillator No    Breast feeding No    Reaction to local anesthetic No    Left Handed Not Asked    Right Handed Yes    Currently spends a great deal of time in the sun Not Asked    Past Sunlamp Treatments for Acne Not Asked    Hx of Spending Great Deal of Time in Sun Not Asked    Bad sunburns in the past Not Asked    Tanning Salons in the Past Not Asked    Hx of Radiation Treatments Not Asked    Regular use of sun block Not Asked   Social History Narrative    Work - banking     Social Determinants of Health     Financial Resource Strain: Not on file   Food Insecurity: Not on file   Transportation Needs: Not on file   Physical Activity: Not on file   Stress: Not on file   Social Connections: Unknown (4/20/2021)    [facility]    Social Connections     Conversations with friends/family/neighbors per week: Not on file   Housing Stability: Low Risk  (7/17/2021)    [facility]    Housing Stability     Mortgage Payment Concerns?: Not on file     Number of Places Lived in the Last Year: Not on file     Unstable Housing?: Not on file       Review of Systems   Constitutional: Negative.    HENT: Negative.     Eyes: Negative.    Respiratory: Negative.     Cardiovascular: Negative.    Gastrointestinal:         Heartburn   Endocrine: Negative.    Genitourinary: Negative.    Musculoskeletal: Negative.    Skin: Negative.    Allergic/Immunologic: Negative.    Neurological: Negative.    Hematological:  Does not bruise/bleed easily.   Psychiatric/Behavioral: Negative.             Current Outpatient Medications   Medication Sig Dispense Refill    tiZANidine HCl 2 MG Oral Cap Take 1 capsule (2 mg total) by mouth 3 (three) times  daily. 270 capsule 0    diazePAM 2 MG Oral Tab Take 1 tablet (2 mg total) by mouth nightly as needed. 20 tablet 0    pregabalin (LYRICA) 75 MG Oral Cap Take 1 capsule (75 mg total) by mouth 2 (two) times daily. 60 capsule 0    Omeprazole 40 MG Oral Capsule Delayed Release Take 1 capsule (40 mg total) by mouth daily. Take 1 capsule by mouth daily before breakfast. 90 capsule 3    amLODIPine 10 MG Oral Tab TAKE 1 TABLET BY MOUTH EVERY DAY (Patient taking differently: at bedtime. TAKE 1 TABLET BY MOUTH EVERY DAY) 90 tablet 3    latanoprost 0.005 % Ophthalmic Solution INSTILL 1 DROP IN BOTH EYES EVERY night 3 each 3    rosuvastatin 5 MG Oral Tab Take 1 tablet (5 mg total) by mouth daily.      cholecalciferol 50 MCG (2000 UT) Oral Cap Take 1 capsule (2,000 Units total) by mouth daily.      MAGNESIUM OR Take by mouth.         Allergies:  Allergies   Allergen Reactions    Celecoxib TONGUE SWELLING     Other reaction(s): CELECOXIB    Sulfa Antibiotics TONGUE SWELLING     Other reaction(s): SULFA (SULFONAMIDE ANTIBIOTICS)    Erythromycin PAIN    Amoxicillin DIARRHEA      PHYSICAL EXAM:   /68 (BP Location: Left arm, Patient Position: Sitting, Cuff Size: large)   Pulse 79   Wt 198 lb (89.8 kg)   BMI 30.11 kg/m²   Physical Exam  Vitals reviewed.   Constitutional:       Appearance: Normal appearance. She is well-developed.   HENT:      Head: Normocephalic and atraumatic.   Cardiovascular:      Rate and Rhythm: Normal rate and regular rhythm.   Pulmonary:      Effort: Pulmonary effort is normal.      Breath sounds: Normal breath sounds.   Abdominal:      General: There is no distension.      Palpations: Abdomen is soft. There is no mass.      Tenderness: There is no abdominal tenderness. There is no guarding or rebound.   Musculoskeletal:         General: Normal range of motion.      Cervical back: Normal range of motion and neck supple.   Skin:     General: Skin is warm and dry.   Neurological:      Mental Status: She  is alert and oriented to person, place, and time.   Psychiatric:         Speech: Speech normal.         Behavior: Behavior normal.                 ASSESSMENT/PLAN:   Diagnoses and all orders for this visit:    Heartburn    Sp lap Nissen w Dr. Ruano.  Now with mild symptoms managed well with PPI and endoscopic findings of small hh with loose Nissen.  No esophagitis.  Discussed nature of GERD and rationale for anti reflux surgery.  Risks and benefits of each options discussed in detail.  Recommend continued observation.         Reinier Berrios MD  9/24/2024

## 2024-10-09 ENCOUNTER — HOSPITAL ENCOUNTER (OUTPATIENT)
Dept: ULTRASOUND IMAGING | Facility: HOSPITAL | Age: 64
Discharge: HOME | End: 2024-10-09
Payer: MEDICARE

## 2024-10-09 ENCOUNTER — HOSPITAL ENCOUNTER (OUTPATIENT)
Dept: MAMMOGRAPHY | Facility: HOSPITAL | Age: 64
Discharge: HOME | End: 2024-10-09
Payer: MEDICARE

## 2024-10-09 DIAGNOSIS — R92.8 ABNORMAL MAMMOGRAM OF RIGHT BREAST: ICD-10-CM

## 2024-10-09 PROCEDURE — 76642 ULTRASOUND BREAST LIMITED: CPT

## 2024-10-09 PROCEDURE — 77062 BREAST TOMOSYNTHESIS BI: CPT

## 2024-10-09 PROCEDURE — 77066 DX MAMMO INCL CAD BI: CPT

## 2024-10-15 ENCOUNTER — HOSPITAL ENCOUNTER (OUTPATIENT)
Dept: MRI IMAGING | Facility: HOSPITAL | Age: 64
Discharge: HOME | End: 2024-10-15
Attending: INTERNAL MEDICINE
Payer: MEDICARE

## 2024-10-15 DIAGNOSIS — N28.9 KIDNEY LESION: ICD-10-CM

## 2024-10-15 DIAGNOSIS — K76.9 LIVER LESION: ICD-10-CM

## 2024-10-15 PROCEDURE — A9575 INJ GADOTERATE MEGLUMI 0.1ML: HCPCS | Performed by: INTERNAL MEDICINE

## 2024-10-15 PROCEDURE — 74183 MRI ABD W/O CNTR FLWD CNTR: CPT | Performed by: INTERNAL MEDICINE

## 2024-10-15 RX ORDER — GADOTERATE MEGLUMINE 376.9 MG/ML
20 INJECTION INTRAVENOUS
Status: COMPLETED | OUTPATIENT
Start: 2024-10-15 | End: 2024-10-15

## 2024-10-15 RX ADMIN — GADOTERATE MEGLUMINE 18 ML: 376.9 INJECTION INTRAVENOUS at 07:43:00

## 2024-10-21 NOTE — TELEPHONE ENCOUNTER
Pt seen by MAGGIE on 3/7 for abdominal tenderness, notes not completed at this time. Pt states vaginal cyst was inflamed, call backing with 7/10 pain. Pt states it has increased in size, quarter now. Pt indicated MAGGIE would send antibiotics if it worsened. Pt informed MAGGIE will be in office at 1 pm. Will route him the message.     Pharmacy confirmed.   Allergies: Celecoxib, Sulfa, Amoxicillin, Erythromycin     To MAGGIE, please review and advise. Thank you.    Comment: 5/20 Detail Level: Simple Render Risk Assessment In Note?: no

## 2024-10-22 ENCOUNTER — PROCEDURE VISIT (OUTPATIENT)
Dept: NEUROLOGY | Facility: CLINIC | Age: 64
End: 2024-10-22
Payer: MEDICARE

## 2024-10-22 VITALS — BODY MASS INDEX: 30.01 KG/M2 | RESPIRATION RATE: 16 BRPM | WEIGHT: 198 LBS | HEIGHT: 68 IN

## 2024-10-22 DIAGNOSIS — M62.838 MUSCLE SPASM: Primary | ICD-10-CM

## 2024-10-22 PROCEDURE — 95913 NRV CNDJ TEST 13/> STUDIES: CPT | Performed by: OTHER

## 2024-10-22 PROCEDURE — 95886 MUSC TEST DONE W/N TEST COMP: CPT | Performed by: OTHER

## 2024-10-22 NOTE — PROCEDURES
HISTORY:    Kerry Hsu is a 64 year old female with a complaint of severe muscle cramps for a number of years.  EMG in 2013 was reportedly normal.  Also recent development of tingling and numbness in the both feet.    PHYSICAL:    Cranial nerves grossly normal. Motor examination showed strength to be 5 of 5 throughout.     Sensory examination showed decreased sensation to pin prick up to ankles.       TECHNICAL SUMMARY:    There were no significant technical difficulty encountered during this study.  Nerve conduction studies were performed without warming with skin temperature between 32-33 degrees centigrade.    SUMMARY:    Bilateral peroneal and tibial motor nerves were tested.  Bilateral sural and superficial peroneal nerves were tested.    Left median and ulnar motor nerves were tested.  Left median and ulnar sensory nerves were tested.  Left median and ulnar palmar orthodromic nerves were tested.    The motor conduction test was performed on 6 nerve(s). The results were normal in 3 nerve(s): R Deep peroneal (Fibular) - EDB, L Deep peroneal (Fibular) - EDB, L Median - APB. Results outside the specified normal range were found in 3 nerve(s), as follows:  In the R Tibial - AH study  the amplitude was reduced for Ankle stimulation  In the L Tibial - AH study  the amplitude was reduced for Ankle stimulation  In the L Ulnar - ADM study  the velocity was reduced for A.Elbow - B.Elbow segment    The sensory conduction test was performed on 6 nerve(s). The results were normal in 1 nerve(s): L Median - DigII UlnarV. Results outside the specified normal range were found in 5 nerve(s), as follows:  In the R Sural - Lat Mall study  the response was considered absent for Calf stimulation  In the L Sural - Lat Mall study  the response was considered absent for Calf stimulation  In the R Superficial peroneal - Ankle study  the response was considered absent for Lat leg stimulation  In the L Superficial peroneal -  Ankle study  the response was considered absent for Lat leg stimulation  In the L Median - Ulnar Palmar study  the peak latency was increased for Ulnar stimulation  the peak latency difference was increased for Median - Wrist segment    The needle EMG examination was performed in 18 muscles. It was normal in 16 muscle(s): L. Deltoid, L. Biceps brachii, L. Triceps brachii, L. Extensor digitorum communis, L. Pronator teres, L. First dorsal interosseous, L. Vastus medialis, R. Vastus medialis, L. Vastus lateralis, R. Vastus lateralis, L. Biceps femoris (short head), R. Biceps femoris (short head), L. Gastrocnemius (Medial head), R. Gastrocnemius (Medial head), L. Tibialis anterior, R. Tibialis anterior. The study was abnormal in 2 muscle(s), with the following distribution:  Abnormal spontaneous/insertional activity was found in L. Dorsal interossei (pedis), R. Dorsal interossei (pedis).             Motor NCS      Nerve / Sites Muscle Latency Amplitude Durat Segments Distance Lat Diff Velocity     ms mV ms  cm ms m/s   R Deep peroneal (Fibular) - EDB      Ankle EDB 5.88 1.9 4.94 Ankle - EDB 8  14      Ref.  <=6.50 >=1.3  Ref.         Fib Head EDB 13.81 5.5 6.42 Fib Head - Ankle 37 7.94 47      Ref.     Ref.   >=38      Knee EDB 15.27 5.9 6.56 Knee - Fib Head 11 1.46 75      Ref.     Ref.   >=38      Acc Peron EDB 9.13 0.4  Acc Peron - EDB      L Deep peroneal (Fibular) - EDB      Ankle EDB 4.71 2.0 4.56 Ankle - EDB 8  17      Ref.  <=6.50 >=1.3  Ref.         Fib Head EDB 12.62 5.6 6.33 Fib Head - Ankle 34 7.92 43      Ref.     Ref.   >=38      Knee EDB 14.46 5.3  Knee - Fib Head 10 1.83 55      Ref.     Ref.   >=38      Acc Peron EDB    Acc Peron - EDB      R Tibial - AH      Ankle AH 5.33 2.9 4.38 Ankle - AH 8  15      Ref.  <=6.00 >=4.4  Ref.         Knee AH 16.52 1.6  Knee - Ankle 44 11.19 39      Ref.     Ref.   >=39   L Tibial - AH      Ankle AH 5.48 1.3 2.88 Ankle - AH 8  15      Ref.  <=6.00 >=4.4  Ref.          Knee AH 17.42 2.1 8.52 Knee - Ankle 50 11.94 42      Ref.     Ref.   >=39       Motor NCS      Nerve / Sites Muscle Latency Amplitude Rel Amp Durat Segments Distance Lat Diff Velocity     ms mV % ms  cm ms m/s   L Median - APB      Wrist APB 2.75 14.0 100 5.94 Wrist - APB 8  29      Elbow APB 7.44 13.1 93.1 5.96 Elbow - Wrist 25 4.69 53   L Ulnar - ADM      Wrist ADM 2.79 13.0 100 8.23 Wrist - ADM         B.Elbow ADM 6.52 12.6 97.4 7.23 B.Elbow - Wrist 21 3.73 56      A.Elbow ADM 8.48 13.2 105 7.35 A.Elbow - B.Elbow 9 1.96 46       Sensory NCS      Nerve / Sites Rec. Site Onset Lat Peak Lat NP Amp PP Amp Segments Distance Velocity     ms ms µV µV  cm m/s   R Sural - Lat Mall      Calf Lat Mall NR NR NR NR Calf - Lat Mall 14 NR      Ref.   <=4.50 >=5.0 >=5.0 Ref.  >=40   L Sural - Lat Mall      Calf Lat Mall NR NR NR NR Calf - Lat Mall 14 NR      Ref.   <=4.50 >=5.0 >=5.0 Ref.  >=40   R Superficial peroneal - Ankle      Lat leg Ankle NR NR NR NR Lat leg - Ankle 14 NR      Ref.   <=4.50 >=4.0 >=4.0 Ref.     L Superficial peroneal - Ankle      Lat leg Ankle NR NR NR NR Lat leg - Ankle 14 NR      Ref.   <=4.50 >=4.0 >=4.0 Ref.         Sensory NCS      Nerve / Sites Rec. Site Onset Lat Peak Lat NP Amp PP Amp Segments Peak Diff     ms ms µV µV  ms   L Median - DigII UlnarV      Median Wrist Dig II 2.58 3.38 25.9 64.0 Median Wrist - Dig II       Ulnar Wrist Dig V 2.79 3.67 51.1 85.8 Ulnar Wrist - Median Wrist 0.29       Sensory NCS - CTS      Nerve / Sites Rec. Site Onset Lat Peak Lat NP Amp PP Amp Segments Dist Peak Diff Velocity     ms ms µV µV  cm ms m/s   L Median - Ulnar Palmar      Median Wrist 1.65 2.10 84.3 80.7 Median - Wrist 8  48.6      Ulnar Wrist 1.75 2.31 28.7 32.1 Ulnar - Median  0.208        EMG Summary Table     Spontaneous MUAP Recruitment   Muscle Nerve Roots IA Fib PSW Fasc H.F. Comments Amp Dur. PPP Pattern   L. Deltoid Axillary C5-C6 N None None None None Normal N N N N   L. Biceps brachii  Musculocutaneous C5-C6 N None None None None Normal N N N N   L. Triceps brachii Radial C6-C8 N None None None None Normal N N N N   L. Extensor digitorum communis Radial C7-C8 N None None None None Normal N N N N   L. Pronator teres Median C6-C7 N None None None None Normal N N N N   L. First dorsal interosseous Ulnar C8-T1 N None None None None Normal N N N N   L. Vastus medialis Femoral L2-L4 N None None None None Normal N N N N   R. Vastus medialis Femoral L2-L4 N None None None None Normal N N N N   L. Vastus lateralis Femoral L2-L4 N None None None None Normal N N N N   R. Vastus lateralis Femoral L2-L4 N None None None None Normal N N N N   L. Biceps femoris (short head) Sciatic (peroneal division) L5-S2 N None None None None Normal N N N N   R. Biceps femoris (short head) Sciatic (peroneal division) L5-S2 N None None None None Normal N N N N   L. Gastrocnemius (Medial head) Tibial S1-S2 N None None None None Normal N N N N   R. Gastrocnemius (Medial head) Tibial S1-S2 N None None None None Normal N N N N   L. Tibialis anterior Deep peroneal (Fibular) L4-L5 N None None None None Normal N N N N   R. Tibialis anterior Deep peroneal (Fibular) L4-L5 N None None None None Normal N N N N   L. Dorsal interossei (pedis) Medial plantar S1-S2 1+ None None None None Normal N N N N   R. 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    There is no electrodiagnostic evidence of another mononeuropathy, plexopathy, inflammatory/necrotizing myopathy or cervical/lumbar radiculopathy affecting either of lower extremities.    CLINICAL CORRELATION:    These abnormal findings are consistent with the patient's clinical  complaints of numbness but not necessarily of the cramps on peripheral nerve or muscle basis.    Ayden Steele MD

## 2024-10-31 ENCOUNTER — HOSPITAL ENCOUNTER (OUTPATIENT)
Dept: GENERAL RADIOLOGY | Facility: HOSPITAL | Age: 64
Discharge: HOME | End: 2024-10-31
Attending: ORTHOPAEDIC SURGERY
Payer: MEDICARE

## 2024-10-31 ENCOUNTER — OFFICE VISIT (OUTPATIENT)
Dept: ORTHOPEDICS CLINIC | Facility: CLINIC | Age: 64
End: 2024-10-31
Payer: MEDICARE

## 2024-10-31 DIAGNOSIS — M17.0 PRIMARY OSTEOARTHRITIS OF BOTH KNEES: ICD-10-CM

## 2024-10-31 DIAGNOSIS — M25.569 KNEE PAIN, UNSPECIFIED CHRONICITY, UNSPECIFIED LATERALITY: ICD-10-CM

## 2024-10-31 DIAGNOSIS — M17.0 PRIMARY OSTEOARTHRITIS OF BOTH KNEES: Primary | ICD-10-CM

## 2024-10-31 PROCEDURE — 73564 X-RAY EXAM KNEE 4 OR MORE: CPT | Performed by: ORTHOPAEDIC SURGERY

## 2024-10-31 PROCEDURE — 99213 OFFICE O/P EST LOW 20 MIN: CPT | Performed by: ORTHOPAEDIC SURGERY

## 2024-10-31 NOTE — PROGRESS NOTES
NURSING INTAKE COMMENTS:   Chief Complaint   Patient presents with    Knee Pain     Bilateral knees f/u- pain returned 3 mos ago- rates pain 7-10/10 all the time- R is worse than L       HPI: This 64 year old female presents today with complaints of bilateral knee pain right worse than left.  She has had a prior Durolane injection of the right knee which helped for quite some time.  She was last seen for her knees nearly a year ago.  She has been treating for her right hip pain with Dr. Kurtis Fields.  She had 1 injection into the hip which helped for only a period of about a week.  Her main complaint is right knee pain today.    Past Medical History:    Abscess of left thigh    Acute meniscal tear of knee    Age-related nuclear cataract of both eyes    Anal sphincter incontinence    Arthritis    Back problem    Chondromalacia    Colon polyps    Degenerative disc disease    Diverticular disease    Esophageal reflux    Glaucoma    Hammertoe    High blood pressure    High cholesterol    Hyperlipidemia with target low density lipoprotein (LDL) cholesterol less than 130 mg/dL    Insomnia    Kidney lesion    Liver lesion    Neuropathy    Right Foot    Obstructive sleep apnea syndrome    Osteoarthritis    Primary open angle glaucoma of both eyes    Diagnosis of glaucoma OU; Started Latanoprost qhs after abnormal VF and OCT 2/25/16;  6/5/18 consult with Dr. Madeline Chappell-see progress note    Sleep apnea    Small bowel obstruction (HCC)    Tendinitis    Unspecified essential hypertension    Visual impairment    Reraders     Past Surgical History:   Procedure Laterality Date    Anal sphincterotomy      03/12/2013 [facility]    Blepharoplasty anesthesia Bilateral 03/05/2020    Dr Kerwin Bello [facility]     Cataract extraction w/  intraocular lens implant Left 05/07/2018    L PC IOL with Dr. Suresh @ [facility]    Colonoscopy N/A 11/11/2016    Procedure: COLONOSCOPY;  Surgeon: Sabrina Cazares MD;  Location: [facility] ENDOSCOPY     Colonoscopy N/A 09/06/2019    Procedure: COLONOSCOPY;  Surgeon: Edwin Sterling MD;  Location: [facility] ENDOSCOPY    Colonoscopy  09/16/2024    Dr. Sterling; colon polyps, diverticulosis, hemorrhoids    Colonoscopy N/A 9/16/2024    Procedure: COLONOSCOPY;  Surgeon: Edwin Sterling MD;  Location: [facility] ENDOSCOPY    Egd  09/16/2024    Dr. Sterling; gastric polyps, small hiatal hernia    Excision of chalazion, single - od - right eye Right 6.27/2017    Nasally    Hc arthrocentesis or inject major joint w/o us      Hysterectomy  1996    KAI    Other      Lap Nissen Fundoplication surgery    Other surgical history  2005,2007,2008    LAPAROSCOPIC LYSIS OF ADHESION    Other surgical history      right foot bunionectomy    Other surgical history  11/14/2014    right superficial anterior peroneal nerve biopsy and right proximal thigh muscle biopsy.    Other surgical history  06/13/2023    Excision and Biopsy of two  perineal cysts    Upper gi endoscopy performed  05/2015    Yag capsulotomy - os - left eye Left 12/19/2018    RJM     Current Outpatient Medications   Medication Sig Dispense Refill    tiZANidine HCl 2 MG Oral Cap Take 1 capsule (2 mg total) by mouth 3 (three) times daily. 270 capsule 0    diazePAM 2 MG Oral Tab Take 1 tablet (2 mg total) by mouth nightly as needed. 20 tablet 0    pregabalin (LYRICA) 75 MG Oral Cap Take 1 capsule (75 mg total) by mouth 2 (two) times daily. 60 capsule 0    Omeprazole 40 MG Oral Capsule Delayed Release Take 1 capsule (40 mg total) by mouth daily. Take 1 capsule by mouth daily before breakfast. 90 capsule 3    amLODIPine 10 MG Oral Tab TAKE 1 TABLET BY MOUTH EVERY DAY (Patient taking differently: at bedtime. TAKE 1 TABLET BY MOUTH EVERY DAY) 90 tablet 3    latanoprost 0.005 % Ophthalmic Solution INSTILL 1 DROP IN BOTH EYES EVERY night 3 each 3    rosuvastatin 5 MG Oral Tab Take 1 tablet (5 mg total) by mouth daily.      cholecalciferol 50 MCG (2000 UT) Oral Cap Take 1 capsule (2,000 Units total)  by mouth daily.      MAGNESIUM OR Take by mouth.       Allergies[1]  Family History   Problem Relation Age of Onset    Hypertension Father     Hypertension Mother     Hypertension Sister     Cancer Sister         liver cancer    Hypertension Brother     Diabetes Neg     Glaucoma Neg     Macular degeneration Neg     Breast Cancer Neg     Ovarian Cancer Neg        Social History     Occupational History    Not on file   Tobacco Use    Smoking status: Never     Passive exposure: Never    Smokeless tobacco: Never   Vaping Use    Vaping status: Never Used   Substance and Sexual Activity    Alcohol use: No     Comment: None.     Drug use: No    Sexual activity: Yes     Birth control/protection: Hysterectomy        Review of Systems:  GENERAL: denies fevers, chills, night sweats, fatigue, unintentional weight loss/gain  SKIN: denies skin lesions, open sores, rash  HEENT:denies recent vision change, new nasal congestion,hearing loss, tinnitus, sore throat, headaches  RESPIRATORY: denies new shortness of breath, cough, asthma, wheezing  CARDIOVASCULAR: denies chest pain, leg cramps with exertion, palpitations, leg swelling  GI: denies abdominal pain, nausea, vomiting, diarrhea, constipation, hematochezia, worsening heartburn or stomach ulcers  : denies dysuria, hematuria, incontinence, increased frequency, urgency, difficulty urinating  MUSCULOSKELETAL: denies musculoskeletal complaints other than in HPI  NEURO: denies numbness, tingling, weakness, balance issues, dizziness, memory loss  PSYCHIATRIC: denies Hx of depression, anxiety, other psychiatric disorders  HEMATOLOGIC: denies blood clots, anemia, blood clotting disorders, blood transfusion  ENDOCRINE: denies autoimmune disease, thyroid issues, or diabetes  ALLERGY: denies asthma, seasonal allergies    Physical Examination:    There were no vitals taken for this visit.  Constitutional: appears well hydrated, alert and responsive, no acute distress noted  Extremities:  Right knee mild valgus deformity.  Tender to the lateral joint line.  Trace effusion.  Ligament stability normal.  Moderate pain with range of motion of the knee.  Left knee tender over the medial joint line.  No pain with passive range of motion of the knee.  Rotation maneuvers produce no significant pain.  Neurological: Light touch and pinprick sensation intact throughout the lower extremities.  Ankle dorsiflexion plantarflexion EHL knee extension and hip flexion strength are 5 out of 5 bilaterally.  No clonus.    Imaging:   XR KNEE COMPLETE BILAT (CPT=73564)    Result Date: 10/31/2024  PROCEDURE: XR KNEE COMPLETE BILAT EM  COMPARISON: [facility] 2nd Floor, XR KNEE COMPLETE BILAT EM(GCM=41078-44), 10/26/2023, 3:38 PM.  INDICATIONS: Chronic deep bilateral knee pain; no known trauma.  TECHNIQUE: 5 views were obtained.   FINDINGS:  Bone mineralization is slightly diminished.  There are severe degenerative changes within the right knee manifested by bony hypertrophy, articular space narrowing/obliteration, subarticular sclerosis, and tibial spine sharpening.  There are moderate degenerative changes within the left knee manifested by articular space narrowing, subarticular sclerosis, and tibial spine sharpening.         CONCLUSION:   1. Severe degenerative changes within the right knee.  2. Moderate degenerative changes within the left knee.    Dictated by (CST): Tim Canela MD on 10/31/2024 at 1:41 PM     Finalized by (CST): Tim Canela MD on 10/31/2024 at 1:44 PM          MRI ABDOMEN (W+WO) (CPT=74183)    Result Date: 10/15/2024  PROCEDURE: MRI ABDOMEN (W+WO) (CPT=74183)  COMPARISON: External Exams, MRI SPINE LUMBAR (CPT=72148), 5/29/2024, 9:31 AM.  [facility], CT UROGRAM(W+WO) W/3D SH(CPT=74178/69845), 12/10/2021, 6:01 PM.  [facility], MRI ABDOMEN WWO CONTRAST, 4/02/2016, 8:59 AM.  INDICATIONS: N28.9 Kidney lesion K76.9 Liver lesion.   TECHNIQUE: A comprehensive MRI examination of the abdomen was performed utilizing a variety of imaging planes and imaging parameters to optimize visualization of suspected pathology.  Images were obtained both before and after intravenous gadolinium injection.   FINDINGS:  LIMITATIONS: The examination is limited by patient motion on many sequences. LUNG BASES: No significant signal abnormality is identified.  LIVER: There is no hepatic steatosis.  There are multiple circumscribed ovoid T2 hyperintense lesions throughout the liver, the largest of which demonstrate thin internal septations consistent with mildly complex cysts.  The smaller lesions are too small  to definitively characterize but statistically most likely relate to small simple cysts.  The largest of these cysts have decreased in size compared to the 12/10/2021 CT including a 9 mm mildly complex cyst in the left hepatic lobe medial segment (series 7, image 16), previously measuring 2.6 cm, as well as a 1.7 cm mildly complex cyst in the right hepatic lobe (series 7, image 20), previously measuring 2.1 cm.  There are no solid liver masses.  BILIARY: The gallbladder is without stones or wall thickening.  No intrahepatic or extrahepatic biliary dilatation is apparent.  PANCREAS: There is normal signal intensity. No focal mass or main pancreatic duct dilatation is seen. There is no evidence of pancreatitis. SPLEEN: No enlargement.  ADRENALS: Unremarkable, without focal mass or enlargement.  KIDNEYS: No hydronephrosis or solid masses are apparent.  There are a few ovoid circumscribed T2 hyperintense renal cortical lesions bilaterally, the largest of which are consistent with simple cysts while the smaller are too small to definitively characterize but most likely represent small simple cysts.  These include a 1.2 cm cyst in the superior right renal pole (series 7, image 16) and a 1.3 cm simple cyst in the superior left renal pole (series 7, image 19) that  have both increased in size compared to the 12/10/2021 abdominal CT. VASCULATURE: The portal vein, IVC, splenic, hepatic, renal veins, and mesenteric veins are patent.  LYMPH NODES: No lymphadenopathy.  ABDOMINAL WALL: Unremarkable.  BONES: Suboptimally assessed by scan protocol, but without grossly apparent bony lesion or fracture.   OTHER: No loculated fluid collections are appreciated.         CONCLUSION:  1. Multiple benign hepatic cysts, the largest of which are minimally complex but have decreased in size compared to the December 2021 CT. 2. Benign simple bilateral renal cortical cysts (Bosniak 1), the largest of which have increased in size compared to 12/10/2021.    Dictated by (CST): Jaspreet Garibay MD on 10/15/2024 at 9:14 AM     Finalized by (CST): Jaspreet Garibay MD on 10/15/2024 at 9:22 AM          [facility] ROMINA 2D+3D DIAGNOSTIC BRITTANY  BILAT (CPT=77066/59230)    Result Date: 10/9/2024  PROCEDURE: [facility] ROMINA 2D+3D DIAGNOSTIC BRITTANY BILAT (CPT=77066/84675)  COMPARISON: [facility],  BREAST RIGHT LIMITED (CPT=76642), 4/08/2024, 9:27 AM.  [facility] BREAST RIGHT LIMITED (CPT=76642), 10/06/2023, 8:35 AM.  [facility] BREAST RIGHT LIMITED (CPT=76642), 10/09/2024, 9:55 AM.  [facility] PF DIGITAL SCREEN W CAD, 9/29/2014, 5:56 PM.  [facility] ROMINA 2D+3D SCREENING BILAT ([id number]), 10/30/2021, 8:13 AM.  [facility] ROMINA 2D+3D SCREENING BILAT ([id number]), 10/13/2020, 4:11 PM.  [facility], MAM ROMINA 2D+3D SCREENING BILAT ([id number]), 10/12/2019, 8:03 AM.  [facility] ROMINA  2D+3D SCREENING BILAT ([id number]), 10/10/2018, 6:16 PM.  [facility] SCREENING BILAT (CPT=77067), 10/08/2017, 1:25 PM.  [facility] DIGITAL SCRN BILAT W/CAD  (CPT=/36378), 10/07/2016, 10:03  AM.  [facility], [facility] PF DIGITAL SCREEN W CAD, 9/30/2015, 6:10 PM.  [facility] ROMINA 2D+3D DIAGNOSTIC ADDL VWS  LEFT (DOU=[id number]/23072), 11/05/2021, 7:53 AM.  [facility], MAM ROMINA 2D+3D SCREENING BILAT ([id number]), 11/07/2022, 5:00 PM.  [facility] ROMINA 2D+3D DIAGNOSTIC BRITTANY BILAT (GZP=85368/37730), 10/06/2023, 7:30 AM.  [facility] ROMINA 2D+3D DIAGNOSTIC BRITTANY RIGHT (CPT=77065/82929), 4/08/2024, 8:36 AM.  INDICATIONS: R92.8 Abnormal mammogram of right breast.  64-year-old patient for follow-up  TECHNIQUE:  Digital diagnostic mammography was performed and images were reviewed with the [employer] GREGORY 1.5.1.5 CAD device.  3D tomosynthesis was performed and reviewed.   BREAST COMPOSITION:   Category b-Scattered areas fibroglandular density.   FINDINGS: Bilateral CC and MLO views were obtained.  The parenchyma pattern is stable with no new suspicious asymmetry, mass, architectural distortion, or microcalcifications identified in either breast.  Targeted ultrasound the right breast was performed.  The previously noted sonographic finding at 08:00 o'clock 5 centimeters from the nipple is not reproducible and has presumably resolved.          CONCLUSION:   Benign findings. As long as the patient's clinical breast exam remains unchanged, patient should return to annual screening mammogram schedule.  BI-RADS CATEGORY:   DIAGNOSTIC CATEGORY 2 - BENIGN FINDING:   RECOMMENDATIONS:  ROUTINE MAMMOGRAM AND CLINICAL EVALUATION IN 12 MONTHS.       PLEASE NOTE: NORMAL MAMMOGRAM DOES NOT EXCLUDE THE POSSIBILITY OF BREAST CANCER.  A CLINICALLY SUSPICIOUS PALPABLE LUMP SHOULD BE BIOPSIED.   For patients over the age of 40, the target due date for the patient's next mammogram has been entered into a reminder system.   Patient received a discharge summary from the  technologist after completion of exam.  Breast marker legend used on images  Triangle = Palpable lump [place] = Skin tag or mole BB = Nipple Linear jame = [place] = Pain    Dictated by (CST): Kenyatta Broderick MD on 10/09/2024 at 9:12 AM     Finalized by (CST): Kenyatta Broderick MD on 10/09/2024 at 10:16 AM          US BREAST RIGHT LIMITED (CPT=76642)    Result Date: 10/9/2024         PROCEDURE: US BREAST RIGHT LIMITED (CPT=76642)  TECHNIQUE: Targeted breast ultrasound was performed with evaluation of only the specific areas of concern.  Static images were recorded by the technologist for review by the radiologist.  FINDINGS: Ultrasound report is dictated under same-day diagnostic mammogram report.  Please refer to the separately dictated same day diagnostic mammogram report for findings and recommendations.      Dictated by (CST): Keynatta Broderick MD on 10/09/2024 at 10:09 AM     Finalized by (CST): Kenyatta Broderick MD on 10/09/2024 at 10:11 AM             Labs:  Lab Results   Component Value Date    WBC 10.3 01/15/2024    HGB 15.4 01/15/2024    .0 01/15/2024      Lab Results   Component Value Date    GLU 94 01/15/2024    BUN 11 01/15/2024    CREATSERUM 1.05 (H) 01/15/2024    GFR 46 (L) 04/02/2016    GFRNAA 66 05/21/2022    GFRAA 76 05/21/2022        Assessment and Plan:  Diagnoses and all orders for this visit:    Primary osteoarthritis of both knees  -     XR KNEE COMPLETE BILAT (CPT=73564); Future  -     [facility] PROC FOR MANAGED CARE AUTH    Knee pain, unspecified chronicity, unspecified laterality  -     XR KNEE COMPLETE BILAT (CPT=73564); Future        Assessment: Bilateral knee osteoarthritis, primary right worse than left    Plan: I discussed operative and nonoperative treatment options.  She may elect for a total knee arthroplasty in the coming months.  She like to try 1 more Durolane injection prior to making the decision.  She may also elect for right hip arthroscopy depending on  her hip symptoms.  We will attempt to authorize a Durolane injection.  Advised her on home exercises, oral anti-inflammatories, activity modifications.  Follow-up again as needed.    Follow Up: Return if symptoms worsen or fail to improve.    CONG LOZANO MD       [1]   Allergies  Allergen Reactions    Celecoxib TONGUE SWELLING     Other reaction(s): CELECOXIB    Sulfa Antibiotics TONGUE SWELLING     Other reaction(s): SULFA (SULFONAMIDE ANTIBIOTICS)    Erythromycin PAIN    Amoxicillin DIARRHEA

## 2024-11-01 ENCOUNTER — TELEPHONE (OUTPATIENT)
Dept: ORTHOPEDICS CLINIC | Facility: CLINIC | Age: 64
End: 2024-11-01

## 2024-11-01 NOTE — TELEPHONE ENCOUNTER
Good morning,     Verified coverage, no authorization needed for Durolane injection.     Okay to schedule patient.     Thank You,  Maria L

## 2024-11-06 NOTE — TELEPHONE ENCOUNTER
S/w patient and informed her that no PA is needed with her health plan. Booked patient for right knee at 11/7/24 at 4:30 and left knee for 11/7/24 at 4:30. She accepted and had no other questions at this time

## 2024-11-07 ENCOUNTER — OFFICE VISIT (OUTPATIENT)
Dept: ORTHOPEDICS CLINIC | Facility: CLINIC | Age: 64
End: 2024-11-07
Payer: MEDICARE

## 2024-11-07 VITALS — SYSTOLIC BLOOD PRESSURE: 137 MMHG | HEART RATE: 105 BPM | DIASTOLIC BLOOD PRESSURE: 82 MMHG

## 2024-11-07 DIAGNOSIS — M17.11 PRIMARY OSTEOARTHRITIS OF RIGHT KNEE: Primary | ICD-10-CM

## 2024-11-07 PROCEDURE — 20610 DRAIN/INJ JOINT/BURSA W/O US: CPT

## 2024-11-07 NOTE — PROGRESS NOTES
NURSING INTAKE COMMENTS:   Chief Complaint   Patient presents with    Knee Pain     R knee f/u- here for Durolane injection today       HPI: This 64 year old female presents today with complaints of right knee follow up. She is here for right knee Durolane injection today. Her last injection was over a year ago. States pain has been progressing over the last couple of weeks. She is not interested in surgery at this time. She would like to proceed with right knee Durolane injection.     Past Medical History:    Abscess of left thigh    Acute meniscal tear of knee    Age-related nuclear cataract of both eyes    Anal sphincter incontinence    Arthritis    Back problem    Chondromalacia    Colon polyps    Degenerative disc disease    Diverticular disease    Esophageal reflux    Glaucoma    Hammertoe    High blood pressure    High cholesterol    Hyperlipidemia with target low density lipoprotein (LDL) cholesterol less than 130 mg/dL    Insomnia    Kidney lesion    Liver lesion    Neuropathy    Right Foot    Obstructive sleep apnea syndrome    Osteoarthritis    Primary open angle glaucoma of both eyes    Diagnosis of glaucoma OU; Started Latanoprost qhs after abnormal VF and OCT 2/25/16;  6/5/18 consult with Dr. Madeline Chappell-see progress note    Sleep apnea    Small bowel obstruction (HCC)    Tendinitis    Unspecified essential hypertension    Visual impairment    Reraders     Past Surgical History:   Procedure Laterality Date    Anal sphincterotomy      03/12/2013 [facility]    Blepharoplasty anesthesia Bilateral 03/05/2020    Dr Suresh [facility]     Cataract extraction w/  intraocular lens implant Left 05/07/2018    L PC IOL with Dr. Suresh @ [facility]    Colonoscopy N/A 11/11/2016    Procedure: COLONOSCOPY;  Surgeon: Sabrina Cazares MD;  Location: [facility] ENDOSCOPY    Colonoscopy N/A 09/06/2019    Procedure: COLONOSCOPY;  Surgeon: Edwin Sterling MD;  Location: [facility] ENDOSCOPY    Colonoscopy  09/16/2024      Asher; colon polyps, diverticulosis, hemorrhoids    Colonoscopy N/A 9/16/2024    Procedure: COLONOSCOPY;  Surgeon: Edwin Sterling MD;  Location: [facility] ENDOSCOPY    Egd  09/16/2024    Dr. Sterling; gastric polyps, small hiatal hernia    Excision of chalazion, single - od - right eye Right 6.27/2017    Nasally    Hc arthrocentesis or inject major joint w/o us      Hysterectomy  1996    KAI    Other      Lap Nissen Fundoplication surgery    Other surgical history  2005,2007,2008    LAPAROSCOPIC LYSIS OF ADHESION    Other surgical history      right foot bunionectomy    Other surgical history  11/14/2014    right superficial anterior peroneal nerve biopsy and right proximal thigh muscle biopsy.    Other surgical history  06/13/2023    Excision and Biopsy of two  perineal cysts    Upper gi endoscopy performed  05/2015    Yag capsulotomy - os - left eye Left 12/19/2018    RJM     Current Outpatient Medications   Medication Sig Dispense Refill    tiZANidine HCl 2 MG Oral Cap Take 1 capsule (2 mg total) by mouth 3 (three) times daily. 270 capsule 0    diazePAM 2 MG Oral Tab Take 1 tablet (2 mg total) by mouth nightly as needed. 20 tablet 0    pregabalin (LYRICA) 75 MG Oral Cap Take 1 capsule (75 mg total) by mouth 2 (two) times daily. 60 capsule 0    Omeprazole 40 MG Oral Capsule Delayed Release Take 1 capsule (40 mg total) by mouth daily. Take 1 capsule by mouth daily before breakfast. 90 capsule 3    amLODIPine 10 MG Oral Tab TAKE 1 TABLET BY MOUTH EVERY DAY (Patient taking differently: at bedtime. TAKE 1 TABLET BY MOUTH EVERY DAY) 90 tablet 3    latanoprost 0.005 % Ophthalmic Solution INSTILL 1 DROP IN BOTH EYES EVERY night 3 each 3    rosuvastatin 5 MG Oral Tab Take 1 tablet (5 mg total) by mouth daily.      cholecalciferol 50 MCG (2000 UT) Oral Cap Take 1 capsule (2,000 Units total) by mouth daily.      MAGNESIUM OR Take by mouth.       Allergies[1]  Family History   Problem Relation Age of Onset    Hypertension Father      Hypertension Mother     Hypertension Sister     Cancer Sister         liver cancer    Hypertension Brother     Diabetes Neg     Glaucoma Neg     Macular degeneration Neg     Breast Cancer Neg     Ovarian Cancer Neg        Social History     Occupational History    Not on file   Tobacco Use    Smoking status: Never     Passive exposure: Never    Smokeless tobacco: Never   Vaping Use    Vaping status: Never Used   Substance and Sexual Activity    Alcohol use: No     Comment: None.     Drug use: No    Sexual activity: Yes     Birth control/protection: Hysterectomy        Review of Systems:  GENERAL: denies fevers, chills, night sweats, fatigue, unintentional weight loss/gain  SKIN: denies skin lesions, open sores, rash  HEENT:denies recent vision change, new nasal congestion,hearing loss, tinnitus, sore throat, headaches  RESPIRATORY: denies new shortness of breath, cough, asthma, wheezing  CARDIOVASCULAR: denies chest pain, leg cramps with exertion, palpitations, leg swelling  GI: denies abdominal pain, nausea, vomiting, diarrhea, constipation, hematochezia, worsening heartburn or stomach ulcers  : denies dysuria, hematuria, incontinence, increased frequency, urgency, difficulty urinating  MUSCULOSKELETAL: denies musculoskeletal complaints other than in HPI  NEURO: denies numbness, tingling, weakness, balance issues, dizziness, memory loss  PSYCHIATRIC: denies Hx of depression, anxiety, other psychiatric disorders  HEMATOLOGIC: denies blood clots, anemia, blood clotting disorders, blood transfusion  ENDOCRINE: denies autoimmune disease, thyroid issues, or diabetes  ALLERGY: denies asthma, seasonal allergies    Physical Examination:    /82   Pulse 105   Constitutional: appears well hydrated, alert and responsive, no acute distress noted  Extremities: Right knee exam is unchanged. There is no effusion. No erythema or palpable warmth.   Neurological: Unchanged     Imaging:   XR KNEE COMPLETE BILAT  (CPT=73564)    Result Date: 10/31/2024  PROCEDURE: XR KNEE COMPLETE BILAT EM  COMPARISON: [facility] 2nd Floor, XR KNEE COMPLETE BILAT EM(SHX=[id number]), 10/26/2023, 3:38 PM.  INDICATIONS: Chronic deep bilateral knee pain; no known trauma.  TECHNIQUE: 5 views were obtained.   FINDINGS:  Bone mineralization is slightly diminished.  There are severe degenerative changes within the right knee manifested by bony hypertrophy, articular space narrowing/obliteration, subarticular sclerosis, and tibial spine sharpening.  There are moderate degenerative changes within the left knee manifested by articular space narrowing, subarticular sclerosis, and tibial spine sharpening.         CONCLUSION:   1. Severe degenerative changes within the right knee.  2. Moderate degenerative changes within the left knee.    Dictated by (CST): Tim Canela MD on 10/31/2024 at 1:41 PM     Finalized by (CST): Tim Canela MD on 10/31/2024 at 1:44 PM          MRI ABDOMEN (W+WO) (CPT=74183)    Result Date: 10/15/2024  PROCEDURE: MRI ABDOMEN (W+WO) (CPT=74183)  COMPARISON: External Exams, MRI SPINE LUMBAR (CPT=72148), 5/29/2024, 9:31 AM.  [facility], CT UROGRAM(W+WO) W/3D SH(CPT=74178/73935), 12/10/2021, 6:01 PM.  [facility], MRI ABDOMEN WWO CONTRAST, 4/02/2016, 8:59 AM.  INDICATIONS: N28.9 Kidney lesion K76.9 Liver lesion.  TECHNIQUE: A comprehensive MRI examination of the abdomen was performed utilizing a variety of imaging planes and imaging parameters to optimize visualization of suspected pathology.  Images were obtained both before and after intravenous gadolinium injection.   FINDINGS:  LIMITATIONS: The examination is limited by patient motion on many sequences. LUNG BASES: No significant signal abnormality is identified.  LIVER: There is no hepatic steatosis.  There are multiple circumscribed ovoid T2 hyperintense lesions throughout the liver, the largest of  which demonstrate thin internal septations consistent with mildly complex cysts.  The smaller lesions are too small  to definitively characterize but statistically most likely relate to small simple cysts.  The largest of these cysts have decreased in size compared to the 12/10/2021 CT including a 9 mm mildly complex cyst in the left hepatic lobe medial segment (series 7, image 16), previously measuring 2.6 cm, as well as a 1.7 cm mildly complex cyst in the right hepatic lobe (series 7, image 20), previously measuring 2.1 cm.  There are no solid liver masses.  BILIARY: The gallbladder is without stones or wall thickening.  No intrahepatic or extrahepatic biliary dilatation is apparent.  PANCREAS: There is normal signal intensity. No focal mass or main pancreatic duct dilatation is seen. There is no evidence of pancreatitis. SPLEEN: No enlargement.  ADRENALS: Unremarkable, without focal mass or enlargement.  KIDNEYS: No hydronephrosis or solid masses are apparent.  There are a few ovoid circumscribed T2 hyperintense renal cortical lesions bilaterally, the largest of which are consistent with simple cysts while the smaller are too small to definitively characterize but most likely represent small simple cysts.  These include a 1.2 cm cyst in the superior right renal pole (series 7, image 16) and a 1.3 cm simple cyst in the superior left renal pole (series 7, image 19) that have both increased in size compared to the 12/10/2021 abdominal CT. VASCULATURE: The portal vein, IVC, splenic, hepatic, renal veins, and mesenteric veins are patent.  LYMPH NODES: No lymphadenopathy.  ABDOMINAL WALL: Unremarkable.  BONES: Suboptimally assessed by scan protocol, but without grossly apparent bony lesion or fracture.   OTHER: No loculated fluid collections are appreciated.         CONCLUSION:  1. Multiple benign hepatic cysts, the largest of which are minimally complex but have decreased in size compared to the December 2021 CT. 2.  Benign simple bilateral renal cortical cysts (Bosniak 1), the largest of which have increased in size compared to 12/10/2021.    Dictated by (CST): Jaspreet Garibay MD on 10/15/2024 at 9:14 AM     Finalized by (CST): Jaspreet Garibay MD on 10/15/2024 at 9:22 AM          [facility] ROMINA 2D+3D DIAGNOSTIC BRITTANY  BILAT (CPT=77066/48297)    Result Date: 10/9/2024  PROCEDURE: [facility] ROMINA 2D+3D DIAGNOSTIC BRITTANY BILAT (CPT=77066/64502)  COMPARISON: [facility] BREAST RIGHT LIMITED (CPT=76642), 4/08/2024, 9:27 AM.  [facility] BREAST RIGHT LIMITED (CPT=76642), 10/06/2023, 8:35 AM.  [facility] BREAST RIGHT LIMITED (CPT=76642), 10/09/2024, 9:55 AM.  [facility] PF DIGITAL SCREEN W CAD, 9/29/2014, 5:56 PM.  [facility] ROMINA 2D+3D SCREENING BILAT ([id number]), 10/30/2021, 8:13 AM.  [facility] ROMINA 2D+3D SCREENING BILAT ([id number]), 10/13/2020, 4:11 PM.  [facility], MAM ROMINA 2D+3D SCREENING BILAT ([id number]), 10/12/2019, 8:03 AM.  [facility] ROMINA  2D+3D SCREENING BILAT ([id number]), 10/10/2018, 6:16 PM.  [facility] SCREENING BILAT (CPT=77067), 10/08/2017, 1:25 PM.  [facility] DIGITAL SCRN BILAT W/CAD (CPT=/57157), 10/07/2016, 10:03  AM.  [facility] PF DIGITAL SCREEN W CAD, 9/30/2015, 6:10 PM.  [facility], [facility] ROMINA 2D+3D DIAGNOSTIC ADDL VWS  LEFT (VET=[id number]), 11/05/2021, 7:53 AM.  [facility], [facility] ROMINA 2D+3D SCREENING BILAT ([id number]), 11/07/2022, 5:00 PM.  [facility], [facility] ROMINA 2D+3D DIAGNOSTIC BRITTANY BILAT (MUL=[id number]), 10/06/2023, 7:30 AM.  [facility], [facility] ROMINA 2D+3D DIAGNOSTIC BRITTANY RIGHT  (CPT=[id number]), 4/08/2024, 8:36 AM.  INDICATIONS: R92.8 Abnormal mammogram of right breast.  64-year-old patient for follow-up  TECHNIQUE:  Digital diagnostic mammography was performed and images were reviewed with the [employer] (Vantage) GREGORY 1.5.1.5 CAD device.  3D tomosynthesis was performed and reviewed.   BREAST COMPOSITION:   Category b-Scattered areas fibroglandular density.   FINDINGS: Bilateral CC and MLO views were obtained.  The parenchyma pattern is stable with no new suspicious asymmetry, mass, architectural distortion, or microcalcifications identified in either breast.  Targeted ultrasound the right breast was performed.  The previously noted sonographic finding at 08:00 o'clock 5 centimeters from the nipple is not reproducible and has presumably resolved.          CONCLUSION:   Benign findings. As long as the patient's clinical breast exam remains unchanged, patient should return to annual screening mammogram schedule.  BI-RADS CATEGORY:   DIAGNOSTIC CATEGORY 2 - BENIGN FINDING:   RECOMMENDATIONS:  ROUTINE MAMMOGRAM AND CLINICAL EVALUATION IN 12 MONTHS.       PLEASE NOTE: NORMAL MAMMOGRAM DOES NOT EXCLUDE THE POSSIBILITY OF BREAST CANCER.  A CLINICALLY SUSPICIOUS PALPABLE LUMP SHOULD BE BIOPSIED.   For patients over the age of 40, the target due date for the patient's next mammogram has been entered into a reminder system.   Patient received a discharge summary from the technologist after completion of exam.  Breast marker legend used on images  Triangle = Palpable lump Keota = Skin tag or mole BB = Nipple Linear jame = Scar Square = Pain    Dictated by (CST): Kenyatta Broderick MD on 10/09/2024 at 9:12 AM     Finalized by (CST): Kenyatta Broderick MD on 10/09/2024 at 10:16 AM          US BREAST RIGHT LIMITED (CPT=76642)    Result Date: 10/9/2024         PROCEDURE: US BREAST RIGHT LIMITED (CPT=76642)  TECHNIQUE: Targeted breast ultrasound was performed with evaluation of only the specific areas of concern.   Static images were recorded by the technologist for review by the radiologist.  FINDINGS: Ultrasound report is dictated under same-day diagnostic mammogram report.  Please refer to the separately dictated same day diagnostic mammogram report for findings and recommendations.      Dictated by (CST): Keynatta Broderick MD on 10/09/2024 at 10:09 AM     Finalized by (CST): Kenyatta Broderick MD on 10/09/2024 at 10:11 AM             Labs:  Lab Results   Component Value Date    WBC 10.3 01/15/2024    HGB 15.4 01/15/2024    .0 01/15/2024      Lab Results   Component Value Date    GLU 94 01/15/2024    BUN 11 01/15/2024    CREATSERUM 1.05 (H) 01/15/2024    GFR 46 (L) 04/02/2016    GFRNAA 66 05/21/2022    GFRAA 76 05/21/2022        Assessment and Plan:  Diagnoses and all orders for this visit:    Primary osteoarthritis of right knee  -     Drain/Inject Large Joint/Bursa  -     sodium hyaluronate (DUROLANE) 60 mg        Assessment: Right knee osteoarthritis     Plan: Using sterile technique and inferior lateral parapatellar approach, the right knee was injected with Durolane. Patient tolerated the procedure well. Advised icing and oral anti-inflammatories as needed for pain. She will be back next week for left knee injection. Accompanied by . Follow up as needed for right knee pain.     Follow Up: Return if symptoms worsen or fail to improve.    Jessica Ladd PA-C       [1]   Allergies  Allergen Reactions    Celecoxib TONGUE SWELLING     Other reaction(s): CELECOXIB    Sulfa Antibiotics TONGUE SWELLING     Other reaction(s): SULFA (SULFONAMIDE ANTIBIOTICS)    Erythromycin PAIN    Amoxicillin DIARRHEA

## 2024-11-14 ENCOUNTER — OFFICE VISIT (OUTPATIENT)
Dept: ORTHOPEDICS CLINIC | Facility: CLINIC | Age: 64
End: 2024-11-14
Payer: MEDICARE

## 2024-11-14 VITALS — HEART RATE: 80 BPM | SYSTOLIC BLOOD PRESSURE: 123 MMHG | DIASTOLIC BLOOD PRESSURE: 73 MMHG

## 2024-11-14 DIAGNOSIS — M17.12 PRIMARY OSTEOARTHRITIS OF LEFT KNEE: Primary | ICD-10-CM

## 2024-11-14 PROCEDURE — 20610 DRAIN/INJ JOINT/BURSA W/O US: CPT

## 2024-11-14 NOTE — PROGRESS NOTES
NURSING INTAKE COMMENTS:   Chief Complaint   Patient presents with    Knee Pain     Left knee durolane inj- pain at a 10/10 today       HPI: This 64 year old female presents today with complaints of left knee follow up. She is here for left knee Durolane injection. Patient states that right knee has not had much improvement since her Durolane injection last week. She is considering total knee arthroplasty on the right knee in the near future. She would like to proceed with left knee Durolane injection today.     Past Medical History:    Abscess of left thigh    Acute meniscal tear of knee    Age-related nuclear cataract of both eyes    Anal sphincter incontinence    Arthritis    Back problem    Chondromalacia    Colon polyps    Degenerative disc disease    Diverticular disease    Esophageal reflux    Glaucoma    Hammertoe    High blood pressure    High cholesterol    Hyperlipidemia with target low density lipoprotein (LDL) cholesterol less than 130 mg/dL    Insomnia    Kidney lesion    Liver lesion    Neuropathy    Right Foot    Obstructive sleep apnea syndrome    Osteoarthritis    Primary open angle glaucoma of both eyes    Diagnosis of glaucoma OU; Started Latanoprost qhs after abnormal VF and OCT 2/25/16;  6/5/18 consult with Dr. Madeline Chappell-see progress note    Sleep apnea    Small bowel obstruction (HCC)    Tendinitis    Unspecified essential hypertension    Visual impairment    Reraders     Past Surgical History:   Procedure Laterality Date    Anal sphincterotomy      03/12/2013 [facility]    Blepharoplasty anesthesia Bilateral 03/05/2020    Dr Suresh [facility]     Cataract extraction w/  intraocular lens implant Left 05/07/2018    L PC IOL with Dr. Suresh @ [facility]    Colonoscopy N/A 11/11/2016    Procedure: COLONOSCOPY;  Surgeon: Sabrina Cazares MD;  Location: [facility] ENDOSCOPY    Colonoscopy N/A 09/06/2019    Procedure: COLONOSCOPY;  Surgeon: Edwin Sterling MD;  Location: [facility] ENDOSCOPY     Colonoscopy  09/16/2024    Dr. Sterling; colon polyps, diverticulosis, hemorrhoids    Colonoscopy N/A 9/16/2024    Procedure: COLONOSCOPY;  Surgeon: Edwin Sterling MD;  Location: [facility] ENDOSCOPY    Egd  09/16/2024    Dr. Sterling; gastric polyps, small hiatal hernia    Excision of chalazion, single - od - right eye Right 6.27/2017    Nasally    Hc arthrocentesis or inject major joint w/o us      Hysterectomy  1996    KAI    Other      Lap Nissen Fundoplication surgery    Other surgical history  2005,2007,2008    LAPAROSCOPIC LYSIS OF ADHESION    Other surgical history      right foot bunionectomy    Other surgical history  11/14/2014    right superficial anterior peroneal nerve biopsy and right proximal thigh muscle biopsy.    Other surgical history  06/13/2023    Excision and Biopsy of two  perineal cysts    Upper gi endoscopy performed  05/2015    Yag capsulotomy - os - left eye Left 12/19/2018    RJM     Current Outpatient Medications   Medication Sig Dispense Refill    tiZANidine HCl 2 MG Oral Cap Take 1 capsule (2 mg total) by mouth 3 (three) times daily. 270 capsule 0    diazePAM 2 MG Oral Tab Take 1 tablet (2 mg total) by mouth nightly as needed. 20 tablet 0    pregabalin (LYRICA) 75 MG Oral Cap Take 1 capsule (75 mg total) by mouth 2 (two) times daily. 60 capsule 0    Omeprazole 40 MG Oral Capsule Delayed Release Take 1 capsule (40 mg total) by mouth daily. Take 1 capsule by mouth daily before breakfast. 90 capsule 3    amLODIPine 10 MG Oral Tab TAKE 1 TABLET BY MOUTH EVERY DAY (Patient taking differently: at bedtime. TAKE 1 TABLET BY MOUTH EVERY DAY) 90 tablet 3    latanoprost 0.005 % Ophthalmic Solution INSTILL 1 DROP IN BOTH EYES EVERY night 3 each 3    rosuvastatin 5 MG Oral Tab Take 1 tablet (5 mg total) by mouth daily.      cholecalciferol 50 MCG (2000 UT) Oral Cap Take 1 capsule (2,000 Units total) by mouth daily.      MAGNESIUM OR Take by mouth.       Allergies[1]  Family History   Problem Relation Age of  Onset    Hypertension Father     Hypertension Mother     Hypertension Sister     Cancer Sister         liver cancer    Hypertension Brother     Diabetes Neg     Glaucoma Neg     Macular degeneration Neg     Breast Cancer Neg     Ovarian Cancer Neg        Social History     Occupational History    Not on file   Tobacco Use    Smoking status: Never     Passive exposure: Never    Smokeless tobacco: Never   Vaping Use    Vaping status: Never Used   Substance and Sexual Activity    Alcohol use: No     Comment: None.     Drug use: No    Sexual activity: Yes     Birth control/protection: Hysterectomy        Review of Systems:  GENERAL: denies fevers, chills, night sweats, fatigue, unintentional weight loss/gain  SKIN: denies skin lesions, open sores, rash  HEENT:denies recent vision change, new nasal congestion,hearing loss, tinnitus, sore throat, headaches  RESPIRATORY: denies new shortness of breath, cough, asthma, wheezing  CARDIOVASCULAR: denies chest pain, leg cramps with exertion, palpitations, leg swelling  GI: denies abdominal pain, nausea, vomiting, diarrhea, constipation, hematochezia, worsening heartburn or stomach ulcers  : denies dysuria, hematuria, incontinence, increased frequency, urgency, difficulty urinating  MUSCULOSKELETAL: denies musculoskeletal complaints other than in HPI  NEURO: denies numbness, tingling, weakness, balance issues, dizziness, memory loss  PSYCHIATRIC: denies Hx of depression, anxiety, other psychiatric disorders  HEMATOLOGIC: denies blood clots, anemia, blood clotting disorders, blood transfusion  ENDOCRINE: denies autoimmune disease, thyroid issues, or diabetes  ALLERGY: denies asthma, seasonal allergies    Physical Examination:    /73   Pulse 80   Constitutional: appears well hydrated, alert and responsive, no acute distress noted  Extremities: Left knee exam is unchanged. There is no effusion. No erythema or palpable warmth. No numbness.   Neurological: Unchanged      Imaging:   XR KNEE COMPLETE BILAT (CPT=73564)    Result Date: 10/31/2024  PROCEDURE: XR KNEE COMPLETE BILAT EM  COMPARISON: [facility] 2nd Floor, XR KNEE COMPLETE BILAT EM(RHT=[id number]), 10/26/2023, 3:38 PM.  INDICATIONS: Chronic deep bilateral knee pain; no known trauma.  TECHNIQUE: 5 views were obtained.   FINDINGS:  Bone mineralization is slightly diminished.  There are severe degenerative changes within the right knee manifested by bony hypertrophy, articular space narrowing/obliteration, subarticular sclerosis, and tibial spine sharpening.  There are moderate degenerative changes within the left knee manifested by articular space narrowing, subarticular sclerosis, and tibial spine sharpening.         CONCLUSION:   1. Severe degenerative changes within the right knee.  2. Moderate degenerative changes within the left knee.    Dictated by (CST): Tim Canela MD on 10/31/2024 at 1:41 PM     Finalized by (CST): Tim Canela MD on 10/31/2024 at 1:44 PM             Labs:  Lab Results   Component Value Date    WBC 10.3 01/15/2024    HGB 15.4 01/15/2024    .0 01/15/2024      Lab Results   Component Value Date    GLU 94 01/15/2024    BUN 11 01/15/2024    CREATSERUM 1.05 (H) 01/15/2024    GFR 46 (L) 04/02/2016    GFRNAA 66 05/21/2022    GFRAA 76 05/21/2022        Assessment and Plan:  Diagnoses and all orders for this visit:    Primary osteoarthritis of left knee  -     Drain/Inject Large Joint/Bursa  -     sodium hyaluronate (DUROLANE) 60 mg        Assessment: Left knee osteoarthritis     Plan: Using sterile technique and superior lateral parapatellar approach, the left knee was injected with Durolane. Patient tolerated the procedure well. Advised icing and oral anti-inflammatories as needed. Accompanied by . Follow up as needed for bilateral knee pain.     Follow Up: Return if symptoms worsen or fail to improve.    Jessica Ladd PA-C       [1]   Allergies  Allergen  Reactions    Celecoxib TONGUE SWELLING     Other reaction(s): CELECOXIB    Sulfa Antibiotics TONGUE SWELLING     Other reaction(s): SULFA (SULFONAMIDE ANTIBIOTICS)    Erythromycin PAIN    Amoxicillin DIARRHEA

## 2024-12-03 DIAGNOSIS — M48.02 CERVICAL STENOSIS OF SPINAL CANAL: ICD-10-CM

## 2024-12-04 ENCOUNTER — LAB ENCOUNTER (OUTPATIENT)
Dept: LAB | Facility: HOSPITAL | Age: 64
End: 2024-12-04
Attending: Other
Payer: MEDICARE

## 2024-12-04 ENCOUNTER — OFFICE VISIT (OUTPATIENT)
Dept: NEUROLOGY | Facility: CLINIC | Age: 64
End: 2024-12-04
Payer: MEDICARE

## 2024-12-04 DIAGNOSIS — M25.551 PAIN IN RIGHT HIP: ICD-10-CM

## 2024-12-04 DIAGNOSIS — G25.81 RESTLESS LEG SYNDROME: Primary | ICD-10-CM

## 2024-12-04 DIAGNOSIS — G25.81 RESTLESS LEG SYNDROME: ICD-10-CM

## 2024-12-04 DIAGNOSIS — M25.552 PAIN IN LEFT HIP: ICD-10-CM

## 2024-12-04 PROBLEM — H02.413 MECHANICAL PTOSIS OF BILATERAL EYELIDS: Status: ACTIVE | Noted: 2019-12-11

## 2024-12-04 PROBLEM — M79.89 SWELLING OF EXTREMITY: Status: ACTIVE | Noted: 2024-01-02

## 2024-12-04 PROBLEM — H43.399 VITREOUS OPACITIES: Status: ACTIVE | Noted: 2024-09-13

## 2024-12-04 PROBLEM — H40.003 PREGLAUCOMA, UNSPECIFIED, BILATERAL: Status: ACTIVE | Noted: 2022-04-27

## 2024-12-04 PROBLEM — H53.143 VISUAL DISCOMFORT, BILATERAL: Status: ACTIVE | Noted: 2022-04-27

## 2024-12-04 PROBLEM — H25.9 AGE-RELATED CATARACT: Status: ACTIVE | Noted: 2018-02-21

## 2024-12-04 PROBLEM — I87.2 VENOUS INSUFFICIENCY OF BOTH LOWER EXTREMITIES: Status: ACTIVE | Noted: 2023-12-29

## 2024-12-04 LAB
DEPRECATED HBV CORE AB SER IA-ACNC: 223 NG/ML
IRON SATN MFR SERPL: 15 %
IRON SERPL-MCNC: 54 UG/DL
TIBC SERPL-MCNC: 356 UG/DL (ref 250–425)
TRANSFERRIN SERPL-MCNC: 239 MG/DL (ref 250–380)

## 2024-12-04 PROCEDURE — 83540 ASSAY OF IRON: CPT

## 2024-12-04 PROCEDURE — 36415 COLL VENOUS BLD VENIPUNCTURE: CPT

## 2024-12-04 PROCEDURE — 99214 OFFICE O/P EST MOD 30 MIN: CPT | Performed by: OTHER

## 2024-12-04 PROCEDURE — 84466 ASSAY OF TRANSFERRIN: CPT

## 2024-12-04 PROCEDURE — 82728 ASSAY OF FERRITIN: CPT

## 2024-12-04 RX ORDER — TIZANIDINE 2 MG/1
TABLET ORAL
COMMUNITY
Start: 2024-08-31

## 2024-12-04 NOTE — PROGRESS NOTES
[facility] [address], [address]  [facility] [address]  [phone]        Neurology Clinic Follow Up Note    Chief Complaint:  Neurologic Problem (LOV 8/29/2024 Seen for Muscle spasm. Patient here today 4 month follow up for test orders/results of lab, EMG and medication management pregabalin 75 MG prn. / Denies numbness, tingling, dizziness or nausea. /)      HPI:   Kerry Hsu is a 64 year old  RH woman w/ a pmhx of oa, DDD, GERD, glaucoma, HTN, HLD,  KB not on CPAP ,  hx of SBO , and length dependent symmetric polyneuropathy who presents in follow up  for  spasms/cramps ongoing for 10 yrs.     Getting worse  Afterwards she has bruising in the site that spasms  Last 15 minutes.  Has intense pain with spasms.  No diaphoresis.  No tachycardia.    Reports she can \"turn a certain way and will get cramps throughout her body.\" She will be sore in those areas for days.    Reports she can't walk when she has an attack.   She has an attack 1x or 2x a week  Previously it was daily over the last year it improved.  Its better now but she still gets them. Since 2014 there have been periods when it spontaneously improves.    Once it starts it will spread to other extremities and locations.  Her left arm will cramp, swell, and then become red. Then it will be sore for days after. \"Like it is inflamed.\"  Never gets a cramp in her right arm.  Sites involved:  Left arm  Both hands  Lower abdomen  Low back and thoracic spine  Both legs and both feet    Spares her chesst and right arm. Spares neck and face.    When she has a cramp in one leg it seems like it can tigger the contralateral leg.    She has had a muscle biopsy in the past.    Failed medications/prior medications:  1. Baclofen  2. Flexeril  3.tizanidine       Review and summation of prior records  Prior neurology note from 2015 \"Ms. Hsu presents to clinic. She has cramps throughout her body. These are more like holger horses  in her cla,f but throughout the both. It is in her arms, back legs and thighs. She just had a nerve and muscle biopsy done in November 14 which had difficulty healing and had infection. She had to go for antibiotic treatmtents. These were reportedly normal. But her Ck levels were elevated. Her cramping has been for around two years or so. It has been worsening during that time. Often in the morning and wakes up from sleep but it can be in the afternoon as well. They last for 10 -15 minutes and go away. Her CK in October was 600 and she had a muscle biopsy in the right thigh and nerve in the right leg in the sural. These were reportedly normal. She had a EMG as well, which was reportedly normal.. This was November of 2013. She has had no MRIs. She was started with some pain medication, which did not help. She was tried on Gabapentin which did not help. Aldolase did not help either. Her hands and feet feel like they are sleeping all the time, She also feels her right foot is more numb than her left since the biopsy. The Pathology shows rare rounded deposits in the basement membrane consistent with obliterated capillary vessels. Otherwise it was within normal limits and showed no evidence of active inflammatory myopathy \"    12/04/24 Interval History/Subjective :   Here in follow up cramps/muscle spasms.  She is taking the Lyrica prn.  If she takes it scheduled it makes her drowsy.  For instance, her bicep will spasm and she will have residual pain for days.   Reviewed her EMG.  She has restless leg syndrome involving her right leg.      ROS: Pertinent positive and negatives per HPI.  All others were reviewed and negative.     Medications:  Current Outpatient Medications   Medication Instructions    amLODIPine 10 MG Oral Tab TAKE 1 TABLET BY MOUTH EVERY DAY    cholecalciferol (VITAMIN D3) 2,000 Units, Daily    diazePAM (VALIUM) 2 mg, Oral, Nightly PRN    latanoprost 0.005 % Ophthalmic Solution INSTILL 1 DROP IN BOTH  EYES EVERY night    MAGNESIUM OR Take by mouth.    Omeprazole 40 mg, Oral, Daily, Take 1 capsule by mouth daily before breakfast.    pregabalin (LYRICA) 75 mg, Oral, 2 times daily    rosuvastatin (CRESTOR) 5 mg, Daily    tiZANidine 2 MG Oral Tab         Reviewed and assessed      Objective:  Last vitals and weight :  There is no height or weight on file to calculate BMI.   There were no vitals filed for this visit.   not currently breastfeeding.  There were no vitals taken for this visit.  Exam:  - General: appears stated age and no distress     - Pulmonary: Normal excursion of the chest.  No signs of respiratory distress.  Neurologic Exam  - Mental Status: Alert and attentive. .  Speech is spontaneous, fluent, and prosodic. Comprehension and repetition intact. Phrase length and rate are normal. No paraphasic errors, neologisms, anomia, acalculia, apraxia, anosognosia, or R/L confusion.   - Cranial Nerves: No gaze preference. Visual fields:normal  Pupils are 4mm briskly constricting to 3mm and equally round and reactive to light  in a well lit room. No rAPD. EOMI. No nystagmus. No ptosis. V1-V3 intact B/L to light touch.No pathological facial asymmetry. No flattening of the nasolabial fold. .  Hearing grossly intact.  Tongue midline. No atrophy or fasiculations of the tongue noted. Palate and uvula elevate symmetrically.  Shoulder shrug symmetric.  - Fundoscopic exam:normal w/o hemorrhages, exudates, or papilledema.No attenuation. No pallor.  - Motor:  normal tone/bulk. No interosseous wasting. No flattening of hypothenar eminences.   Motor Strength    Pronator drift: No pronator drift   Arm Rolling: No orbiting.   Finger Taps: Finger taps are symmetric in rate and amplitude.    Rapid movements: symmetric. No fatiguing.   Right Left     Motor Strength   Deltoids 5 5  Triceps 5 5  Biceps 5 5   5 5   Hip Flexors 5 5   Knee extensors 5 5  Knee flexors 5 5      Foot Taps:    Asterixis: No asterixis noted.   Tremor:        Reflexes:    C5 C6 C7  L4 S1   R 2+ 2+  2+ 2+   L 2+ 2+  2+ 2+    Frontal release signs:Not assessed.    Jaw Jerk:    Flor's sign:absent   Nonsustained clonus: Absent   Sustained clonus: Absent   - Sensory:   Light touch: normal  Temperature: gradient loss  Pinprick:   Vibration:  gradient loss   Proprioception:      Sensory level:      Romberg:   - Cerebellum: No truncal ataxia. No titubations. No dysmetria, no dysdiadochokinesis. No rebound.      - Plantar response: flexor bilaterally       Most recent lab results:        Component      Latest Ref Rng 8/29/2024   NMDA-R Antibody      Negative  Negative    Anti-Yo Ab      Negative  Negative    ITPR1 Antibody      Negative  Negative    LGI1 Antibody, Cell-based IFA      Negative  Negative    Aquaporin 4 Antibody      Negative  Negative    CASPR2 Antibody,Cell-based IFA      Negative  Negative    Zic4 Antibody      Negative  Negative    mGluR1 Antibody      Negative  Negative    AMPAR1 AB, CBA      Negative  Negative    CRMP-5 IgG      Negative  Negative    DNER Antibody      Negative  Negative    Amphiphysin Antibody      Negative  Negative    ANTI GAD65 INTERPRETATION      Negative  Negative    ADRIANA-B-R Antibody      Negative  Negative    Anti-Ri Ab      Negative  Negative    Antineruonal nuclear Ab Type 3      Negative  Negative    Ma2/Ta Antibody      Negative  Negative    IgLON5 Antibody      Negative  Negative    Purkinje Cell Cyto Ab Type 2      Negative  Negative    Purkinje Cell Cyto Ab Type 2      Negative  Negative    AGNA-1      Negative  Negative    VGCC ANTIBODY      0.0 - 30.0 pmol/L <1.0    DPPX Antibody      Negative  Negative    INTERP      Negative  Negative    AMPAR2 AB, CBA      Negative  Negative    Anti-Hu Ab      Negative  Negative        Reviewed and assessed    Diagnostic studies:  Performed an independent visualization of  mri brain from 2023  Imaging revealed:  agree w/ read    EMG  There is electrodiagnostic evidence of a  primarily axonal length dependent symmetric polyneuropathy.     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     There is no electrodiagnostic evidence of another mononeuropathy, plexopathy, inflammatory/necrotizing myopathy or cervical/lumbar radiculopathy affecting either of lower extremities.     CLINICAL CORRELATION:     These abnormal findings are consistent with the patient's clinical complaints of numbness but not necessarily of the cramps on peripheral nerve or muscle basis.       Assessment      The medications she  was on the in the past that were sedating are the best choice to tx cramps. She has cramps d/t her neuropathy. She does not need a refill on the tizanidine.      Plan   1. Restless leg syndrome  - Iron And Tibc; Future  - Transferrin; Future  - Ferritin; Future    2. Pain in left hip  - Iron And Tibc; Future    3. Pain in right hip  - Transferrin; Future  - Ferritin; Future                This document is not intended to support charting by exception.  Sections left blank in a completed note should be presumed not to have been done.      Disclaimer:   This record was dictated using  Dragon software. There may be errors due to voice recognition problems that were not realized and corrected during the completion of the note.             Thank you for allowing me to participate in the care of your patient.    Aaron Caro DO  12/4/2024

## 2024-12-06 RX ORDER — IBUPROFEN 600 MG/1
600 TABLET, FILM COATED ORAL DAILY
Qty: 90 TABLET | Refills: 1 | Status: SHIPPED | OUTPATIENT
Start: 2024-12-06

## 2024-12-06 NOTE — TELEPHONE ENCOUNTER
Patient replied she did request refill.  Previously discontinued and allergy to celebrex listed.  Please advise on refill request.

## 2024-12-11 ENCOUNTER — OFFICE VISIT (OUTPATIENT)
Dept: PHYSICAL MEDICINE AND REHAB | Facility: CLINIC | Age: 64
End: 2024-12-11
Payer: MEDICARE

## 2024-12-11 VITALS — WEIGHT: 198 LBS | BODY MASS INDEX: 30 KG/M2

## 2024-12-11 DIAGNOSIS — M54.16 RIGHT LUMBAR RADICULOPATHY: Primary | ICD-10-CM

## 2024-12-11 DIAGNOSIS — R73.03 PREDIABETES: ICD-10-CM

## 2024-12-11 DIAGNOSIS — I10 ESSENTIAL HYPERTENSION: ICD-10-CM

## 2024-12-11 PROCEDURE — 99214 OFFICE O/P EST MOD 30 MIN: CPT | Performed by: PHYSICAL MEDICINE & REHABILITATION

## 2024-12-11 RX ORDER — PREGABALIN 75 MG/1
75 CAPSULE ORAL 2 TIMES DAILY
Qty: 60 CAPSULE | Refills: 2 | Status: SHIPPED | OUTPATIENT
Start: 2024-12-11

## 2024-12-16 ENCOUNTER — OFFICE VISIT (OUTPATIENT)
Dept: OBGYN CLINIC | Facility: CLINIC | Age: 64
End: 2024-12-16

## 2024-12-16 VITALS
SYSTOLIC BLOOD PRESSURE: 120 MMHG | WEIGHT: 196.19 LBS | DIASTOLIC BLOOD PRESSURE: 76 MMHG | BODY MASS INDEX: 30 KG/M2 | HEART RATE: 75 BPM

## 2024-12-16 DIAGNOSIS — N89.8 VAGINAL DISCHARGE: ICD-10-CM

## 2024-12-16 DIAGNOSIS — N89.8 VAGINAL IRRITATION: Primary | ICD-10-CM

## 2024-12-16 PROCEDURE — 99213 OFFICE O/P EST LOW 20 MIN: CPT | Performed by: NURSE PRACTITIONER

## 2024-12-16 RX ORDER — FLUCONAZOLE 150 MG/1
150 TABLET ORAL AS DIRECTED
Qty: 2 TABLET | Refills: 0 | Status: SHIPPED | OUTPATIENT
Start: 2024-12-16

## 2024-12-16 NOTE — PROGRESS NOTES
[facility]   Obstetrics and Gynecology    Kerry Hsu is a 64 year old female  No LMP recorded. (Menstrual status: Partial Hysterectomy).   Chief Complaint   Patient presents with    Gyn Problem     Vaginal itching and burning per patient    She is having vaginal burning, slight discharge white in underwear, and very irritated in area.  No external irritation.  Using monistat OTC x 7 days. Last used last night. Last night really bad.    She is sexually active, but symptoms not related to intercourse.  No changes in soaps or detergents.    S/p hysterectomy    OBSTETRICS HISTORY:  OB History    Para Term  AB Living   2 2 2 0 0 2   SAB IAB Ectopic Multiple Live Births   0 0 0 0 2       GYNE HISTORY:  Use of Birth Control (if yes, specify type): Hysterectomy (2024  8:54 AM)  Pap Date: 16 (2024  8:54 AM)  Pap Result Notes: Neg Pap/HPV // Mammo 10/9/24 Diag C2-Benign // Dexa 3/11/23 Osteopenia (2024  8:54 AM)  Follow Up Recommendation: Annual 3/7/24 MAGGIE (2024  8:54 AM)      History   Sexual Activity    Sexual activity: Yes    Birth control/ protection: Hysterectomy       MEDICAL HISTORY:  Past Medical History:    Abscess of left thigh    Acute meniscal tear of knee    Age-related nuclear cataract of both eyes    Anal sphincter incontinence    Arthritis    Back problem    Chondromalacia    Colon polyps    Degenerative disc disease    Disorder of kidney and ureter    Diverticular disease    Esophageal reflux    Glaucoma    Hammertoe    High blood pressure    High cholesterol    Hyperlipidemia with target low density lipoprotein (LDL) cholesterol less than 130 mg/dL    Insomnia    Kidney lesion    Liver lesion    Neuropathy    Right Foot    Obstructive sleep apnea syndrome    Osteoarthritis    Primary open angle glaucoma of both eyes    Diagnosis of glaucoma OU; Started Latanoprost qhs after abnormal VF and OCT 16;  18 consult with Dr. Correa  Ta-see progress note    Sleep apnea    Small bowel obstruction (HCC)    Tendinitis    Unspecified essential hypertension    Visual impairment    Reraders       SOCIAL HISTORY:  Social History     Socioeconomic History    Marital status:      Spouse name: Not on file    Number of children: Not on file    Years of education: Not on file    Highest education level: Not on file   Occupational History    Not on file   Tobacco Use    Smoking status: Never     Passive exposure: Never    Smokeless tobacco: Never   Vaping Use    Vaping status: Never Used   Substance and Sexual Activity    Alcohol use: No     Comment: None.     Drug use: No    Sexual activity: Yes     Birth control/protection: Hysterectomy   Other Topics Concern     Service Not Asked    Blood Transfusions Not Asked    Caffeine Concern Yes     Comment: occasional soda    Occupational Exposure Not Asked    Hobby Hazards Not Asked    Sleep Concern Not Asked    Stress Concern Not Asked    Weight Concern Not Asked    Special Diet Not Asked    Back Care Not Asked    Exercise Yes    Bike Helmet Not Asked    Seat Belt Not Asked    Self-Exams Not Asked    Grew up on a farm Not Asked    History of tanning Not Asked    Outdoor occupation Not Asked    Pt has a pacemaker No    Pt has a defibrillator No    Breast feeding No    Reaction to local anesthetic No    Left Handed Not Asked    Right Handed Yes    Currently spends a great deal of time in the sun Not Asked    Past Sunlamp Treatments for Acne Not Asked    Hx of Spending Great Deal of Time in Sun Not Asked    Bad sunburns in the past Not Asked    Tanning Salons in the Past Not Asked    Hx of Radiation Treatments Not Asked    Regular use of sun block Not Asked   Social History Narrative    Work - banking     Social Drivers of Health     Financial Resource Strain: Not on file   Food Insecurity: Not on file   Transportation Needs: Not on file   Physical Activity: Not on file   Stress: Not on file    Social Connections: Unknown (4/20/2021)    [facility]    Social Connections     Conversations with friends/family/neighbors per week: Not on file   Housing Stability: Low Risk  (7/17/2021)    [facility]    Housing Stability     Mortgage Payment Concerns?: Not on file     Number of Places Lived in the Last Year: Not on file     Unstable Housing?: Not on file       MEDICATIONS:    Current Outpatient Medications:     fluconazole (DIFLUCAN) 150 MG Oral Tab, Take 1 tablet (150 mg total) by mouth As Directed. Take one tablet by mouth today, repeat one tablet by mouth in 3 days, Disp: 2 tablet, Rfl: 0    pregabalin (LYRICA) 75 MG Oral Cap, Take 1 capsule (75 mg total) by mouth 2 (two) times daily., Disp: 60 capsule, Rfl: 2    IBUPROFEN 600 MG Oral Tab, TAKE 1 TABLET(600 MG) BY MOUTH DAILY AS NEEDED FOR PAIN, Disp: 90 tablet, Rfl: 1    tiZANidine 2 MG Oral Tab, , Disp: , Rfl:     diazePAM 2 MG Oral Tab, Take 1 tablet (2 mg total) by mouth nightly as needed., Disp: 20 tablet, Rfl: 0    Omeprazole 40 MG Oral Capsule Delayed Release, Take 1 capsule (40 mg total) by mouth daily. Take 1 capsule by mouth daily before breakfast., Disp: 90 capsule, Rfl: 3    amLODIPine 10 MG Oral Tab, TAKE 1 TABLET BY MOUTH EVERY DAY (Patient taking differently: at bedtime. TAKE 1 TABLET BY MOUTH EVERY DAY), Disp: 90 tablet, Rfl: 3    latanoprost 0.005 % Ophthalmic Solution, INSTILL 1 DROP IN BOTH EYES EVERY night, Disp: 3 each, Rfl: 3    rosuvastatin 5 MG Oral Tab, Take 1 tablet (5 mg total) by mouth daily., Disp: , Rfl:     cholecalciferol 50 MCG (2000 UT) Oral Cap, Take 1 capsule (2,000 Units total) by mouth daily., Disp: , Rfl:     MAGNESIUM OR, Take by mouth., Disp: , Rfl:     ALLERGIES:  Allergies[1]      Review of Systems:  Constitutional:  Denies fatigue, night sweats, hot flashes  Cardiovascular:  denies  chest pain or palpitations  Respiratory:  denies shortness of breath  Gastrointestinal:  denies heartburn, abdominal pain, diarrhea or constipation  Genitourinary: see HPI  Musculoskeletal:  denies back pain.  Skin/Breast:  Denies any breast pain, lumps, or discharge.   Neurological:  denies headaches, extremity weakness or numbness.  Psychiatric: denies depression or anxiety.  Endocrine:   denies excessive thirst or urination.  Heme/Lymph:  denies history of anemia, easy bruising or bleeding.      PHYSICAL EXAM:     Vitals:    12/16/24 0857   BP: 120/76   Pulse: 75   Weight: 196 lb 3.2 oz (89 kg)     Body mass index is 29.83 kg/m².     Patient offered chaperone, patient declined    Constitutional: well developed, well nourished    Psychiatric:  Oriented to time, place, person and situation. Appropriate mood and affect    Pelvic Exam:  External Genitalia: normal appearance, hair distribution, and no lesions  Urethral Meatus:  normal in size, location, without lesions and prolapse  Bladder:  No fullness, masses or tenderness  Vagina:  erythema, +white discharge  Cervix:  absent  Uterus: absent  Adnexa: normal without masses or tenderness  Perineum: normal  Anus: no hemorroids   Lymph node: no inguinal lymph nodes    Assessment & Plan:  Kerry was seen today for gyn problem.    Diagnoses and all orders for this visit:    Vaginal irritation  -     Vaginitis Vaginosis PCR Panel; Future    Vaginal discharge  -     Vaginitis Vaginosis PCR Panel; Future    Other orders  -     fluconazole (DIFLUCAN) 150 MG Oral Tab; Take 1 tablet (150 mg total) by mouth As Directed. Take one tablet by mouth today, repeat one tablet by mouth in 3 days    Will treat for yeast on exam  Culture done  Rto if symptoms not improved      BRIONNA Viera    This note was prepared using Dragon [employer] voice recognition dictation software. As a result errors may occur. When identified these errors have been corrected. While every attempt is made  to correct errors during dictation discrepancies may still exist.         [1]   Allergies  Allergen Reactions    Celecoxib TONGUE SWELLING     Other reaction(s): CELECOXIB    Sulfa Antibiotics TONGUE SWELLING     Other reaction(s): SULFA (SULFONAMIDE ANTIBIOTICS)    Erythromycin PAIN    Celecoxib UNKNOWN    Erythromycin Stearate UNKNOWN    Amoxicillin DIARRHEA

## 2024-12-17 ENCOUNTER — OFFICE VISIT (OUTPATIENT)
Dept: INTERNAL MEDICINE CLINIC | Facility: CLINIC | Age: 64
End: 2024-12-17
Payer: MEDICARE

## 2024-12-17 VITALS
DIASTOLIC BLOOD PRESSURE: 72 MMHG | HEIGHT: 68 IN | BODY MASS INDEX: 29.86 KG/M2 | HEART RATE: 68 BPM | WEIGHT: 197 LBS | SYSTOLIC BLOOD PRESSURE: 122 MMHG

## 2024-12-17 DIAGNOSIS — N28.1 KIDNEY CYST, ACQUIRED: Primary | ICD-10-CM

## 2024-12-17 DIAGNOSIS — R20.2 NUMBNESS AND TINGLING OF BOTH FEET: ICD-10-CM

## 2024-12-17 DIAGNOSIS — G47.33 OSA (OBSTRUCTIVE SLEEP APNEA): ICD-10-CM

## 2024-12-17 DIAGNOSIS — R20.0 NUMBNESS AND TINGLING OF BOTH FEET: ICD-10-CM

## 2024-12-17 DIAGNOSIS — I10 ESSENTIAL HYPERTENSION: ICD-10-CM

## 2024-12-17 DIAGNOSIS — R25.2 CRAMP OF LIMB: ICD-10-CM

## 2024-12-17 LAB
BV BACTERIA DNA VAG QL NAA+PROBE: NEGATIVE
C GLABRATA DNA VAG QL NAA+PROBE: POSITIVE
C KRUSEI DNA VAG QL NAA+PROBE: NEGATIVE
CANDIDA DNA VAG QL NAA+PROBE: POSITIVE
T VAGINALIS DNA VAG QL NAA+PROBE: NEGATIVE

## 2024-12-17 PROCEDURE — 99214 OFFICE O/P EST MOD 30 MIN: CPT | Performed by: INTERNAL MEDICINE

## 2024-12-17 PROCEDURE — G2211 COMPLEX E/M VISIT ADD ON: HCPCS | Performed by: INTERNAL MEDICINE

## 2024-12-17 NOTE — PROGRESS NOTES
Kerry Hsu is a 64 year old female.  Chief Complaint   Patient presents with    Leg Pain     Right leg - unsure of any swelling x 2 months        HPI:   Patient comes for follow-up with her   C/C leg pain  C/o was told she had neurology  and was todl she had neuropathy and had EMG and her right leg and b/l feet has numbness and tingling and feels heavy   Gabapentin - no help, aldolase - didn't help   Was given lyrica and takes it only at night , which it usually helps her sleep except last night   Has not tried the valium  yet  Got Injection to the right hip 2 weeks (DEC 6TH ) ago [facility] right now she is feeling well  Since last visit she did get the MRI and the liver cyst have decreased but noted that she does have kidney cysts so we will get an ultrasound and refer her to urology for that    She had sleep study 6/2024 but she is scared to stay overnight at a hosp              HISTORY  saw the neurosurgeon and was reassured that it was not her back so saw the Ortho doctor and MRI of the hip were done  Not taking diazepam   Foot is numb and hips and lower back hurt   Noted that in 2017 she was given duloxetine 20 30 40 and 60 most likely is a increased titrating up and it was not helping  Also has some questions regarding her insurance and Medicare and confirming she does not have HMO in the chart-confirmed that I do not see this in her chart           HISTORY  restarted in the upper arms \"its back and more severe \"   Wondering if she has stiff persons synd -would like Anti-glutamic acid decarboxylase (NICOLE) antibody testing , looked at the chart and noted that there was a NICOLE 65 antibody testing that is available in the chart but I am not sure if this is the same as the antiglutamic acid decarboxylase so she will follow-up with rheumatology  Had emg and muscle biopsy in 2014 by dr hernandez  a few yrs ago at that area left leg area does get swollen and hard   Has apt with GI  this week , H. pylori test is negative  Got injection to the back and although it still hurting it does take time so she is being patient with that  Still has rash under her breasts and also the groin and has tried ketoconazole cream as well as the nystatin powder        HISTORY  Saw Dr. Delgadillo the orthopedic doctor and was sent for physical therapy which she is doing now for her back but needs to see the back doctor--reviewed note and noted it was suggested she see physiatry         Had  the left cataract removed  approx 3 yrs ago and since then has had blurry vision and double vision In the left eye        She still does get occasional cramping and takes magnesium for it and has numbness and tingling in bilateral feet worse on the right, noted in the past her cpk was always elevated and had this worked up but did read that lead might have something to do with it so we will have this checked    HISTORY    palpitations for the past couple weeks at least and it is now scaring her because she can feel it when she is just lying down or just sitting there not doing anything and it is not on it but only on exertion, comes and goes   Also has some retrosternal chest pains 5/10  Better ? --took ppi and it didn't help   Worse -with deep inspirations  No increase in caffeine  Chest pain and palpitations happen at the same time and not associated with any diaphoresis  She did have an EKG done in January and a calcium score in August of this year           HISTORY  Saw dr rothman ent and ct was neg SO NO  sinus SURG NEEDED     She thinks its her eyes   Feels her vision is bad even after glasses and thinks it happended after cataract surg   HAS SEEN   saw dr mckeon--  neurology   Saw cards dr genao and will go for heart scan   Saw many eye drs and tried drops but no help , eye drops now burns                     HISTORY  6/2023 visit     will see the surgeon for a cyst that she has in her vaginal area, she had already seen  the dermatologist and the GYN doctor  Hopefully will come out of the boot for her left leg  Saw ENT and may is sinus surgery            HISTORY   right wrist pain after her rotator cuff surg x 2 weeks -was first like an electric shock and now only gets that once in a while but now more sore   Going for PT   She also has some left thigh pain - from buttocks to the knees         History  3/2022  right shoulder surgery on 3/7/2022   Requested by dr shabnam Garcia - can be seen in epic   Can walk up one flight of stars without feeling short of breath   Right shoulder radiates to the neck on that side   She is right handed , cant use her hand and arm as much            HISTORY   10/2021   She has been seeing Ortho for the neuropathy and also received shots to the shoulder-right-and knees and was given Tylenol No. 3  Also sees the foot doctor  saw dr mike lucas and consider valium but she feels this will affect her ability to work   She complains of \"nerve pain\" in the feet   She states that valium knocks her out -even if she takes it in the evening she still feels the effects in the am , foot dr gave gabpentin but that too makes her sleepy \"im very sensitive   Also c/o left flank pain  X couple weeks -- ua done in office and does show blood with uti    8/10 , sharp and stabbing ,   Worse with standing after sitting a long time   Better - ?   Comes and goesbut now more frequent      Not taking trazodone - not helping and she is not sure what meds cuases what so she doesn't want to be on meds   Know she has had CTs and ultrasounds of her kidney done before and has seen urology as well- dr moreno -she did have a 3 mm nonobstructing stone in the right kidney  Still has her chronic cough and try the Tessalon Perles which did not help that much, has an inhaler so she will try that, the codien cough syrup just put her to sleep as well      Finished doxy yest taht she got from the  ,didn't help          HISTORY 3/2021    cramps is all over but mostly in the legs-stretching makes it works and the meloxicam is not helping and she did see the rheumatologist and was started on a new medication metaxalone 800 mg 3 times a day but it makes her sleepy so she is only taking it at night--it still wakes her up at night so does not feel that that is working was recommended to follow[facility] at an [facility]     Still has a cough and the codeine cough syrup did not help much and noted that she is on and PPI and the chest x-ray was normal which was ordered last time   she has noticed that the rescue inhaler does help                       HISTORY  History from August 2020   Has seen the rheumatologist, physiatry and gone for physical therapy  Has tried Cymbalta given by physiatry but in January when I had seen her she had stated that she was not taking it and and cyclobenzaprine was started but she does not think that that was helpful  She states that she has had an EMG in the past as well a very long time ago  She states that she drinks a lot of water as well  2015 emg report noted in chart - normal   Also noted in 2015 she saw the neurologist Dr. Enamorado and was recommended to go to the pain doctors--Flexeril was tried  Needs to go back to see Dr. Hays the podiatrist for her orthotics                 History   Last seen in February and since then in March she saw Dr. Delgadillo and got knee injections for her osteoarthritis of the knees   had an endoscopic procedure and was found to have gastric polyp by Dr. Pereira  In June she saw the foot doctor and received a shot in her foot for the neuroma  In July she saw Dr. Bird the eye doctor for her glaucoma and will return in 4 months and then           History  1/11/2020 visit   Dr clay- ortho -- left hand tendonitiis -got shot and feeks better   Would like to see dr berger   Not taking cymbalta - takes T#3 one a week , ibuprofen \"seldom\"- once a mn   Muscle cramps coming back - legs thighs  and feet , no RLS               History- 11/19  C/o right shoulder has a tear  And she does admit she uses the left more   Used to see dr berger and now sees dr haque and got a shot to the right shoulder   Needs a referral to see pain clinic   Needs referral to see podiatrist --corns and calluses right foot / toe and also for ingrown toenails   Pain is 10/10 -- iburprofen does helps but doesn't like taking it too much --takes T#3 but t just puts her to sleep and then she will wake up with pain -- unable ot sratch her back   No falls trauma or injury that she is aware of      History   She has had both muscle and nerve biopsies by dr hernandez   She gets these cramps every day   Steroids do not help either  Noted that she had try cyclobenzaprine 10 mg in July 2019--no help   Since the last visit she did see her orthopedic doctor and received a short of a cortisone shot to her right shoulder  Also c/o left hand tender swelling x few weeks   She also states that she gets rashes underneath her breasts and the triamcinolone helps with this and needs a refill        hisotry -8/19 first visit   C/c htn   C/o fell on July 8th and has some left rib pain and back pains   Had some ct scans and would like to go through it  Needs referrals    Usually gets shots to her knees and shoulders- was told to follow-up with a new doctor     [facility]  gerd daily ppi --h/o nissens fundoplication  ?2006  HTN  Hip pain and back pain - T#3 - prn 2 tabs a week, also takes ibuprofen   bm hematuria   Non obstructing kid stones   elmer was on cpap   Glaucoma suspect   Right shoulder rotator cuff tear s/p surg 3/2022   Osteoarthritis knees  Hepatic and renal cysts similar to 8/19 and CT 5/20  H/o left toe OM s/p amputation   ELMER on 6/2024 sleep study      Dr peters - eye dr Dr moreno - urologist   Dr. Craft- rheum   Dr. Delgadillo- ortho   Dr lorraine hall       ----------------------------------------------------------------------------------------------------------------     Current Outpatient Medications   Medication Sig Dispense Refill    fluconazole (DIFLUCAN) 150 MG Oral Tab Take 1 tablet (150 mg total) by mouth As Directed. Take one tablet by mouth today, repeat one tablet by mouth in 3 days 2 tablet 0    pregabalin (LYRICA) 75 MG Oral Cap Take 1 capsule (75 mg total) by mouth 2 (two) times daily. 60 capsule 2    IBUPROFEN 600 MG Oral Tab TAKE 1 TABLET(600 MG) BY MOUTH DAILY AS NEEDED FOR PAIN 90 tablet 1    tiZANidine 2 MG Oral Tab       diazePAM 2 MG Oral Tab Take 1 tablet (2 mg total) by mouth nightly as needed. 20 tablet 0    Omeprazole 40 MG Oral Capsule Delayed Release Take 1 capsule (40 mg total) by mouth daily. Take 1 capsule by mouth daily before breakfast. 90 capsule 3    amLODIPine 10 MG Oral Tab TAKE 1 TABLET BY MOUTH EVERY DAY (Patient taking differently: at bedtime. TAKE 1 TABLET BY MOUTH EVERY DAY) 90 tablet 3    latanoprost 0.005 % Ophthalmic Solution INSTILL 1 DROP IN BOTH EYES EVERY night 3 each 3    rosuvastatin 5 MG Oral Tab Take 1 tablet (5 mg total) by mouth daily.      cholecalciferol 50 MCG (2000 UT) Oral Cap Take 1 capsule (2,000 Units total) by mouth daily.      MAGNESIUM OR Take by mouth.        Past Medical History:    Abscess of left thigh    Acute meniscal tear of knee    Age-related nuclear cataract of both eyes    Anal sphincter incontinence    Arthritis    Back problem    Chondromalacia    Colon polyps    Degenerative disc disease    Disorder of kidney and ureter    Diverticular disease    Esophageal reflux    Glaucoma    Hammertoe    High blood pressure    High cholesterol    Hyperlipidemia with target low density lipoprotein (LDL) cholesterol less than 130 mg/dL    Insomnia    Kidney lesion    Liver lesion    Neuropathy    Right Foot    Obstructive sleep apnea syndrome    Osteoarthritis    Primary open angle glaucoma of both eyes     Diagnosis of glaucoma OU; Started Latanoprost qhs after abnormal VF and OCT 2/25/16;  6/5/18 consult with Dr. Madeline Chappell-see progress note    Sleep apnea    Small bowel obstruction (HCC)    Tendinitis    Unspecified essential hypertension    Visual impairment    Reraders      Past Surgical History:   Procedure Laterality Date    Anal sphincterotomy      03/12/2013 [place]    Blepharoplasty anesthesia Bilateral 03/05/2020    Dr Kerwin Bello Ophthalmology     Cataract extraction w/  intraocular lens implant Left 05/07/2018    L PC IOL with Dr. Suresh @ [facility]    Colonoscopy N/A 11/11/2016    Procedure: COLONOSCOPY;  Surgeon: Sabrina Cazares MD;  Location: [facility] ENDOSCOPY    Colonoscopy N/A 09/06/2019    Procedure: COLONOSCOPY;  Surgeon: Edwin Sterling MD;  Location: [facility] ENDOSCOPY    Colonoscopy  09/16/2024    Dr. Sterling; colon polyps, diverticulosis, hemorrhoids    Colonoscopy N/A 9/16/2024    Procedure: COLONOSCOPY;  Surgeon: Edwin Sterling MD;  Location: [facility] ENDOSCOPY    Egd  09/16/2024    Dr. Sterling; gastric polyps, small hiatal hernia    Excision of chalazion, single - od - right eye Right 6.27/2017    Nasally    Hc arthrocentesis or inject major joint w/o us      Hysterectomy  1996    [facility]    Other      Lap Nissen Fundoplication surgery    Other surgical history  2005,2007,2008    LAPAROSCOPIC LYSIS OF ADHESION    Other surgical history      right foot bunionectomy    Other surgical history  11/14/2014    right superficial anterior peroneal nerve biopsy and right proximal thigh muscle biopsy.    Other surgical history  06/13/2023    Excision and Biopsy of two  perineal cysts    Upper gi endoscopy performed  05/2015    Yag capsulotomy - os - left eye Left 12/19/2018    [facility]ANCHESKA      Social History:  Social History     Socioeconomic History    Marital status:    Tobacco Use    Smoking status: Never     Passive exposure: Never    Smokeless tobacco: Never   Vaping Use    Vaping status: Never Used    Substance and Sexual Activity    Alcohol use: No     Comment: None.     Drug use: No    Sexual activity: Yes     Birth control/protection: Hysterectomy   Other Topics Concern    Caffeine Concern Yes     Comment: occasional soda    Exercise Yes    Pt has a pacemaker No    Pt has a defibrillator No    Breast feeding No    Reaction to local anesthetic No    Right Handed Yes   Social History Narrative    Work - banking     Social Drivers of Health      Received from [facility], [facility]    Social Connections    Received from [facility], [facility]    Housing Stability        REVIEW OF SYSTEMS:   GENERAL HEALTH: No fevers, chills, sweats, fatigue  RESPIRATORY: denies shortness of breath, cough, wheezing  CARDIOVASCULAR: denies chest pain on exertion, palpitations, swelling in feet  NEURO: denies headaches , anxiety, depression    EXAM:   /72   Pulse 68   Ht 5' 8\" (1.727 m)   Wt 197 lb (89.4 kg)   BMI 29.95 kg/m²   GENERAL: well developed, well nourished,in no apparent distress  SKIN: no rashes,no suspicious lesions  HEENT: atraumatic, normocephalic  NECK: supple,no adenopathy  LUNGS: clear to auscultation, no wheeze  CARDIO: RRR without murmur  EXTREMITIES: no  edema    ASSESSMENT AND PLAN:   Diagnoses and all orders for this visit:    Kidney cyst, acquired  -     US KIDNEY/BLADDER (CPT=76770); Future  -     Urology Referral - In Network  -     Comp Metabolic Panel (14); Future  -     Lipid Panel; Future  -     TSH W Reflex To Free T4; Future  -     CBC, Platelet; No Differential; Future  Usg ordered   Numbness and tingling of both feet  -     Comp Metabolic Panel (14); Future  -     Lipid Panel; Future  -     TSH W Reflex To Free T4; Future  -     CBC, Platelet; No Differential; Future  And   Cramp of limb  -     Comp Metabolic Panel (14); Future  -     Lipid Panel; Future  -     TSH W Reflex To Free T4; Future  -     CBC,  Platelet; No Differential; Future  Recheck labs , ry the medication time as she is very sensitive and to see which medicines worked best  Essential hypertension  -     Comp Metabolic Panel (14); Future  -     Lipid Panel; Future  -     TSH W Reflex To Free T4; Future  -     CBC, Platelet; No Differential; Future  Advised pt to follow a low salt , low sodium (including fast foods and processed foods), can look up DASH diet, exercise 30 min a day , monitor bp         KB (obstructive sleep apnea)  -     CPAP TITRATION STUDY ADULT  -     General sleep study; Future    Cpap titration ordered but will refer to pulm ofr autopap as she has a phobia for sleeping anywhere else than her home           Preventative medicine  Colonoscopy done  9/19  Dr. hall rpt in yrs ie 2024- has apt   Mammogram-10/2023  Pap 2016 dr chin -hina ,saw Dr. Witt in February 2021 and sees mina jaffe   Labs 12/2023   10/2023 1/2024  reviewed      The patient indicates understanding of these issues and agrees to the plan.  No follow-ups on file.

## 2024-12-18 ENCOUNTER — LAB ENCOUNTER (OUTPATIENT)
Dept: LAB | Facility: HOSPITAL | Age: 64
End: 2024-12-18
Attending: INTERNAL MEDICINE
Payer: MEDICARE

## 2024-12-18 DIAGNOSIS — R25.2 CRAMP OF LIMB: ICD-10-CM

## 2024-12-18 DIAGNOSIS — R20.2 NUMBNESS AND TINGLING OF BOTH FEET: ICD-10-CM

## 2024-12-18 DIAGNOSIS — R20.0 NUMBNESS AND TINGLING OF BOTH FEET: ICD-10-CM

## 2024-12-18 DIAGNOSIS — N28.1 KIDNEY CYST, ACQUIRED: ICD-10-CM

## 2024-12-18 DIAGNOSIS — I10 ESSENTIAL HYPERTENSION: ICD-10-CM

## 2024-12-18 LAB
ALBUMIN SERPL-MCNC: 4.5 G/DL (ref 3.2–4.8)
ALBUMIN/GLOB SERPL: 1.7 {RATIO} (ref 1–2)
ALP LIVER SERPL-CCNC: 128 U/L
ALT SERPL-CCNC: 17 U/L
ANION GAP SERPL CALC-SCNC: 5 MMOL/L (ref 0–18)
AST SERPL-CCNC: 17 U/L (ref ?–34)
BILIRUB SERPL-MCNC: 0.7 MG/DL (ref 0.2–1.1)
BUN BLD-MCNC: 11 MG/DL (ref 9–23)
BUN/CREAT SERPL: 10.4 (ref 10–20)
CALCIUM BLD-MCNC: 9.6 MG/DL (ref 8.7–10.4)
CHLORIDE SERPL-SCNC: 108 MMOL/L (ref 98–112)
CHOLEST SERPL-MCNC: 180 MG/DL (ref ?–200)
CO2 SERPL-SCNC: 30 MMOL/L (ref 21–32)
CREAT BLD-MCNC: 1.06 MG/DL
DEPRECATED RDW RBC AUTO: 41.9 FL (ref 35.1–46.3)
EGFRCR SERPLBLD CKD-EPI 2021: 58 ML/MIN/1.73M2 (ref 60–?)
ERYTHROCYTE [DISTWIDTH] IN BLOOD BY AUTOMATED COUNT: 15 % (ref 11–15)
FASTING PATIENT LIPID ANSWER: YES
FASTING STATUS PATIENT QL REPORTED: YES
GLOBULIN PLAS-MCNC: 2.7 G/DL (ref 2–3.5)
GLUCOSE BLD-MCNC: 121 MG/DL (ref 70–99)
HCT VFR BLD AUTO: 42.5 %
HDLC SERPL-MCNC: 62 MG/DL (ref 40–59)
HGB BLD-MCNC: 14.3 G/DL
LDLC SERPL CALC-MCNC: 94 MG/DL (ref ?–100)
MCH RBC QN AUTO: 26.3 PG (ref 26–34)
MCHC RBC AUTO-ENTMCNC: 33.6 G/DL (ref 31–37)
MCV RBC AUTO: 78.1 FL
NONHDLC SERPL-MCNC: 118 MG/DL (ref ?–130)
OSMOLALITY SERPL CALC.SUM OF ELEC: 297 MOSM/KG (ref 275–295)
PLATELET # BLD AUTO: 401 10(3)UL (ref 150–450)
POTASSIUM SERPL-SCNC: 3.9 MMOL/L (ref 3.5–5.1)
PROT SERPL-MCNC: 7.2 G/DL (ref 5.7–8.2)
RBC # BLD AUTO: 5.44 X10(6)UL
SODIUM SERPL-SCNC: 143 MMOL/L (ref 136–145)
TRIGL SERPL-MCNC: 141 MG/DL (ref 30–149)
TSI SER-ACNC: 1.51 UIU/ML (ref 0.55–4.78)
VLDLC SERPL CALC-MCNC: 23 MG/DL (ref 0–30)
WBC # BLD AUTO: 11.3 X10(3) UL (ref 4–11)

## 2024-12-18 PROCEDURE — 84443 ASSAY THYROID STIM HORMONE: CPT

## 2024-12-18 PROCEDURE — 80061 LIPID PANEL: CPT

## 2024-12-18 PROCEDURE — 85027 COMPLETE CBC AUTOMATED: CPT

## 2024-12-18 PROCEDURE — 36415 COLL VENOUS BLD VENIPUNCTURE: CPT

## 2024-12-18 PROCEDURE — 80053 COMPREHEN METABOLIC PANEL: CPT

## 2024-12-18 RX ORDER — TERCONAZOLE 4 MG/G
1 CREAM VAGINAL NIGHTLY
Qty: 45 G | Refills: 0 | Status: SHIPPED | OUTPATIENT
Start: 2024-12-18

## 2024-12-18 NOTE — TELEPHONE ENCOUNTER
Kerry notified of results and recommendations. Terazol prescription sent. She will finish fluconazole as prescribed. Patient verbalized understanding.

## 2024-12-18 NOTE — TELEPHONE ENCOUNTER
Please call patient. Reviewed her vaginal culture. She was given fluconazole but she also needs a different medication for another type of yeast. Please prescription terazol vaginal cream    Result   Vaginitis Vaginosis PCR Panel (Order [id number])   Contains abnormal data Vaginitis Vaginosis PCR Panel  Order: [id number]   Collected 12/16/2024 10:52 AM       Status: Final result       Dx: Vaginal irritation; Vaginal discharge    Specimen Information: Vaginal; Other   0 Result Notes      Component  Ref Range & Units    Bacterial Vaginosis  Negative Negative   Candida group  Negative Positive Abnormal    Comment: Candida group includes C. ablicans, C. tropicalis, C. parapsilosis, and  C. dubliniensis.    Because PCR is highly sensitive, the detection of Candida DNA can represent colonization or infection. Results should be interpreted in the context of patient symptoms.  Antimicrobial susceptibility testing will not be performed.   Nakaseomyces glabrata (Candida glabrata)  Negative Positive Abnormal    Comment: This organism has variable resistance to fluconazole.  Antimicrobial susceptibility testing will not be performed. Topical antifungal therapy is usually preferred.   Pichia kudriavzevii (Candida krusei)  Negative Negative   Trichomonas vaginalis  Negative Negative   Resulting Agency [facility] ([facility])              Narrative  Performed by: [facility] ([facility])  This assay utilizes RT-PCR to detect the DNA of Gardnerella vaginalis, Lactobacillus spp. (L. crispatus and L. jensenii), Atopobium vaginae, Bacterial Vaginosis Associated Bacteria-2 (BVAB-2), and Megasphaera-1. An algorithm is used to determine if the targets detected represent a vaginal microbiome consistent with bacterial vaginosis or normal vaginal khari.  FDA approval was based on data in the 18 years and over population.     Specimen Collected: 12/16/24 10:52 AM Last Resulted: 12/17/24  8:53 AM

## 2024-12-19 ENCOUNTER — OFFICE VISIT (OUTPATIENT)
Dept: ORTHOPEDICS CLINIC | Facility: CLINIC | Age: 64
End: 2024-12-19
Payer: MEDICARE

## 2024-12-19 ENCOUNTER — HOSPITAL ENCOUNTER (OUTPATIENT)
Dept: ULTRASOUND IMAGING | Age: 64
Discharge: HOME | End: 2024-12-19
Attending: INTERNAL MEDICINE
Payer: MEDICARE

## 2024-12-19 DIAGNOSIS — N28.1 KIDNEY CYST, ACQUIRED: ICD-10-CM

## 2024-12-19 DIAGNOSIS — M17.11 PRIMARY OSTEOARTHRITIS OF RIGHT KNEE: Primary | ICD-10-CM

## 2024-12-19 PROCEDURE — 76770 US EXAM ABDO BACK WALL COMP: CPT | Performed by: INTERNAL MEDICINE

## 2024-12-19 PROCEDURE — 99214 OFFICE O/P EST MOD 30 MIN: CPT | Performed by: ORTHOPAEDIC SURGERY

## 2024-12-19 NOTE — PROGRESS NOTES
NURSING INTAKE COMMENTS:   Chief Complaint   Patient presents with    Knee Pain     Bilateral knee- Patient received gel injections on 11/7/24 and 11/14/24. Rates pain at 8/10. States that right is worse than left. Patient states that there has been minimal relief with the gel injections. Patient here to talk about possible surgery or next steps.        HPI: This 64 year old female presents today with complaints of bilateral knee pain right worse than left.  She has primarily pain along the posterior right knee and anterior left knee.  She has been taking ibuprofen on a regular basis.  The injection given a month ago helped for a few weeks.  She may be interested in more definitive treatment options.  No recent injury to the knees.    Past Medical History:    Abscess of left thigh    Acute meniscal tear of knee    Age-related nuclear cataract of both eyes    Anal sphincter incontinence    Arthritis    Back problem    Chondromalacia    Colon polyps    Degenerative disc disease    Disorder of kidney and ureter    Diverticular disease    Esophageal reflux    Glaucoma    Hammertoe    High blood pressure    High cholesterol    Hyperlipidemia with target low density lipoprotein (LDL) cholesterol less than 130 mg/dL    Insomnia    Kidney lesion    Liver lesion    Neuropathy    Right Foot    Obstructive sleep apnea syndrome    Osteoarthritis    Primary open angle glaucoma of both eyes    Diagnosis of glaucoma OU; Started Latanoprost qhs after abnormal VF and OCT 2/25/16;  6/5/18 consult with Dr. Madeline Chappell-see progress note    Sleep apnea    Small bowel obstruction (HCC)    Tendinitis    Unspecified essential hypertension    Visual impairment    Reraders     Past Surgical History:   Procedure Laterality Date    Anal sphincterotomy      03/12/2013 [facility]    Blepharoplasty anesthesia Bilateral 03/05/2020    Dr Kerwin Bello [facility]hthalmology     Cataract extraction w/  intraocular lens implant Left 05/07/2018    L PC IOL with  Dr. Suresh @ [facility]    Colonoscopy N/A 11/11/2016    Procedure: COLONOSCOPY;  Surgeon: Sabrina Cazares MD;  Location: [facility] ENDOSCOPY    Colonoscopy N/A 09/06/2019    Procedure: COLONOSCOPY;  Surgeon: Edwin Sterling MD;  Location: [facility] ENDOSCOPY    Colonoscopy  09/16/2024    Dr. Sterling; colon polyps, diverticulosis, hemorrhoids    Colonoscopy N/A 9/16/2024    Procedure: COLONOSCOPY;  Surgeon: Edwin Sterling MD;  Location: [facility] ENDOSCOPY    Egd  09/16/2024    Dr. Sterling; gastric polyps, small hiatal hernia    Excision of chalazion, single - od - right eye Right 6.27/2017    Nasally    Hc arthrocentesis or inject major joint w/o us      Hysterectomy  1996    KAI    Other      Lap Nissen Fundoplication surgery    Other surgical history  2005,2007,2008    LAPAROSCOPIC LYSIS OF ADHESION    Other surgical history      right foot bunionectomy    Other surgical history  11/14/2014    right superficial anterior peroneal nerve biopsy and right proximal thigh muscle biopsy.    Other surgical history  06/13/2023    Excision and Biopsy of two  perineal cysts    Upper gi endoscopy performed  05/2015    Yag capsulotomy - os - left eye Left 12/19/2018    RJM     Current Outpatient Medications   Medication Sig Dispense Refill    teraconazole 0.4 % Vaginal Cream Place 1 applicator vaginally nightly. For 7 nights in a row 45 g 0    fluconazole (DIFLUCAN) 150 MG Oral Tab Take 1 tablet (150 mg total) by mouth As Directed. Take one tablet by mouth today, repeat one tablet by mouth in 3 days 2 tablet 0    pregabalin (LYRICA) 75 MG Oral Cap Take 1 capsule (75 mg total) by mouth 2 (two) times daily. 60 capsule 2    IBUPROFEN 600 MG Oral Tab TAKE 1 TABLET(600 MG) BY MOUTH DAILY AS NEEDED FOR PAIN 90 tablet 1    tiZANidine 2 MG Oral Tab       diazePAM 2 MG Oral Tab Take 1 tablet (2 mg total) by mouth nightly as needed. 20 tablet 0    Omeprazole 40 MG Oral Capsule Delayed Release Take 1 capsule (40 mg total) by mouth daily. Take 1 capsule by  mouth daily before breakfast. 90 capsule 3    amLODIPine 10 MG Oral Tab TAKE 1 TABLET BY MOUTH EVERY DAY (Patient taking differently: at bedtime. TAKE 1 TABLET BY MOUTH EVERY DAY) 90 tablet 3    latanoprost 0.005 % Ophthalmic Solution INSTILL 1 DROP IN BOTH EYES EVERY night 3 each 3    rosuvastatin 5 MG Oral Tab Take 1 tablet (5 mg total) by mouth daily.      cholecalciferol 50 MCG (2000 UT) Oral Cap Take 1 capsule (2,000 Units total) by mouth daily.      MAGNESIUM OR Take by mouth.       Allergies[1]  Family History   Problem Relation Age of Onset    Hypertension Father     Hypertension Mother     Hypertension Sister     Cancer Sister         liver cancer    Hypertension Brother     Diabetes Neg     Glaucoma Neg     Macular degeneration Neg     Breast Cancer Neg     Ovarian Cancer Neg        Social History     Occupational History    Not on file   Tobacco Use    Smoking status: Never     Passive exposure: Never    Smokeless tobacco: Never   Vaping Use    Vaping status: Never Used   Substance and Sexual Activity    Alcohol use: No     Comment: None.     Drug use: No    Sexual activity: Yes     Birth control/protection: Hysterectomy        Review of Systems:  GENERAL: denies fevers, chills, night sweats, fatigue, unintentional weight loss/gain  SKIN: denies skin lesions, open sores, rash  HEENT:denies recent vision change, new nasal congestion,hearing loss, tinnitus, sore throat, headaches  RESPIRATORY: denies new shortness of breath, cough, asthma, wheezing  CARDIOVASCULAR: denies chest pain, leg cramps with exertion, palpitations, leg swelling  GI: denies abdominal pain, nausea, vomiting, diarrhea, constipation, hematochezia, worsening heartburn or stomach ulcers  : denies dysuria, hematuria, incontinence, increased frequency, urgency, difficulty urinating  MUSCULOSKELETAL: denies musculoskeletal complaints other than in HPI  NEURO: denies numbness, tingling, weakness, balance issues, dizziness, memory  loss  PSYCHIATRIC: denies Hx of depression, anxiety, other psychiatric disorders  HEMATOLOGIC: denies blood clots, anemia, blood clotting disorders, blood transfusion  ENDOCRINE: denies autoimmune disease, thyroid issues, or diabetes  ALLERGY: denies asthma, seasonal allergies    Physical Examination:    There were no vitals taken for this visit.  Constitutional: appears well hydrated, alert and responsive, no acute distress noted  Extremities: Bilateral knee examination unchanged  Neurological: Unchanged    Imaging:   Previous knee x-rays were again reviewed.  There are extensive degenerative changes right knee lateral compartment and patellofemoral joint.    Labs:  Lab Results   Component Value Date    WBC 11.3 (H) 12/18/2024    HGB 14.3 12/18/2024    .0 12/18/2024      Lab Results   Component Value Date     (H) 12/18/2024    BUN 11 12/18/2024    CREATSERUM 1.06 (H) 12/18/2024    GFR 46 (L) 04/02/2016    GFRNAA 66 05/21/2022    GFRAA 76 05/21/2022        Assessment and Plan:  Diagnoses and all orders for this visit:    Primary osteoarthritis of right knee  -     MRI KNEE BONNIE, RIGHT (CPT=73721); Future        Assessment: Bilateral knee osteoarthritis, primary right worse than left.  Valgus deformity right knee    Plan: I discussed operative and nonoperative treatment options.  I advised consideration of total knee arthroplasty for the right knee.  Risks and benefits of surgery were discussed.  Questions were answered.  No guarantees were made.  She may elect for surgery in the coming weeks.  I ordered MRI imaging of the right hip knee and ankle using the Bonnie protocol for preoperative computer navigation and patient significance to mentation.  Advised medical and dental evaluation prior to surgery.  Follow-up again on the date of the procedure.    Follow Up: Return if symptoms worsen or fail to improve.    CONG LOZANO MD       [1]   Allergies  Allergen Reactions    Celecoxib TONGUE SWELLING      Other reaction(s): CELECOXIB    Sulfa Antibiotics TONGUE SWELLING     Other reaction(s): SULFA (SULFONAMIDE ANTIBIOTICS)    Erythromycin PAIN    Celecoxib UNKNOWN    Erythromycin Stearate UNKNOWN    Amoxicillin DIARRHEA

## 2024-12-24 ENCOUNTER — TELEPHONE (OUTPATIENT)
Dept: ORTHOPEDICS CLINIC | Facility: CLINIC | Age: 64
End: 2024-12-24

## 2024-12-24 DIAGNOSIS — M17.11 PRIMARY OSTEOARTHRITIS OF RIGHT KNEE: Primary | ICD-10-CM

## 2024-12-24 NOTE — TELEPHONE ENCOUNTER
Spoke with patient to offer surgery dates. She chose 3/17/25. Patient was reminded that Medical and/or Dental clearance is needed within 30 days of surgical date. Failure to complete all testing in a timely manner will cause surgery to be delayed and/or rescheduled. Patient understood and had no further questions at this time.

## 2024-12-24 NOTE — TELEPHONE ENCOUNTER
Type of surgery:  Right total knee arthroplasty  Date: 3/17/25  Location: [facility]  Medical Clearance:      *Medical: Yes      *Dental: Yes      *Other:  Prior Authorization Status: Pending   Workers Comp:  [place]: [id number]  [place]: Yes  POV: MARIO

## 2024-12-26 NOTE — PROGRESS NOTES
[facility]  OFFICE FOLLOW UP EVALUATION      HISTORY OF PRESENT ILLNESS:     Chief Complaint   Patient presents with    Follow - Up     LOV 8/19/24. F/U for R sided low back pain radiating into hip/glute. Pain 4/10. Admits N/T from R thigh to R foot. Admits some weakness in R leg. Pt takes lyrica nightly with improved sleep. No tx.       Patient is following up for right-sided low back pain with radiation in the right lower extremity.  She was last seen about 3 months ago.  She is taking Lyrica 75 mg at nighttime which is providing good relief.  She continues to have low back pain with radiation of the right hip and gluteal with occasional numbness tingling sensation down to the foot.  She rates the pain to be 4 out of 10.  She has some weakness subjectively in the leg.  She is following with orthopedics for management of knee and hip pain.    PHYSICAL EXAM:   Wt 198 lb (89.8 kg)   BMI 30.11 kg/m²     Gait  Able to toe walk and heel walk without any difficulty     LUMBAR SPINE:  Inspection: no erythema, swelling, or obvious deformity.  Their iliac crest and shoulder heights are symmetrical.     Palpation: Non tender to palpation of the spinous process.   ROM: Restricted in forward flexion with reproduction of pain  Strength: 5/5 in bilateral lower extremities except 4 out of 5 right EHL  Sensation: Intact to light touch in all dermatomes of the lower extremities except decreased to light touch in the right L5 dermatome  Reflexes: 2/4 at L4 and S1  Facet Loading: no specific facet pain  Straight leg raise: negative for radicular pain symptoms  Slump test: Positive for pain symptoms for radicular pain symptoms    IMAGING:     MRI lumbar spine completed outside institution on 5/29/2024 was personally reviewed which is notable for grade 1 anterolisthesis of L4 on L5. There is minimal disc bulging at L3-4 with no significant spinal or foraminal narrowing. At L4-5 there is  mild diffuse foraminal narrowing. There is a minimal bulging disc at L5-S1 with mild facet arthropathy. There is no significant spinal or foraminal narrowing.        All imaging results were reviewed and discussed with patient.      ASSESSMENT/PLAN:     1. Right lumbar radiculopathy    2. Essential hypertension    3. Prediabetes          Kerry Hsu is a 64 year old female following up for persistent right lumbar radiculopathy symptoms.  Overall she is doing well with Lyrica 75 mg at nighttime.  I recommended increasing it to twice daily and follow-up in 3 months.  She will continue follow-up with orthopedic surgery for regards to her knee or hip osteoarthritis treatment and management.  She will follow-up in 3 months at which time we will discuss any further interventional lumbosacral spine procedure if the pain persist.    The patient verbalized understanding with the plan and was in agreement. All questions/concerns were addressed and there were no barriers to learning.  Please note Dragon dictation software was used to dictate this note and may result in inadvertent typos.    Willie Baptiste DO, FAAPMR & CAQSM  Physical Medicine and Rehabilitation  Sports and Spine Medicine    PAST MEDICAL HISTORY:     Past Medical History:    Abscess of left thigh    Acute meniscal tear of knee    Age-related nuclear cataract of both eyes    Anal sphincter incontinence    Arthritis    Back problem    Chondromalacia    Colon polyps    Degenerative disc disease    Disorder of kidney and ureter    Diverticular disease    Esophageal reflux    Glaucoma    Hammertoe    High blood pressure    High cholesterol    Hyperlipidemia with target low density lipoprotein (LDL) cholesterol less than 130 mg/dL    Insomnia    Kidney lesion    Liver lesion    Neuropathy    Right Foot    Obstructive sleep apnea syndrome    Osteoarthritis    Primary open angle glaucoma of both eyes    Diagnosis of glaucoma OU; Started Latanoprost qhs after  abnormal VF and OCT 2/25/16;  6/5/18 consult with Dr. Madeline Chappell-see progress note    Sleep apnea    Small bowel obstruction (HCC)    Tendinitis    Unspecified essential hypertension    Visual impairment    Reraders         PAST SURGICAL HISTORY:     Past Surgical History:   Procedure Laterality Date    Anal sphincterotomy      03/12/2013 Gely    Blepharoplasty anesthesia Bilateral 03/05/2020    Dr Kerwin Bello Ophthalmology     Cataract extraction w/  intraocular lens implant Left 05/07/2018    L PC IOL with Dr. Suresh @ [facility]    Colonoscopy N/A 11/11/2016    Procedure: COLONOSCOPY;  Surgeon: Sabrina Cazares MD;  Location: [facility] ENDOSCOPY    Colonoscopy N/A 09/06/2019    Procedure: COLONOSCOPY;  Surgeon: Edwin Sterling MD;  Location: [facility] ENDOSCOPY    Colonoscopy  09/16/2024    Dr. Sterling; colon polyps, diverticulosis, hemorrhoids    Colonoscopy N/A 9/16/2024    Procedure: COLONOSCOPY;  Surgeon: Edwin Sterling MD;  Location: [facility] ENDOSCOPY    Egd  09/16/2024    Dr. Sterling; gastric polyps, small hiatal hernia    Excision of chalazion, single - od - right eye Right 6.27/2017    Nasally    Hc arthrocentesis or inject major joint w/o us      Hysterectomy  1996    KAI    Other      Lap Nissen Fundoplication surgery    Other surgical history  2005,2007,2008    LAPAROSCOPIC LYSIS OF ADHESION    Other surgical history      right foot bunionectomy    Other surgical history  11/14/2014    right superficial anterior peroneal nerve biopsy and right proximal thigh muscle biopsy.    Other surgical history  06/13/2023    Excision and Biopsy of two  perineal cysts    Upper gi endoscopy performed  05/2015    Yag capsulotomy - os - left eye Left 12/19/2018    CHENCHO         CURRENT MEDICATIONS:     Current Outpatient Medications   Medication Sig Dispense Refill    pregabalin (LYRICA) 75 MG Oral Cap Take 1 capsule (75 mg total) by mouth 2 (two) times daily. 60 capsule 2    teraconazole 0.4 % Vaginal Cream Place 1 applicator  vaginally nightly. For 7 nights in a row 45 g 0    fluconazole (DIFLUCAN) 150 MG Oral Tab Take 1 tablet (150 mg total) by mouth As Directed. Take one tablet by mouth today, repeat one tablet by mouth in 3 days 2 tablet 0    IBUPROFEN 600 MG Oral Tab TAKE 1 TABLET(600 MG) BY MOUTH DAILY AS NEEDED FOR PAIN 90 tablet 1    tiZANidine 2 MG Oral Tab       diazePAM 2 MG Oral Tab Take 1 tablet (2 mg total) by mouth nightly as needed. 20 tablet 0    Omeprazole 40 MG Oral Capsule Delayed Release Take 1 capsule (40 mg total) by mouth daily. Take 1 capsule by mouth daily before breakfast. 90 capsule 3    amLODIPine 10 MG Oral Tab TAKE 1 TABLET BY MOUTH EVERY DAY (Patient taking differently: at bedtime. TAKE 1 TABLET BY MOUTH EVERY DAY) 90 tablet 3    latanoprost 0.005 % Ophthalmic Solution INSTILL 1 DROP IN BOTH EYES EVERY night 3 each 3    rosuvastatin 5 MG Oral Tab Take 1 tablet (5 mg total) by mouth daily.      cholecalciferol 50 MCG (2000 UT) Oral Cap Take 1 capsule (2,000 Units total) by mouth daily.      MAGNESIUM OR Take by mouth.           ALLERGIES:     Allergies   Allergen Reactions    Celecoxib TONGUE SWELLING     Other reaction(s): CELECOXIB    Sulfa Antibiotics TONGUE SWELLING     Other reaction(s): SULFA (SULFONAMIDE ANTIBIOTICS)    Erythromycin PAIN    Celecoxib UNKNOWN    Erythromycin Stearate UNKNOWN    Amoxicillin DIARRHEA         FAMILY HISTORY:     Family History   Problem Relation Age of Onset    Hypertension Father     Hypertension Mother     Hypertension Sister     Cancer Sister         liver cancer    Hypertension Brother     Diabetes Neg     Glaucoma Neg     Macular degeneration Neg     Breast Cancer Neg     Ovarian Cancer Neg           SOCIAL HISTORY:     Social History     Socioeconomic History    Marital status:    Tobacco Use    Smoking status: Never     Passive exposure: Never    Smokeless tobacco: Never   Vaping Use    Vaping status: Never Used   Substance and Sexual Activity    Alcohol  use: No     Comment: None.     Drug use: No    Sexual activity: Yes     Birth control/protection: Hysterectomy   Other Topics Concern    Caffeine Concern Yes     Comment: occasional soda    Exercise Yes    Pt has a pacemaker No    Pt has a defibrillator No    Breast feeding No    Reaction to local anesthetic No    Right Handed Yes   Social History Narrative    Work - banking     Social Drivers of Health      [facility]    Social Connections    [facility]    Housing Stability          REVIEW OF SYSTEMS:   A comprehensive 10 point review of systems was completed.  Pertinent positives and negatives noted in the the HPI.      LABS:     Lab Results   Component Value Date     (H) 07/11/2024    A1C 6.2 (H) 07/11/2024     Lab Results   Component Value Date    WBC 11.3 (H) 12/18/2024    RBC 5.44 (H) 12/18/2024    HGB 14.3 12/18/2024    HCT 42.5 12/18/2024    MCV 78.1 (L) 12/18/2024    MCH 26.3 12/18/2024    MCHC 33.6 12/18/2024    RDW 15.0 12/18/2024    .0 12/18/2024    MPV 7.7 09/07/2017     Lab Results   Component Value Date     (H) 12/18/2024    BUN 11 12/18/2024    BUNCREA 10.4 12/18/2024    CREATSERUM 1.06 (H) 12/18/2024    ANIONGAP 5 12/18/2024    GFR 46 (L) 04/02/2016    GFRNAA 66 05/21/2022    GFRAA 76 05/21/2022    CA 9.6 12/18/2024    OSMOCALC 297 (H) 12/18/2024    ALKPHO 128 12/18/2024    AST 17 12/18/2024    ALT 17 12/18/2024    ALKPHOS 88 08/27/2016    BILT 0.7 12/18/2024    TP 7.2 12/18/2024    ALB 4.5 12/18/2024    GLOBULIN 2.7 12/18/2024    AGRATIO 1.2 08/27/2016     12/18/2024    K 3.9 12/18/2024     12/18/2024    CO2 30.0 12/18/2024     Lab Results   Component Value Date    PTP 13.2 12/27/2023    INR 0.95 12/27/2023     Lab Results   Component Value Date    VITD 26.0 (L) 06/08/2023    [id number] 36.0 01/22/2015

## 2025-01-06 NOTE — H&P (VIEW-ONLY)
Established patient    Problem(s), Ilness(es), Injury(ies): Right hip pain    Number and complexity of Problems Addressed: 1 or more chronic illnesses with exacerbation, progression, or side effects of treatment    NURSING INTAKE COMMENTS: Patient presents with:  Right Hip - Follow - Up, Pain      HPI: This 64 year old female presents today with complaints of continued right hip pain.  Has done injections which gave from a couple of weeks of relief as well as therapy and medications without success.  This limits her activities and ability to ambulate.  Also has right knee arthritis with pending knee replacement with Dr. Delgadillo.  However she would like her hip addressed first.    Past Medical History:   Diagnosis Date   • Abscess of left thigh    • Acute meniscal tear of knee    • Age-related nuclear cataract of both eyes 2/10/2015   • Anal sphincter incontinence 4/28/2014   • Back problem    • Cancer (HCC)    • Chondromalacia    • Colon polyps    • Degenerative disc disease    • Diverticular disease    • Esophageal reflux    • Glaucoma    • Hammertoe    • High blood pressure    • Insomnia    • Neuropathy    • Primary open angle glaucoma of both eyes 5/19/2015    Diagnosis of glaucoma OU; Started Latanoprost qhs after abnormal VF and OCT 2/25/16;  6/5/18 consult with Dr. Madeline Chappell-see progress note   • Small bowel obstruction (HCC)    • Tendinitis    • Unspecified essential hypertension        Past Surgical History:   Procedure Laterality Date   • BLEPHAROPLASTY ANESTHESIA Bilateral 03/05/2020    Dr Kerwin Bello [facility]hthalmology    • CATARACT EXTRACTION W/  INTRAOCULAR LENS IMPLANT Left 05/07/2018    L PC IOL with Dr. Suresh @ [facility]   • COLONOSCOPY N/A 11/11/2016    Procedure: COLONOSCOPY;  Surgeon: Sabrina Cazares MD;  Location: [facility] ENDOSCOPY   • COLONOSCOPY N/A 9/6/2019    Procedure: COLONOSCOPY;  Surgeon: Edwin Sterling MD;  Location: [facility] ENDOSCOPY   • EXCISION OF CHALAZION, SINGLE - OD - RIGHT EYE  Right 6.27/2017    Nasally   • HC ARTHROCENTESIS OR INJECT MAJOR JOINT W/O US     • HYSTERECTOMY  1996    [facility]AI   • OTHER      Lap Nissen Fundoplication surgery   • OTHER SURGICAL HISTORY  2005,2007,2008    LAPAROSCOPIC LYSIS OF ADHESION   • OTHER SURGICAL HISTORY      right foot bunionectomy   • OTHER SURGICAL HISTORY  11-14-14    right superficial anterior peroneal nerve biopsy and right proximal thigh muscle biopsy.   • SPHINCTEROTOMY ANAL DIVISION SPHINCTER SPX      03/12/2013 [place]   • UPPER GI ENDOSCOPY PERFORMED  5/2015   • YAG CAPSULOTOMY - OS - LEFT EYE Left 12/19/2018    RJM       traMADol 50 MG Oral Tab, Take 1 tablet (50 mg total) by mouth every 6 (six) hours as needed for Pain. No alcohol or driving on this med. Stop if lethargic or hallucinating., Disp: 25 tablet, Rfl: 0  HYDROcodone-acetaminophen 5-325 MG Oral Tab, , Disp: , Rfl:   azithromycin 250 MG Oral Tab, , Disp: , Rfl:   latanoprost 0.005 % Ophthalmic Solution, INSTILL 1 DROP IN BOTH EYES EVERY MORNING, Disp: 7.5 mL, Rfl: 3  oxyCODONE-acetaminophen 5-325 MG Oral Tab, Take 1-2 tablets by mouth every 4 (four) hours as needed for Pain., Disp: 35 tablet, Rfl: 0  omeprazole 20 MG Oral Capsule Delayed Release, Take 2 capsules (40 mg total) by mouth every morning., Disp: 180 capsule, Rfl: 1  Azelastine HCl 0.1 % Nasal Solution, 2 sprays by Nasal route daily., Disp: 1 each, Rfl: 0  MAGNESIUM OR, Take by mouth., Disp: , Rfl:   amLODIPine 10 MG Oral Tab, TAKE 1 TABLET BY MOUTH EVERY DAY (Patient not taking: Reported on 1/6/2025), Disp: 90 tablet, Rfl: 1    No current facility-administered medications on file prior to visit.        Erythromycin            PAIN  Andrews-2 Inhibitors (S*        Comment:Other reaction(s): CELECOXIB  Sulfa Antibiotics       TONGUE SWELLING    Comment:Other reaction(s): SULFA (SULFONAMIDE ANTIBIOTICS)  Amoxicillin             DIARRHEA    Family History   Problem Relation Age of Onset   • Hypertension Father    • Hypertension Mother     • Hypertension Sister    • Cancer Sister         liver cancer   • Hypertension Brother    • Diabetes Neg    • Glaucoma Neg    • Macular degeneration Neg    • Breast Cancer Neg    • Ovarian Cancer Neg        Social History    Socioeconomic History      Marital status:       Spouse name: Not on file      Number of children: Not on file      Years of education: Not on file      Highest education level: Not on file    Occupational History      Not on file    Tobacco Use      Smoking status: Never      Smokeless tobacco: Never    Vaping Use      Vaping status: Never Used    Substance and Sexual Activity      Alcohol use: No        Alcohol/week: 0.0 standard drinks of alcohol        Comment: None.       Drug use: No      Sexual activity: Yes        Birth control/protection: Hysterectomy    Other Topics      Concerns:         Service: Not Asked        Blood Transfusions: Not Asked        Caffeine Concern: Yes          occasional soda        Occupational Exposure: Not Asked        Hobby Hazards: Not Asked        Sleep Concern: Not Asked        Stress Concern: Not Asked        Weight Concern: Not Asked        Special Diet: Not Asked        Back Care: Not Asked        Exercise: Yes        Bike Helmet: Not Asked        Seat Belt: Not Asked        Self-Exams: Not Asked        Grew up on a farm: Not Asked        History of tanning: Not Asked        Outdoor occupation: Not Asked        Pt has a pacemaker: No        Pt has a defibrillator: No        Breast feeding: Not Asked        Reaction to local anesthetic: No        Left Handed: Not Asked        Right Handed: Yes        Currently spends a great deal of time in the sun: Not Asked        Past Sunlamp Treatments for Acne: Not Asked        Hx of Spending Great Deal of Time in Sun: Not Asked        Bad sunburns in the past: Not Asked        Tanning Salons in the Past: Not Asked        Hx of Radiation Treatments: Not Asked        Regular use of sun block: Not  Asked    Social History Narrative      Work - banking    Social Determinants of Health  Financial Resource Strain: Not on file  Food Insecurity: Not on file  Transportation Needs: Not on file  Physical Activity: Not on file  Stress: Not on file  Social Connections: Unknown (4/20/2021)      Received from [facility]      Social Connections          Conversations with friends/family/neighbors per week: Not on file  Intimate Partner Violence: Not on file  Housing Stability: Low Risk  (7/17/2021)      Received from [facility]      Housing Stability          Mortgage Payment Concerns?: Not on file          Number of Places Lived in the Last Year: Not on file          Unstable Housing?: Not on file    Physical Examination:  There is no height or weight on file to calculate BMI., Wt Readings from Last 6 Encounters:  03/07/22 : 196 lb (88.9 kg)  03/02/22 : 199 lb (90.3 kg)  02/26/22 : 195 lb 6.4 oz (88.6 kg)  02/09/22 : 200 lb (90.7 kg)  02/07/22 : 200 lb (90.7 kg)  01/26/22 : 200 lb (90.7 kg)      This is a pleasant 64 year old female in no acute distress.      Heart, lung, and abdomen are normal based upon simple visual observation: The patient has good peripheral perfusion, non-labored breathing, and a soft abdomen.    Positive FADIR.  Good range of motion.  Neurovascularly intact.  Also tenderness along the greater trochanter.    Radiographs/Imaging/Tests reviewed if applicable:  Reviewed both the MRI and x-ray.  No significant arthritis but multifocal labral tearing noted  Review of prior external note(s) if applicable: N/A  Assessment requiring an independent historian if applicable: N/A  Discussion of management or test interpretation with another physician if applicable: N/A  Independent Interpretation of tests performed by another physician if applicable: N/A    Kerry was seen today for follow - up and pain.    Diagnoses and all orders for this visit:    Tear of right  acetabular labrum, initial encounter  -     DULY SURGERY SCHEDULING MSK; Future  -     PHYSICAL THERAPY EXTERNAL; Future        Plan: Patient has significant hip pain interfering with activities of daily living and quality of life.  X-rays and MRI are consistent with a labral tear.  Reasonable conservative measures have failed including rest, physical therapy, anti-inflammatory medications, and an intra-articular injection.    Risks and benefits of the procedure were discussed as well as the rehabilitation necessary afterwards. Major risk include but are not limited to the following: Bleeding, infection, blood clot, pulmonary embolus, pudendal nerve palsy, lateral femoral cutaneous nerve injury, continued hip pain, stiffness, re-tearing of the labrum, heterotopic ossification, and need for further surgery.  Patient also understands that if any underlying arthritis is noted intraoperatively that pain may persist.  All of the patient's questions were answered and the patient expressed full understanding of my explanations and plan of care.      Risk of morbidity from additional diagnostic testing or treatment: Moderate    Dragon speech recognition software was used to prepare this note. If a word or phrase is confusing, it is likely due to a failure of recognition. Please contact me with any questions or clarifications.

## 2025-01-07 ENCOUNTER — TELEPHONE (OUTPATIENT)
Dept: INTERNAL MEDICINE CLINIC | Facility: CLINIC | Age: 65
End: 2025-01-07

## 2025-01-10 ENCOUNTER — OFFICE VISIT (OUTPATIENT)
Dept: INTERNAL MEDICINE CLINIC | Facility: CLINIC | Age: 65
End: 2025-01-10
Payer: MEDICARE

## 2025-01-10 ENCOUNTER — LAB ENCOUNTER (OUTPATIENT)
Dept: LAB | Age: 65
End: 2025-01-10
Payer: MEDICARE

## 2025-01-10 VITALS
HEART RATE: 73 BPM | WEIGHT: 200 LBS | BODY MASS INDEX: 30.31 KG/M2 | DIASTOLIC BLOOD PRESSURE: 70 MMHG | OXYGEN SATURATION: 98 % | HEIGHT: 68 IN | SYSTOLIC BLOOD PRESSURE: 132 MMHG

## 2025-01-10 DIAGNOSIS — Z01.818 PREOP EXAM FOR INTERNAL MEDICINE: ICD-10-CM

## 2025-01-10 DIAGNOSIS — Z01.818 PREOP EXAM FOR INTERNAL MEDICINE: Primary | ICD-10-CM

## 2025-01-10 LAB
ATRIAL RATE: 63 BPM
P AXIS: 49 DEGREES
P-R INTERVAL: 158 MS
Q-T INTERVAL: 412 MS
QRS DURATION: 78 MS
QTC CALCULATION (BEZET): 421 MS
R AXIS: 44 DEGREES
T AXIS: 58 DEGREES
VENTRICULAR RATE: 63 BPM

## 2025-01-10 PROCEDURE — 93005 ELECTROCARDIOGRAM TRACING: CPT

## 2025-01-10 PROCEDURE — 99213 OFFICE O/P EST LOW 20 MIN: CPT

## 2025-01-10 PROCEDURE — 93010 ELECTROCARDIOGRAM REPORT: CPT | Performed by: INTERNAL MEDICINE

## 2025-01-10 NOTE — PROGRESS NOTES
Kerry Hsu is a 64 year old female who presents for a pre-operative physical exam.     HPI related to surgery:   Procedure: right hip arthroplasty with labral repair  Surgeon: Calvin Partida  Date: 1/28/2025  Location: [facility] OR    Active medical problems:  HTN  HLD  Venous insufficiency BLE  GERD   KB     She has had previous anesthesia:  Yes.  Previous complications:  No    Social History     Socioeconomic History    Marital status:    Tobacco Use    Smoking status: Never     Passive exposure: Never    Smokeless tobacco: Never   Vaping Use    Vaping status: Never Used   Substance and Sexual Activity    Alcohol use: No     Comment: None.     Drug use: No    Sexual activity: Yes     Birth control/protection: Hysterectomy   Other Topics Concern    Caffeine Concern Yes     Comment: occasional soda    Exercise Yes    Pt has a pacemaker No    Pt has a defibrillator No    Breast feeding No    Reaction to local anesthetic No    Right Handed Yes   Social History Narrative    Work - banking     Social Drivers of Health      [facility], [facility]    Social Connections    Received from [facility], [facility]    Housing Stability      Past Medical History:    Abscess of left thigh    Acute meniscal tear of knee    Age-related nuclear cataract of both eyes    Anal sphincter incontinence    Arthritis    Back problem    Chondromalacia    Colon polyps    Degenerative disc disease    Disorder of kidney and ureter    Diverticular disease    Esophageal reflux    Glaucoma    Hammertoe    High blood pressure    High cholesterol    Hyperlipidemia with target low density lipoprotein (LDL) cholesterol less than 130 mg/dL    Insomnia    Kidney lesion    Liver lesion    Neuropathy    Right Foot    Obstructive sleep apnea syndrome    Osteoarthritis    Primary open angle glaucoma of both eyes    Diagnosis of glaucoma OU;  Started Latanoprost qhs after abnormal VF and OCT 2/25/16;  6/5/18 consult with Dr. Madeline Chappell-see progress note    Sleep apnea    Small bowel obstruction (HCC)    Tendinitis    Unspecified essential hypertension    Visual impairment    Reraders     Past Surgical History:   Procedure Laterality Date    Anal sphincterotomy      03/12/2013 [place]    Blepharoplasty anesthesia Bilateral 03/05/2020    Dr Kerwin Bello Ophthalmology     Cataract extraction w/  intraocular lens implant Left 05/07/2018    L PC IOL with Dr. Suresh @ [facility]    Colonoscopy N/A 11/11/2016    Procedure: COLONOSCOPY;  Surgeon: Sabrina Cazares MD;  Location: [facility] ENDOSCOPY    Colonoscopy N/A 09/06/2019    Procedure: COLONOSCOPY;  Surgeon: Edwin Sterling MD;  Location: [facility] ENDOSCOPY    Colonoscopy  09/16/2024    Dr. Sterling; colon polyps, diverticulosis, hemorrhoids    Colonoscopy N/A 9/16/2024    Procedure: COLONOSCOPY;  Surgeon: Edwin Sterling MD;  Location: [facility] ENDOSCOPY    Egd  09/16/2024    Dr. Sterling; gastric polyps, small hiatal hernia    Excision of chalazion, single - od - right eye Right 6.27/2017    Nasally    Hc arthrocentesis or inject major joint w/o us      Hysterectomy  1996    [facility]    Other      Lap Nissen Fundoplication surgery    Other surgical history  2005,2007,2008    LAPAROSCOPIC LYSIS OF ADHESION    Other surgical history      right foot bunionectomy    Other surgical history  11/14/2014    right superficial anterior peroneal nerve biopsy and right proximal thigh muscle biopsy.    Other surgical history  06/13/2023    Excision and Biopsy of two  perineal cysts    Upper gi endoscopy performed  05/2015    Yag capsulotomy - os - left eye Left 12/19/2018    RJM     Allergies: Allergies[1]  Current Outpatient Medications   Medication Sig Dispense Refill    teraconazole 0.4 % Vaginal Cream Place 1 applicator vaginally nightly. For 7 nights in a row 45 g 0    fluconazole (DIFLUCAN) 150 MG Oral Tab Take 1 tablet (150 mg total)  by mouth As Directed. Take one tablet by mouth today, repeat one tablet by mouth in 3 days 2 tablet 0    pregabalin (LYRICA) 75 MG Oral Cap Take 1 capsule (75 mg total) by mouth 2 (two) times daily. 60 capsule 2    IBUPROFEN 600 MG Oral Tab TAKE 1 TABLET(600 MG) BY MOUTH DAILY AS NEEDED FOR PAIN 90 tablet 1    tiZANidine 2 MG Oral Tab       diazePAM 2 MG Oral Tab Take 1 tablet (2 mg total) by mouth nightly as needed. 20 tablet 0    Omeprazole 40 MG Oral Capsule Delayed Release Take 1 capsule (40 mg total) by mouth daily. Take 1 capsule by mouth daily before breakfast. 90 capsule 3    amLODIPine 10 MG Oral Tab TAKE 1 TABLET BY MOUTH EVERY DAY (Patient taking differently: at bedtime. TAKE 1 TABLET BY MOUTH EVERY DAY) 90 tablet 3    latanoprost 0.005 % Ophthalmic Solution INSTILL 1 DROP IN BOTH EYES EVERY night 3 each 3    rosuvastatin 5 MG Oral Tab Take 1 tablet (5 mg total) by mouth daily.      cholecalciferol 50 MCG (2000 UT) Oral Cap Take 1 capsule (2,000 Units total) by mouth daily.      MAGNESIUM OR Take by mouth.          REVIEW OF SYSTEMS:   Review of Systems   Constitutional: Negative.    HENT: Negative.     Respiratory: Negative.     Cardiovascular: Negative.    Gastrointestinal: Negative.    Genitourinary: Negative.    Skin: Negative.    Neurological: Negative.         PHYSICAL EXAM:   /70   Pulse 73   Ht 5' 8\" (1.727 m)   Wt 200 lb (90.7 kg)   SpO2 98%   BMI 30.41 kg/m²    Physical Exam  Vitals reviewed.   Constitutional:       General: She is not in acute distress.     Appearance: Normal appearance. She is well-developed.   HENT:      Mouth/Throat:      Mouth: Mucous membranes are moist.      Pharynx: Oropharynx is clear.   Cardiovascular:      Rate and Rhythm: Normal rate and regular rhythm.      Heart sounds: Normal heart sounds.   Pulmonary:      Effort: Pulmonary effort is normal.      Breath sounds: Normal breath sounds.   Lymphadenopathy:      Cervical: No cervical adenopathy.   Skin:      General: Skin is warm and dry.   Neurological:      Mental Status: She is alert and oriented to person, place, and time.        LABORATORY DATA:     Previous reaction to anesthesia? No   Known clotting disorder or on blood thinners? No     Cervical/neck:   - Arthritis: no  - Neck pain: no  - Difficulty with ROM: no   - Prior neck injury/surgery: no     Cardiac:  - History of ischemic coronary disease: no  - History of heart failure: no  - Heart attack in past 90 days: no  - Chest pain in last 90 days: no  - History of Arrhythmia: no  - History of stroke or TIA: no  - Diabetes/A1C: no       - Most recent echo/Stress test: Echo done on 6/11/2022      Pulmonary:   - Tobacco use: no  - Apnea/CPAP: moderate KB dx 6/2024, not on treatment, waiting to see pulmonologist   - Asthma/COPD: no   - Difficulty laying flat for extended period of time: no   - Dyspnea/exertional: no     Functional Capacity:   Perform ADLs- eating, dress, toilet? Yes   Walk up flight of steps, hill, walk ground level 3-4mph? Yes   Perform heavy housework- scrubbing floors, move heavy furniture, climb 2 flights of stairs? Yes   Participate in strenuous sports- swimming, singles tennis, football, basketball, ski? No   Walking is highest level of activity and feels well while walking    The 10-year ASCVD risk score (Wilton MALHOTRA, et al., 2019) is: 9.2%    Values used to calculate the score:      Age: 64 years      Sex: Female      Is Non- : Yes      Diabetic: No      Tobacco smoker: No      Systolic Blood Pressure: 132 mmHg      Is BP treated: Yes      HDL Cholesterol: 62 mg/dL      Total Cholesterol: 180 mg/dL    ASSESSMENT AND PLAN:     Assessment:  Encounter Diagnosis   Name Primary?    Preop exam for internal medicine Yes     Reviewed labs from 12/2024 which are acceptable for surgery  Will repeat EKG     Plan   No orders of the defined types were placed in this encounter.    Given the information available in the above notes  the patient may proceed with surgical intervention as long as the benefit outweighs the risk at the time of the procedure. This consult was sent back the referring physician, Calvin Partida APRN           [1]   Allergies  Allergen Reactions    Celecoxib TONGUE SWELLING     Other reaction(s): CELECOXIB    Sulfa Antibiotics TONGUE SWELLING     Other reaction(s): SULFA (SULFONAMIDE ANTIBIOTICS)    Erythromycin PAIN    Celecoxib UNKNOWN    Erythromycin Stearate UNKNOWN    Amoxicillin DIARRHEA

## 2025-01-16 ENCOUNTER — OFFICE VISIT (OUTPATIENT)
Dept: SURGERY | Facility: CLINIC | Age: 65
End: 2025-01-16
Payer: MEDICARE

## 2025-01-16 VITALS — DIASTOLIC BLOOD PRESSURE: 83 MMHG | HEART RATE: 91 BPM | SYSTOLIC BLOOD PRESSURE: 127 MMHG

## 2025-01-16 DIAGNOSIS — N20.0 KIDNEY STONES: Primary | ICD-10-CM

## 2025-01-16 PROCEDURE — 99204 OFFICE O/P NEW MOD 45 MIN: CPT | Performed by: UROLOGY

## 2025-01-16 NOTE — PROGRESS NOTES
SUBJECTIVE:  Kerry Hsu is a 64 year old female who presents for a consultation at the request of, and a copy of this note will be sent to, Bella Henning, for evaluation of kidney stones and renal cyst. She states that the problem is unchanged. Symptoms include noted on recent MRI to have renal cystic disease.  MRI October 15, 2024 demonstrates a simple bilateral renal cysts Bosniak 1.  Follow-up renal ultrasound December 19, 2024 demonstrates questionable right sided nonobstructing calculi x 2 measuring 9 mm in size.  Denies any flank pain.  Has a previous urologic history of intermittent gross hematuria for which I had seen her most recently December 2021.  She underwent a complete workup including urine cytology, CT urogram and office cystoscopy demonstrated no abnormalities.  She denies any flank pain.  No further episodes of gross hematuria or dysuria is reported.. She denies any other complaints.    Allergies: Allergies[1]    History:  Past Medical History:    Abscess of left thigh    Acute meniscal tear of knee    Age-related nuclear cataract of both eyes    Anal sphincter incontinence    Arthritis    Back problem    Chondromalacia    Colon polyps    Degenerative disc disease    Disorder of kidney and ureter    Diverticular disease    Esophageal reflux    Glaucoma    Hammertoe    High blood pressure    High cholesterol    Hyperlipidemia with target low density lipoprotein (LDL) cholesterol less than 130 mg/dL    Insomnia    Kidney lesion    Liver lesion    Neuropathy    Right Foot    Obstructive sleep apnea syndrome    Osteoarthritis    Primary open angle glaucoma of both eyes    Diagnosis of glaucoma OU; Started Latanoprost qhs after abnormal VF and OCT 2/25/16;  6/5/18 consult with Dr. Madeline Chappell-see progress note    Sleep apnea    Small bowel obstruction (HCC)    Tendinitis    Unspecified essential hypertension    Visual impairment    Reraders      Past Surgical History:   Procedure  Laterality Date    Anal sphincterotomy      03/12/2013 Gely    Blepharoplasty anesthesia Bilateral 03/05/2020    Dr Kerwin Bello [facility]     Cataract extraction w/  intraocular lens implant Left 05/07/2018    L PC IOL with Dr. Suresh @ [facility]    Colonoscopy N/A 11/11/2016    Procedure: COLONOSCOPY;  Surgeon: Sabrina Cazares MD;  Location: [facility] ENDOSCOPY    Colonoscopy N/A 09/06/2019    Procedure: COLONOSCOPY;  Surgeon: Edwin Sterling MD;  Location: [facility] ENDOSCOPY    Colonoscopy  09/16/2024    Dr. Sterling; colon polyps, diverticulosis, hemorrhoids    Colonoscopy N/A 9/16/2024    Procedure: COLONOSCOPY;  Surgeon: Edwin Sterling MD;  Location: [facility] ENDOSCOPY    Egd  09/16/2024    Dr. Sterling; gastric polyps, small hiatal hernia    Excision of chalazion, single - od - right eye Right 6.27/2017    Nasally    Hc arthrocentesis or inject major joint w/o us      Hysterectomy  1996    KAI    Other      Lap Nissen Fundoplication surgery    Other surgical history  2005,2007,2008    LAPAROSCOPIC LYSIS OF ADHESION    Other surgical history      right foot bunionectomy    Other surgical history  11/14/2014    right superficial anterior peroneal nerve biopsy and right proximal thigh muscle biopsy.    Other surgical history  06/13/2023    Excision and Biopsy of two  perineal cysts    Upper gi endoscopy performed  05/2015    Yag capsulotomy - os - left eye Left 12/19/2018    RJM      Family History   Problem Relation Age of Onset    Hypertension Father     Hypertension Mother     Hypertension Sister     Cancer Sister         liver cancer    Hypertension Brother     Diabetes Neg     Glaucoma Neg     Macular degeneration Neg     Breast Cancer Neg     Ovarian Cancer Neg       Social History:   Social History     Socioeconomic History    Marital status:    Tobacco Use    Smoking status: Never     Passive exposure: Never    Smokeless tobacco: Never   Vaping Use    Vaping status: Never Used   Substance and Sexual Activity     Alcohol use: No     Comment: None.     Drug use: No    Sexual activity: Yes     Birth control/protection: Hysterectomy   Other Topics Concern    Caffeine Concern Yes     Comment: occasional soda    Exercise Yes    Pt has a pacemaker No    Pt has a defibrillator No    Breast feeding No    Reaction to local anesthetic No    Right Handed Yes   Social History Narrative    Work - banking     Social Drivers of Health      [facility], [facility]    Social Connections    Received from [facility], [facility]    Housing Stability            REVIEW OF SYSTEMS:  RESPIRATORY:  Negative for chest tightness, wheezing, cough, shortness of breath,  sputum production,chest pain or pleurisy.  CARDIOVASCULAR:  Negative for pain or chest discomfort, dizziness or fainting, palpitations, leg swelling, nocturia, or claudication.  GASTROINTESTINAL:  Negative for nausea, vomiting, diarrhea, constipation, heartburn or indigestion, abdominal pains, bloody or tarry stools.  GENERAL: Denies:  weight gain, weight loss, fever, night sweats, bone pain, malaise, and fatigue. Positive for:  none.  All other review of systems reviewed and otherwise negative    OBJECTIVE:  /83 (BP Location: Left arm, Patient Position: Sitting, Cuff Size: adult)   Pulse 91   She appears well, in no apparent distress.  Alert and oriented times three, pleasant and cooperative.  CARDIOVASCULAR:normal apical impulse  RESPIRATORY: no chest wall deformities or tenderness  ABDOMEN: abdomen is soft without significant tenderness, masses, organomegaly or guarding  Skin exam grossly normal  Intact neurologically grossly  ASSESSMENT/PLAN  Encounter Diagnosis   Name Primary?    Kidney stones Yes   Reviewed findings at length with patient.  Recommended:  - CT stone protocol abdomen pelvis.  - Follow-up otherwise in 3 to 4 weeks to review those results.    No orders of the defined  types were placed in this encounter.      Meds This Visit:  Requested Prescriptions      No prescriptions requested or ordered in this encounter       Imaging & Referrals:  CT ABDOMEN+PELVIS KIDNEYSTONE 2D RNDR(NO IV,NO ORAL)(CPT=74176)                        [1]   Allergies  Allergen Reactions    Celecoxib TONGUE SWELLING     Other reaction(s): CELECOXIB    Sulfa Antibiotics TONGUE SWELLING     Other reaction(s): SULFA (SULFONAMIDE ANTIBIOTICS)    Erythromycin PAIN    Celecoxib UNKNOWN    Erythromycin Stearate UNKNOWN    Amoxicillin DIARRHEA

## 2025-01-16 NOTE — H&P (VIEW-ONLY)
SUBJECTIVE:  Kerry Hsu is a 64 year old female who presents for a consultation at the request of, and a copy of this note will be sent to, Bella Henning, for evaluation of kidney stones and renal cyst. She states that the problem is unchanged. Symptoms include noted on recent MRI to have renal cystic disease.  MRI October 15, 2024 demonstrates a simple bilateral renal cysts Bosniak 1.  Follow-up renal ultrasound December 19, 2024 demonstrates questionable right sided nonobstructing calculi x 2 measuring 9 mm in size.  Denies any flank pain.  Has a previous urologic history of intermittent gross hematuria for which I had seen her most recently December 2021.  She underwent a complete workup including urine cytology, CT urogram and office cystoscopy demonstrated no abnormalities.  She denies any flank pain.  No further episodes of gross hematuria or dysuria is reported.. She denies any other complaints.    Allergies: Allergies[1]    History:  Past Medical History:    Abscess of left thigh    Acute meniscal tear of knee    Age-related nuclear cataract of both eyes    Anal sphincter incontinence    Arthritis    Back problem    Chondromalacia    Colon polyps    Degenerative disc disease    Disorder of kidney and ureter    Diverticular disease    Esophageal reflux    Glaucoma    Hammertoe    High blood pressure    High cholesterol    Hyperlipidemia with target low density lipoprotein (LDL) cholesterol less than 130 mg/dL    Insomnia    Kidney lesion    Liver lesion    Neuropathy    Right Foot    Obstructive sleep apnea syndrome    Osteoarthritis    Primary open angle glaucoma of both eyes    Diagnosis of glaucoma OU; Started Latanoprost qhs after abnormal VF and OCT 2/25/16;  6/5/18 consult with Dr. Madeline Chappell-see progress note    Sleep apnea    Small bowel obstruction (HCC)    Tendinitis    Unspecified essential hypertension    Visual impairment    Reraders      Past Surgical History:   Procedure  Laterality Date    Anal sphincterotomy      03/12/2013 G[facility]    Blepharoplasty anesthesia Bilateral 03/05/2020    Dr Kerwin Bello Ophthalmology     Cataract extraction w/  intraocular lens implant Left 05/07/2018    L PC IOL with Dr. Suresh @ [facility]    Colonoscopy N/A 11/11/2016    Procedure: COLONOSCOPY;  Surgeon: Sbarina Cazares MD;  Location: [facility] ENDOSCOPY    Colonoscopy N/A 09/06/2019    Procedure: COLONOSCOPY;  Surgeon: Edwin Sterling MD;  Location: [facility] ENDOSCOPY    Colonoscopy  09/16/2024    Dr. Sterling; colon polyps, diverticulosis, hemorrhoids    Colonoscopy N/A 9/16/2024    Procedure: COLONOSCOPY;  Surgeon: Edwin Sterling MD;  Location: [facility] ENDOSCOPY    Egd  09/16/2024    Dr. Sterling; gastric polyps, small hiatal hernia    Excision of chalazion, single - od - right eye Right 6.27/2017    Nasally    Hc arthrocentesis or inject major joint w/o us      Hysterectomy  1996    KAI    Other      Lap Nissen Fundoplication surgery    Other surgical history  2005,2007,2008    LAPAROSCOPIC LYSIS OF ADHESION    Other surgical history      right foot bunionectomy    Other surgical history  11/14/2014    right superficial anterior peroneal nerve biopsy and right proximal thigh muscle biopsy.    Other surgical history  06/13/2023    Excision and Biopsy of two  perineal cysts    Upper gi endoscopy performed  05/2015    Yag capsulotomy - os - left eye Left 12/19/2018    RJM      Family History   Problem Relation Age of Onset    Hypertension Father     Hypertension Mother     Hypertension Sister     Cancer Sister         liver cancer    Hypertension Brother     Diabetes Neg     Glaucoma Neg     Macular degeneration Neg     Breast Cancer Neg     Ovarian Cancer Neg       Social History:   Social History     Socioeconomic History    Marital status:    Tobacco Use    Smoking status: Never     Passive exposure: Never    Smokeless tobacco: Never   Vaping Use    Vaping status: Never Used   Substance and Sexual Activity     Alcohol use: No     Comment: None.     Drug use: No    Sexual activity: Yes     Birth control/protection: Hysterectomy   Other Topics Concern    Caffeine Concern Yes     Comment: occasional soda    Exercise Yes    Pt has a pacemaker No    Pt has a defibrillator No    Breast feeding No    Reaction to local anesthetic No    Right Handed Yes   Social History Narrative    Work - banking     Social Drivers of Health      Received [facility], [facility]    Social Connections    Received from [facility], [facility]    Housing Stability            REVIEW OF SYSTEMS:  RESPIRATORY:  Negative for chest tightness, wheezing, cough, shortness of breath,  sputum production,chest pain or pleurisy.  CARDIOVASCULAR:  Negative for pain or chest discomfort, dizziness or fainting, palpitations, leg swelling, nocturia, or claudication.  GASTROINTESTINAL:  Negative for nausea, vomiting, diarrhea, constipation, heartburn or indigestion, abdominal pains, bloody or tarry stools.  GENERAL: Denies:  weight gain, weight loss, fever, night sweats, bone pain, malaise, and fatigue. Positive for:  none.  All other review of systems reviewed and otherwise negative    OBJECTIVE:  /83 (BP Location: Left arm, Patient Position: Sitting, Cuff Size: adult)   Pulse 91   She appears well, in no apparent distress.  Alert and oriented times three, pleasant and cooperative.  CARDIOVASCULAR:normal apical impulse  RESPIRATORY: no chest wall deformities or tenderness  ABDOMEN: abdomen is soft without significant tenderness, masses, organomegaly or guarding  Skin exam grossly normal  Intact neurologically grossly  ASSESSMENT/PLAN  Encounter Diagnosis   Name Primary?    Kidney stones Yes   Reviewed findings at length with patient.  Recommended:  - CT stone protocol abdomen pelvis.  - Follow-up otherwise in 3 to 4 weeks to review those results.    No orders of the defined  types were placed in this encounter.      Meds This Visit:  Requested Prescriptions      No prescriptions requested or ordered in this encounter       Imaging & Referrals:  CT ABDOMEN+PELVIS KIDNEYSTONE 2D RNDR(NO IV,NO ORAL)(CPT=74176)                        [1]   Allergies  Allergen Reactions    Celecoxib TONGUE SWELLING     Other reaction(s): CELECOXIB    Sulfa Antibiotics TONGUE SWELLING     Other reaction(s): SULFA (SULFONAMIDE ANTIBIOTICS)    Erythromycin PAIN    Celecoxib UNKNOWN    Erythromycin Stearate UNKNOWN    Amoxicillin DIARRHEA

## 2025-01-23 ENCOUNTER — HOSPITAL ENCOUNTER (OUTPATIENT)
Dept: CT IMAGING | Facility: HOSPITAL | Age: 65
Discharge: HOME | End: 2025-01-23
Attending: UROLOGY
Payer: MEDICARE

## 2025-01-23 ENCOUNTER — HOSPITAL ENCOUNTER (OUTPATIENT)
Dept: MRI IMAGING | Age: 65
Discharge: HOME | End: 2025-01-23
Attending: ORTHOPAEDIC SURGERY
Payer: MEDICARE

## 2025-01-23 DIAGNOSIS — M17.11 PRIMARY OSTEOARTHRITIS OF RIGHT KNEE: ICD-10-CM

## 2025-01-23 DIAGNOSIS — N20.0 KIDNEY STONES: ICD-10-CM

## 2025-01-23 PROCEDURE — 73721 MRI JNT OF LWR EXTRE W/O DYE: CPT | Performed by: ORTHOPAEDIC SURGERY

## 2025-01-23 PROCEDURE — 74176 CT ABD & PELVIS W/O CONTRAST: CPT | Performed by: UROLOGY

## 2025-01-28 ENCOUNTER — ANESTHESIA (OUTPATIENT)
Dept: SURGERY | Facility: HOSPITAL | Age: 65
End: 2025-01-28
Payer: MEDICARE

## 2025-01-28 ENCOUNTER — HOSPITAL ENCOUNTER (OUTPATIENT)
Facility: HOSPITAL | Age: 65
Setting detail: HOSPITAL OUTPATIENT SURGERY
Discharge: HOME | End: 2025-01-28
Attending: ORTHOPAEDIC SURGERY | Admitting: ORTHOPAEDIC SURGERY
Payer: MEDICARE

## 2025-01-28 ENCOUNTER — ANESTHESIA EVENT (OUTPATIENT)
Dept: SURGERY | Facility: HOSPITAL | Age: 65
End: 2025-01-28
Payer: MEDICARE

## 2025-01-28 ENCOUNTER — APPOINTMENT (OUTPATIENT)
Dept: GENERAL RADIOLOGY | Facility: HOSPITAL | Age: 65
End: 2025-01-28
Attending: ORTHOPAEDIC SURGERY
Payer: MEDICARE

## 2025-01-28 VITALS
BODY MASS INDEX: 29.7 KG/M2 | WEIGHT: 196 LBS | RESPIRATION RATE: 16 BRPM | SYSTOLIC BLOOD PRESSURE: 118 MMHG | HEIGHT: 68 IN | DIASTOLIC BLOOD PRESSURE: 53 MMHG | TEMPERATURE: 98 F | HEART RATE: 85 BPM | OXYGEN SATURATION: 98 %

## 2025-01-28 DIAGNOSIS — S73.191A TEAR OF RIGHT ACETABULAR LABRUM, INITIAL ENCOUNTER: Primary | ICD-10-CM

## 2025-01-28 LAB — GLUCOSE BLDC GLUCOMTR-MCNC: 116 MG/DL (ref 70–99)

## 2025-01-28 PROCEDURE — 0MQL4ZZ REPAIR RIGHT HIP BURSA AND LIGAMENT, PERCUTANEOUS ENDOSCOPIC APPROACH: ICD-10-PCS | Performed by: ORTHOPAEDIC SURGERY

## 2025-01-28 PROCEDURE — 76000 FLUOROSCOPY <1 HR PHYS/QHP: CPT | Performed by: ORTHOPAEDIC SURGERY

## 2025-01-28 PROCEDURE — 82962 GLUCOSE BLOOD TEST: CPT

## 2025-01-28 DEVICE — QFIX 1.8 MINI SUTURE ANCHOR
Type: IMPLANTABLE DEVICE | Site: HIP | Status: FUNCTIONAL
Brand: Q-FIX

## 2025-01-28 RX ORDER — ONDANSETRON 2 MG/ML
4 INJECTION INTRAMUSCULAR; INTRAVENOUS EVERY 6 HOURS PRN
Status: DISCONTINUED | OUTPATIENT
Start: 2025-01-28 | End: 2025-01-28

## 2025-01-28 RX ORDER — FAMOTIDINE 10 MG/ML
20 INJECTION, SOLUTION INTRAVENOUS ONCE
Status: COMPLETED | OUTPATIENT
Start: 2025-01-28 | End: 2025-01-28

## 2025-01-28 RX ORDER — MIDAZOLAM HYDROCHLORIDE 1 MG/ML
INJECTION INTRAMUSCULAR; INTRAVENOUS AS NEEDED
Status: DISCONTINUED | OUTPATIENT
Start: 2025-01-28 | End: 2025-01-28 | Stop reason: SURG

## 2025-01-28 RX ORDER — METOCLOPRAMIDE HYDROCHLORIDE 5 MG/ML
10 INJECTION INTRAMUSCULAR; INTRAVENOUS EVERY 8 HOURS PRN
Status: DISCONTINUED | OUTPATIENT
Start: 2025-01-28 | End: 2025-01-28

## 2025-01-28 RX ORDER — HYDROMORPHONE HYDROCHLORIDE 1 MG/ML
0.6 INJECTION, SOLUTION INTRAMUSCULAR; INTRAVENOUS; SUBCUTANEOUS EVERY 5 MIN PRN
Status: DISCONTINUED | OUTPATIENT
Start: 2025-01-28 | End: 2025-01-28

## 2025-01-28 RX ORDER — LIDOCAINE HYDROCHLORIDE 10 MG/ML
INJECTION, SOLUTION EPIDURAL; INFILTRATION; INTRACAUDAL; PERINEURAL AS NEEDED
Status: DISCONTINUED | OUTPATIENT
Start: 2025-01-28 | End: 2025-01-28 | Stop reason: SURG

## 2025-01-28 RX ORDER — NALOXONE HYDROCHLORIDE 0.4 MG/ML
0.08 INJECTION, SOLUTION INTRAMUSCULAR; INTRAVENOUS; SUBCUTANEOUS AS NEEDED
Status: DISCONTINUED | OUTPATIENT
Start: 2025-01-28 | End: 2025-01-28

## 2025-01-28 RX ORDER — PHENYLEPHRINE HCL 10 MG/ML
VIAL (ML) INJECTION AS NEEDED
Status: DISCONTINUED | OUTPATIENT
Start: 2025-01-28 | End: 2025-01-28 | Stop reason: SURG

## 2025-01-28 RX ORDER — HYDROCODONE BITARTRATE AND ACETAMINOPHEN 5; 325 MG/1; MG/1
1 TABLET ORAL ONCE
Status: COMPLETED | OUTPATIENT
Start: 2025-01-28 | End: 2025-01-28

## 2025-01-28 RX ORDER — TRIAMCINOLONE ACETONIDE 40 MG/ML
INJECTION, SUSPENSION INTRA-ARTICULAR; INTRAMUSCULAR AS NEEDED
Status: DISCONTINUED | OUTPATIENT
Start: 2025-01-28 | End: 2025-01-28 | Stop reason: HOSPADM

## 2025-01-28 RX ORDER — METOCLOPRAMIDE HYDROCHLORIDE 5 MG/ML
10 INJECTION INTRAMUSCULAR; INTRAVENOUS ONCE
Status: COMPLETED | OUTPATIENT
Start: 2025-01-28 | End: 2025-01-28

## 2025-01-28 RX ORDER — ONDANSETRON 2 MG/ML
INJECTION INTRAMUSCULAR; INTRAVENOUS AS NEEDED
Status: DISCONTINUED | OUTPATIENT
Start: 2025-01-28 | End: 2025-01-28 | Stop reason: SURG

## 2025-01-28 RX ORDER — SODIUM CHLORIDE, SODIUM LACTATE, POTASSIUM CHLORIDE, CALCIUM CHLORIDE 600; 310; 30; 20 MG/100ML; MG/100ML; MG/100ML; MG/100ML
INJECTION, SOLUTION INTRAVENOUS CONTINUOUS
Status: DISCONTINUED | OUTPATIENT
Start: 2025-01-28 | End: 2025-01-28

## 2025-01-28 RX ORDER — METOCLOPRAMIDE 10 MG/1
10 TABLET ORAL ONCE
Status: COMPLETED | OUTPATIENT
Start: 2025-01-28 | End: 2025-01-28

## 2025-01-28 RX ORDER — FAMOTIDINE 20 MG/1
20 TABLET, FILM COATED ORAL ONCE
Status: COMPLETED | OUTPATIENT
Start: 2025-01-28 | End: 2025-01-28

## 2025-01-28 RX ORDER — DEXAMETHASONE SODIUM PHOSPHATE 4 MG/ML
VIAL (ML) INJECTION AS NEEDED
Status: DISCONTINUED | OUTPATIENT
Start: 2025-01-28 | End: 2025-01-28 | Stop reason: SURG

## 2025-01-28 RX ORDER — HYDROCODONE BITARTRATE AND ACETAMINOPHEN 5; 325 MG/1; MG/1
1 TABLET ORAL EVERY 6 HOURS PRN
Qty: 28 TABLET | Refills: 0 | Status: SHIPPED | OUTPATIENT
Start: 2025-01-28 | End: 2025-02-04

## 2025-01-28 RX ORDER — BUPIVACAINE HYDROCHLORIDE AND EPINEPHRINE 2.5; 5 MG/ML; UG/ML
INJECTION, SOLUTION INFILTRATION; PERINEURAL AS NEEDED
Status: DISCONTINUED | OUTPATIENT
Start: 2025-01-28 | End: 2025-01-28 | Stop reason: HOSPADM

## 2025-01-28 RX ORDER — ROCURONIUM BROMIDE 10 MG/ML
INJECTION, SOLUTION INTRAVENOUS AS NEEDED
Status: DISCONTINUED | OUTPATIENT
Start: 2025-01-28 | End: 2025-01-28 | Stop reason: SURG

## 2025-01-28 RX ORDER — EPHEDRINE SULFATE 50 MG/ML
INJECTION, SOLUTION INTRAVENOUS AS NEEDED
Status: DISCONTINUED | OUTPATIENT
Start: 2025-01-28 | End: 2025-01-28 | Stop reason: SURG

## 2025-01-28 RX ORDER — HYDROMORPHONE HYDROCHLORIDE 1 MG/ML
0.2 INJECTION, SOLUTION INTRAMUSCULAR; INTRAVENOUS; SUBCUTANEOUS EVERY 5 MIN PRN
Status: DISCONTINUED | OUTPATIENT
Start: 2025-01-28 | End: 2025-01-28

## 2025-01-28 RX ORDER — INDOMETHACIN 75 MG/1
75 CAPSULE, EXTENDED RELEASE ORAL
Qty: 21 CAPSULE | Refills: 0 | Status: SHIPPED | OUTPATIENT
Start: 2025-01-28 | End: 2025-02-18

## 2025-01-28 RX ORDER — HYDROMORPHONE HYDROCHLORIDE 1 MG/ML
0.4 INJECTION, SOLUTION INTRAMUSCULAR; INTRAVENOUS; SUBCUTANEOUS EVERY 5 MIN PRN
Status: DISCONTINUED | OUTPATIENT
Start: 2025-01-28 | End: 2025-01-28

## 2025-01-28 RX ORDER — ACETAMINOPHEN 500 MG
1000 TABLET ORAL ONCE
Status: COMPLETED | OUTPATIENT
Start: 2025-01-28 | End: 2025-01-28

## 2025-01-28 RX ADMIN — LIDOCAINE HYDROCHLORIDE 50 MG: 10 INJECTION, SOLUTION EPIDURAL; INFILTRATION; INTRACAUDAL; PERINEURAL at 07:30:00

## 2025-01-28 RX ADMIN — PHENYLEPHRINE HCL 100 MCG: 10 MG/ML VIAL (ML) INJECTION at 07:40:00

## 2025-01-28 RX ADMIN — PHENYLEPHRINE HCL 100 MCG: 10 MG/ML VIAL (ML) INJECTION at 08:05:00

## 2025-01-28 RX ADMIN — ROCURONIUM BROMIDE 50 MG: 10 INJECTION, SOLUTION INTRAVENOUS at 07:33:00

## 2025-01-28 RX ADMIN — MIDAZOLAM HYDROCHLORIDE 1 MG: 1 INJECTION INTRAMUSCULAR; INTRAVENOUS at 07:29:00

## 2025-01-28 RX ADMIN — EPHEDRINE SULFATE 10 MG: 50 INJECTION, SOLUTION INTRAVENOUS at 08:13:00

## 2025-01-28 RX ADMIN — DEXAMETHASONE SODIUM PHOSPHATE 4 MG: 4 MG/ML VIAL (ML) INJECTION at 08:02:00

## 2025-01-28 RX ADMIN — MIDAZOLAM HYDROCHLORIDE 1 MG: 1 INJECTION INTRAMUSCULAR; INTRAVENOUS at 07:26:00

## 2025-01-28 RX ADMIN — ONDANSETRON 4 MG: 2 INJECTION INTRAMUSCULAR; INTRAVENOUS at 08:02:00

## 2025-01-28 NOTE — OPERATIVE REPORT
[facility]    PATIENT'S NAME: AINSLEY HARTLEY   ATTENDING PHYSICIAN: Calvin Daniel MD   OPERATING PHYSICIAN: Calvin Daniel MD   PATIENT ACCOUNT#:   [id number]    LOCATION:  [facility] PACU 4 [facility]  MEDICAL RECORD #:   [id number]       YOB: 1960  ADMISSION DATE:       01/28/2025      OPERATION DATE:  01/28/2025    OPERATIVE REPORT      PREOPERATIVE DIAGNOSIS:  Right hip acetabular labral tear.  POSTOPERATIVE DIAGNOSIS:  Right hip acetabular labral tear.  PROCEDURE:  Right hip arthroscopy with labral repair.    ASSISTANT:  Donna Vargas PA-C.  (Assistant was needed for the leg manipulation, camera operation, cannula management, suture passing, knot tying, and closure).    ANESTHESIA:  General.    ESTIMATED BLOOD LOSS:  5 mL.    BLOOD PRODUCTS:  None.    SPECIMENS:  None.    COMPLICATIONS:  None.    IMPLANTS:  [employer] Q-Fix anchors x2.    INDICATIONS:  The patient is a 64-year-old who has had progressive right hip pain limiting her ambulation, range of motion, and recreational exercise that was not responsive to conservative treatments.  Physical exam findings and imaging were suggestive of femoroacetabular impingement and/or labral tearing.  I recommended hip arthroscopy.  There was not any major significant arthritis noted on imaging.  Risks of surgery include but are not limited to the following:  Bleeding, infection, blood clot, pulmonary embolus, continued hip pain, hip stiffness, anesthetic risk, pudendal nerve palsy, lateral femoral cutaneous nerve injury, heterotopic ossification, re-tearing of the labrum, symptomatic hardware, localized numbness, and if significant arthritis is noted, potential need for further surgery in the form of hip replacement.  All of her questions were answered, and she expressed full understanding of my explanations and plan of care.  Consent was obtained.    FINDINGS:  Extensive degenerative labral tearing of the right hip from  12 to 3 o'clock.  No gross detachment of the labrum from the acetabular rim.  Mild grade 2 Outerbridge changes to both the femoral head and weightbearing acetabular bone with no areas of exposed bone.  Ligamentum teres was intact.      OPERATIVE TECHNIQUE:  The patient was met in the preoperative holding area and the correct site identified with her help.  She was then brought back to the operating room.  General anesthesia was administered.  Bony prominences were padded and limbs secured.  She was brought down over a well-padded large diameter bolster.  Gentle traction was placed on the left lower extremity, more significant traction on the right lower extremity to obtain joint distraction using fluoroscopy.  Right hip was prepped and draped in the usual sterile fashion.  Prior to beginning, a time-out was performed and preoperative antibiotics were infused.  Three portals were created, an anterior peritrochanteric portal, a modified mid-anterior portal, and a distal anterolateral portal, all in similar fashion using fluoroscopy for guidance, a spinal needle for localization, an 11-blade for skin incision, and flexible guidewires to pass instrumentation over.  Capsulotomy was performed to aid in mobilization and visualization within the hip joint using a [place] blade.  From there, diagnostic arthroscopy was performed of the central compartment.  We noted extensive labral fraying and tearing from 12 to 3 o'clock in this right hip.  We lightly debrided soft tissue off the acetabular rim within this interval.  There was some mild articular cartilage damage on both the acetabular and femoral head side indicative of mild arthritis.  Through this anterolateral portal after we debrided the labrum and smoothed it, we placed 2 Q-Fix anchors equally spaced out between 12 and 3 o'clock and then used a penetrating suture passing-type device to pass suture around the labrum in simple fashion and tied 3 alternating post  half-hitches to resecure and reinforce the torn labrum back to the acetabular rim.  There were no areas of gross detachment of the labrum from the acetabular rim.  We further smoothed and debrided the labrum using a radiofrequency wand and shaver.  We then released traction.  The capsule was repaired with a 2-0 nonabsorbable suture using a penetrating suture passing-type device and tying 3 alternating post half-hitches.  Excess suture was cut.  Three incision sites were closed with 3-0 nylon in interrupted fashion and a sterile dressing was applied.  The patient was then awakened, transferred to her cart and taken to the recovery room in stable condition.    Dictated By Calvin Daniel MD  d: 01/28/2025 09:11:19  t: 01/28/2025 10:57:33  [facility] [id number]  [place]/

## 2025-01-28 NOTE — ANESTHESIA PROCEDURE NOTES
Airway  Date/Time: 1/28/2025 7:31 AM  Urgency: Elective      General Information and Staff    Patient location during procedure: OR  Anesthesiologist: Hermelindo Palmer DO  Performed: anesthesiologist   Performed by: Hermelindo Palmer DO  Authorized by: Hermelindo Palmer DO      Indications and Patient Condition  Indications for airway management: anesthesia  Sedation level: deep  Preoxygenated: yes  Patient position: sniffing  Mask difficulty assessment: 1 - vent by mask    Final Airway Details  Final airway type: endotracheal airway      Successful airway: ETT  Cuffed: yes   Successful intubation technique: direct laryngoscopy  Endotracheal tube insertion site: oral  Blade: Jack  Blade size: #3  ETT size (mm): 7.0    Cormack-Lehane Classification: grade IIA - partial view of glottis  Placement verified by: capnometry   Measured from: lips  ETT to lips (cm): 21  Number of attempts at approach: 1

## 2025-01-28 NOTE — DISCHARGE INSTRUCTIONS
Home Care Instructions Following Your Hip Arthroscopy     We hope you were pleased with your care at [facility]. We wish you the best outcome and overall experience with your operation. These instructions will help to minimize your pain and improve the likelihood of a successful result.     Weight bearing status  You will be 30lbs weight bearing on your operative extremity for the next 3 weeks.  You will use crutches for ambulation assistance while partial weight bearing.  Crutches will be provided to you from the hospital.    Bandages (Dressing)  Keep dressing clean and dry.  You may remove your dressing 24 hours after surgery. You will notice small black sutures over your incisions. Place simple bandaids over each incision until you first post-operative appointment.  You can shower once your bandaging is removed, and the incisions can get wet.  However, do not soak in a bathtub, swimming pool, hot tub, etc.    Wound Care  The following instructions will promote proper healing and help to prevent infection.  Our office staff will remove your sutures at your first post-operative appointment:   You can shower and get the incisions wet. Pat dry after your shower.  No submerging incisions under water for 4 weeks after surgery.    Side Effects  Most patients will have some degree of numbness/tingling about the operative extremity and groin/testicular region post-operatively.  Patient's undergoing a hip arthroscopy should expect some numbness/tingling post-operatively.  Patients usually complain of numbness/tinling about the inner thigh and genital region.  This is very normal post-operatively and is due to positioning during the hip arthroscopy surgery.  You can expect numbness/tingling up to 3 months post-operatively. If the numbness/tingling does not improve by 3 months post-operatively, please inform Dr. Daniel and his staff.    Cold Therapy  Cold therapy will help minimize swelling, improve, comfort,  and limit the need for pain medication.  Apply an ice pack 2-3 times daily as needed  The ice bag should never come in direct contact with the skin. Always place a towel in between the ice pack and your skin  Immediately contact Dr. Daniel if you experience excruciating pain not helped by pain medication, burning, blisters, redness, discoloration, or other changes in skin appearance.    Pain Medication: Norco  Norco: Take one tablet every six hours as needed for pain.  Do not exceed 8 (eight) tablets each day and only take more than one tablet every 6 hours very sparingly.  Do not take Norco if you do not have pain.  You may take small doses of NSAIDs (Advil, Aleve) to assist with pain control if the Norco is not giving you maximum pain relief. It is okay to take NSAIDs with Norco.  You should not take Tylenol with Norco, as Norco already contains Tylenol, and too much Tylenol can be very dangerous for your body.    Anti-inflammatory (Indomethacin)  Indomethacin is a strong anti-inflammatory that Dr. Daniel prescribes for patient's after having a hip arthroscopy.   You will take this medication once daily for 20 days.  This medication is to prevent any bony deformities within the hip joint shaved down during surgery from increasing in size.  This medication may cause stomach upset, so it is recommended to take it with food and on a full stomach.  Do not take other anit-inflammatories (ibuprofen, naproxen, etc) while taking indomethacin     Deep Vein Thrombosis (DVT) Prophylaxis  You are at increased risk for developing a DVT or lower extremity blood clot after a lower extremity surgery.   Signs and symptoms of a DVT include calf swelling, calf pain, and calf tenderness to palpation.  If you experience severe calf pain, calf tenderness or calf swelling, you should go to the nearest urgent care or emergency room immediately for a STAT ultrasound to assess for a blood clot.  Keys to prevention of  DVT are  performing ankle pumps (moving your ankle in an up and down motion) multiple times throughout the day for the first 1-2 weeks post-op.    Home Medication  Resume your home medications as instructed.    Diet  Resume your normal diet.    Activity  No strenuous activity. Again, you will be 30lb weight bearing post-operatively for 3 weeks.   Use your crutches while 30lb weight bearing.  Another restriction is NO PURPOSEFUL hip flexion greater than 90 degrees for 6 weeks post-operatively.   You can go up and down the stairs as tolerated. Use common sense.  You cannot return to work, if your work requires strenuous activity with your legs.   You cannot return to any sports or exercises involving your legs until Dr. Daniel gives you permission.    Driving  Do not drive if you are taking pain medication. Do not drive while using crutches.    Follow-up Appointment with Dr. Daniel  You were likely given a postoperative appointment with Dr. Daniel or Donna GARCIA at the time your operation was scheduled.  Call Dr. Daniel's office today to verify your appointment date, time, and location. Call [phone]. You should be seen about 2 weeks after your surgery  At your 1st postoperative office visit: Your wounds will be evaluated, healing assessed, physical therapy will be ordered and scheduled and any additional concerns and instructions will be discussed.    Questions or Concerns  Call Dr. Daniel's office if you experience severe pain not controlled by pain medication, swelling, numbness, tingling, bleeding, fever, or other concerns.   If your call is made after office hours, a physician assistant or nurse practitioner will be available to help you. There is always a surgeon covering Dr. Daniel's patients, if he is unavailable.    Thank you for coming to [facility] for your operation. The nurses and the anesthesiologist try very hard to make sure you receive the best care possible. Your trust in  them is greatly appreciated.    Thanks so much,  Dr. María COON HOME CARE INSTRUCTIONS: POST-OP ANESTHESIA  The medication that you received for sedation or general anesthesia can last up to 24 hours. Your judgment and reflexes may be altered, even if you feel like your normal self.      We Recommend:   Do not drive any motor vehicle or bicycle   Avoid mowing the lawn, playing sports, or working with power tools/applicances (power saws, electric knives or mixers)   That you have someone stay with you on your first night home   Do not drink alcohol or take sleeping pills or tranquilizers   Do not sign legal documents within 24 hours of your procedure   If you had a nerve block for your surgery, take extra care not to put any pressure on your arm or hand for 24 hours    It is normal:  For you to have a sore throat if you had a breathing tube during surgery (while you were asleep!). The sore throat should get better within 48 hours. You can gargle with warm salt water (1/2 tsp in 4 oz warm water) or use a throat lozenge for comfort  To feel muscle aches or soreness especially in the abdomen, chest or neck. The achy feeling should go away in the next 24 hours  To feel weak, sleepy or \"wiped out\". Your should start feeling better in the next 24 hours.   To experience mild discomforts such as sore lip or tongue, headache, cramps, gas pains or a bloated feeling in your abdomen.   To experience mild back pain or soreness for a day or two if you had spinal or epidural anesthesia.   If you had laparoscopic surgery, to feel shoulder pain or discomfort on the day of surgery.   For some patients to have nausea after surgery/anesthesia    If you feel nausea or experience vomiting:   Try to move around less.   Eat less than usual or drink only liquids until the next morning   Nausea should resolve in about 24 hours    If you have a problem when you are at home:    Call your surgeons office

## 2025-01-28 NOTE — ANESTHESIA POSTPROCEDURE EVALUATION
Patient: Kerry Hsu    Procedure Summary       Date: 01/28/25 Room / Location: [facility] MAIN OR  / [facility] MAIN OR    Anesthesia Start: 0723 Anesthesia Stop: 0914    Procedure: Right hip arthroscopy with labral repair (Right: Hip) Diagnosis: (Right hip acetabular labrum tear)    Surgeons: Calvin Daniel MD Anesthesiologist: Hermelindo Palmer DO    Anesthesia Type: general ASA Status: 2            Anesthesia Type: general    Vitals Value Taken Time   /64 01/28/25 0913   Temp 97.8 01/28/25 0916   Pulse 83 01/28/25 0915   Resp 17 01/28/25 0915   SpO2 96 % 01/28/25 0915   Vitals shown include unfiled device data.    [facility] AN Post Evaluation:   Patient Evaluated in PACU  Patient Participation: complete - patient participated  Level of Consciousness: sleepy but conscious  Pain Management: adequate  Airway Patency:patent  Yes    Nausea/Vomiting: none  Cardiovascular Status: acceptable  Respiratory Status: acceptable  Postoperative Hydration acceptable      Hermelindo Palmer DO  1/28/2025 9:16 AM

## 2025-01-28 NOTE — ANESTHESIA PREPROCEDURE EVALUATION
Anesthesia PreOp Note    HPI:     Kerry Hsu is a 64 year old female who presents for preoperative consultation requested by: Calvin Daniel MD    Date of Surgery: 1/28/2025    Procedure(s):  Right hip arthroscopy with labral repair  Indication: Right hip acetabular labrum tear    Relevant Problems   No relevant active problems       NPO:  Last Liquid Consumption Date: 01/27/25  Last Liquid Consumption Time: 2030  Last Solid Consumption Date: 01/27/25  Last Solid Consumption Time: 2030  Last Liquid Consumption Date: 01/27/25          History Review:  Patient Active Problem List    Diagnosis Date Noted    Vitreous opacities 09/13/2024    Kidney lesion 07/25/2024    Liver lesion 07/25/2024    Neutrophilia 01/15/2024    Swelling of extremity 01/02/2024    Venous insufficiency of both lower extremities 12/29/2023    Osteopenia of left hip 02/28/2023    Visual discomfort, bilateral 04/27/2022    Preglaucoma, unspecified, bilateral 04/27/2022    Callus of foot 08/24/2020    Prediabetes 08/24/2020    Nontraumatic complete tear of right rotator cuff 01/11/2020    Leg cramps 01/11/2020    Mechanical ptosis of bilateral eyelids 12/11/2019    Lumbar foraminal stenosis 10/24/2019    Herniation of intervertebral disc between L4 and L5 10/24/2019    DDD (degenerative disc disease), lumbar 10/24/2019    Chronic bilateral low back pain with bilateral sciatica 10/24/2019    Myofascial pain 10/24/2019    Elevated CPK 08/21/2019    Pseudophakia, left eye 12/04/2018    Lumbar radiculopathy 10/31/2018    Cervical stenosis of spinal canal 05/21/2018    Obstructive sleep apnea syndrome 03/21/2018    Age-related cataract 02/21/2018    Primary open angle glaucoma of both eyes, moderate stage 02/10/2018    Ptosis of both eyelids 02/10/2018    Cramps, muscle, general 11/20/2017    Vitamin D deficiency 11/20/2017    Chronic cough     Diverticulosis 11/11/2016    Internal hemorrhoid 11/11/2016    Neuropathy 06/30/2015     Myopathy 06/30/2015    Status post Nissen fundoplication 04/21/2015    GERD (gastroesophageal reflux disease) 04/21/2015    Hand arthritis 09/02/2014    Essential hypertension     Nausea 05/17/2014    Anal sphincter incontinence 04/28/2014    Primary localized osteoarthrosis, lower leg 04/09/2014    Bronchitis 01/21/2014    Microscopic hematuria 12/05/2013    Cervicalgia 11/02/2013    Effusion of lower leg joint 10/21/2013    Congenital cystic kidney disease 09/26/2013    Osteoarthritis of knee 09/18/2013    Disorder of kidney and ureter 09/17/2013    Urinary tract infection 09/17/2013    Gross hematuria 09/17/2013    Otitis media 08/29/2013    Vaginitis and vulvovaginitis 08/29/2013    Pressure ulcer, stage 1 08/18/2013    Acute pharyngitis 06/22/2013    Unspecified vertiginous syndromes and labyrinthine disorders 05/15/2013    Tear of lateral cartilage or meniscus of knee, current 02/27/2013    Incontinence of feces 12/28/2012    Anxiety state 12/15/2012    Otalgia 10/11/2012    Abnormal mammogram 10/03/2012    Arthropathy 07/10/2012    Nonspecific abnormal serum enzyme levels 07/07/2012    Cough 04/20/2012    Adjustment disorder with depressed mood 12/27/2011    Chondromalacia of patella 10/17/2011    Rhabdomyolysis 08/16/2011    Goiter 08/13/2011    Chronic skin ulcer (HCC) 06/13/2011    Sebaceous cyst 06/13/2011    Incisional hernia 06/11/2011    Complete rupture of rotator cuff 04/28/2011    Pain in joint, lower leg 04/22/2011    Insomnia 01/25/2011    Myositis 01/15/2011    Strain of rotator cuff 12/27/2010    Nonspecific abnormal finding in stool contents 12/04/2010    Pain in joint, shoulder region 12/04/2010    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    Chest pain 09/01/2010    Hyperlipidemia 07/30/2010    Malfunct impl card defb 04/19/2010    Bunion 04/19/2010    Low back pain 04/01/2010    Mixed emotional features as adjustment reaction 02/13/2010    Cellulitis 12/09/2009    Dermatitis  11/28/2009    Sleep disturbance 10/02/2009    Tinea corporis 09/24/2009    Hemorrhoids with complication 08/19/2009    Residual hemorrhoidal skin tags 08/12/2009    External hemorrhoids 08/12/2009    Pyogenic granuloma of skin and subcutaneous tissue 08/03/2009    Dyspepsia and disorder of function of stomach 02/02/2009    Benign neoplasm of skin of trunk 01/23/2009    Thoracic and lumbosacral neuritis 01/21/2009    Osteoarthrosis 11/11/2008    Backache 10/24/2008    Malaise and fatigue 07/19/2008    Abdominal pain, generalized 07/19/2008    Shoulder region pain 06/10/2008    Symptoms involving digestive system 02/05/2008    Hemorrhage of gastrointestinal tract 02/05/2008    Irritable colon 01/18/2008    Disorder of bursae and tendons in shoulder region 01/18/2008    Acute sinusitis 11/26/2007    Abdominal pain 10/10/2007    Arthropathy, lower leg 08/29/2007    Sciatica 06/12/2007    Dizziness 03/17/2007    Synovial cyst 01/20/2007    Cellulitis and abscess of toe 01/20/2007    Cramp of limb 06/24/2006    Heartburn 06/06/2006    Reflux esophagitis 06/06/2006    Arthropathy of ankle and foot 04/27/2006    Spasm of muscle 11/23/2005    Acute bronchitis 11/23/2005    Inflammatory disease of breast 11/23/2005    Neuralgia and neuritis 04/29/2005    Other hammer toe(s) (acquired), left foot 04/21/2005    Lateral epicondylitis of elbow 04/12/2005    Symptom associated with female genital organs 03/18/2005    Sprain of nose 03/15/2005    Esophageal reflux 03/15/2005       Past Medical History:    Abscess of left thigh    Acute meniscal tear of knee    Age-related nuclear cataract of both eyes    Anal sphincter incontinence    Arthritis    Back problem    Cataract    left    Chondromalacia    Colon polyps    Degenerative disc disease    Disorder of kidney and ureter    Diverticular disease    Esophageal reflux    Glaucoma    Hammertoe    High blood pressure    High cholesterol    Hyperlipidemia with target low density  lipoprotein (LDL) cholesterol less than 130 mg/dL    Insomnia    Kidney lesion    Liver lesion    Neuropathy    both feet    Obstructive sleep apnea syndrome    Osteoarthritis    Palpitation    Prediabetes    Primary open angle glaucoma of both eyes    Diagnosis of glaucoma OU; Started Latanoprost qhs after abnormal VF and OCT 2/25/16;  6/5/18 consult with Dr. Madeline Chappell-see progress note    Sleep apnea    Small bowel obstruction (HCC)    Tendinitis    Unspecified essential hypertension    Visual impairment    Readers       Past Surgical History:   Procedure Laterality Date    Anal sphincterotomy      03/12/2013 [facility]    Blepharoplasty anesthesia Bilateral 03/05/2020    Dr Kerwin Bello [facility]     Cataract extraction w/  intraocular lens implant Left 05/07/2018    L PC IOL with Dr. Suresh @ [facility] [place]    Colonoscopy N/A 11/11/2016    Procedure: COLONOSCOPY;  Surgeon: Sabrina Cazares MD;  Location: [facility] ENDOSCOPY    Colonoscopy N/A 09/06/2019    Procedure: COLONOSCOPY;  Surgeon: Edwin Sterling MD;  Location: [facility] ENDOSCOPY    Colonoscopy  09/16/2024    Dr. Sterling; colon polyps, diverticulosis, hemorrhoids    Colonoscopy N/A 9/16/2024    Procedure: COLONOSCOPY;  Surgeon: Edwin Sterling MD;  Location: [facility] ENDOSCOPY    Egd  09/16/2024    Dr. Sterling; gastric polyps, small hiatal hernia    Excision of chalazion, single - od - right eye Right 6.27/2017    Nasally    Hc arthrocentesis or inject major joint w/o us      Hysterectomy  1996    KAI    Other      Lap Nissen Fundoplication surgery    Other surgical history  2005,2007,2008    LAPAROSCOPIC LYSIS OF ADHESION    Other surgical history      right foot bunionectomy    Other surgical history  11/14/2014    right superficial anterior peroneal nerve biopsy and right proximal thigh muscle biopsy.    Other surgical history  06/13/2023    Excision and Biopsy of two  perineal cysts    Upper gi endoscopy performed  05/2015    Yag capsulotomy - os - left eye Left  12/19/2018    RJM       Prescriptions Prior to Admission[1]  Current Medications and Prescriptions Ordered in Epic[2]    Allergies[3]    Family History   Problem Relation Age of Onset    Hypertension Father     Hypertension Mother     Hypertension Sister     Cancer Sister         liver cancer    Hypertension Brother     Diabetes Neg     Glaucoma Neg     Macular degeneration Neg     Breast Cancer Neg     Ovarian Cancer Neg      Social History     Socioeconomic History    Marital status:    Tobacco Use    Smoking status: Never     Passive exposure: Never    Smokeless tobacco: Never   Vaping Use    Vaping status: Never Used   Substance and Sexual Activity    Alcohol use: No     Comment: None.     Drug use: No    Sexual activity: Yes     Birth control/protection: Hysterectomy   Other Topics Concern    Caffeine Concern Yes     Comment: occasional soda    Exercise Yes    Pt has a pacemaker No    Pt has a defibrillator No    Breast feeding No    Reaction to local anesthetic No    Right Handed Yes       Available pre-op labs reviewed.  Lab Results   Component Value Date    WBC 11.3 (H) 12/18/2024    RBC 5.44 (H) 12/18/2024    HGB 14.3 12/18/2024    HCT 42.5 12/18/2024    MCV 78.1 (L) 12/18/2024    MCH 26.3 12/18/2024    MCHC 33.6 12/18/2024    RDW 15.0 12/18/2024    .0 12/18/2024     Lab Results   Component Value Date     12/18/2024    K 3.9 12/18/2024     12/18/2024    CO2 30.0 12/18/2024    BUN 11 12/18/2024    CREATSERUM 1.06 (H) 12/18/2024     (H) 12/18/2024    CA 9.6 12/18/2024          Vital Signs:  Body mass index is 29.8 kg/m².   height is 1.727 m (5' 8\") and weight is 88.9 kg (196 lb). Her oral temperature is 97.6 °F (36.4 °C). Her blood pressure is 127/67 and her pulse is 74. Her respiration is 18 and oxygen saturation is 96%.   Vitals:    01/24/25 1154 01/28/25 0620   BP:  127/67   Pulse:  74   Resp:  18   Temp:  97.6 °F (36.4 °C)   TempSrc:  Oral   SpO2:  96%   Weight: 89.8 kg  (198 lb) 88.9 kg (196 lb)   Height: 1.727 m (5' 8\") 1.727 m (5' 8\")        Anesthesia Evaluation      Airway   Mallampati: II  TM distance: >3 FB  Neck ROM: full  Dental    (+) partials    Comment: Upper partials. Removed in pre-op area    Pulmonary - normal exam   (+) sleep apnea  Cardiovascular   (+) hypertension    ROS comment: 2022 Echo:  Study Conclusions   1. Left ventricle: The cavity size was normal. Wall thickness was      normal. Systolic function was normal. The estimated ejection      fraction was 60-65%, by biplane method of disks. Wall motion was      normal; there were no regional wall motion abnormalities. Left      ventricular diastolic function parameters were normal for the      patient's age.   2. Right ventricle: The cavity size was dilated.   3. Right atrium: The atrium was dilated.   4. Tricuspid valve: Moderate regurgitation.       Neuro/Psych    (+)  neuromuscular disease, anxiety/panic attacks,        GI/Hepatic/Renal    (+) GERD    Endo/Other    Abdominal   (-) obese                 Anesthesia Plan:   ASA:  2  Plan:   General  Airway:  ETT  Informed Consent Plan and Risks Discussed With:  Patient  Consent Comment: I have discussed the anesthetic plan, major risks and alternatives with the patient and answered all questions.  Minor and major side effects were discussed with patient, including but not limited to: injury to teeth/gums/lips, aspiration, nausea/vomiting postoperatively, anaphylaxis, heart attack, stroke, post-operative ventilation and death. The patient desires to proceed with surgery and anesthesia as planned. All questions answered.        I have informed Kerry Carrzacamelia Hsu and/or legal guardian or family member of the nature of the anesthetic plan, benefits, risks including possible dental damage if relevant, major complications, and any alternative forms of anesthetic management.   All of the patient's questions were answered to the best of my ability. The patient  desires the anesthetic management as planned.  Hermelindo Palmer,   1/28/2025 6:56 AM  Present on Admission:  **None**           [1]   Medications Prior to Admission   Medication Sig Dispense Refill Last Dose/Taking    pregabalin (LYRICA) 75 MG Oral Cap Take 1 capsule (75 mg total) by mouth 2 (two) times daily. 60 capsule 2 Past Week    IBUPROFEN 600 MG Oral Tab TAKE 1 TABLET(600 MG) BY MOUTH DAILY AS NEEDED FOR PAIN 90 tablet 1 Past Week    Omeprazole 40 MG Oral Capsule Delayed Release Take 1 capsule (40 mg total) by mouth daily. Take 1 capsule by mouth daily before breakfast. 90 capsule 3 1/28/2025 Morning    amLODIPine 10 MG Oral Tab TAKE 1 TABLET BY MOUTH EVERY DAY (Patient taking differently: Take 1 tablet (10 mg total) by mouth at bedtime. TAKE 1 TABLET BY MOUTH EVERY DAY) 90 tablet 3 1/27/2025 Bedtime    latanoprost 0.005 % Ophthalmic Solution INSTILL 1 DROP IN BOTH EYES EVERY night 3 each 3 1/27/2025 Bedtime    rosuvastatin 5 MG Oral Tab Take 1 tablet (5 mg total) by mouth nightly.   1/27/2025 Bedtime    cholecalciferol 50 MCG (2000 UT) Oral Cap Take 1 capsule (2,000 Units total) by mouth daily.   Past Week    MAGNESIUM OR Take by mouth daily.   Past Week   [2]   Current Facility-Administered Medications Ordered in Epic   Medication Dose Route Frequency Provider Last Rate Last Admin    lactated ringers infusion   Intravenous Continuous Calvin Daniel MD        ceFAZolin (Ancef) 2g in 10mL IV syringe premix  2 g Intravenous Once Calvin Daniel MD         No current [facility]-ordered outpatient medications on file.   [3]   Allergies  Allergen Reactions    Celecoxib TONGUE SWELLING     Other reaction(s): CELECOXIB    Sulfa Antibiotics TONGUE SWELLING     Other reaction(s): SULFA (SULFONAMIDE ANTIBIOTICS)    Erythromycin PAIN    Erythromycin Stearate PAIN    Amoxicillin DIARRHEA

## 2025-01-28 NOTE — INTERVAL H&P NOTE
Pre-op Diagnosis: Right hip acetabular labrum tear    The above referenced H&P was reviewed by Calvin Daniel MD on 1/28/2025, the patient was examined and no significant changes have occurred in the patient's condition since the H&P was performed.  I discussed with the patient and/or legal representative the potential benefits, risks and side effects of this procedure; the likelihood of the patient achieving goals; and potential problems that might occur during recuperation.  I discussed reasonable alternatives to the procedure, including risks, benefits and side effects related to the alternatives and risks related to not receiving this procedure.  We will proceed with procedure as planned.

## 2025-01-28 NOTE — BRIEF OP NOTE
Pre-Operative Diagnosis: Right hip acetabular labrum tear     Post-Operative Diagnosis: Right hip acetabular labrum tear      Procedure Performed:   Right hip arthroscopy with labral repair    Surgeons and Role:     * Calvin Daniel MD - Primary    Assistant(s):  PA: Donna Vargas PA-C     Surgical Findings: as dictated     Specimen: none     Estimated Blood Loss: 5mL        Calvin Daniel MD  1/28/2025  9:05 AM

## 2025-02-03 ENCOUNTER — PATIENT MESSAGE (OUTPATIENT)
Dept: INTERNAL MEDICINE CLINIC | Facility: CLINIC | Age: 65
End: 2025-02-03

## 2025-02-04 ENCOUNTER — NURSE TRIAGE (OUTPATIENT)
Dept: INTERNAL MEDICINE CLINIC | Facility: CLINIC | Age: 65
End: 2025-02-04

## 2025-02-04 NOTE — TELEPHONE ENCOUNTER
Action Requested: Summary for Provider     []  Critical Lab, Recommendations Needed  [] Need Additional Advice  []   FYI    []   Need Orders  [] Need Medications Sent to Pharmacy  []  Other     SUMMARY: c/o neuropathy flaring up, having increased numbness and tingling in her feet and legs since increasing her atorvastatin dose. The patient has an appointment with neurology on 02/07, she will follow up after that visit. Advised to call back if her symptoms get worse.     Reason for call: Neuropathy  Onset: two weeks    The patient states her neuropathy has been flaring up and is effecting her sleep, she was informed that her rosuvastatin could make her symptoms worse. Her dose was recently increased from 5 MG every other day to 10 MG daily - she just started that at the end of January and her symptoms began to flare up after that. She is having increased tingling, numbness in her legs and feet. She is able to walk and stand, no falling. Advised to keep an eye on her feet for any wounds.  The patient has an appointment with her neurologist on Friday 02/07, she is going to see them and then follow up as needed with  based on the results of that visit. Advised to call back if her symptoms get worse.     Reason for Disposition   Numbness or tingling in one or both feet is a chronic symptom (recurrent or ongoing problem lasting > 4 weeks)    Protocols used: Neurologic Deficit-A-OH    
Yes

## 2025-02-07 ENCOUNTER — OFFICE VISIT (OUTPATIENT)
Dept: NEUROLOGY | Facility: CLINIC | Age: 65
End: 2025-02-07
Payer: MEDICARE

## 2025-02-07 DIAGNOSIS — M62.838 MUSCLE SPASM: ICD-10-CM

## 2025-02-07 DIAGNOSIS — G25.81 RESTLESS LEG SYNDROME: Primary | ICD-10-CM

## 2025-02-07 DIAGNOSIS — G25.81 RLS (RESTLESS LEGS SYNDROME): ICD-10-CM

## 2025-02-07 DIAGNOSIS — E61.1 LOW SERUM IRON: Primary | ICD-10-CM

## 2025-02-07 DIAGNOSIS — G62.9 NEUROPATHY: ICD-10-CM

## 2025-02-07 PROBLEM — M75.122 NONTRAUMATIC COMPLETE TEAR OF LEFT ROTATOR CUFF: Status: ACTIVE | Noted: 2023-07-28

## 2025-02-07 PROBLEM — E78.5 HYPERLIPIDEMIA WITH TARGET LOW DENSITY LIPOPROTEIN (LDL) CHOLESTEROL LESS THAN 130 MG/DL: Status: ACTIVE | Noted: 2023-07-28

## 2025-02-07 RX ORDER — ROSUVASTATIN CALCIUM 10 MG/1
10 TABLET, COATED ORAL EVERY EVENING
COMMUNITY
Start: 2025-01-24

## 2025-02-07 NOTE — PATIENT INSTRUCTIONS
Take 150mg (2 capsules) of pregabalin nightly to see if that helps. Take it 1 hour before you go lie down to sleep.  Treating your sleep apnea will help your pain at night.  Try the above strategies.  Follow up with Dr. Blanco about iron replacement

## 2025-02-07 NOTE — PROGRESS NOTES
[facility]  [address] [place] [address], [address]  [facility] [address]  [phone]        Neurology Clinic Follow Up Note    Chief Complaint:  Neurologic Problem (LOV 12/4/2024 Seen for Restless leg syndrome. /Patient here today 2 month follow up for test orders/results of labs and medication management . Patient c/o mary numbness on feet, nerve pain more on the right leg with hot/cold sensation, tingling, since 1 year with worsening./Denies dizziness or nausea, no loss of balance. )      HPI:   Kerry Hsu is a 64 year old  RH woman w/ a pmhx of oa, DDD, GERD, glaucoma, HTN, HLD,  KB not on CPAP ,  hx of SBO , and length dependent symmetric polyneuropathy who presents in follow up  for  spasms/cramps ongoing for 10 yrs.     Getting worse  Afterwards she has bruising in the site that spasms  Last 15 minutes.  Has intense pain with spasms.  No diaphoresis.  No tachycardia.    Reports she can \"turn a certain way and will get cramps throughout her body.\" She will be sore in those areas for days.    Reports she can't walk when she has an attack.   She has an attack 1x or 2x a week  Previously it was daily over the last year it improved.  Its better now but she still gets them. Since 2014 there have been periods when it spontaneously improves.    Once it starts it will spread to other extremities and locations.  Her left arm will cramp, swell, and then become red. Then it will be sore for days after. \"Like it is inflamed.\"  Never gets a cramp in her right arm.  Sites involved:  Left arm  Both hands  Lower abdomen  Low back and thoracic spine  Both legs and both feet    Spares her chesst and right arm. Spares neck and face.    When she has a cramp in one leg it seems like it can tigger the contralateral leg.    She has had a muscle biopsy in the past.    Failed medications/prior medications:  1. Baclofen  2. Flexeril  3.tizanidine   4. Cymbalta for neuropathic pain - failed in 2017 abd again in  2024; was on 20, 30m 40 and 60mg  5. Lyrica 75mg bid - currently on; not taking e daytime dose b/c of sedation  6. Gabapentin prn - taking as needed currently. Also causes excessive daytime sedation.  7.       Review and summation of prior records  Prior neurology note from 2015 \"Ms. Hsu presents to clinic. She has cramps throughout her body. These are more like holger horses in her cla,f but throughout the both. It is in her arms, back legs and thighs. She just had a nerve and muscle biopsy done in November 14 which had difficulty healing and had infection. She had to go for antibiotic treatmtents. These were reportedly normal. But her Ck levels were elevated. Her cramping has been for around two years or so. It has been worsening during that time. Often in the morning and wakes up from sleep but it can be in the afternoon as well. They last for 10 -15 minutes and go away. Her CK in October was 600 and she had a muscle biopsy in the right thigh and nerve in the right leg in the sural. These were reportedly normal. She had a EMG as well, which was reportedly normal.. This was November of 2013. She has had no MRIs. She was started with some pain medication, which did not help. She was tried on Gabapentin which did not help. Aldolase did not help either. Her hands and feet feel like they are sleeping all the time, She also feels her right foot is more numb than her left since the biopsy. The Pathology shows rare rounded deposits in the basement membrane consistent with obliterated capillary vessels. Otherwise it was within normal limits and showed no evidence of active inflammatory myopathy \"      02/07/25 Interval History/Subjective :   Here in follow up cramps/muscle spasms/rls.  Her symptoms are  worse at night. She has severe burning pain.   R>L is worse.  If she does not take anything     12/04/24 Interval History/Subjective :   Here in follow up cramps/muscle spasms.  She is taking the Lyrica prn.  If she  takes it scheduled it makes her drowsy.  For instance, her bicep will spasm and she will have residual pain for days.   Reviewed her EMG.  She has restless leg syndrome involving her right leg.      ROS: Pertinent positive and negatives per HPI.  All others were reviewed and negative.     Medications:     Current Outpatient Medications   Medication Instructions    amLODIPine 10 MG Oral Tab TAKE 1 TABLET BY MOUTH EVERY DAY    cholecalciferol (VITAMIN D3) 2,000 Units, Daily    indomethacin ER (INDOCIN SR) 75 mg, Oral, Daily with food    latanoprost 0.005 % Ophthalmic Solution INSTILL 1 DROP IN BOTH EYES EVERY night    MAGNESIUM OR Daily    Omeprazole 40 mg, Oral, Daily, Take 1 capsule by mouth daily before breakfast.    pregabalin (LYRICA) 75 mg, Oral, 2 times daily    rosuvastatin (CRESTOR) 5 mg, Nightly    rosuvastatin (CRESTOR) 10 mg, Every evening         Reviewed and assessed      Objective:  Last vitals and weight :  There is no height or weight on file to calculate BMI.   There were no vitals filed for this visit.   not currently breastfeeding.  There were no vitals taken for this visit.  Exam:  - General: appears stated age and no distress     - Pulmonary: Normal excursion of the chest.  No signs of respiratory distress.  Neurologic Exam  - Mental Status: Alert and attentive. .  Speech is spontaneous, fluent, and prosodic. Comprehension and repetition intact. Phrase length and rate are normal. No paraphasic errors, neologisms, anomia, acalculia, apraxia, anosognosia, or R/L confusion.   - Cranial Nerves: No gaze preference. Visual fields:normal  Pupils are 4mm briskly constricting to 3mm and equally round and reactive to light  in a well lit room. No rAPD. EOMI. No nystagmus. No ptosis. V1-V3 intact B/L to light touch.No pathological facial asymmetry. No flattening of the nasolabial fold. .  Hearing grossly intact.  Tongue midline. No atrophy or fasiculations of the tongue noted. Palate and uvula elevate  symmetrically.  Shoulder shrug symmetric.     - Motor:  normal tone/bulk. No interosseous wasting. No flattening of hypothenar eminences.   Motor Strength    Pronator drift: No pronator drift   Arm Rolling: No orbiting.   Finger Taps: Finger taps are symmetric in rate and amplitude.    Rapid movements: symmetric. No fatiguing.   Right Left     Motor Strength   Deltoids 5 5  Triceps 5 5  Biceps 5 5   5 5   Hip Flexors 5 5   Knee extensors 5 5  Knee flexors 5 5      Foot Taps:    Asterixis: No asterixis noted.   Tremor:       Reflexes:    C5 C6 C7  L4 S1   R 2+ 2+  2+ 2+   L 2+ 2+  2+ 2+    Frontal release signs:Not assessed.    Jaw Jerk:    Flor's sign:absent   Nonsustained clonus: Absent   Sustained clonus: Absent   - Sensory:   Light touch: normal  Temperature: gradient loss  Vibration:  gradient loss      - Cerebellum: No truncal ataxia. No titubations. No dysmetria, no dysdiadochokinesis. No rebound.      - Plantar response: flexor bilaterally       Most recent lab results:        Component      Latest Ref Rng 8/29/2024   NMDA-R Antibody      Negative  Negative    Anti-Yo Ab      Negative  Negative    ITPR1 Antibody      Negative  Negative    LGI1 Antibody, Cell-based IFA      Negative  Negative    Aquaporin 4 Antibody      Negative  Negative    CASPR2 Antibody,Cell-based IFA      Negative  Negative    Zic4 Antibody      Negative  Negative    mGluR1 Antibody      Negative  Negative    AMPAR1 AB, CBA      Negative  Negative    CRMP-5 IgG      Negative  Negative    DNER Antibody      Negative  Negative    Amphiphysin Antibody      Negative  Negative    ANTI GAD65 INTERPRETATION      Negative  Negative    ADRIANA-B-R Antibody      Negative  Negative    Anti-Ri Ab      Negative  Negative    Antineruonal nuclear Ab Type 3      Negative  Negative    Ma2/Ta Antibody      Negative  Negative    IgLON5 Antibody      Negative  Negative    Purkinje Cell Cyto Ab Type 2      Negative  Negative    Purkinje Cell Cyto Ab  Type 2      Negative  Negative    AGNA-1      Negative  Negative    VGCC ANTIBODY      0.0 - 30.0 pmol/L <1.0    DPPX Antibody      Negative  Negative    INTERP      Negative  Negative    AMPAR2 AB, CBA      Negative  Negative    Anti-Hu Ab      Negative  Negative           Recent Results (from the past 16 weeks)   Iron And Tibc    Collection Time: 12/04/24 11:11 AM   Result Value Ref Range    Iron 54 50 - 170 ug/dL    Transferrin 239 (L) 250 - 380 mg/dL    Total Iron Binding Capacity 356 250 - 425 ug/dL    % Saturation 15 15 - 50 %   Ferritin    Collection Time: 12/04/24 11:11 AM   Result Value Ref Range    Ferritin 223 50 - 306 ng/mL   Vaginitis Vaginosis PCR Panel    Collection Time: 12/16/24 10:52 AM    Specimen: Vaginal; Other   Result Value Ref Range    Bacterial Vaginosis Negative Negative    Candida group Positive (A) Negative    Nakaseomyces glabrata (Candida glabrata) Positive (A) Negative    Pichia kudriavzevii (Candida krusei) Negative Negative    Trichomonas vaginalis Negative Negative   Comp Metabolic Panel (14)    Collection Time: 12/18/24  8:07 AM   Result Value Ref Range    Glucose 121 (H) 70 - 99 mg/dL    Sodium 143 136 - 145 mmol/L    Potassium 3.9 3.5 - 5.1 mmol/L    Chloride 108 98 - 112 mmol/L    CO2 30.0 21.0 - 32.0 mmol/L    Anion Gap 5 0 - 18 mmol/L    BUN 11 9 - 23 mg/dL    Creatinine 1.06 (H) 0.55 - 1.02 mg/dL    BUN/CREA Ratio 10.4 10.0 - 20.0    Calcium, Total 9.6 8.7 - 10.4 mg/dL    Calculated Osmolality 297 (H) 275 - 295 mOsm/kg    eGFR-Cr 58 (L) >=60 mL/min/1.73m2    ALT 17 10 - 49 U/L    AST 17 <34 U/L    Alkaline Phosphatase 128 55 - 142 U/L    Bilirubin, Total 0.7 0.2 - 1.1 mg/dL    Total Protein 7.2 5.7 - 8.2 g/dL    Albumin 4.5 3.2 - 4.8 g/dL    Globulin  2.7 2.0 - 3.5 g/dL    A/G Ratio 1.7 1.0 - 2.0    Patient Fasting for CMP? Yes    Lipid Panel    Collection Time: 12/18/24  8:07 AM   Result Value Ref Range    Cholesterol, Total 180 <200 mg/dL    HDL Cholesterol 62 (H) 40 - 59  mg/dL    Triglycerides 141 30 - 149 mg/dL    LDL Cholesterol 94 <100 mg/dL    VLDL 23 0 - 30 mg/dL    Non HDL Chol 118 <130 mg/dL    Patient Fasting for Lipid? Yes    TSH W Reflex To Free T4    Collection Time: 12/18/24  8:07 AM   Result Value Ref Range    TSH 1.511 0.550 - 4.780 uIU/mL   CBC, Platelet; No Differential    Collection Time: 12/18/24  8:07 AM   Result Value Ref Range    WBC 11.3 (H) 4.0 - 11.0 x10(3) uL    RBC 5.44 (H) 3.80 - 5.30 x10(6)uL    HGB 14.3 12.0 - 16.0 g/dL    HCT 42.5 35.0 - 48.0 %    MCV 78.1 (L) 80.0 - 100.0 fL    MCH 26.3 26.0 - 34.0 pg    MCHC 33.6 31.0 - 37.0 g/dL    RDW 15.0 11.0 - 15.0 %    RDW-SD 41.9 35.1 - 46.3 fL    .0 150.0 - 450.0 10(3)uL   EKG 12 Lead    Collection Time: 01/10/25 10:02 AM   Result Value Ref Range    Ventricular rate 63 BPM    Atrial rate 63 BPM    P-R Interval 158 ms    QRS Duration 78 ms    Q-T Interval 412 ms    QTC Calculation (Bezet) 421 ms    P Axis 49 degrees    R Axis 44 degrees    T Axis 58 degrees   POCT Glucose    Collection Time: 01/28/25  7:09 AM   Result Value Ref Range    POC Glucose  116 (H) 70 - 99 mg/dL           Reviewed and assessed    Diagnostic studies:  Performed an independent visualization of  mri brain from 2023  Imaging revealed:  agree w/ read    EMG  There is electrodiagnostic evidence of a primarily axonal length dependent symmetric polyneuropathy.     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     There is no electrodiagnostic evidence of another mononeuropathy, plexopathy, inflammatory/necrotizing myopathy or cervical/lumbar radiculopathy affecting either of lower extremities.     CLINICAL CORRELATION:     These abnormal findings are consistent with the patient's clinical complaints of numbness but not necessarily of the cramps on peripheral nerve or muscle basis.       Assessment      The medications she  was on the in the past that were  sedating are the best choice to tx cramps. She has cramps d/t her neuropathy. She does not need a refill on the tizanidine.     Iron replacement is indicated in patients with RLS if transferrin saturation is less than 45% and the serum ferritin level is less than 300 ng/mL (to minimize iron overload risk).  Her ferritin is 223 and her percent saturation is less than 45%.     Ideally she would be on a medication that would not cause such drowsiness during the day. Considered Cymbalta but that could make restless leg worse. Also she was on it in the past. Considered a TCA but it will make her  KB worse.  She will see sleep medicine in April. Treating her KB will help her neuropathic pain/RLS.       Asked her PCP to kindly start pt on iron replacement. Anticipate that will also help. Recommend she go up on lyrica to 150mg at bedtime. Lastly discussed nonpharmacological interventions.            Plan   1. Restless leg syndrome    2. Muscle spasm    3. Neuropathy                  This document is not intended to support charting by exception.  Sections left blank in a completed note should be presumed not to have been done.      Disclaimer:   This record was dictated using  Dragon software. There may be errors due to voice recognition problems that were not realized and corrected during the completion of the note.             Thank you for allowing me to participate in the care of your patient.    Aaron Caro DO  02/07/25

## 2025-02-10 ENCOUNTER — TELEPHONE (OUTPATIENT)
Dept: INTERNAL MEDICINE CLINIC | Facility: CLINIC | Age: 65
End: 2025-02-10

## 2025-02-10 ENCOUNTER — TELEPHONE (OUTPATIENT)
Age: 65
End: 2025-02-10

## 2025-02-10 NOTE — TELEPHONE ENCOUNTER
----- Message from Aracelis IKE sent at 2/10/2025 10:49 AM CST -----  Regarding: FW: iron replacement for rls    ----- Message -----  From: Bella Rogers MD  Sent: 2/7/2025   8:42 PM CST  To: Aaron Caro DO; Jeison Rivera DO; #  Subject: RE: iron replacement for rls                     She has seen [facility] in the past so I will refer her back to dr weile [facility] usually helps with iron infusions which also helps with insurance coverage   RN, pls let pt know I have [facility] --  Referral placed   TY  ----- Message -----  From: Aaron Caro DO  Sent: 2/7/2025  11:22 AM CST  To: Bella Rogers MD  Subject: iron replacement for rls                         Hi,   She would benefit from iron replacement but I am not knowledgeable enough to know the right dosage, etc. Could you please start her on some iron replacement for her RLS?  Thank you

## 2025-02-10 NOTE — TELEPHONE ENCOUNTER
Patient calling to review Dr Rogers's recommendations again,verified name and date of birth.  Reviewed results and recommendations from Dr Rogers's 2/10/25 note below that a Hematology referral with Dr Rivera is recommended for iron infusions.   Patient verbalizes understanding and agrees to plan of care..  Office number provided and patient transferred to schedule an appointment.

## 2025-02-10 NOTE — TELEPHONE ENCOUNTER
Patient calling to schedule infusion appt. Her PCP is recommend she start them again.       Please contact patient.

## 2025-02-10 NOTE — TELEPHONE ENCOUNTER
Called patient (name and  verified) and instructed on providers message below. Patient verbalized understanding and agrees to plan.    Referred to Provider Information:  Provider Address Phone   Jeison Rivera  [address]  [facility] [address] [phone]

## 2025-02-17 ENCOUNTER — APPOINTMENT (OUTPATIENT)
Age: 65
End: 2025-02-17
Attending: INTERNAL MEDICINE
Payer: MEDICARE

## 2025-02-17 ENCOUNTER — OFFICE VISIT (OUTPATIENT)
Age: 65
End: 2025-02-17
Attending: STUDENT IN AN ORGANIZED HEALTH CARE EDUCATION/TRAINING PROGRAM
Payer: MEDICARE

## 2025-02-17 ENCOUNTER — LAB ENCOUNTER (OUTPATIENT)
Dept: LAB | Facility: HOSPITAL | Age: 65
End: 2025-02-17
Attending: STUDENT IN AN ORGANIZED HEALTH CARE EDUCATION/TRAINING PROGRAM
Payer: MEDICARE

## 2025-02-17 VITALS
HEIGHT: 68 IN | WEIGHT: 200 LBS | BODY MASS INDEX: 30.31 KG/M2 | OXYGEN SATURATION: 98 % | RESPIRATION RATE: 18 BRPM | TEMPERATURE: 98 F | DIASTOLIC BLOOD PRESSURE: 83 MMHG | SYSTOLIC BLOOD PRESSURE: 143 MMHG | HEART RATE: 82 BPM

## 2025-02-17 DIAGNOSIS — R79.9 ABNORMAL FINDING OF BLOOD CHEMISTRY, UNSPECIFIED: ICD-10-CM

## 2025-02-17 DIAGNOSIS — G62.9 NEUROPATHY: ICD-10-CM

## 2025-02-17 DIAGNOSIS — D72.829 LEUKOCYTOSIS, UNSPECIFIED TYPE: ICD-10-CM

## 2025-02-17 DIAGNOSIS — D72.829 LEUKOCYTOSIS, UNSPECIFIED TYPE: Primary | ICD-10-CM

## 2025-02-17 DIAGNOSIS — D72.828 NEUTROPHILIA: ICD-10-CM

## 2025-02-17 LAB
ALBUMIN SERPL-MCNC: 4.7 G/DL (ref 3.2–4.8)
ALBUMIN/GLOB SERPL: 1.7 {RATIO} (ref 1–2)
ALP LIVER SERPL-CCNC: 136 U/L
ALT SERPL-CCNC: 33 U/L
ANION GAP SERPL CALC-SCNC: 6 MMOL/L (ref 0–18)
AST SERPL-CCNC: 25 U/L (ref ?–34)
BASOPHILS # BLD AUTO: 0.05 X10(3) UL (ref 0–0.2)
BASOPHILS NFR BLD AUTO: 0.6 %
BILIRUB SERPL-MCNC: 0.6 MG/DL (ref 0.2–1.1)
BUN BLD-MCNC: 11 MG/DL (ref 9–23)
BUN/CREAT SERPL: 11 (ref 10–20)
CALCIUM BLD-MCNC: 9.4 MG/DL (ref 8.7–10.4)
CHLORIDE SERPL-SCNC: 110 MMOL/L (ref 98–112)
CO2 SERPL-SCNC: 28 MMOL/L (ref 21–32)
CREAT BLD-MCNC: 1 MG/DL
DEPRECATED HBV CORE AB SER IA-ACNC: 225 NG/ML
DEPRECATED RDW RBC AUTO: 41.7 FL (ref 35.1–46.3)
EGFRCR SERPLBLD CKD-EPI 2021: 62 ML/MIN/1.73M2 (ref 60–?)
EOSINOPHIL # BLD AUTO: 0.31 X10(3) UL (ref 0–0.7)
EOSINOPHIL NFR BLD AUTO: 3.9 %
ERYTHROCYTE [DISTWIDTH] IN BLOOD BY AUTOMATED COUNT: 14.4 % (ref 11–15)
FASTING STATUS PATIENT QL REPORTED: NO
FOLATE SERPL-MCNC: 11.4 NG/ML (ref 5.4–?)
GLOBULIN PLAS-MCNC: 2.7 G/DL (ref 2–3.5)
GLUCOSE BLD-MCNC: 115 MG/DL (ref 70–99)
HCT VFR BLD AUTO: 44.5 %
HGB BLD-MCNC: 14.6 G/DL
IGA SERPL-MCNC: 291.1 MG/DL (ref 40–350)
IGM SERPL-MCNC: 47.2 MG/DL (ref 50–300)
IMM GRANULOCYTES # BLD AUTO: 0.02 X10(3) UL (ref 0–1)
IMM GRANULOCYTES NFR BLD: 0.3 %
IMMUNOGLOBULIN PNL SER-MCNC: 1089 MG/DL (ref 650–1600)
IRON SATN MFR SERPL: 29 %
IRON SERPL-MCNC: 93 UG/DL
LYMPHOCYTES # BLD AUTO: 1.98 X10(3) UL (ref 1–4)
LYMPHOCYTES NFR BLD AUTO: 24.9 %
MCH RBC QN AUTO: 26.4 PG (ref 26–34)
MCHC RBC AUTO-ENTMCNC: 32.8 G/DL (ref 31–37)
MCV RBC AUTO: 80.5 FL
MONOCYTES # BLD AUTO: 0.53 X10(3) UL (ref 0.1–1)
MONOCYTES NFR BLD AUTO: 6.7 %
NEUTROPHILS # BLD AUTO: 5.06 X10 (3) UL (ref 1.5–7.7)
NEUTROPHILS # BLD AUTO: 5.06 X10(3) UL (ref 1.5–7.7)
NEUTROPHILS NFR BLD AUTO: 63.6 %
OSMOLALITY SERPL CALC.SUM OF ELEC: 298 MOSM/KG (ref 275–295)
PLATELET # BLD AUTO: 384 10(3)UL (ref 150–450)
POTASSIUM SERPL-SCNC: 3.7 MMOL/L (ref 3.5–5.1)
PROT SERPL-MCNC: 7.4 G/DL (ref 5.7–8.2)
RBC # BLD AUTO: 5.53 X10(6)UL
SODIUM SERPL-SCNC: 144 MMOL/L (ref 136–145)
TOTAL IRON BINDING CAPACITY: 324 UG/DL (ref 250–425)
TRANSFERRIN SERPL-MCNC: 261 MG/DL (ref 250–380)
VIT B12 SERPL-MCNC: 404 PG/ML (ref 211–911)
WBC # BLD AUTO: 8 X10(3) UL (ref 4–11)

## 2025-02-17 PROCEDURE — 86334 IMMUNOFIX E-PHORESIS SERUM: CPT

## 2025-02-17 PROCEDURE — 82728 ASSAY OF FERRITIN: CPT

## 2025-02-17 PROCEDURE — 83540 ASSAY OF IRON: CPT

## 2025-02-17 PROCEDURE — 36415 COLL VENOUS BLD VENIPUNCTURE: CPT

## 2025-02-17 PROCEDURE — 82746 ASSAY OF FOLIC ACID SERUM: CPT

## 2025-02-17 PROCEDURE — 82784 ASSAY IGA/IGD/IGG/IGM EACH: CPT

## 2025-02-17 PROCEDURE — 85025 COMPLETE CBC W/AUTO DIFF WBC: CPT

## 2025-02-17 PROCEDURE — 83521 IG LIGHT CHAINS FREE EACH: CPT

## 2025-02-17 PROCEDURE — 84165 PROTEIN E-PHORESIS SERUM: CPT

## 2025-02-17 PROCEDURE — 80053 COMPREHEN METABOLIC PANEL: CPT

## 2025-02-17 PROCEDURE — 82607 VITAMIN B-12: CPT

## 2025-02-17 PROCEDURE — 84466 ASSAY OF TRANSFERRIN: CPT

## 2025-02-18 LAB
ALBUMIN SERPL ELPH-MCNC: 4.33 G/DL (ref 3.75–5.21)
ALBUMIN/GLOB SERPL: 1.51 {RATIO} (ref 1–2)
ALPHA1 GLOB SERPL ELPH-MCNC: 0.31 G/DL (ref 0.19–0.46)
ALPHA2 GLOB SERPL ELPH-MCNC: 0.53 G/DL (ref 0.48–1.05)
B-GLOBULIN SERPL ELPH-MCNC: 0.94 G/DL (ref 0.68–1.23)
GAMMA GLOB SERPL ELPH-MCNC: 1.1 G/DL (ref 0.62–1.7)
KAPPA LC FREE SER-MCNC: 1.91 MG/DL (ref 0.33–1.94)
KAPPA LC FREE/LAMBDA FREE SER NEPH: 1.46 {RATIO} (ref 0.26–1.65)
LAMBDA LC FREE SERPL-MCNC: 1.31 MG/DL (ref 0.57–2.63)
PROT SERPL-MCNC: 7.2 G/DL (ref 5.7–8.2)

## 2025-02-18 NOTE — PROGRESS NOTES
[facility]   [facility]  Hematology Clinic Progress Note    Patient Name: Kerry Hsu   YOB: 1960   Medical Record Number: [id number]    Reason for followup: Concern for iron deficiency    Subjective:   Kerry Hsu is a 64 year old female with a history of hypertension, diabetes, GERD, hyperlipidemia, obesity who presents for hematology follow-up regarding concerns for iron deficiency.  She was initially seen in February 2024 regarding intermittent and isolated neutrophilic leukocytosis.  Extensive workup was unremarkable and this was thought to be related to possibly obesity versus stress and more sinister causes have been ruled out.     More recently, the patient has been following with neurology complaining of some faint neuropathy or restless leg syndrome.  Iron studies in December were normal, including a hemoglobin 14.3, ferritin 223 iron saturation 15%.  She presents back to hematology for further discussions.    Other than chronic cramps faint neuropathy or restless legs at night she is operating at her baseline status of health.  Denies obvious bleeding, eats a balanced diet, recent endoscopy unremarkable.    Review of Systems:  Hematology/Oncology ROS performed and negative except as above in HPI    History/Other:   Current Medications:   rosuvastatin 10 MG Oral Tab Take 1 tablet (10 mg total) by mouth every evening.      indomethacin ER 75 MG Oral Cap CR Take 1 capsule (75 mg total) by mouth daily with food for 21 days. 21 capsule 0    pregabalin (LYRICA) 75 MG Oral Cap Take 1 capsule (75 mg total) by mouth 2 (two) times daily. 60 capsule 2    Omeprazole 40 MG Oral Capsule Delayed Release Take 1 capsule (40 mg total) by mouth daily. Take 1 capsule by mouth daily before breakfast. 90 capsule 3    amLODIPine 10 MG Oral Tab TAKE 1 TABLET BY MOUTH EVERY DAY (Patient taking differently: Take 1 tablet (10 mg total) by mouth at  bedtime. TAKE 1 TABLET BY MOUTH EVERY DAY) 90 tablet 3    latanoprost 0.005 % Ophthalmic Solution INSTILL 1 DROP IN BOTH EYES EVERY night 3 each 3    cholecalciferol 50 MCG (2000 UT) Oral Cap Take 1 capsule (2,000 Units total) by mouth daily.      MAGNESIUM OR Take by mouth daily.         Allergies:   Allergies   Allergen Reactions    Celecoxib TONGUE SWELLING     Other reaction(s): CELECOXIB    Sulfa Antibiotics TONGUE SWELLING     Other reaction(s): SULFA (SULFONAMIDE ANTIBIOTICS)    Erythromycin PAIN     Abdominal pain    Erythromycin Stearate PAIN     Abdominal pain    Amoxicillin DIARRHEA       Objective:   Blood pressure 143/83, pulse 82, temperature 98.2 °F (36.8 °C), temperature source Oral, resp. rate 18, height 1.727 m (5' 8\"), weight 90.7 kg (200 lb), SpO2 98%, not currently breastfeeding.  Physical Exam:  General: A&Ox3, NAD  HEENT: PERRL  CV: RRR  Pulm: normal effort  Extremities: no edema  Neurological: grossly intact    Results:   Labs:  Lab Results   Component Value Date    WBC 8.0 02/17/2025    HGB 14.6 02/17/2025    HCT 44.5 02/17/2025    .0 02/17/2025    CREATSERUM 1.00 02/17/2025    BUN 11 02/17/2025     02/17/2025    K 3.7 02/17/2025     02/17/2025    CO2 28.0 02/17/2025     (H) 02/17/2025    CA 9.4 02/17/2025    ALB 4.7 02/17/2025    ALKPHO 136 02/17/2025    BILT 0.6 02/17/2025    TP 7.4 02/17/2025    AST 25 02/17/2025    ALT 33 02/17/2025    PTT 31.1 10/06/2023    INR 0.95 12/27/2023    T4F 1.4 11/21/2023    TSH 1.511 12/18/2024    ESRML 21 01/15/2024    CRP <0.29 05/21/2022    MG 2.1 06/08/2023     (H) 05/15/2024    B12 404 02/17/2025     Assessment & Plan:   Kerry Hsu is a 64 year old female with a history of hypertension, diabetes, GERD, hyperlipidemia, obesity who presents for hematology follow-up regarding concerns for iron deficiency.  She was initially seen in February 2024 regarding intermittent and isolated neutrophilic leukocytosis.   Extensive workup was unremarkable and this was thought to be related to possibly obesity versus stress and more sinister causes have been ruled out.     There is currently no evidence of iron deficiency, but given clinical history we will repeat labs today.  Recheck CBC, CMP, iron, ferritin, B12, folate, also check SPEP.  We will touch base when results return.  For now, no indication for iron supplementation.    MDM Moderate: undiagnosed new problem with uncertain prognosis; review of notes and tests, ordering of tests, independent interpretation of tests; low risk of morbidity from additional testing/treatment    Jeison Rivera, DO    Merged with [facility]  [facility]     This note was created using a voice-recognition transcribing system. Incorrect words or phrases may have been missed during proofreading. Please interpret accordingly.

## 2025-02-21 ENCOUNTER — PATIENT MESSAGE (OUTPATIENT)
Dept: OPHTHALMOLOGY | Facility: CLINIC | Age: 65
End: 2025-02-21

## 2025-03-07 ENCOUNTER — HOSPITAL ENCOUNTER (OUTPATIENT)
Dept: GENERAL RADIOLOGY | Facility: HOSPITAL | Age: 65
Discharge: HOME | End: 2025-03-07
Attending: INTERNAL MEDICINE
Payer: MEDICARE

## 2025-03-07 ENCOUNTER — OFFICE VISIT (OUTPATIENT)
Dept: INTERNAL MEDICINE CLINIC | Facility: CLINIC | Age: 65
End: 2025-03-07

## 2025-03-07 ENCOUNTER — LAB ENCOUNTER (OUTPATIENT)
Dept: LAB | Facility: HOSPITAL | Age: 65
End: 2025-03-07
Attending: INTERNAL MEDICINE
Payer: MEDICARE

## 2025-03-07 VITALS
HEART RATE: 71 BPM | HEIGHT: 68 IN | WEIGHT: 201.19 LBS | SYSTOLIC BLOOD PRESSURE: 122 MMHG | TEMPERATURE: 97 F | DIASTOLIC BLOOD PRESSURE: 79 MMHG | BODY MASS INDEX: 30.49 KG/M2 | OXYGEN SATURATION: 100 %

## 2025-03-07 DIAGNOSIS — R73.03 PREDIABETES: ICD-10-CM

## 2025-03-07 DIAGNOSIS — Z01.818 PREOP EXAMINATION: ICD-10-CM

## 2025-03-07 DIAGNOSIS — R35.1 NOCTURIA: ICD-10-CM

## 2025-03-07 DIAGNOSIS — Z01.818 PREOP EXAMINATION: Primary | ICD-10-CM

## 2025-03-07 LAB
ALBUMIN SERPL-MCNC: 4.7 G/DL (ref 3.2–4.8)
ALBUMIN/GLOB SERPL: 1.9 {RATIO} (ref 1–2)
ALP LIVER SERPL-CCNC: 127 U/L
ALT SERPL-CCNC: 20 U/L
ANION GAP SERPL CALC-SCNC: 10 MMOL/L (ref 0–18)
AST SERPL-CCNC: 20 U/L (ref ?–34)
BASOPHILS # BLD AUTO: 0.04 X10(3) UL (ref 0–0.2)
BASOPHILS NFR BLD AUTO: 0.4 %
BILIRUB SERPL-MCNC: 0.6 MG/DL (ref 0.2–1.1)
BILIRUB UR QL: NEGATIVE
BILIRUB UR QL: NEGATIVE
BUN BLD-MCNC: 12 MG/DL (ref 9–23)
BUN/CREAT SERPL: 11.2 (ref 10–20)
CALCIUM BLD-MCNC: 9.2 MG/DL (ref 8.7–10.4)
CHLORIDE SERPL-SCNC: 105 MMOL/L (ref 98–112)
CLARITY UR: CLEAR
CLARITY UR: CLEAR
CO2 SERPL-SCNC: 28 MMOL/L (ref 21–32)
CREAT BLD-MCNC: 1.07 MG/DL
DEPRECATED RDW RBC AUTO: 40.8 FL (ref 35.1–46.3)
EGFRCR SERPLBLD CKD-EPI 2021: 57 ML/MIN/1.73M2 (ref 60–?)
EOSINOPHIL # BLD AUTO: 0.13 X10(3) UL (ref 0–0.7)
EOSINOPHIL NFR BLD AUTO: 1.2 %
ERYTHROCYTE [DISTWIDTH] IN BLOOD BY AUTOMATED COUNT: 14.3 % (ref 11–15)
EST. AVERAGE GLUCOSE BLD GHB EST-MCNC: 131 MG/DL (ref 68–126)
FASTING STATUS PATIENT QL REPORTED: NO
GLOBULIN PLAS-MCNC: 2.5 G/DL (ref 2–3.5)
GLUCOSE BLD-MCNC: 100 MG/DL (ref 70–99)
GLUCOSE UR-MCNC: NORMAL MG/DL
GLUCOSE UR-MCNC: NORMAL MG/DL
HBA1C MFR BLD: 6.2 % (ref ?–5.7)
HCT VFR BLD AUTO: 42.3 %
HGB BLD-MCNC: 14 G/DL
HGB UR QL STRIP.AUTO: NEGATIVE
HGB UR QL STRIP.AUTO: NEGATIVE
IMM GRANULOCYTES # BLD AUTO: 0.04 X10(3) UL (ref 0–1)
IMM GRANULOCYTES NFR BLD: 0.4 %
KETONES UR-MCNC: NEGATIVE MG/DL
KETONES UR-MCNC: NEGATIVE MG/DL
LEUKOCYTE ESTERASE UR QL STRIP.AUTO: NEGATIVE
LEUKOCYTE ESTERASE UR QL STRIP.AUTO: NEGATIVE
LYMPHOCYTES # BLD AUTO: 2.77 X10(3) UL (ref 1–4)
LYMPHOCYTES NFR BLD AUTO: 25.8 %
MCH RBC QN AUTO: 26.2 PG (ref 26–34)
MCHC RBC AUTO-ENTMCNC: 33.1 G/DL (ref 31–37)
MCV RBC AUTO: 79.2 FL
MONOCYTES # BLD AUTO: 0.78 X10(3) UL (ref 0.1–1)
MONOCYTES NFR BLD AUTO: 7.3 %
NEUTROPHILS # BLD AUTO: 6.98 X10 (3) UL (ref 1.5–7.7)
NEUTROPHILS # BLD AUTO: 6.98 X10(3) UL (ref 1.5–7.7)
NEUTROPHILS NFR BLD AUTO: 64.9 %
NITRITE UR QL STRIP.AUTO: NEGATIVE
NITRITE UR QL STRIP.AUTO: NEGATIVE
OSMOLALITY SERPL CALC.SUM OF ELEC: 296 MOSM/KG (ref 275–295)
PH UR: 5.5 [PH] (ref 5–8)
PH UR: 5.5 [PH] (ref 5–8)
PLATELET # BLD AUTO: 381 10(3)UL (ref 150–450)
POTASSIUM SERPL-SCNC: 3.5 MMOL/L (ref 3.5–5.1)
PROT SERPL-MCNC: 7.2 G/DL (ref 5.7–8.2)
PROT UR-MCNC: 20 MG/DL
PROT UR-MCNC: 20 MG/DL
RBC # BLD AUTO: 5.34 X10(6)UL
SODIUM SERPL-SCNC: 143 MMOL/L (ref 136–145)
SP GR UR STRIP: 1.02 (ref 1–1.03)
SP GR UR STRIP: 1.02 (ref 1–1.03)
UROBILINOGEN UR STRIP-ACNC: NORMAL
UROBILINOGEN UR STRIP-ACNC: NORMAL
WBC # BLD AUTO: 10.7 X10(3) UL (ref 4–11)

## 2025-03-07 PROCEDURE — 81001 URINALYSIS AUTO W/SCOPE: CPT

## 2025-03-07 PROCEDURE — 83036 HEMOGLOBIN GLYCOSYLATED A1C: CPT

## 2025-03-07 PROCEDURE — 71046 X-RAY EXAM CHEST 2 VIEWS: CPT | Performed by: INTERNAL MEDICINE

## 2025-03-07 PROCEDURE — 85025 COMPLETE CBC W/AUTO DIFF WBC: CPT

## 2025-03-07 PROCEDURE — 80053 COMPREHEN METABOLIC PANEL: CPT

## 2025-03-07 PROCEDURE — G2211 COMPLEX E/M VISIT ADD ON: HCPCS | Performed by: INTERNAL MEDICINE

## 2025-03-07 PROCEDURE — 87086 URINE CULTURE/COLONY COUNT: CPT

## 2025-03-07 PROCEDURE — 87081 CULTURE SCREEN ONLY: CPT

## 2025-03-07 PROCEDURE — 99214 OFFICE O/P EST MOD 30 MIN: CPT | Performed by: INTERNAL MEDICINE

## 2025-03-07 PROCEDURE — 36415 COLL VENOUS BLD VENIPUNCTURE: CPT

## 2025-03-07 NOTE — PROGRESS NOTES
Kerry Hsu is a 64 year old female.  Chief Complaint   Patient presents with    Pre-Op Exam     3/17/25  Marcin Delgadillo MD  Right total knee arthroplasty               HPI:   Pt comes with her  for pre op  C/c pre op  C/o going for right total knee replacement on 3/17/2025 by dr haque   Overall doing well but her vertigo came back and   Saw the cardiologist and back on crestor 10 and on lyrica 10 bid as it causes neuropathy sympt   Can walk up 4 flights of stairs without feeling short of breath but her knee does hurt, got EKG in jan   Got dental clearance as well   The most recent surgery was the hip surgery done on January 2025 -doing PT , not taking anything for pain     HISTORY   was told she had neurology  and was todl she had neuropathy and had EMG and her right leg and b/l feet has numbness and tingling and feels heavy   Gabapentin - no help, aldolase - didn't help   Was given lyrica and takes it only at night , which it usually helps her sleep except last night   Has not tried the valium  yet  Got Injection to the right hip 2 weeks (DEC 6TH ) ago by Dr. Jefferson Mcmillan at [facility] right now she is feeling well  Since last visit she did get the MRI and the liver cyst have decreased but noted that she does have kidney cysts so we will get an ultrasound and refer her to urology for that     She had sleep study 6/2024 but she is scared to stay overnight at a [facility]                    HISTORY  saw the neurosurgeon and was reassured that it was not her back so saw the Ortho doctor and MRI of the hip were done  Not taking diazepam   Foot is numb and hips and lower back hurt   Noted that in 2017 she was given duloxetine 20 30 40 and 60 most likely is a increased titrating up and it was not helping  Also has some questions regarding her insurance and Medicare and confirming she does not have HMO in the chart-confirmed that I do not see this in her chart           HISTORY  restarted in the upper arms  \"its back and more severe \"   Wondering if she has stiff persons synd -would like Anti-glutamic acid decarboxylase (NICOLE) antibody testing , looked at the chart and noted that there was a NICOLE 65 antibody testing that is available in the chart but I am not sure if this is the same as the antiglutamic acid decarboxylase so she will follow-up with rheumatology  Had emg and muscle biopsy in 2014 by dr hernandez  a few yrs ago at that area left leg area does get swollen and hard   Has apt with GI this week , H. pylori test is negative  Got injection to the back and although it still hurting it does take time so she is being patient with that  Still has rash under her breasts and also the groin and has tried ketoconazole cream as well as the nystatin powder        HISTORY  Saw Dr. Delgadillo the orthopedic doctor and was sent for physical therapy which she is doing now for her back but needs to see the back doctor--reviewed note and noted it was suggested she see physiatry         Had  the left cataract removed  approx 3 yrs ago and since then has had blurry vision and double vision In the left eye        She still does get occasional cramping and takes magnesium for it and has numbness and tingling in bilateral feet worse on the right, noted in the past her cpk was always elevated and had this worked up but did read that lead might have something to do with it so we will have this checked    HISTORY    palpitations for the past couple weeks at least and it is now scaring her because she can feel it when she is just lying down or just sitting there not doing anything and it is not on it but only on exertion, comes and goes   Also has some retrosternal chest pains 5/10  Better ? --took ppi and it didn't help   Worse -with deep inspirations  No increase in caffeine  Chest pain and palpitations happen at the same time and not associated with any diaphoresis  She did have an EKG done in January and a calcium score in August of  this year           HISTORY  Saw dr rothman ent and ct was neg SO NO  sinus SURG NEEDED     She thinks its her eyes   Feels her vision is bad even after glasses and thinks it happended after cataract surg   HAS SEEN   saw dr mckeon--  neurology   Saw cards dr genao and will go for heart scan   Saw many eye drs and tried drops but no help , eye drops now burns                     HISTORY  6/2023 visit     will see the surgeon for a cyst that she has in her vaginal area, she had already seen the dermatologist and the GYN doctor  Hopefully will come out of the boot for her left leg  Saw ENT and may is sinus surgery            HISTORY   right wrist pain after her rotator cuff surg x 2 weeks -was first like an electric shock and now only gets that once in a while but now more sore   Going for PT   She also has some left thigh pain - from buttocks to the knees         History  3/2022  right shoulder surgery on 3/7/2022   Requested by dr shabnam Garcia - can be seen in epic   Can walk up one flight of stars without feeling short of breath   Right shoulder radiates to the neck on that side   She is right handed , cant use her hand and arm as much            HISTORY   10/2021   She has been seeing Ortho for the neuropathy and also received shots to the shoulder-right-and knees and was given Tylenol No. 3  Also sees the foot doctor  saw dr mckeon nuerology and consider valium but she feels this will affect her ability to work   She complains of \"nerve pain\" in the feet   She states that valium knocks her out -even if she takes it in the evening she still feels the effects in the am , foot dr gave gabpentin but that too makes her sleepy \"im very sensitive   Also c/o left flank pain  X couple weeks -- ua done in office and does show blood with uti    8/10 , sharp and stabbing ,   Worse with standing after sitting a long time   Better - ?   Comes and goesbut now more frequent      Not taking trazodone - not helping and she is  not sure what meds cuases what so she doesn't want to be on meds   Know she has had CTs and ultrasounds of her kidney done before and has seen urology as well- dr moreno -she did have a 3 mm nonobstructing stone in the right kidney  Still has her chronic cough and try the Tessalon Perles which did not help that much, has an inhaler so she will try that, the codien cough syrup just put her to sleep as well      Finished doxy yest taht she got from the  ,didn't help          HISTORY 3/2021   cramps is all over but mostly in the legs-stretching makes it works and the meloxicam is not helping and she did see the rheumatologist and was started on a new medication metaxalone 800 mg 3 times a day but it makes her sleepy so she is only taking it at night--it still wakes her up at night so does not feel that that is working was recommended to follow-up at an academic center     Still has a cough and the codeine cough syrup did not help much and noted that she is on and PPI and the chest x-ray was normal which was ordered last time   she has noticed that the rescue inhaler does help                       HISTORY  History from August 2020   Has seen the rheumatologist, physiatry and gone for physical therapy  Has tried Cymbalta given by physiatry but in January when I had seen her she had stated that she was not taking it and and cyclobenzaprine was started but she does not think that that was helpful  She states that she has had an EMG in the past as well a very long time ago  She states that she drinks a lot of water as well  2015 emg report noted in chart - normal   Also noted in 2015 she saw the neurologist Dr. Enamorado and was recommended to go to the pain doctors--Flexeril was tried  Needs to go back to see Dr. Hays the podiatrist for her orthotics                 History   Last seen in February and since then in March she saw Dr. Delgadillo and got knee injections for her osteoarthritis of the knees   had an endoscopic  procedure and was found to have gastric polyp by Dr. Pereira  In June she saw the foot doctor and received a shot in her foot for the neuroma  In July she saw Dr. Bird the eye doctor for her glaucoma and will return in 4 months and then           History  1/11/2020 visit   Dr clay- ortho -- left hand tendonitiis -got shot and feeks better   Would like to see dr berger   Not taking cymbalta - takes T#3 one a week , ibuprofen \"seldom\"- once a mn   Muscle cramps coming back - legs thighs and feet , no RLS               History- 11/19  C/o right shoulder has a tear  And she does admit she uses the left more   Used to see dr berger and now sees dr haque and got a shot to the right shoulder   Needs a referral to see pain clinic   Needs referral to see podiatrist --corns and calluses right foot / toe and also for ingrown toenails   Pain is 10/10 -- iburprofen does helps but doesn't like taking it too much --takes T#3 but t just puts her to sleep and then she will wake up with pain -- unable ot sratch her back   No falls trauma or injury that she is aware of      History   She has had both muscle and nerve biopsies by dr hernandez   She gets these cramps every day   Steroids do not help either  Noted that she had try cyclobenzaprine 10 mg in July 2019--no help   Since the last visit she did see her orthopedic doctor and received a short of a cortisone shot to her right shoulder  Also c/o left hand tender swelling x few weeks   She also states that she gets rashes underneath her breasts and the triamcinolone helps with this and needs a refill        hisotry -8/19 first visit   C/c htn   C/o fell on July 8th and has some left rib pain and back pains   Had some ct scans and would like to go through it  Needs referrals    Usually gets shots to her knees and shoulders- was told to follow-up with a new doctor     [facility]  gerd daily ppi --h/o nissens fundoplication  ?2006  HTN  Hip pain and back pain - T#3 - prn 2 tabs a  week, also takes ibuprofen   bm hematuria   Non obstructing kid stones   elmer was on cpap   Glaucoma suspect   Right shoulder rotator cuff tear s/p surg 3/2022   Osteoarthritis knees  Hepatic and renal cysts similar to 8/19 and CT 5/20  H/o left toe OM s/p amputation   ELMER on 6/2024 sleep study      Dr peters - eye dr Dr moreno - urologist   Dr. Craft- rheum   Dr. Delgadillo- ortho   Dr lorraine hall      ----------------------------------------------------------------------------------------------------------------    Current Outpatient Medications   Medication Sig Dispense Refill    rosuvastatin 10 MG Oral Tab Take 1 tablet (10 mg total) by mouth every evening.      pregabalin (LYRICA) 75 MG Oral Cap Take 1 capsule (75 mg total) by mouth 2 (two) times daily. 60 capsule 2    Omeprazole 40 MG Oral Capsule Delayed Release Take 1 capsule (40 mg total) by mouth daily. Take 1 capsule by mouth daily before breakfast. 90 capsule 3    amLODIPine 10 MG Oral Tab TAKE 1 TABLET BY MOUTH EVERY DAY (Patient taking differently: Take 1 tablet (10 mg total) by mouth at bedtime. TAKE 1 TABLET BY MOUTH EVERY DAY) 90 tablet 3    latanoprost 0.005 % Ophthalmic Solution INSTILL 1 DROP IN BOTH EYES EVERY night 3 each 3    cholecalciferol 50 MCG (2000 UT) Oral Cap Take 1 capsule (2,000 Units total) by mouth daily.      MAGNESIUM OR Take by mouth daily.        Past Medical History:    Abscess of left thigh    Acute meniscal tear of knee    Age-related nuclear cataract of both eyes    Anal sphincter incontinence    Arthritis    Back problem    Cataract    left    Chondromalacia    Colon polyps    Degenerative disc disease    Disorder of kidney and ureter    Diverticular disease    Esophageal reflux    Glaucoma    Hammertoe    High blood pressure    High cholesterol    Hyperlipidemia with target low density lipoprotein (LDL) cholesterol less than 130 mg/dL    Insomnia    Kidney lesion    Liver lesion    Neuropathy    both feet     Obstructive sleep apnea syndrome    Osteoarthritis    Palpitation    Prediabetes    Primary open angle glaucoma of both eyes    Diagnosis of glaucoma OU; Started Latanoprost qhs after abnormal VF and OCT 2/25/16;  6/5/18 consult with Dr. Madeline Chappell-see progress note    Sleep apnea    Small bowel obstruction (HCC)    Tendinitis    Unspecified essential hypertension    Visual impairment    Readers      Past Surgical History:   Procedure Laterality Date    Anal sphincterotomy      03/12/2013 [facility]    Blepharoplasty anesthesia Bilateral 03/05/2020    Dr Kerwin Bello Ophthalmology     Cataract extraction w/  intraocular lens implant Left 05/07/2018    L PC IOL with Dr. Suresh @ [facility]    Colonoscopy N/A 11/11/2016    Procedure: COLONOSCOPY;  Surgeon: Sarbina Cazares MD;  Location: [facility] ENDOSCOPY    Colonoscopy N/A 09/06/2019    Procedure: COLONOSCOPY;  Surgeon: Edwin Sterling MD;  Location: [facility] ENDOSCOPY    Colonoscopy  09/16/2024    Dr. Sterling; colon polyps, diverticulosis, hemorrhoids    Colonoscopy N/A 9/16/2024    Procedure: COLONOSCOPY;  Surgeon: Edwin Sterling MD;  Location: [facility] ENDOSCOPY    Egd  09/16/2024    Dr. Sterling; gastric polyps, small hiatal hernia    Excision of chalazion, single - od - right eye Right 6.27/2017    Nasally    Hc arthrocentesis or inject major joint w/o us      Hysterectomy  1996    [facility]AI    Other      Lap Nissen Fundoplication surgery    Other surgical history  2005,2007,2008    LAPAROSCOPIC LYSIS OF ADHESION    Other surgical history      right foot bunionectomy    Other surgical history  11/14/2014    right superficial anterior peroneal nerve biopsy and right proximal thigh muscle biopsy.    Other surgical history  06/13/2023    Excision and Biopsy of two  perineal cysts    Upper gi endoscopy performed  05/2015    Yag capsulotomy - os - left eye Left 12/19/2018    RJM      Social History:  Social History     Socioeconomic History    Marital status:    Tobacco Use     Smoking status: Never     Passive exposure: Never    Smokeless tobacco: Never   Vaping Use    Vaping status: Never Used   Substance and Sexual Activity    Alcohol use: No     Comment: None.     Drug use: No    Sexual activity: Yes     Birth control/protection: Hysterectomy   Other Topics Concern    Caffeine Concern Yes     Comment: occasional soda    Exercise Yes    Pt has a pacemaker No    Pt has a defibrillator No    Breast feeding No    Reaction to local anesthetic No    Right Handed Yes   Social History Narrative    Work - banking     Social Drivers of Health      Received from [facility], [facility]    Housing Stability        REVIEW OF SYSTEMS:   GENERAL HEALTH: No fevers, chills, sweats,+ fatigue  VISION: No recent vision problems, blurry vision or double vision-chronic issues   HEENT: No decreased hearing ear pain nasal congestion or sore throat  SKIN: denies any unusual skin lesions or rashes  RESPIRATORY: denies shortness of breath, cough, wheezing  CARDIOVASCULAR: denies chest pain on exertion, palpitations, + swelling in feet  GI: denies abdominal pain and + heartburn-, stable on medications, no  nausea or vomiting  : No Pain on urination, change in the color of urine, discharge, urinating frequently, + wakes up once at night to urinate  MUS: + chronic back pain,+ joint pain, + muscle pain  NEURO: denies headaches , anxiety, depression    EXAM:   /79   Pulse 71   Temp 96.8 °F (36 °C)   Ht 5' 8\" (1.727 m)   Wt 201 lb 3.2 oz (91.3 kg)   SpO2 100%   BMI 30.59 kg/m²   GENERAL: well developed, well nourished,in no apparent distress  SKIN: no rashes,no suspicious lesions  HEENT: atraumatic, normocephalic   NECK: supple,no adenopathy  LUNGS: clear to auscultation, no wheeze  CARDIO: RRR    EXTREMITIES: no cyanosis, or edema    ASSESSMENT AND PLAN:   Diagnoses and all orders for this visit:    Preop examination  -     XR CHEST PA + LAT CHEST (CPT=71046);  Future  -     MSSA and MRSA Culture Screen; Future  -     CBC With Differential With Platelet; Future  -     Comp Metabolic Panel (14); Future  -     Urinalysis with Culture Reflex; Future  -     Urine Culture, Routine [E]; Future  -     Urinalysis, Routine [E]; Future  -     Hemoglobin A1C; Future    Prediabetes  -     Hemoglobin A1C; Future    Nocturia  -     Urine Culture, Routine [E]; Future    Will check labs and chest x-ray, noted that EKG was normal in January and she has very good has exercise tolerance, underwent her hip surgery in January 2025 without any complications    Addendum  3/9/2025  Reviewed labs chest x-ray and EKG  Patient is cleared for surgery  She is at low risk for any perioperative cardiac complications.  She will need routine perioperative care including early ambulation, DVT prophylaxis, incentive spirometry etc.      Preventative medicine  Colonoscopy done  9/19 and 9/2024   Dr. hall   Mammogram-10/2024  Pap 2016 dr chin -hina ,saw Dr. Witt in February 2021 and sees mina jaffe   Labs 2/2025  reviewed     The patient indicates understanding of these issues and agrees to the plan.  No follow-ups on file.

## 2025-03-10 ENCOUNTER — TELEPHONE (OUTPATIENT)
Dept: INTERNAL MEDICINE CLINIC | Facility: CLINIC | Age: 65
End: 2025-03-10

## 2025-03-10 NOTE — TELEPHONE ENCOUNTER
Medical clearance faxed successfully to Dr Crane at [phone] and [phone] successfully

## 2025-03-13 ENCOUNTER — OFFICE VISIT (OUTPATIENT)
Dept: OBGYN CLINIC | Facility: CLINIC | Age: 65
End: 2025-03-13

## 2025-03-13 VITALS
WEIGHT: 200.88 LBS | DIASTOLIC BLOOD PRESSURE: 77 MMHG | HEART RATE: 73 BPM | SYSTOLIC BLOOD PRESSURE: 128 MMHG | BODY MASS INDEX: 31 KG/M2

## 2025-03-13 DIAGNOSIS — Z12.31 SCREENING MAMMOGRAM FOR BREAST CANCER: ICD-10-CM

## 2025-03-13 DIAGNOSIS — Z01.419 ENCOUNTER FOR GYNECOLOGICAL EXAMINATION WITHOUT ABNORMAL FINDING: Primary | ICD-10-CM

## 2025-03-13 PROCEDURE — G0101 CA SCREEN;PELVIC/BREAST EXAM: HCPCS | Performed by: OBSTETRICS & GYNECOLOGY

## 2025-03-13 NOTE — PROGRESS NOTES
The following individual(s) verbally consented to be recorded using ambient AI listening technology and understand that they can each withdraw their consent to this listening technology at any point by asking the clinician to turn off or pause the recording:    Patient name: Kerry Hsu

## 2025-03-13 NOTE — PROGRESS NOTES
Subjective:   Patient ID: Kerry Hsu is a 64 year old female.    HPI    History of Present Illness  The patient, with a history of osteoarthritis, is scheduled for a right knee replacement surgery due to worsening pain and functional impairment. The pain is described as 'bone on bone' and significantly affects her ability to navigate stairs. The decision to proceed with surgery was made after years of conservative management and discussion with her orthopedic surgeon. The patient expresses nervousness about the upcoming procedure but acknowledges the necessity due to the impact on her quality of life.    In addition to the knee pain, the patient recently underwent surgery for a labral tear in her hip. The recovery is ongoing, with residual soreness in the groin area. The patient describes a gradual improvement in function and is eager to complete the recovery process.          History/Other:   Review of Systems   Constitutional:  Negative for appetite change, fatigue and unexpected weight change.   Eyes:  Negative for visual disturbance.   Respiratory:  Negative for cough and shortness of breath.    Cardiovascular:  Negative for chest pain, palpitations and leg swelling.   Gastrointestinal:  Negative for abdominal distention, abdominal pain, blood in stool, constipation and diarrhea.   Genitourinary:  Negative for dyspareunia, dysuria, frequency, pelvic pain and urgency.   Musculoskeletal:  Negative for arthralgias and myalgias.   Skin:  Negative for rash.   Neurological:  Negative for weakness, numbness and headaches.   Psychiatric/Behavioral:  Negative for dysphoric mood. The patient is not nervous/anxious.      No current outpatient medications on file.     Allergies:Allergies[1]    Objective:   Physical Exam  Constitutional:       General: She is not in acute distress.     Appearance: She is well-developed. She is not diaphoretic.   Neck:      Thyroid: No thyromegaly.   Cardiovascular:      Rate and  Rhythm: Normal rate and regular rhythm.      Heart sounds: Normal heart sounds. No murmur heard.  Pulmonary:      Effort: Pulmonary effort is normal.      Breath sounds: Normal breath sounds. No wheezing or rales.   Chest:   Breasts:     Right: Normal. No mass, nipple discharge, skin change or tenderness.      Left: Normal. No mass, nipple discharge, skin change or tenderness.      Comments:     Abdominal:      General: There is no distension.      Palpations: Abdomen is soft. There is no mass.      Tenderness: There is no abdominal tenderness. There is no guarding or rebound.   Genitourinary:     Labia:         Right: No rash or lesion.         Left: No rash or lesion.       Vagina: Normal. No vaginal discharge.      Uterus: Absent.       Adnexa:         Right: No mass, tenderness or fullness.          Left: No mass, tenderness or fullness.     Musculoskeletal:         General: No tenderness.      Cervical back: Neck supple.   Lymphadenopathy:      Cervical: No cervical adenopathy.      Upper Body:      Right upper body: No supraclavicular, axillary or pectoral adenopathy.      Left upper body: No supraclavicular, axillary or pectoral adenopathy.   Neurological:      Mental Status: She is alert.         Assessment & Plan:   1. Encounter for gynecological examination without abnormal finding    2. Screening mammogram for breast cancer        No orders of the defined types were placed in this encounter.      Meds This Visit:  Requested Prescriptions      No prescriptions requested or ordered in this encounter       Imaging & Referrals:  [facility] ROMINA 2D+3D SCREENING BILAT (CPT=77067/86255)    Assessment & Plan  Knee osteoarthritis  Severe osteoarthritis in the right knee, described as bone on bone, significantly affecting mobility and quality of life. She experiences difficulty with stairs and is scheduled for a knee replacement. She is advised to stop NSAIDs prior to surgery and is nervous about the procedure but  recognizes its necessity.  - Proceed with right knee replacement surgery  - Ensure no NSAIDs are taken prior to surgery  - Contact with surgery time    Labral tear of the hip  Post-surgical recovery from a labral tear in the hip, performed by Dr. Daniel. She still experiences some soreness in the groin area.    Recurrent skin abscess to right side of vulva  Recurrent skin abscess on the right side of the vulva was not addressed in this encounter, indicating it may not be an active issue.    Abnormal mammogram of right breast  Last mammogram in October showed a category two result, considered benign. Plan to repeat the mammogram in a year and a day to comply with Medicare requirements.  - Order repeat mammogram at least a year and a day from previous                 [1]   Allergies  Allergen Reactions    Celecoxib TONGUE SWELLING     Other reaction(s): CELECOXIB    Sulfa Antibiotics TONGUE SWELLING     Other reaction(s): SULFA (SULFONAMIDE ANTIBIOTICS)    Erythromycin PAIN     Abdominal pain    Erythromycin Stearate PAIN     Abdominal pain    Amoxicillin DIARRHEA

## 2025-03-14 NOTE — TELEPHONE ENCOUNTER
Well Visit, Ages 18 to 65: Care Instructions  Well visits can help you stay healthy. Your doctor has checked your overall health and may have suggested ways to take good care of yourself. Your doctor also may have recommended tests. You can help prevent illness with healthy eating, good sleep, vaccinations, regular exercise, and other steps.    Get the tests that you and your doctor decide on. Depending on your age and risks, examples might include screening for diabetes; hepatitis C; HIV; and cervical, breast, lung, and colon cancer. Screening helps find diseases before any symptoms appear.   Eat healthy foods. Choose fruits, vegetables, whole grains, lean protein, and low-fat dairy foods. Limit saturated fat and reduce salt.     Limit alcohol. Men should have no more than 2 drinks a day. Women should have no more than 1. For some people, no alcohol is the best choice.   Exercise. Get at least 30 minutes of exercise on most days of the week. Walking can be a good choice.     Reach and stay at your healthy weight. This will lower your risk for many health problems.   Take care of your mental health. Try to stay connected with friends, family, and community, and find ways to manage stress.     If you're feeling depressed or hopeless, talk to someone. A counselor can help. If you don't have a counselor, talk to your doctor.   Talk to your doctor if you think you may have a problem with alcohol or drug use. This includes prescription medicines, marijuana, and other drugs.     Avoid tobacco and nicotine: Don't smoke, vape, or chew. If you need help quitting, talk to your doctor.   Practice safer sex. Getting tested, using condoms or dental dams, and limiting sex partners can help prevent STIs.     Use birth control if it's important to you to prevent pregnancy. Talk with your doctor about your choices and what might be best for you.   Prevent problems where you can. Protect your skin from too much sun, wash your 
--Medical assistant please look for the fax and hand it to me –it is not on my desk
No fax received asked to refax they state no labs needed just a letter
Noted–we will discuss with patient at her upcoming appointment  Please call eye doctor's office and see if there is anything specifically that he wants for clearance
Patient calling (verified name and ), requesting a LETTER for her surgical clearance, states that she is schedule on 3/6/20  And can fax the LETTER at [phone] [facility] , states that there is no blood tests required, only the clearance L
Relayed Dr Stacia Starkey verbalized understanding, stated she will have Eye lift surgery
Spoke with patient (verified name and ), this nurse asked [facility] office [phone].     Called [facility] and spoke with Anju Moralse, states that they faxed the information already, no need for any pre op lab or testing, thi
Usually if the  any surgeon requiring  a clearance  have some papers as well--are they asking for anything specifically? Is it surgery for her? Glaucoma  Letter can be given to patient/faxed at the time of her visit.
Was seen at the office on 2/24/20 with surgical clearance letter made.
Universal Safety Interventions

## 2025-03-17 ENCOUNTER — APPOINTMENT (OUTPATIENT)
Dept: GENERAL RADIOLOGY | Facility: HOSPITAL | Age: 65
End: 2025-03-17
Attending: ORTHOPAEDIC SURGERY
Payer: MEDICARE

## 2025-03-17 ENCOUNTER — ANESTHESIA EVENT (OUTPATIENT)
Dept: SURGERY | Facility: HOSPITAL | Age: 65
End: 2025-03-17
Payer: MEDICARE

## 2025-03-17 ENCOUNTER — HOSPITAL ENCOUNTER (OUTPATIENT)
Facility: HOSPITAL | Age: 65
Discharge: HOME HEALTH | End: 2025-03-19
Attending: ORTHOPAEDIC SURGERY | Admitting: ORTHOPAEDIC SURGERY
Payer: MEDICARE

## 2025-03-17 ENCOUNTER — ANESTHESIA (OUTPATIENT)
Dept: SURGERY | Facility: HOSPITAL | Age: 65
End: 2025-03-17
Payer: MEDICARE

## 2025-03-17 DIAGNOSIS — M17.11 PRIMARY OSTEOARTHRITIS OF RIGHT KNEE: Primary | ICD-10-CM

## 2025-03-17 PROBLEM — G47.33 OSA (OBSTRUCTIVE SLEEP APNEA): Status: ACTIVE | Noted: 2018-03-21

## 2025-03-17 LAB — GLUCOSE BLDC GLUCOMTR-MCNC: 111 MG/DL (ref 70–99)

## 2025-03-17 PROCEDURE — 88311 DECALCIFY TISSUE: CPT | Performed by: ORTHOPAEDIC SURGERY

## 2025-03-17 PROCEDURE — 88305 TISSUE EXAM BY PATHOLOGIST: CPT | Performed by: ORTHOPAEDIC SURGERY

## 2025-03-17 PROCEDURE — 82962 GLUCOSE BLOOD TEST: CPT

## 2025-03-17 PROCEDURE — 0SRC0J9 REPLACEMENT OF RIGHT KNEE JOINT WITH SYNTHETIC SUBSTITUTE, CEMENTED, OPEN APPROACH: ICD-10-PCS | Performed by: ORTHOPAEDIC SURGERY

## 2025-03-17 PROCEDURE — 73560 X-RAY EXAM OF KNEE 1 OR 2: CPT | Performed by: ORTHOPAEDIC SURGERY

## 2025-03-17 DEVICE — IMPLANTABLE DEVICE
Type: IMPLANTABLE DEVICE | Site: KNEE | Status: FUNCTIONAL
Brand: BIOMET® BONE CEMENT R

## 2025-03-17 DEVICE — IMPLANTABLE DEVICE
Type: IMPLANTABLE DEVICE | Site: KNEE | Status: FUNCTIONAL
Brand: PERSONA®

## 2025-03-17 DEVICE — IMPLANTABLE DEVICE
Type: IMPLANTABLE DEVICE | Site: KNEE | Status: FUNCTIONAL
Brand: PERSONA® VIVACIT-E®

## 2025-03-17 DEVICE — IMPLANTABLE DEVICE
Type: IMPLANTABLE DEVICE | Site: KNEE | Status: FUNCTIONAL
Brand: PERSONA® NATURAL TIBIA®

## 2025-03-17 RX ORDER — MORPHINE SULFATE 4 MG/ML
4 INJECTION, SOLUTION INTRAMUSCULAR; INTRAVENOUS EVERY 2 HOUR PRN
Status: DISCONTINUED | OUTPATIENT
Start: 2025-03-17 | End: 2025-03-19

## 2025-03-17 RX ORDER — HYDROCODONE BITARTRATE AND ACETAMINOPHEN 7.5; 325 MG/1; MG/1
2 TABLET ORAL EVERY 6 HOURS PRN
Status: DISCONTINUED | OUTPATIENT
Start: 2025-03-17 | End: 2025-03-18

## 2025-03-17 RX ORDER — BUPIVACAINE HYDROCHLORIDE 7.5 MG/ML
INJECTION, SOLUTION INTRASPINAL
Status: COMPLETED | OUTPATIENT
Start: 2025-03-17 | End: 2025-03-17

## 2025-03-17 RX ORDER — DOCUSATE SODIUM 100 MG/1
100 CAPSULE, LIQUID FILLED ORAL 2 TIMES DAILY
Status: DISCONTINUED | OUTPATIENT
Start: 2025-03-17 | End: 2025-03-19

## 2025-03-17 RX ORDER — MORPHINE SULFATE 2 MG/ML
2 INJECTION, SOLUTION INTRAMUSCULAR; INTRAVENOUS EVERY 2 HOUR PRN
Status: DISCONTINUED | OUTPATIENT
Start: 2025-03-17 | End: 2025-03-19

## 2025-03-17 RX ORDER — MORPHINE SULFATE 10 MG/ML
6 INJECTION, SOLUTION INTRAMUSCULAR; INTRAVENOUS EVERY 10 MIN PRN
Status: DISCONTINUED | OUTPATIENT
Start: 2025-03-17 | End: 2025-03-17 | Stop reason: HOSPADM

## 2025-03-17 RX ORDER — ONDANSETRON 2 MG/ML
4 INJECTION INTRAMUSCULAR; INTRAVENOUS ONCE AS NEEDED
Status: ACTIVE | OUTPATIENT
Start: 2025-03-17 | End: 2025-03-17

## 2025-03-17 RX ORDER — HYDROMORPHONE HYDROCHLORIDE 1 MG/ML
0.4 INJECTION, SOLUTION INTRAMUSCULAR; INTRAVENOUS; SUBCUTANEOUS EVERY 5 MIN PRN
Status: DISCONTINUED | OUTPATIENT
Start: 2025-03-17 | End: 2025-03-17 | Stop reason: HOSPADM

## 2025-03-17 RX ORDER — NALBUPHINE HYDROCHLORIDE 10 MG/ML
2.5 INJECTION INTRAMUSCULAR; INTRAVENOUS; SUBCUTANEOUS EVERY 4 HOURS PRN
Status: DISCONTINUED | OUTPATIENT
Start: 2025-03-17 | End: 2025-03-18

## 2025-03-17 RX ORDER — MAGNESIUM HYDROXIDE 1200 MG/15ML
LIQUID ORAL CONTINUOUS PRN
Status: DISCONTINUED | OUTPATIENT
Start: 2025-03-17 | End: 2025-03-17 | Stop reason: HOSPADM

## 2025-03-17 RX ORDER — FAMOTIDINE 20 MG/1
20 TABLET, FILM COATED ORAL ONCE
Status: DISCONTINUED | OUTPATIENT
Start: 2025-03-17 | End: 2025-03-17 | Stop reason: HOSPADM

## 2025-03-17 RX ORDER — PROCHLORPERAZINE EDISYLATE 5 MG/ML
5 INJECTION INTRAMUSCULAR; INTRAVENOUS ONCE AS NEEDED
Status: COMPLETED | OUTPATIENT
Start: 2025-03-17 | End: 2025-03-17

## 2025-03-17 RX ORDER — DIPHENHYDRAMINE HCL 25 MG
25 CAPSULE ORAL EVERY 4 HOURS PRN
Status: DISCONTINUED | OUTPATIENT
Start: 2025-03-17 | End: 2025-03-18

## 2025-03-17 RX ORDER — PREGABALIN 75 MG/1
75 CAPSULE ORAL 2 TIMES DAILY
Status: DISCONTINUED | OUTPATIENT
Start: 2025-03-17 | End: 2025-03-19

## 2025-03-17 RX ORDER — OXYCODONE HCL 10 MG/1
10 TABLET, FILM COATED, EXTENDED RELEASE ORAL ONCE
Status: COMPLETED | OUTPATIENT
Start: 2025-03-17 | End: 2025-03-17

## 2025-03-17 RX ORDER — HYDROCODONE BITARTRATE AND ACETAMINOPHEN 7.5; 325 MG/1; MG/1
1 TABLET ORAL EVERY 6 HOURS PRN
Status: DISCONTINUED | OUTPATIENT
Start: 2025-03-17 | End: 2025-03-18

## 2025-03-17 RX ORDER — ONDANSETRON 2 MG/ML
4 INJECTION INTRAMUSCULAR; INTRAVENOUS EVERY 4 HOURS PRN
Status: DISCONTINUED | OUTPATIENT
Start: 2025-03-17 | End: 2025-03-19

## 2025-03-17 RX ORDER — MORPHINE SULFATE 4 MG/ML
2 INJECTION, SOLUTION INTRAMUSCULAR; INTRAVENOUS EVERY 10 MIN PRN
Status: DISCONTINUED | OUTPATIENT
Start: 2025-03-17 | End: 2025-03-17 | Stop reason: HOSPADM

## 2025-03-17 RX ORDER — BISACODYL 10 MG
10 SUPPOSITORY, RECTAL RECTAL
Status: DISCONTINUED | OUTPATIENT
Start: 2025-03-17 | End: 2025-03-19

## 2025-03-17 RX ORDER — HYDROCODONE BITARTRATE AND ACETAMINOPHEN 7.5; 325 MG/1; MG/1
1 TABLET ORAL EVERY 4 HOURS PRN
Status: DISCONTINUED | OUTPATIENT
Start: 2025-03-17 | End: 2025-03-19

## 2025-03-17 RX ORDER — MIDAZOLAM HYDROCHLORIDE 1 MG/ML
INJECTION INTRAMUSCULAR; INTRAVENOUS AS NEEDED
Status: DISCONTINUED | OUTPATIENT
Start: 2025-03-17 | End: 2025-03-17 | Stop reason: SURG

## 2025-03-17 RX ORDER — OXYCODONE HCL 10 MG/1
10 TABLET, FILM COATED, EXTENDED RELEASE ORAL 2 TIMES DAILY
Status: DISCONTINUED | OUTPATIENT
Start: 2025-03-17 | End: 2025-03-18

## 2025-03-17 RX ORDER — OXYCODONE HCL 10 MG/1
10 TABLET, FILM COATED, EXTENDED RELEASE ORAL EVERY 12 HOURS
Status: DISCONTINUED | OUTPATIENT
Start: 2025-03-17 | End: 2025-03-17

## 2025-03-17 RX ORDER — METOCLOPRAMIDE HYDROCHLORIDE 5 MG/ML
10 INJECTION INTRAMUSCULAR; INTRAVENOUS EVERY 8 HOURS PRN
Status: DISCONTINUED | OUTPATIENT
Start: 2025-03-18 | End: 2025-03-19

## 2025-03-17 RX ORDER — DIPHENHYDRAMINE HCL 25 MG
25 CAPSULE ORAL EVERY 4 HOURS PRN
Status: DISCONTINUED | OUTPATIENT
Start: 2025-03-17 | End: 2025-03-19

## 2025-03-17 RX ORDER — NALOXONE HYDROCHLORIDE 0.4 MG/ML
0.08 INJECTION, SOLUTION INTRAMUSCULAR; INTRAVENOUS; SUBCUTANEOUS AS NEEDED
Status: DISCONTINUED | OUTPATIENT
Start: 2025-03-17 | End: 2025-03-17 | Stop reason: HOSPADM

## 2025-03-17 RX ORDER — ONDANSETRON 2 MG/ML
4 INJECTION INTRAMUSCULAR; INTRAVENOUS ONCE
Status: COMPLETED | OUTPATIENT
Start: 2025-03-17 | End: 2025-03-17

## 2025-03-17 RX ORDER — MORPHINE SULFATE 1 MG/ML
INJECTION, SOLUTION EPIDURAL; INTRATHECAL; INTRAVENOUS
Status: COMPLETED | OUTPATIENT
Start: 2025-03-17 | End: 2025-03-17

## 2025-03-17 RX ORDER — PANTOPRAZOLE SODIUM 40 MG/1
40 TABLET, DELAYED RELEASE ORAL
Status: DISCONTINUED | OUTPATIENT
Start: 2025-03-18 | End: 2025-03-19

## 2025-03-17 RX ORDER — HYDROMORPHONE HYDROCHLORIDE 1 MG/ML
0.2 INJECTION, SOLUTION INTRAMUSCULAR; INTRAVENOUS; SUBCUTANEOUS EVERY 5 MIN PRN
Status: DISCONTINUED | OUTPATIENT
Start: 2025-03-17 | End: 2025-03-17 | Stop reason: HOSPADM

## 2025-03-17 RX ORDER — ROSUVASTATIN CALCIUM 10 MG/1
10 TABLET, COATED ORAL EVERY EVENING
Status: DISCONTINUED | OUTPATIENT
Start: 2025-03-17 | End: 2025-03-19

## 2025-03-17 RX ORDER — HYDROMORPHONE HYDROCHLORIDE 1 MG/ML
0.6 INJECTION, SOLUTION INTRAMUSCULAR; INTRAVENOUS; SUBCUTANEOUS EVERY 5 MIN PRN
Status: DISCONTINUED | OUTPATIENT
Start: 2025-03-17 | End: 2025-03-17 | Stop reason: HOSPADM

## 2025-03-17 RX ORDER — METOCLOPRAMIDE HYDROCHLORIDE 5 MG/ML
10 INJECTION INTRAMUSCULAR; INTRAVENOUS ONCE
Status: COMPLETED | OUTPATIENT
Start: 2025-03-17 | End: 2025-03-17

## 2025-03-17 RX ORDER — ACETAMINOPHEN 325 MG/1
650 TABLET ORAL EVERY 6 HOURS PRN
Status: DISCONTINUED | OUTPATIENT
Start: 2025-03-17 | End: 2025-03-18

## 2025-03-17 RX ORDER — SODIUM CHLORIDE, SODIUM LACTATE, POTASSIUM CHLORIDE, CALCIUM CHLORIDE 600; 310; 30; 20 MG/100ML; MG/100ML; MG/100ML; MG/100ML
INJECTION, SOLUTION INTRAVENOUS CONTINUOUS
Status: DISCONTINUED | OUTPATIENT
Start: 2025-03-17 | End: 2025-03-19

## 2025-03-17 RX ORDER — ONDANSETRON 2 MG/ML
4 INJECTION INTRAMUSCULAR; INTRAVENOUS EVERY 6 HOURS PRN
Status: DISCONTINUED | OUTPATIENT
Start: 2025-03-17 | End: 2025-03-17

## 2025-03-17 RX ORDER — SENNOSIDES 8.6 MG
17.2 TABLET ORAL NIGHTLY
Status: DISCONTINUED | OUTPATIENT
Start: 2025-03-17 | End: 2025-03-19

## 2025-03-17 RX ORDER — ACETAMINOPHEN 500 MG
1000 TABLET ORAL ONCE
Status: COMPLETED | OUTPATIENT
Start: 2025-03-17 | End: 2025-03-17

## 2025-03-17 RX ORDER — MORPHINE SULFATE 4 MG/ML
4 INJECTION, SOLUTION INTRAMUSCULAR; INTRAVENOUS EVERY 10 MIN PRN
Status: DISCONTINUED | OUTPATIENT
Start: 2025-03-17 | End: 2025-03-17 | Stop reason: HOSPADM

## 2025-03-17 RX ORDER — METOCLOPRAMIDE 10 MG/1
10 TABLET ORAL ONCE
Status: COMPLETED | OUTPATIENT
Start: 2025-03-17 | End: 2025-03-17

## 2025-03-17 RX ORDER — SODIUM CHLORIDE, SODIUM LACTATE, POTASSIUM CHLORIDE, CALCIUM CHLORIDE 600; 310; 30; 20 MG/100ML; MG/100ML; MG/100ML; MG/100ML
INJECTION, SOLUTION INTRAVENOUS CONTINUOUS
Status: DISCONTINUED | OUTPATIENT
Start: 2025-03-17 | End: 2025-03-17 | Stop reason: HOSPADM

## 2025-03-17 RX ORDER — HYDROMORPHONE HYDROCHLORIDE 1 MG/ML
0.4 INJECTION, SOLUTION INTRAMUSCULAR; INTRAVENOUS; SUBCUTANEOUS
Status: DISCONTINUED | OUTPATIENT
Start: 2025-03-17 | End: 2025-03-18

## 2025-03-17 RX ORDER — DIPHENHYDRAMINE HYDROCHLORIDE 50 MG/ML
12.5 INJECTION, SOLUTION INTRAMUSCULAR; INTRAVENOUS EVERY 4 HOURS PRN
Status: DISCONTINUED | OUTPATIENT
Start: 2025-03-17 | End: 2025-03-18

## 2025-03-17 RX ORDER — FAMOTIDINE 10 MG/ML
20 INJECTION, SOLUTION INTRAVENOUS ONCE
Status: DISCONTINUED | OUTPATIENT
Start: 2025-03-17 | End: 2025-03-17 | Stop reason: HOSPADM

## 2025-03-17 RX ORDER — AMLODIPINE BESYLATE 10 MG/1
10 TABLET ORAL DAILY
Status: DISCONTINUED | OUTPATIENT
Start: 2025-03-18 | End: 2025-03-19

## 2025-03-17 RX ORDER — DIPHENHYDRAMINE HYDROCHLORIDE 50 MG/ML
12.5 INJECTION, SOLUTION INTRAMUSCULAR; INTRAVENOUS EVERY 4 HOURS PRN
Status: DISCONTINUED | OUTPATIENT
Start: 2025-03-17 | End: 2025-03-19

## 2025-03-17 RX ORDER — POLYETHYLENE GLYCOL 3350 17 G/17G
17 POWDER, FOR SOLUTION ORAL DAILY PRN
Status: DISCONTINUED | OUTPATIENT
Start: 2025-03-17 | End: 2025-03-19

## 2025-03-17 RX ORDER — TRANEXAMIC ACID 10 MG/ML
INJECTION, SOLUTION INTRAVENOUS AS NEEDED
Status: DISCONTINUED | OUTPATIENT
Start: 2025-03-17 | End: 2025-03-17 | Stop reason: SURG

## 2025-03-17 RX ORDER — HALOPERIDOL 5 MG/ML
0.5 INJECTION INTRAMUSCULAR ONCE AS NEEDED
Status: ACTIVE | OUTPATIENT
Start: 2025-03-17 | End: 2025-03-17

## 2025-03-17 RX ORDER — SODIUM PHOSPHATE, DIBASIC AND SODIUM PHOSPHATE, MONOBASIC 7; 19 G/230ML; G/230ML
1 ENEMA RECTAL ONCE AS NEEDED
Status: DISCONTINUED | OUTPATIENT
Start: 2025-03-17 | End: 2025-03-19

## 2025-03-17 RX ORDER — HYDROMORPHONE HYDROCHLORIDE 1 MG/ML
0.6 INJECTION, SOLUTION INTRAMUSCULAR; INTRAVENOUS; SUBCUTANEOUS
Status: DISCONTINUED | OUTPATIENT
Start: 2025-03-17 | End: 2025-03-18

## 2025-03-17 RX ORDER — ONDANSETRON HYDROCHLORIDE 4 MG/5ML
4 SOLUTION ORAL ONCE
Status: DISCONTINUED | OUTPATIENT
Start: 2025-03-17 | End: 2025-03-17

## 2025-03-17 RX ORDER — LATANOPROST 50 UG/ML
1 SOLUTION/ DROPS OPHTHALMIC NIGHTLY
Status: DISCONTINUED | OUTPATIENT
Start: 2025-03-17 | End: 2025-03-19

## 2025-03-17 RX ORDER — NALOXONE HYDROCHLORIDE 0.4 MG/ML
0.08 INJECTION, SOLUTION INTRAMUSCULAR; INTRAVENOUS; SUBCUTANEOUS
Status: DISCONTINUED | OUTPATIENT
Start: 2025-03-17 | End: 2025-03-18

## 2025-03-17 RX ORDER — MORPHINE SULFATE 2 MG/ML
1 INJECTION, SOLUTION INTRAMUSCULAR; INTRAVENOUS EVERY 2 HOUR PRN
Status: DISCONTINUED | OUTPATIENT
Start: 2025-03-17 | End: 2025-03-19

## 2025-03-17 RX ADMIN — SODIUM CHLORIDE, SODIUM LACTATE, POTASSIUM CHLORIDE, CALCIUM CHLORIDE: 600; 310; 30; 20 INJECTION, SOLUTION INTRAVENOUS at 12:14:00

## 2025-03-17 RX ADMIN — BUPIVACAINE HYDROCHLORIDE 1.5 ML: 7.5 INJECTION, SOLUTION INTRASPINAL at 12:30:00

## 2025-03-17 RX ADMIN — MIDAZOLAM HYDROCHLORIDE 2 MG: 1 INJECTION INTRAMUSCULAR; INTRAVENOUS at 12:42:00

## 2025-03-17 RX ADMIN — MORPHINE SULFATE 0.2 MG: 1 INJECTION, SOLUTION EPIDURAL; INTRATHECAL; INTRAVENOUS at 12:30:00

## 2025-03-17 RX ADMIN — MIDAZOLAM HYDROCHLORIDE 2 MG: 1 INJECTION INTRAMUSCULAR; INTRAVENOUS at 12:16:00

## 2025-03-17 RX ADMIN — TRANEXAMIC ACID 1000 MG: 10 INJECTION, SOLUTION INTRAVENOUS at 12:40:00

## 2025-03-17 NOTE — OPERATIVE REPORT
Operative Note    Patient Name: Kerry Hsu    Preoperative Diagnosis: Primary osteoarthritis of right knee [M17.11]    Postoperative Diagnosis: Primary osteoarthritis of right knee [M17.11]    Primary Surgeon: CONG LOZANO MD     Assistant: Mariano Ladd    Procedures: R TKA with PSI, preoperative computer navigation    Surgical Findings: above    Anesthesia: Spinal    Complications: none    Specimen: R knee bone and soft tissue to pathology    Drains: chang    Condition: stable to RR    Estimated Blood Loss: 100cc    OPERATIVE TECHNIQUE:  The patient was identified in the preoperative holding area.  The appropriate consents were obtained.  Patient was taken to the operating room and placed in the supine position on the operating room table.  After adequate spinal anesthesia and sedation was achieved, a Chang catheter was inserted using sterile technique.  An SCD device was placed on the non-surgical lower extremity.  All bony prominences were well padded.  A warming blanket was applied to the chest.  The patient was given preoperative IV antibiotics and IV tranexamic acid.  After placing a tourniquet on the surgical thigh, the surgical  knee and lower extremity were prepped and draped in a sterile fashion.  The surgical foot was then placed in the foot aguirre for the [facility]-type knee positioner.  It was well padded.  The extremity was exsanguinated and the tourniquet was inflated to 250 mmHg.  A longitudinal incision was then made over the anterior aspect of the knee.  A medial parapatellar retinacular incision was made.  The patella was partially everted and a freehand method was used to resect approximately 8 to 10 mm of articular surface.  The patella was sized and a size 38 Perico implant was chosen.  The drill guide was applied to the cut surface and the lug holes were drilled in the usual fashion.  Care was taken to medialized the placement of the drill guide.  Next, a patellar protector was  inserted.  The knee was brought into a flexed position.  The menisci and cruciate ligaments were excised.  Portions of the superficial medial collateral ligament were sharply elevated from the anteromedial proximal tibia.  Next, the patient-specific pin guide for the distal femur was applied to the anterior distal femur.  The anterior and distal pins were placed.  Distal pins were removed and marked.  The distal cutting guide was applied.  The standard distal cut was made.  The bone fragments were sized on the back table and appeared consistent with the preoperative plan.  We then placed the 4-in-1 cutting guide for a size 9 femur.  Anterior, posterior, and chamfer cuts were made.  The posterior bone fragments were sized on the back table and appeared consistent with the preoperative plan.  We then turned attention to the tibia.  A PCL retractor was inserted.  The patient-specific pin guide for the proximal tibia was applied.  The anterior and superior pins were placed.  Superior pins were removed and marked.  The cutting guide was next applied and alignment was checked with the long axis of the tibia.  It was checked in all 3 planes and appeared appropriately positioned.  We then cut the tibia.  The bone fragment was sized on the back table and appeared consistent with the preoperative plan.  We then placed a trial E Persona natural tibia from the Perico set.  We aligned this with the superior drill pins and pinned the trial implant in position.  We then finished the tibia with the reamer and keel cutter.  We trialed the tibia and femur, placed a 12 mm polyethylene bearing trial and brought the knee through range of motion.  It was adequately stable in all planes.  Full range of motion was achieved.  There was no impingement on the implants.  We then removed the trial implants after lateralizing the femoral implant trial and drilling the lug holes.  We then used third-generation cementing technique to place a size  E tibia, a size 9 femur, and a size 38 x 9.5 patella.  Excess cement was removed.  Attention was paid to hemostasis.  Tourniquet was deflated.  We chose a 12 mm polyethylene bearing medial congruent and this was snapped into position with the Perico polyethylene .  It appeared stable and well locked to the tibial tray.  Again, the knee was brought through range of motion.  It was adequately stable in all planes.  Full range of motion was achieved.  There was no impingement on the implants.  Diluted Betadine solution was used to irrigate the implants in situ.  The solution was left in place for 30 seconds.  It was then copiously irrigated with sterile saline.  We closed the retinacular layer with 0 Ethibond sutures in interrupted fashion.  Skin and subcutaneous layers were closed with 2-0 Vicryl sutures and skin staples.  A sterile dressing was applied followed by a Polar Care device and Ace wrap.  The patient's anesthesia was reversed.  She was extubated and taken to the recovery room in stable condition.  All sponge and instrument counts were reported as correct.  The attending physician, Dr. Lozano, was present and performed all critical portions of the procedure.  There were no complications.  The first and second assistant were medically necessary for the surgery.  They assisted with patient positioning, retraction of soft tissues for accurate placement of the implants and trials.  They assisted with cement fragment removal, bone fragment removal, hemostasis and wound closure.  Without the aid of the assistance, the surgical procedure would not been possible.        CONG LOZANO MD

## 2025-03-17 NOTE — CONSULTS
[facility]    PATIENT'S NAME: AINSLEY HARTLEY   ATTENDING PHYSICIAN: Marcin Delgadillo MD   CONSULTING PHYSICIAN: Victoria Rick MD   PATIENT ACCOUNT#:   [id number]    LOCATION:  [facility] PACU 13 [facility] 10  MEDICAL RECORD #:   [id number]       YOB: 1960  ADMISSION DATE:       03/17/2025      CONSULT DATE:  03/17/2025    REPORT OF CONSULTATION      REASON FOR ADMISSION:  Post right total knee arthroplasty.    HISTORY OF PRESENT ILLNESS:  The patient is a 64-year-old  female with chronic right knee pain and underlying severe primary osteoarthritis.  Failed outpatient conservative medical management options.  Scheduled today for above-mentioned procedure by her orthopedic surgeon, Dr. Delgadillo.  Preoperatively she had spinal block.  Postoperatively transferred to PACU for further monitoring.    PAST MEDICAL HISTORY:  Osteoarthritis, obesity, obstructive sleep apnea, diverticulitis, hypertension, hyperlipidemia, peripheral neuropathy.    PAST SURGICAL HISTORY:  Anal sphincterotomy, total abdominal hysterectomy, cataract procedure, Nissen fundoplication, right bunionectomy.    MEDICATIONS:  Please see medication reconciliation list.     ALLERGIES:  Celebrex and sulfa.    SOCIAL HISTORY:  No tobacco, alcohol, or drug use.  Independent for basic activities of daily living.     FAMILY HISTORY:  Positive for hypertension.    REVIEW OF SYSTEMS:  Currently resting in bed.  No right knee pain.  No chest pain, no shortness of breath.  Other 12-point review of systems is negative.        PHYSICAL EXAMINATION:    GENERAL:  Alert and oriented to time, place, and person.  No acute distress.    VITAL SIGNS:  Temperature 98.6, pulse 68, respiratory rate 12, blood pressure 120/57, pulse ox 99% on room air.  HEENT:  Atraumatic.  Oropharynx clear.  Moist mucous membranes.  Ears and nose normal.  Eyes:  Anicteric sclerae.  NECK:  Supple.  No lymphadenopathy.  Trachea midline.  Full range of  motion.  LUNGS:  Clear to auscultation bilaterally.  Normal respiratory effort.    HEART:  Regular rate and rhythm.  S1 and S2 auscultated.  No murmur.  ABDOMEN:  Soft, nondistended.  No tenderness.  Positive bowel sounds.  EXTREMITIES:  Right knee dressing.  No leg edema.  No clubbing or cyanosis.  NEUROLOGIC:  Decreased sensation and muscle movement, both lower extremities, post spinal block.  No other focal findings.     ASSESSMENT AND PLAN:    1.   Right knee primary osteoarthritis, status post right total knee arthroplasty, spinal block.  Pain control.  Neurovascular checks.  Xarelto for DVT prophylaxis.  Physical and occupational therapy.    2.   Essential hypertension.  Continue home medications and monitor.   3.   Neuropathy.  Continue home medications.  4.   Obstructive sleep apnea.  Apply obstructive sleep apnea protocol and monitor.    5.   Hyperlipidemia.  Continue home medications.    Dictated By Victoria Rick MD  d: 03/17/2025 14:49:08  t: 03/17/2025 15:40:12  Job [id number]  FB/

## 2025-03-17 NOTE — ANESTHESIA PROCEDURE NOTES
Spinal Block    Performed by: Guadalupe Moss MD  Authorized by: Guadalupe Moss MD      General Information and Staff    Start Time:   Anesthesiologist:  Guadalupe Moss MD  Performed by:  Anesthesiologist  Patient Location:  OR  Preanesthetic Checklist: patient identified, IV checked, risks and benefits discussed, monitors and equipment checked, pre-op evaluation, timeout performed, anesthesia consent and sterile technique used      Procedure Details    Patient Position:  Sitting  Prep: ChloraPrep    Monitoring:  Cardiac monitor  Approach:  Midline  Location:  L3-4  Injection Technique:  Single-shot    Needle    Needle Type:  Pencil-tip  Needle Gauge:  24 G  Needle Length:  3.5 in    Assessment    Sensory Level:  T8  Events: clear CSF, CSF aspirated, well tolerated and blood negative      Additional Comments

## 2025-03-17 NOTE — RESPIRATORY THERAPY NOTE
CPAP EVALUATION:    Pt states that she was never diagnosed with sleep apnea and doesn't use CPAP when sleeping.

## 2025-03-17 NOTE — ANESTHESIA POSTPROCEDURE EVALUATION
Patient: Kerry Hsu    Procedure Summary       Date: 03/17/25 Room / Location: [facility]  / [facility]    Anesthesia Start: 1214 Anesthesia Stop: 1404    Procedure: Right total knee arthroplasty (Right: Knee) Diagnosis:       Primary osteoarthritis of right knee      (Primary osteoarthritis of right knee [M17.11])    Surgeons: Marcin Delgadillo MD Anesthesiologist: Guadalupe Moss MD    Anesthesia Type: spinal, MAC ASA Status: 2            Anesthesia Type: spinal, MAC    Vitals Value Taken Time   /52 03/17/25 1432   Temp 98.6 °F (37 °C) 03/17/25 1404   Pulse 72 03/17/25 1435   Resp 13 03/17/25 1435   SpO2 96 % 03/17/25 1435   Vitals shown include unfiled device data.    [facility] Post Evaluation:   Patient Evaluated in PACU  Patient Participation: complete - patient participated  Level of Consciousness: awake and alert  Pain Score: 0  Pain Management: adequateYes    Nausea/Vomiting: none  Cardiovascular Status: acceptable  Respiratory Status: acceptable  Postoperative Hydration acceptable      Guadalupe Moss MD  3/17/2025 2:35 PM

## 2025-03-17 NOTE — ANESTHESIA PREPROCEDURE EVALUATION
Anesthesia PreOp Note    HPI:     Kerry Hsu is a 64 year old female who presents for preoperative consultation requested by: Marcin Delgadillo MD    Date of Surgery: 3/17/2025    Procedure(s):  Right total knee arthroplasty  Indication: Primary osteoarthritis of right knee [M17.11]    Relevant Problems   No relevant active problems       NPO:  Last Liquid Consumption Date: 03/17/25  Last Liquid Consumption Time: 1118 (small sip water with pre operative medication)  Last Solid Consumption Date: 03/16/25  Last Solid Consumption Time: 2200  Last Liquid Consumption Date: 03/17/25          History Review:  Patient Active Problem List    Diagnosis Date Noted    Vitreous opacities 09/13/2024    Kidney lesion 07/25/2024    Liver lesion 07/25/2024    Neutrophilia 01/15/2024    Swelling of extremity 01/02/2024    Venous insufficiency of both lower extremities 12/29/2023    Nontraumatic complete tear of left rotator cuff 07/28/2023    Hyperlipidemia with target low density lipoprotein (LDL) cholesterol less than 130 mg/dL 07/28/2023    Osteopenia of left hip 02/28/2023    Visual discomfort, bilateral 04/27/2022    Preglaucoma, unspecified, bilateral 04/27/2022    Callus of foot 08/24/2020    Prediabetes 08/24/2020    Nontraumatic complete tear of right rotator cuff 01/11/2020    Leg cramps 01/11/2020    Mechanical ptosis of bilateral eyelids 12/11/2019    Lumbar foraminal stenosis 10/24/2019    Herniation of intervertebral disc between L4 and L5 10/24/2019    DDD (degenerative disc disease), lumbar 10/24/2019    Chronic bilateral low back pain with bilateral sciatica 10/24/2019    Myofascial pain 10/24/2019    Elevated CPK 08/21/2019    Pseudophakia, left eye 12/04/2018    Lumbar radiculopathy 10/31/2018    Cervical stenosis of spinal canal 05/21/2018    Obstructive sleep apnea syndrome 03/21/2018    Age-related cataract 02/21/2018    Primary open angle glaucoma of both eyes, moderate stage 02/10/2018    Ptosis of  both eyelids 02/10/2018    Cramps, muscle, general 11/20/2017    Vitamin D deficiency 11/20/2017    Chronic cough     Diverticulosis 11/11/2016    Internal hemorrhoid 11/11/2016    Neuropathy 06/30/2015    Myopathy 06/30/2015    Status post Nissen fundoplication 04/21/2015    GERD (gastroesophageal reflux disease) 04/21/2015    Hand arthritis 09/02/2014    Essential hypertension     Nausea 05/17/2014    Anal sphincter incontinence 04/28/2014    Primary localized osteoarthrosis, lower leg 04/09/2014    Bronchitis 01/21/2014    Microscopic hematuria 12/05/2013    Cervicalgia 11/02/2013    Effusion of lower leg joint 10/21/2013    Congenital cystic kidney disease 09/26/2013    Osteoarthritis of knee 09/18/2013    Disorder of kidney and ureter 09/17/2013    Urinary tract infection 09/17/2013    Gross hematuria 09/17/2013    Otitis media 08/29/2013    Vaginitis and vulvovaginitis 08/29/2013    Pressure ulcer, stage 1 08/18/2013    Acute pharyngitis 06/22/2013    Unspecified vertiginous syndromes and labyrinthine disorders 05/15/2013    Tear of lateral cartilage or meniscus of knee, current 02/27/2013    Incontinence of feces 12/28/2012    Anxiety state 12/15/2012    Otalgia 10/11/2012    Abnormal mammogram 10/03/2012    Arthropathy 07/10/2012    Nonspecific abnormal serum enzyme levels 07/07/2012    Cough 04/20/2012    Adjustment disorder with depressed mood 12/27/2011    Chondromalacia of patella 10/17/2011    Rhabdomyolysis 08/16/2011    Goiter 08/13/2011    Chronic skin ulcer (HCC) 06/13/2011    Sebaceous cyst 06/13/2011    Incisional hernia 06/11/2011    Complete rupture of rotator cuff 04/28/2011    Pain in joint, lower leg 04/22/2011    Insomnia 01/25/2011    Myositis 01/15/2011    Strain of rotator cuff 12/27/2010    Nonspecific abnormal finding in stool contents 12/04/2010    Pain in joint, shoulder region 12/04/2010    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    Chest pain 09/01/2010     Hyperlipidemia 07/30/2010    Malfunct impl card defb 04/19/2010    Bunion 04/19/2010    Low back pain 04/01/2010    Mixed emotional features as adjustment reaction 02/13/2010    Cellulitis 12/09/2009    Dermatitis 11/28/2009    Sleep disturbance 10/02/2009    Tinea corporis 09/24/2009    Hemorrhoids with complication 08/19/2009    Residual hemorrhoidal skin tags 08/12/2009    External hemorrhoids 08/12/2009    Pyogenic granuloma of skin and subcutaneous tissue 08/03/2009    Dyspepsia and disorder of function of stomach 02/02/2009    Benign neoplasm of skin of trunk 01/23/2009    Thoracic and lumbosacral neuritis 01/21/2009    Osteoarthrosis 11/11/2008    Backache 10/24/2008    Malaise and fatigue 07/19/2008    Abdominal pain, generalized 07/19/2008    Shoulder region pain 06/10/2008    Symptoms involving digestive system 02/05/2008    Hemorrhage of gastrointestinal tract 02/05/2008    Irritable colon 01/18/2008    Disorder of bursae and tendons in shoulder region 01/18/2008    Acute sinusitis 11/26/2007    Abdominal pain 10/10/2007    Arthropathy, lower leg 08/29/2007    Sciatica 06/12/2007    Dizziness 03/17/2007    Synovial cyst 01/20/2007    Cellulitis and abscess of toe 01/20/2007    Cramp of limb 06/24/2006    Heartburn 06/06/2006    Reflux esophagitis 06/06/2006    Arthropathy of ankle and foot 04/27/2006    Spasm of muscle 11/23/2005    Acute bronchitis 11/23/2005    Inflammatory disease of breast 11/23/2005    Neuralgia and neuritis 04/29/2005    Other hammer toe(s) (acquired), left foot 04/21/2005    Lateral epicondylitis of elbow 04/12/2005    Symptom associated with female genital organs 03/18/2005    Sprain of nose 03/15/2005    Esophageal reflux 03/15/2005       Past Medical History:    Abscess of left thigh    Acute meniscal tear of knee    Age-related nuclear cataract of both eyes    Anal sphincter incontinence    Arthritis    Back problem    Back problem    Cataract    left    Chondromalacia    Colon  polyps    Degenerative disc disease    Disorder of kidney and ureter    Diverticular disease    Esophageal reflux    Glaucoma    Hammertoe    High blood pressure    High cholesterol    Hx of motion sickness    Hyperlipidemia with target low density lipoprotein (LDL) cholesterol less than 130 mg/dL    Insomnia    Kidney lesion    Liver lesion    per patient not being treated or seeing a specialist    Neuropathy    both feet    Obstructive sleep apnea syndrome    Osteoarthritis    Palpitation    PONV (postoperative nausea and vomiting)    Prediabetes    Primary open angle glaucoma of both eyes    Diagnosis of glaucoma OU; Started Latanoprost qhs after abnormal VF and OCT 2/25/16;  6/5/18 consult with Dr. Madeline Chappell-see progress note    Sleep apnea    no current therapy    Small bowel obstruction (HCC)    Tendinitis    Unspecified essential hypertension    Visual impairment    Readers       Past Surgical History:   Procedure Laterality Date    Anal sphincterotomy      03/12/2013 [place]    Blepharoplasty anesthesia Bilateral 03/05/2020    Dr Suresh [facility]     Cataract extraction w/  intraocular lens implant Left 05/07/2018    L PC IOL with Dr. Suresh @ [facility]    Colonoscopy N/A 11/11/2016    Procedure: COLONOSCOPY;  Surgeon: Sabrina Cazares MD;  Location: [facility]    Colonoscopy N/A 09/06/2019    Procedure: COLONOSCOPY;  Surgeon: Edwin Sterling MD;  Location: [facility]    Colonoscopy  09/16/2024    Dr. Sterling; colon polyps, diverticulosis, hemorrhoids    Colonoscopy N/A 9/16/2024    Procedure: COLONOSCOPY;  Surgeon: Edwin Sterling MD;  Location: [facility]    Egd  09/16/2024    Dr. Sterling; gastric polyps, small hiatal hernia    Excision of chalazion, single - od - right eye Right 6.27/2017    Nasally    Hc arthrocentesis or inject major joint w/o us      Hysterectomy  1996    KAI    Other      Lap Nissen Fundoplication surgery    Other surgical history  2005,2007,2008    LAPAROSCOPIC  LYSIS OF ADHESION    Other surgical history      right foot bunionectomy    Other surgical history  11/14/2014    right superficial anterior peroneal nerve biopsy and right proximal thigh muscle biopsy.    Other surgical history  06/13/2023    Excision and Biopsy of two  perineal cysts    Upper gi endoscopy performed  05/2015    Yag capsulotomy - os - left eye Left 12/19/2018    RJM       Prescriptions Prior to Admission[1]  Current Medications and Prescriptions Ordered in Epic[2]    Allergies[3]    Family History   Problem Relation Age of Onset    Hypertension Father     Hypertension Mother     Hypertension Sister     Cancer Sister         liver cancer    Hypertension Brother     Diabetes Neg     Glaucoma Neg     Macular degeneration Neg     Breast Cancer Neg     Ovarian Cancer Neg      Social History     Socioeconomic History    Marital status:    Tobacco Use    Smoking status: Never     Passive exposure: Never    Smokeless tobacco: Never   Vaping Use    Vaping status: Never Used   Substance and Sexual Activity    Alcohol use: No     Comment: None.     Drug use: No    Sexual activity: Yes     Birth control/protection: Hysterectomy   Other Topics Concern    Caffeine Concern Yes     Comment: occasional soda    Exercise Yes    Pt has a pacemaker No    Pt has a defibrillator No    Breast feeding No    Reaction to local anesthetic No    Right Handed Yes       Available pre-op labs reviewed.  Lab Results   Component Value Date    WBC 10.7 03/07/2025    RBC 5.34 (H) 03/07/2025    HGB 14.0 03/07/2025    HCT 42.3 03/07/2025    MCV 79.2 (L) 03/07/2025    MCH 26.2 03/07/2025    MCHC 33.1 03/07/2025    RDW 14.3 03/07/2025    .0 03/07/2025     Lab Results   Component Value Date     03/07/2025    K 3.5 03/07/2025     03/07/2025    CO2 28.0 03/07/2025    BUN 12 03/07/2025    CREATSERUM 1.07 (H) 03/07/2025     (H) 03/07/2025    PGLU 111 (H) 03/17/2025    CA 9.2 03/07/2025          Vital  Signs:  Body mass index is 30.11 kg/m².   height is 1.727 m (5' 8\") and weight is 89.8 kg (198 lb). Her oral temperature is 98.4 °F (36.9 °C). Her blood pressure is 125/68 and her pulse is 64. Her respiration is 16 and oxygen saturation is 100%.   Vitals:    03/11/25 0924 03/17/25 1128   BP:  125/68   Pulse:  64   Resp:  16   Temp:  98.4 °F (36.9 °C)   TempSrc:  Oral   SpO2:  100%   Weight: 91.2 kg (201 lb) 89.8 kg (198 lb)   Height: 1.727 m (5' 8\") 1.727 m (5' 8\")        Anesthesia Evaluation     Patient summary reviewed and Nursing notes reviewed    History of anesthetic complications   Airway   Mallampati: II  TM distance: >3 FB  Neck ROM: full  Dental - Dentition appears grossly intact     Pulmonary - normal exam   (+) sleep apnea  Cardiovascular - normal exam  (+) hypertension    Neuro/Psych    (+)  neuromuscular disease, anxiety/panic attacks,        GI/Hepatic/Renal    (+) GERD, liver disease    Endo/Other    Abdominal  - normal exam                 Anesthesia Plan:   ASA:  2  Plan:   Spinal and MAC  Informed Consent Plan and Risks Discussed With:  Patient      I have informed Kerry Hsu and/or legal guardian or family member of the nature of the anesthetic plan, benefits, risks including possible dental damage if relevant, major complications, and any alternative forms of anesthetic management.   All of the patient's questions were answered to the best of my ability. The patient desires the anesthetic management as planned.  Guadalupe Moss MD  3/17/2025 12:13 PM  Present on Admission:  **None**           [1]   Medications Prior to Admission   Medication Sig Dispense Refill Last Dose/Taking    rosuvastatin 10 MG Oral Tab Take 1 tablet (10 mg total) by mouth every evening.   3/16/2025    pregabalin (LYRICA) 75 MG Oral Cap Take 1 capsule (75 mg total) by mouth 2 (two) times daily. 60 capsule 2 3/16/2025    Omeprazole 40 MG Oral Capsule Delayed Release Take 1 capsule (40 mg total) by mouth daily. Take 1  capsule by mouth daily before breakfast. 90 capsule 3 3/17/2025    amLODIPine 10 MG Oral Tab TAKE 1 TABLET BY MOUTH EVERY DAY 90 tablet 3 3/16/2025    latanoprost 0.005 % Ophthalmic Solution INSTILL 1 DROP IN BOTH EYES EVERY night 3 each 3 3/16/2025    cholecalciferol 50 MCG (2000 UT) Oral Cap Take 1 capsule (2,000 Units total) by mouth daily.   Past Week    MAGNESIUM OR Take by mouth daily.   Past Week   [2]   Current Facility-Administered Medications Ordered in Epic   Medication Dose Route Frequency Provider Last Rate Last Admin    lactated ringers infusion   Intravenous Continuous Marcin Delgadillo MD 20 mL/hr at 03/17/25 1142 New Bag at 03/17/25 1142    [Transfer Hold] famotidine (Pepcid) tab 20 mg  20 mg Oral Once Marcin Delgadillo MD        Or    [Transfer Hold] famotidine (Pepcid) 20 mg/2mL injection 20 mg  20 mg Intravenous Once Marcin Delgadillo MD        ceFAZolin (Ancef) 2g in 10mL IV syringe premix  2 g Intravenous Once Marcin Delgadillo MD         No current [facility]-ordered outpatient medications on file.   [3]   Allergies  Allergen Reactions    Celecoxib TONGUE SWELLING     Other reaction(s): CELECOXIB    Sulfa Antibiotics TONGUE SWELLING     Other reaction(s): SULFA (SULFONAMIDE ANTIBIOTICS)    Erythromycin PAIN     Abdominal pain    Erythromycin Stearate PAIN     Abdominal pain    Amoxicillin DIARRHEA

## 2025-03-17 NOTE — H&P
[facility]  part of [facility]    History & Physical    Kerry Hsu Patient Status:  Outpatient in a Bed    1960 MRN [id number]   Location [facility] PRE OP RECOVERY Attending Marcin Delgadillo MD   Hosp Day # 0 PCP Bella Rogers MD     Date:  3/17/2025  Date of Admission:  3/17/2025    History provided by:patient  Chief Complaint:   No chief complaint on file.    Right knee pain   HPI:   Kerry Hsu is a(n) 64 year old female. Presents today with complaints of bilateral knee pain right worse than left.  She has primarily pain along the posterior right knee and anterior left knee.  She has been taking ibuprofen on a regular basis.  The injection given a month ago helped for a few weeks.  She may be interested in more definitive treatment options.  No recent injury to the knees.     History     Past Medical History:    Abscess of left thigh    Acute meniscal tear of knee    Age-related nuclear cataract of both eyes    Anal sphincter incontinence    Arthritis    Back problem    Back problem    Cataract    left    Chondromalacia    Colon polyps    Degenerative disc disease    Disorder of kidney and ureter    Diverticular disease    Esophageal reflux    Glaucoma    Hammertoe    High blood pressure    High cholesterol    Hx of motion sickness    Hyperlipidemia with target low density lipoprotein (LDL) cholesterol less than 130 mg/dL    Insomnia    Kidney lesion    Liver lesion    per patient not being treated or seeing a specialist    Neuropathy    both feet    Obstructive sleep apnea syndrome    Osteoarthritis    Palpitation    PONV (postoperative nausea and vomiting)    Prediabetes    Primary open angle glaucoma of both eyes    Diagnosis of glaucoma OU; Started Latanoprost qhs after abnormal VF and OCT 16;  18 consult with Dr. Madeline Chappell-see progress note    Sleep apnea    no current therapy    Small bowel obstruction (HCC)    Tendinitis     Unspecified essential hypertension    Visual impairment    Readers     Past Surgical History:   Procedure Laterality Date    Anal sphincterotomy      03/12/2013 [place]    Blepharoplasty anesthesia Bilateral 03/05/2020    Dr Suresh [facility]     Cataract extraction w/  intraocular lens implant Left 05/07/2018    L PC IOL with Dr. Suresh @ [facility]    Colonoscopy N/A 11/11/2016    Procedure: COLONOSCOPY;  Surgeon: Sabrina Cazares MD;  Location: [facility] ENDOSCOPY    Colonoscopy N/A 09/06/2019    Procedure: COLONOSCOPY;  Surgeon: Edwin Sterling MD;  Location: [facility] ENDOSCOPY    Colonoscopy  09/16/2024    Dr. Sterling; colon polyps, diverticulosis, hemorrhoids    Colonoscopy N/A 9/16/2024    Procedure: COLONOSCOPY;  Surgeon: Edwin Sterling MD;  Location: [facility] ENDOSCOPY    Egd  09/16/2024    Dr. Sterling; gastric polyps, small hiatal hernia    Excision of chalazion, single - od - right eye Right 6.27/2017    Nasally    Hc arthrocentesis or inject major joint w/o us      Hysterectomy  1996    KAI    Other      Lap Nissen Fundoplication surgery    Other surgical history  2005,2007,2008    LAPAROSCOPIC LYSIS OF ADHESION    Other surgical history      right foot bunionectomy    Other surgical history  11/14/2014    right superficial anterior peroneal nerve biopsy and right proximal thigh muscle biopsy.    Other surgical history  06/13/2023    Excision and Biopsy of two  perineal cysts    Upper gi endoscopy performed  05/2015    Yag capsulotomy - os - left eye Left 12/19/2018    RJM     Family History   Problem Relation Age of Onset    Hypertension Father     Hypertension Mother     Hypertension Sister     Cancer Sister         liver cancer    Hypertension Brother     Diabetes Neg     Glaucoma Neg     Macular degeneration Neg     Breast Cancer Neg     Ovarian Cancer Neg      Social History:  Social History     Socioeconomic History    Marital status:    Tobacco Use    Smoking status: Never     Passive exposure: Never     Smokeless tobacco: Never   Vaping Use    Vaping status: Never Used   Substance and Sexual Activity    Alcohol use: No     Comment: None.     Drug use: No    Sexual activity: Yes     Birth control/protection: Hysterectomy   Other Topics Concern    Caffeine Concern Yes     Comment: occasional soda    Exercise Yes    Pt has a pacemaker No    Pt has a defibrillator No    Breast feeding No    Reaction to local anesthetic No    Right Handed Yes   Social History Narrative    Work - banking     Social Drivers of Health     Food Insecurity: No Food Insecurity (3/13/2025)    NCSS - Food Insecurity     Worried About Running Out of Food in the Last Year: No     Ran Out of Food in the Last Year: No   Transportation Needs: No Transportation Needs (3/13/2025)    NCSS - Transportation     Lack of Transportation: No   Housing Stability: Not At Risk (3/13/2025)    NCSS - Housing/Utilities     Has Housing: Yes     Worried About Losing Housing: No     Unable to Get Utilities: No     Allergies/Medications:   Allergies: Allergies[1]  Medications Prior to Admission   Medication Sig    rosuvastatin 10 MG Oral Tab Take 1 tablet (10 mg total) by mouth every evening.    pregabalin (LYRICA) 75 MG Oral Cap Take 1 capsule (75 mg total) by mouth 2 (two) times daily.    Omeprazole 40 MG Oral Capsule Delayed Release Take 1 capsule (40 mg total) by mouth daily. Take 1 capsule by mouth daily before breakfast.    amLODIPine 10 MG Oral Tab TAKE 1 TABLET BY MOUTH EVERY DAY    latanoprost 0.005 % Ophthalmic Solution INSTILL 1 DROP IN BOTH EYES EVERY night    cholecalciferol 50 MCG (2000 UT) Oral Cap Take 1 capsule (2,000 Units total) by mouth daily.    MAGNESIUM OR Take by mouth daily.       Review of Systems:   Pertinent items are noted in HPI.    Physical Exam:   Vital Signs:  Blood pressure 125/68, pulse 64, temperature 98.4 °F (36.9 °C), temperature source Oral, resp. rate 16, height 5' 8\" (1.727 m), weight 198 lb (89.8 kg), SpO2 100%, not  currently breastfeeding.     General appearance: alert, appears stated age and cooperative  Extremities: Right knee mild valgus deformity. Tender to the lateral joint line. Trace effusion. Ligament stability normal. Moderate pain with range of motion of the knee. Left knee tender over the medial joint line. No pain with passive range of motion of the knee. Rotation maneuvers produce no significant pain.   Pulses: 2+ and symmetric  Neurologic: Alert and oriented X 3, normal strength and tone. Normal symmetric reflexes. Normal coordination and gait    Cervical Papanicolaou to be done in MD's office    Results:     Lab Results   Component Value Date    WBC 10.7 03/07/2025    HGB 14.0 03/07/2025    HCT 42.3 03/07/2025    .0 03/07/2025    CREATSERUM 1.07 (H) 03/07/2025    BUN 12 03/07/2025     03/07/2025    K 3.5 03/07/2025     03/07/2025    CO2 28.0 03/07/2025     (H) 03/07/2025    CA 9.2 03/07/2025    ALB 4.7 03/07/2025    ALKPHO 127 03/07/2025    BILT 0.6 03/07/2025    TP 7.2 03/07/2025    AST 20 03/07/2025    ALT 20 03/07/2025    PTT 31.1 10/06/2023    INR 0.95 12/27/2023    T4F 1.4 11/21/2023    TSH 1.511 12/18/2024    ESRML 21 01/15/2024    CRP <0.29 05/21/2022    MG 2.1 06/08/2023     (H) 05/15/2024    B12 404 02/17/2025       No results found.        Assessment/Plan:     * No active hospital problems. *    Assessment: Bilateral knee osteoarthritis, primary right worse than left.  Valgus deformity right knee     Plan: I discussed operative and nonoperative treatment options.  I advised consideration of total knee arthroplasty for the right knee.  Risks and benefits of surgery were discussed.  Questions were answered.  No guarantees were made.  She may elect for surgery in the coming weeks.  I ordered MRI imaging of the right hip knee and ankle using the [facility] protocol for preoperative computer navigation and patient significance to mentation.  Advised medical and dental evaluation  prior to surgery.  Follow-up again on the date of the procedure.     Follow Up: Two weeks after surgery for post op visit     Jessica Ladd PA-C  3/17/2025         [1]   Allergies  Allergen Reactions    Celecoxib TONGUE SWELLING     Other reaction(s): CELECOXIB    Sulfa Antibiotics TONGUE SWELLING     Other reaction(s): SULFA (SULFONAMIDE ANTIBIOTICS)    Erythromycin PAIN     Abdominal pain    Erythromycin Stearate PAIN     Abdominal pain    Amoxicillin DIARRHEA

## 2025-03-18 LAB
ANION GAP SERPL CALC-SCNC: 7 MMOL/L (ref 0–18)
BUN BLD-MCNC: 13 MG/DL (ref 9–23)
BUN/CREAT SERPL: 14.1 (ref 10–20)
CALCIUM BLD-MCNC: 8.7 MG/DL (ref 8.7–10.4)
CHLORIDE SERPL-SCNC: 106 MMOL/L (ref 98–112)
CO2 SERPL-SCNC: 29 MMOL/L (ref 21–32)
CREAT BLD-MCNC: 0.92 MG/DL
EGFRCR SERPLBLD CKD-EPI 2021: 69 ML/MIN/1.73M2 (ref 60–?)
GLUCOSE BLD-MCNC: 123 MG/DL (ref 70–99)
HCT VFR BLD AUTO: 35.5 %
HGB BLD-MCNC: 12 G/DL
OSMOLALITY SERPL CALC.SUM OF ELEC: 295 MOSM/KG (ref 275–295)
POTASSIUM SERPL-SCNC: 3.8 MMOL/L (ref 3.5–5.1)
SODIUM SERPL-SCNC: 142 MMOL/L (ref 136–145)

## 2025-03-18 PROCEDURE — 97530 THERAPEUTIC ACTIVITIES: CPT

## 2025-03-18 PROCEDURE — 97535 SELF CARE MNGMENT TRAINING: CPT

## 2025-03-18 PROCEDURE — 97162 PT EVAL MOD COMPLEX 30 MIN: CPT

## 2025-03-18 PROCEDURE — 80048 BASIC METABOLIC PNL TOTAL CA: CPT | Performed by: ORTHOPAEDIC SURGERY

## 2025-03-18 PROCEDURE — 85014 HEMATOCRIT: CPT | Performed by: ORTHOPAEDIC SURGERY

## 2025-03-18 PROCEDURE — 94760 N-INVAS EAR/PLS OXIMETRY 1: CPT

## 2025-03-18 PROCEDURE — 97165 OT EVAL LOW COMPLEX 30 MIN: CPT

## 2025-03-18 PROCEDURE — 97116 GAIT TRAINING THERAPY: CPT

## 2025-03-18 PROCEDURE — 85018 HEMOGLOBIN: CPT | Performed by: ORTHOPAEDIC SURGERY

## 2025-03-18 RX ORDER — POLYETHYLENE GLYCOL 3350 17 G/17G
17 POWDER, FOR SOLUTION ORAL DAILY PRN
Qty: 24 EACH | Refills: 0 | Status: SHIPPED | OUTPATIENT
Start: 2025-03-18

## 2025-03-18 RX ORDER — HYDROCODONE BITARTRATE AND ACETAMINOPHEN 7.5; 325 MG/1; MG/1
1 TABLET ORAL EVERY 4 HOURS PRN
Qty: 25 TABLET | Refills: 0 | Status: SHIPPED | OUTPATIENT
Start: 2025-03-18

## 2025-03-18 RX ORDER — ASPIRIN 325 MG
325 TABLET, DELAYED RELEASE (ENTERIC COATED) ORAL 2 TIMES DAILY
Qty: 21 TABLET | Refills: 0 | Status: SHIPPED | OUTPATIENT
Start: 2025-03-18

## 2025-03-18 RX ORDER — ASPIRIN 325 MG
325 TABLET, DELAYED RELEASE (ENTERIC COATED) ORAL 2 TIMES DAILY
Status: DISCONTINUED | OUTPATIENT
Start: 2025-03-18 | End: 2025-03-19

## 2025-03-18 NOTE — PROGRESS NOTES
Subjective: No complaints. Pain poorly controlled.    Objective:  Patient Vitals for the past 24 hrs:   BP Temp Temp src Pulse Resp SpO2 Height Weight   03/18/25 0415 124/60 98.4 °F (36.9 °C) Oral 83 18 94 % -- --   03/18/25 0018 136/67 98.2 °F (36.8 °C) Oral 73 18 97 % -- --   03/17/25 1951 129/67 97.1 °F (36.2 °C) Oral 73 18 98 % -- --   03/17/25 1610 134/66 (!) 95.7 °F (35.4 °C) Oral -- 16 -- -- --   03/17/25 1540 120/60 98 °F (36.7 °C) -- 78 13 96 % -- --   03/17/25 1530 124/53 -- -- 73 14 98 % -- --   03/17/25 1520 129/56 -- -- 79 13 94 % -- --   03/17/25 1510 126/54 -- -- 78 14 97 % -- --   03/17/25 1500 124/57 -- -- 72 13 96 % -- --   03/17/25 1450 123/55 -- -- 67 14 97 % -- --   03/17/25 1440 120/57 -- -- 68 12 99 % -- --   03/17/25 1430 102/52 -- -- 64 11 93 % -- --   03/17/25 1420 122/62 -- -- 74 12 94 % -- --   03/17/25 1412 115/55 -- -- 66 11 98 % -- --   03/17/25 1404 118/78 98.6 °F (37 °C) -- 82 16 99 % -- --   03/17/25 1128 125/68 98.4 °F (36.9 °C) Oral 64 16 100 % 5' 8\" (1.727 m) 198 lb (89.8 kg)     Sitting in chair in no acute distress.   Dressing is clean, dry, and intact.  Foot light touch sensation is intact.  Ankle dorsiflexion strength is 5/5. Ankle plantarflexion strength is 5/5.  No calf tenderness.  No calf swelling.  Edmond's sign is negative.   The lower/upper extremity is neurovascularly intact.   The lower/upper extremity compartments are supple and non-tender.   Lab Results   Component Value Date    WBC 10.7 03/07/2025    RBC 5.34 (H) 03/07/2025    HGB 12.0 03/18/2025    MCV 79.2 (L) 03/07/2025    MCH 26.2 03/07/2025    MCHC 33.1 03/07/2025    RDW 14.3 03/07/2025    MPV 7.7 09/07/2017     Lab Results   Component Value Date    INR 0.95 12/27/2023       Assessment:  Postoperative day #1   S/p RTKA    Plan:      Continue pain control.  Continue DVT prophylaxis.  Continue PT/OT.  D/C planning:  home with home health care today    CONG LOZANO MD

## 2025-03-18 NOTE — PHYSICAL THERAPY NOTE
PHYSICAL THERAPY KNEE EVALUATION - INPATIENT      Room Number: Room 7/Room 7-A  Evaluation Date: 3/18/2025  Type of Evaluation: Initial  Physician Order: PT Eval and Treat    Presenting Problem: s/p R TKA  Co-Morbidities : Osteoarthritis, obesity, obstructive sleep apnea, diverticulitis, hypertension, hyperlipidemia, peripheral neuropathy  Reason for Therapy: Mobility Dysfunction and Discharge Planning    PHYSICAL THERAPY ASSESSMENT   Patient is a 64 year old female admitted 3/17/2025 for s/p R TKA.  Prior to admission, patient's baseline is independent.  Patient is currently functioning below baseline with bed mobility, transfers, gait, stair negotiation, maintaining seated position, standing prolonged periods, and performing household tasks.  Patient is requiring contact guard assist as a result of the following impairments: pain and medical status.  Physical Therapy will continue to follow for duration of hospitalization.    Patient will benefit from continued skilled PT Services at discharge to promote prior level of function and safety with additional support and return home with home health PT (pending progress).    PLAN DURING HOSPITALIZATION  Nursing Mobility Recommendation : 1 Assist  PT Treatment Plan: Bed mobility, Body mechanics, Coordination, Endurance, Energy conservation, Patient education, Gait training, Neuromuscular re-educate, Family education, Range of motion, Strengthening, Stair training, Balance training  Rehab Potential : Good  Frequency (Obs): BID     PHYSICAL THERAPY MEDICAL/SOCIAL HISTORY   History related to current admission: The patient is a 64-year-old  female with chronic right knee pain and underlying severe primary osteoarthritis. Failed outpatient conservative medical management options      Problem List  Principal Problem:    Primary osteoarthritis of right knee  Active Problems:    Essential hypertension    Neuropathy    KB (obstructive sleep apnea)     Hyperlipidemia    HOME SITUATION  Type of Home: House  Home Layout: Multi-level  Stairs to Enter : 3   Railing: Yes    Stairs to Bedroom: 10  Railing: Yes  Lives With: Spouse    Drives: Yes   Patient Regularly Uses: Cane (on occassion)      Prior Level of Kay: Prior to admission patient was independent with performance of all ADL's and performance of functional mobility. Pt reports she uses a cane on occasion.     SUBJECTIVE  Agreeable to PT    PHYSICAL THERAPY EXAMINATION   OBJECTIVE  Precautions: Bed/chair alarm  Fall Risk: Standard fall risk    WEIGHT BEARING RESTRICTION  R Lower Extremity: Weight Bearing as Tolerated    PAIN ASSESSMENT  Rating: Unable to rate  Location: surgical site  Management Techniques: Activity promotion, Body mechanics, Breathing techniques, Relaxation, Repositioning    COGNITION  Overall Cognitive Status:  WFL - within functional limits  Arousal/Alertness:  appropriate responses to stimuli  Initiation: appears intact    RANGE OF MOTION AND STRENGTH ASSESSMENT  Upper extremity ROM and strength are within functional limits BUEs  Lower extremity ROM is within functional limits BLEs  Lower extremity strength is within functional limits BLEs, not formally tested, grossly 4/5    BALANCE  Static Sitting: Good  Dynamic Sitting: Good  Static Standing: Fair +  Dynamic Standing: Fair    ACTIVITY TOLERANCE  Pulse: 74 (sitting at rest)  Heart Rate Source: Monitor  BP: 128/62 (128/62 sitting at rest, 143/64 sitting post first STS, 143/66 sitting at end of session)  BP Location: Left arm  BP Method: Automatic  Patient Position: Sitting    O2 WALK  Oxygen Therapy  SPO2% on Room Air at Rest: 95 (95 sitting at rest (originally 85, increased to 95 with deep breathing), 93 sitting post first STS, 91 sitting at end of session)    AM-PAC '6-Clicks' INPATIENT SHORT FORM - BASIC MOBILITY  How much difficulty does the patient currently have...  Patient Difficulty: Turning over in bed (including adjusting  bedclothes, sheets and blankets)?: A Little   Patient Difficulty: Sitting down on and standing up from a chair with arms (e.g., wheelchair, bedside commode, etc.): A Little   Patient Difficulty: Moving from lying on back to sitting on the side of the bed?: A Little   How much help from another person does the patient currently need...   Help from Another: Moving to and from a bed to a chair (including a wheelchair)?: A Little   Help from Another: Need to walk in hospital room?: A Little   Help from Another: Climbing 3-5 steps with a railing?: A Little     AM-PAC Score:  Raw Score: 18   Approx Degree of Impairment: 46.58%   Standardized Score (AM-PAC Scale): 43.63   CMS Modifier (G-Code): CK     FUNCTIONAL ABILITY STATUS  Functional Mobility/Gait Assessment  Gait Assistance: Contact guard assist (close chair follow for safety)  Distance (ft): 35  Assistive Device: Rolling walker  Pattern: R Decreased stance time, R Foot flat (heavy UE support on RW, decreased zoë)  Rolling: not tested  Supine to Sit: not tested  Sit to Supine: not tested  Sit to Stand: contact guard assist    Exercise/Education Provided:  Education Provided To: Patient, Family/Caregiver Patient Education: Role of Physical Therapy, Plan of Care, Discharge Recommendations, DME Recommendations, Functional Transfer Techniques, Fall Prevention, Weight Bear Status, Posture/Positioning, Surgical Precautions, Edema Reduction, Energy Conservation, Proper Body Mechanics, LE HEP for ROM, LE HEP for Strengthening, Gait Training Patient's Response to Education: Verbalized Understanding     Skilled Therapy Provided: Patient received seated in chair with family present at initiation of session agreeable to participation in PT. Patient tolerates treatment fairly. Pt is drowsy throughout session and reports feeling a little dizzy -- vitals takes and stable, RN notified. Pt presents with decreased SpO2 (85%) sitting in chair at start of session -- increases to 95%  with deep breathing. Pt performs STSx3 from chair with RW and CGA. Pt tolerates static marching and side to side weight shift with no reports of pain. Ambulates ~35ft with RW and CGA (close chair follow for safety). Patient educated on importance of movement, gait safety, POC and DC recs, verbalized understanding. Patient left seated in chair, family present, lines intact, needs in reach and handoff to RN.    The patient's Approx Degree of Impairment: 46.58% has been calculated based on documentation in the [facility] '6 clicks' Inpatient Basic Mobility Short Form.  Research supports that patients with this level of impairment may benefit from HH PT with increased assist (pending progress).  Final disposition will be made by interdisciplinary medical team.    Patient End of Session: Up in chair, Needs met, Call light within reach, RN aware of session/findings, All patient questions and concerns addressed, Hospital anti-slip socks, Ice applied, Family present    CURRENT GOALS  Goals to be met by: 4/1/25  Patient Goal Patient's self-stated goal is: not stated   Goal #1 Patient is able to demonstrate supine - sit EOB @ level: modified independent     Goal #1   Current Status    Goal #2 Patient is able to demonstrate transfers Sit to/from Stand at assistance level: modified independent     Goal #2  Current Status    Goal #3 Patient is able to ambulate 300 feet with assistive device at assistance level: modified independent    Goal #3   Current Status    Goal #4 Patient will negotiate 13 stairs/one curb w/ assistive device and supervision   Goal #4   Current Status    Goal #5  AROM 0 degrees extension to 95 degrees flexion     Goal #5   Current Status    Goal #6 Patient independently performs home exercise program for ROM/strengthening per the instructions provided in preparation for discharge.   Goal #6  Current Status      Patient Evaluation Complexity Level:  History High - 3 or more personal factors and/or co-morbidities    Examination of body systems Moderate - addressing a total of 3 or more elements   Clinical Presentation  Moderate - Evolving   Clinical Decision Making  Moderate Complexity     Gait Training: 10 minutes  Therapeutic Activity:  15 minutes    Yehuda Arango, SPT

## 2025-03-18 NOTE — PROGRESS NOTES
[facility]  Anesthesiology Epidural Follow-up Note  3/18/2025    Patient name: Kerry Hsu 64 year old female  : 1960  MRN: [id number]    Diagnosis: [unfilled]    S/P: Total knee arthroplasty    Pain treatment modality: Duramorph    Current hospital day: Hospital Day: 2    Pain Scores: 2    Current Medications:  Scheduled Meds:   aspirin  325 mg Oral BID    amLODIPine  10 mg Oral Daily    latanoprost  1 drop Both Eyes Nightly    pantoprazole  40 mg Oral QAM AC    pregabalin  75 mg Oral BID    rosuvastatin  10 mg Oral QPM    sennosides  17.2 mg Oral Nightly    docusate sodium  100 mg Oral BID    oxyCODONE ER  10 mg Oral BID     Continuous Infusions:   lactated ringers Stopped (25)    lactated ringers Stopped (25)     PRN Meds:.  naloxone    acetaminophen    HYDROcodone-acetaminophen    HYDROcodone-acetaminophen    HYDROmorphone    HYDROmorphone    diphenhydrAMINE **OR** diphenhydrAMINE    nalbuphine    polyethylene glycol (PEG 3350)    magnesium hydroxide    bisacodyl    fleet enema    metoclopramide    diphenhydrAMINE **OR** diphenhydrAMINE    HYDROcodone-acetaminophen    morphINE **OR** morphINE **OR** morphINE    ondansetron    Anticoagulation:       Hu catheter:      Assessment:  No complications from neuro axial anesthesia    Plan:  P.o. meds as needed    TERELL ROD MD  Anesthesia Acute Pain Service [phone]

## 2025-03-18 NOTE — PLAN OF CARE
Problem: Patient Centered Care  Goal: Patient preferences are identified and integrated in the patient's plan of care  Description: Interventions:- What would you like us to know as we care for you? Patient is from home with  and he is her support system.  - Provide timely, complete, and accurate information to patient/family- Incorporate patient and family knowledge, values, beliefs, and cultural backgrounds into the planning and delivery of care- Encourage patient/family to participate in care and decision-making at the level they choose- Honor patient and family perspectives and choices  Outcome: Progressing     Problem: Patient/Family Goals  Goal: Patient/Family Long Term Goal  Description: Patient's Long Term Goal: Patient will independently walk with a walker and will have no complications from surgery.  Interventions:  - Up as tolerated with walker, WBAT to right knee.  - Monitor incision for any signs of infection or bleeding.  - Pain management with oral medication.  - Take oral anticoagulant as prescribed to prevent blood clots.  - Fall prevention.  - May ice incision to prevent swelling or to help reduce pain.  - Follow up with surgery as recommended.  - [facility] PT/OT.  - Continue to use incentive spirometry to prevent pneumonia.  - See additional Care Plan goals for specific interventions  Outcome: Progressing  Goal: Patient/Family Short Term Goal  Description: Patient's Short Term Goal: Home when stable and passes therapy.    Interventions:   - Up as tolerated with walker, WBAT to right knee.  - Monitor incision for any signs of infection or bleeding.  - Pain management with oral medication.  - Take oral anticoagulant as prescribed to prevent blood clots.  - Fall prevention.  - May ice incision to prevent swelling or to help reduce pain.  - Follow up with surgery as recommended.  - PT/OT as ordered.  - Use incentive spirometry 10x/H to prevent pneumonia.  - Monitor labs and VS as ordered and  abnormalities as needed.  - See additional Care Plan goals for specific interventions  Outcome: Progressing     Problem: PAIN - ADULT  Goal: Verbalizes/displays adequate comfort level or patient's stated pain goal  Description: INTERVENTIONS:- Encourage pt to monitor pain and request assistance- Assess pain using appropriate pain scale- Administer analgesics based on type and severity of pain and evaluate response- Implement non-pharmacological measures as appropriate and evaluate response- Consider cultural and social influences on pain and pain management- Manage/alleviate anxiety- Utilize distraction and/or relaxation techniques- Monitor for opioid side effects- Notify MD/LIP if interventions unsuccessful or patient reports new pain- Anticipate increased pain with activity and pre-medicate as appropriate  Outcome: Progressing     Problem: RISK FOR INFECTION - ADULT  Goal: Absence of fever/infection during anticipated neutropenic period  Description: INTERVENTIONS- Monitor WBC- Administer growth factors as ordered- Implement neutropenic guidelines  Outcome: Progressing     Problem: SAFETY ADULT - FALL  Goal: Free from fall injury  Description: INTERVENTIONS:- Assess pt frequently for physical needs- Identify cognitive and physical deficits and behaviors that affect risk of falls.- Mason fall precautions as indicated by assessment.- Educate pt/family on patient safety including physical limitations- Instruct pt to call for assistance with activity based on assessment- Modify environment to reduce risk of injury- Provide assistive devices as appropriate- Consider OT/PT consult to assist with strengthening/mobility- Encourage toileting schedule  Outcome: Progressing     Problem: DISCHARGE PLANNING  Goal: Discharge to home or other facility with appropriate resources  Description: INTERVENTIONS:- Identify barriers to discharge w/pt and caregiver- Include patient/family/discharge partner in discharge planning-  Arrange for needed discharge resources and transportation as appropriate- Identify discharge learning needs (meds, wound care, etc)- Arrange for interpreters to assist at discharge as needed- Consider post-discharge preferences of patient/family/discharge partner- Complete POLST form as appropriate- Assess patient's ability to be responsible for managing their own health- Refer to Case Management Department for coordinating discharge planning if the patient needs post-hospital services based on physician/LIP order or complex needs related to functional status, cognitive ability or social support system  Outcome: Progressing     Problem: SKIN/TISSUE INTEGRITY - ADULT  Goal: Incision(s), wounds(s) or drain site(s) healing without S/S of infection  Description: INTERVENTIONS:- Assess and document risk factors for pressure ulcer development- Assess and document skin integrity- Assess and document dressing/incision, wound bed, drain sites and surrounding tissue- Implement wound care per orders- Initiate isolation precautions as appropriate- Initiate Pressure Ulcer prevention bundle as indicated  Outcome: Progressing     Problem: MUSCULOSKELETAL - ADULT  Goal: Return mobility to safest level of function  Description: INTERVENTIONS:- Assess patient stability and activity tolerance for standing, transferring and ambulating w/ or w/o assistive devices- Assist with transfers and ambulation using safe patient handling equipment as needed- Ensure adequate protection for wounds/incisions during mobilization- Obtain PT/OT consults as needed- Advance activity as appropriate- Communicate ordered activity level and limitations with patient/family  Outcome: Progressing  Goal: Maintain proper alignment of affected body part  Description: INTERVENTIONS:- Support and protect limb and body alignment per provider's orders- Instruct and reinforce with patient and family use of appropriate assistive device and precautions (e.g. spinal or  hip dislocation precautions)  Outcome: Progressing    Patient is alert and oriented, still drowsy today and did not do well with therapy.  Staff made aware of needs.  Patient is currently in room air, denies shortness of breathing nor chest pain.  Patient is up with a walker with 1 assist.  Patient is voiding well, passing gas but denies having a BM.  Patient hopefully will go home in AM.

## 2025-03-18 NOTE — OCCUPATIONAL THERAPY NOTE
OCCUPATIONAL THERAPY EVALUATION - INPATIENT     Room Number: Room 7/Room 7-A  Evaluation Date: 3/18/2025  Type of Evaluation: Initial  Presenting Problem: R TKA    Physician Order: IP Consult to Occupational Therapy  Reason for Therapy: ADL/IADL Dysfunction and Discharge Planning    OCCUPATIONAL THERAPY ASSESSMENT   Patient is a 64 year old female admitted 3/17/2025 for R TKA.  Prior to admission, patient's baseline is independent w ADLS, IADL, TFRS, and functional mobility w cane PRN.   .  Patient is currently functioning below baseline with ADLs and functional mobility/TFRs.  Patient is requiring min/mod A LB ADLS, CGA functional mobility/TFRS as a result of the following impairments: decreased functional strength, decreased functional reach, decreased endurance, pain, impaired   balance, decreased muscular endurance, and limited   ROM. Occupational Therapy will continue to follow for duration of hospitalization.    Patient will benefit from continued skilled OT Services with no additional skilled services but increased support at home.    PLAN DURING HOSPITALIZATION  OT Device Recommendations: Transfer tub bench  OT Treatment Plan: Balance activities, Energy conservation/work simplification techniques, ADL training, Functional transfer training, Endurance training     OCCUPATIONAL THERAPY MEDICAL/SOCIAL HISTORY   Problem List   Principal Problem:    Primary osteoarthritis of right knee  Active Problems:    Essential hypertension    Neuropathy    KB (obstructive sleep apnea)    Hyperlipidemia    HOME SITUATION  Type of Home: House  Home Layout: Multi-level  Lives With: Spouse  Toilet and Equipment: Standard height toilet  Shower/Tub and Equipment: Walk-in shower; Grab bar  Other Equipment: None  Occupation/Status: none  Drives: Yes  Patient Regularly Uses: Cane; Other (Comment) (PRN)      SUBJECTIVE  \"I am just so tired from all the meds\"    OCCUPATIONAL THERAPY EXAMINATION   OBJECTIVE  Precautions: Limb alert -  right; Bed/chair alarm  Fall Risk: Standard fall risk    WEIGHT BEARING RESTRICTION  R Lower Extremity: Weight Bearing as Tolerated    PAIN ASSESSMENT  Ratin  Location: RKnee  Management Techniques: Activity promotion; Body mechanics; Relaxation; Ice    VITALS  Semi-george: 161/76  Sittin/78  Standing:           BP: 142/79  BP Location: Left arm  BP Method: Automatic  Patient Position: Standing        COGNITION  Overall Cognitive Status:  WFL - within functional limits    RANGE OF MOTION   Upper extremity ROM is within functional limits     STRENGTH ASSESSMENT  Upper extremity strength is within functional limits     ACTIVITIES OF DAILY LIVING ASSESSMENT  AM-PAC ‘6-Clicks’ Inpatient Daily Activity Short Form  How much help from another person does the patient currently need…  -   Putting on and taking off regular lower body clothing?: A Lot  -   Bathing (including washing, rinsing, drying)?: A Little  -   Toileting, which includes using toilet, bedpan or urinal? : A Little  -   Putting on and taking off regular upper body clothing?: A Little  -   Taking care of personal grooming such as brushing teeth?: A Little  -   Eating meals?: None    AM-PAC Score:  Score: 18  Approx Degree of Impairment: 46.65%  Standardized Score (AM-PAC Scale): 38.66  CMS Modifier (G-Code): CK    BED MOBILITY  Supine to Sit: min assist  Comments:    A for RLE    FUNCTIONAL TRANSFER ASSESSMENT  Sit to Stand from EOB: contact guard assist  Sit to Stand from Chair: contact guard assist  Stand Pivot Transfer from EOB to Chair: contact guard assist  Comments:    2ww    FUNCTIONAL MOBILITY  contact guard assist for in-room fx mobility using RW  Comments:     5 ft 2ww,slow paced, pain    ACTIVITIES OF DAILY LIVING  Eating: independent  Grooming: setup assist, seated  LB Dressing: min assist. Threads underwear and pants A for socks due to distal reach  Toileting: contact guard assist-simulated  Comments:     Graded based on abilities  during session and clinical judgement. Pt demos abilities with:  LB dressing, grooming. Discussed task modification, home environment safety and energy conservation with ADLS, TFRs, and functional mobility given. Pt reports understanding and insight to safety within the home. Pt reports will have A PRN      Skilled Therapy Provided: Educated pt about role of OT and POC. Reviewed home safety, task modification for ADLS, TFRS, functional mobility, pain and edema management throughout recovery. Pt verbalized/demonstrated understanding and reports all needs/questions met at this time. At the end, pt was positioned in chair with  call light and personal items in reach, BLE elevated and ice to R knee,  RN aware      EDUCATION PROVIDED  Patient Education : Role of Occupational Therapy; Discharge Recommendations; Plan of Care; DME Recommendations; Functional Transfer Techniques; Fall Prevention; Weight Bear Status; Posture/Positioning; Edema Reduction; Energy Conservation; Proper Body Mechanics  Patient's Response to Education: Returned Demonstration; Verbalized Understanding    The patient's Approx Degree of Impairment: 46.65% has been calculated based on documentation in the [facility] '6 clicks' Inpatient Daily Activity Short Form.  Research supports that patients with this level of impairment may benefit from [facility].  Final disposition will be made by interdisciplinary medical team.    Patient End of Session: Up in chair, Needs met, Call light within reach, RN aware of session/findings, All patient questions and concerns addressed, Hospital anti-slip socks, Ice applied, Alarm set    OT Goals  Patient self-stated goal is: get back to walking     Patient will complete LE dressing with mod I AE PRN  Comment:     Patient will complete toilet transfer with mod I  Comment:     Patient will complete self care task at sink level with mod I   Comment:    Patient will don/doff clothing from hips during toileting/dressing task Geraldine   Comment:         Goals  on: 25  Frequency: 3-5 xs/week    Patient Evaluation Complexity Level:   Occupational Profile/Medical History LOW - Brief history including review of medical or therapy records    Specific performance deficits impacting engagement in ADL/IADL LOW  1 - 3 performance deficits    Client Assessment/Performance Deficits LOW - No comorbidities nor modifications of tasks    Clinical Decision Making LOW - Analysis of occupational profile, problem-focused assessments, limited treatment options    Overall Complexity LOW     OT Session Time: 20 minutes  Self-Care Home Management: 12 minutes  Therapeutic Activity: 8 minutes

## 2025-03-18 NOTE — HOME CARE LIAISON
Referral received from SW/CM team via [employer]. Discussed with patient the recommendation for home health services, patient agreeable, and all questions answered. Updated NORMA Frausto.

## 2025-03-18 NOTE — PHYSICAL THERAPY NOTE
PHYSICAL THERAPY KNEE TREATMENT NOTE - INPATIENT     Room Number: Room 7/Room 7-A       Presenting Problem: s/p R TKA  Co-Morbidities : PAST MEDICAL HISTORY:  Osteoarthritis, obesity, obstructive sleep apnea, diverticulitis, hypertension, hyperlipidemia, peripheral neuropathy.     PAST SURGICAL HISTORY:  Anal sphincterotomy, total abdominal hysterectomy, cataract procedure, Nissen fundoplication, right bunionectomy.    Problem List  Principal Problem:    Primary osteoarthritis of right knee  Active Problems:    Essential hypertension    Neuropathy    KB (obstructive sleep apnea)    Hyperlipidemia    PHYSICAL THERAPY ASSESSMENT   Patient demonstrates good  progress this session, goals  remain in progress.      Patient is requiring contact guard assist and minimal assist as a result of the following impairments: decreased functional strength and medical status.     Patient continues to function below baseline with bed mobility, transfers, gait, stair negotiation, maintaining seated position, standing prolonged periods, and performing household tasks.  Next session anticipate patient to progress bed mobility, transfers, gait, stair negotiation, maintaining seated position, standing prolonged periods, and performing household tasks.  Physical Therapy will continue to follow patient for duration of hospitalization.    Patient continues to benefit from continued skilled PT services: at discharge to promote prior level of function and safety with additional support and return home with home health PT. (Pending progress)    PLAN DURING HOSPITALIZATION  Nursing Mobility Recommendation : 1 Assist     PT Treatment Plan: Bed mobility, Body mechanics, Coordination, Endurance, Energy conservation, Patient education, Gait training, Neuromuscular re-educate, Family education, Range of motion, Strengthening, Stair training, Balance training  Frequency (Obs): Daily     SUBJECTIVE  Agreeable to PT    OBJECTIVE  Precautions: Limb  alert - right, Bed/chair alarm    WEIGHT BEARING STATUS  R Lower Extremity: Weight Bearing as Tolerated    PAIN ASSESSMENT   Rating: Unable to rate  Location: denies  Management Techniques: Activity promotion, Body mechanics, Breathing techniques, Relaxation, Repositioning    BALANCE  Static Sitting: Good  Dynamic Sitting: Good  Static Standing: Fair +  Dynamic Standing: Fair +    AM-PAC '6-Clicks' INPATIENT SHORT FORM - BASIC MOBILITY  How much difficulty does the patient currently have...  Patient Difficulty: Turning over in bed (including adjusting bedclothes, sheets and blankets)?: A Little   Patient Difficulty: Sitting down on and standing up from a chair with arms (e.g., wheelchair, bedside commode, etc.): A Little   Patient Difficulty: Moving from lying on back to sitting on the side of the bed?: A Little   How much help from another person does the patient currently need...   Help from Another: Moving to and from a bed to a chair (including a wheelchair)?: A Little   Help from Another: Need to walk in hospital room?: A Little   Help from Another: Climbing 3-5 steps with a railing?: A Little     AM-PAC Score:  Raw Score: 18   Approx Degree of Impairment: 46.58%   Standardized Score (AM-PAC Scale): 43.63   CMS Modifier (G-Code): CK    FUNCTIONAL ABILITY STATUS  Functional Mobility/Gait Assessment  Gait Assistance: Contact guard assist (close chair follow for safety)  Distance (ft): 60  Assistive Device: Rolling walker  Pattern: R Foot flat, R Decreased stance time (heavy UE support on RW, decreased zoë)  Stairs: Stairs  How Many Stairs: 8 (4x2)  Device: 1 Rail, 2 Rails  Assist: Contact guard assist  Pattern: Ascend and Descend  Ascend and Descend : Step to  Rolling: not tested  Supine to Sit: not tested  Sit to Supine: not tested  Sit to Stand: minimal assist    Skilled Therapy Provided: Patient received seated in chair at initiation of session agreeable to participation in PT. Patient tolerates treatment  well. STSx3 performed with Maria Fernanda and RW. Pt reports no dizziness this session. Pt ambulates ~60ft with RW and CGA (close chair follow for safety). Negotiated 4x2 stairs with 1-2 rails and CGA -- required verbal cueing to ensure proper sequencing during ascent/descent. Pt uses 2 rails when negotiating first 4 steps, 1 rail and HHA when negotiating second set of stairs -- educated on entering home safely with  providing HHA. Demonstrates improvement in ambulation tolerance and in comparison to previous session. Patient educated on stair negotiation, gait safety, POC and DC recs, verbalized understanding. Patient left seated in chair, lines intact, needs in reach and handoff to RN.    The patient's Approx Degree of Impairment: 46.58% has been calculated based on documentation in the [facility] '6 clicks' Inpatient Basic Mobility Short Form.  Research supports that patients with this level of impairment may benefit from HH PT with increased assist (pending progress).  Final disposition will be made by interdisciplinary medical team.    Knee ROM   R Knee Flexion (degrees): 85  L Knee Flexion (degrees):  (WNL)  R Knee Extension (degrees): -10 (-10 AROM, -2 PROM)  L Knee Extension (degrees):  (WNL)    Patient End of Session: Up in chair, Needs met, Call light within reach, RN aware of session/findings, All patient questions and concerns addressed, Hospital anti-slip socks, Ice applied    CURRENT GOALS  Goals to be met by: 4/1/25  Patient Goal Patient's self-stated goal is: not stated   Goal #1 Patient is able to demonstrate supine - sit EOB @ level: modified independent     Goal #1   Current Status progressing   Goal #2 Patient is able to demonstrate transfers Sit to/from Stand at assistance level: modified independent     Goal #2  Current Status progressing   Goal #3 Patient is able to ambulate 300 feet with assistive device at assistance level: modified independent    Goal #3   Current Status progressing   Goal #4  Patient will negotiate 13 stairs/one curb w/ assistive device and supervision   Goal #4   Current Status progressing   Goal #5  AROM 0 degrees extension to 95 degrees flexion     Goal #5   Current Status progressing   Goal #6 Patient independently performs home exercise program for ROM/strengthening per the instructions provided in preparation for discharge.   Goal #6  Current Status progressing     Gait Training: 10 minutes  Therapeutic Activity: 8 minutes    Yehuda Arango, SPT

## 2025-03-18 NOTE — CM/SW NOTE
03/18/25 1200   CM/SW Referral Data   Referral Source Physician;Social Work (self-referral)   Reason for Referral Discharge planning   Informant Patient   Medical Hx   Does patient have an established PCP? Yes   Patient Info   Patient's Current Mental Status at Time of Assessment Alert;Oriented   Patient's Home Environment House   Patient lives with Spouse/Significant other   Patient Status Prior to Admission   Independent with ADLs and Mobility Yes   Discharge Needs   Anticipated D/C needs Home health care   Choice of Post-Acute Provider   Informed patient of right to choose their preferred provider Yes   List of appropriate post-acute services provided to patient/family with quality data Yes   Patient/family choice [facility]     NORMA spoke with the pt and is agreeable to [facility]    F2F entered. NORMA notified [facility] about the discharge as well    PLAN: home with [facility]     Lisbet ZAMBRANOW, MSW ext. [phone]

## 2025-03-18 NOTE — OCCUPATIONAL THERAPY NOTE
Chart reviewed, RN approved. Will follow up in PM-Pt recently medicated and challenges w staying awake during eval attempt

## 2025-03-18 NOTE — PROGRESS NOTES
Progress Note     Kerry Hsu Patient Status:  Outpatient in a Bed    1960 MRN [id number]   Location [facility] Attending Brigid Cheung MD   Hosp Day # 0 PCP Bella Rogers MD     Subjective:   S: Patient POD#1 s/p Right TKA.   Worked with PT twice, still unable to fully complete session goals.    Review of Systems:   10 point ROS completed and was negative, except for pertinent positive and negatives stated in subjective.    Objective:   Vital signs:  Temp:  [95.7 °F (35.4 °C)-98.4 °F (36.9 °C)] 97.5 °F (36.4 °C)  Pulse:  [73-83] 74  Resp:  [13-18] 16  BP: (120-143)/(53-79) 142/79  SpO2:  [94 %-98 %] 94 %    Wt Readings from Last 6 Encounters:   25 198 lb (89.8 kg)   25 200 lb 14.4 oz (91.1 kg)   25 201 lb 3.2 oz (91.3 kg)   25 200 lb (90.7 kg)   25 196 lb (88.9 kg)   01/10/25 200 lb (90.7 kg)         Physical Exam:    General: No acute distress. Alert ,         Respiratory: Clear to auscultation bilaterally. No wheezes. No rhonchi.  Cardiovascular: S1, S2. Regular rate and rhythm. No murmurs, rubs or gallops.   Abdomen: Soft, nontender, nondistended.  Positive bowel sounds. No rebound or guarding.  Neurologic: No focal neurological deficits.   Musculoskeletal: Moves all extremities.  Extremities: No edema. Right knee dressing in place.     Results:   Diagnostic Data:      Labs:    Labs Last 24 Hours:   BMP     CBC    Other     Na 142 Cl 106 BUN 13 Glu 123   Hb 12.0   PTT - Procal -   K 3.8 CO2 29.0 Cr 0.92   WBC - >< PLT -  INR - CRP -   Renal Lytes Endo    Hct 35.5   Trop - D dim -   eGFR - Ca 8.7 POC Gluc  -    LFT   pBNP - Lactic -   eGFR AA - PO4 - A1c -   AST - APk - Prot -  LDL -     Mg - TSH -   ALT - T mary - Alb -        COVID-19 Lab Results    COVID-19  Lab Results   Component Value Date    COVID19 Not Detected 2024    COVID19 Not Detected 2023    COVID19 Detected (A) 2022       Pro-Calcitonin  No results for input(s): \"PCT\" in  the last 168 hours.    Cardiac  No results for input(s): \"TROP\", \"PBNP\" in the last 168 hours.    Creatinine Kinase  No results for input(s): \"CK\" in the last 168 hours.    Inflammatory Markers  No results for input(s): \"CRP\", \"NABILA\", \"LDH\", \"DDIMER\" in the last 168 hours.    Imaging: Imaging data reviewed in Epic.    Medications:    aspirin  325 mg Oral BID    amLODIPine  10 mg Oral Daily    latanoprost  1 drop Both Eyes Nightly    pantoprazole  40 mg Oral QAM AC    pregabalin  75 mg Oral BID    rosuvastatin  10 mg Oral QPM    sennosides  17.2 mg Oral Nightly    docusate sodium  100 mg Oral BID    oxyCODONE ER  10 mg Oral BID       Assessment & Plan:   ASSESSMENT / PLAN:     1.       Right knee primary osteoarthritis, status post right total knee arthroplasty, spinal block.  Pain control.  Neurovascular checks.  Xarelto for DVT prophylaxis.  Physical and occupational therapy.    2.       Essential hypertension.  Continue home medications and monitor.   3.       Neuropathy.  Continue home medications.  4.       Obstructive sleep apnea.  Apply obstructive sleep apnea protocol and monitor.    5.       Hyperlipidemia.  Continue home medications.     Dispo: anticipate home tomorrow with       MDM: Moderate   I personally spent time on chart/note review, review of labs/imaging, discussion with patient, physical exam, discussion with staff, consultants, coordinating care, writing progress note, and discussion of plan of care.     Brigid Cheung MD    Supplementary Documentation:

## 2025-03-18 NOTE — DISCHARGE INSTRUCTIONS
Norco or tylenol for pain.  Ibuprofen for pain/inflammation.  Aspirin 325mg twice daily for 3 weeks for blood clot prevention.  May remove dressing and place new mepilex or guaze over incision in 4 days.  Keep incision clean and dry.  Ice and elevate knee.  Weight bear as tolerated.  Use walker/crutches for comfort.  Follow up with Dr. Delgadillo in 12 weeks [phone].     Monitor incision for any signs of infection such as excessive redness, inflammation, presence of pus like drainage coming out of your incision or fever over 101F temperature.  May ice incision to prevent swelling or to help reduce pain.  Take oral anticoagulant ordered to prevent blood clots.    Fall prevention.      Home health  Sometimes managing your health at home requires assistance.  The [facility] team has recognized your preference to use [facility].  They can be reached by phone at [phone].  The fax number for your reference is [phone].  A representative from the home health agency will contact you or your family to schedule your first visit.      Dear Patient,     [facility] cares about your progress with recovery following your joint replacement surgery.     300 days from your scheduled surgery, [facility] will send you a follow-up survey to help us understand how your surgery impacted your mobility, pain, and overall quality of life. Please make every effort to complete this survey. The information collected from this survey will be used by your physician to track your recovery.     Sincerely,     [facility] [place]

## 2025-03-19 VITALS
BODY MASS INDEX: 30.01 KG/M2 | DIASTOLIC BLOOD PRESSURE: 66 MMHG | OXYGEN SATURATION: 94 % | HEIGHT: 68 IN | SYSTOLIC BLOOD PRESSURE: 129 MMHG | TEMPERATURE: 99 F | WEIGHT: 198 LBS | HEART RATE: 102 BPM | RESPIRATION RATE: 16 BRPM

## 2025-03-19 PROCEDURE — 97116 GAIT TRAINING THERAPY: CPT

## 2025-03-19 PROCEDURE — 97110 THERAPEUTIC EXERCISES: CPT

## 2025-03-19 NOTE — DISCHARGE SUMMARY
[facility]  part of [facility]    Discharge Summary    Kerry Hsu Patient Status:  Outpatient in a Bed    1960 MRN [id number]   Location [facility] Attending Rafael Villar MD   Hosp Day # 0 PCP Bella Rogers MD     Date of Admission: 3/17/2025     Date of Discharge: 25      Lace+ Score: 47  59-90 High Risk  29-58 Medium Risk  0-28   Low Risk.    TCM Follow-Up Recommendation:  LACE 29-58: Moderate Risk of readmission after discharge from the hospital.    DISCHARGE DX: Principal Problem:    Primary osteoarthritis of right knee  Active Problems:    Essential hypertension    Neuropathy    KB (obstructive sleep apnea)    Hyperlipidemia       The patient was seen and examined on day of discharge and this discharge summary is in conjunction with any daily progress note from day of discharge.    HPI per admitting physician: \"Kerry Hsu is a(n) 64 year old female. Presents today with complaints of bilateral knee pain right worse than left.  She has primarily pain along the posterior right knee and anterior left knee.  She has been taking ibuprofen on a regular basis.  The injection given a month ago helped for a few weeks.  She may be interested in more definitive treatment options.  No recent injury to the knees. \"    Hospital Course:        1.       Right knee primary osteoarthritis   status post right total knee arthroplasty   Continue Pain control as needed   Physical and occupational therapy.    Follow up with surgery as outpt  2.       Essential hypertension.    Continue home medications  3.       Neuropathy.    Continue home medications.  4.       Obstructive sleep apnea.    Apply obstructive sleep apnea protocol   5.       Hyperlipidemia.    Continue home medications.      Physical Exam:    Vitals:    25 1330 25 1614 25 0342   BP: 142/79 159/73  133/57   BP Location: Left arm Left arm  Left arm   Pulse:  81  71    Resp:  17 16 16   Temp:  97.9 °F (36.6 °C) 99.3 °F (37.4 °C) 99 °F (37.2 °C)   TempSrc:  Oral Temporal Temporal   SpO2:  95% 91% 97%   Weight:       Height:         Patient Weight for the past 72 hrs:   Weight   03/17/25 1128 198 lb (89.8 kg)       Intake/Output Summary (Last 24 hours) at 3/19/2025 0923  Last data filed at 3/19/2025 0200  Gross per 24 hour   Intake --   Output 1500 ml   Net -1500 ml         GENERAL:  Awake and alert, in no acute distress.  HEART:  S1 and S2 heard.  RRR   LUNGS:  Air entry was good.  No crackles or wheezes   ABDOMEN: Soft and non-tender.   MUSCULOSKELETAL: RLE dressing in place  PSYCHIATRIC: Normal mood    CULTURE:   No results found for this visit on 03/17/25.    IMAGING STUDIES: SOME MAY NEED FOLLOW UP WITH PCP   XR CHEST PA + LAT CHEST (CPT=71046)    Result Date: 3/7/2025  CONCLUSION:   No focal opacity, pleural effusion, or pneumothorax.    Dictated by (CST): Jefferson Grier MD on 3/07/2025 at 3:00 PM     Finalized by (CST): Jefferson Grier MD on 3/07/2025 at 3:01 PM           LABS :     Lab Results   Component Value Date    WBC 10.7 03/07/2025    HGB 12.0 03/18/2025    HCT 35.5 03/18/2025    .0 03/07/2025    CREATSERUM 0.92 03/18/2025    BUN 13 03/18/2025     03/18/2025    K 3.8 03/18/2025     03/18/2025    CO2 29.0 03/18/2025     (H) 03/18/2025    CA 8.7 03/18/2025    ALB 4.7 03/07/2025    ALKPHO 127 03/07/2025    BILT 0.6 03/07/2025    TP 7.2 03/07/2025    AST 20 03/07/2025    ALT 20 03/07/2025    PTT 31.1 10/06/2023    INR 0.95 12/27/2023    T4F 1.4 11/21/2023    TSH 1.511 12/18/2024    ESRML 21 01/15/2024    CRP <0.29 05/21/2022    MG 2.1 06/08/2023     (H) 05/15/2024    B12 404 02/17/2025       Recent Labs   Lab 03/18/25  0548   HGB 12.0   HCT 35.5     Recent Labs   Lab 03/18/25  0548   *   BUN 13   CREATSERUM 0.92   CA 8.7      K 3.8      CO2 29.0     Lab Results   Component Value Date    INR 0.95 12/27/2023    INR 1.00  10/06/2023    INR 0.95 01/20/2023       Disposition: Discharge to Home    Condition at Discharge: Stable     Discharge Medications:      Discharge Medications        START taking these medications        Instructions Prescription details   aspirin 325 MG Tbec      Take 1 tablet (325 mg total) by mouth in the morning and 1 tablet (325 mg total) before bedtime.   Quantity: 21 tablet  Refills: 0     HYDROcodone-acetaminophen 7.5-325 MG Tabs  Commonly known as: Norco      Take 1 tablet by mouth every 4 (four) hours as needed for Pain.   Quantity: 25 tablet  Refills: 0     Polyethylene Glycol 3350 17 g Pack  Commonly known as: MIRALAX      Take 17 g by mouth daily as needed.   Quantity: 24 each  Refills: 0            CONTINUE taking these medications        Instructions Prescription details   amLODIPine 10 MG Tabs  Commonly known as: Norvasc      TAKE 1 TABLET BY MOUTH EVERY DAY   Quantity: 90 tablet  Refills: 3     cholecalciferol 50 MCG (2000 UT) Caps  Commonly known as: Vitamin D3      Take 1 capsule (2,000 Units total) by mouth daily.   Refills: 0     latanoprost 0.005 % Soln  Commonly known as: Xalatan      INSTILL 1 DROP IN BOTH EYES EVERY night   Quantity: 3 each  Refills: 3     MAGNESIUM OR      Take by mouth daily.   Refills: 0     Omeprazole 40 MG Cpdr      Take 1 capsule (40 mg total) by mouth daily. Take 1 capsule by mouth daily before breakfast.   Quantity: 90 capsule  Refills: 3     pregabalin 75 MG Caps  Commonly known as: Lyrica      Take 1 capsule (75 mg total) by mouth 2 (two) times daily.   Quantity: 60 capsule  Refills: 2     rosuvastatin 10 MG Tabs  Commonly known as: Crestor      Take 1 tablet (10 mg total) by mouth every evening.   Refills: 0               Where to Get Your Medications        [id number] - [place], [place] - [address] AT [facility] [address], [phone], [phone]  [address], [place] [address]      Phone:  [phone]   aspirin 325 MG Tbec  HYDROcodone-acetaminophen 7.5-325 MG Tabs  Polyethylene Glycol 3350 17 g Pack         Follow up Visits  No follow-up provider specified.  Bella Rogers MD    Consultants         Provider   Role Specialty     Victoria Rick MD      Consulting Physician HOSPITALIST              Other Discharge Instructions:       Discharge Instructions         Norco or tylenol for pain.  Ibuprofen for pain/inflammation.  Aspirin 325mg twice daily for 3 weeks for blood clot prevention.  May remove dressing and place new mepilex or guaze over incision in 4 days.  Keep incision clean and dry.  Ice and elevate knee.  Weight bear as tolerated.  Use walker/crutches for comfort.  Follow up with Dr. Delgadillo in 12 weeks [phone].     Home health  Sometimes managing your health at home requires assistance.  The [facility] team has recognized your preference to use [facility].  They can be reached by phone at [phone].  The fax number for your reference is [phone].  A representative from the home health agency will contact you or your family to schedule your first visit.            ----------------------------------------------------  33 MIN SPENT ON THIS DC   Rafael Villar MD    3/19/2025

## 2025-03-19 NOTE — PHYSICAL THERAPY NOTE
PHYSICAL THERAPY TREATMENT NOTE - INPATIENT     Room Number: Room 7/Room 7-A       Presenting Problem: s/p R TKA  Co-Morbidities : PAST MEDICAL HISTORY:  Osteoarthritis, obesity, obstructive sleep apnea, diverticulitis, hypertension, hyperlipidemia, peripheral neuropathy.     PAST SURGICAL HISTORY:  Anal sphincterotomy, total abdominal hysterectomy, cataract procedure, Nissen fundoplication, right bunionectomy.    Problem List  Principal Problem:    Primary osteoarthritis of right knee  Active Problems:    Essential hypertension    Neuropathy    KB (obstructive sleep apnea)    Hyperlipidemia      PHYSICAL THERAPY ASSESSMENT   Patient demonstrates fair progress this session, goals  remain in progress.      Patient is requiring supervision as a result of the following impairments: decreased functional strength, pain, and limited   ROM.     Patient continues to function near baseline with gait.  Next session anticipate patient to progress gait.  Physical Therapy will continue to follow patient for duration of hospitalization.    Patient continues to benefit from continued skilled PT services: at discharge to promote prior level of function and safety with additional support and return home with home health PT.    PLAN DURING HOSPITALIZATION  Nursing Mobility Recommendation : 1 Assist     PT Treatment Plan: Bed mobility, Patient education, Family education, Gait training, Transfer training  Frequency (Obs): Daily     SUBJECTIVE  \"I want to go home\"    OBJECTIVE  Precautions: Limb alert - right, Bed/chair alarm    WEIGHT BEARING RESTRICTION  R Lower Extremity: Weight Bearing as Tolerated      PAIN ASSESSMENT   Rating: Unable to rate  Location: denies  Management Techniques: Activity promotion, Body mechanics, Breathing techniques, Relaxation, Repositioning    BALANCE  Static Sitting: Fair  Dynamic Sitting: Fair  Static Standing: Fair -  Dynamic Standing: Fair -    ACTIVITY TOLERANCE                          O2 WALK        AM-PAC '6-Clicks' INPATIENT SHORT FORM - BASIC MOBILITY  How much difficulty does the patient currently have...  Patient Difficulty: Turning over in bed (including adjusting bedclothes, sheets and blankets)?: A Little   Patient Difficulty: Sitting down on and standing up from a chair with arms (e.g., wheelchair, bedside commode, etc.): A Little   Patient Difficulty: Moving from lying on back to sitting on the side of the bed?: A Little   How much help from another person does the patient currently need...   Help from Another: Moving to and from a bed to a chair (including a wheelchair)?: A Little   Help from Another: Need to walk in hospital room?: A Little   Help from Another: Climbing 3-5 steps with a railing?: A Little     AM-PAC Score:  Raw Score: 18   Approx Degree of Impairment: 46.58%   Standardized Score (AM-PAC Scale): 43.63   CMS Modifier (G-Code): CK    FUNCTIONAL ABILITY STATUS  Functional Mobility/Gait Assessment  Gait Assistance: Supervision  Distance (ft): 150  Assistive Device: Cane  Pattern: R Foot flat, R Decreased stance time (heavy UE support on RW, decreased zoë)  Stairs: Stairs  How Many Stairs: 8  Device: 1 Rail, 2 Rails  Assist: Contact guard assist  Pattern: Ascend and Descend  Ascend and Descend : Step to  Rolling: supervision  Supine to Sit: supervision  Sit to Supine: supervision  Sit to Stand: supervision    Skilled Therapy Provided: gait training and stair training    The patient's Approx Degree of Impairment: 46.58% has been calculated based on documentation in the [facility] '6 clicks' Inpatient Daily Activity Short Form.  Research supports that patients with this level of impairment may benefit from HH.  Final disposition will be made by interdisciplinary medical team.    THERAPEUTIC EXERCISES  Lower Extremity Ankle pumps  Glut sets  LAQ  Heel slides   Position Sitting  20xea     Patient End of Session: Up in chair    CURRENT GOALS   Goals to be met by: 4/1/25  Patient Goal  Patient's self-stated goal is: not stated   Goal #1 Patient is able to demonstrate supine - sit EOB @ level: modified independent     Goal #1   Current Status progressing   Goal #2 Patient is able to demonstrate transfers Sit to/from Stand at assistance level: modified independent     Goal #2  Current Status progressing   Goal #3 Patient is able to ambulate 300 feet with assistive device at assistance level: modified independent    Goal #3   Current Status progressing   Goal #4 Patient will negotiate 13 stairs/one curb w/ assistive device and supervision   Goal #4   Current Status progressing   Goal #5  AROM 0 degrees extension to 95 degrees flexion     Goal #5   Current Status progressing   Goal #6 Patient independently performs home exercise program for ROM/strengthening per the instructions provided in preparation for discharge.   Goal #6  Current Status progressing     Gait Trainin minutes  Therapeutic Exercise: 10 minutes

## 2025-03-19 NOTE — CM/SW NOTE
03/19/25 0900   Discharge disposition   Expected discharge disposition Home-Health   Post Acute Care Provider Residential   Discharge transportation Private car     [facility] liaison notified of dc today via secure chat.    Residential home healthcare  P:[phone]  F:[phone]    Yoli Mejia RN, BSN  Nurse   [phone]

## 2025-03-19 NOTE — PLAN OF CARE
Problem: Patient Centered Care  Goal: Patient preferences are identified and integrated in the patient's plan of care  Description: Interventions:- What would you like us to know as we care for you? Patient is from home with  and he is her support system.  - Provide timely, complete, and accurate information to patient/family- Incorporate patient and family knowledge, values, beliefs, and cultural backgrounds into the planning and delivery of care- Encourage patient/family to participate in care and decision-making at the level they choose- Honor patient and family perspectives and choices  Outcome: Adequate for Discharge     Problem: Patient/Family Goals  Goal: Patient/Family Long Term Goal  Description: Patient's Long Term Goal: Patient will independently walk with a walker and will have no complications from surgery.  Interventions:  - Up as tolerated with walker, WBAT to right knee.  - Monitor incision for any signs of infection or bleeding.  - Pain management with oral medication.  - Take oral anticoagulant as prescribed to prevent blood clots.  - Fall prevention.  - May ice incision to prevent swelling or to help reduce pain.  - Follow up with surgery as recommended.  - [facility] PT/OT.  - Continue to use incentive spirometry to prevent pneumonia.  - See additional Care Plan goals for specific interventions  Outcome: Adequate for Discharge  Goal: Patient/Family Short Term Goal  Description: Patient's Short Term Goal: Home when stable and passes therapy.    Interventions:   - Up as tolerated with walker, WBAT to right knee.  - Monitor incision for any signs of infection or bleeding.  - Pain management with oral medication.  - Take oral anticoagulant as prescribed to prevent blood clots.  - Fall prevention.  - May ice incision to prevent swelling or to help reduce pain.  - Follow up with surgery as recommended.  - PT/OT as ordered.  - Use incentive spirometry 10x/H to prevent pneumonia.  - Monitor labs and VS  as ordered and abnormalities as needed.  - See additional Care Plan goals for specific interventions  Outcome: Adequate for Discharge     Problem: PAIN - ADULT  Goal: Verbalizes/displays adequate comfort level or patient's stated pain goal  Description: INTERVENTIONS:- Encourage pt to monitor pain and request assistance- Assess pain using appropriate pain scale- Administer analgesics based on type and severity of pain and evaluate response- Implement non-pharmacological measures as appropriate and evaluate response- Consider cultural and social influences on pain and pain management- Manage/alleviate anxiety- Utilize distraction and/or relaxation techniques- Monitor for opioid side effects- Notify MD/LIP if interventions unsuccessful or patient reports new pain- Anticipate increased pain with activity and pre-medicate as appropriate  Outcome: Adequate for Discharge     Problem: RISK FOR INFECTION - ADULT  Goal: Absence of fever/infection during anticipated neutropenic period  Description: INTERVENTIONS- Monitor WBC- Administer growth factors as ordered- Implement neutropenic guidelines  Outcome: Adequate for Discharge     Problem: SAFETY ADULT - FALL  Goal: Free from fall injury  Description: INTERVENTIONS:- Assess pt frequently for physical needs- Identify cognitive and physical deficits and behaviors that affect risk of falls.- [place] fall precautions as indicated by assessment.- Educate pt/family on patient safety including physical limitations- Instruct pt to call for assistance with activity based on assessment- Modify environment to reduce risk of injury- Provide assistive devices as appropriate- Consider OT/PT consult to assist with strengthening/mobility- Encourage toileting schedule  Outcome: Adequate for Discharge     Problem: DISCHARGE PLANNING  Goal: Discharge to home or other facility with appropriate resources  Description: INTERVENTIONS:- Identify barriers to discharge w/pt and caregiver- Include  patient/family/discharge partner in discharge planning- Arrange for needed discharge resources and transportation as appropriate- Identify discharge learning needs (meds, wound care, etc)- Arrange for interpreters to assist at discharge as needed- Consider post-discharge preferences of patient/family/discharge partner- Complete POLST form as appropriate- Assess patient's ability to be responsible for managing their own health- Refer to Case Management Department for coordinating discharge planning if the patient needs post-hospital services based on physician/LIP order or complex needs related to functional status, cognitive ability or social support system  Outcome: Adequate for Discharge     Problem: SKIN/TISSUE INTEGRITY - ADULT  Goal: Incision(s), wounds(s) or drain site(s) healing without S/S of infection  Description: INTERVENTIONS:- Assess and document risk factors for pressure ulcer development- Assess and document skin integrity- Assess and document dressing/incision, wound bed, drain sites and surrounding tissue- Implement wound care per orders- Initiate isolation precautions as appropriate- Initiate Pressure Ulcer prevention bundle as indicated  Outcome: Adequate for Discharge     Problem: MUSCULOSKELETAL - ADULT  Goal: Return mobility to safest level of function  Description: INTERVENTIONS:- Assess patient stability and activity tolerance for standing, transferring and ambulating w/ or w/o assistive devices- Assist with transfers and ambulation using safe patient handling equipment as needed- Ensure adequate protection for wounds/incisions during mobilization- Obtain PT/OT consults as needed- Advance activity as appropriate- Communicate ordered activity level and limitations with patient/family  Outcome: Adequate for Discharge  Goal: Maintain proper alignment of affected body part  Description: INTERVENTIONS:- Support and protect limb and body alignment per provider's orders- Instruct and reinforce with  patient and family use of appropriate assistive device and precautions (e.g. spinal or hip dislocation precautions)  Outcome: Adequate for Discharge    Patient is more awake and passed PT today.  Patient's pain is much better controlled with oral medication.  Patient is voiding well, passing gas but denies BM.  Patient will be going home today.

## 2025-03-19 NOTE — PLAN OF CARE
Kerry is alert x 4. Voiding freely. Pain is managed with Norco oral and received a dose of IVP Morphine during the night. Up x 1 assist with the walker, wbat. Fall precautions are in place. Family at the bedside. Plan is for discharge with home health.    Problem: PAIN - ADULT  Goal: Verbalizes/displays adequate comfort level or patient's stated pain goal  Description: INTERVENTIONS:- Encourage pt to monitor pain and request assistance- Assess pain using appropriate pain scale- Administer analgesics based on type and severity of pain and evaluate response- Implement non-pharmacological measures as appropriate and evaluate response- Consider cultural and social influences on pain and pain management- Manage/alleviate anxiety- Utilize distraction and/or relaxation techniques- Monitor for opioid side effects- Notify MD/LIP if interventions unsuccessful or patient reports new pain- Anticipate increased pain with activity and pre-medicate as appropriate  Outcome: Progressing     Problem: SAFETY ADULT - FALL  Goal: Free from fall injury  Description: INTERVENTIONS:- Assess pt frequently for physical needs- Identify cognitive and physical deficits and behaviors that affect risk of falls.- [place] fall precautions as indicated by assessment.- Educate pt/family on patient safety including physical limitations- Instruct pt to call for assistance with activity based on assessment- Modify environment to reduce risk of injury- Provide assistive devices as appropriate- Consider OT/PT consult to assist with strengthening/mobility- Encourage toileting schedule  Outcome: Progressing     Problem: MUSCULOSKELETAL - ADULT  Goal: Return mobility to safest level of function  Description: INTERVENTIONS:- Assess patient stability and activity tolerance for standing, transferring and ambulating w/ or w/o assistive devices- Assist with transfers and ambulation using safe patient handling equipment as needed- Ensure adequate protection for  wounds/incisions during mobilization- Obtain PT/OT consults as needed- Advance activity as appropriate- Communicate ordered activity level and limitations with patient/family  Outcome: Progressing     Problem: DISCHARGE PLANNING  Goal: Discharge to home or other facility with appropriate resources  Description: INTERVENTIONS:- Identify barriers to discharge w/pt and caregiver- Include patient/family/discharge partner in discharge planning- Arrange for needed discharge resources and transportation as appropriate- Identify discharge learning needs (meds, wound care, etc)- Arrange for interpreters to assist at discharge as needed- Consider post-discharge preferences of patient/family/discharge partner- Complete POLST form as appropriate- Assess patient's ability to be responsible for managing their own health- Refer to Case Management Department for coordinating discharge planning if the patient needs post-hospital services based on physician/LIP order or complex needs related to functional status, cognitive ability or social support system  Outcome: Progressing

## 2025-03-27 ENCOUNTER — HOSPITAL ENCOUNTER (OUTPATIENT)
Dept: ULTRASOUND IMAGING | Facility: HOSPITAL | Age: 65
Discharge: HOME | End: 2025-03-27
Payer: MEDICARE

## 2025-03-27 DIAGNOSIS — M79.661 RIGHT CALF PAIN: ICD-10-CM

## 2025-03-27 DIAGNOSIS — M79.661 RIGHT CALF PAIN: Primary | ICD-10-CM

## 2025-03-27 DIAGNOSIS — Z47.89 ORTHOPEDIC AFTERCARE: Primary | ICD-10-CM

## 2025-03-27 PROCEDURE — 93971 EXTREMITY STUDY: CPT

## 2025-03-27 RX ORDER — GABAPENTIN 100 MG/1
100 CAPSULE ORAL 3 TIMES DAILY
Qty: 30 CAPSULE | Refills: 0 | Status: ON HOLD | OUTPATIENT
Start: 2025-03-27

## 2025-03-27 RX ORDER — HYDROCODONE BITARTRATE AND ACETAMINOPHEN 5; 325 MG/1; MG/1
1 TABLET ORAL EVERY 6 HOURS PRN
Qty: 30 TABLET | Refills: 0 | Status: ON HOLD | OUTPATIENT
Start: 2025-03-27

## 2025-03-31 ENCOUNTER — APPOINTMENT (OUTPATIENT)
Dept: ULTRASOUND IMAGING | Facility: HOSPITAL | Age: 65
End: 2025-03-31
Attending: STUDENT IN AN ORGANIZED HEALTH CARE EDUCATION/TRAINING PROGRAM
Payer: MEDICARE

## 2025-03-31 ENCOUNTER — HOSPITAL ENCOUNTER (INPATIENT)
Facility: HOSPITAL | Age: 65
LOS: 2 days | Discharge: HOME | End: 2025-04-02
Attending: STUDENT IN AN ORGANIZED HEALTH CARE EDUCATION/TRAINING PROGRAM | Admitting: HOSPITALIST
Payer: MEDICARE

## 2025-03-31 ENCOUNTER — HOSPITAL ENCOUNTER (INPATIENT)
Facility: HOSPITAL | Age: 65
LOS: 2 days | Discharge: HOME HEALTH | End: 2025-04-02
Attending: STUDENT IN AN ORGANIZED HEALTH CARE EDUCATION/TRAINING PROGRAM | Admitting: HOSPITALIST
Payer: MEDICARE

## 2025-03-31 DIAGNOSIS — T81.40XA POSTOPERATIVE INFECTION, UNSPECIFIED TYPE, INITIAL ENCOUNTER: Primary | ICD-10-CM

## 2025-03-31 PROBLEM — L08.9 RIGHT KNEE SKIN INFECTION: Status: ACTIVE | Noted: 2025-03-31

## 2025-03-31 LAB
ANION GAP SERPL CALC-SCNC: 10 MMOL/L (ref 0–18)
BASOPHILS # BLD AUTO: 0.04 X10(3) UL (ref 0–0.2)
BASOPHILS NFR BLD AUTO: 0.3 %
BUN BLD-MCNC: 11 MG/DL (ref 9–23)
BUN/CREAT SERPL: 10.7 (ref 10–20)
CALCIUM BLD-MCNC: 9.4 MG/DL (ref 8.7–10.4)
CHLORIDE SERPL-SCNC: 106 MMOL/L (ref 98–112)
CO2 SERPL-SCNC: 26 MMOL/L (ref 21–32)
CREAT BLD-MCNC: 1.03 MG/DL
DEPRECATED RDW RBC AUTO: 41.1 FL (ref 35.1–46.3)
EGFRCR SERPLBLD CKD-EPI 2021: 60 ML/MIN/1.73M2 (ref 60–?)
EOSINOPHIL # BLD AUTO: 0.17 X10(3) UL (ref 0–0.7)
EOSINOPHIL NFR BLD AUTO: 1.3 %
ERYTHROCYTE [DISTWIDTH] IN BLOOD BY AUTOMATED COUNT: 14.3 % (ref 11–15)
GLUCOSE BLD-MCNC: 93 MG/DL (ref 70–99)
HCT VFR BLD AUTO: 35.4 %
HGB BLD-MCNC: 11.9 G/DL
IMM GRANULOCYTES # BLD AUTO: 0.08 X10(3) UL (ref 0–1)
IMM GRANULOCYTES NFR BLD: 0.6 %
LACTATE SERPL-SCNC: 1.5 MMOL/L (ref 0.5–2)
LYMPHOCYTES # BLD AUTO: 0.91 X10(3) UL (ref 1–4)
LYMPHOCYTES NFR BLD AUTO: 6.8 %
MCH RBC QN AUTO: 26.4 PG (ref 26–34)
MCHC RBC AUTO-ENTMCNC: 33.6 G/DL (ref 31–37)
MCV RBC AUTO: 78.7 FL
MONOCYTES # BLD AUTO: 1.07 X10(3) UL (ref 0.1–1)
MONOCYTES NFR BLD AUTO: 8 %
NEUTROPHILS # BLD AUTO: 11.03 X10 (3) UL (ref 1.5–7.7)
NEUTROPHILS # BLD AUTO: 11.03 X10(3) UL (ref 1.5–7.7)
NEUTROPHILS NFR BLD AUTO: 83 %
OSMOLALITY SERPL CALC.SUM OF ELEC: 293 MOSM/KG (ref 275–295)
PLATELET # BLD AUTO: 704 10(3)UL (ref 150–450)
POTASSIUM SERPL-SCNC: 3.6 MMOL/L (ref 3.5–5.1)
RBC # BLD AUTO: 4.5 X10(6)UL
SODIUM SERPL-SCNC: 142 MMOL/L (ref 136–145)
WBC # BLD AUTO: 13.3 X10(3) UL (ref 4–11)

## 2025-03-31 PROCEDURE — 0S9C3ZX DRAINAGE OF RIGHT KNEE JOINT, PERCUTANEOUS APPROACH, DIAGNOSTIC: ICD-10-PCS | Performed by: ORTHOPAEDIC SURGERY

## 2025-03-31 PROCEDURE — 99222 1ST HOSP IP/OBS MODERATE 55: CPT | Performed by: HOSPITALIST

## 2025-03-31 PROCEDURE — 93971 EXTREMITY STUDY: CPT | Performed by: STUDENT IN AN ORGANIZED HEALTH CARE EDUCATION/TRAINING PROGRAM

## 2025-03-31 RX ORDER — HYDROCODONE BITARTRATE AND ACETAMINOPHEN 5; 325 MG/1; MG/1
1 TABLET ORAL ONCE
Status: COMPLETED | OUTPATIENT
Start: 2025-03-31 | End: 2025-03-31

## 2025-03-31 RX ORDER — LATANOPROST 50 UG/ML
1 SOLUTION/ DROPS OPHTHALMIC NIGHTLY
Status: DISCONTINUED | OUTPATIENT
Start: 2025-03-31 | End: 2025-04-02

## 2025-03-31 RX ORDER — SODIUM CHLORIDE 9 MG/ML
INJECTION, SOLUTION INTRAVENOUS CONTINUOUS
Status: CANCELLED | OUTPATIENT
Start: 2025-03-31

## 2025-03-31 RX ORDER — AMLODIPINE BESYLATE 10 MG/1
10 TABLET ORAL DAILY
Status: DISCONTINUED | OUTPATIENT
Start: 2025-04-01 | End: 2025-03-31

## 2025-03-31 RX ORDER — HYDROCODONE BITARTRATE AND ACETAMINOPHEN 5; 325 MG/1; MG/1
2 TABLET ORAL EVERY 4 HOURS PRN
Status: DISCONTINUED | OUTPATIENT
Start: 2025-03-31 | End: 2025-04-02

## 2025-03-31 RX ORDER — HYDROCODONE BITARTRATE AND ACETAMINOPHEN 5; 325 MG/1; MG/1
1 TABLET ORAL EVERY 6 HOURS PRN
Status: DISCONTINUED | OUTPATIENT
Start: 2025-03-31 | End: 2025-03-31

## 2025-03-31 RX ORDER — HYDROCODONE BITARTRATE AND ACETAMINOPHEN 5; 325 MG/1; MG/1
1 TABLET ORAL EVERY 4 HOURS PRN
Status: DISCONTINUED | OUTPATIENT
Start: 2025-03-31 | End: 2025-04-02

## 2025-03-31 RX ORDER — MORPHINE SULFATE 2 MG/ML
1 INJECTION, SOLUTION INTRAMUSCULAR; INTRAVENOUS EVERY 2 HOUR PRN
Status: DISCONTINUED | OUTPATIENT
Start: 2025-03-31 | End: 2025-04-02

## 2025-03-31 RX ORDER — PANTOPRAZOLE SODIUM 40 MG/1
40 TABLET, DELAYED RELEASE ORAL
Status: DISCONTINUED | OUTPATIENT
Start: 2025-04-01 | End: 2025-04-02

## 2025-03-31 RX ORDER — MORPHINE SULFATE 2 MG/ML
2 INJECTION, SOLUTION INTRAMUSCULAR; INTRAVENOUS EVERY 2 HOUR PRN
Status: DISCONTINUED | OUTPATIENT
Start: 2025-03-31 | End: 2025-04-02

## 2025-03-31 RX ORDER — METOCLOPRAMIDE HYDROCHLORIDE 5 MG/ML
5 INJECTION INTRAMUSCULAR; INTRAVENOUS EVERY 8 HOURS PRN
Status: DISCONTINUED | OUTPATIENT
Start: 2025-03-31 | End: 2025-04-02

## 2025-03-31 RX ORDER — ONDANSETRON 2 MG/ML
4 INJECTION INTRAMUSCULAR; INTRAVENOUS EVERY 6 HOURS PRN
Status: DISCONTINUED | OUTPATIENT
Start: 2025-03-31 | End: 2025-04-02

## 2025-03-31 RX ORDER — ROSUVASTATIN CALCIUM 10 MG/1
10 TABLET, COATED ORAL EVERY EVENING
Status: DISCONTINUED | OUTPATIENT
Start: 2025-03-31 | End: 2025-04-02

## 2025-03-31 RX ORDER — ENOXAPARIN SODIUM 100 MG/ML
40 INJECTION SUBCUTANEOUS DAILY
Status: DISCONTINUED | OUTPATIENT
Start: 2025-03-31 | End: 2025-03-31

## 2025-03-31 RX ORDER — TEMAZEPAM 15 MG/1
15 CAPSULE ORAL NIGHTLY PRN
Status: DISCONTINUED | OUTPATIENT
Start: 2025-03-31 | End: 2025-04-02

## 2025-03-31 RX ORDER — MORPHINE SULFATE 4 MG/ML
4 INJECTION, SOLUTION INTRAMUSCULAR; INTRAVENOUS EVERY 2 HOUR PRN
Status: DISCONTINUED | OUTPATIENT
Start: 2025-03-31 | End: 2025-04-02

## 2025-03-31 RX ORDER — IBUPROFEN 600 MG/1
600 TABLET, FILM COATED ORAL EVERY 6 HOURS PRN
COMMUNITY
End: 2025-04-08

## 2025-03-31 RX ORDER — GABAPENTIN 100 MG/1
100 CAPSULE ORAL 3 TIMES DAILY
Status: DISCONTINUED | OUTPATIENT
Start: 2025-03-31 | End: 2025-04-02

## 2025-03-31 RX ORDER — ACETAMINOPHEN 325 MG/1
650 TABLET ORAL EVERY 4 HOURS PRN
Status: DISCONTINUED | OUTPATIENT
Start: 2025-03-31 | End: 2025-04-02

## 2025-03-31 RX ORDER — ACETAMINOPHEN 500 MG
500 TABLET ORAL EVERY 4 HOURS PRN
Status: DISCONTINUED | OUTPATIENT
Start: 2025-03-31 | End: 2025-04-02

## 2025-03-31 RX ORDER — POLYETHYLENE GLYCOL 3350 17 G/17G
17 POWDER, FOR SOLUTION ORAL DAILY PRN
Status: DISCONTINUED | OUTPATIENT
Start: 2025-03-31 | End: 2025-04-02

## 2025-03-31 RX ORDER — AMLODIPINE BESYLATE 10 MG/1
10 TABLET ORAL DAILY
Status: DISCONTINUED | OUTPATIENT
Start: 2025-03-31 | End: 2025-04-02

## 2025-03-31 RX ORDER — MORPHINE SULFATE 4 MG/ML
4 INJECTION, SOLUTION INTRAMUSCULAR; INTRAVENOUS ONCE
Status: COMPLETED | OUTPATIENT
Start: 2025-03-31 | End: 2025-03-31

## 2025-03-31 NOTE — ED PROVIDER NOTES
[place] Emergency Department Note  Patient: Kerry Hsu Age: 64 year old Sex: female      MRN: [id number]  : 1960    Patient Seen in: [facility] Emergency Department    History     Chief Complaint   Patient presents with    Postop/Procedure Problem    Knee Pain     Stated Complaint: rt. knee pain post op    History obtained from: Patient    Patient is a 64-year-old female with past medical history of hypertension, hyperlipidemia, arthritis status post right total knee arthroplasty on 3/17/2025 with Dr. Delgadillo presenting today for evaluation of right knee pain.  She states that she has been having pain in the right knee over the past 3 days associated with redness and swelling.  She states that she had been recovering well and had her postoperative appointment 5 days ago during which things were going well.  States that she began having worsening symptoms 3 days ago.  She denies any fevers.  States that she is taking Tylenol with no improvement of pain.    Review of Systems:  Review of Systems  Positive for stated complaint: rt. knee pain post op. Constitutional and vital signs reviewed. All other systems reviewed and negative except as noted above.    Patient History:  Past Medical History:    Abscess of left thigh    Acute meniscal tear of knee    Age-related nuclear cataract of both eyes    Anal sphincter incontinence    Arthritis    Back problem    Back problem    Cataract    left    Chondromalacia    Colon polyps    Degenerative disc disease    Disorder of kidney and ureter    Diverticular disease    Esophageal reflux    Glaucoma    Hammertoe    High blood pressure    High cholesterol    Hx of motion sickness    Hyperlipidemia with target low density lipoprotein (LDL) cholesterol less than 130 mg/dL    Insomnia    Kidney lesion    Liver lesion    per patient not being treated or seeing a specialist    Neuropathy    both feet    Obstructive sleep apnea syndrome    Osteoarthritis     Palpitation    PONV (postoperative nausea and vomiting)    Prediabetes    Primary open angle glaucoma of both eyes    Diagnosis of glaucoma OU; Started Latanoprost qhs after abnormal VF and OCT 2/25/16;  6/5/18 consult with Dr. Madeline Chappell-see progress note    Sleep apnea    no current therapy    Small bowel obstruction (HCC)    Tendinitis    Unspecified essential hypertension    Visual impairment    Readers       Past Surgical History:   Procedure Laterality Date    Anal sphincterotomy      03/12/2013 [place]    Blepharoplasty anesthesia Bilateral 03/05/2020    Dr Kerwin Bello [facility]     Cataract extraction w/  intraocular lens implant Left 05/07/2018    L PC IOL with Dr. Suresh @ [facility]    Colonoscopy N/A 11/11/2016    Procedure: COLONOSCOPY;  Surgeon: Sabrina Cazares MD;  Location: [facility] ENDOSCOPY    Colonoscopy N/A 09/06/2019    Procedure: COLONOSCOPY;  Surgeon: Edwin Sterling MD;  Location: [facility] ENDOSCOPY    Colonoscopy  09/16/2024    Dr. Sterling; colon polyps, diverticulosis, hemorrhoids    Colonoscopy N/A 9/16/2024    Procedure: COLONOSCOPY;  Surgeon: Edwin Sterling MD;  Location: [facility] ENDOSCOPY    Egd  09/16/2024    Dr. Sterling; gastric polyps, small hiatal hernia    Excision of chalazion, single - od - right eye Right 6.27/2017    Nasally    Hc arthrocentesis or inject major joint w/o us      Hysterectomy  1996    KAI    Other      Lap Nissen Fundoplication surgery    Other surgical history  2005,2007,2008    LAPAROSCOPIC LYSIS OF ADHESION    Other surgical history      right foot bunionectomy    Other surgical history  11/14/2014    right superficial anterior peroneal nerve biopsy and right proximal thigh muscle biopsy.    Other surgical history  06/13/2023    Excision and Biopsy of two  perineal cysts    Upper gi endoscopy performed  05/2015    Yag capsulotomy - os - left eye Left 12/19/2018    RJM        Family History   Problem Relation Age of Onset    Hypertension Father     Hypertension Mother      Hypertension Sister     Cancer Sister         liver cancer    Hypertension Brother     Diabetes Neg     Glaucoma Neg     Macular degeneration Neg     Breast Cancer Neg     Ovarian Cancer Neg        Specific Social Determinants of Health:   Social History     Socioeconomic History    Marital status:    Tobacco Use    Smoking status: Never     Passive exposure: Never    Smokeless tobacco: Never   Vaping Use    Vaping status: Never Used   Substance and Sexual Activity    Alcohol use: No     Comment: None.     Drug use: No    Sexual activity: Yes     Birth control/protection: Hysterectomy   Other Topics Concern    Caffeine Concern Yes     Comment: occasional soda    Exercise Yes    Pt has a pacemaker No    Pt has a defibrillator No    Breast feeding No    Reaction to local anesthetic No    Right Handed Yes   Social History Narrative    Work - banking     Social Drivers of Health     Food Insecurity: No Food Insecurity (3/13/2025)    NCSS - Food Insecurity     Worried About Running Out of Food in the Last Year: No     Ran Out of Food in the Last Year: No   Transportation Needs: No Transportation Needs (3/13/2025)    NCSS - Transportation     Lack of Transportation: No   Housing Stability: Not At Risk (3/13/2025)    NCSS - Housing/Utilities     Has Housing: Yes     Worried About Losing Housing: No     Unable to Get Utilities: No           PSFH elements reviewed from today and agreed except as otherwise stated in HPI.    Physical Exam     ED Triage Vitals [03/31/25 1204]   /78   Pulse 101   Resp 20   Temp 98.4 °F (36.9 °C)   Temp src Oral   SpO2 96 %   O2 Device None (Room air)       Current:/80   Pulse 83   Temp 98.4 °F (36.9 °C) (Oral)   Resp 18   Ht 172.7 cm (5' 8\")   Wt 89.8 kg   SpO2 97%   BMI 30.11 kg/m²         Physical Exam  Constitutional:       General: She is not in acute distress.  HENT:      Head: Normocephalic and atraumatic.      Mouth/Throat:      Mouth: Mucous membranes are  moist.   Eyes:      Extraocular Movements: Extraocular movements intact.   Cardiovascular:      Rate and Rhythm: Normal rate and regular rhythm.      Heart sounds: Normal heart sounds.   Pulmonary:      Effort: Pulmonary effort is normal. No respiratory distress.      Breath sounds: Normal breath sounds.   Abdominal:      Palpations: Abdomen is soft.      Tenderness: There is no abdominal tenderness.   Musculoskeletal:      Comments: Right knee is edematous with overlying erythema most notable at the superior lateral region of the knee, overlying tenderness over the distal thigh, knee, and proximal shin.  Range of motion limited secondary to pain.  Normal distal pulses equal bilaterally.   Skin:     General: Skin is warm and dry.      Capillary Refill: Capillary refill takes less than 2 seconds.      Findings: No rash.   Neurological:      General: No focal deficit present.      Mental Status: She is alert and oriented to person, place, and time.   Psychiatric:         Mood and Affect: Mood normal.         Behavior: Behavior normal.         ED Course   Labs:   Labs Reviewed   BASIC METABOLIC PANEL (8) - Abnormal; Notable for the following components:       Result Value    Creatinine 1.03 (*)     All other components within normal limits   CBC WITH DIFFERENTIAL WITH PLATELET - Abnormal; Notable for the following components:    WBC 13.3 (*)     HGB 11.9 (*)     MCV 78.7 (*)     .0 (*)     Neutrophil Absolute Prelim 11.03 (*)     Neutrophil Absolute 11.03 (*)     Lymphocyte Absolute 0.91 (*)     Monocyte Absolute 1.07 (*)     All other components within normal limits   LACTIC ACID, PLASMA   BLOOD CULTURE   BLOOD CULTURE     Radiology findings:  I personally reviewed the images.   US VENOUS DOPPLER LEG RIGHT - DIAG IMG (CPT=93971)    Result Date: 3/31/2025  CONCLUSION:  1. No right lower extremity DVT.    Dictated by (CST): Johnny Carrera MD on 3/31/2025 at 3:51 PM     Finalized by (CST): Johnny Carrera MD on  3/31/2025 at 3:52 PM             Cardiac Monitor: Interpreted by me.   Pulse Readings from Last 1 Encounters:   03/31/25 83   , sinus,     External non-ED records reviewed independently by me: Operative note from 3/17/2025 confirms patient underwent a right TKA for primary osteoarthritis of    MDM   64-year-old female with a past medical history of hypertension, hyperlipidemia, arthritis status post right TKA on 3/17 presenting for evaluation of atraumatic right knee pain, swelling and redness over the past 3 days.  Upon arrival to emergency department, well-appearing and in no acute distress.  Mildly tachycardic but his vitals are limits.  Exam as above    Differential diagnoses considered includes, but is not limited to: Postop infection, septic arthritis, DVT    Will obtain the following tests: CBC, BMP, lactic acid, blood cultures x 2, right lower extremity venous duplex  Please see ED course for my independent review of these tests/imaging results.    Initial Medications/Therapeutics administered: Norco    Chronic conditions affecting care: Hypertension, hyperlipidemia, arthritis    Workup and medications considered but not ordered: Consider antibiotics however asked to held until orthopedic surgery can perform arthrocentesis    Social Determinants of Health that impacted care: None    ED Course as of 03/31/25 1751  ------------------------------------------------------------  Time: 03/31 1717  Comment: I spoke with Janine Mai, orthopedic surgery PA, who is made aware of new consult.  Discussed need for arthrocentesis to be done prior to antibiotics.  Will hold antibiotics at this time.  I endorsed patient's case to the [facility] hospitalist, Dr. Rick, who accepted patient for admission.  ------------------------------------------------------------  Time: 03/31 1751  Comment: I spoke with Dr. Bishop, who reiterated need for holding off on antibiotics until after arthrocentesis.  He has agreed to evaluate  patient.            Disposition and Plan     Clinical Impression:  1. Postoperative infection, unspecified type, initial encounter        Disposition:  Admit    Follow-up:  No follow-up provider specified.    Medications Prescribed:  Current Discharge Medication List          Hospital Problems       Present on Admission  Date Reviewed: 3/18/2025            ICD-10-CM Noted POA    * (Principal) Right knee skin infection L08.9 3/31/2025 Unknown        This note may have been created using voice dictation technology and may include inadvertent errors.      Azra Dotson MD  Emergency Medicine

## 2025-03-31 NOTE — ED INITIAL ASSESSMENT (HPI)
Pt came in for right knee pain, swelling and redness.  Pt states that she had right knee replacement surgery 03/21/25 here.  Pt states pain in her right knee is worsening.  Denies fever.  Pt is A/OX 4, breathing unlabored.  Tylenol taken at 10 AM,   Per pt she did not take Norco, Gabapentin today.

## 2025-03-31 NOTE — H&P
[facility]    PATIENT'S NAME: AINSLEY HARTLEY   ATTENDING PHYSICIAN: Azra Dotson MD   PATIENT ACCOUNT#:   [id number]    LOCATION:  [address]  MEDICAL RECORD #:   [id number]       YOB: 1960  ADMISSION DATE:       03/31/2025    HISTORY AND PHYSICAL EXAMINATION    CHIEF COMPLAINT:  Right knee pain, limitation range of motion, rule out cellulitis versus prosthetic joint infection.    HISTORY OF PRESENT ILLNESS:  The patient is a 64-year-old  female who had a right total knee arthroplasty done on March 17, 2025, discharged home in a stable condition; came back today to the emergency department for evaluation of progressive right knee swelling, pain, and limitation of range of motion for last 3 days.  CBC showed white blood cell count of 11.3 with left shift.  Chemistry was unremarkable.  Venous Doppler study was negative.    PAST MEDICAL HISTORY:  Osteoarthritis, obesity, obstructive sleep apnea, diverticulitis, hypertension, hyperlipidemia, peripheral neuropathy.    PAST SURGICAL HISTORY:  Right total knee arthroplasty, anal sphincterectomy, total abdominal hysterectomy, cataract procedure, Nissen fundoplication, and right bunionectomy.    MEDICATIONS:  Please see medication reconciliation list.     ALLERGIES:  Celebrex and sulfa.    FAMILY HISTORY:  Positive for hypertension.    SOCIAL HISTORY:  No tobacco, alcohol, or drug use.  Independent for basic activities of daily living.     REVIEW OF SYSTEMS:  Patient reports right knee pain, swelling.  Surgical wound without dehiscence or drainage.  She has subjective chills at home.  She feels pain also streaking on the medial aspect of her right thigh and lateral aspects of her right leg.      PHYSICAL EXAMINATION:    GENERAL:  Alert and oriented to time, place, and person.  Moderate distress.  VITAL SIGNS:  Temperature 98.4, pulse 83, respiratory rate 18, blood pressure 118/80, pulse ox 97% on room air.    HEENT:   Atraumatic.  Oropharynx clear.  Moist mucous membranes.  Ears, nose normal.  Eyes:  Anicteric sclerae.  NECK:  Supple.  No lymphadenopathy.  Trachea midline.  Full range of motion.  LUNGS:  Clear to auscultation bilaterally.  Normal respiratory effort.    HEART:  Regular rate, rhythm.  S1 and S2 auscultated.  No murmur.   ABDOMEN:  Soft, nondistended.  No tenderness.  Positive bowel sounds.    EXTREMITIES:  Right knee effusion with warm to touch and tenderness to palpation bilaterally.  Also tenderness to palpation medial thigh and tender lymphadenopathy, right groin area.  NEUROLOGIC:  Motor and sensory intact.     ASSESSMENT:    1.   Right knee effusion, limitation of range of motion, with warmness to touch, streaking cellulitis medial thigh.  Rule out cellulitis versus prosthetic joint infection or both.    2.   Essential hypertension.      PLAN:  Patient will be admitted to general medical floor.  IV vancomycin and cefepime.  Blood cultures.  ID and orthopedic surgery consult.  Monitor temperature curve and hemodynamic status.  Pain control.  Further recommendations to follow.     Dictated By Victoria Rick MD  d: 03/31/2025 17:21:16  t: 03/31/2025 17:22:54  Job [id number]  FB/    cc: Azra Dotson MD

## 2025-03-31 NOTE — ED QUICK NOTES
Orders for admission, patient is aware of plan and ready to go upstairs. Any questions, please call ED RN Holley at extension [phone].     Patient Covid vaccination status: Fully vaccinated     COVID Test Ordered in ED: None    COVID Suspicion at Admission: N/A    Running Infusions:  None    Mental Status/LOC at time of transport: AOx4    Other pertinent information:   CIWA score: N/A   NIH score:  N/A

## 2025-04-01 LAB
ANION GAP SERPL CALC-SCNC: 8 MMOL/L (ref 0–18)
BASOPHILS # BLD AUTO: 0.04 X10(3) UL (ref 0–0.2)
BASOPHILS NFR BLD AUTO: 0.5 %
BASOPHILS NFR SNV: 0 %
BUN BLD-MCNC: 10 MG/DL (ref 9–23)
BUN/CREAT SERPL: 10.1 (ref 10–20)
CALCIUM BLD-MCNC: 9.1 MG/DL (ref 8.7–10.4)
CHLORIDE SERPL-SCNC: 105 MMOL/L (ref 98–112)
CO2 SERPL-SCNC: 27 MMOL/L (ref 21–32)
CREAT BLD-MCNC: 0.99 MG/DL
DEPRECATED RDW RBC AUTO: 40.2 FL (ref 35.1–46.3)
EGFRCR SERPLBLD CKD-EPI 2021: 63 ML/MIN/1.73M2 (ref 60–?)
EOSINOPHIL # BLD AUTO: 0.09 X10(3) UL (ref 0–0.7)
EOSINOPHIL NFR BLD AUTO: 1.2 %
EOSINOPHIL NFR SNV: 1 %
ERYTHROCYTE [DISTWIDTH] IN BLOOD BY AUTOMATED COUNT: 14.4 % (ref 11–15)
GLUCOSE BLD-MCNC: 113 MG/DL (ref 70–99)
HCT VFR BLD AUTO: 34.4 %
HGB BLD-MCNC: 11.5 G/DL
IMM GRANULOCYTES # BLD AUTO: 0.04 X10(3) UL (ref 0–1)
IMM GRANULOCYTES NFR BLD: 0.5 %
LYMPHOCYTES # BLD AUTO: 2.18 X10(3) UL (ref 1–4)
LYMPHOCYTES NFR BLD AUTO: 28.5 %
LYMPHOCYTES NFR SNV: 4 %
MCH RBC QN AUTO: 25.8 PG (ref 26–34)
MCHC RBC AUTO-ENTMCNC: 33.4 G/DL (ref 31–37)
MCV RBC AUTO: 77.1 FL
MONOCYTES # BLD AUTO: 0.9 X10(3) UL (ref 0.1–1)
MONOCYTES NFR BLD AUTO: 11.7 %
MONOS+MACROS NFR SNV: 24 %
NEUTROPHILS # BLD AUTO: 4.41 X10 (3) UL (ref 1.5–7.7)
NEUTROPHILS # BLD AUTO: 4.41 X10(3) UL (ref 1.5–7.7)
NEUTROPHILS NFR BLD AUTO: 57.6 %
NEUTROPHILS NFR FLD: 71 %
OSMOLALITY SERPL CALC.SUM OF ELEC: 290 MOSM/KG (ref 275–295)
PLATELET # BLD AUTO: 671 10(3)UL (ref 150–450)
POTASSIUM SERPL-SCNC: 3.8 MMOL/L (ref 3.5–5.1)
RBC # BLD AUTO: 4.46 X10(6)UL
RBC # FLD AUTO: ABNORMAL /CUMM (ref ?–1)
SODIUM SERPL-SCNC: 140 MMOL/L (ref 136–145)
TOTAL CELLS COUNTED FLD: 100
TOTAL CELLS COUNTED SNV: 6621 /CUMM (ref 0–200)
WBC # BLD AUTO: 7.7 X10(3) UL (ref 4–11)
WBC # SNV: 6621 /CUMM

## 2025-04-01 PROCEDURE — 99233 SBSQ HOSP IP/OBS HIGH 50: CPT | Performed by: FAMILY MEDICINE

## 2025-04-01 RX ORDER — VANCOMYCIN HYDROCHLORIDE
15 EVERY 12 HOURS
Status: DISCONTINUED | OUTPATIENT
Start: 2025-04-02 | End: 2025-04-02

## 2025-04-01 RX ORDER — VANCOMYCIN 1.75 GRAM/500 ML IN 0.9 % SODIUM CHLORIDE INTRAVENOUS
20 ONCE
Status: COMPLETED | OUTPATIENT
Start: 2025-04-01 | End: 2025-04-01

## 2025-04-01 RX ORDER — ENOXAPARIN SODIUM 100 MG/ML
40 INJECTION SUBCUTANEOUS DAILY
Status: DISCONTINUED | OUTPATIENT
Start: 2025-04-01 | End: 2025-04-02

## 2025-04-01 NOTE — PROGRESS NOTES
Progress Note     Kerry Hsu Patient Status:  Inpatient    1960 MRN [id number]   Location [facility] 4W/SW/SE Attending Roya Reeder MD   Hosp Day # 1 PCP Bella Rogers MD     Chief Complaint: patient presented with   Chief Complaint   Patient presents with    Postop/Procedure Problem    Knee Pain       Subjective:   S: Patient feels better today denies any nausea vomiting diarrhea denies any fever or chills.    Review of Systems:   10 point ROS completed and was negative, except for pertinent positive and negatives stated in subjective.    Objective:   Vital signs:  Temp:  [98.2 °F (36.8 °C)-99.7 °F (37.6 °C)] 98.2 °F (36.8 °C)  Pulse:  [] 59  Resp:  [18-20] 18  BP: (118-147)/(61-80) 123/66  SpO2:  [94 %-100 %] 96 %    Wt Readings from Last 6 Encounters:   25 195 lb (88.5 kg)   25 198 lb (89.8 kg)   25 200 lb 14.4 oz (91.1 kg)   25 201 lb 3.2 oz (91.3 kg)   25 200 lb (90.7 kg)   25 196 lb (88.9 kg)         Physical Exam:    General: No acute distress. Alert ,         Respiratory: Clear to auscultation bilaterally. No wheezes. No rhonchi.  Cardiovascular: S1, S2. Regular rate and rhythm. No murmurs, rubs or gallops.   Abdomen: Soft, nontender, nondistended.  Positive bowel sounds. No rebound or guarding.  Neurologic: No focal neurological deficits.   Musculoskeletal: Moves all extremities.  Extremities: No edema.    Results:   Diagnostic Data:      Labs:    Labs Last 24 Hours:   BMP     CBC    Other     Na 140 Cl 105 BUN 10 Glu 113   Hb 11.5   PTT - Procal -   K 3.8 CO2 27.0 Cr 0.99   WBC 7.7 >< .0  INR - CRP -   Renal Lytes Endo    Hct 34.4   Trop - D dim -   eGFR - Ca 9.1 POC Gluc  -    LFT   pBNP - Lactic 1.5   eGFR AA - PO4 - A1c -   AST - APk - Prot -  LDL -     Mg - TSH -   ALT - T mary - Alb -        COVID-19 Lab Results    COVID-19  Lab Results   Component Value Date    COVID19 Not Detected 2024    COVID19 Not Detected  01/22/2023    COVID19 Detected (A) 06/27/2022       Pro-Calcitonin  No results for input(s): \"PCT\" in the last 168 hours.    Cardiac  No results for input(s): \"TROP\", \"PBNP\" in the last 168 hours.    Creatinine Kinase  No results for input(s): \"CK\" in the last 168 hours.    Inflammatory Markers  No results for input(s): \"CRP\", \"NABILA\", \"LDH\", \"DDIMER\" in the last 168 hours.    Imaging: Imaging data reviewed in Epic.    Medications:    gabapentin  100 mg Oral TID    latanoprost  1 drop Both Eyes Nightly    pantoprazole  40 mg Oral QAM AC    rosuvastatin  10 mg Oral QPM    amLODIPine  10 mg Oral Daily       Assessment & Plan:   ASSESSMENT / PLAN:       Right knee pain and effusion  History of right total knee arthroplasty on March 17, 2025  Status post right knee aspiration, cultures pending  Followed by Ortho and infectious disease  Continue antibiotics  Blood cultures pending  Pain management    History hyperlipidemia  Continue Crestor    History of hypertension  Norvasc    History of arthritis  Continue Gabapentin     Quality:  DVT Prophylaxis: Lovenox   CODE status: full   DISPO: pending clinical improvement.   Estimated date of discharge: To be determined  Discharge is dependent on: Improved clinical status  At this point Patient is expected to be discharge to: Home versus rehab      Plan of care discussed with Patient and RN.     Coordinated care with providers and counseling re: treatment plan and workup     Roya Reeder MD    Supplementary Documentation:         **Certification      PHYSICIAN Certification of Need for Inpatient Hospitalization - Initial Certification    Patient will require inpatient services that will reasonably be expected to span two midnight's based on the clinical documentation in H+P.   Based on patients current state of illness, I anticipate that, after discharge, patient will require TBD.             I personally reviewed the available laboratories, imaging including operative report.  I discussed/will discuss the case with patient and her nurse. I ordered laboratories studies. I adjusted medications including not applicable today. Medical decision making high, risk is high.     >55min spent, >50% spent counseling and coordinating care in the form of educating pt/family and d/w consultants and staff. Most of the time spent discussing the above plan.

## 2025-04-01 NOTE — HOME CARE LIAISON
This is a current pt with [facility] . Pt will need a SILVIA order entered prior to DC for the below services. Please add a F2F if more services need to be added. RN/PT.

## 2025-04-01 NOTE — PLAN OF CARE
Alert and oriented. Vitals stable. Afebrile. [place] for pain control. Left knee swollen and warm to touch, incision clean and intact. Ambulating with walker. Vanco and cefepime started. Waiting for cultures to come back. Tolerating diet. Voiding freely + flatus. No Bm. miralax given. Patient calls appropriately. All needs met at this time   Problem: Patient Centered Care  Goal: Patient preferences are identified and integrated in the patient's plan of care  Description: Interventions:- What would you like us to know as we care for you? - Provide timely, complete, and accurate information to patient/family- Incorporate patient and family knowledge, values, beliefs, and cultural backgrounds into the planning and delivery of care- Encourage patient/family to participate in care and decision-making at the level they choose- Honor patient and family perspectives and choices  Outcome: Progressing     Problem: Patient/Family Goals  Goal: Patient/Family Long Term Goal  Description: Patient's Long Term Goal: Interventions:- - See additional Care Plan goals for specific interventions  Outcome: Progressing  Goal: Patient/Family Short Term Goal  Description: Patient's Short Term Goal: Interventions: - - See additional Care Plan goals for specific interventions  Outcome: Progressing     Problem: PAIN - ADULT  Goal: Verbalizes/displays adequate comfort level or patient's stated pain goal  Description: INTERVENTIONS:- Encourage pt to monitor pain and request assistance- Assess pain using appropriate pain scale- Administer analgesics based on type and severity of pain and evaluate response- Implement non-pharmacological measures as appropriate and evaluate response- Consider cultural and social influences on pain and pain management- Manage/alleviate anxiety- Utilize distraction and/or relaxation techniques- Monitor for opioid side effects- Notify MD/LIP if interventions unsuccessful or patient reports new pain- Anticipate increased  pain with activity and pre-medicate as appropriate  Outcome: Progressing     Problem: RISK FOR INFECTION - ADULT  Goal: Absence of fever/infection during anticipated neutropenic period  Description: INTERVENTIONS- Monitor WBC- Administer growth factors as ordered- Implement neutropenic guidelines  Outcome: Progressing     Problem: SAFETY ADULT - FALL  Goal: Free from fall injury  Description: INTERVENTIONS:- Assess pt frequently for physical needs- Identify cognitive and physical deficits and behaviors that affect risk of falls.- [place] fall precautions as indicated by assessment.- Educate pt/family on patient safety including physical limitations- Instruct pt to call for assistance with activity based on assessment- Modify environment to reduce risk of injury- Provide assistive devices as appropriate- Consider OT/PT consult to assist with strengthening/mobility- Encourage toileting schedule  Outcome: Progressing     Problem: DISCHARGE PLANNING  Goal: Discharge to home or other facility with appropriate resources  Description: INTERVENTIONS:- Identify barriers to discharge w/pt and caregiver- Include patient/family/discharge partner in discharge planning- Arrange for needed discharge resources and transportation as appropriate- Identify discharge learning needs (meds, wound care, etc)- Arrange for interpreters to assist at discharge as needed- Consider post-discharge preferences of patient/family/discharge partner- Complete POLST form as appropriate- Assess patient's ability to be responsible for managing their own health- Refer to Case Management Department for coordinating discharge planning if the patient needs post-hospital services based on physician/LIP order or complex needs related to functional status, cognitive ability or social support system  Outcome: Progressing

## 2025-04-01 NOTE — PHYSICAL THERAPY NOTE
PHYSICAL THERAPY EVALUATION - INPATIENT     Room Number: 433/433-A  Evaluation Date: 4/1/2025  Type of Evaluation: Initial   Physician Order: PT Eval and Treat    Presenting Problem: R knee pain, swelling, redness  Co-Morbidities : s/p R TKA 3/17  Reason for Therapy: Mobility Dysfunction and Discharge Planning    PHYSICAL THERAPY ASSESSMENT   Patient is a 64 year old female admitted 3/31/2025 for R knee pain, swelling, redness.  Prior to admission, patient's baseline is independent.  Patient is currently functioning below baseline with bed mobility, transfers, gait, stair negotiation, maintaining seated position, standing prolonged periods, and performing household tasks.  Patient is requiring stand-by assist and contact guard assist as a result of the following impairments: medical status.  Physical Therapy will continue to follow for duration of hospitalization.    Patient will benefit from continued skilled PT Services at discharge to promote prior level of function and safety with additional support and return home with home health PT.    PLAN DURING HOSPITALIZATION  Nursing Mobility Recommendation : 1 Assist     PT Treatment Plan: Bed mobility, Body mechanics, Coordination, Endurance, Energy conservation, Patient education, Gait training, Neuromuscular re-educate, Range of motion, Strengthening, Stair training, Balance training  Rehab Potential : Good  Frequency (Obs): Daily     PHYSICAL THERAPY MEDICAL/SOCIAL HISTORY   History related to current admission: he patient is a 64-year-old  female who had a right total knee arthroplasty done on March 17, 2025, discharged home in a stable condition; came back today to the emergency department for evaluation of progressive right knee swelling, pain, and limitation of range of motion for last 3 days      Problem List  Principal Problem:    Postoperative infection, unspecified type, initial encounter  Active Problems:    Cellulitis    Right knee skin  infection    HOME SITUATION  Type of Home: House  Home Layout: Multi-level  Stairs to Enter : 3   Railing: Yes    Stairs to Bedroom: 10    Railing: Yes    Lives With: Spouse    Drives: Yes   Patient Regularly Uses: Rolling walker (since R TKA)     Prior Level of [place]: Prior to admission patient was independent with performance of all ADL's and performance of functional mobility. Pt has been using RW at home since R TKA on 3/17    SUBJECTIVE  \"The room up here is nicer than the one on the first floor\"    PHYSICAL THERAPY EXAMINATION   OBJECTIVE  Precautions: Bed/chair alarm  Fall Risk: Standard fall risk    WEIGHT BEARING RESTRICTION  R Lower Extremity: Weight Bearing as Tolerated    PAIN ASSESSMENT  Ratin  Location: R knee  Management Techniques: Activity promotion, Breathing techniques, Relaxation, Repositioning, Body mechanics    COGNITION  Overall Cognitive Status:  WFL - within functional limits  Arousal/Alertness:  appropriate responses to stimuli  Initiation: appears intact    RANGE OF MOTION AND STRENGTH ASSESSMENT  Upper extremity ROM and strength are within functional limits BUEs  Lower extremity ROM is within functional limits BLEs  Lower extremity strength is within functional limits BLEs, not formally tested, grossly 4/5    BALANCE  Static Sitting: Good  Dynamic Sitting: Good  Static Standing: Good  Dynamic Standing: Good    ACTIVITY TOLERANCE  Pulse: 78  Heart Rate Source: Monitor  BP: 128/63 (128/68 supine at rest, 127/63 sitting at end)  BP Location: Right arm  BP Method: Automatic  Patient Position: Sitting    O2 WALK  Oxygen Therapy  SPO2% on Room Air at Rest: 96    AM-PAC '6-Clicks' INPATIENT SHORT FORM - BASIC MOBILITY  How much difficulty does the patient currently have...  Patient Difficulty: Turning over in bed (including adjusting bedclothes, sheets and blankets)?: A Little   Patient Difficulty: Sitting down on and standing up from a chair with arms (e.g., wheelchair, bedside  commode, etc.): A Little   Patient Difficulty: Moving from lying on back to sitting on the side of the bed?: A Little   How much help from another person does the patient currently need...   Help from Another: Moving to and from a bed to a chair (including a wheelchair)?: A Little   Help from Another: Need to walk in hospital room?: A Little   Help from Another: Climbing 3-5 steps with a railing?: A Little     AM-PAC Score:  Raw Score: 18   Approx Degree of Impairment: 46.58%   Standardized Score (AM-PAC Scale): 43.63   CMS Modifier (G-Code): CK    FUNCTIONAL ABILITY STATUS  Functional Mobility/Gait Assessment  Gait Assistance:  (SBA)  Distance (ft): 275  Assistive Device: Rolling walker  Pattern: Within Functional Limits  Rolling: stand-by assist  Supine to Sit: stand-by assist  Sit to Supine: not tested  Sit to Stand: stand-by assist    Exercise/Education Provided:  Education Provided To: Patient Patient Education: Role of Physical Therapy, Plan of Care, Discharge Recommendations, DME Recommendations, Functional Transfer Techniques, Fall Prevention, Weight Bear Status, Surgical Precautions, Posture/Positioning, Edema Reduction, Energy Conservation, Proper Body Mechanics, Gait Training Patient's Response to Education: Verbalized Understanding     Skilled Therapy Provided: Patient received supine in bed with family present at initiation of session agreeable to participation in PT. Patient tolerates treatment well. Bed mobility and SBA performed with SBA. Ambulates 275ft with RW and CGA-SBA. Pt reports feeling a little light headed throughout session -- vitals taken and stable. Patient educated on general safety, POC and DC recs, verbalized understanding. Patient left seated in chair with ice applied and alarm engaged, lines intact, needs in reach and handoff to RN.    The patient's Approx Degree of Impairment: 46.58% has been calculated based on documentation in the [facility] '6 clicks' Inpatient Basic Mobility Short  Form.  Research supports that patients with this level of impairment may benefit from  PT with increased assist.  Final disposition will be made by interdisciplinary medical team.    Patient End of Session: Up in chair, Needs met, Call light within reach, RN aware of session/findings, All patient questions and concerns addressed, Hospital anti-slip socks, Ice applied, Alarm set, Family present    CURRENT GOALS  Goals to be met by: 4/15/25  Patient Goal Patient's self-stated goal is: go home   Goal #1 Patient is able to demonstrate supine - sit EOB @ level: modified independent     Goal #1   Current Status    Goal #2 Patient is able to demonstrate transfers EOB to/from [facility] at assistance level: modified independent with walker - rolling     Goal #2  Current Status    Goal #3 Patient is able to ambulate 300 feet with assist device: walker - rolling at assistance level: modified independent   Goal #3   Current Status    Goal #4 Patient will negotiate 13 stairs/one curb w/ assistive device and supervision   Goal #4   Current Status    Goal #5 Patient to demonstrate independence with home activity/exercise instructions provided to patient in preparation for discharge.   Goal #5   Current Status    Goal #6    Goal #6  Current Status      Patient Evaluation Complexity Level:  History High - 3 or more personal factors and/or co-morbidities   Examination of body systems Moderate - addressing a total of 3 or more elements   Clinical Presentation  Moderate - Evolving   Clinical Decision Making  Moderate Complexity     Gait Training: 15 minutes    Yehuda Arango, SPT

## 2025-04-01 NOTE — CM/SW NOTE
Per chart review pt was recently at [facility] for TKA by Dr. Bishop. Pt Dc'd home w/ [facility].    [facility] ID on consult, awaiting recommendations. Cultures pending.  Surgical and podiatry plan pending.    Secure chat sent to Gabriela [facility] liaison to see if pt is current with their service.  Per Gabriela pt is current w/ RN/PT. SILVIA entered.    CM requested department  (DSC) to initiate AIDIN referral for Infusion  to [facility] and [facility] to check benefits. SW/CM will continue to follow.      1400:  [facility]:  approved for in office daily infusions only, covered 100%. If she needs home has to go with part D provider.    [facility]:   tentative benefits below:   Copay Amount: $  168.84     /week cefepime Copay Amount: $  155.50     /week vanco   Per Nancie:$210.00 /supplies per week($30.00/day)    Final costs will be determined by final orders        .Yoli Mejia RN, BSN  Nurse   [phone]

## 2025-04-01 NOTE — CM/SW NOTE
Department  notified of request for Infusion , aidin referrals started. Assigned CM/SW to follow up with pt/family on further discharge planning.     Emily Stearns, DSC

## 2025-04-01 NOTE — CONSULTS
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  -  Awaiting ortho plan.  -  Follow fever curve, wbc.  -  Reviewed labs, micro, imaging reports, available old records.  -  Case d/w patient, RN.    History of Present Illness:  Kerry Hsu is a 64 year old female with a history of KB, HTN, OA s/p R TKA 3/17/25, who presented to [facility] ED with worsening right knee pain that started over the weekend. Denies any fevers or chills. Was having some difficulty ambulating. Denies any drainage from incision. On arrival, Tmax 99.7, wbc 13.3, blood cx obtained, R knee aspirated with 6621 wbcs, 218K rbcs, 71% segs, no crystals, cx pending, started on vancomycin. ID consulted.    History:  Past Medical History:    Abscess of left thigh    Acute meniscal tear of knee    Age-related nuclear cataract of both eyes    Anal sphincter incontinence    Arthritis    Back problem    Back problem    Cataract    left    Chondromalacia    Colon polyps    Degenerative disc disease    Disorder of kidney and ureter    Diverticular disease    Esophageal reflux    Glaucoma    [place]amm[place]oe    High blood pressure    High cholesterol    Hx of motion sickness    Hyperlipidemia with target low density lipoprotein (LDL) cholesterol less than 130 mg/dL    Insomnia    Kidney lesion    Liver lesion    per patient not being treated or seeing a specialist    Neuropathy    both feet    Obstructive sleep apnea syndrome    Osteoarthritis    Palpitation    PONV (postoperative nausea and vomiting)     Prediabetes    Primary open angle glaucoma of both eyes    Diagnosis of glaucoma OU; Started Latanoprost qhs after abnormal VF and OCT 2/25/16;  6/5/18 consult with Dr. Madeline Chappell-see progress note    Sleep apnea    no current therapy    Small bowel obstruction (HCC)    Tendinitis    Unspecified essential hypertension    Visual impairment    Readers     Past Surgical History:   Procedure Laterality Date    Anal sphincterotomy      03/12/2013 [place]    Blepharoplasty anesthesia Bilateral 03/05/2020    Dr Kerwin Bello [facility]hthal[facility]     Cataract extraction w/  intraocular lens implant Left 05/07/2018    L PC IOL with Dr. Suresh @ [facility]    Colonoscopy N/A 11/11/2016    Procedure: COLONOSCOPY;  Surgeon: Sabrina Cazares MD;  Location: [facility] ENDOSCOPY    Colonoscopy N/A 09/06/2019    Procedure: COLONOSCOPY;  Surgeon: Edwin Sterling MD;  Location: [facility] ENDOSCOPY    Colonoscopy  09/16/2024    Dr. Sterling; colon polyps, diverticulosis, hemorrhoids    Colonoscopy N/A 9/16/2024    Procedure: COLONOSCOPY;  Surgeon: Edwin Sterling MD;  Location: [facility] ENDOSCOPY    Egd  09/16/2024    Dr. Sterling; gastric polyps, small hiatal hernia    Excision of chalazion, single - od - right eye Right 6.27/2017    Nasally    Hc arthrocentesis or inject major joint w/o us      Hysterectomy  1996    KAI    Other      Lap Nissen Fundoplication surgery    Other surgical history  2005,2007,2008    LAPAROSCOPIC LYSIS OF ADHESION    Other surgical history      right foot bunionectomy    Other surgical history  11/14/2014    right superficial anterior peroneal nerve biopsy and right proximal thigh muscle biopsy.    Other surgical history  06/13/2023    Excision and Biopsy of two  perineal cysts    Upper gi endoscopy performed  05/2015    Yag capsulotomy - os - left eye Left 12/19/2018    RJM     Family History   Problem Relation Age of Onset    Hypertension Father     Hypertension Mother     Hypertension Sister     Cancer Sister         liver  cancer    Hypertension Brother     Diabetes Neg     Glaucoma Neg     Macular degeneration Neg     Breast Cancer Neg     Ovarian Cancer Neg       reports that she has never smoked. She has never been exposed to tobacco smoke. She has never used smokeless tobacco. She reports that she does not drink alcohol and does not use drugs.    Allergies:  Allergies[1]    Medications:    Current Facility-Administered Medications:     acetaminophen (Tylenol Extra Strength) tab 500 mg, 500 mg, Oral, Q4H PRN    ondansetron (Zofran) 4 MG/2ML injection 4 mg, 4 mg, Intravenous, Q6H PRN    metoclopramide (Reglan) 5 mg/mL injection 5 mg, 5 mg, Intravenous, Q8H PRN    temazepam (Restoril) cap 15 mg, 15 mg, Oral, Nightly PRN    morphINE PF 2 MG/ML injection 1 mg, 1 mg, Intravenous, Q2H PRN **OR** morphINE PF 2 MG/ML injection 2 mg, 2 mg, Intravenous, Q2H PRN **OR** morphINE PF 4 MG/ML injection 4 mg, 4 mg, Intravenous, Q2H PRN    acetaminophen (Tylenol) tab 650 mg, 650 mg, Oral, Q4H PRN **OR** HYDROcodone-acetaminophen (Norco) 5-325 MG per tab 1 tablet, 1 tablet, Oral, Q4H PRN **OR** HYDROcodone-acetaminophen (Norco) 5-325 MG per tab 2 tablet, 2 tablet, Oral, Q4H PRN    gabapentin (Neurontin) cap 100 mg, 100 mg, Oral, TID    latanoprost (Xalatan) 0.005 % ophthalmic solution 1 drop, 1 drop, Both Eyes, Nightly    pantoprazole (Protonix) DR tab 40 mg, 40 mg, Oral, QAM AC    polyethylene glycol (PEG 3350) (Miralax) 17 g oral packet 17 g, 17 g, Oral, Daily PRN    rosuvastatin (Crestor) tab 10 mg, 10 mg, Oral, QPM    amLODIPine (Norvasc) tab 10 mg, 10 mg, Oral, Daily    Review of Systems:  CONSTITUTIONAL:  No weight loss, weakness or fatigue.  HEENT:  Eyes:  No visual loss, blurred vision, double vision or yellow sclerae. Ears, Nose, Throat:  No hearing loss, sneezing, congestion, runny nose or sore throat.  SKIN:  No rash or itching.  CARDIOVASCULAR:  No chest pain, chest pressure or chest discomfort  RESPIRATORY:  No shortness of breath,  cough or sputum.  GASTROINTESTINAL:  No anorexia, nausea, vomiting or diarrhea. No abdominal pain or blood.  GENITOURINARY:  No Burning on urination.   NEUROLOGICAL:  No headache, dizziness, syncope, paralysis, ataxia, numbness or tingling in the extremities.  MUSCULOSKELETAL: +R knee pain  HEMATOLOGIC:  No anemia, bleeding or bruising.  ALLERGIES:  No history of asthma, hives, eczema or rhinitis.    Physical Exam:  Vital signs: Blood pressure 128/63, pulse 78, temperature 98.2 °F (36.8 °C), temperature source Oral, resp. rate 18, height 5' 8\" (1.727 m), weight 195 lb (88.5 kg), SpO2 96%, not currently breastfeeding.    General: Awake, in bed  HEENT: Moist mucous membranes.   Neck: No lymphadenopathy.  Supple.  Cardiovascular: RRR  Respiratory: CTAB  Abdomen: Soft, no TTP  Musculoskeletal: No edema noted  Integument: R knee with edema, incision c/d/I, restricted ROM  Lines: PIV+    Laboratory Data:  Recent Labs   Lab 04/01/25  0718   RBC 4.46   HGB 11.5*   HCT 34.4*   MCV 77.1*   MCH 25.8*   MCHC 33.4   RDW 14.4   NEPRELIM 4.41   WBC 7.7   .0*     Recent Labs   Lab 03/31/25  1454 04/01/25  0718   GLU 93 113*   BUN 11 10   CREATSERUM 1.03* 0.99   CA 9.4 9.1    140   K 3.6 3.8    105   CO2 26.0 27.0       Microbiology: Reviewed in EMR    Radiology: Reviewed    Thank you for allowing us to participate in the care of this patient. Please do not hesitate to call if you have any questions.   We will continue to follow with you and will make further recommendations based on his progress.    [facility]  [phone]  4/1/2025           [1]   Allergies  Allergen Reactions    Celecoxib TONGUE SWELLING     Other reaction(s): CELECOXIB    Sulfa Antibiotics TONGUE SWELLING     Other reaction(s): SULFA (SULFONAMIDE ANTIBIOTICS)    Erythromycin PAIN     Abdominal pain    Erythromycin Stearate PAIN     Abdominal pain    Amoxicillin DIARRHEA

## 2025-04-01 NOTE — PROGRESS NOTES
[facility] Pharmacy Dosing Service      Initial Pharmacokinetic Consult for Vancomycin Dosing     Kerry Hsu is a 64 year old female who is being initiated on vancomycin therapy for osteomyelitis.  Pharmacy has been asked to dose vancomycin by JOSE Worrell.  The initial treatment and monitoring approach will be steady state AUC strategy.        Weight and Temperature:    Wt Readings from Last 1 Encounters:   25 88.5 kg (195 lb)        Temp Readings from Last 1 Encounters:   25 98.2 °F (36.8 °C) (Oral)      Labs:   Recent Labs   Lab 25  1454 25  0718   CREATSERUM 1.03* 0.99      Estimated Creatinine Clearance: 56.4 mL/min (based on SCr of 0.99 mg/dL).     Recent Labs   Lab 25  1454 25  0718   WBC 13.3* 7.7          The Pharmacokinetic Target is:     to 600 mg-h/L and trough <=15 mg/L    Renal Dosing Considerations:    None     Assessment/Plan:   Initial/Loading dose: Will receive 1750 mg IV (20 mg/kg, capped at 2250 mg) x 1 loading dose.      Maintenance dose: Pharmacy will dose vancomycin at 1250 mg IV every 12 hours    Monitorin) Plan for vancomycin peak and trough to be obtained at steady state    2) Pharmacy will order SCr as clinically indicated to assess renal function.    3) Pharmacy will monitor for toxicity and efficacy, adjust vancomycin dose and/or frequency, and order vancomycin levels as appropriate per the Pharmacy and Therapeutics Committee approved protocol until discontinuation of the medication.       We appreciate the opportunity to assist in the care of this patient.     Nisha Ocampo, PharmD  2025  12:09 PM  [place]  Pharmacy Extension: [phone]

## 2025-04-01 NOTE — CONSULTS
[facility]  part of [facility]    Report of Consultation    Kerry Hsu Patient Status:  Inpatient    1960 MRN [id number]   Location [facility] 4W/SW/SE Attending Victoria Rick MD   Hosp Day # 0 PCP Bella Rogers MD     Date of Admission:  3/31/2025  Date of Consult: 3/31/2025    Reason for Consultation:   Consults    History provided by: Patient is a good historian.  Her  was also present in the room.  HPI:     Chief Complaint   Patient presents with    Postop/Procedure Problem    Knee Pain     HPI  Patient is a 64-year-old woman who had a right knee replaced by my partner 2 weeks ago today.  She had question of DVT and had venous Doppler ultrasound 3/27/2025.  She came back to the emergency room today and the emergency room doctor felt there a risk of right knee replacement infection.  The patient was admitted and a consult for our group was placed.  Patient stated she has had some fevers with a maximum of 99.  She has not had anything over 100 °F.  She states she has pain is her chief complaint.    Medical history is negative for diabetes.  Kidney function is at the margin of normal with a GFR of 60.  History     Past Medical History:    Abscess of left thigh    Acute meniscal tear of knee    Age-related nuclear cataract of both eyes    Anal sphincter incontinence    Arthritis    Back problem    Back problem    Cataract    left    Chondromalacia    Colon polyps    Degenerative disc disease    Disorder of kidney and ureter    Diverticular disease    Esophageal reflux    Glaucoma    Hammertoe    High blood pressure    High cholesterol    Hx of motion sickness    Hyperlipidemia with target low density lipoprotein (LDL) cholesterol less than 130 mg/dL    Insomnia    Kidney lesion    Liver lesion    per patient not being treated or seeing a specialist    Neuropathy    both feet    Obstructive sleep apnea syndrome    Osteoarthritis    Palpitation    PONV  (postoperative nausea and vomiting)    Prediabetes    Primary open angle glaucoma of both eyes    Diagnosis of glaucoma OU; Started Latanoprost qhs after abnormal VF and OCT 2/25/16;  6/5/18 consult with Dr. Madeline Chappell-see progress note    Sleep apnea    no current therapy    Small bowel obstruction (HCC)    Tendinitis    Unspecified essential hypertension    Visual impairment    Readers     Past Surgical History:   Procedure Laterality Date    Anal sphincterotomy      03/12/2013 Gely    Blepharoplasty anesthesia Bilateral 03/05/2020    Dr Kerwin Bello Ophthalmology     Cataract extraction w/  intraocular lens implant Left 05/07/2018    L PC IOL with Dr. Suresh @ [facility]    Colonoscopy N/A 11/11/2016    Procedure: COLONOSCOPY;  Surgeon: Sabrina Cazares MD;  Location: [facility] ENDOSCOPY    Colonoscopy N/A 09/06/2019    Procedure: COLONOSCOPY;  Surgeon: Edwin Sterling MD;  Location: [facility] ENDOSCOPY    Colonoscopy  09/16/2024    Dr. Sterling; colon polyps, diverticulosis, hemorrhoids    Colonoscopy N/A 9/16/2024    Procedure: COLONOSCOPY;  Surgeon: Edwin Sterling MD;  Location: [facility] ENDOSCOPY    Egd  09/16/2024    Dr. Sterling; gastric polyps, small hiatal hernia    Excision of chalazion, single - od - right eye Right 6.27/2017    Nasally    Hc arthrocentesis or inject major joint w/o us      Hysterectomy  1996    KAI    Other      Lap Nissen Fundoplication surgery    Other surgical history  2005,2007,2008    LAPAROSCOPIC LYSIS OF ADHESION    Other surgical history      right foot bunionectomy    Other surgical history  11/14/2014    right superficial anterior peroneal nerve biopsy and right proximal thigh muscle biopsy.    Other surgical history  06/13/2023    Excision and Biopsy of two  perineal cysts    Upper gi endoscopy performed  05/2015    Yag capsulotomy - os - left eye Left 12/19/2018    RJM     Family History   Problem Relation Age of Onset    Hypertension Father     Hypertension Mother     Hypertension Sister      Cancer Sister         liver cancer    Hypertension Brother     Diabetes Neg     Glaucoma Neg     Macular degeneration Neg     Breast Cancer Neg     Ovarian Cancer Neg      Social History:  Social History     Socioeconomic History    Marital status:    Tobacco Use    Smoking status: Never     Passive exposure: Never    Smokeless tobacco: Never   Vaping Use    Vaping status: Never Used   Substance and Sexual Activity    Alcohol use: No     Comment: None.     Drug use: No    Sexual activity: Yes     Birth control/protection: Hysterectomy   Other Topics Concern    Caffeine Concern Yes     Comment: occasional soda    Exercise Yes    Pt has a pacemaker No    Pt has a defibrillator No    Breast feeding No    Reaction to local anesthetic No    Right Handed Yes   Social History Narrative    Work - banking     Social Drivers of Health     Food Insecurity: No Food Insecurity (3/31/2025)    NCSS - Food Insecurity     Worried About Running Out of Food in the Last Year: No     Ran Out of Food in the Last Year: No   Transportation Needs: No Transportation Needs (3/31/2025)    NCSS - Transportation     Lack of Transportation: No   Housing Stability: Not At Risk (3/31/2025)    NCSS - Housing/Utilities     Has Housing: Yes     Worried About Losing Housing: No     Unable to Get Utilities: No     Allergies/Medications:   Allergies: Allergies[1]  Medications Prior to Admission   Medication Sig    ibuprofen 600 MG Oral Tab Take 1 tablet (600 mg total) by mouth every 6 (six) hours as needed for Pain.    HYDROcodone-acetaminophen 5-325 MG Oral Tab Take 1 tablet by mouth every 6 (six) hours as needed for Pain. No alcohol or driving on this med. Stop if lethargic or hallucinating.    gabapentin 100 MG Oral Cap Take 1 capsule (100 mg total) by mouth 3 (three) times daily.    polyethylene glycol, PEG 3350, 17 g Oral Powd Pack Take 17 g by mouth daily as needed.    rosuvastatin 10 MG Oral Tab Take 1 tablet (10 mg total) by mouth every  evening.    Omeprazole 40 MG Oral Capsule Delayed Release Take 1 capsule (40 mg total) by mouth daily. Take 1 capsule by mouth daily before breakfast.    amLODIPine 10 MG Oral Tab TAKE 1 TABLET BY MOUTH EVERY DAY    latanoprost 0.005 % Ophthalmic Solution INSTILL 1 DROP IN BOTH EYES EVERY night    cholecalciferol 50 MCG (2000 UT) Oral Cap Take 1 capsule (2,000 Units total) by mouth daily.    MAGNESIUM OR Take 1 tablet by mouth daily.       Review of Systems:   Review of Systems she does not appear diaphoretic and has no chills.  Shortness of breath.    Physical Exam:   Vital Signs:   height is 5' 8\" (1.727 m) and weight is 195 lb (88.5 kg). Her oral temperature is 99.7 °F (37.6 °C). Her blood pressure is 147/70 and her pulse is 81. Her respiration is 18 and oxygen saturation is 96%.   Physical Exam  Temperature was 99.7.  Calf was soft.  No pitting edema in the leg.  Right knee with minimal effusion but not unexpected for 2 weeks postop right knee replacement.  The incision itself was healed well and I removed the remaining Steri-Strips.  Normal motor and sensory.  She can do an active straight leg raise.  Motion 5 to 40 degrees.  She would not let me flex it further.  Results:     Lab Results   Component Value Date    WBC 13.3 (H) 03/31/2025    HGB 11.9 (L) 03/31/2025    HCT 35.4 03/31/2025    .0 (H) 03/31/2025    CREATSERUM 1.03 (H) 03/31/2025    BUN 11 03/31/2025     03/31/2025    K 3.6 03/31/2025     03/31/2025    CO2 26.0 03/31/2025    GLU 93 03/31/2025    CA 9.4 03/31/2025    ALB 4.7 03/07/2025    ALKPHO 127 03/07/2025    BILT 0.6 03/07/2025    TP 7.2 03/07/2025    AST 20 03/07/2025    ALT 20 03/07/2025    PTT 31.1 10/06/2023    INR 0.95 12/27/2023    T4F 1.4 11/21/2023    TSH 1.511 12/18/2024    ESRML 21 01/15/2024    CRP <0.29 05/21/2022    MG 2.1 06/08/2023     (H) 05/15/2024    B12 404 02/17/2025     US VENOUS DOPPLER LEG RIGHT - DIAG IMG (CPT=93971)    Result Date:  3/31/2025  CONCLUSION:  1. No right lower extremity DVT.    Dictated by (CST): Johnny Carrera MD on 3/31/2025 at 3:51 PM     Finalized by (CST): Johnny Carrera MD on 3/31/2025 at 3:52 PM               Impression:     Postoperative infection, unspecified type, initial encounter  Have them hold antibiotics until I could obtain an aspiration.  With the patient's permission, I aspirated 15 cc dark blood-tinged joint fluid.  It was sent for cell count, crystal analysis, and culture.  No signs of purulence or white cells.    She may renew physical therapy weightbearing as tolerated and work on range of motion and strengthening.  Occupational therapy was also ordered.  I started antibiotics with vancomycin and Ancef pending the results of the culture.  Infectious disease consult will be obtained.      Cellulitis  As above      Right knee skin infection  As above         Recommendations:  As above    Keo Bishop MD  3/31/2025         [1]   Allergies  Allergen Reactions    Celecoxib TONGUE SWELLING     Other reaction(s): CELECOXIB    Sulfa Antibiotics TONGUE SWELLING     Other reaction(s): SULFA (SULFONAMIDE ANTIBIOTICS)    Erythromycin PAIN     Abdominal pain    Erythromycin Stearate PAIN     Abdominal pain    Amoxicillin DIARRHEA

## 2025-04-01 NOTE — OCCUPATIONAL THERAPY NOTE
OCCUPATIONAL THERAPY EVALUATION - INPATIENT     Room Number: 433/433-A  Evaluation Date: 4/1/2025  Type of Evaluation: Initial       Physician Order: IP Consult to Occupational Therapy  Reason for Therapy: ADL/IADL Dysfunction and Discharge Planning    OCCUPATIONAL THERAPY ASSESSMENT   RN cleared pt for participation in OT session, which was completed in collaboration with PT. Upon arrival, pt was supine in bed and agreeable to activity.  present during session. Pt was left in chair and alarm was activated. Call light and all needs left in reach. Handoff given to RN.    Patient is a 64 year old female admitted 3/31/2025 for R knee aspiration; recent TKA.  Prior to admission, pt was independent.  Patient is currently at baseline. OT to sign off. Home with  is plan.    ACTIVITY TOLERANCE  Pulse: 78        BP: 128/63           Oxygen Therapy  SPO2% on Room Air at Rest: 96    Education provided  Educated pt about role of OT and hospital therapy process as well as proper safety techniques including proper hand placement, body mechanics, safety techniques. Pt verbalized/demonstrated proper carryover.    DC OT    OCCUPATIONAL THERAPY MEDICAL/SOCIAL HISTORY     Problem List  Principal Problem:    Postoperative infection, unspecified type, initial encounter  Active Problems:    Cellulitis    Right knee skin infection      Past Medical History  Past Medical History:    Abscess of left thigh    Acute meniscal tear of knee    Age-related nuclear cataract of both eyes    Anal sphincter incontinence    Arthritis    Back problem    Back problem    Cataract    left    Chondromalacia    Colon polyps    Degenerative disc disease    Disorder of kidney and ureter    Diverticular disease    Esophageal reflux    Glaucoma    Hammertoe    High blood pressure    High cholesterol    Hx of motion sickness    Hyperlipidemia with target low density lipoprotein (LDL) cholesterol less than 130 mg/dL    Insomnia    Kidney lesion     Liver lesion    per patient not being treated or seeing a specialist    Neuropathy    both feet    Obstructive sleep apnea syndrome    Osteoarthritis    Palpitation    PONV (postoperative nausea and vomiting)    Prediabetes    Primary open angle glaucoma of both eyes    Diagnosis of glaucoma OU; Started Latanoprost qhs after abnormal VF and OCT 2/25/16;  6/5/18 consult with Dr. Madeline Chappell-see progress note    Sleep apnea    no current therapy    Small bowel obstruction (HCC)    Tendinitis    Unspecified essential hypertension    Visual impairment    Readers       Past Surgical History  Past Surgical History:   Procedure Laterality Date    Anal sphincterotomy      03/12/2013 Gely    Blepharoplasty anesthesia Bilateral 03/05/2020    Dr Kerwin Bello Ophthalmology     Cataract extraction w/  intraocular lens implant Left 05/07/2018    L PC IOL with Dr. Suresh @ [facility]    Colonoscopy N/A 11/11/2016    Procedure: COLONOSCOPY;  Surgeon: Sabrina Cazares MD;  Location: [facility] ENDOSCOPY    Colonoscopy N/A 09/06/2019    Procedure: COLONOSCOPY;  Surgeon: Edwin Sterling MD;  Location: [facility] ENDOSCOPY    Colonoscopy  09/16/2024    Dr. Sterling; colon polyps, diverticulosis, hemorrhoids    Colonoscopy N/A 9/16/2024    Procedure: COLONOSCOPY;  Surgeon: Edwin Sterling MD;  Location: [facility] ENDOSCOPY    Egd  09/16/2024    Dr. Sterling; gastric polyps, small hiatal hernia    Excision of chalazion, single - od - right eye Right 6.27/2017    Nasally    Hc arthrocentesis or inject major joint w/o us      Hysterectomy  1996    KAI    Other      Lap Nissen Fundoplication surgery    Other surgical history  2005,2007,2008    LAPAROSCOPIC LYSIS OF ADHESION    Other surgical history      right foot bunionectomy    Other surgical history  11/14/2014    right superficial anterior peroneal nerve biopsy and right proximal thigh muscle biopsy.    Other surgical history  06/13/2023    Excision and Biopsy of two  perineal cysts    Upper gi endoscopy  performed  05/2015    Yag capsulotomy - os - left eye Left 12/19/2018    RJM       HOME SITUATION  Type of Home: House  Home Layout: Multi-level  Lives With: Spouse                      Drives: Yes  Patient Regularly Uses: Rolling walker (since R TKA)    SUBJECTIVE  \"Ice would be great.\"    OCCUPATIONAL THERAPY EXAMINATION      OBJECTIVE  Precautions: Bed/chair alarm  Fall Risk: Standard fall risk    PAIN ASSESSMENT  Rating: Unable to rate  Location: R knee  Management Techniques: Ice         COORDINATION  Gross Motor: WFL   Fine Motor: WFL     ACTIVITIES OF DAILY LIVING ASSESSMENT  AM-PAC ‘6-Clicks’ Inpatient Daily Activity Short Form  How much help from another person does the patient currently need…  -   Putting on and taking off regular lower body clothing?: A Little  -   Bathing (including washing, rinsing, drying)?: A Little  -   Toileting, which includes using toilet, bedpan or urinal? : None  -   Putting on and taking off regular upper body clothing?: None  -   Taking care of personal grooming such as brushing teeth?: None  -   Eating meals?: None    AM-PAC Score:  Score: 22  Approx Degree of Impairment: 25.8%  Standardized Score (AM-PAC Scale): 47.1  CMS Modifier (G-Code): CJ       FUNCTIONAL TRANSFER ASSESSMENT  Supine to Sit : Independent     ind    FUNCTIONAL ADL ASSESSMENT  Sup-ind    The patient's Approx Degree of Impairment: 25.8% has been calculated based on documentation in the [facility] '6 clicks' Inpatient Daily Activity Short Form.  Research supports that patients with this level of impairment may benefit from home. Final disposition will be made by interdisciplinary medical team.         Patient Evaluation Complexity Level:   Occupational Profile/Medical History LOW - Brief history including review of medical or therapy records    Specific performance deficits impacting engagement in ADL/IADL LOW  1 - 3 performance deficits    Client Assessment/Performance Deficits LOW - No comorbidities nor  modifications of tasks    Clinical Decision Making LOW - Analysis of occupational profile, problem-focused assessments, limited treatment options    Overall Complexity LOW     OT Session Time: 20 minutes  Self-Care Home Management: 10 minutes           Valeria Esteban OT  [facility]  Inpatient Rehabilitation  Occupational Therapy  [phone]

## 2025-04-01 NOTE — PLAN OF CARE
Alert and oriented x4. Stand by walker. Voiding. Right knee aspiration by Dr. Bishop. Cultures pending. Pain control. ID on consult  Problem: Patient Centered Care  Goal: Patient preferences are identified and integrated in the patient's plan of care  Description: Interventions:- What would you like us to know as we care for you? Home with - Provide timely, complete, and accurate information to patient/family- Incorporate patient and family knowledge, values, beliefs, and cultural backgrounds into the planning and delivery of care- Encourage patient/family to participate in care and decision-making at the level they choose- Honor patient and family perspectives and choices  Outcome: Progressing     Problem: Patient/Family Goals  Goal: Patient/Family Long Term Goal  Description: Patient's Long Term Goal: pain management Interventions:- pain administration - See additional Care Plan goals for specific interventions  Outcome: Progressing  Goal: Patient/Family Short Term Goal  Description: Patient's Short Term Goal: calling for assistance out of bed Interventions: - safe ambulation - See additional Care Plan goals for specific interventions  Outcome: Progressing     Problem: PAIN - ADULT  Goal: Verbalizes/displays adequate comfort level or patient's stated pain goal  Description: INTERVENTIONS:- Encourage pt to monitor pain and request assistance- Assess pain using appropriate pain scale- Administer analgesics based on type and severity of pain and evaluate response- Implement non-pharmacological measures as appropriate and evaluate response- Consider cultural and social influences on pain and pain management- Manage/alleviate anxiety- Utilize distraction and/or relaxation techniques- Monitor for opioid side effects- Notify MD/LIP if interventions unsuccessful or patient reports new pain- Anticipate increased pain with activity and pre-medicate as appropriate  Outcome: Progressing     Problem: RISK FOR  INFECTION - ADULT  Goal: Absence of fever/infection during anticipated neutropenic period  Description: INTERVENTIONS- Monitor WBC- Administer growth factors as ordered- Implement neutropenic guidelines  Outcome: Progressing     Problem: SAFETY ADULT - FALL  Goal: Free from fall injury  Description: INTERVENTIONS:- Assess pt frequently for physical needs- Identify cognitive and physical deficits and behaviors that affect risk of falls.- [place] fall precautions as indicated by assessment.- Educate pt/family on patient safety including physical limitations- Instruct pt to call for assistance with activity based on assessment- Modify environment to reduce risk of injury- Provide assistive devices as appropriate- Consider OT/PT consult to assist with strengthening/mobility- Encourage toileting schedule  Outcome: Progressing     Problem: DISCHARGE PLANNING  Goal: Discharge to home or other facility with appropriate resources  Description: INTERVENTIONS:- Identify barriers to discharge w/pt and caregiver- Include patient/family/discharge partner in discharge planning- Arrange for needed discharge resources and transportation as appropriate- Identify discharge learning needs (meds, wound care, etc)- Arrange for interpreters to assist at discharge as needed- Consider post-discharge preferences of patient/family/discharge partner- Complete POLST form as appropriate- Assess patient's ability to be responsible for managing their own health- Refer to Case Management Department for coordinating discharge planning if the patient needs post-hospital services based on physician/LIP order or complex needs related to functional status, cognitive ability or social support system  Outcome: Progressing

## 2025-04-02 VITALS
HEART RATE: 62 BPM | BODY MASS INDEX: 29.55 KG/M2 | SYSTOLIC BLOOD PRESSURE: 121 MMHG | HEIGHT: 68 IN | DIASTOLIC BLOOD PRESSURE: 78 MMHG | OXYGEN SATURATION: 100 % | TEMPERATURE: 98 F | RESPIRATION RATE: 16 BRPM | WEIGHT: 195 LBS

## 2025-04-02 PROCEDURE — 99239 HOSP IP/OBS DSCHRG MGMT >30: CPT | Performed by: FAMILY MEDICINE

## 2025-04-02 NOTE — PROGRESS NOTES
[facility] [place]  part of [facility]    Progress Note    Kerry Hsu Patient Status:  Inpatient    1960 MRN [id number]   Location [facility] 4W/SW/SE Attending Roya Reeder MD   Hosp Day # 2 PCP Bella Rogers MD       Subjective:   Subjective:  Patient awake, lying in bed.  Reports pain is well-controlled.  Denies any fever, chills, night sweats.    Objective:   Blood pressure 115/56, pulse 59, temperature 98.2 °F (36.8 °C), temperature source Oral, resp. rate 16, height 5' 8\" (1.727 m), weight 195 lb (88.5 kg), SpO2 95%, not currently breastfeeding.  Physical Exam  Lower extremity skin healthy, no pitting edema.  Calf soft nontender.  No erythema or swelling to right knee.  Able to straight leg raise bilaterally.  Able to dorsiflex and plantarflex against resistance.  Range of motion right knee 0 to 40 degrees.  Reports intermittent tingling to right foot.    Results:   Lab Results   Component Value Date    WBC 7.7 2025    HGB 11.5 (L) 2025    HCT 34.4 (L) 2025    .0 (H) 2025    CREATSERUM 0.99 2025    BUN 10 2025     2025    K 3.8 2025     2025    CO2 27.0 2025     (H) 2025    CA 9.1 2025    ALB 4.7 2025    ALKPHO 127 2025    BILT 0.6 2025    TP 7.2 2025    AST 20 2025    ALT 20 2025    PTT 31.1 10/06/2023    INR 0.95 2023    T4F 1.4 2023    TSH 1.511 2024    ESRML 21 01/15/2024    CRP <0.29 2022    MG 2.1 2023     (H) 05/15/2024    B12 404 2025       US VENOUS DOPPLER LEG RIGHT - DIAG IMG (CPT=93971)    Result Date: 3/31/2025  CONCLUSION:  1. No right lower extremity DVT.    Dictated by (CST): Johnny Carrera MD on 3/31/2025 at 3:51 PM     Finalized by (CST): Johnny Carrera MD on 3/31/2025 at 3:52 PM               Assessment & Plan:     Postoperative infection, unspecified type, initial  encounter  Cultures of right knee negative to date.  Continue antibiotics for cellulitis as per infectious disease, at this time do not believe right knee is infected.  Patient will continue working with physical therapy to work on range of motion right knee.      Cellulitis  As above      Right knee skin infection  As above              AMIRA Rios  4/2/2025

## 2025-04-02 NOTE — CM/SW NOTE
Discharge today, patient will go on weekly delvance infusions. Patient agreeable to [place] location. Confirmed plan w/ Brenna [facility] liaison. Patient scheduled for first visit tomorrow 4/3 at 8:30am, Brenna scheduled the appt w/ the patient.     Confirmed plan w/ Kiarra RENO & Gabriela [facility] liaison.    DEL Ugalde [id number]     None

## 2025-04-02 NOTE — PROGRESS NOTES
INFECTIOUS DISEASE PROGRESS NOTE  [facility]  part of [facility] ID PROGRESS NOTE    Kerry Hsu Patient Status:  Inpatient    1960 MRN [id number]   Location [facility] 4W/SW/SE Attending Roya Reeder MD   Hosp Day # 2 PCP Bella Rogers MD     Subjective:  ROS reviewed. Has been ambulating. Feels better.    ASSESSMENT:    Antibiotics: Vancomycin, cefepime     # R knee cellulitis s/p recent R TKA 3/17 ?early PJI               -S/p aspiration 6621 wbcs, 218K rbcs, 71% segs, no crystals, cx NGTD     PLAN:  -  Currently on vancomycin and cefepime. Will DC on once weekly IV dalbavancin x 4 doses.  -  Follow fever curve, wbc.  -  Reviewed labs, micro, imaging reports, available old records.  -  Case d/w patient, RN.     History of Present Illness:  Kerry Hsu is a 64 year old female with a history of KB, HTN, OA s/p R TKA 3/17/25, who presented to [facility] ED with worsening right knee pain that started over the weekend. Denies any fevers or chills. Was having some difficulty ambulating. Denies any drainage from incision. On arrival, Tmax 99.7, wbc 13.3, blood cx obtained, R knee aspirated with 6621 wbcs, 218K rbcs, 71% segs, no crystals, cx pending, started on vancomycin. ID consulted.    Physical Exam:  /78 (BP Location: Right arm)   Pulse 62   Temp 97.8 °F (36.6 °C) (Oral)   Resp 16   Ht 5' 8\" (1.727 m)   Wt 195 lb (88.5 kg)   SpO2 100%   BMI 29.65 kg/m²     Gen:   Awake, in bed  HEENT:  EOMI, neck supple  CV/lungs:  RRR, CTAB  Abdom:  Soft, NT/ND, +BS  Skin/extrem:  R knee with incision c/d/i  Lines:  PIV+    Laboratory Data: Reviewed    Microbiology: Reviewed    Radiology: Reviewed      [facility]  [phone]  2025    Patient told Nora Kwong RN and I at change of shift that he does not want to do PD tonight. Patient stated he discussed with the MD and he will resume it in the morning.

## 2025-04-02 NOTE — DISCHARGE SUMMARY
[facility]  part of [facility]    Discharge Summary    Kerry Hsu Patient Status:  Inpatient    1960 MRN [id number]   Location [facility] 4W/SW/SE Attending Roya Reeder MD   Hosp Day # 2 PCP Bella Rogers MD     Date of Admission: 3/31/2025 Disposition: Home Health Care Services     Date of Discharge: 2025    Admitting Diagnosis: Postoperative infection, unspecified type, initial encounter [T81.40XA]  Right knee skin infection [L08.9]    Hospital Discharge Diagnoses:   Right knee cellulitis  Status post right total knee arthroplasty on   History of hyperlipidemia  Hypertension  Arthritis      Lace+ Score: 62  59-90 High Risk  29-58 Medium Risk  0-28   Low Risk.    TCM Follow-Up Recommendation:  LACE > 58: High Risk of readmission after discharge from the hospital.      Problem List:   Patient Active Problem List   Diagnosis    Essential hypertension    Hand arthritis    Status post Nissen fundoplication    GERD (gastroesophageal reflux disease)    Neuropathy    Myopathy    Diverticulosis    Internal hemorrhoid    Chronic cough    Cramps, muscle, general    Vitamin D deficiency    Primary open angle glaucoma of both eyes, moderate stage    Ptosis of both eyelids    KB (obstructive sleep apnea)    Cervical stenosis of spinal canal    Lumbar radiculopathy    Pseudophakia, left eye    Elevated CPK    Lumbar foraminal stenosis    Herniation of intervertebral disc between L4 and L5    DDD (degenerative disc disease), lumbar    Chronic bilateral low back pain with bilateral sciatica    Myofascial pain    Nontraumatic complete tear of right rotator cuff    Leg cramps    Callus of foot    Prediabetes    Osteopenia of left hip    Neutrophilia    Anxiety state    Cervicalgia    Chondromalacia of patella    Congenital cystic kidney disease    Dizziness    Heartburn    Microscopic hematuria    Goiter    Hyperlipidemia    Sleep disturbance    Kidney lesion    Liver  lesion    Cellulitis    Chronic skin ulcer (HCC)    Thoracic and lumbosacral neuritis    Malfunct impl card defb    Myositis    Nausea    Other hammer toe(s) (acquired), left foot    Shoulder region pain    Sprain of nose    Unspecified vertiginous syndromes and labyrinthine disorders    Age-related cataract    Benign neoplasm of skin of trunk    Pyogenic granuloma of skin and subcutaneous tissue    Bunion    Incisional hernia    Incontinence of feces    Insomnia    Irritable colon    Lateral epicondylitis of elbow    Malaise and fatigue    Mechanical ptosis of bilateral eyelids    Mixed emotional features as adjustment reaction    Adjustment disorder with depressed mood    Nonspecific abnormal finding in stool contents    Nonspecific abnormal serum enzyme levels    Otitis media    Pain in joint, lower leg    Pain in joint, shoulder region    Pressure ulcer, stage 1    Primary localized osteoarthrosis, lower leg    Reflux esophagitis    Residual hemorrhoidal skin tags    Sciatica    Sebaceous cyst    Symptoms involving digestive system    Symptom associated with female genital organs    Disorder of bursae and tendons in shoulder region    Synovial cyst    Vitreous opacities    Tear of lateral cartilage or meniscus of knee, current    Tinea corporis    Disorder of kidney and ureter    Urinary tract infection    Vaginitis and vulvovaginitis    Venous insufficiency of both lower extremities    Visual discomfort, bilateral    Abnormal mammogram    Anal sphincter incontinence    Cellulitis and abscess of toe    Otalgia    Abdominal pain, generalized    Abdominal pain    Backache    Chest pain    Low back pain    Cough    Cramp of limb    Spasm of muscle    Swelling of extremity    Hemorrhage of gastrointestinal tract    Esophageal reflux    Dyspepsia and disorder of function of stomach    Preglaucoma, unspecified, bilateral    External hemorrhoids    Hemorrhoids with complication    Gross hematuria    Acute bronchitis     Acute sinusitis    Arthropathy    Arthropathy of ankle and foot    Arthropathy, lower leg    Congenital anomaly of musculoskeletal system    Bronchitis    Dermatitis    Inflammatory disease of breast    Effusion of lower leg joint    Primary osteoarthritis of right knee    Osteoarthrosis    Acute pharyngitis    Rhabdomyolysis    Neuralgia and neuritis    Complete rupture of rotator cuff    Strain of rotator cuff    Nontraumatic complete tear of left rotator cuff    Hyperlipidemia with target low density lipoprotein (LDL) cholesterol less than 130 mg/dL    Right knee skin infection    Postoperative infection, unspecified type, initial encounter         Physical Exam:   General appearance: alert, appears stated age and cooperative  Pulmonary:  clear to auscultation bilaterally  Cardiovascular: S1, S2 normal, no murmur, click, rub or gallop, regular rate and rhythm  Abdominal: soft, non-tender; bowel sounds normal; no masses,  no organomegaly  Extremities: extremities normal, atraumatic, no cyanosis or edema  Psychiatric: calm      HPI per admitting:  The patient is a 64-year-old  female who had a right total knee arthroplasty done on March 17, 2025, discharged home in a stable condition; came back today to the emergency department for evaluation of progressive right knee swelling, pain, and limitation of range of motion for last 3 days. CBC showed white blood cell count of 11.3 with left shift. Chemistry was unremarkable. Venous Doppler study was negative.     Hospital Course: Right knee pain and effusion  History of right total knee arthroplasty on March 17, 2025  Status post right knee aspiration, cultures NTD  Followed by Ortho and infectious disease  Continue antibiotics-Cefepime and Vanco  Blood cultures NTD  Pain management  Labs in am   Patient was initially treated with IV antibiotics, all cultures were negative.  Infectious disease recommended once weekly IV dalbavancin x 4 doses.  Patient had  a stable hospital course and was discharged in a stable condition.        History hyperlipidemia  Continue Crestor     History of hypertension  Norvasc     History of arthritis  Continue Gabapentin        Consultations: Infectious disease and Ortho    Procedures: Right knee aspiration bedside    Bilateral venous Doppler ultrasound did not reveal any DVT      Complications: see hospital course     Discharge Condition: Good    Discharge Medications:      Discharge Medications        CONTINUE taking these medications        Instructions Prescription details   amLODIPine 10 MG Tabs  Commonly known as: Norvasc      TAKE 1 TABLET BY MOUTH EVERY DAY   Quantity: 90 tablet  Refills: 3     cholecalciferol 50 MCG (2000 UT) Caps  Commonly known as: Vitamin D3      Take 1 capsule (2,000 Units total) by mouth daily.   Refills: 0     gabapentin 100 MG Caps  Commonly known as: Neurontin      Take 1 capsule (100 mg total) by mouth 3 (three) times daily.   Quantity: 30 capsule  Refills: 0     HYDROcodone-acetaminophen 5-325 MG Tabs  Commonly known as: Norco      Take 1 tablet by mouth every 6 (six) hours as needed for Pain. No alcohol or driving on this med. Stop if lethargic or hallucinating.   Quantity: 30 tablet  Refills: 0     ibuprofen 600 MG Tabs  Commonly known as: Motrin      Take 1 tablet (600 mg total) by mouth every 6 (six) hours as needed for Pain.   Refills: 0     latanoprost 0.005 % Soln  Commonly known as: Xalatan      INSTILL 1 DROP IN BOTH EYES EVERY night   Quantity: 3 each  Refills: 3     MAGNESIUM OR      Take 1 tablet by mouth daily.   Refills: 0     Omeprazole 40 MG Cpdr      Take 1 capsule (40 mg total) by mouth daily. Take 1 capsule by mouth daily before breakfast.   Quantity: 90 capsule  Refills: 3     Polyethylene Glycol 3350 17 g Pack  Commonly known as: MIRALAX      Take 17 g by mouth daily as needed.   Quantity: 24 each  Refills: 0     rosuvastatin 10 MG Tabs  Commonly known as: Crestor      Take 1 tablet  (10 mg total) by mouth every evening.   Refills: 0              Follow up Visits: Follow-up with PCP in 1 week        Roya Reeder MD  4/2/2025  4:07 PM    > 35 min

## 2025-04-02 NOTE — PROGRESS NOTES
Progress Note     Kerry Hsu Patient Status:  Inpatient    1960 MRN [id number]   Location [facility] 4W/SW/SE Attending Roya Reeder MD   Hosp Day # 2 PCP Bella Rogers MD     Chief Complaint: patient presented with   Chief Complaint   Patient presents with    Postop/Procedure Problem    Knee Pain       Subjective:   S: Patient feels better today denies any nausea vomiting diarrhea denies any fever or chills.    Review of Systems:   10 point ROS completed and was negative, except for pertinent positive and negatives stated in subjective.    Objective:   Vital signs:  Temp:  [97.8 °F (36.6 °C)-98.4 °F (36.9 °C)] 97.8 °F (36.6 °C)  Pulse:  [59-78] 62  Resp:  [16-18] 16  BP: (115-128)/(56-78) 121/78  SpO2:  [94 %-100 %] 100 %    Wt Readings from Last 6 Encounters:   25 195 lb (88.5 kg)   25 198 lb (89.8 kg)   25 200 lb 14.4 oz (91.1 kg)   25 201 lb 3.2 oz (91.3 kg)   25 200 lb (90.7 kg)   25 196 lb (88.9 kg)         Physical Exam:    General: No acute distress. Alert ,         Respiratory: Clear to auscultation bilaterally. No wheezes. No rhonchi.  Cardiovascular: S1, S2. Regular rate and rhythm. No murmurs, rubs or gallops.   Abdomen: Soft, nontender, nondistended.  Positive bowel sounds. No rebound or guarding.  Neurologic: No focal neurological deficits.   Musculoskeletal: Moves all extremities.  Extremities: No edema.    Results:   Diagnostic Data:      Labs:    Labs Last 24 Hours:   BMP     CBC    Other     Na - Cl - BUN - Glu -   Hb -   PTT - Procal -   K - CO2 - Cr -   WBC - >< PLT -  INR - CRP -   Renal Lytes Endo    Hct -   Trop - D dim -   eGFR - Ca - POC Gluc  -    LFT   pBNP - Lactic -   eGFR AA - PO4 - A1c -   AST - APk - Prot -  LDL -     Mg - TSH -   ALT - T mary - Alb -        COVID-19 Lab Results    COVID-19  Lab Results   Component Value Date    COVID19 Not Detected 2024    COVID19 Not Detected 2023    COVID19 Detected (A)  06/27/2022       Pro-Calcitonin  No results for input(s): \"PCT\" in the last 168 hours.    Cardiac  No results for input(s): \"TROP\", \"PBNP\" in the last 168 hours.    Creatinine Kinase  No results for input(s): \"CK\" in the last 168 hours.    Inflammatory Markers  No results for input(s): \"CRP\", \"NABILA\", \"LDH\", \"DDIMER\" in the last 168 hours.    Imaging: Imaging data reviewed in Epic.    Medications:    enoxaparin  40 mg Subcutaneous Daily    vancomycin  15 mg/kg Intravenous Q12H    cefepime  1 g Intravenous Q12H    gabapentin  100 mg Oral TID    latanoprost  1 drop Both Eyes Nightly    pantoprazole  40 mg Oral QAM AC    rosuvastatin  10 mg Oral QPM    amLODIPine  10 mg Oral Daily       Assessment & Plan:   ASSESSMENT / PLAN:       Right knee pain and effusion  History of right total knee arthroplasty on March 17, 2025  Status post right knee aspiration, cultures NTD  Followed by Ortho and infectious disease  Continue antibiotics-Cefepime and Vanco  Blood cultures NTD  Pain management  Labs in am       History hyperlipidemia  Continue Crestor    History of hypertension  Norvasc    History of arthritis  Continue Gabapentin     Quality:  DVT Prophylaxis: Lovenox   CODE status: full   DISPO: pending clinical improvement.   Estimated date of discharge: To be determined  Discharge is dependent on: Improved clinical status  At this point Patient is expected to be discharge to: Home versus rehab      Plan of care discussed with Patient and RN.     Coordinated care with providers and counseling re: treatment plan and workup     Roya Reeder MD    Supplementary Documentation:         **Certification      PHYSICIAN Certification of Need for Inpatient Hospitalization - Initial Certification    Patient will require inpatient services that will reasonably be expected to span two midnight's based on the clinical documentation in H+P.   Based on patients current state of illness, I anticipate that, after discharge, patient will  require TBD.             I personally reviewed the available laboratories, imaging including operative report. I discussed/will discuss the case with patient and her nurse. I ordered laboratories studies. I adjusted medications including not applicable today. Medical decision making high, risk is high.     >55min spent, >50% spent counseling and coordinating care in the form of educating pt/family and d/w consultants and staff. Most of the time spent discussing the above plan.

## 2025-04-02 NOTE — PLAN OF CARE
Pt A/O x4, VSS. Ambulating with assist. IV abx continued. Tolerating diet. Norco PRN for pain. Fall precautions in place. Call light within reach. Calls appropriately. Frequent rounding. Will be discharging home with IV abx & HHC. All dc information given to pt, all questions answered. Pt verbalized understanding.     Problem: Patient Centered Care  Goal: Patient preferences are identified and integrated in the patient's plan of care  Description: Interventions:- What would you like us to know as we care for you? - Provide timely, complete, and accurate information to patient/family- Incorporate patient and family knowledge, values, beliefs, and cultural backgrounds into the planning and delivery of care- Encourage patient/family to participate in care and decision-making at the level they choose- Honor patient and family perspectives and choices  Outcome: Adequate for Discharge     Problem: Patient/Family Goals  Goal: Patient/Family Long Term Goal  Description: Patient's Long Term Goal: Interventions:- - See additional Care Plan goals for specific interventions  Outcome: Adequate for Discharge  Goal: Patient/Family Short Term Goal  Description: Patient's Short Term Goal: Interventions: - - See additional Care Plan goals for specific interventions  Outcome: Adequate for Discharge     Problem: PAIN - ADULT  Goal: Verbalizes/displays adequate comfort level or patient's stated pain goal  Description: INTERVENTIONS:- Encourage pt to monitor pain and request assistance- Assess pain using appropriate pain scale- Administer analgesics based on type and severity of pain and evaluate response- Implement non-pharmacological measures as appropriate and evaluate response- Consider cultural and social influences on pain and pain management- Manage/alleviate anxiety- Utilize distraction and/or relaxation techniques- Monitor for opioid side effects- Notify MD/LIP if interventions unsuccessful or patient reports new pain-  Anticipate increased pain with activity and pre-medicate as appropriate  Outcome: Adequate for Discharge     Problem: RISK FOR INFECTION - ADULT  Goal: Absence of fever/infection during anticipated neutropenic period  Description: INTERVENTIONS- Monitor WBC- Administer growth factors as ordered- Implement neutropenic guidelines  Outcome: Adequate for Discharge     Problem: SAFETY ADULT - FALL  Goal: Free from fall injury  Description: INTERVENTIONS:- Assess pt frequently for physical needs- Identify cognitive and physical deficits and behaviors that affect risk of falls.- [place] fall precautions as indicated by assessment.- Educate pt/family on patient safety including physical limitations- Instruct pt to call for assistance with activity based on assessment- Modify environment to reduce risk of injury- Provide assistive devices as appropriate- Consider OT/PT consult to assist with strengthening/mobility- Encourage toileting schedule  Outcome: Adequate for Discharge     Problem: DISCHARGE PLANNING  Goal: Discharge to home or other facility with appropriate resources  Description: INTERVENTIONS:- Identify barriers to discharge w/pt and caregiver- Include patient/family/discharge partner in discharge planning- Arrange for needed discharge resources and transportation as appropriate- Identify discharge learning needs (meds, wound care, etc)- Arrange for interpreters to assist at discharge as needed- Consider post-discharge preferences of patient/family/discharge partner- Complete POLST form as appropriate- Assess patient's ability to be responsible for managing their own health- Refer to Case Management Department for coordinating discharge planning if the patient needs post-hospital services based on physician/LIP order or complex needs related to functional status, cognitive ability or social support system  Outcome: Adequate for Discharge

## 2025-04-02 NOTE — PLAN OF CARE
Patient AO x4. On RA. Scds and Lovenox for DVT prophylaxis. Up to bathroom and voiding freely. Up self with RW. Norco PRN for pain. IV abx given per order. Call light within reach. Fall precautions.  remains at bedside. Plan for HH once ID final recs.  Problem: Patient Centered Care  Goal: Patient preferences are identified and integrated in the patient's plan of care  Description: Interventions:- What would you like us to know as we care for you? - Provide timely, complete, and accurate information to patient/family- Incorporate patient and family knowledge, values, beliefs, and cultural backgrounds into the planning and delivery of care- Encourage patient/family to participate in care and decision-making at the level they choose- Honor patient and family perspectives and choices  Outcome: Progressing     Problem: Patient/Family Goals  Goal: Patient/Family Long Term Goal  Description: Patient's Long Term Goal: Interventions:- - See additional Care Plan goals for specific interventions  Outcome: Progressing  Goal: Patient/Family Short Term Goal  Description: Patient's Short Term Goal: Interventions: - - See additional Care Plan goals for specific interventions  Outcome: Progressing     Problem: PAIN - ADULT  Goal: Verbalizes/displays adequate comfort level or patient's stated pain goal  Description: INTERVENTIONS:- Encourage pt to monitor pain and request assistance- Assess pain using appropriate pain scale- Administer analgesics based on type and severity of pain and evaluate response- Implement non-pharmacological measures as appropriate and evaluate response- Consider cultural and social influences on pain and pain management- Manage/alleviate anxiety- Utilize distraction and/or relaxation techniques- Monitor for opioid side effects- Notify MD/LIP if interventions unsuccessful or patient reports new pain- Anticipate increased pain with activity and pre-medicate as appropriate  Outcome: Progressing      Problem: RISK FOR INFECTION - ADULT  Goal: Absence of fever/infection during anticipated neutropenic period  Description: INTERVENTIONS- Monitor WBC- Administer growth factors as ordered- Implement neutropenic guidelines  Outcome: Progressing     Problem: SAFETY ADULT - FALL  Goal: Free from fall injury  Description: INTERVENTIONS:- Assess pt frequently for physical needs- Identify cognitive and physical deficits and behaviors that affect risk of falls.- [place] fall precautions as indicated by assessment.- Educate pt/family on patient safety including physical limitations- Instruct pt to call for assistance with activity based on assessment- Modify environment to reduce risk of injury- Provide assistive devices as appropriate- Consider OT/PT consult to assist with strengthening/mobility- Encourage toileting schedule  Outcome: Progressing     Problem: DISCHARGE PLANNING  Goal: Discharge to home or other facility with appropriate resources  Description: INTERVENTIONS:- Identify barriers to discharge w/pt and caregiver- Include patient/family/discharge partner in discharge planning- Arrange for needed discharge resources and transportation as appropriate- Identify discharge learning needs (meds, wound care, etc)- Arrange for interpreters to assist at discharge as needed- Consider post-discharge preferences of patient/family/discharge partner- Complete POLST form as appropriate- Assess patient's ability to be responsible for managing their own health- Refer to Case Management Department for coordinating discharge planning if the patient needs post-hospital services based on physician/LIP order or complex needs related to functional status, cognitive ability or social support system  Outcome: Progressing

## 2025-04-02 NOTE — DISCHARGE INSTRUCTIONS
[facility] [phone]  You have an appointment at [facility] location on 4/3/25 at 8:30am  [address], [place] [address]   [phone]    [facility]  P:[phone]  F:[phone]

## 2025-04-02 NOTE — PROGRESS NOTES
[facility]  part of [facility]    Progress Note    Kerry Hsu Patient Status:  Inpatient    1960 MRN [id number]   Location [facility] 4W/SW/SE Attending Roya Reeder MD   Hosp Day # 1 PCP Bella Rogers MD         Subjective:     Constitutional:         Patient awake and alert in hospital bed.   visiting.  Her knee feels a little better.  She still has pain with bending.  No fever, chills, or sweats.       Objective:   Blood pressure 122/68, pulse 67, temperature 98.4 °F (36.9 °C), temperature source Oral, resp. rate 18, height 5' 8\" (1.727 m), weight 195 lb (88.5 kg), SpO2 94%, not currently breastfeeding.  Physical Exam  Musculoskeletal:      Comments: Knee and leg appear very benign without erythema, swelling, pitting edema or calf tenderness.  Able to do active straight leg raise.  Able to dorsiflex and plantarflex her foot easily.  She allowed flexion only to 40 degrees.  Healing uneventfully.  Normal motor and sensory right foot and toes.         Results:   Lab Results   Component Value Date    WBC 7.7 2025    HGB 11.5 (L) 2025    HCT 34.4 (L) 2025    .0 (H) 2025    CREATSERUM 0.99 2025    BUN 10 2025     2025    K 3.8 2025     2025    CO2 27.0 2025     (H) 2025    CA 9.1 2025    ALB 4.7 2025    ALKPHO 127 2025    BILT 0.6 2025    TP 7.2 2025    AST 20 2025    ALT 20 2025    PTT 31.1 10/06/2023    INR 0.95 2023    T4F 1.4 2023    TSH 1.511 2024    ESRML 21 01/15/2024    CRP <0.29 2022    MG 2.1 2023     (H) 05/15/2024    B12 404 2025       US VENOUS DOPPLER LEG RIGHT - DIAG IMG (CPT=93971)    Result Date: 3/31/2025  CONCLUSION:  1. No right lower extremity DVT.    Dictated by (CST): Johnny Carrera MD on 3/31/2025 at 3:51 PM     Finalized by (CST): Johnny Carrera MD on 3/31/2025 at  3:52 PM               Assessment & Plan:     Postoperative infection, unspecified type, initial encounter  The aspiration of the joint itself had 6600 white cells with 71% neutrophils.  Culture at 18 hours negative.  No crystals seen.  Continue antibiotics for cellulitis as per infectious disease but I do not think the knee joint itself is infected.  I encouraged her to continue with vigorous home exercise program in addition to therapy.      Cellulitis  As above.      Right knee skin infection  As above.              Keo Bishop MD  4/1/2025

## 2025-04-02 NOTE — PHYSICAL THERAPY NOTE
PHYSICAL THERAPY TREATMENT NOTE - INPATIENT     Room Number: 433/433-A       Presenting Problem: R knee pain, swelling, redness  Co-Morbidities : s/p R TKA 3/18    Problem List  Principal Problem:    Postoperative infection, unspecified type, initial encounter  Active Problems:    Cellulitis    Right knee skin infection      PHYSICAL THERAPY ASSESSMENT   Patient demonstrates good  progress this session, goals  progressing with 2/5 met this session.      Patient is requiring contact guard assist as a result of the following impairments: decreased functional strength, pain, and limited right knee ROM.     Patient continues to function near baseline with bed mobility, transfers, gait, stair negotiation, maintaining seated position, standing prolonged periods, and performing household tasks.  Next session anticipate patient to progress stair negotiation, standing prolonged periods, and right knee ROM .  Physical Therapy will continue to follow patient for duration of hospitalization.    Patient continues to benefit from continued skilled PT services: at discharge to promote prior level of function and safety with additional support and return home with home health PT.    PLAN DURING HOSPITALIZATION  Nursing Mobility Recommendation : 1 Assist     PT Treatment Plan: Endurance, Patient education, Gait training, Neuromuscular re-educate, Range of motion, Strengthening, Stair training  Frequency (Obs): Daily     SUBJECTIVE  \"I am feeling better but it's still pretty swollen\"    OBJECTIVE  Precautions: Bed/chair alarm    WEIGHT BEARING RESTRICTION  R Lower Extremity: Weight Bearing as Tolerated      PAIN ASSESSMENT   Ratin  Location: right knee with exs/end range  Management Techniques: Activity promotion, Repositioning, Relaxation    BALANCE  Static Sitting: Good  Dynamic Sitting: Good  Static Standing: Good  Dynamic Standing: Good    AM-PAC '6-Clicks' INPATIENT SHORT FORM - BASIC MOBILITY  How much difficulty does the  patient currently have...  Patient Difficulty: Turning over in bed (including adjusting bedclothes, sheets and blankets)?: None   Patient Difficulty: Sitting down on and standing up from a chair with arms (e.g., wheelchair, bedside commode, etc.): None   Patient Difficulty: Moving from lying on back to sitting on the side of the bed?: None   How much help from another person does the patient currently need...   Help from Another: Moving to and from a bed to a chair (including a wheelchair)?: None   Help from Another: Need to walk in hospital room?: A Little   Help from Another: Climbing 3-5 steps with a railing?: A Little     AM-PAC Score:  Raw Score: 22   Approx Degree of Impairment: 20.91%   Standardized Score (AM-PAC Scale): 53.28   CMS Modifier (G-Code):     FUNCTIONAL ABILITY STATUS  Functional Mobility/Gait Assessment  Gait Assistance: Modified independent  Distance (ft): 200'x2  Assistive Device: Rolling walker  Pattern: Within Functional Limits  Stairs: Stairs  How Many Stairs: 4  Device: 2 Rails  Assist: Supervision  Pattern: Ascend and Descend  Ascend and Descend : Step to  Rolling: independent  Supine to Sit: modified independent  Sit to Supine: modified independent  Sit to Stand: modified independent    Skilled Therapy Provided: ROWDY Davies approves participation in therapy session. Patient was medicated about 1 hour ago and presents in bed and agreeable to working with therapy. She reports knee feels better but is still pretty sore and swollen anteriorly. Reviewed and emphasized positioning of R knee into extension as well as working on flexion (with pain 6-7/10 and decreases once stretch is stopped). She tolerates Nustep well. She has limited range from 5-67* but is aware to keep working on stretching into both flexion and extension as tolerated. She is aware to walk several more times today and do her exs as well as stretching. She has the skills needed to return home with , dc planned today.  She is returned to bed with all needs in reach and RN is aware.    The patient's Approx Degree of Impairment: 20.91% has been calculated based on documentation in the [facility] '6 clicks' Inpatient Daily Activity Short Form.  Research supports that patients with this level of impairment may benefit from home with no needs.  However, pt is recently s/p TKA and will benefit from ongoing PT services at home and then progress to OPPT and she is in agreement.  Final disposition will be made by interdisciplinary medical team.    THERAPEUTIC EXERCISES  Lower Extremity Heel raises  Heel slides  Hip adduction squeezes  LAQ  Quad sets  SAQ  SLR  Nu step x 8 min level 2, up to 67* knee flexion     Position Sitting & Standing       Patient End of Session: In bed, Needs met, Call light within reach, RN aware of session/findings, All patient questions and concerns addressed    CURRENT GOALS     Goals to be met by: 4/15/25  Patient Goal Patient's self-stated goal is: go home   Goal #1 Patient is able to demonstrate supine - sit EOB @ level: modified independent      Goal #1   Current Status  MET   Goal #2 Patient is able to demonstrate transfers EOB to/from [facility] at assistance level: modified independent with walker - rolling      Goal #2  Current Status  MET   Goal #3 Patient is able to ambulate 300 feet with assist device: walker - rolling at assistance level: modified independent   Goal #3   Current Status  In progress   Goal #4 Patient will negotiate 13 stairs/one curb w/ assistive device and supervision   Goal #4   Current Status  In progress   Goal #5 Patient to demonstrate independence with home activity/exercise instructions provided to patient in preparation for discharge.   Goal #5   Current Status  in progress   Goal #6     Goal #6  Current Status          Therapeutic Activity: 12 minutes  Therapeutic Exercise: 19 minutes

## 2025-04-03 ENCOUNTER — TELEPHONE (OUTPATIENT)
Dept: INTERNAL MEDICINE CLINIC | Facility: CLINIC | Age: 65
End: 2025-04-03

## 2025-04-03 ENCOUNTER — PATIENT OUTREACH (OUTPATIENT)
Dept: CASE MANAGEMENT | Age: 65
End: 2025-04-03

## 2025-04-03 NOTE — PROGRESS NOTES
Transitions of Care Navigation  Discharge Date: 25  Contact Date: 4/3/2025    Transitions of Care Assessment:  JAMIL Initial Assessment    General:  Assessment completed with: Patient  Patient Subjective: Pt reports she is doing okay. Pt did complete abx at the [facility] location today as advised. Pt stated she has an appt with Dr. Delgadillo tomorrow. Pt has chills at times however stated this has been happening. Pt denies fevers. Pt reports the swelling has improved. Pt is using ice packs and Norco as needed for pain. Pt has been able to have a bowel movements, denies constipation, hematochezia and melena. Pt has been drinking prune juice and pushing fluids. Pt denies issues voiding. Pt is working on her appetite, eating small meals. Pt is ambulating with a walker to prevent falls. Pt stated [facility] did call her today, she plans to start services as advised. Pt denies symptoms at this time. Pt has no questions at this time.  Chief Complaint: R knee cellulitis s/p recent R TKA 3/17  Verify patient name and  with patient/ caregiver: Yes    Hospital Stay/Discharge:  Tell me what you understand of why you were in the hospital or emergency department: right knee swelling and pain  Prior to leaving the hospital were your Discharge Instructions reviewed with you?: Yes  Did you receive a copy of your written Discharge Instructions?: Yes  What questions do you have about your Discharge Instructions?: None at this time  Do you feel better or worse since you left the hospital or emergency department?: Better    Follow - Up Appointment:  Do you have a follow-up appointment?: Yes  Date: 25  Physician: Dr. Delgadillo- surgeon  Are there any barriers to getting to your follow-up appointment?: No    Home Health/DME:  Prior to leaving the hospital was Home Health (HH) arranged for you?: Yes ([facility])     Prior to leaving the hospital or emergency department was Durable Medical Equipment (DME), medical  supplies, or infusions arranged for you?: No (Pt stated she did s/w HHC today, awaiting a call back to set up a visit)  Are DME/medical supply/infusions needs identified by staff during this assessment?: No     Medications/Diet:  Did any of your medications change, during or after your hospital stay or ED visit?: Yes  Do you have your new or updated medications?: No (Pt is going to infusion center for abx)  Are there any reasons that keep you from taking your medication as prescribed?: No  Any concerns about medication refills?:  (N/A- pt declined to review medication list)    Were you given a different diet per your Discharge Instructions?: No     Questions/Concerns:  Do you have any questions or concerns that have not been discussed?: No       Nursing Interventions: All d/c instructions reviewed with the pt. Reviewed when to call MD vs when to call 911 or go the ED. Discussed s/s of infection/cellulitis, DVT/PE and constipation. Pt is aware Norco can cause constipation. Reviewed fall precautions. Encouraged proper hydration. Educated pt on the importance of taking all meds as prescribed as well as close f/u with PCP/specialists. Pt verbalized understanding and will contact the office with any further questions or concerns. Pt declined to review medication list at this time.       Medications:  Medication Reconciliation:  I am aware of an inpatient discharge within the last 30 days.  The discharge medication list has not been reconciled with the patient's current medication list and reviewed by me. See medication list for additions of new medication, and changes to current doses of medications and discontinued medications.  Current Outpatient Medications   Medication Sig Dispense Refill    ibuprofen 600 MG Oral Tab Take 1 tablet (600 mg total) by mouth every 6 (six) hours as needed for Pain.      HYDROcodone-acetaminophen 5-325 MG Oral Tab Take 1 tablet by mouth every 6 (six) hours as needed for Pain. No alcohol or  driving on this med. Stop if lethargic or hallucinating. 30 tablet 0    gabapentin 100 MG Oral Cap Take 1 capsule (100 mg total) by mouth 3 (three) times daily. 30 capsule 0    polyethylene glycol, PEG 3350, 17 g Oral Powd Pack Take 17 g by mouth daily as needed. 24 each 0    rosuvastatin 10 MG Oral Tab Take 1 tablet (10 mg total) by mouth every evening.      Omeprazole 40 MG Oral Capsule Delayed Release Take 1 capsule (40 mg total) by mouth daily. Take 1 capsule by mouth daily before breakfast. 90 capsule 3    amLODIPine 10 MG Oral Tab TAKE 1 TABLET BY MOUTH EVERY DAY 90 tablet 3    latanoprost 0.005 % Ophthalmic Solution INSTILL 1 DROP IN BOTH EYES EVERY night 3 each 3    cholecalciferol 50 MCG (2000 UT) Oral Cap Take 1 capsule (2,000 Units total) by mouth daily.      MAGNESIUM OR Take 1 tablet by mouth daily.             Follow-up Appointments: Pt denies any barriers to keeping/getting to U appts.   Your appointments       Date & Time Appointment Department (Center)    Apr 18, 2025 10:00 AM CDT Consult with Tricia Eli DO [facility], [address], [place] ([facility] [place])    Please bring in any pertinent lab or diagnostic test results with you to your appointment.        Jul 22, 2025 11:40 AM CDT Medicare Annual Health Assessment with Bella Rogers MD [facility] ([facility])        Oct 10, 2025 7:40 AM CDT Screening Mammogram 3D [place] with [facility] RM[facility] [place] ([facility] [place])    You may be subject to a fee if you do not show up for your appointment or you cancel within 24 hours of your appointment.    Please arrive 15 minutes prior to your appointment.    If breastfeeding, pump or breastfeed one hour prior to your appointment time.    Continuous glucose monitors must be removed so the appointment should be scheduled near the normal replacement date.    It is important to  bring your previous mammography films to your appointment so that the radiologist has previous images to compare this exam to. Having your previous mammograms on file helps the radiologist notice subtle changes in your breast tissue that can alert us to a need for further studies. Without these images, the radiologist will not be able to detect the subtle changes and your final reading will be delayed until we receive them.    It is important that the annual screening mammogram be scheduled at least a year and a day after your last screening mammogram to ensure your insurance plan will cover the cost, unless you are experiencing breast related symptoms.    Do NOT use perfumes, deodorant, talcum powder, lotions, or creams on your breasts or underarms. They leave a coating that may be picked up by the x-rays, thereby distorting the mammogram.    Wear a two piece outfit the day of the exam. This allows you to be more comfortable during the exam.      Oct 10, 2025 7:40 AM CDT Screening Mammogram 3D [place] with [facility] 1 [facility] [place] ([facility] [place])    You may be subject to a fee if you do not show up for your appointment or you cancel within 24 hours of your appointment.    Please arrive 15 minutes prior to your appointment.    If breastfeeding, pump or breastfeed one hour prior to your appointment time.    Continuous glucose monitors must be removed so the appointment should be scheduled near the normal replacement date.    It is important to bring your previous mammography films to your appointment so that the radiologist has previous images to compare this exam to. Having your previous mammograms on file helps the radiologist notice subtle changes in your breast tissue that can alert us to a need for further studies. Without these images, the radiologist will not be able to detect the subtle changes and your final reading will be delayed until we receive them.    It is important  that the annual screening mammogram be scheduled at least a year and a day after your last screening mammogram to ensure your insurance plan will cover the cost, unless you are experiencing breast related symptoms.    Do NOT use perfumes, deodorant, talcum powder, lotions, or creams on your breasts or underarms. They leave a coating that may be picked up by the x-rays, thereby distorting the mammogram.    Wear a two piece outfit the day of the exam. This allows you to be more comfortable during the exam.      Mar 19, 2026 9:00 AM CDT Adult Physical with Srinivasan Mendoza DO [facility], [address], [place] - OB/GYN ([facility] [place])    PLEASE NOTE - Most insurances allow a Complete Physical once every 366 days. Please schedule accordingly.    Please arrive 15 minutes prior to your scheduled appointment. Please also bring your Insurance card, Photo ID, and your medication bottles or a list of your current medication.    If you no longer require this appointment, please contact your physician office to cancel.              [facility] - [place] [facility] [place]  [address] [place] [id number] [phone] [facility] [place] [facility] [place]  [address] [place] [id number] [phone] [facility] [address], [place] [facility] [place]  [address]  [place] [address] [phone]    [facility], [address], [place] - OB/GYN  [facility] [place]  [address] [facility] [address]  [place] [address] [phone] [address] [address] [phone]            Transitional Care Clinic  Was TCC Ordered: No    Primary Care Provider (If no TCC appointment)  Does patient already have a PCP appointment scheduled? No  Nurse Care Manager Attempted to schedule PCP office TCM/JAMIL  appointment with patient   -If no appointment scheduled: Explain : pt declined at this time, prefers to FU with Surgeon for now. TE to PCP Office to FU.     Specialist  Does the patient have any other follow-up appointment(s) need to be scheduled? Yes   -If yes: Nurse Care Manager reviewed upcoming specialist appointments with patient: Yes   -Does the patient need assistance scheduling appointment(s): No- pt declined assistance.     Follow up with Bella Rogers MD (Internal Medicine); As needed- Pt declined PCP appt at this time.   Follow up with Allan Abdullahi DO (INFECTIOUS DISEASES) in 1 month (5/2/2025)- Pt stated she has an appt with ID in two weeks.  Follow up with Keo Bishop MD (SURGERY, ORTHOPEDIC); As needed- Pt has an appt tomorrow.    [x]  Patient verbally agrees to additional follow-up calls from Nurse Care Manager    Book By Date: 4/9/25

## 2025-04-03 NOTE — PROGRESS NOTES
Left message on mailbox for patient to call nurse care manager back for transitions of care call.  Nurse care  information included in message.      R knee cellulitis s/p recent R TKA 3/17   DC on once weekly IV dalbavancin x 4 doses   [facility]  first dose 4/3

## 2025-04-03 NOTE — TELEPHONE ENCOUNTER
Spoke to patient for Transitions of Care call today.  Patient does not have an appointment scheduled at this time. Pt declined an appt with PCP when offered as pt has an appt with Surgeon tomorrow.  TCM/JAMIL appointment needed by 4/9/25.  Please advise.    BOOK BY DATE: 4/9/25    Clinical staff:  Please follow-up with patient and try to get them to schedule as patient would greatly benefit from TCM/JAMIL.  Thank you!

## 2025-04-07 ENCOUNTER — ORDER TRANSCRIPTION (OUTPATIENT)
Dept: PHYSICAL THERAPY | Facility: HOSPITAL | Age: 65
End: 2025-04-07

## 2025-04-07 DIAGNOSIS — Z96.651 S/P TOTAL KNEE ARTHROPLASTY, RIGHT: Primary | ICD-10-CM

## 2025-04-08 ENCOUNTER — OFFICE VISIT (OUTPATIENT)
Dept: INTERNAL MEDICINE CLINIC | Facility: CLINIC | Age: 65
End: 2025-04-08

## 2025-04-08 VITALS
DIASTOLIC BLOOD PRESSURE: 76 MMHG | TEMPERATURE: 97 F | WEIGHT: 192.63 LBS | OXYGEN SATURATION: 100 % | HEIGHT: 68 IN | BODY MASS INDEX: 29.2 KG/M2 | HEART RATE: 75 BPM | SYSTOLIC BLOOD PRESSURE: 132 MMHG

## 2025-04-08 DIAGNOSIS — L03.115 CELLULITIS OF KNEE, RIGHT: ICD-10-CM

## 2025-04-08 DIAGNOSIS — I10 ESSENTIAL HYPERTENSION: ICD-10-CM

## 2025-04-08 DIAGNOSIS — G62.9 NEUROPATHY: ICD-10-CM

## 2025-04-08 DIAGNOSIS — Z96.651 HISTORY OF TOTAL RIGHT KNEE REPLACEMENT: Primary | ICD-10-CM

## 2025-04-08 DIAGNOSIS — D75.839 THROMBOCYTOSIS: ICD-10-CM

## 2025-04-08 PROCEDURE — 99496 TRANSJ CARE MGMT HIGH F2F 7D: CPT | Performed by: INTERNAL MEDICINE

## 2025-04-08 RX ORDER — IBUPROFEN 600 MG/1
600 TABLET, FILM COATED ORAL 2 TIMES DAILY PRN
Qty: 60 TABLET | Refills: 0 | Status: SHIPPED | OUTPATIENT
Start: 2025-04-08

## 2025-04-08 RX ORDER — GABAPENTIN 300 MG/1
300 CAPSULE ORAL NIGHTLY
Qty: 90 CAPSULE | Refills: 1 | Status: SHIPPED | OUTPATIENT
Start: 2025-04-08

## 2025-04-08 NOTE — PROGRESS NOTES
Subjective:   Kerry Hsu is a 64 year old female who presents for hospital follow up.   She was discharged from [facility] to Home or Self Care  Admission Date: 3/31/25   Discharge Date: 4/2/25  Hospital Discharge Diagnosis: Postoperative infection, unspecified type, initial encounter [T81.40XA]  Right knee skin infection [L08.9]    Interactive contact within 2 business days post discharge first initiated on Date: 4/3/2025    During the visit, the following was completed:  Obtained and reviewed discharge summary, continuity of care documents, and Hospitalist notes  Reviewed Labs (CBC, CMP)    HPI: pt comes for hosp discharge follow-up after being admitted for right knee cellulitis  Currently getting IV antibiotics once a week for a possible total of either 2 to 4 weeks- finished one out pt infusion   Currently getting PT at home ,still in pain 5/10   Initially had gone  to the emergency department for evaluation of progressive right knee swelling, pain, and limitation of range of motion for last 3 days. CBC showed white blood cell count of 11.3 with left shift. Chemistry was unremarkable. Venous Doppler study was negative.   Saw her orthopedic doctor Dr. Delgadillo last week and will see him again this week  Walking with a cane now , off the walker   Sleep is poor due to the neuropathy in her leg and foot         HISTORY  Saw the cardiologist and back on crestor 10 and on lyrica 10 bid as it causes neuropathy sympt   Can walk up 4 flights of stairs without feeling short of breath but her knee does hurt, got EKG in jan   Got dental clearance as well   The most recent surgery was the hip surgery done on January 2025 -doing PT , not taking anything for pain      HISTORY   was told she had neurology  and was todl she had neuropathy and had EMG and her right leg and b/l feet has numbness and tingling and feels heavy   Gabapentin - no help, aldolase - didn't help   Was given lyrica and takes it only at night ,  which it usually helps her sleep except last night   Has not tried the valium  yet  Got Injection to the right hip 2 weeks (DEC 6TH ) ago by Dr. Jefferson Mcmillan at [facility] right now she is feeling well  Since last visit she did get the MRI and the liver cyst have decreased but noted that she does have kidney cysts so we will get an ultrasound and refer her to urology for that     She had sleep study 6/2024 but she is scared to stay overnight at a hosp                    HISTORY  saw the neurosurgeon and was reassured that it was not her back so saw the Ortho doctor and MRI of the hip were done  Not taking diazepam   Foot is numb and hips and lower back hurt   Noted that in 2017 she was given duloxetine 20 30 40 and 60 most likely is a increased titrating up and it was not helping  Also has some questions regarding her insurance and Medicare and confirming she does not have HMO in the chart-confirmed that I do not see this in her chart           HISTORY  restarted in the upper arms \"its back and more severe \"   Wondering if she has stiff persons synd -would like Anti-glutamic acid decarboxylase (NICOLE) antibody testing , looked at the chart and noted that there was a NICOLE 65 antibody testing that is available in the chart but I am not sure if this is the same as the antiglutamic acid decarboxylase so she will follow-up with rheumatology  Had emg and muscle biopsy in 2014 by dr hernandez  a few yrs ago at that area left leg area does get swollen and hard   Has apt with GI this week , H. pylori test is negative  Got injection to the back and although it still hurting it does take time so she is being patient with that  Still has rash under her breasts and also the groin and has tried ketoconazole cream as well as the nystatin powder        HISTORY  Saw Dr. Delgadillo the orthopedic doctor and was sent for physical therapy which she is doing now for her back but needs to see the back doctor--reviewed note and noted it was  suggested she see physiatry         Had  the left cataract removed  approx 3 yrs ago and since then has had blurry vision and double vision In the left eye        She still does get occasional cramping and takes magnesium for it and has numbness and tingling in bilateral feet worse on the right, noted in the past her cpk was always elevated and had this worked up but did read that lead might have something to do with it so we will have this checked    HISTORY    palpitations for the past couple weeks at least and it is now scaring her because she can feel it when she is just lying down or just sitting there not doing anything and it is not on it but only on exertion, comes and goes   Also has some retrosternal chest pains 5/10  Better ? --took ppi and it didn't help   Worse -with deep inspirations  No increase in caffeine  Chest pain and palpitations happen at the same time and not associated with any diaphoresis  She did have an EKG done in January and a calcium score in August of this year           HISTORY  Saw dr rothman ent and ct was neg SO NO  sinus SURG NEEDED     She thinks its her eyes   Feels her vision is bad even after glasses and thinks it happended after cataract surg   HAS SEEN   saw dr mckeon--  neurology   Saw cards dr genao and will go for heart scan   Saw many eye drs and tried drops but no help , eye drops now burns                     HISTORY  6/2023 visit     will see the surgeon for a cyst that she has in her vaginal area, she had already seen the dermatologist and the GYN doctor  Hopefully will come out of the boot for her left leg  Saw ENT and may is sinus surgery            HISTORY   right wrist pain after her rotator cuff surg x 2 weeks -was first like an electric shock and now only gets that once in a while but now more sore   Going for PT   She also has some left thigh pain - from buttocks to the knees         History  3/2022  right shoulder surgery on 3/7/2022   Requested by   shabnam Garcia - can be seen in epic   Can walk up one flight of stars without feeling short of breath   Right shoulder radiates to the neck on that side   She is right handed , cant use her hand and arm as much            HISTORY   10/2021   She has been seeing Ortho for the neuropathy and also received shots to the shoulder-right-and knees and was given Tylenol No. 3  Also sees the foot doctor  saw dr mike lucas and consider valium but she feels this will affect her ability to work   She complains of \"nerve pain\" in the feet   She states that valium knocks her out -even if she takes it in the evening she still feels the effects in the am , foot dr gave gabpentin but that too makes her sleepy \"im very sensitive   Also c/o left flank pain  X couple weeks -- ua done in office and does show blood with uti    8/10 , sharp and stabbing ,   Worse with standing after sitting a long time   Better - ?   Comes and goesbut now more frequent      Not taking trazodone - not helping and she is not sure what meds cuases what so she doesn't want to be on meds   Know she has had CTs and ultrasounds of her kidney done before and has seen urology as well- dr moreno -she did have a 3 mm nonobstructing stone in the right kidney  Still has her chronic cough and try the Tessalon Perles which did not help that much, has an inhaler so she will try that, the codien cough syrup just put her to sleep as well      Finished doxy yest taht she got from the  ,didn't help          HISTORY 3/2021   cramps is all over but mostly in the legs-stretching makes it works and the meloxicam is not helping and she did see the rheumatologist and was started on a new medication metaxalone 800 mg 3 times a day but it makes her sleepy so she is only taking it at night--it still wakes her up at night so does not feel that that is working was recommended to follow[facility] at an [facility]     Still has a cough and the codeine cough syrup did not help much  and noted that she is on and PPI and the chest x-ray was normal which was ordered last time   she has noticed that the rescue inhaler does help                       HISTORY  History from August 2020   Has seen the rheumatologist, physiatry and gone for physical therapy  Has tried Cymbalta given by physiatry but in January when I had seen her she had stated that she was not taking it and and cyclobenzaprine was started but she does not think that that was helpful  She states that she has had an EMG in the past as well a very long time ago  She states that she drinks a lot of water as well  2015 emg report noted in chart - normal   Also noted in 2015 she saw the neurologist Dr. Enamorado and was recommended to go to the pain doctors--Flexeril was tried  Needs to go back to see Dr. Hays the podiatrist for her orthotics                 History   Last seen in February and since then in March she saw Dr. Delgadillo and got knee injections for her osteoarthritis of the knees   had an endoscopic procedure and was found to have gastric polyp by Dr. Pereira  In June she saw the foot doctor and received a shot in her foot for the neuroma  In July she saw Dr. Bird the eye doctor for her glaucoma and will return in 4 months and then           History  1/11/2020 visit   Dr clay- ortho -- left hand tendonitiis -got shot and feeks better   Would like to see dr berger   Not taking cymbalta - takes T#3 one a week , ibuprofen \"seldom\"- once a mn   Muscle cramps coming back - legs thighs and feet , no RLS               History- 11/19  C/o right shoulder has a tear  And she does admit she uses the left more   Used to see dr berger and now sees dr haque and got a shot to the right shoulder   Needs a referral to see pain clinic   Needs referral to see podiatrist --corns and calluses right foot / toe and also for ingrown toenails   Pain is 10/10 -- iburprofen does helps but doesn't like taking it too much --takes T#3 but t just  puts her to sleep and then she will wake up with pain -- unable ot sratch her back   No falls trauma or injury that she is aware of      History   She has had both muscle and nerve biopsies by dr hernandez   She gets these cramps every day   Steroids do not help either  Noted that she had try cyclobenzaprine 10 mg in July 2019--no help   Since the last visit she did see her orthopedic doctor and received a short of a cortisone shot to her right shoulder  Also c/o left hand tender swelling x few weeks   She also states that she gets rashes underneath her breasts and the triamcinolone helps with this and needs a refill        hisotry -8/19 first visit   C/c htn   C/o fell on July 8th and has some left rib pain and back pains   Had some ct scans and would like to go through it  Needs referrals    Usually gets shots to her knees and shoulders- was told to follow-up with a new doctor     [facility]  gerd daily ppi --h/o nissens fundoplication  ?2006  HTN  Hip pain and back pain - T#3 - prn 2 tabs a week, also takes ibuprofen   bm hematuria   Non obstructing kid stones   elmer was on cpap   Glaucoma suspect   Right shoulder rotator cuff tear s/p surg 3/2022   Osteoarthritis knees  Hepatic and renal cysts similar to 8/19 and CT 5/20  H/o left toe OM s/p amputation   ELMER on 6/2024 sleep study      Dr peters - eye dr Dr moreno - urologist   Dr. Craft- rheum   Dr. Delgadillo- ortho   Dr lorraine hall      ----------------------------------------------------------------------------------------------------------------         History/Other:   Current Medications:  Medication Reconciliation:  I am aware of an inpatient discharge within the last 30 days.  The discharge medication list has been reconciled with the patient's current medication list and reviewed by me. See medication list for additions of new medication, and changes to current doses of medications and discontinued medications.  Outpatient Medications Marked as  Taking for the 4/8/25 encounter (Office Visit) with Bella Rogers MD   Medication Sig    gabapentin 300 MG Oral Cap Take 1 capsule (300 mg total) by mouth nightly.    ibuprofen 600 MG Oral Tab Take 1 tablet (600 mg total) by mouth 2 (two) times daily as needed for Pain. After meals    polyethylene glycol, PEG 3350, 17 g Oral Powd Pack Take 17 g by mouth daily as needed.    rosuvastatin 10 MG Oral Tab Take 1 tablet (10 mg total) by mouth every evening.    Omeprazole 40 MG Oral Capsule Delayed Release Take 1 capsule (40 mg total) by mouth daily. Take 1 capsule by mouth daily before breakfast.    amLODIPine 10 MG Oral Tab TAKE 1 TABLET BY MOUTH EVERY DAY    latanoprost 0.005 % Ophthalmic Solution INSTILL 1 DROP IN BOTH EYES EVERY night    cholecalciferol 50 MCG (2000 UT) Oral Cap Take 1 capsule (2,000 Units total) by mouth daily.    MAGNESIUM OR Take 1 tablet by mouth daily.       Review of Systems  GENERAL: feels well otherwise, +chills   SKIN: denies any unusual skin lesions  EYES: denies blurred vision or double vision  HEENT: denies nasal congestion, sinus pain or ST  LUNGS: denies shortness of breath with exertion  CARDIOVASCULAR: denies chest pain on exertion  GI: denies abdominal pain, denies heartburn  : denies dysuria, vaginal discharge or itching, no complaint of urinary incontinence   MUSCULOSKELETAL: denies back pain  NEURO: denies headaches  PSYCHE: denies depression or anxiety  HEMATOLOGIC: + hx of anemia  ENDOCRINE: denies thyroid history  ALL/ASTHMA: denies hx of allergy or asthma    Objective:   Physical Exam  GENERAL: well developed, well nourished,in no apparent distress, walks with cane   SKIN: no rashes,no suspicious lesions  HEENT: atraumatic, normocephalic  NECK: supple,no adenopathy  LUNGS: clear to auscultation, no wheeze  CARDIO: RRR without murmur  GI: good BS's,no masses or tenderness  EXTREMITIES: no cyanosis, or edema on the left but the right knee is swollen   Well-healing incision  without any surrounding redness discharge      /76   Pulse 75   Temp 97 °F (36.1 °C)   Ht 5' 8\" (1.727 m)   Wt 192 lb 9.6 oz (87.4 kg)   SpO2 100%   BMI 29.28 kg/m²  Estimated body mass index is 29.28 kg/m² as calculated from the following:    Height as of this encounter: 5' 8\" (1.727 m).    Weight as of this encounter: 192 lb 9.6 oz (87.4 kg).    Assessment & Plan:   1. History of total right knee replacement (Primary)  -     Ibuprofen; Take 1 tablet (600 mg total) by mouth 2 (two) times daily as needed for Pain. After meals  Dispense: 60 tablet; Refill: 0  And  2. Cellulitis of knee, right  -     Ibuprofen; Take 1 tablet (600 mg total) by mouth 2 (two) times daily as needed for Pain. After meals  Dispense: 60 tablet; Refill: 0  Improving, continue physical therapy, will see orthopedic surgeon this week and will discuss how many more doses of antibiotics  3. Thrombocytosis  Improving    4. Essential hypertension  Advised pt to follow a low salt , low sodium (including fast foods and processed foods), can look up DASH diet, exercise/stay active as needed with a goal of 30 min a day , monitor bp     5. Neuropathy  -     Gabapentin; Take 1 capsule (300 mg total) by mouth nightly.  Dispense: 90 capsule; Refill: 1  Increase dose of gabapentin            Preventative medicine  Colonoscopy done  9/19 and 9/2024   Dr. hall   Mammogram-10/2024  Pap 2016 dr chin -hina ,saw Dr. Witt in February 2021 and sees mina jaffe   Labs 2/2025  and 4/2025 reviewed      Return in 3 months (on 7/8/2025).

## 2025-04-09 ENCOUNTER — TELEPHONE (OUTPATIENT)
Dept: PHYSICAL THERAPY | Facility: HOSPITAL | Age: 65
End: 2025-04-09

## 2025-04-10 ENCOUNTER — PATIENT OUTREACH (OUTPATIENT)
Dept: CASE MANAGEMENT | Age: 65
End: 2025-04-10

## 2025-04-10 NOTE — PROGRESS NOTES
7 Day Follow Up Call    Left message on mailbox for patient to call nurse care manager back for transitions of care call.  Nurse care  information included in message.

## 2025-04-16 DIAGNOSIS — Z47.89 ORTHOPEDIC AFTERCARE: Primary | ICD-10-CM

## 2025-04-17 ENCOUNTER — OFFICE VISIT (OUTPATIENT)
Dept: PHYSICAL THERAPY | Age: 65
End: 2025-04-17
Payer: MEDICARE

## 2025-04-17 ENCOUNTER — HOSPITAL ENCOUNTER (OUTPATIENT)
Dept: GENERAL RADIOLOGY | Age: 65
Discharge: HOME | End: 2025-04-17
Payer: MEDICARE

## 2025-04-17 DIAGNOSIS — Z47.89 ORTHOPEDIC AFTERCARE: ICD-10-CM

## 2025-04-17 DIAGNOSIS — Z96.651 S/P TOTAL KNEE ARTHROPLASTY, RIGHT: Primary | ICD-10-CM

## 2025-04-17 PROCEDURE — 73564 X-RAY EXAM KNEE 4 OR MORE: CPT

## 2025-04-17 PROCEDURE — 97161 PT EVAL LOW COMPLEX 20 MIN: CPT | Performed by: PHYSICAL THERAPIST

## 2025-04-17 PROCEDURE — 97110 THERAPEUTIC EXERCISES: CPT | Performed by: PHYSICAL THERAPIST

## 2025-04-17 NOTE — PROGRESS NOTES
LOWER EXTREMITY EVALUATION:     Diagnosis:   S/P total knee arthroplasty, right (Z96.651) Patient:  Kerry Hsu (64 year old, female)        Referring Provider: Jessica Ladd  Today's Date   4/17/2025    Precautions:      Date of Evaluation: 04/17/25  Next MD visit: No data recorded  Date of Surgery: 3/17/25     PATIENT SUMMARY   Summary of chief complaints: S/P total knee arthroplasty, right (Z96.651)  History of current condition: Pt has had B knee pain for about 10 years. Pt said her R knee is worse than her L knee. She had a TKA performed on 3/17/25. Pt was stayed in the hospital for 2 nights. Pt went to ER a week later due to severe pain, and feeling ill. Pt states she received antibiotic infusions, and had her R knee drained. Pt received home health therapy, and was discharged from [facility] last week. Pt just had an Xray. Pt states her incision is closed and healed. Pt is only taking tylenol   Pain level: current 6 /10, at best 0 /10, at worst 10 /10  Description of symptoms: sharp, shooting pain anterior knee   Occupation:     Leisure activities/Hobbies:     Prior level of function: Independent with all ADLs without an AD  Current limitations: Ambulation, stairs, squatting, bending, balance  Pt goals: \"Be able to walk better, straighten out my leg, and bend it back\"  Past medical history was reviewed with Kerry.  Significant findings include:    Imaging/Tests: Xrtammy Payne  has a past medical history of Abscess of left thigh, Acute meniscal tear of knee, Age-related nuclear cataract of both eyes (02/10/2015), Anal sphincter incontinence (04/28/2014), Arthritis, Back problem, Back problem, Cataract, Chondromalacia, Colon polyps, Degenerative disc disease, Disorder of kidney and ureter (09/17/2013), Diverticular disease, Esophageal reflux, Glaucoma, Hammertoe, High blood pressure, High cholesterol, motion sickness, Hyperlipidemia with target low density lipoprotein (LDL) cholesterol less than 130 mg/dL  (07/28/2023), Insomnia, Kidney lesion (07/25/2024), Liver lesion (07/25/2024), Neuropathy, Obstructive sleep apnea syndrome (03/21/2018), Osteoarthritis, Palpitation, PONV (postoperative nausea and vomiting), Prediabetes, Primary open angle glaucoma of both eyes (05/19/2015), Sleep apnea, Small bowel obstruction (HCC), Tendinitis, Unspecified essential hypertension, and Visual impairment.  She  has a past surgical history that includes hysterectomy (1996); anal sphincterotomy; hc arthrocentesis or inject major joint w/o us; upper gi endoscopy performed (05/2015); colonoscopy (N/A, 11/11/2016); Excision of Chalazion, Single - OD - Right Eye (Right, 6.27/2017); Cataract extraction w/  intraocular lens implant (Left, 05/07/2018); Yag Capsulotomy - OS - Left Eye (Left, 12/19/2018); other; colonoscopy (N/A, 09/06/2019); other surgical history (2005,2007,2008); other surgical history; other surgical history (11/14/2014); blepharoplasty anesthesia (Bilateral, 03/05/2020); other surgical history (06/13/2023); egd (09/16/2024); colonoscopy (09/16/2024); and colonoscopy (N/A, 9/16/2024).    ASSESSMENT  Kerry presents to physical therapy evaluation with primary c/o S/P total knee arthroplasty, right (Z96.651). The results of the objective tests and measures show Decreased ROM, decreased R LE strength, impaired gait and balance,. Functional deficits include but are not limited to Ambulation, stairs, squatting, bending, balance. Signs and symptoms are consistent with diagnosis of S/P total knee arthroplasty, right (Z96.651). Pt and PT discussed evaluation findings, pathology, POC and HEP.  Pt voiced understanding and performs HEP correctly without reported pain. Skilled Physical Therapy is medically necessary to address the above impairments and reach functional goals.    OBJECTIVE:    Musculoskeletal  Observation: Pt's R knee is sweollen; amb with SC  Palpation: Warm to touch on the R   Accessory Motion: Gr 3 Patellar mobs all  planes     Edema: Mid-patellar girth: 45cm R, 38cm   Special Tests:      ROM and Strength: (* denotes performed with pain)  Hip   MMT (-/5)    R L     Flex (L2) 3- 4     Ext  3- 4     Abd 3+ 4     ER         IR        ,   Knee   ROM MMT (-/5)    R L R L     Flex 70 deg 0 2       Ext (L3) 10 deg 124 2         Flexibility:  LE Flexibility R L     Hip Flexor mod restricted mod restricted     Hamstrings mod restricted min restricted     ITB mod restricted       Piriformis mod restricted mod restricted     Quads mod restricted mod restricted     Gastroc-soleus mod restricted mod restricted       Neurological:  Sensation: Intact     Balance and Functional Mobility:  Gait: pt ambulates on level ground with assistive device; decreased step length; decreased stance phase (SC)   Balance: SLS: R 4 sec,  L 3 sec  Functional Mobility:  5x sit to stand test:     TUG:        Today's Treatment and Response:   Pt education was provided on exam findings, treatment diagnosis, treatment plan, expectations, and prognosis.  Today's Treatment       4/17/2025   LE Treatment   Therapeutic Exercise QS 5 sec hold x15  Prone knee flexion x15  Discuss elevation + ankle pumps with ice   Therapeutic Exercise Minutes 10   Evaluation Minutes 30   Total Time Of Timed Procedures 10   Total Time Of Service-Based Procedures 30   Total Treatment Time 40   HEP Access Code: [email or web address]  URL: [email or web address]  Date: 04/17/2025  Prepared by: Martha Birmingham    Exercises  - Long Sitting Quad Set with Towel Roll Under Heel  - 3 x daily - 7 x weekly - 1 sets - 15 reps - 5 sec hold  - Supine Heel Slide  - 3 x daily - 7 x weekly - 2 sets - 10 reps - 5 seconds hold  - Seated Heel Slide  - 3 x daily - 7 x weekly - 2 sets - 10 reps - 5 seconds hold  - Prone Knee Flexion AROM  - 3 x daily - 7 x weekly - 2 sets - 10 reps        Patient was instructed in and issued a HEP for: Access Code: [email or web address]  URL: [email or web address]  Date:  04/17/2025  Prepared by: Martha Birmingham    Exercises  - Long Sitting Quad Set with Towel Roll Under Heel  - 3 x daily - 7 x weekly - 1 sets - 15 reps - 5 sec hold  - Supine Heel Slide  - 3 x daily - 7 x weekly - 2 sets - 10 reps - 5 seconds hold  - Seated Heel Slide  - 3 x daily - 7 x weekly - 2 sets - 10 reps - 5 seconds hold  - Prone Knee Flexion AROM  - 3 x daily - 7 x weekly - 2 sets - 10 reps    Charges:  PT EVAL: Low Complexity, 1 Low Eval, 1 TherEx  In agreement with evaluation findings and clinical rationale, this evaluation involved LOW COMPLEXITY decision making due to no personal factors/comorbidities, 1-2 body structures involved/activity limitations, and stable symptoms as documented in the evaluation.                                                                                                                PLAN OF CARE:    Goals: (to be met in 16 visits)   Patient to be independent with HEP.  Patient to increase R Knee AROM = WFL to allow patient to sit to stand from a chair without discomfort.  Patient to increase B Hip and Knee strength = 4+/5 to allow for prolonged ambulation within the community.   Patient to increase hamstring length by 5 degrees.  Patient to improve score on LEFS by 10% to show improvement in QOL.  Patient to have an overall subjective improvement of 75% or greater.      Frequency / Duration: Patient will be seen 2x/week or a total of 16  visits over a 90 day period. Treatment will include: Gait training; Manual Therapy; Neuromuscular Re-education; Therapeutic Activities; Therapeutic Exercise; Home Exercise Program instruction; Electrical stimulation (unattended); Patient/Family Education    Education or treatment limitation: None   Rehab Potential: good     LEFS Score  LEFS Score: (Patient-Rptd) 36.25 % (4/17/2025 10:57 AM)      Patient/Family/Caregiver was advised of these findings, precautions, and treatment options and has agreed to actively participate in planning and for  this course of care.    Thank you for your referral. Please co-sign or sign and return this letter via fax as soon as possible to [phone]. If you have any questions, please contact me at Dept: [phone]    Sincerely,  Electronically signed by therapist: Martha Birmingham PT  Physician's certification required: Yes  I certify the need for these services furnished under this plan of treatment and while under my care.    X___________________________________________________ Date____________________    Certification From: 4/17/2025  To:7/16/2025

## 2025-04-18 ENCOUNTER — OFFICE VISIT (OUTPATIENT)
Dept: PULMONOLOGY | Facility: CLINIC | Age: 65
End: 2025-04-18

## 2025-04-18 VITALS
DIASTOLIC BLOOD PRESSURE: 72 MMHG | HEART RATE: 99 BPM | HEIGHT: 68 IN | WEIGHT: 192 LBS | SYSTOLIC BLOOD PRESSURE: 130 MMHG | BODY MASS INDEX: 29.1 KG/M2 | OXYGEN SATURATION: 97 % | RESPIRATION RATE: 16 BRPM

## 2025-04-18 DIAGNOSIS — G47.33 OSA (OBSTRUCTIVE SLEEP APNEA): Primary | ICD-10-CM

## 2025-04-18 PROCEDURE — 99204 OFFICE O/P NEW MOD 45 MIN: CPT | Performed by: INTERNAL MEDICINE

## 2025-04-18 NOTE — H&P
Referring Physician  Bella Rogers MD    Chief Complaint  Sleep apnea evaluation    History of Present Illness  Patient presents today for evaluation of sleep apnea.  Had polysomnography performed 2024 with evidence of mild/moderate sleep apnea.  Admits to some snoring and fatigue and hypersomnia.  Has not been set up with CPAP.  Denies any history of known lung disease or significant dyspnea symptoms    Review of Systems  Constitutional: denies weight loss, fevers, chills, weakness,   HEENT: denies epistaxis, sore throat, postnasal drip  Cardio: denies chest pain, chest pressure, palpitations  Respiratory: denies dyspnea, cough, wheezing, hemoptysis   GI: denies nausea, vomiting, abdominal pain  : denies dysuria, hematuria  Musculoskeletal: denies arthralgia, myalgia  Integumentary: denies rash, itching  Neurological: denies syncope, weakness, dizziness,   Psychiatric: denies depression, anxiety  Hematologic: denies bruising    Past Medical History  Past Medical History[1]     Surgical History  Past Surgical History[2]    Family History  Family History[3]     Social History  Tobacco: Denies  Alcohol: Denies significant intake  Illicit Drugs: Denies    Medications  Medications Ordered Prior to Encounter[4]    Allergies  Allergies[5]    Physical Exam  Constitutional: no acute distress  HEENT: PERRL  Neck: supple, no JVD  Cardio: RRR, S1 S2  Respiratory: clear to auscultation bilaterally, no wheezing, rales, rhonchi, crackles  GI: abdomen soft, non tender, active bowel sounds, no organomegaly  Extremities: no clubbing, cyanosis, edema  Neurologic: no gross motor deficits  Skin: warm, dry  Lymphatic: no supraclavicular lymphadenopathy     Assessment  1.  KB    Plan  -Patient presents today for pulm evaluation.  I reviewed her polysomnography results with evidence of KB.  Discussed treatment options with the patient.  Will set patient up with auto CPAP.  Advised patient to return to pulmonary clinic between 30 and  90 days to review efficacy and compliance    Tricia Eli DO  Pulmonary Critical Care Medicine  [facility]  4/18/2025  10:45 AM        [1]   Past Medical History:   Abscess of left thigh    Acute meniscal tear of knee    Age-related nuclear cataract of both eyes    Anal sphincter incontinence    Arthritis    Back problem    Back problem    Cataract    left    Chondromalacia    Colon polyps    Degenerative disc disease    Disorder of kidney and ureter    Diverticular disease    Esophageal reflux    Glaucoma    Hammertoe    High blood pressure    High cholesterol    Hx of motion sickness    Hyperlipidemia with target low density lipoprotein (LDL) cholesterol less than 130 mg/dL    Insomnia    Kidney lesion    Liver lesion    per patient not being treated or seeing a specialist    Neuropathy    both feet    Obstructive sleep apnea syndrome    Osteoarthritis    Palpitation    PONV (postoperative nausea and vomiting)    Prediabetes    Primary open angle glaucoma of both eyes    Diagnosis of glaucoma OU; Started Latanoprost qhs after abnormal VF and OCT 2/25/16;  6/5/18 consult with Dr. Madeline Chappell-see progress note    Sleep apnea    no current therapy    Small bowel obstruction (HCC)    Tendinitis    Unspecified essential hypertension    Visual impairment    Readers   [2]   Past Surgical History:  Procedure Laterality Date    Anal sphincterotomy      03/12/2013 [facility]    Blepharoplasty anesthesia Bilateral 03/05/2020    Dr Suresh [facility]     Cataract extraction w/  intraocular lens implant Left 05/07/2018    L PC IOL with Dr. Suresh @ [facility]    Colonoscopy N/A 11/11/2016    Procedure: COLONOSCOPY;  Surgeon: Sabrina Cazares MD;  Location: [facility] ENDOSCOPY    Colonoscopy N/A 09/06/2019    Procedure: COLONOSCOPY;  Surgeon: Edwin Sterling MD;  Location: [facility] ENDOSCOPY    Colonoscopy  09/16/2024    Dr. Sterling; colon polyps, diverticulosis, hemorrhoids    Colonoscopy N/A 9/16/2024    Procedure:  COLONOSCOPY;  Surgeon: Edwin Sterling MD;  Location: [facility] ENDOSCOPY    Egd  09/16/2024    Dr. Sterling; gastric polyps, small hiatal hernia    Excision of chalazion, single - od - right eye Right 6.27/2017    Nasally    Hc arthrocentesis or inject major joint w/o us      Hysterectomy  1996    KAI    Other      Lap Nissen Fundoplication surgery    Other surgical history  2005,2007,2008    LAPAROSCOPIC LYSIS OF ADHESION    Other surgical history      right foot bunionectomy    Other surgical history  11/14/2014    right superficial anterior peroneal nerve biopsy and right proximal thigh muscle biopsy.    Other surgical history  06/13/2023    Excision and Biopsy of two  perineal cysts    Upper gi endoscopy performed  05/2015    Yag capsulotomy - os - left eye Left 12/19/2018    RJM   [3]   Family History  Problem Relation Age of Onset    Hypertension Father     Hypertension Mother     Hypertension Sister     Cancer Sister         liver cancer    Hypertension Brother     Diabetes Neg     Glaucoma Neg     Macular degeneration Neg     Breast Cancer Neg     Ovarian Cancer Neg    [4]   Current Outpatient Medications on File Prior to Visit   Medication Sig Dispense Refill    gabapentin 300 MG Oral Cap Take 1 capsule (300 mg total) by mouth nightly. 90 capsule 1    ibuprofen 600 MG Oral Tab Take 1 tablet (600 mg total) by mouth 2 (two) times daily as needed for Pain. After meals 60 tablet 0    HYDROcodone-acetaminophen 5-325 MG Oral Tab Take 1 tablet by mouth every 6 (six) hours as needed for Pain. No alcohol or driving on this med. Stop if lethargic or hallucinating. 30 tablet 0    polyethylene glycol, PEG 3350, 17 g Oral Powd Pack Take 17 g by mouth daily as needed. 24 each 0    rosuvastatin 10 MG Oral Tab Take 1 tablet (10 mg total) by mouth every evening.      Omeprazole 40 MG Oral Capsule Delayed Release Take 1 capsule (40 mg total) by mouth daily. Take 1 capsule by mouth daily before breakfast. 90 capsule 3    amLODIPine 10  MG Oral Tab TAKE 1 TABLET BY MOUTH EVERY DAY 90 tablet 3    latanoprost 0.005 % Ophthalmic Solution INSTILL 1 DROP IN BOTH EYES EVERY night 3 each 3    cholecalciferol 50 MCG (2000 UT) Oral Cap Take 1 capsule (2,000 Units total) by mouth daily.      MAGNESIUM OR Take 1 tablet by mouth daily.       No current facility-administered medications on file prior to visit.   [5]   Allergies  Allergen Reactions    Celecoxib TONGUE SWELLING     Other reaction(s): CELECOXIB    Sulfa Antibiotics TONGUE SWELLING     Other reaction(s): SULFA (SULFONAMIDE ANTIBIOTICS)    Erythromycin PAIN     Abdominal pain    Erythromycin Stearate PAIN     Abdominal pain    Amoxicillin DIARRHEA

## 2025-04-22 ENCOUNTER — OFFICE VISIT (OUTPATIENT)
Dept: PHYSICAL THERAPY | Age: 65
End: 2025-04-22
Payer: MEDICARE

## 2025-04-22 PROCEDURE — 97110 THERAPEUTIC EXERCISES: CPT | Performed by: PHYSICAL THERAPIST

## 2025-04-22 NOTE — PROGRESS NOTES
Patient: Kerry Hsu (64 year old, female) Referring Provider:  Insurance:   Diagnosis: S/P total knee arthroplasty, right (Z96.651) Jessica Ladd  MEDICARE   Date of Surgery: 3/17/25 Next MD visit:  HUGO MARSHALL INDEMNITY   Precautions:    No data recorded Referral Information:    Date of Evaluation: Req: 0, Auth: 0, Exp:     04/17/25 POC Auth Visits:          Today's Date   4/22/2025    Subjective  Pt has been compliant with HEP       Pain: 1/10     Objective  see table below              Assessment  Pt has + swelling noted on the R knee. + quad tightness R. Good improvement    Goals (to be met in 16 visits)   Patient to be independent with HEP.  Patient to increase R Knee AROM = WFL to allow patient to sit to stand from a chair without discomfort.  Patient to increase B Hip and Knee strength = 4+/5 to allow for prolonged ambulation within the community.   Patient to increase hamstring length by 5 degrees.  Patient to improve score on LEFS by 10% to show improvement in QOL.  Patient to have an overall subjective improvement of 75% or greater.        Plan  cont poc    Treatment Last 4 Visits  Treatment Day: 2       4/17/2025 4/22/2025   LE Treatment   Therapeutic Exercise QS 5 sec hold x15  Prone knee flexion x15  Discuss elevation + ankle pumps with ice NuStep L2 x7min  Hooklying ball squeeze x20  DKTC with RSB 2x10  Prone knee flexion x20  Prone QS x20  Knee flexion stretch at stair 10 sec x10  Calf stretch on incline 30 sec x 3   Manual Therapy  STM R knee for inflammation relief  Patellar mobs all planes  Prone gentle c-r knee flexion     Therapeutic Exercise Minutes 10 40   Evaluation Minutes 30    Total Time Of Timed Procedures 10 40   Total Time Of Service-Based Procedures 30 0   Total Treatment Time 40 40   HEP Access Code: [email or web address]  URL: [email or web address]  Date: 04/17/2025  Prepared by: Martha Birmingham    Exercises  - Long Sitting Quad Set with Towel Roll Under Heel  - 3 x daily - 7 x  weekly - 1 sets - 15 reps - 5 sec hold  - Supine Heel Slide  - 3 x daily - 7 x weekly - 2 sets - 10 reps - 5 seconds hold  - Seated Heel Slide  - 3 x daily - 7 x weekly - 2 sets - 10 reps - 5 seconds hold  - Prone Knee Flexion AROM  - 3 x daily - 7 x weekly - 2 sets - 10 reps         HEP  Access Code: [email or web address]  URL: [email or web address]  Date: 04/17/2025  Prepared by: Martha Birmingham    Exercises  - Long Sitting Quad Set with Towel Roll Under Heel  - 3 x daily - 7 x weekly - 1 sets - 15 reps - 5 sec hold  - Supine Heel Slide  - 3 x daily - 7 x weekly - 2 sets - 10 reps - 5 seconds hold  - Seated Heel Slide  - 3 x daily - 7 x weekly - 2 sets - 10 reps - 5 seconds hold  - Prone Knee Flexion AROM  - 3 x daily - 7 x weekly - 2 sets - 10 reps    Charges     3 TherEx

## 2025-04-23 ENCOUNTER — OFFICE VISIT (OUTPATIENT)
Dept: PHYSICAL THERAPY | Age: 65
End: 2025-04-23
Payer: MEDICARE

## 2025-04-23 DIAGNOSIS — Z47.89 ORTHOPEDIC AFTERCARE: Primary | ICD-10-CM

## 2025-04-23 PROCEDURE — 97140 MANUAL THERAPY 1/> REGIONS: CPT | Performed by: PHYSICAL THERAPIST

## 2025-04-23 PROCEDURE — 97110 THERAPEUTIC EXERCISES: CPT | Performed by: PHYSICAL THERAPIST

## 2025-04-23 RX ORDER — TRAMADOL HYDROCHLORIDE 50 MG/1
50 TABLET ORAL EVERY 6 HOURS PRN
Qty: 20 TABLET | Refills: 0 | Status: SHIPPED | OUTPATIENT
Start: 2025-04-23

## 2025-04-23 NOTE — PROGRESS NOTES
Patient: Kerry Hsu (64 year old, female) Referring Provider:  Insurance:   Diagnosis: S/P total knee arthroplasty, right (Z96.651) Jessica Ladd  MEDICARE   Date of Surgery: 3/17/25 Next MD visit:  HUGO MARSHALL INDEMNITY   Precautions:    No data recorded Referral Information:    Date of Evaluation: Req: 0, Auth: 0, Exp:     04/17/25 POC Auth Visits:          Today's Date   4/23/2025    Subjective  Pt was sore after yesterday's sessio, but woke up okay today.       Pain: 1/10     Objective  see table below; R knee flex:90 deg              Assessment  Pt has improved with R knee flexion by 20 degrees. Still has limited R knee AROM, limited hip/knee strength. Swelling still present.    Goals (to be met in 16 visits)   Patient to be independent with HEP.  Patient to increase R Knee AROM = WFL to allow patient to sit to stand from a chair without discomfort.  Patient to increase B Hip and Knee strength = 4+/5 to allow for prolonged ambulation within the community.   Patient to increase hamstring length by 5 degrees.  Patient to improve score on LEFS by 10% to show improvement in QOL.  Patient to have an overall subjective improvement of 75% or greater.          Plan  cont poc    Treatment Last 4 Visits  Treatment Day: 3       4/17/2025 4/22/2025 4/23/2025   LE Treatment   Therapeutic Exercise QS 5 sec hold x15  Prone knee flexion x15  Discuss elevation + ankle pumps with ice NuStep L2 x7min  Hooklying ball squeeze x20  DKTC with RSB 2x10  Prone knee flexion x20  Prone QS x20  Knee flexion stretch at stair 10 sec x10  Calf stretch on incline 30 sec x 3 NuStep x8min L3  SAQ x20  SL hip abd 2x10  Prone QS 5 sec hold x15  Prone knee flexion 3x10  Prone hip ext x10  Up and down stairs: step to going down,and alternating going up x4      Manual Therapy  STM R knee for inflammation relief  Patellar mobs all planes  Prone gentle c-r knee flexion   STM R knee for inflammation relief  Patellar mobs all planes  Supine passive  knee flexion       Therapeutic Exercise Minutes 10 40 25   Manual Therapy Minutes   15   Evaluation Minutes 30     Total Time Of Timed Procedures 10 40 40   Total Time Of Service-Based Procedures 30 0 0   Total Treatment Time 40 40 40   HEP Access Code: [email or web address]  URL: [email or web address]  Date: 04/17/2025  Prepared by: Martha Birmingham    Exercises  - Long Sitting Quad Set with Towel Roll Under Heel  - 3 x daily - 7 x weekly - 1 sets - 15 reps - 5 sec hold  - Supine Heel Slide  - 3 x daily - 7 x weekly - 2 sets - 10 reps - 5 seconds hold  - Seated Heel Slide  - 3 x daily - 7 x weekly - 2 sets - 10 reps - 5 seconds hold  - Prone Knee Flexion AROM  - 3 x daily - 7 x weekly - 2 sets - 10 reps          HEP  Access Code: [email or web address]  URL: [email or web address]  Date: 04/17/2025  Prepared by: Martha Birmingham    Exercises  - Long Sitting Quad Set with Towel Roll Under Heel  - 3 x daily - 7 x weekly - 1 sets - 15 reps - 5 sec hold  - Supine Heel Slide  - 3 x daily - 7 x weekly - 2 sets - 10 reps - 5 seconds hold  - Seated Heel Slide  - 3 x daily - 7 x weekly - 2 sets - 10 reps - 5 seconds hold  - Prone Knee Flexion AROM  - 3 x daily - 7 x weekly - 2 sets - 10 reps    Charges     2 TherEx, 1 Manual

## 2025-04-24 ENCOUNTER — APPOINTMENT (OUTPATIENT)
Dept: PHYSICAL THERAPY | Age: 65
End: 2025-04-24
Payer: MEDICARE

## 2025-04-28 DIAGNOSIS — I10 ESSENTIAL HYPERTENSION: ICD-10-CM

## 2025-04-28 RX ORDER — OMEPRAZOLE 40 MG/1
40 CAPSULE, DELAYED RELEASE ORAL DAILY
Qty: 90 CAPSULE | Refills: 3 | Status: SHIPPED | OUTPATIENT
Start: 2025-04-28

## 2025-04-28 NOTE — TELEPHONE ENCOUNTER
Requested Prescriptions     Pending Prescriptions Disp Refills    OMEPRAZOLE 40 MG Oral Capsule Delayed Release [Pharmacy Med Name: OMEPRAZOLE 40MG CAPSULES] 90 capsule 3     Sig: TAKE 1 CAPSULE(40 MG) BY MOUTH DAILY BEFORE BREAKFAST     LOV: 06/12/2024  LR: 06/12/2024  Last Colonoscopy: 09/16/2024  gb

## 2025-04-29 ENCOUNTER — OFFICE VISIT (OUTPATIENT)
Dept: PHYSICAL THERAPY | Age: 65
End: 2025-04-29
Payer: MEDICARE

## 2025-04-29 PROCEDURE — 97110 THERAPEUTIC EXERCISES: CPT | Performed by: PHYSICAL THERAPIST

## 2025-04-29 NOTE — PROGRESS NOTES
Patient: Kerry Hsu (64 year old, female) Referring Provider:  Insurance:   Diagnosis: S/P total knee arthroplasty, right (Z96.651) Jessica Ladd  MEDICARE   Date of Surgery: 3/17/25 Next MD visit:  [employer] MARSHALL INDEMNITY   Precautions:    No data recorded Referral Information:    Date of Evaluation: Req: 0, Auth: 0, Exp:     04/17/25 POC Auth Visits:          Today's Date   4/29/2025    Subjective  Pt having more pain over the past few days. Has not taken any pain medication. Pt is now amb without an AD.       Pain: 8/10     Objective  R knee AROM Ext: +8              Assessment  Educated patient on benefits of staying ahead of the pain, and to take medication as instructed by physician if she feels comfortable. Added additional exercises to HEP. Pt felt better post session    Goals (to be met in 16 visits)   Patient to be independent with HEP.  Patient to increase R Knee AROM = WFL to allow patient to sit to stand from a chair without discomfort.  Patient to increase B Hip and Knee strength = 4+/5 to allow for prolonged ambulation within the community.   Patient to increase hamstring length by 5 degrees.  Patient to improve score on LEFS by 10% to show improvement in QOL.  Patient to have an overall subjective improvement of 75% or greater.            Plan  cont poc    Treatment Last 4 Visits  Treatment Day: 4       4/17/2025 4/22/2025 4/23/2025 4/29/2025   LE Treatment   Therapeutic Exercise QS 5 sec hold x15  Prone knee flexion x15  Discuss elevation + ankle pumps with ice NuStep L2 x7min  Hooklying ball squeeze x20  DKTC with RSB 2x10  Prone knee flexion x20  Prone QS x20  Knee flexion stretch at stair 10 sec x10  Calf stretch on incline 30 sec x 3 NuStep x8min L3  SAQ x20  SL hip abd 2x10  Prone QS 5 sec hold x15  Prone knee flexion 3x10  Prone hip ext x10  Up and down stairs: step to going down,and alternating going up x4    NuStep x8min  Brief roller to R quad for pain and tightness  Patellar mobs all  planes  STM to decrease pain on the R knee  QS with towel roll under ankle x10  DKTC with RSB 5 sec hold x15  LTR x12B  Supine HSS with strap 30 sec x 4  Standing Hip abd 3x10  Standing Hip ext 3x10     Manual Therapy  STM R knee for inflammation relief  Patellar mobs all planes  Prone gentle c-r knee flexion   STM R knee for inflammation relief  Patellar mobs all planes  Supine passive knee flexion        Therapeutic Exercise Minutes 10 40 25 45   Manual Therapy Minutes   15    Evaluation Minutes 30      Total Time Of Timed Procedures 10 40 40 43   Total Time Of Service-Based Procedures 30 0 0 0   Total Treatment Time 40 40 40 43   HEP Access Code: [email or web address]  URL: [email or web address]  Date: 04/17/2025  Prepared by: Martha Birmingham    Exercises  - Long Sitting Quad Set with Towel Roll Under Heel  - 3 x daily - 7 x weekly - 1 sets - 15 reps - 5 sec hold  - Supine Heel Slide  - 3 x daily - 7 x weekly - 2 sets - 10 reps - 5 seconds hold  - Seated Heel Slide  - 3 x daily - 7 x weekly - 2 sets - 10 reps - 5 seconds hold  - Prone Knee Flexion AROM  - 3 x daily - 7 x weekly - 2 sets - 10 reps   Access Code: [email or web address]  URL: [email or web address]  Date: 04/29/2025  Prepared by: Martha Birmingham    Exercises  - Seated Long Arc Quad  - 1 x daily - 7 x weekly - 3 sets - 10 reps - hold 5 seconds hold  - Seated March  - 1 x daily - 7 x weekly - 3 sets - 10 reps  - Standing Hip Abduction with Counter Support  - 1 x daily - 7 x weekly - 3 sets - 10 reps  - Standing Hip Extension with Counter Support  - 1 x daily - 7 x weekly - 3 sets - 10 reps  - Supine Bridge  - 1 x daily - 7 x weekly - 2 sets - 10 reps  - Sit to Stand with Armchair  - 1 x daily - 7 x weekly - 1 sets - 10 reps        HEP  Access Code: [email or web address]  URL: [email or web address]  Date: 04/29/2025  Prepared by: Martha Alreja    Exercises  - Seated Long Arc Quad  - 1 x daily - 7 x weekly - 3 sets - 10 reps - hold 5 seconds  hold  - Seated March  - 1 x daily - 7 x weekly - 3 sets - 10 reps  - Standing Hip Abduction with Counter Support  - 1 x daily - 7 x weekly - 3 sets - 10 reps  - Standing Hip Extension with Counter Support  - 1 x daily - 7 x weekly - 3 sets - 10 reps  - Supine Bridge  - 1 x daily - 7 x weekly - 2 sets - 10 reps  - Sit to Stand with Armchair  - 1 x daily - 7 x weekly - 1 sets - 10 reps    Charges     3 TherEx

## 2025-04-30 NOTE — TELEPHONE ENCOUNTER
Refill Per Protocol     Requested Prescriptions   Pending Prescriptions Disp Refills    AMLODIPINE 10 MG Oral Tab [Pharmacy Med Name: AMLODIPINE BESYLATE 10MG TABLETS] 90 tablet 3     Sig: TAKE 1 TABLET BY MOUTH EVERY DAY       Hypertension Medications Protocol Passed - 4/30/2025  3:39 PM        Passed - CMP or BMP in past 12 months        Passed - Last BP reading less than 140/90     BP Readings from Last 1 Encounters:   04/18/25 130/72               Passed - In person appointment or virtual visit in the past 12 mos or appointment in next 3 mos     Recent Outpatient Visits              Yesterday     [facility] in [place] Martha Birmingham, PT    Office Visit    1 week ago     [facility] in [place] AlreMartha chaves, PT    Office Visit    1 week ago     [facility] in [place] AlrejaMartha, PT    Office Visit    1 week ago KB (obstructive sleep apnea)    [facility] [address], [place] Tricia Eli DO    Office Visit    1 week ago S/P total knee arthroplasty, right    [facility] in [place] Martha Birmingham, PT    Office Visit          Future Appointments         Provider Department Appt Notes    In 2 days Sherita Johnson, PT [facility] in [place] Medicare/Suppl    In 6 days Sherita Johnson, PT [facility] in [place] Medicare/Suppl    In 1 week Martha Birmingham, PT [facility] in [place] Medicare/Suppl    In 1 week Martha Birmingham, PT [facility] in [place] Medicare/Suppl    In 2 weeks Martha Birmingham, PT [facility] in [place] Medicare/Suppl    In 2 weeks Martha Birmingham, PT [facility] in [place] Medicare/Suppl    In 3 weeks Martha Birmingham, PT [facility] in [place] Medicare/Suppl    In 3 weeks Martha Birmingham, PT [facility] in [place] Medicare/Suppl    In 1 month Tricia Eli DO [facility], [address], [place] 2 mo f/u    In 2 months  Bella Rogers MD [facility] Annual physical ( last px 6/10/2024)    In 5 months [facility] DEXA RM1; CYRIL SOTO RM1 [facility] - [place]     In 10 months Srinivasan Mendoza DO [facility], [address], [place] - OB/GYN Annual physical                    Passed - EGFRCR or GFRAA > 50     GFR Evaluation  EGFRCR: 63 , resulted on 4/1/2025          Passed - Medication is active on med list

## 2025-04-30 NOTE — TELEPHONE ENCOUNTER
Please review pended refill request as unable to refill due to high/very high interaction warning copied here:    High  Allergy/Contraindication: amLODIPineReactions: PAIN. No reaction type specified. User documented allergy severity: Medium.  Level 1 with ERYTHROMYCIN STEARATE (Ingredient: CORN STARCH).  \"Abdominal pain\"  Details

## 2025-05-01 RX ORDER — AMLODIPINE BESYLATE 10 MG/1
10 TABLET ORAL DAILY
Qty: 90 TABLET | Refills: 3 | Status: SHIPPED | OUTPATIENT
Start: 2025-05-01

## 2025-05-02 ENCOUNTER — OFFICE VISIT (OUTPATIENT)
Dept: PHYSICAL THERAPY | Age: 65
End: 2025-05-02
Payer: MEDICARE

## 2025-05-02 DIAGNOSIS — Z96.651 S/P TOTAL KNEE ARTHROPLASTY, RIGHT: Primary | ICD-10-CM

## 2025-05-02 PROCEDURE — 97110 THERAPEUTIC EXERCISES: CPT | Performed by: PHYSICAL THERAPIST

## 2025-05-02 NOTE — PROGRESS NOTES
Patient: Kerry Hsu (64 year old, female) Referring Provider:  Insurance:   Diagnosis: S/P total knee arthroplasty, right (Z96.651) Jessica Ladd  MEDICARE   Date of Surgery: 3/17/25 Next MD visit:  [facility] [employer]   Precautions:    No data recorded Referral Information:    Date of Evaluation: Req: 0, Auth: 0, Exp:     04/17/25 POC Auth Visits:  10       Today's Date   5/2/2025    Subjective  Pt reports \"the swelling has improved; however, the knee still feels stiff, I see the surgeon in 5 weeks.\"       Pain: 8/10     Objective  See table below.           Assessment  Pt responded well to therapeutic interventions with pain at EROM of knee flexion. Continued with quad strengthening and stretching for return to ADLs with minimal to no pain.    Goals (to be met in 16 visits)   Patient to be independent with HEP.  Patient to increase R Knee AROM = WFL to allow patient to sit to stand from a chair without discomfort.  Patient to increase B Hip and Knee strength = 4+/5 to allow for prolonged ambulation within the community.   Patient to increase hamstring length by 5 degrees.  Patient to improve score on LEFS by 10% to show improvement in QOL.  Patient to have an overall subjective improvement of 75% or greater.              Plan  Progress skilled PT as tolerated to help improve knee ROM and quad strength to help normalize gait pattern.    Treatment Last 4 Visits  Treatment Day: 5       4/22/2025 4/23/2025 4/29/2025 5/2/2025   LE Treatment   Therapeutic Exercise NuStep L2 x7min  Hooklying ball squeeze x20  DKTC with RSB 2x10  Prone knee flexion x20  Prone QS x20  Knee flexion stretch at stair 10 sec x10  Calf stretch on incline 30 sec x 3 NuStep x8min L3  SAQ x20  SL hip abd 2x10  Prone QS 5 sec hold x15  Prone knee flexion 3x10  Prone hip ext x10  Up and down stairs: step to going down,and alternating going up x4    NuStep x8min  Brief roller to R quad for pain and tightness  Patellar mobs all planes  STM to  decrease pain on the R knee  QS with towel roll under ankle x10  DKTC with RSB 5 sec hold x15  LTR x12B  Supine HSS with strap 30 sec x 4  Standing Hip abd 3x10  Standing Hip ext 3x10   NuStep x8min L4  Patellar mobs all planes  STM to decrease pain on the R knee  QS with towel roll under ankle x10  DKTC with RSB 5 sec hold x15  LTR x12B  Supine HSS with strap 30 sec x 4  Standing Hip abd 3x10  Standing Hip ext 3x10     Manual Therapy STM R knee for inflammation relief  Patellar mobs all planes  Prone gentle c-r knee flexion   STM R knee for inflammation relief  Patellar mobs all planes  Supine passive knee flexion         Therapeutic Exercise Minutes 40 25 45 40   Manual Therapy Minutes  15     Total Time Of Timed Procedures 40 40 43 40   Total Time Of Service-Based Procedures 0 0 0 0   Total Treatment Time 40 40 43 40   HEP   Access Code: [email or web address]  URL: [email or web address]/  Date: 04/29/2025  Prepared by: Martha Birmingham    Exercises  - Seated Long Arc Quad  - 1 x daily - 7 x weekly - 3 sets - 10 reps - hold 5 seconds hold  - Seated March  - 1 x daily - 7 x weekly - 3 sets - 10 reps  - Standing Hip Abduction with Counter Support  - 1 x daily - 7 x weekly - 3 sets - 10 reps  - Standing Hip Extension with Counter Support  - 1 x daily - 7 x weekly - 3 sets - 10 reps  - Supine Bridge  - 1 x daily - 7 x weekly - 2 sets - 10 reps  - Sit to Stand with Armchair  - 1 x daily - 7 x weekly - 1 sets - 10 reps         HEP  Access Code: [email or web address]  URL: [email or web address]/  Date: 04/29/2025  Prepared by: Martha Alreja    Exercises  - Seated Long Arc Quad  - 1 x daily - 7 x weekly - 3 sets - 10 reps - hold 5 seconds hold  - Seated March  - 1 x daily - 7 x weekly - 3 sets - 10 reps  - Standing Hip Abduction with Counter Support  - 1 x daily - 7 x weekly - 3 sets - 10 reps  - Standing Hip Extension with Counter Support  - 1 x daily - 7 x weekly - 3 sets - 10 reps  - Supine Bridge  - 1 x daily - 7  x weekly - 2 sets - 10 reps  - Sit to Stand with Armchair  - 1 x daily - 7 x weekly - 1 sets - 10 reps    Charges     Therex: x3

## 2025-05-06 ENCOUNTER — OFFICE VISIT (OUTPATIENT)
Dept: PHYSICAL THERAPY | Age: 65
End: 2025-05-06
Payer: MEDICARE

## 2025-05-06 DIAGNOSIS — Z96.651 S/P TOTAL KNEE ARTHROPLASTY, RIGHT: Primary | ICD-10-CM

## 2025-05-06 PROCEDURE — 97110 THERAPEUTIC EXERCISES: CPT | Performed by: PHYSICAL THERAPIST

## 2025-05-06 NOTE — PROGRESS NOTES
Patient: Kerry Hsu (64 year old, female) Referring Provider:  Insurance:   Diagnosis: S/P total knee arthroplasty, right (Z96.651) Jessica Ladd  MEDICARE   Date of Surgery: 3/17/25 Next MD visit:  [facility] [facility] [employer]   Precautions:    No data recorded Referral Information:    Date of Evaluation: Req: 0, Auth: 0, Exp:     04/17/25 POC Auth Visits:  10       Today's Date   5/6/2025    Subjective  Pt reports \"the incision is still painful and it is still sensitive; however, the stairs are easier going up but going down I am doing one step at a time.\"       Pain: 2/10     Objective  See table below.AROM: 0-105 degrees         Assessment  Pt responded well to therapeutic interventions with pain at EROM of knee flexion. Continued with quad strengthening and stretching for return to ADLs with minimal to no pain.    Goals (to be met in 16 visits)   Patient to be independent with HEP.  Patient to increase R Knee AROM = WFL to allow patient to sit to stand from a chair without discomfort.  Patient to increase B Hip and Knee strength = 4+/5 to allow for prolonged ambulation within the community.   Patient to increase hamstring length by 5 degrees.  Patient to improve score on LEFS by 10% to show improvement in QOL.  Patient to have an overall subjective improvement of 75% or greater.                Plan  Progress skilled PT as tolerated to help improve knee ROM and quad strength to help normalize gait pattern.    Treatment Last 4 Visits  Treatment Day: 6       4/23/2025 4/29/2025 5/2/2025 5/6/2025   LE Treatment   Therapeutic Exercise NuStep x8min L3  SAQ x20  SL hip abd 2x10  Prone QS 5 sec hold x15  Prone knee flexion 3x10  Prone hip ext x10  Up and down stairs: step to going down,and alternating going up x4    NuStep x8min  Brief roller to R quad for pain and tightness  Patellar mobs all planes  STM to decrease pain on the R knee  QS with towel roll under ankle x10  DKTC with RSB 5 sec hold x15  LTR x12B  Supine HSS  with strap 30 sec x 4  Standing Hip abd 3x10  Standing Hip ext 3x10   NuStep x8min L4  Patellar mobs all planes  STM to decrease pain on the R knee  QS with towel roll under ankle x10  DKTC with RSB 5 sec hold x15  LTR x12B  Supine HSS with strap 30 sec x 4  Standing Hip abd 3x10  Standing Hip ext 3x10   NuStep x8min L3  Patellar mobs all planes  STM to decrease pain on the R knee  SLR: x15 with quad set  SKTC with RSB 5 sec hold x15  Supine HSS with strap 30 sec x 4  Standing Hip abd 3x10  Standing Hip ext 3x10  B Shuttle: x15 5 cords  SL Shuttle: x15 3 cords   Manual Therapy STM R knee for inflammation relief  Patellar mobs all planes  Supine passive knee flexion          Therapeutic Exercise Minutes 25 45 40 40   Manual Therapy Minutes 15      Total Time Of Timed Procedures 40 43 40 40   Total Time Of Service-Based Procedures 0 0 0 0   Total Treatment Time 40 43 40 40   HEP  Access Code: [email or web address]  URL: [email or web address]  Date: 04/29/2025  Prepared by: Martha Birmingham    Exercises  - Seated Long Arc Quad  - 1 x daily - 7 x weekly - 3 sets - 10 reps - hold 5 seconds hold  - Seated March  - 1 x daily - 7 x weekly - 3 sets - 10 reps  - Standing Hip Abduction with Counter Support  - 1 x daily - 7 x weekly - 3 sets - 10 reps  - Standing Hip Extension with Counter Support  - 1 x daily - 7 x weekly - 3 sets - 10 reps  - Supine Bridge  - 1 x daily - 7 x weekly - 2 sets - 10 reps  - Sit to Stand with Armchair  - 1 x daily - 7 x weekly - 1 sets - 10 reps          HEP  Access Code: [email or web address]  URL: [email or web address]  Date: 04/29/2025  Prepared by: Martha Alreja    Exercises  - Seated Long Arc Quad  - 1 x daily - 7 x weekly - 3 sets - 10 reps - hold 5 seconds hold  - Seated March  - 1 x daily - 7 x weekly - 3 sets - 10 reps  - Standing Hip Abduction with Counter Support  - 1 x daily - 7 x weekly - 3 sets - 10 reps  - Standing Hip Extension with Counter Support  - 1 x daily - 7 x weekly  - 3 sets - 10 reps  - Supine Bridge  - 1 x daily - 7 x weekly - 2 sets - 10 reps  - Sit to Stand with Armchair  - 1 x daily - 7 x weekly - 1 sets - 10 reps    Charges        Therex: x3

## 2025-05-08 ENCOUNTER — OFFICE VISIT (OUTPATIENT)
Dept: PHYSICAL THERAPY | Age: 65
End: 2025-05-08
Payer: MEDICARE

## 2025-05-08 PROCEDURE — 97110 THERAPEUTIC EXERCISES: CPT | Performed by: PHYSICAL THERAPIST

## 2025-05-08 PROCEDURE — 97140 MANUAL THERAPY 1/> REGIONS: CPT | Performed by: PHYSICAL THERAPIST

## 2025-05-08 NOTE — PROGRESS NOTES
Patient: Kerry Hsu (64 year old, female) Referring Provider:  Insurance:   Diagnosis: S/P total knee arthroplasty, right (Z96.651) Jessica Ladd  MEDICARE   Date of Surgery: 3/17/25 Next MD visit:  [facility] [facility] [employer]   Precautions:    No data recorded Referral Information:    Date of Evaluation: Req: 0, Auth: 0, Exp:     04/17/25 POC Auth Visits:  10       Today's Date   5/8/2025    Subjective  Patient states stairs are going well. She is trying to descend stairs with a recipricol pattern. Pt still feels sore around the thigh, and knee. The tenderness aorund the incision is still present.       Pain: 2/10     Objective  see table below            Assessment  Encouraged patient to do prone knee flexion at home to increase quad flexibility. Pt had less discomfort post sessiont sulaiman    Goals (to be met in 16 visits)   Patient to be independent with HEP.  Patient to increase R Knee AROM = WFL to allow patient to sit to stand from a chair without discomfort.  Patient to increase B Hip and Knee strength = 4+/5 to allow for prolonged ambulation within the community.   Patient to increase hamstring length by 5 degrees.  Patient to improve score on LEFS by 10% to show improvement in QOL.  Patient to have an overall subjective improvement of 75% or greater.              Plan  Progress skilled PT as tolerated to help improve knee ROM and quad strength to help normalize gait pattern.    Treatment Last 4 Visits  Treatment Day: 7       4/29/2025 5/2/2025 5/6/2025 5/8/2025   LE Treatment   Therapeutic Exercise NuStep x8min  Brief roller to R quad for pain and tightness  Patellar mobs all planes  STM to decrease pain on the R knee  QS with towel roll under ankle x10  DKTC with RSB 5 sec hold x15  LTR x12B  Supine HSS with strap 30 sec x 4  Standing Hip abd 3x10  Standing Hip ext 3x10   NuStep x8min L4  Patellar mobs all planes  STM to decrease pain on the R knee  QS with towel roll under ankle x10  DKTC with RSB 5 sec hold  x15  LTR x12B  Supine HSS with strap 30 sec x 4  Standing Hip abd 3x10  Standing Hip ext 3x10   NuStep x8min L3  Patellar mobs all planes  STM to decrease pain on the R knee  SLR: x15 with quad set  SKTC with RSB 5 sec hold x15  Supine HSS with strap 30 sec x 4  Standing Hip abd 3x10  Standing Hip ext 3x10  B Shuttle: x15 5 cords  SL Shuttle: x15 3 cords NuStep x7min L3 LE only  Prone HS curl 3x10  Prone Hip ext 2x10B  LAQ 2# 2x10  Knee flexion stretch at the stair 20 sec x 5     Manual Therapy    Patellar mobs all planes  STM R knee for pain relief  STM R gastroc to increase flexibility   Therapeutic Exercise Minutes 45 40 40 25   Manual Therapy Minutes    15   Total Time Of Timed Procedures 43 40 40 40   Total Time Of Service-Based Procedures 0 0 0 0   Total Treatment Time 43 40 40 40   HEP Access Code: [email or web address]  URL: [email or web address]  Date: 04/29/2025  Prepared by: Martha Birmingham    Exercises  - Seated Long Arc Quad  - 1 x daily - 7 x weekly - 3 sets - 10 reps - hold 5 seconds hold  - Seated March  - 1 x daily - 7 x weekly - 3 sets - 10 reps  - Standing Hip Abduction with Counter Support  - 1 x daily - 7 x weekly - 3 sets - 10 reps  - Standing Hip Extension with Counter Support  - 1 x daily - 7 x weekly - 3 sets - 10 reps  - Supine Bridge  - 1 x daily - 7 x weekly - 2 sets - 10 reps  - Sit to Stand with Armchair  - 1 x daily - 7 x weekly - 1 sets - 10 reps           HEP  Access Code: [email or web address]  URL: [email or web address]  Date: 04/29/2025  Prepared by: Martha Alreja    Exercises  - Seated Long Arc Quad  - 1 x daily - 7 x weekly - 3 sets - 10 reps - hold 5 seconds hold  - Seated March  - 1 x daily - 7 x weekly - 3 sets - 10 reps  - Standing Hip Abduction with Counter Support  - 1 x daily - 7 x weekly - 3 sets - 10 reps  - Standing Hip Extension with Counter Support  - 1 x daily - 7 x weekly - 3 sets - 10 reps  - Supine Bridge  - 1 x daily - 7 x weekly - 2 sets - 10 reps  -  Sit to Stand with Armchair  - 1 x daily - 7 x weekly - 1 sets - 10 reps    Charges     1Manual, 2 TherEx

## 2025-05-13 ENCOUNTER — OFFICE VISIT (OUTPATIENT)
Dept: PHYSICAL THERAPY | Age: 65
End: 2025-05-13
Payer: MEDICARE

## 2025-05-13 PROCEDURE — 97110 THERAPEUTIC EXERCISES: CPT | Performed by: PHYSICAL THERAPIST

## 2025-05-13 NOTE — PROGRESS NOTES
Patient: Kerry Hsu (64 year old, female) Referring Provider:  Insurance:   Diagnosis: S/P total knee arthroplasty, right (Z96.651) Jessica Ladd  MEDICARE   Date of Surgery: 3/17/25 Next MD visit:  [facility] [employer]MN[employer]   Precautions:    No data recorded Referral Information:    Date of Evaluation: Req: 0, Auth: 0, Exp:     04/17/25 POC Auth Visits:  10       Today's Date   5/13/2025    Subjective  Pt a stiff this morning like most mornings. Pt feels like she has sharp pain everyday, and is tender to touch.       Pain: 3/10     Objective  see table below; R knee AROM: 9-100 deg              Assessment  Encouraged patient to continue with knee flexion exercises at home, including prone lying. Pt has nerve sensitivity which could be hindering progress.    Goals (to be met in 16 visits)   Patient to be independent with HEP.  Patient to increase R Knee AROM = WFL to allow patient to sit to stand from a chair without discomfort.  Patient to increase B Hip and Knee strength = 4+/5 to allow for prolonged ambulation within the community.   Patient to increase hamstring length by 5 degrees.  Patient to improve score on LEFS by 10% to show improvement in QOL.  Patient to have an overall subjective improvement of 75% or greater.                Plan  Progress skilled PT as tolerated to help improve knee ROM and quad strength to help normalize gait pattern.    Treatment Last 4 Visits  Treatment Day: 8       5/2/2025 5/6/2025 5/8/2025 5/13/2025   LE Treatment   Therapeutic Exercise NuStep x8min L4  Patellar mobs all planes  STM to decrease pain on the R knee  QS with towel roll under ankle x10  DKTC with RSB 5 sec hold x15  LTR x12B  Supine HSS with strap 30 sec x 4  Standing Hip abd 3x10  Standing Hip ext 3x10   NuStep x8min L3  Patellar mobs all planes  STM to decrease pain on the R knee  SLR: x15 with quad set  SKTC with RSB 5 sec hold x15  Supine HSS with strap 30 sec x 4  Standing Hip abd 3x10  Standing Hip ext 3x10  B  Shuttle: x15 5 cords  SL Shuttle: x15 3 cords NuStep x7min L3 LE only  Prone HS curl 3x10  Prone Hip ext 2x10B  LAQ 2# 2x10  Knee flexion stretch at the stair 20 sec x 5   NuStep x7min  Bridge 3x5-with flexing R knee into more flexion after each set  DKTC with RSB x20  Brief STM R knee to decrease swelling, decrease sensitivity  Prone passive Rknee flexion x1min  Prone knee flexion with self op with opposite foot 10 sec hold x15  Sit to stand with UE 2x10-cueing to avoid kicking out R LE.   Knee flexion stretch at stair 10 sec 2x 10  4 in step up with opp hip flex  4 in step down x15 R       Manual Therapy   Patellar mobs all planes  STM R knee for pain relief  STM R gastroc to increase flexibility    Therapeutic Exercise Minutes 40 40 25 40   Manual Therapy Minutes   15    Total Time Of Timed Procedures 40 40 40 40   Total Time Of Service-Based Procedures 0 0 0 0   Total Treatment Time 40 40 40 40   HEP    Access Code: [email or web address]  URL: [email or web address]  Date: 05/13/2025  Prepared by: Martha Birmingham    Exercises  - Prone Knee Flexion AAROM with Overpressure  - 1 x daily - 7 x weekly - 2 sets - 10 reps - 5 seconds hold  - Standing Knee Flexion Stretch on Step  - 1 x daily - 7 x weekly - 2 sets - 10 reps - 5 sec hold  - Sit to Stand with Armchair  - 1 x daily - 7 x weekly - 1 sets - 10 reps        HEP  Access Code: [email or web address]  URL: [email or web address]  Date: 05/13/2025  Prepared by: Martha Alreandie    Exercises  - Prone Knee Flexion AAROM with Overpressure  - 1 x daily - 7 x weekly - 2 sets - 10 reps - 5 seconds hold  - Standing Knee Flexion Stretch on Step  - 1 x daily - 7 x weekly - 2 sets - 10 reps - 5 sec hold  - Sit to Stand with Armchair  - 1 x daily - 7 x weekly - 1 sets - 10 reps    Charges     3 TherEx

## 2025-05-15 ENCOUNTER — APPOINTMENT (OUTPATIENT)
Dept: PHYSICAL THERAPY | Age: 65
End: 2025-05-15
Payer: MEDICARE

## 2025-05-16 ENCOUNTER — OFFICE VISIT (OUTPATIENT)
Dept: PHYSICAL THERAPY | Age: 65
End: 2025-05-16
Payer: MEDICARE

## 2025-05-16 DIAGNOSIS — Z96.651 S/P TOTAL KNEE ARTHROPLASTY, RIGHT: Primary | ICD-10-CM

## 2025-05-16 PROCEDURE — 97110 THERAPEUTIC EXERCISES: CPT | Performed by: PHYSICAL THERAPIST

## 2025-05-16 NOTE — PROGRESS NOTES
Patient: Kerry Hsu (64 year old, female) Referring Provider:  Insurance:   Diagnosis: S/P total knee arthroplasty, right (Z96.651) Jessica Ladd  MEDICARE   Date of Surgery: 3/17/25 Next MD visit:  [facility] [employer]MN[facility]   Precautions:    No data recorded Referral Information:    Date of Evaluation: Req: 0, Auth: 0, Exp:     04/17/25 POC Auth Visits:  10       Today's Date   5/16/2025    Subjective  Pt reports \"the knee is stiff this morning, I continue to feel the sharp everyday in the front and sides of the knee but I am going up and down the stairs step over step.\"       Pain: 4/10     Objective  See table below. R knee AROM:-1-110       Assessment  Pt responded well to therapeutic interventions; however, continues to have pain with EROM with knee flexion. Improved knee flexion noted today. Continued with step-ups and added lateral step downs to help with quad strengthening for return to ADLs with minimal to no pain.    Goals (to be met in 16 visits)   Patient to be independent with HEP.  Patient to increase R Knee AROM = WFL to allow patient to sit to stand from a chair without discomfort.  Patient to increase B Hip and Knee strength = 4+/5 to allow for prolonged ambulation within the community.   Patient to increase hamstring length by 5 degrees.  Patient to improve score on LEFS by 10% to show improvement in QOL.  Patient to have an overall subjective improvement of 75% or greater.                  Plan  Progress skilled PT as tolerated to help improve knee ROM and quad strength to help normalize gait pattern.    Treatment Last 4 Visits  Treatment Day: 9 5/6/2025 5/8/2025 5/13/2025 5/16/2025   LE Treatment   Therapeutic Exercise NuStep x8min L3  Patellar mobs all planes  STM to decrease pain on the R knee  SLR: x15 with quad set  SKTC with RSB 5 sec hold x15  Supine HSS with strap 30 sec x 4  Standing Hip abd 3x10  Standing Hip ext 3x10  B Shuttle: x15 5 cords  SL Shuttle: x15 3 cords NuStep x7min L3  LE only  Prone HS curl 3x10  Prone Hip ext 2x10B  LAQ 2# 2x10  Knee flexion stretch at the stair 20 sec x 5   NuStep x7min  Bridge 3x5-with flexing R knee into more flexion after each set  DKTC with RSB x20  Brief STM R knee to decrease swelling, decrease sensitivity  Prone passive Rknee flexion x1min  Prone knee flexion with self op with opposite foot 10 sec hold x15  Sit to stand with UE 2x10-cueing to avoid kicking out R LE.   Knee flexion stretch at stair 10 sec 2x 10  4 in step up with opp hip flex  4 in step down x15 R     NuStep x6 min L5  Bridge 3x5-with flexing R knee into more flexion after each set  DKTC with RSB x20  Brief STM R knee to decrease swelling, decrease sensitivity  Prone passive Rknee flexion x1min  Prone knee flexion with self op with opposite foot 10 sec hold x15  Sit to stand with UE 2x10-cueing to avoid kicking out R LE.   Knee flexion stretch at stair 10 sec 2x 10  4 in step up with opp hip flex  4 in step down x15 R  4 in lateral step down x15 R   Manual Therapy  Patellar mobs all planes  STM R knee for pain relief  STM R gastroc to increase flexibility     Therapeutic Exercise Minutes 40 25 40 40   Manual Therapy Minutes  15     Total Time Of Timed Procedures 40 40 40 40   Total Time Of Service-Based Procedures 0 0 0 0   Total Treatment Time 40 40 40 40   HEP   Access Code: [email or web address]  URL: [email or web address]/  Date: 05/13/2025  Prepared by: Martha Birmingham    Exercises  - Prone Knee Flexion AAROM with Overpressure  - 1 x daily - 7 x weekly - 2 sets - 10 reps - 5 seconds hold  - Standing Knee Flexion Stretch on Step  - 1 x daily - 7 x weekly - 2 sets - 10 reps - 5 sec hold  - Sit to Stand with Armchair  - 1 x daily - 7 x weekly - 1 sets - 10 reps         HEP  Access Code: [email or web address]  URL: https://[employer]/  Date: 05/13/2025  Prepared by: Martha Birmingham    Exercises  - Prone Knee Flexion AAROM with Overpressure  - 1 x daily - 7 x weekly - 2 sets - 10  reps - 5 seconds hold  - Standing Knee Flexion Stretch on Step  - 1 x daily - 7 x weekly - 2 sets - 10 reps - 5 sec hold  - Sit to Stand with Armchair  - 1 x daily - 7 x weekly - 1 sets - 10 reps    Charges      Therex: x3

## 2025-05-20 ENCOUNTER — OFFICE VISIT (OUTPATIENT)
Dept: PHYSICAL THERAPY | Age: 65
End: 2025-05-20
Payer: MEDICARE

## 2025-05-20 PROCEDURE — 97110 THERAPEUTIC EXERCISES: CPT | Performed by: PHYSICAL THERAPIST

## 2025-05-20 NOTE — PROGRESS NOTES
Patient: Kerry Hsu (64 year old, female) Referring Provider:  Insurance:   Diagnosis: S/P total knee arthroplasty, right (Z96.651) Jessica Ladd  MEDICARE   Date of Surgery: 3/17/25 Next MD visit:  [facility] [employer]   Precautions:    No data recorded Referral Information:    Date of Evaluation: Req: 0, Auth: 0, Exp:     04/17/25 POC Auth Visits:  10        Progress Summary  Pt has attended 10 visits in Physical Therapy.      Today's Date   5/20/2025    Subjective  Pt reports that the cover still hurts in bed. The sensitivity isn't getting better. Still getting shooting pain around the R knee joint. Pt is now amb without an AD. She is also able to ascend descend stairs with a recipricol pattern. Overall she feels 60% better.       Pain: 3/10     Objective  See table below. R knee AROM: 4-98deg       Hip       4/17/2025 5/20/2025   Hip ROM/MMT   Rt Hip Flexion MMT (L2) 3- 3+   Lt Hip Flexion MMT (L2) 4 4   Rt Hip Extension MMT 3- 3+   Lt Hip Extension MMT 4 4   Rt Hip Abduction MMT 3+ 3+   Lt Hip Abduction MMT 4 4   Rt Hip ER MMT  3+   Lt Hip ER MMT  4   Rt Hip IR MMT  3+   Lt Hip IR MMT  4   , Knee       4/17/2025 5/20/2025   Knee ROM/MMT   Rt Knee Flexion 70 deg 98 deg   Lt Knee Flexion 0    Rt Knee Flexion MMT 2 3   Rt Knee Extension (L3) 10 deg 4 deg   Lt Knee Extension (L3) 124    Rt Knee Extension MMT (L3) 2 3          Assessment  Pt has limited inferior scar mobility. Encouraged patient to continue to address scar. Patient has a firm end feel for passive range of motion of the R knee. She is apprehensive to do flexion exercises at home due to shooting pain in the knee. She has some concern it could be nerve related. She will call physician office today to discuss pain management options. She has improved with strength and rom.    Goals (to be met in 16 visits)   Patient to be independent with HEP.  Patient to increase R Knee AROM = WFL to allow patient to sit to stand from a chair without  discomfort.  Patient to increase B Hip and Knee strength = 4+/5 to allow for prolonged ambulation within the community.   Patient to increase hamstring length by 5 degrees.  Patient to improve score on LEFS by 10% to show improvement in QOL.  Patient to have an overall subjective improvement of 75% or greater.                  Plan  Progress skilled PT as tolerated to help improve knee ROM and quad strength to help normalize gait pattern. Pt to follow up with physician re: pain. Rec patient continue an additional 10 visits to achieve rom goals and strength goals.    Treatment Last 4 Visits  Treatment Day: 10       5/8/2025 5/13/2025 5/16/2025 5/20/2025   LE Treatment   Therapeutic Exercise NuStep x7min L3 LE only  Prone HS curl 3x10  Prone Hip ext 2x10B  LAQ 2# 2x10  Knee flexion stretch at the stair 20 sec x 5   NuStep x7min  Bridge 3x5-with flexing R knee into more flexion after each set  DKTC with RSB x20  Brief STM R knee to decrease swelling, decrease sensitivity  Prone passive Rknee flexion x1min  Prone knee flexion with self op with opposite foot 10 sec hold x15  Sit to stand with UE 2x10-cueing to avoid kicking out R LE.   Knee flexion stretch at stair 10 sec 2x 10  4 in step up with opp hip flex  4 in step down x15 R     NuStep x6 min L5  Bridge 3x5-with flexing R knee into more flexion after each set  DKTC with RSB x20  Brief STM R knee to decrease swelling, decrease sensitivity  Prone passive Rknee flexion x1min  Prone knee flexion with self op with opposite foot 10 sec hold x15  Sit to stand with UE 2x10-cueing to avoid kicking out R LE.   Knee flexion stretch at stair 10 sec 2x 10  4 in step up with opp hip flex  4 in step down x15 R  4 in lateral step down x15 R NuStep x7min L4  Re-assessment completed  Prone quad stretch 10 sec hold x10  6 in lateral step down 3x10  6 in step up with opp hip flex 3x10B  Calf stretch on incline 30 sec x 4  Prone passive knee flexion x2min  Supine patellar mobs all  planes with scar massage   Manual Therapy Patellar mobs all planes  STM R knee for pain relief  STM R gastroc to increase flexibility      Therapeutic Exercise Minutes 25 40 40 40   Manual Therapy Minutes 15      Total Time Of Timed Procedures 40 40 40 40   Total Time Of Service-Based Procedures 0 0 0 0   Total Treatment Time 40 40 40 40   HEP  Access Code: [email or web address]  URL: [email or web address]  Date: 05/13/2025  Prepared by: Martha Birmingham    Exercises  - Prone Knee Flexion AAROM with Overpressure  - 1 x daily - 7 x weekly - 2 sets - 10 reps - 5 seconds hold  - Standing Knee Flexion Stretch on Step  - 1 x daily - 7 x weekly - 2 sets - 10 reps - 5 sec hold  - Sit to Stand with Armchair  - 1 x daily - 7 x weekly - 1 sets - 10 reps          HEP  Access Code: [email or web address]  URL: [email or web address]  Date: 05/13/2025  Prepared by: Martha Birmingham    Exercises  - Prone Knee Flexion AAROM with Overpressure  - 1 x daily - 7 x weekly - 2 sets - 10 reps - 5 seconds hold  - Standing Knee Flexion Stretch on Step  - 1 x daily - 7 x weekly - 2 sets - 10 reps - 5 sec hold  - Sit to Stand with Armchair  - 1 x daily - 7 x weekly - 1 sets - 10 reps    Charges     3 TherEx        Post LEFS Score  Post LEFS Score: (Patient-Rptd) 52.5 % (5/20/2025  9:14 AM)    16.25 % improvement    Plan: Continue skilled Physical Therapy 2 x/week or a total of 10 additional visits over a 90 day period. Treatment will include: TherEx, TherAct, Manual prn, Modalities prn, Gait, Neuroed       Patient/Family/Caregiver was advised of these findings, precautions, and treatment options and has agreed to actively participate in planning and for this course of care.    Thank you for your referral. If you have any questions, please contact me at Dept: [phone].    Sincerely,  Electronically signed by therapist: Martha Birmingham PT     Physician's certification required:  Yes  Please co-sign or sign and return this letter via fax as  soon as possible to [phone].   I certify the need for these services furnished under this plan of treatment and while under my care.    X___________________________________________________ Date____________________    Certification From: 5/20/2025  To:8/18/2025

## 2025-05-22 ENCOUNTER — OFFICE VISIT (OUTPATIENT)
Dept: PHYSICAL THERAPY | Age: 65
End: 2025-05-22
Payer: MEDICARE

## 2025-05-22 PROCEDURE — 97110 THERAPEUTIC EXERCISES: CPT | Performed by: PHYSICAL THERAPIST

## 2025-05-22 NOTE — PROGRESS NOTES
Patient: Kerry Hsu (64 year old, female) Referring Provider:  Insurance:   Diagnosis: S/P total knee arthroplasty, right (Z96.651) Jessica Ladd  MEDICARE   Date of Surgery: 3/17/25 Next MD visit:  [facility] [employer]   Precautions:    No data recorded Referral Information:    Date of Evaluation: Req: 0, Auth: 0, Exp:     04/17/25 POC Auth Visits:  20       Today's Date   5/22/2025    Subjective          Pain: 0/10     Objective  See table below. R knee AROM: 4-98deg           Assessment  Pt has increased R knee arom compared to last visit. Significant quad and hamstring tightness noted today.    Goals (to be met in 16 visits)   Patient to be independent with HEP.  Patient to increase R Knee AROM = WFL to allow patient to sit to stand from a chair without discomfort.  Patient to increase B Hip and Knee strength = 4+/5 to allow for prolonged ambulation within the community.   Patient to increase hamstring length by 5 degrees.  Patient to improve score on LEFS by 10% to show improvement in QOL.  Patient to have an overall subjective improvement of 75% or greater.                    Plan  Progress skilled PT    Treatment Last 4 Visits  Treatment Day: 11       5/13/2025 5/16/2025 5/20/2025 5/22/2025   LE Treatment   Therapeutic Exercise NuStep x7min  Bridge 3x5-with flexing R knee into more flexion after each set  DKTC with RSB x20  Brief STM R knee to decrease swelling, decrease sensitivity  Prone passive Rknee flexion x1min  Prone knee flexion with self op with opposite foot 10 sec hold x15  Sit to stand with UE 2x10-cueing to avoid kicking out R LE.   Knee flexion stretch at stair 10 sec 2x 10  4 in step up with opp hip flex  4 in step down x15 R     NuStep x6 min L5  Bridge 3x5-with flexing R knee into more flexion after each set  DKTC with RSB x20  Brief STM R knee to decrease swelling, decrease sensitivity  Prone passive Rknee flexion x1min  Prone knee flexion with self op with opposite foot 10 sec hold  x15  Sit to stand with UE 2x10-cueing to avoid kicking out R LE.   Knee flexion stretch at stair 10 sec 2x 10  4 in step up with opp hip flex  4 in step down x15 R  4 in lateral step down x15 R NuStep x7min L4  Re-assessment completed  Prone quad stretch 10 sec hold x10  6 in lateral step down 3x10  6 in step up with opp hip flex 3x10B  Calf stretch on incline 30 sec x 4  Prone passive knee flexion x2min  Supine patellar mobs all planes with scar massage Upright bike x8min lizeth totter- progress to full revolutions. Seat notch 5  Prone contract relax into R knee flexion  Hooklying bridge x5 then increase range x4 rounds  Passive knee flexion in hooklying  Ball squeeze at max end range knee flexion rom (flex: 111 deg)  Prone hang x3min  5 cords B leg press on the shuttle 3x10  4 cords SL press 2x10  Knee flexion stretch at stair 20sec x 10  4 in step up with opp hip flexion x20B  HSS at stair 30 sec x 4   Therapeutic Exercise Minutes 40 40 40 40   Total Time Of Timed Procedures 40 40 40 40   Total Time Of Service-Based Procedures 0 0 0 0   Total Treatment Time 40 40 40 40   HEP Access Code: [email or web address]  URL: [email or web address]  Date: 05/13/2025  Prepared by: Martha Birmingham    Exercises  - Prone Knee Flexion AAROM with Overpressure  - 1 x daily - 7 x weekly - 2 sets - 10 reps - 5 seconds hold  - Standing Knee Flexion Stretch on Step  - 1 x daily - 7 x weekly - 2 sets - 10 reps - 5 sec hold  - Sit to Stand with Armchair  - 1 x daily - 7 x weekly - 1 sets - 10 reps   Access Code: [email or web address]  URL: [email or web address]  Date: 05/22/2025  Prepared by: Martha Birmingham    Exercises  - Prone Knee Extension Hang  - 2 x daily - 7 x weekly - 1 sets - 1 reps - 3-5 minutes hold        HEP  Access Code: [email or web address]  URL: [email or web address]/  Date: 05/22/2025  Prepared by: Martha Lopezreja    Exercises  - Prone Knee Extension Hang  - 2 x daily - 7 x weekly - 1 sets - 1 reps - 3-5  minutes hold    Charges     3 TherEx

## 2025-05-27 ENCOUNTER — OFFICE VISIT (OUTPATIENT)
Dept: PHYSICAL THERAPY | Age: 65
End: 2025-05-27
Payer: MEDICARE

## 2025-05-27 PROCEDURE — 97110 THERAPEUTIC EXERCISES: CPT | Performed by: PHYSICAL THERAPIST

## 2025-05-27 NOTE — PROGRESS NOTES
Patient: Kerry Hsu (64 year old, female) Referring Provider:  Insurance:   Diagnosis: S/P total knee arthroplasty, right (Z96.651) Jessica Ladd  MEDICARE   Date of Surgery: 3/17/25 Next MD visit:  [facility] [employer]   Precautions:    No data recorded Referral Information:    Date of Evaluation: Req: 0, Auth: 0, Exp:     04/17/25 POC Auth Visits:  20       Today's Date   5/27/2025    Subjective  Pt reports that she feels like her knee is going to split open when she goes down the stairs, or when she sits for a long time then stands.       Pain: 5/10     Objective  See table below; R knee AROM 7-110              Assessment  Pt has significant proximal scar tissue mobility. Pt states she has been massaging her scar at home, but it is painful. This could be contributing to tightness sensation with knee flexion activities such as descending stairs.    Goals (to be met in 16 visits)   Patient to be independent with HEP.  Patient to increase R Knee AROM = WFL to allow patient to sit to stand from a chair without discomfort.  Patient to increase B Hip and Knee strength = 4+/5 to allow for prolonged ambulation within the community.   Patient to increase hamstring length by 5 degrees.  Patient to improve score on LEFS by 10% to show improvement in QOL.  Patient to have an overall subjective improvement of 75% or greater.                      Plan  Progress skilled PT; increase height of step down if tolerable    Treatment Last 4 Visits  Treatment Day: 12       5/16/2025 5/20/2025 5/22/2025 5/27/2025   LE Treatment   Therapeutic Exercise NuStep x6 min L5  Bridge 3x5-with flexing R knee into more flexion after each set  DKTC with RSB x20  Brief STM R knee to decrease swelling, decrease sensitivity  Prone passive Rknee flexion x1min  Prone knee flexion with self op with opposite foot 10 sec hold x15  Sit to stand with UE 2x10-cueing to avoid kicking out R LE.   Knee flexion stretch at stair 10 sec 2x 10  4 in step up  with opp hip flex  4 in step down x15 R  4 in lateral step down x15 R NuStep x7min L4  Re-assessment completed  Prone quad stretch 10 sec hold x10  6 in lateral step down 3x10  6 in step up with opp hip flex 3x10B  Calf stretch on incline 30 sec x 4  Prone passive knee flexion x2min  Supine patellar mobs all planes with scar massage Upright bike x8min lizeth totter- progress to full revolutions. Seat notch 5  Prone contract relax into R knee flexion  Hooklying bridge x5 then increase range x4 rounds  Passive knee flexion in hooklying  Ball squeeze at max end range knee flexion rom (flex: 111 deg)  Prone hang x3min  5 cords B leg press on the shuttle 3x10  4 cords SL press 2x10  Knee flexion stretch at stair 20sec x 10  4 in step up with opp hip flexion x20B  HSS at stair 30 sec x 4 Upright bike- seat 3 to lizeth totter x8min  Scar tissue massage with knee flexed and extended  SKTC stretch to allow gravity to assist in knee flexion 30 sec x 4R  Prone quad stretch 30 sec x 5R  4 in step down 2x10 R  4 in lateral step down 2x10R  Calf stretch on incline 30 sec x4  Sit to stand low plinth 2x10  SLS R 30 sec x 2   Therapeutic Exercise Minutes 40 40 40 40   Total Time Of Timed Procedures 40 40 40 40   Total Time Of Service-Based Procedures 0 0 0 0   Total Treatment Time 40 40 40 40   HEP   Access Code: [email or web address]  URL: [email or web address]  Date: 05/22/2025  Prepared by: Martha Birmingham    Exercises  - Prone Knee Extension Hang  - 2 x daily - 7 x weekly - 1 sets - 1 reps - 3-5 minutes hold         HEP  Access Code: [email or web address]  URL: [email or web address]  Date: 05/22/2025  Prepared by: Martha Birmingham    Exercises  - Prone Knee Extension Hang  - 2 x daily - 7 x weekly - 1 sets - 1 reps - 3-5 minutes hold    Charges     3 TherEx

## 2025-06-04 ENCOUNTER — LAB ENCOUNTER (OUTPATIENT)
Dept: LAB | Facility: HOSPITAL | Age: 65
End: 2025-06-04
Payer: MEDICARE

## 2025-06-04 DIAGNOSIS — G89.18 POSTOPERATIVE PAIN: Primary | ICD-10-CM

## 2025-06-04 DIAGNOSIS — M89.269: ICD-10-CM

## 2025-06-04 DIAGNOSIS — G89.18 POSTOPERATIVE PAIN: ICD-10-CM

## 2025-06-04 LAB
BASOPHILS # BLD AUTO: 0.02 X10(3) UL (ref 0–0.2)
BASOPHILS NFR BLD AUTO: 0.2 %
CRP SERPL-MCNC: <0.4 MG/DL (ref ?–1)
DEPRECATED RDW RBC AUTO: 42.5 FL (ref 35.1–46.3)
EOSINOPHIL # BLD AUTO: 0.06 X10(3) UL (ref 0–0.7)
EOSINOPHIL NFR BLD AUTO: 0.7 %
ERYTHROCYTE [DISTWIDTH] IN BLOOD BY AUTOMATED COUNT: 15.3 % (ref 11–15)
ERYTHROCYTE [SEDIMENTATION RATE] IN BLOOD: 5 MM/HR (ref 0–30)
HCT VFR BLD AUTO: 41 % (ref 35–48)
HGB BLD-MCNC: 13.8 G/DL (ref 12–16)
IMM GRANULOCYTES # BLD AUTO: 0.03 X10(3) UL (ref 0–1)
IMM GRANULOCYTES NFR BLD: 0.3 %
LYMPHOCYTES # BLD AUTO: 1.98 X10(3) UL (ref 1–4)
LYMPHOCYTES NFR BLD AUTO: 22.3 %
MCH RBC QN AUTO: 26 PG (ref 26–34)
MCHC RBC AUTO-ENTMCNC: 33.7 G/DL (ref 31–37)
MCV RBC AUTO: 77.4 FL (ref 80–100)
MONOCYTES # BLD AUTO: 0.64 X10(3) UL (ref 0.1–1)
MONOCYTES NFR BLD AUTO: 7.2 %
NEUTROPHILS # BLD AUTO: 6.13 X10 (3) UL (ref 1.5–7.7)
NEUTROPHILS # BLD AUTO: 6.13 X10(3) UL (ref 1.5–7.7)
NEUTROPHILS NFR BLD AUTO: 69.3 %
PLATELET # BLD AUTO: 364 10(3)UL (ref 150–450)
RBC # BLD AUTO: 5.3 X10(6)UL (ref 3.8–5.3)
WBC # BLD AUTO: 8.9 X10(3) UL (ref 4–11)

## 2025-06-04 PROCEDURE — 36415 COLL VENOUS BLD VENIPUNCTURE: CPT

## 2025-06-04 PROCEDURE — 85652 RBC SED RATE AUTOMATED: CPT

## 2025-06-04 PROCEDURE — 85025 COMPLETE CBC W/AUTO DIFF WBC: CPT

## 2025-06-04 PROCEDURE — 86140 C-REACTIVE PROTEIN: CPT

## 2025-06-05 ENCOUNTER — OFFICE VISIT (OUTPATIENT)
Dept: PHYSICAL THERAPY | Age: 65
End: 2025-06-05
Payer: MEDICARE

## 2025-06-05 DIAGNOSIS — Z96.651 S/P TOTAL KNEE ARTHROPLASTY, RIGHT: Primary | ICD-10-CM

## 2025-06-05 PROCEDURE — 97110 THERAPEUTIC EXERCISES: CPT | Performed by: PHYSICAL THERAPIST

## 2025-06-05 NOTE — PROGRESS NOTES
Patient: Kerry Hsu (64 year old, female) Referring Provider:  Insurance:   Diagnosis: S/P total knee arthroplasty, right (Z96.651) No ref. provider found  MEDICARE   Date of Surgery: 3/17/25 Next MD visit:  [facility] [employer]MN[employer]   Precautions:    No data recorded Referral Information:    Date of Evaluation: Req: 0, Auth: 0, Exp:     04/17/25 POC Auth Visits:  20       Today's Date   6/5/2025    Subjective  Pt reports \"I went to the surgeon yesterday who wants me to pause PT for now to see a nerve doctor due to the increased sensitivity at the incision site.\"       Pain: 7/10      Discharge Summary  Pt has attended 13 visits in Physical Therapy. Pt discharged from skilled PT due to doctor's orders to see nerve specialist.    Objective  See table below; R knee AROM 7-110         Assessment  Pt has significant proximal scar tissue mobility. Pt states she has been massaging her scar at home, but it is painful. This could be contributing to tightness sensation with knee flexion activities such as descending stairs.    Goals (to be met in 16 visits)   Patient to be independent with HEP.  Patient to increase R Knee AROM = WFL to allow patient to sit to stand from a chair without discomfort.  Patient to increase B Hip and Knee strength = 4+/5 to allow for prolonged ambulation within the community.   Patient to increase hamstring length by 5 degrees.  Patient to improve score on LEFS by 10% to show improvement in QOL.  Patient to have an overall subjective improvement of 75% or greater.                    Plan  Progress skilled PT; increase height of step down if tolerable    Treatment Last 4 Visits  Treatment Day: 13 5/20/2025 5/22/2025 5/27/2025 6/5/2025   LE Treatment   Therapeutic Exercise NuStep x7min L4  Re-assessment completed  Prone quad stretch 10 sec hold x10  6 in lateral step down 3x10  6 in step up with opp hip flex 3x10B  Calf stretch on incline 30 sec x 4  Prone passive knee flexion x2min  Supine  patellar mobs all planes with scar massage Upright bike x8min lizeth totter- progress to full revolutions. Seat notch 5  Prone contract relax into R knee flexion  Hooklying bridge x5 then increase range x4 rounds  Passive knee flexion in hooklying  Ball squeeze at max end range knee flexion rom (flex: 111 deg)  Prone hang x3min  5 cords B leg press on the shuttle 3x10  4 cords SL press 2x10  Knee flexion stretch at stair 20sec x 10  4 in step up with opp hip flexion x20B  HSS at stair 30 sec x 4 Upright bike- seat 3 to lizeth totter x8min  Scar tissue massage with knee flexed and extended  SKTC stretch to allow gravity to assist in knee flexion 30 sec x 4R  Prone quad stretch 30 sec x 5R  4 in step down 2x10 R  4 in lateral step down 2x10R  Calf stretch on incline 30 sec x4  Sit to stand low plinth 2x10  SLS R 30 sec x 2 Upright bike- seat 3 to lizeth totter x8min  Scar tissue massage with knee flexed and extended  SKTC stretch to allow gravity to assist in knee flexion 30 sec x 4R  Prone quad stretch 30 sec x 5R  4 in step down 2x10 R  4 in lateral step down 2x10R  Calf stretch on incline 30 sec x4  Sit to stand low plinth 2x10  SLS R 30 sec x 2   Therapeutic Exercise Minutes 40 40 40    Total Time Of Timed Procedures 40 40 40 0   Total Time Of Service-Based Procedures 0 0 0 0   Total Treatment Time 40 40 40 0   HEP  Access Code: [email or web address]  URL: [email or web address]  Date: 05/22/2025  Prepared by: Martha Birmingham    Exercises  - Prone Knee Extension Hang  - 2 x daily - 7 x weekly - 1 sets - 1 reps - 3-5 minutes hold          HEP  Access Code: [email or web address]  URL: [email or web address]  Date: 05/22/2025  Prepared by: Martha Birmingham    Exercises  - Prone Knee Extension Hang  - 2 x daily - 7 x weekly - 1 sets - 1 reps - 3-5 minutes hold    Charges      Therex: x3      Plan: Pt discharged from skilled PT.    Patient/Family/Caregiver was advised of these findings, precautions, and treatment  options and has agreed to actively participate in planning and for this course of care.    Thank you for your referral. If you have any questions, please contact me at Dept: [phone].    Sincerely,  Electronically signed by therapist: Sherita Johnson PT     Physician's certification required:  No  Please co-sign or sign and return this letter via fax as soon as possible to [phone].   I certify the need for these services furnished under this plan of treatment and while under my care.    X___________________________________________________ Date____________________    Certification From: 6/5/2025  To:9/3/2025

## 2025-06-06 ENCOUNTER — OFFICE VISIT (OUTPATIENT)
Dept: NEUROLOGY | Facility: CLINIC | Age: 65
End: 2025-06-06
Payer: MEDICARE

## 2025-06-06 VITALS — DIASTOLIC BLOOD PRESSURE: 65 MMHG | HEART RATE: 76 BPM | SYSTOLIC BLOOD PRESSURE: 109 MMHG

## 2025-06-06 DIAGNOSIS — G62.9 NEUROPATHY: Primary | ICD-10-CM

## 2025-06-06 DIAGNOSIS — M62.838 MUSCLE SPASM: ICD-10-CM

## 2025-06-06 DIAGNOSIS — G25.81 RESTLESS LEG SYNDROME: ICD-10-CM

## 2025-06-06 PROCEDURE — 99214 OFFICE O/P EST MOD 30 MIN: CPT | Performed by: OTHER

## 2025-06-06 RX ORDER — FAMOTIDINE 20 MG/1
TABLET, FILM COATED ORAL AS NEEDED
COMMUNITY
Start: 2025-05-16

## 2025-06-06 RX ORDER — GABAPENTIN 300 MG/1
600 CAPSULE ORAL NIGHTLY
Qty: 180 CAPSULE | Refills: 1 | Status: SHIPPED | OUTPATIENT
Start: 2025-06-06 | End: 2025-12-03

## 2025-06-06 RX ORDER — PREGABALIN 75 MG/1
CAPSULE ORAL
COMMUNITY
Start: 2025-05-16

## 2025-06-06 NOTE — PROGRESS NOTES
The following individual(s) verbally consented to be recorded using ambient AI listening technology and understand that they can each withdraw their consent to this listening technology at any point by asking the clinician to turn off or pause the recording:    Patient name: Kerry Hsu  Additional names:  Marky Hsu

## 2025-06-06 NOTE — PROGRESS NOTES
[facility]  [address] [place] [address], [address]  [facility] [address]  [phone]        Neurology Clinic Follow Up Note    Chief Complaint:  Neurologic Problem (LOV: 2/7/25 Patient is present today to follow up on Restless leg syndrome + Neuropathy. Patient states when she first started taking gabapentin she noticed an improvement but more recently has noticed no improvement. Current Medication: gabapentin 300 MG/Patient gives verbal consent to use [facility]. /)      HPI:   Kerry Hsu is a  64 year old  RH woman w/ a pmhx of oa, DDD, GERD, glaucoma, HTN, HLD,  KB  on CPAP ,  hx of SBO , and length dependent symmetric polyneuropathy who presents in follow up  for  spasms/cramps ongoing for 10 yrs.     Getting worse  Afterwards she has bruising in the site that spasms  Last 15 minutes.  Has intense pain with spasms.  No diaphoresis.  No tachycardia.    Reports she can \"turn a certain way and will get cramps throughout her body.\" She will be sore in those areas for days.    Reports she can't walk when she has an attack.   She has an attack 1x or 2x a week  Previously it was daily over the last year it improved.  Its better now but she still gets them. Since 2014 there have been periods when it spontaneously improves.    Once it starts it will spread to other extremities and locations.  Her left arm will cramp, swell, and then become red. Then it will be sore for days after. \"Like it is inflamed.\"  Never gets a cramp in her right arm.  Sites involved:  Left arm  Both hands  Lower abdomen  Low back and thoracic spine  Both legs and both feet    Spares her chesst and right arm. Spares neck and face.    When she has a cramp in one leg it seems like it can tigger the contralateral leg.    She has had a muscle biopsy in the past.    Failed medications/prior medications:  1. Baclofen  2. Flexeril  3. Tizanidine   4. Cymbalta for neuropathic pain - failed in 2017 abd again in 2024; was on 20, 30m  40 and 60mg  5. Lyrica 75mg bid - currently on; not taking in the daytime dose b/c of sedation  6. Gabapentin prn - taking as needed currently. Also causes excessive daytime sedation. Switched to 300mg at bedtime.  Was no longer effective.   7.       Review and summation of prior records  Prior neurology note from 2015 \"Ms. Hsu presents to clinic. She has cramps throughout her body. These are more like holger horses in her cla,f but throughout the both. It is in her arms, back legs and thighs. She just had a nerve and muscle biopsy done in November 14 which had difficulty healing and had infection. She had to go for antibiotic treatmtents. These were reportedly normal. But her Ck levels were elevated. Her cramping has been for around two years or so. It has been worsening during that time. Often in the morning and wakes up from sleep but it can be in the afternoon as well. They last for 10 -15 minutes and go away. Her CK in October was 600 and she had a muscle biopsy in the right thigh and nerve in the right leg in the sural. These were reportedly normal. She had a EMG as well, which was reportedly normal.. This was November of 2013. She has had no MRIs. She was started with some pain medication, which did not help. She was tried on Gabapentin which did not help. Aldolase did not help either. Her hands and feet feel like they are sleeping all the time, She also feels her right foot is more numb than her left since the biopsy. The Pathology shows rare rounded deposits in the basement membrane consistent with obliterated capillary vessels. Otherwise it was within normal limits and showed no evidence of active inflammatory myopathy \"    06/06/25 Interval history/subjective  History of Present Illness  Kerry Hsu is a 64 year old female with neuropathy who presents with worsening neuropathic pain and sleep disturbances.    She experiences significant neuropathic pain, particularly in her legs and feet,  with the right leg being more severely affected. The pain is severe and disrupts her ability to relax both at night and during the day. She has previously been on Lyrica and gabapentin, but these medications have ceased to be effective. She is currently taking gabapentin 300 mg nightly.    She also experiences sleep disturbances, which she attributes to her neuropathic pain. She uses a CPAP machine for sleep apnea. Lyrica previously caused drowsiness, but it no longer has this effect. She needs improved sleep quality.    Her medical history includes constipation, which she believes is exacerbated by her medications. She has tried various remedies, including prunes and stool softeners, with limited success. She finds some relief with magnesium water.    No dry eyes or dry mouth. She confirms experiencing constipation. She mentions a family history of dementia, although she does not currently exhibit symptoms of memory impairment.          02/07/25 Interval History/Subjective :   Here in follow up cramps/muscle spasms/rls.  Her symptoms are  worse at night. She has severe burning pain.   R>L is worse.  If she does not take anything     12/04/24 Interval History/Subjective :   Here in follow up cramps/muscle spasms.  She is taking the Lyrica prn.  If she takes it scheduled it makes her drowsy.  For instance, her bicep will spasm and she will have residual pain for days.   Reviewed her EMG.  She has restless leg syndrome involving her right leg.      ROS: Pertinent positive and negatives per HPI.  All others were reviewed and negative.     Medications:     Current Outpatient Medications   Medication Instructions    amLODIPine (NORVASC) 10 mg, Oral, Daily    cholecalciferol (VITAMIN D3) 2,000 Units, Daily    famotidine 20 MG Oral Tab As needed    gabapentin (NEURONTIN) 300 mg, Oral, Nightly    HYDROcodone-acetaminophen 5-325 MG Oral Tab 1 tablet, Oral, Every 6 hours PRN, No alcohol or driving on this med. Stop if  lethargic or hallucinating.    ibuprofen (MOTRIN) 600 mg, Oral, 2 times daily PRN, After meals    latanoprost 0.005 % Ophthalmic Solution INSTILL 1 DROP IN BOTH EYES EVERY night    MAGNESIUM OR 1 tablet, Daily    Omeprazole 40 mg, Oral, Daily    Polyethylene Glycol 3350 (MIRALAX) 17 g, Oral, DAILY PRN    pregabalin 75 MG Oral Cap     rosuvastatin (CRESTOR) 10 mg, Every evening    traMADol (ULTRAM) 50 mg, Oral, Every 6 hours PRN, No alcohol or driving on this med. Stop if lethargic or hallucinating.           Reviewed and assessed      Objective:  Last vitals and weight :  There is no height or weight on file to calculate BMI.   Vitals:    06/06/25 1024   Patient Position: Sitting   BP Location: Left arm      Blood pressure 109/65, pulse 76, not currently breastfeeding.  /65 (BP Location: Left arm, Patient Position: Sitting, Cuff Size: adult)   Pulse 76   Exam:  - General: appears stated age and no distress     - Pulmonary: Normal excursion of the chest.  No signs of respiratory distress.  Neurologic Exam  - Mental Status: Alert and attentive. .  Speech is spontaneous, fluent, and prosodic. Comprehension and repetition intact. Phrase length and rate are normal. No paraphasic errors, neologisms, anomia, acalculia, apraxia, anosognosia, or R/L confusion.   - Cranial Nerves: No gaze preference. Visual fields:normal  Pupils are 4mm briskly constricting to 3mm and equally round and reactive to light  in a well lit room. No rAPD. EOMI. No nystagmus. No ptosis. V1-V3 intact B/L to light touch.No pathological facial asymmetry. No flattening of the nasolabial fold. .  Hearing grossly intact.  Tongue midline. No atrophy or fasiculations of the tongue noted. Palate and uvula elevate symmetrically.  Shoulder shrug symmetric.     - Motor:  normal tone/bulk. No interosseous wasting. No flattening of hypothenar eminences.   Motor Strength    Pronator drift: No pronator drift   Arm Rolling: No orbiting.   Finger Taps: Finger  taps are symmetric in rate and amplitude.    Rapid movements: symmetric. No fatiguing.      Foot Taps:    Asterixis: No asterixis noted.   Tremor:          - Sensory:   Light touch: normal  Temperature: gradient loss  Vibration:  gradient loss      - Cerebellum: No truncal ataxia. No titubations. No dysmetria, no dysdiadochokinesis. No rebound.      - Plantar response: flexor bilaterally       Most recent lab results:        Component      Latest Ref Rng 8/29/2024   NMDA-R Antibody      Negative  Negative    Anti-Yo Ab      Negative  Negative    ITPR1 Antibody      Negative  Negative    LGI1 Antibody, Cell-based IFA      Negative  Negative    Aquaporin 4 Antibody      Negative  Negative    CASPR2 Antibody,Cell-based IFA      Negative  Negative    Zic4 Antibody      Negative  Negative    mGluR1 Antibody      Negative  Negative    AMPAR1 AB, CBA      Negative  Negative    CRMP-5 IgG      Negative  Negative    DNER Antibody      Negative  Negative    Amphiphysin Antibody      Negative  Negative    ANTI GAD65 INTERPRETATION      Negative  Negative    ADRIANA-B-R Antibody      Negative  Negative    Anti-Ri Ab      Negative  Negative    Antineruonal nuclear Ab Type 3      Negative  Negative    Ma2/Ta Antibody      Negative  Negative    IgLON5 Antibody      Negative  Negative    Purkinje Cell Cyto Ab Type 2      Negative  Negative    Purkinje Cell Cyto Ab Type 2      Negative  Negative    AGNA-1      Negative  Negative    VGCC ANTIBODY      0.0 - 30.0 pmol/L <1.0    DPPX Antibody      Negative  Negative    INTERP      Negative  Negative    AMPAR2 AB, CBA      Negative  Negative    Anti-Hu Ab      Negative  Negative           Recent Results (from the past 16 weeks)   CBC With Differential With Platelet    Collection Time: 02/17/25  9:25 AM   Result Value Ref Range    WBC 8.0 4.0 - 11.0 x10(3) uL    RBC 5.53 (H) 3.80 - 5.30 x10(6)uL    HGB 14.6 12.0 - 16.0 g/dL    HCT 44.5 35.0 - 48.0 %    MCV 80.5 80.0 - 100.0 fL    MCH 26.4  26.0 - 34.0 pg    MCHC 32.8 31.0 - 37.0 g/dL    RDW-SD 41.7 35.1 - 46.3 fL    RDW 14.4 11.0 - 15.0 %    .0 150.0 - 450.0 10(3)uL    Neutrophil Absolute Prelim 5.06 1.50 - 7.70 x10 (3) uL    Neutrophil Absolute 5.06 1.50 - 7.70 x10(3) uL    Lymphocyte Absolute 1.98 1.00 - 4.00 x10(3) uL    Monocyte Absolute 0.53 0.10 - 1.00 x10(3) uL    Eosinophil Absolute 0.31 0.00 - 0.70 x10(3) uL    Basophil Absolute 0.05 0.00 - 0.20 x10(3) uL    Immature Granulocyte Absolute 0.02 0.00 - 1.00 x10(3) uL    Neutrophil % 63.6 %    Lymphocyte % 24.9 %    Monocyte % 6.7 %    Eosinophil % 3.9 %    Basophil % 0.6 %    Immature Granulocyte % 0.3 %   Comp Metabolic Panel (14)    Collection Time: 02/17/25  9:25 AM   Result Value Ref Range    Glucose 115 (H) 70 - 99 mg/dL    Sodium 144 136 - 145 mmol/L    Potassium 3.7 3.5 - 5.1 mmol/L    Chloride 110 98 - 112 mmol/L    CO2 28.0 21.0 - 32.0 mmol/L    Anion Gap 6 0 - 18 mmol/L    BUN 11 9 - 23 mg/dL    Creatinine 1.00 0.55 - 1.02 mg/dL    BUN/CREA Ratio 11.0 10.0 - 20.0    Calcium, Total 9.4 8.7 - 10.4 mg/dL    Calculated Osmolality 298 (H) 275 - 295 mOsm/kg    eGFR-Cr 62 >=60 mL/min/1.73m2    ALT 33 10 - 49 U/L    AST 25 <34 U/L    Alkaline Phosphatase 136 55 - 142 U/L    Bilirubin, Total 0.6 0.2 - 1.1 mg/dL    Total Protein 7.4 5.7 - 8.2 g/dL    Albumin 4.7 3.2 - 4.8 g/dL    Globulin  2.7 2.0 - 3.5 g/dL    A/G Ratio 1.7 1.0 - 2.0    Patient Fasting for CMP? No    Ferritin    Collection Time: 02/17/25  9:25 AM   Result Value Ref Range    Ferritin 225 50 - 306 ng/mL   Iron And Tibc    Collection Time: 02/17/25  9:25 AM   Result Value Ref Range    Iron 93 50 - 170 ug/dL    Transferrin 261 250 - 380 mg/dL    Total Iron Binding Capacity 324 250 - 425 ug/dL    % Saturation 29 15 - 50 %   Folic Acid Serum (Folate)    Collection Time: 02/17/25  9:25 AM   Result Value Ref Range    Folate (Folic Acid) 11.4 >5.4 ng/mL   Vitamin B12 with reflex to MMA    Collection Time: 02/17/25  9:25 AM    Result Value Ref Range    Vitamin B12 404 211 - 911 pg/mL   HOLD MMA    Collection Time: 02/17/25  9:25 AM   Result Value Ref Range    HOLD MMA Auto Resulted    Serum Protein Electrophoresis    Collection Time: 02/17/25  9:25 AM   Result Value Ref Range    Total Protein 7.2 5.7 - 8.2 g/dL    Albumin 4.33 3.75 - 5.21 g/dL    Alpha-1-Globulins 0.31 0.19 - 0.46 g/dL    Alpha-2-Globulins 0.53 0.48 - 1.05 g/dL    Beta Globulins 0.94 0.68 - 1.23 g/dL    Gamma Globulins 1.10 0.62 - 1.70 g/dL    Albumin/Globulin Ratio 1.51 1.00 - 2.00    SPE Interpretation         Serum protein electrophoresis shows no evidence of significant pathologic abnormalities.        Reviewed By:   Lei Wagner M.D.        Immunoglobulin A/G/M, Quant    Collection Time: 02/17/25  9:25 AM   Result Value Ref Range    Immunoglobulin A 291.10 40.00 - 350.00 mg/dL    Immunoglobulin G 1,089 650 - 1,600 mg/dL    Immunoglobulin M 47.2 (L) 50.0 - 300.0 mg/dL   Immunofixation (BLUE)    Collection Time: 02/17/25  9:25 AM   Result Value Ref Range    Immunofixation         No evidence of monoclonal proteins identified.        Reviewed By:   Lei Wagner M.D.        Immunoglobulin free LT chains blood    Collection Time: 02/17/25  9:25 AM   Result Value Ref Range    Kappa Free Light Chain 1.907 0.330 - 1.940 mg/dL    Lambda Free Light Chain 1.310 0.571 - 2.630 mg/dL    Kappa/Lambda Flc Ratio 1.46 0.26 - 1.65   MSSA and MRSA Culture Screen    Collection Time: 03/07/25  2:22 PM    Specimen: Nares; Other   Result Value Ref Range    MSSA/MRSA Culture No MRSA or Staph aureus(MSSA) Isolated    CBC With Differential With Platelet    Collection Time: 03/07/25  2:22 PM   Result Value Ref Range    WBC 10.7 4.0 - 11.0 x10(3) uL    RBC 5.34 (H) 3.80 - 5.30 x10(6)uL    HGB 14.0 12.0 - 16.0 g/dL    HCT 42.3 35.0 - 48.0 %    MCV 79.2 (L) 80.0 - 100.0 fL    MCH 26.2 26.0 - 34.0 pg    MCHC 33.1 31.0 - 37.0 g/dL    RDW-SD 40.8 35.1 - 46.3 fL    RDW 14.3 11.0 - 15.0 %    PLT  381.0 150.0 - 450.0 10(3)uL    Neutrophil Absolute Prelim 6.98 1.50 - 7.70 x10 (3) uL    Neutrophil Absolute 6.98 1.50 - 7.70 x10(3) uL    Lymphocyte Absolute 2.77 1.00 - 4.00 x10(3) uL    Monocyte Absolute 0.78 0.10 - 1.00 x10(3) uL    Eosinophil Absolute 0.13 0.00 - 0.70 x10(3) uL    Basophil Absolute 0.04 0.00 - 0.20 x10(3) uL    Immature Granulocyte Absolute 0.04 0.00 - 1.00 x10(3) uL    Neutrophil % 64.9 %    Lymphocyte % 25.8 %    Monocyte % 7.3 %    Eosinophil % 1.2 %    Basophil % 0.4 %    Immature Granulocyte % 0.4 %   Comp Metabolic Panel (14)    Collection Time: 03/07/25  2:22 PM   Result Value Ref Range    Glucose 100 (H) 70 - 99 mg/dL    Sodium 143 136 - 145 mmol/L    Potassium 3.5 3.5 - 5.1 mmol/L    Chloride 105 98 - 112 mmol/L    CO2 28.0 21.0 - 32.0 mmol/L    Anion Gap 10 0 - 18 mmol/L    BUN 12 9 - 23 mg/dL    Creatinine 1.07 (H) 0.55 - 1.02 mg/dL    BUN/CREA Ratio 11.2 10.0 - 20.0    Calcium, Total 9.2 8.7 - 10.4 mg/dL    Calculated Osmolality 296 (H) 275 - 295 mOsm/kg    eGFR-Cr 57 (L) >=60 mL/min/1.73m2    ALT 20 10 - 49 U/L    AST 20 <34 U/L    Alkaline Phosphatase 127 55 - 142 U/L    Bilirubin, Total 0.6 0.2 - 1.1 mg/dL    Total Protein 7.2 5.7 - 8.2 g/dL    Albumin 4.7 3.2 - 4.8 g/dL    Globulin  2.5 2.0 - 3.5 g/dL    A/G Ratio 1.9 1.0 - 2.0    Patient Fasting for CMP? No    Urinalysis with Culture Reflex    Collection Time: 03/07/25  2:22 PM    Specimen: Urine   Result Value Ref Range    Urine Color Light-Yellow Yellow    Clarity Urine Clear Clear    Spec Gravity 1.016 1.005 - 1.030    Glucose Urine Normal Normal mg/dL    Bilirubin Urine Negative Negative    Ketones Urine Negative Negative mg/dL    Blood Urine Negative Negative    pH Urine 5.5 5.0 - 8.0    Protein Urine 20 (A) Negative mg/dL    Urobilinogen Urine Normal Normal    Nitrite Urine Negative Negative    Leukocyte Esterase Urine Negative Negative   Urine Culture, Routine [E]    Collection Time: 03/07/25  2:22 PM    Specimen: Urine,  clean catch   Result Value Ref Range    Urine Culture       <10,000 cfu/ml Multiple species present- probable contamination.   Urinalysis, Routine [E]    Collection Time: 03/07/25  2:22 PM   Result Value Ref Range    Urine Color Light-Yellow Yellow    Clarity Urine Clear Clear    Spec Gravity 1.016 1.005 - 1.030    Glucose Urine Normal Normal mg/dL    Bilirubin Urine Negative Negative    Ketones Urine Negative Negative mg/dL    Blood Urine Negative Negative    pH Urine 5.5 5.0 - 8.0    Protein Urine 20 (A) Negative mg/dL    Urobilinogen Urine Normal Normal    Nitrite Urine Negative Negative    Leukocyte Esterase Urine Negative Negative   Hemoglobin A1C    Collection Time: 03/07/25  2:22 PM   Result Value Ref Range    HgbA1C 6.2 (H) <5.7 %    Estimated Average Glucose 131 (H) 68 - 126 mg/dL   POCT Glucose    Collection Time: 03/17/25 11:39 AM   Result Value Ref Range    POC Glucose  111 (H) 70 - 99 mg/dL   Specimen to Pathology Tissue    Collection Time: 03/17/25 12:58 PM   Result Value Ref Range    Case Report       Surgical Pathology                                Case: [id number]                                  Authorizing Provider:  Marcin Delgadillo MD      Collected:           03/17/2025 12:58 PM          Ordering Location:     [facility]          Received:            03/17/2025 03:11 PM                                 Operating Room                                                               Pathologist:           Benjie Gerardo MD                                                                  Specimen:    Knee, right, 1 RIGHT KNEE BONE AND TISSUE                                                  Final Diagnosis:         Right knee, bone and soft tissue; right total knee arthroplasty:  Bone and articular cartilage with degenerative changes, consistent with osteoarthritis  Fibroconnective tissue and synovium with vascular congestion and mild reactive fibrosis        Embedded Images      Clinical  Information       M17.11 Primary Osteoarthritis Of Right Knee.         Gross Description       The specimen is received in formalin labeled \"Shadi, right knee bone and tissue\" and consists of multiple pieces of white to yellow-tan bone and soft tissue including femoral condyles, tibial plateau, patella, meniscus and bursa measuring in aggregate 16.4 x 14.5 x 3.1 cm.  The articular surfaces show moderate to severe erosion with eburnation of the underlying bone.  Representative sections submitted as follows: A1-bursae and soft tissue; A2-bone with articular surface for decalcification. ([facility])    Benjie Gerardo M.D.      Interpretation Benign     Basic Metabolic Panel (8)    Collection Time: 03/18/25  5:48 AM   Result Value Ref Range    Glucose 123 (H) 70 - 99 mg/dL    Sodium 142 136 - 145 mmol/L    Potassium 3.8 3.5 - 5.1 mmol/L    Chloride 106 98 - 112 mmol/L    CO2 29.0 21.0 - 32.0 mmol/L    Anion Gap 7 0 - 18 mmol/L    BUN 13 9 - 23 mg/dL    Creatinine 0.92 0.55 - 1.02 mg/dL    BUN/CREA Ratio 14.1 10.0 - 20.0    Calcium, Total 8.7 8.7 - 10.4 mg/dL    Calculated Osmolality 295 275 - 295 mOsm/kg    eGFR-Cr 69 >=60 mL/min/1.73m2   Hemoglobin & Hematocrit    Collection Time: 03/18/25  5:48 AM   Result Value Ref Range    HGB 12.0 12.0 - 16.0 g/dL    HCT 35.5 35.0 - 48.0 %   Basic Metabolic Panel (8)    Collection Time: 03/31/25  2:54 PM   Result Value Ref Range    Glucose 93 70 - 99 mg/dL    Sodium 142 136 - 145 mmol/L    Potassium 3.6 3.5 - 5.1 mmol/L    Chloride 106 98 - 112 mmol/L    CO2 26.0 21.0 - 32.0 mmol/L    Anion Gap 10 0 - 18 mmol/L    BUN 11 9 - 23 mg/dL    Creatinine 1.03 (H) 0.55 - 1.02 mg/dL    BUN/CREA Ratio 10.7 10.0 - 20.0    Calcium, Total 9.4 8.7 - 10.4 mg/dL    Calculated Osmolality 293 275 - 295 mOsm/kg    eGFR-Cr 60 >=60 mL/min/1.73m2   CBC With Differential With Platelet    Collection Time: 03/31/25  2:54 PM   Result Value Ref Range    WBC 13.3 (H) 4.0 - 11.0 x10(3) uL    RBC 4.50 3.80 - 5.30 x10(6)uL     HGB 11.9 (L) 12.0 - 16.0 g/dL    HCT 35.4 35.0 - 48.0 %    MCV 78.7 (L) 80.0 - 100.0 fL    MCH 26.4 26.0 - 34.0 pg    MCHC 33.6 31.0 - 37.0 g/dL    RDW-SD 41.1 35.1 - 46.3 fL    RDW 14.3 11.0 - 15.0 %    .0 (H) 150.0 - 450.0 10(3)uL    Neutrophil Absolute Prelim 11.03 (H) 1.50 - 7.70 x10 (3) uL    Neutrophil Absolute 11.03 (H) 1.50 - 7.70 x10(3) uL    Lymphocyte Absolute 0.91 (L) 1.00 - 4.00 x10(3) uL    Monocyte Absolute 1.07 (H) 0.10 - 1.00 x10(3) uL    Eosinophil Absolute 0.17 0.00 - 0.70 x10(3) uL    Basophil Absolute 0.04 0.00 - 0.20 x10(3) uL    Immature Granulocyte Absolute 0.08 0.00 - 1.00 x10(3) uL    Neutrophil % 83.0 %    Lymphocyte % 6.8 %    Monocyte % 8.0 %    Eosinophil % 1.3 %    Basophil % 0.3 %    Immature Granulocyte % 0.6 %   Blood Culture    Collection Time: 03/31/25  5:50 PM    Specimen: Blood,peripheral   Result Value Ref Range    Blood Culture Result No Growth 5 Days    Blood Culture    Collection Time: 03/31/25  5:50 PM    Specimen: Blood,peripheral   Result Value Ref Range    Blood Culture Result No Growth 5 Days    Lactic Acid, Plasma    Collection Time: 03/31/25  5:50 PM   Result Value Ref Range    Lactic Acid 1.5 0.5 - 2.0 mmol/L   Body Fluid Cult Aerobic and Anaerobic    Collection Time: 03/31/25  9:21 PM    Specimen: Synovial fluid,knee; Body fluid, unspecified   Result Value Ref Range    Body Fluid Culture Result No Growth 5 Days     Body Fluid Smear No WBCs seen     Body Fluid Smear No organisms seen    CRYSTAL EXAM,MISCELLANEOUS FL    Collection Time: 03/31/25  9:21 PM   Result Value Ref Range    Body Fluid Source Right Knee     Fluid Crystals None Seen    Cell Count and Diff, Synovial    Collection Time: 03/31/25  9:21 PM   Result Value Ref Range    Body Fluid Color Red     Body Fluid Clarity Bloody     TNC, Synovial Fluid 6,621 (H) 0 - 200 /CUMM    RBC Synovial 218,145 (H) <1 /CUMM    Body Fluid Source Right Knee    CELL CT/DIF SYNOVIAL    Collection Time: 03/31/25  9:21 PM    Result Value Ref Range    WBC Calculated, Synovial Fluid 6,621 /CUMM    Neutrophil Synovial 71 %    Lymph Synovial 4 %    Mon/Mac/Hist Synovial 24 %    Eosinophil Synovial 1 %    Basophil Synovial 0 %    Total Cells Counted 100    Basic Metabolic Panel (8)    Collection Time: 04/01/25  7:18 AM   Result Value Ref Range    Glucose 113 (H) 70 - 99 mg/dL    Sodium 140 136 - 145 mmol/L    Potassium 3.8 3.5 - 5.1 mmol/L    Chloride 105 98 - 112 mmol/L    CO2 27.0 21.0 - 32.0 mmol/L    Anion Gap 8 0 - 18 mmol/L    BUN 10 9 - 23 mg/dL    Creatinine 0.99 0.55 - 1.02 mg/dL    BUN/CREA Ratio 10.1 10.0 - 20.0    Calcium, Total 9.1 8.7 - 10.4 mg/dL    Calculated Osmolality 290 275 - 295 mOsm/kg    eGFR-Cr 63 >=60 mL/min/1.73m2   CBC With Differential With Platelet    Collection Time: 04/01/25  7:18 AM   Result Value Ref Range    WBC 7.7 4.0 - 11.0 x10(3) uL    RBC 4.46 3.80 - 5.30 x10(6)uL    HGB 11.5 (L) 12.0 - 16.0 g/dL    HCT 34.4 (L) 35.0 - 48.0 %    MCV 77.1 (L) 80.0 - 100.0 fL    MCH 25.8 (L) 26.0 - 34.0 pg    MCHC 33.4 31.0 - 37.0 g/dL    RDW-SD 40.2 35.1 - 46.3 fL    RDW 14.4 11.0 - 15.0 %    .0 (H) 150.0 - 450.0 10(3)uL    Neutrophil Absolute Prelim 4.41 1.50 - 7.70 x10 (3) uL    Neutrophil Absolute 4.41 1.50 - 7.70 x10(3) uL    Lymphocyte Absolute 2.18 1.00 - 4.00 x10(3) uL    Monocyte Absolute 0.90 0.10 - 1.00 x10(3) uL    Eosinophil Absolute 0.09 0.00 - 0.70 x10(3) uL    Basophil Absolute 0.04 0.00 - 0.20 x10(3) uL    Immature Granulocyte Absolute 0.04 0.00 - 1.00 x10(3) uL    Neutrophil % 57.6 %    Lymphocyte % 28.5 %    Monocyte % 11.7 %    Eosinophil % 1.2 %    Basophil % 0.5 %    Immature Granulocyte % 0.5 %   Sed Rate, Bethanyren (Automated)    Collection Time: 06/04/25 11:01 AM   Result Value Ref Range    Sed Rate 5 0 - 30 mm/Hr   CBC With Differential With Platelet    Collection Time: 06/04/25 11:01 AM   Result Value Ref Range    WBC 8.9 4.0 - 11.0 x10(3) uL    RBC 5.30 3.80 - 5.30 x10(6)uL    HGB  13.8 12.0 - 16.0 g/dL    HCT 41.0 35.0 - 48.0 %    MCV 77.4 (L) 80.0 - 100.0 fL    MCH 26.0 26.0 - 34.0 pg    MCHC 33.7 31.0 - 37.0 g/dL    RDW-SD 42.5 35.1 - 46.3 fL    RDW 15.3 (H) 11.0 - 15.0 %    .0 150.0 - 450.0 10(3)uL    Neutrophil Absolute Prelim 6.13 1.50 - 7.70 x10 (3) uL    Neutrophil Absolute 6.13 1.50 - 7.70 x10(3) uL    Lymphocyte Absolute 1.98 1.00 - 4.00 x10(3) uL    Monocyte Absolute 0.64 0.10 - 1.00 x10(3) uL    Eosinophil Absolute 0.06 0.00 - 0.70 x10(3) uL    Basophil Absolute 0.02 0.00 - 0.20 x10(3) uL    Immature Granulocyte Absolute 0.03 0.00 - 1.00 x10(3) uL    Neutrophil % 69.3 %    Lymphocyte % 22.3 %    Monocyte % 7.2 %    Eosinophil % 0.7 %    Basophil % 0.2 %    Immature Granulocyte % 0.3 %   C-Reactive Protein    Collection Time: 06/04/25 11:01 AM   Result Value Ref Range    C-Reactive Protein <0.40 <1.00 mg/dL           Reviewed and assessed    Diagnostic studies:  Performed an independent visualization of  mri brain from 2023  Imaging revealed:  agree w/ read    EMG  There is electrodiagnostic evidence of a primarily axonal length dependent symmetric polyneuropathy.     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     There is no electrodiagnostic evidence of another mononeuropathy, plexopathy, inflammatory/necrotizing myopathy or cervical/lumbar radiculopathy affecting either of lower extremities.     CLINICAL CORRELATION:     These abnormal findings are consistent with the patient's clinical complaints of numbness but not necessarily of the cramps on peripheral nerve or muscle basis.       Assessment      The medications she  was on the in the past that were sedating are the best choice to tx cramps. She has cramps d/t her neuropathy. She does not need a refill on the tizanidine.     Consensus guidelines, which have not been empirically tested, suggest that supplementation of iron in adults with RLS  should be instituted with oral or IV iron if serum ferritin <= 75 ng/mL or transferrin saturation < 20%, and only with IV iron if serum ferritin is between 75 and 100 ng/mL.   Her ferritin is 223 and her percent saturation is less than 45%.     Ideally she would be on a medication that would not cause such drowsiness during the day. Considered Cymbalta but that could make restless leg worse. Also she was on it in the past.               Assessment & Plan  Length Dependent Symmetric Polyneuropathy  Severe neuropathic pain, particularly nocturnal, affecting both feet with greater intensity in the right foot. Current treatment with pregabalin and gabapentin is ineffective. Discussed amitriptyline or nortriptyline, but she is concerned about side effects such as memory impairment and constipation. Decision to increase gabapentin dosage as an initial step. Amitriptyline may be considered if gabapentin remains ineffective, with caution regarding potential side effects including dry eyes, dry mouth, worsening constipation, and urinary retention.  - Increase gabapentin to 600 mg nightly.  - Consider amitriptyline at night if increased gabapentin is ineffective.    Restless Leg Syndrome  Symptoms primarily affecting the right leg with significant nocturnal movement, possibly related to neuropathy or knee replacement.    Muscle Spasms  Muscle spasms not specifically addressed.    Constipation  Chronic constipation, potentially exacerbated by current medications. She uses magnesium water for relief but is concerned about long-term use. Discussed potential side effects of amitriptyline and nortriptyline worsening constipation. Recommended docusate and senna for management.  - Consider docusate and senna for constipation management.    Sleep Apnea  Obstructive sleep apnea managed with CPAP therapy. No issues reported with CPAP use.         Plan   1. Neuropathy  - gabapentin 300 MG Oral Cap; Take 2 capsules (600 mg total) by  mouth nightly.  Dispense: 180 capsule; Refill: 1    2. Muscle spasm  - gabapentin 300 MG Oral Cap; Take 2 capsules (600 mg total) by mouth nightly.  Dispense: 180 capsule; Refill: 1    3. Restless leg syndrome  - gabapentin 300 MG Oral Cap; Take 2 capsules (600 mg total) by mouth nightly.  Dispense: 180 capsule; Refill: 1                    This document is not intended to support charting by exception.  Sections left blank in a completed note should be presumed not to have been done.      Disclaimer:   This record was dictated using  Dragon software. There may be errors due to voice recognition problems that were not realized and corrected during the completion of the note.             Thank you for allowing me to participate in the care of your patient.    Aaron Caro DO  02/07/25

## 2025-06-12 ENCOUNTER — APPOINTMENT (OUTPATIENT)
Dept: PHYSICAL THERAPY | Age: 65
End: 2025-06-12
Payer: MEDICARE

## 2025-06-16 ENCOUNTER — OFFICE VISIT (OUTPATIENT)
Dept: INTERNAL MEDICINE CLINIC | Facility: CLINIC | Age: 65
End: 2025-06-16
Payer: MEDICARE

## 2025-06-16 VITALS
SYSTOLIC BLOOD PRESSURE: 136 MMHG | WEIGHT: 193.63 LBS | BODY MASS INDEX: 29.35 KG/M2 | HEIGHT: 68 IN | OXYGEN SATURATION: 97 % | DIASTOLIC BLOOD PRESSURE: 77 MMHG | HEART RATE: 84 BPM

## 2025-06-16 DIAGNOSIS — M51.369 DEGENERATION OF INTERVERTEBRAL DISC OF LUMBAR REGION, UNSPECIFIED WHETHER PAIN PRESENT: ICD-10-CM

## 2025-06-16 DIAGNOSIS — E78.2 MIXED HYPERLIPIDEMIA: ICD-10-CM

## 2025-06-16 DIAGNOSIS — Z89.429 ACQUIRED ABSENCE OF OTHER TOE(S), UNSPECIFIED SIDE (HCC): ICD-10-CM

## 2025-06-16 DIAGNOSIS — M48.061 LUMBAR FORAMINAL STENOSIS: ICD-10-CM

## 2025-06-16 DIAGNOSIS — Z96.651 HISTORY OF TOTAL RIGHT KNEE REPLACEMENT: ICD-10-CM

## 2025-06-16 DIAGNOSIS — M54.16 LUMBAR RADICULOPATHY: Chronic | ICD-10-CM

## 2025-06-16 DIAGNOSIS — R74.8 ELEVATED CPK: ICD-10-CM

## 2025-06-16 DIAGNOSIS — I10 ESSENTIAL HYPERTENSION: ICD-10-CM

## 2025-06-16 DIAGNOSIS — M48.02 CERVICAL STENOSIS OF SPINAL CANAL: ICD-10-CM

## 2025-06-16 DIAGNOSIS — K21.9 GASTROESOPHAGEAL REFLUX DISEASE, UNSPECIFIED WHETHER ESOPHAGITIS PRESENT: ICD-10-CM

## 2025-06-16 DIAGNOSIS — Z00.00 ENCOUNTER FOR ANNUAL HEALTH EXAMINATION: Primary | ICD-10-CM

## 2025-06-16 DIAGNOSIS — I87.2 VENOUS INSUFFICIENCY OF BOTH LOWER EXTREMITIES: ICD-10-CM

## 2025-06-16 DIAGNOSIS — G47.33 OSA (OBSTRUCTIVE SLEEP APNEA): ICD-10-CM

## 2025-06-16 DIAGNOSIS — K76.9 LIVER LESION: ICD-10-CM

## 2025-06-16 DIAGNOSIS — Z98.890 STATUS POST NISSEN FUNDOPLICATION: ICD-10-CM

## 2025-06-16 DIAGNOSIS — M54.14 THORACIC AND LUMBOSACRAL NEURITIS: ICD-10-CM

## 2025-06-16 DIAGNOSIS — R25.2 LEG CRAMPS: ICD-10-CM

## 2025-06-16 DIAGNOSIS — M51.26 HERNIATION OF INTERVERTEBRAL DISC BETWEEN L4 AND L5: ICD-10-CM

## 2025-06-16 DIAGNOSIS — M67.919 DISORDER OF BURSAE AND TENDONS IN SHOULDER REGION: ICD-10-CM

## 2025-06-16 DIAGNOSIS — G62.9 NEUROPATHY: ICD-10-CM

## 2025-06-16 DIAGNOSIS — R73.03 PREDIABETES: ICD-10-CM

## 2025-06-16 DIAGNOSIS — M71.9 DISORDER OF BURSAE AND TENDONS IN SHOULDER REGION: ICD-10-CM

## 2025-06-16 DIAGNOSIS — M54.17 THORACIC AND LUMBOSACRAL NEURITIS: ICD-10-CM

## 2025-06-16 DIAGNOSIS — E55.9 VITAMIN D DEFICIENCY: ICD-10-CM

## 2025-06-16 DIAGNOSIS — G72.9 MYOPATHY: ICD-10-CM

## 2025-06-16 DIAGNOSIS — M85.852 OSTEOPENIA OF LEFT HIP: ICD-10-CM

## 2025-06-16 PROBLEM — L84 CALLUS OF FOOT: Status: RESOLVED | Noted: 2020-08-24 | Resolved: 2025-06-16

## 2025-06-16 PROBLEM — Z96.1 PSEUDOPHAKIA, LEFT EYE: Status: RESOLVED | Noted: 2018-12-04 | Resolved: 2025-06-16

## 2025-06-16 PROBLEM — H25.9 AGE-RELATED CATARACT: Status: RESOLVED | Noted: 2018-02-21 | Resolved: 2025-06-16

## 2025-06-16 PROBLEM — N28.9 KIDNEY LESION: Status: RESOLVED | Noted: 2024-07-25 | Resolved: 2025-06-16

## 2025-06-16 PROBLEM — H40.003 PREGLAUCOMA, UNSPECIFIED, BILATERAL: Status: RESOLVED | Noted: 2022-04-27 | Resolved: 2025-06-16

## 2025-06-16 PROBLEM — M75.122 NONTRAUMATIC COMPLETE TEAR OF LEFT ROTATOR CUFF: Status: RESOLVED | Noted: 2023-07-28 | Resolved: 2025-06-16

## 2025-06-16 PROBLEM — M54.42 CHRONIC BILATERAL LOW BACK PAIN WITH BILATERAL SCIATICA: Status: RESOLVED | Noted: 2019-10-24 | Resolved: 2025-06-16

## 2025-06-16 PROBLEM — T81.40XA POSTOPERATIVE INFECTION, UNSPECIFIED TYPE, INITIAL ENCOUNTER: Status: RESOLVED | Noted: 2025-03-31 | Resolved: 2025-06-16

## 2025-06-16 PROBLEM — H40.1132 PRIMARY OPEN ANGLE GLAUCOMA OF BOTH EYES, MODERATE STAGE: Status: RESOLVED | Noted: 2018-02-10 | Resolved: 2025-06-16

## 2025-06-16 PROBLEM — E78.5 HYPERLIPIDEMIA WITH TARGET LOW DENSITY LIPOPROTEIN (LDL) CHOLESTEROL LESS THAN 130 MG/DL: Status: RESOLVED | Noted: 2023-07-28 | Resolved: 2025-06-16

## 2025-06-16 PROBLEM — L08.9 RIGHT KNEE SKIN INFECTION: Status: RESOLVED | Noted: 2025-03-31 | Resolved: 2025-06-16

## 2025-06-16 PROBLEM — D72.828 NEUTROPHILIA: Status: RESOLVED | Noted: 2024-01-15 | Resolved: 2025-06-16

## 2025-06-16 PROBLEM — M79.18 MYOFASCIAL PAIN: Status: RESOLVED | Noted: 2019-10-24 | Resolved: 2025-06-16

## 2025-06-16 PROBLEM — L03.115 CELLULITIS OF KNEE, RIGHT: Status: RESOLVED | Noted: 2025-04-08 | Resolved: 2025-06-16

## 2025-06-16 PROBLEM — M54.41 CHRONIC BILATERAL LOW BACK PAIN WITH BILATERAL SCIATICA: Status: RESOLVED | Noted: 2019-10-24 | Resolved: 2025-06-16

## 2025-06-16 PROBLEM — H02.413 MECHANICAL PTOSIS OF BILATERAL EYELIDS: Status: RESOLVED | Noted: 2019-12-11 | Resolved: 2025-06-16

## 2025-06-16 PROBLEM — H53.143 VISUAL DISCOMFORT, BILATERAL: Status: RESOLVED | Noted: 2022-04-27 | Resolved: 2025-06-16

## 2025-06-16 PROBLEM — H43.399 VITREOUS OPACITIES: Status: RESOLVED | Noted: 2024-09-13 | Resolved: 2025-06-16

## 2025-06-16 PROBLEM — M75.121 NONTRAUMATIC COMPLETE TEAR OF RIGHT ROTATOR CUFF: Status: RESOLVED | Noted: 2020-01-11 | Resolved: 2025-06-16

## 2025-06-16 PROBLEM — G89.29 CHRONIC BILATERAL LOW BACK PAIN WITH BILATERAL SCIATICA: Status: RESOLVED | Noted: 2019-10-24 | Resolved: 2025-06-16

## 2025-06-16 PROBLEM — H02.403 PTOSIS OF BOTH EYELIDS: Status: RESOLVED | Noted: 2018-02-10 | Resolved: 2025-06-16

## 2025-06-16 PROBLEM — M79.89 SWELLING OF EXTREMITY: Status: RESOLVED | Noted: 2024-01-02 | Resolved: 2025-06-16

## 2025-06-16 PROBLEM — D75.839 THROMBOCYTOSIS: Status: RESOLVED | Noted: 2025-04-08 | Resolved: 2025-06-16

## 2025-06-16 PROCEDURE — G0439 PPPS, SUBSEQ VISIT: HCPCS | Performed by: INTERNAL MEDICINE

## 2025-06-16 PROCEDURE — 99499 UNLISTED E&M SERVICE: CPT | Performed by: INTERNAL MEDICINE

## 2025-06-16 NOTE — PROGRESS NOTES
Subjective:   Kerry Hsu is a 64 year old female who presents for a Medicare Subsequent Annual Wellness visit (Pt already had Initial Annual Wellness) and scheduled follow up of multiple significant but stable problems.   Pt comes with her  for her medicare physical  The right knee is still giving her significant pain and nerve pain and the physical therapy had to be stopped because of this  She will see the nerve doctor this week --dr gretel barron   Even covering it with her jeans are at night with the sheet or covers irritates that area  She used to have right shoulder bursitis   patient has been using the CPAP machine consistently and has been benefiting from it and was told to follow-up with her primary after 30 days--got the CPAP machine in April and has been using it since then      HISTORY  Saw the cardiologist and back on crestor 10 and on lyrica 10 bid as it causes neuropathy sympt   Can walk up 4 flights of stairs without feeling short of breath but her knee does hurt, got EKG in jan   Got dental clearance as well   The most recent surgery was the hip surgery done on January 2025 -doing PT , not taking anything for pain      HISTORY   was told she had neurology  and was todl she had neuropathy and had EMG and her right leg and b/l feet has numbness and tingling and feels heavy   Gabapentin - no help, aldolase - didn't help   Was given lyrica and takes it only at night , which it usually helps her sleep except last night   Has not tried the valium  yet  Got Injection to the right hip 2 weeks (DEC 6TH ) ago by Dr. Jefferson Mcmillan at [facility] right now she is feeling well  Since last visit she did get the MRI and the liver cyst have decreased but noted that she does have kidney cysts so we will get an ultrasound and refer her to urology for that     She had sleep study 6/2024 but she is scared to stay [facility]                    HISTORY  saw the neurosurgeon and was reassured that  it was not her back so saw the Ortho doctor and MRI of the hip were done  Not taking diazepam   Foot is numb and hips and lower back hurt   Noted that in 2017 she was given duloxetine 20 30 40 and 60 most likely is a increased titrating up and it was not helping  Also has some questions regarding her insurance and Medicare and confirming she does not have HMO in the chart-confirmed that I do not see this in her chart           HISTORY  restarted in the upper arms \"its back and more severe \"   Wondering if she has stiff persons synd -would like Anti-glutamic acid decarboxylase (NICOLE) antibody testing , looked at the chart and noted that there was a NICOLE 65 antibody testing that is available in the chart but I am not sure if this is the same as the antiglutamic acid decarboxylase so she will follow-up with rheumatology  Had emg and muscle biopsy in 2014 by dr hernandez  a few yrs ago at that area left leg area does get swollen and hard   Has apt with GI this week , H. pylori test is negative  Got injection to the back and although it still hurting it does take time so she is being patient with that  Still has rash under her breasts and also the groin and has tried ketoconazole cream as well as the nystatin powder        HISTORY  Saw Dr. Delgadillo the orthopedic doctor and was sent for physical therapy which she is doing now for her back but needs to see the back doctor--reviewed note and noted it was suggested she see physiatry         Had  the left cataract removed  approx 3 yrs ago and since then has had blurry vision and double vision In the left eye        She still does get occasional cramping and takes magnesium for it and has numbness and tingling in bilateral feet worse on the right, noted in the past her cpk was always elevated and had this worked up but did read that lead might have something to do with it so we will have this checked    HISTORY    palpitations for the past couple weeks at least and it is now  scaring her because she can feel it when she is just lying down or just sitting there not doing anything and it is not on it but only on exertion, comes and goes   Also has some retrosternal chest pains 5/10  Better ? --took ppi and it didn't help   Worse -with deep inspirations  No increase in caffeine  Chest pain and palpitations happen at the same time and not associated with any diaphoresis  She did have an EKG done in January and a calcium score in August of this year           HISTORY  Saw dr rothman ent and ct was neg SO NO  sinus SURG NEEDED     She thinks its her eyes   Feels her vision is bad even after glasses and thinks it happended after cataract surg   HAS SEEN   saw dr mckeon--  neurology   Saw cards dr genao and will go for heart scan   Saw many eye drs and tried drops but no help , eye drops now burns                     HISTORY  6/2023 visit     will see the surgeon for a cyst that she has in her vaginal area, she had already seen the dermatologist and the GYN doctor  Hopefully will come out of the boot for her left leg  Saw ENT and may is sinus surgery            HISTORY   right wrist pain after her rotator cuff surg x 2 weeks -was first like an electric shock and now only gets that once in a while but now more sore   Going for PT   She also has some left thigh pain - from buttocks to the knees         History  3/2022  right shoulder surgery on 3/7/2022   Requested by dr shabnam Garcia - can be seen in epic   Can walk up one flight of stars without feeling short of breath   Right shoulder radiates to the neck on that side   She is right handed , cant use her hand and arm as much            HISTORY   10/2021   She has been seeing Ortho for the neuropathy and also received shots to the shoulder-right-and knees and was given Tylenol No. 3  Also sees the foot doctor  saw dr mckeon nuerology and consider valium but she feels this will affect her ability to work   She complains of \"nerve pain\" in the  feet   She states that valium knocks her out -even if she takes it in the evening she still feels the effects in the am , foot dr gave gabpentin but that too makes her sleepy \"im very sensitive   Also c/o left flank pain  X couple weeks -- ua done in office and does show blood with uti    8/10 , sharp and stabbing ,   Worse with standing after sitting a long time   Better - ?   Comes and goesbut now more frequent      Not taking trazodone - not helping and she is not sure what meds cuases what so she doesn't want to be on meds   Know she has had CTs and ultrasounds of her kidney done before and has seen urology as well- dr moreno -she did have a 3 mm nonobstructing stone in the right kidney  Still has her chronic cough and try the Tessalon Perles which did not help that much, has an inhaler so she will try that, the codien cough syrup just put her to sleep as well      Finished doxy yest taht she got from the  ,didn't help          HISTORY 3/2021   cramps is all over but mostly in the legs-stretching makes it works and the meloxicam is not helping and she did see the rheumatologist and was started on a new medication metaxalone 800 mg 3 times a day but it makes her sleepy so she is only taking it at night--it still wakes her up at night so does not feel that that is working was recommended to [facility] an [facility]     Still has a cough and the codeine cough syrup did not help much and noted that she is on and PPI and the chest x-ray was normal which was ordered last time   she has noticed that the rescue inhaler does help                       HISTORY  History from August 2020   Has seen the rheumatologist, physiatry and gone for physical therapy  Has tried Cymbalta given by physiatry but in January when I had seen her she had stated that she was not taking it and and cyclobenzaprine was started but she does not think that that was helpful  She states that she has had an EMG in the past as well a very  long time ago  She states that she drinks a lot of water as well  2015 emg report noted in chart - normal   Also noted in 2015 she saw the neurologist Dr. Enamorado and was recommended to go to the pain doctors--Flexeril was tried  Needs to go back to see Dr. Hays the podiatrist for her orthotics                 History   Last seen in February and since then in March she saw Dr. Delgadillo and got knee injections for her osteoarthritis of the knees   had an endoscopic procedure and was found to have gastric polyp by Dr. Pereira  In June she saw the foot doctor and received a shot in her foot for the neuroma  In July she saw Dr. Bird the eye doctor for her glaucoma and will return in 4 months and then           History  1/11/2020 visit   Dr clay- ortho -- left hand tendonitiis -got shot and feeks better   Would like to see dr berger   Not taking cymbalta - takes T#3 one a week , ibuprofen \"seldom\"- once a mn   Muscle cramps coming back - legs thighs and feet , no RLS               History- 11/19  C/o right shoulder has a tear  And she does admit she uses the left more   Used to see dr berger and now sees dr haque and got a shot to the right shoulder   Needs a referral to see pain clinic   Needs referral to see podiatrist --corns and calluses right foot / toe and also for ingrown toenails   Pain is 10/10 -- iburprofen does helps but doesn't like taking it too much --takes T#3 but t just puts her to sleep and then she will wake up with pain -- unable ot sratch her back   No falls trauma or injury that she is aware of      History   She has had both muscle and nerve biopsies by dr hernandez   She gets these cramps every day   Steroids do not help either  Noted that she had try cyclobenzaprine 10 mg in July 2019--no help   Since the last visit she did see her orthopedic doctor and received a short of a cortisone shot to her right shoulder  Also c/o left hand tender swelling x few weeks   She also states that she  gets rashes underneath her breasts and the triamcinolone helps with this and needs a refill        hisotry -8/19 first visit   C/c htn   C/o fell on July 8th and has some left rib pain and back pains   Had some ct scans and would like to go through it  Needs referrals    Usually gets shots to her knees and shoulders- was told to follow-up with a new doctor     [facility]  gerd daily ppi --h/o nissens fundoplication  ?2006  HTN  Hip pain and back pain - T#3 - prn 2 tabs a week, also takes ibuprofen   bm hematuria   Non obstructing kid stones   kb was on cpap   Glaucoma suspect   Right shoulder rotator cuff tear s/p surg 3/2022   Osteoarthritis knees  Hepatic and renal cysts similar to 8/19 and CT 5/20  H/o left toe OM s/p amputation   KB on 6/2024 sleep study      Dr peters - eye dr Dr moreno - urologist   Dr. Craft- rheum   Dr. Delgadillo- ortho   Dr lorraine hall      ----------------------------------------------------------------------------------------------------------------            History/Other:   Fall Risk Assessment:   She has been screened for Falls and is low risk.      Cognitive Assessment:   She had a completely normal cognitive assessment - see flowsheet entries       Functional Ability/Status:   Kerry Hsu has some abnormal functions as listed below:  She has Vision problems based on screening of functional status.       Depression Screening (PHQ):  PHQ-2 SCORE: 0  , done 6/16/2025             Advanced Directives:   She does NOT have a Living Will. [Do you have a living will?: No]  She does NOT have a Power of  for Health Care. [Do you have a healthcare power of ?: No]  Discussed Advance Care Planning with patient (and family/surrogate if present). Standard forms made available to patient in After Visit Summary.      Patient Active Problem List   Diagnosis    Essential hypertension    Status post Nissen fundoplication    Neuropathy    Myopathy    Vitamin D  deficiency    KB (obstructive sleep apnea)    Cervical stenosis of spinal canal    Lumbar radiculopathy    Elevated CPK    Lumbar foraminal stenosis    Herniation of intervertebral disc between L4 and L5    DDD (degenerative disc disease), lumbar    Leg cramps    Prediabetes    Osteopenia of left hip    Hyperlipidemia    Liver lesion    Thoracic and lumbosacral neuritis    Disorder of bursae and tendons in shoulder region    Venous insufficiency of both lower extremities    Esophageal reflux    Neuralgia and neuritis    History of total right knee replacement    Acquired absence of other toe(s), unspecified side (HCC)     Allergies:  She is allergic to celecoxib, sulfa antibiotics, erythromycin, erythromycin stearate, and amoxicillin.    Current Medications:  Outpatient Medications Marked as Taking for the 6/16/25 encounter (Office Visit) with Bella Rogers MD   Medication Sig    famotidine 20 MG Oral Tab as needed.    gabapentin 300 MG Oral Cap Take 2 capsules (600 mg total) by mouth nightly.    amLODIPine 10 MG Oral Tab Take 1 tablet (10 mg total) by mouth daily.    OMEPRAZOLE 40 MG Oral Capsule Delayed Release TAKE 1 CAPSULE(40 MG) BY MOUTH DAILY BEFORE BREAKFAST    ibuprofen 600 MG Oral Tab Take 1 tablet (600 mg total) by mouth 2 (two) times daily as needed for Pain. After meals    polyethylene glycol, PEG 3350, 17 g Oral Powd Pack Take 17 g by mouth daily as needed.    rosuvastatin 10 MG Oral Tab Take 1 tablet (10 mg total) by mouth every evening.    latanoprost 0.005 % Ophthalmic Solution INSTILL 1 DROP IN BOTH EYES EVERY night    cholecalciferol 50 MCG (2000 UT) Oral Cap Take 1 capsule (2,000 Units total) by mouth daily.       Medical History:  She  has a past medical history of Abscess of left thigh, Acute meniscal tear of knee, Age-related nuclear cataract of both eyes (02/10/2015), Anal sphincter incontinence (04/28/2014), Arthritis, Back problem, Back problem, Cataract, Chondromalacia, Colon polyps,  Degenerative disc disease, Disorder of kidney and ureter (09/17/2013), Diverticular disease, Esophageal reflux, Glaucoma, Hammertoe, High blood pressure, High cholesterol, motion sickness, Hyperlipidemia with target low density lipoprotein (LDL) cholesterol less than 130 mg/dL (07/28/2023), Insomnia, Kidney lesion (07/25/2024), Liver lesion (07/25/2024), Neuropathy, Obstructive sleep apnea syndrome (03/21/2018), Osteoarthritis, Palpitation, PONV (postoperative nausea and vomiting), Prediabetes, Primary open angle glaucoma of both eyes (05/19/2015), Sleep apnea, Small bowel obstruction (HCC), Tendinitis, Unspecified essential hypertension, and Visual impairment.  Surgical History:  She  has a past surgical history that includes hysterectomy (1996); anal sphincterotomy; hc arthrocentesis or inject major joint w/o us; upper gi endoscopy performed (05/2015); colonoscopy (N/A, 11/11/2016); Excision of Chalazion, Single - OD - Right Eye (Right, 6.27/2017); Cataract extraction w/  intraocular lens implant (Left, 05/07/2018); Yag Capsulotomy - OS - Left Eye (Left, 12/19/2018); other; colonoscopy (N/A, 09/06/2019); other surgical history (2005,2007,2008); other surgical history; other surgical history (11/14/2014); blepharoplasty anesthesia (Bilateral, 03/05/2020); other surgical history (06/13/2023); egd (09/16/2024); colonoscopy (09/16/2024); and colonoscopy (N/A, 9/16/2024).   Family History:  Her family history includes Cancer in her sister; Hypertension in her brother, father, mother, and sister.  Social History:  She  reports that she has never smoked. She has never been exposed to tobacco smoke. She has never used smokeless tobacco. She reports that she does not drink alcohol and does not use drugs.    Tobacco:  She has never smoked tobacco.    CAGE Alcohol Screen:   CAGE screening score of 0 on 6/16/2025, showing low risk of alcohol abuse.      Patient Care Team:  Bella Rogers MD as PCP - General (Internal  Medicine)  Allan Abdullahi DO (INFECTIOUS DISEASES)  Bella Rogers MD as PCP (Internal Medicine)  Joni Dueñas DO (OBSTETRICS & GYNECOLOGY)  Srinivasan Mendoza DO (OBSTETRICS & GYNECOLOGY)  Dodie Asif APRN (Nurse Practitioner)    Review of Systems  GENERAL: feels well otherwise  SKIN: denies any unusual skin lesions  EYES: denies blurred vision or double vision  HEENT: denies nasal congestion, sinus pain or ST  LUNGS: denies shortness of breath with exertion  CARDIOVASCULAR: denies chest pain on exertion  GI: denies abdominal pain, denies heartburn  : denies dysuria, vaginal discharge or itching, no complaint of urinary incontinence   MUSCULOSKELETAL: + back pain- stable   NEURO: denies headaches  PSYCHE: denies depression or anxiety  HEMATOLOGIC: denies hx of anemia  ENDOCRINE: denies thyroid history  ALL/ASTHMA: denies hx of allergy or asthma    Objective:   Physical Exam  GENERAL: well developed, well nourished,in no apparent distress  SKIN: no rashes,no suspicious lesions  HEENT: atraumatic, normocephalic,ears and throat are clear, no frontal or maxillary sinus tenderness, pupils equal reactive to light bilaterally, extraocular muscles intact  NECK: supple,no adenopathy,nontender   LUNGS: clear to auscultation, no wheeze  CARDIO: RRR without murmur  GI: good BS's,no masses or tenderness  EXTREMITIES: + edema R>L , right knee with well healed scar , + tender       /77   Pulse 84   Ht 5' 8\" (1.727 m)   Wt 193 lb 9.6 oz (87.8 kg)   SpO2 97%   BMI 29.44 kg/m²  Estimated body mass index is 29.44 kg/m² as calculated from the following:    Height as of this encounter: 5' 8\" (1.727 m).    Weight as of this encounter: 193 lb 9.6 oz (87.8 kg).    Medicare Hearing Assessment:   Hearing Screening    Screening Method: Questionnaire  I have a problem hearing over the telephone: No I have trouble following the conversations when two or more people are talking at the same time: No   I have trouble  understanding things on the TV: No I have to strain to understand conversations: No   I have to worry about missing the telephone ring or doorbell: No I have trouble hearing conversations in a noisy background such as a crowded room or restaurant: No   I get confused about where sounds come from: No I misunderstand some words in a sentence and need to ask people to repeat themselves: No   I especially have trouble understanding the speech of women and children: No I have trouble understanding the speaker in a large room such as at a meeting or place of Mandaeism: No   Many people I talk to seem to mumble (or don't speak clearly): No People get annoyed because I misunderstand what they say: No   I misunderstand what others are saying and make inappropriate responses: No I avoid social activities because I cannot hear well and fear I will reply improperly: No   Family members and friends have told me they think I may have hearing loss: No                   Assessment & Plan:   Kerry Hsu is a 64 year old female who presents for a Medicare Assessment.     1. Encounter for annual health examination (Primary)  Advised patient to watch what she eats exercise as tolerated , seatbelt use no texting driving, sunscreen use advised   2. Essential hypertension  -     Hemoglobin A1C; Future; Expected date: 06/16/2025  -     Lipid Panel; Future; Expected date: 06/16/2025  -     Vitamin D; Future; Expected date: 06/16/2025  Advised pt to follow a low salt , low sodium (including fast foods and processed foods), can look up DASH diet, exercise 30 min a day , monitor bp ,cont meds   3. Mixed hyperlipidemia  -     Hemoglobin A1C; Future; Expected date: 06/16/2025  -     Lipid Panel; Future; Expected date: 06/16/2025  -     Vitamin D; Future; Expected date: 06/16/2025  Follow a low fat low cholesterol diet and exercise as tolerated , unable to statin   4. Venous insufficiency of both lower extremities  Keep legs elevated, can  try compression stockings if tolerable  5. Acquired absence of other toe(s), unspecified side (HCC)  Stable   6. Vitamin D deficiency  -     Vitamin D; Future; Expected date: 06/16/2025  Stable , monitor  7. Prediabetes  -     Hemoglobin A1C; Future; Expected date: 06/16/2025  Follow low sugar low-carb diet and exercise with a goal of 30 minutes a day, monitor A1c  8. Cervical stenosis of spinal canal  Stable   9. Thoracic and lumbosacral neuritis  Stable   10. Degeneration of intervertebral disc of lumbar region, unspecified whether pain present  Stable   11. Herniation of intervertebral disc between L4 and L5  Stable   12. Lumbar foraminal stenosis  Stable   13. Lumbar radiculopathy  Stable   14. Osteopenia of left hip  Stable   15. Neuropathy  Stable   16. Gastroesophageal reflux disease, unspecified whether esophagitis present  Stable   17. Status post Nissen fundoplication  Stable   18. Liver lesion  -     US LIVER (CPT=76705); Future; Expected date: 06/16/2025  Liver ultrasound ordered  19. Disorder of bursae and tendons in shoulder region  Stable   20. History of total right knee replacement  Stable   21. Myopathy  Currently stable   22. Elevated CPK  Stable currently   23. Leg cramps  Stable   24. KB (obstructive sleep apnea)  Stable                  Preventative medicine  Colonoscopy done  9/19 and 9/2024   Dr. hall   Mammogram-10/2024  Pap 2016 dr chin -hina ,saw Dr. Witt in February 2021 and sees mina jaffe   Labs 12/2025 2/2025  and 4/2025 6/2025 reviewed      The patient indicates understanding of these issues and agrees to the plan.  Reinforced healthy diet, lifestyle, and exercise.      Return in 3 months (on 9/16/2025).     Bella Rogers MD, 6/16/2025     Supplementary Documentation:   General Health:  In the past six months, have you lost more than 10 pounds without trying?: 2 - No  Has your appetite been poor?: No  Type of Diet: Balanced  How does the patient maintain a good energy  level?: Daily Walks  How would you describe your daily physical activity?: Light  How would you describe your current health state?: Good  How do you maintain positive mental well-being?: Social Interaction, Puzzles, Visiting Friends, Visiting Family  On a scale of 0 to 10, with 0 being no pain and 10 being severe pain, what is your pain level?: 0 - (None)  In the past six months, have you experienced urine leakage?: 0-No  At any time do you feel concerned for the safety/well-being of yourself and/or your children, in your home or elsewhere?: No  Have you had any immunizations at another office such as Influenza, Hepatitis B, Tetanus, or Pneumococcal?: No    Health Maintenance   Topic Date Due    COVID-19 Vaccine (8 - 2024-25 season) 03/17/2025    Annual Physical  06/10/2025    Mammogram  10/09/2025    Colorectal Cancer Screening  09/16/2029    Influenza Vaccine  Completed    DEXA Scan  Completed    Annual Depression Screening  Completed    Fall Risk Screening (Annual)  Completed    Pneumococcal Vaccine: 50+ Years  Completed    Zoster Vaccines  Completed    Meningococcal B Vaccine  Aged Out

## 2025-06-17 ENCOUNTER — APPOINTMENT (OUTPATIENT)
Dept: PHYSICAL THERAPY | Age: 65
End: 2025-06-17
Payer: MEDICARE

## 2025-06-21 ENCOUNTER — HOSPITAL ENCOUNTER (OUTPATIENT)
Age: 65
Discharge: HOME | End: 2025-06-21
Payer: MEDICARE

## 2025-06-21 VITALS
DIASTOLIC BLOOD PRESSURE: 67 MMHG | OXYGEN SATURATION: 97 % | SYSTOLIC BLOOD PRESSURE: 119 MMHG | RESPIRATION RATE: 18 BRPM | TEMPERATURE: 98 F | HEART RATE: 80 BPM

## 2025-06-21 DIAGNOSIS — J01.90 ACUTE SINUSITIS, RECURRENCE NOT SPECIFIED, UNSPECIFIED LOCATION: Primary | ICD-10-CM

## 2025-06-21 PROCEDURE — 99214 OFFICE O/P EST MOD 30 MIN: CPT

## 2025-06-21 PROCEDURE — 99213 OFFICE O/P EST LOW 20 MIN: CPT

## 2025-06-21 RX ORDER — DOXYCYCLINE 100 MG/1
100 CAPSULE ORAL 2 TIMES DAILY
Qty: 20 CAPSULE | Refills: 0 | Status: SHIPPED | OUTPATIENT
Start: 2025-06-21 | End: 2025-07-01

## 2025-06-21 NOTE — ED INITIAL ASSESSMENT (HPI)
Patient arrives ambulatory with c/o sinus pain to under eye, right>left, forehead since Tuesday. Denies fevers. Denies other symptoms. Denies recent illness.

## 2025-06-21 NOTE — ED PROVIDER NOTES
Patient Seen in: [facility]        History  Chief Complaint   Patient presents with    Sinus Problem     Stated Complaint: URI    Subjective:   HPI            64-year-old female presents with right sided sinus pain and congestion.  She has tried multiple over-the-counter sinus medications without relief.  She denies fever.      Objective:     Past Medical History:    Abscess of left thigh    Acute meniscal tear of knee    Age-related nuclear cataract of both eyes    Anal sphincter incontinence    Arthritis    Back problem    Back problem    Cataract    left    Chondromalacia    Colon polyps    Degenerative disc disease    Disorder of kidney and ureter    Diverticular disease    Esophageal reflux    Glaucoma    Hammertoe    High blood pressure    High cholesterol    Hx of motion sickness    Hyperlipidemia with target low density lipoprotein (LDL) cholesterol less than 130 mg/dL    Insomnia    Kidney lesion    Liver lesion    per patient not being treated or seeing a specialist    Neuropathy    both feet    Obstructive sleep apnea syndrome    Osteoarthritis    Palpitation    PONV (postoperative nausea and vomiting)    Prediabetes    Primary open angle glaucoma of both eyes    Diagnosis of glaucoma OU; Started Latanoprost qhs after abnormal VF and OCT 2/25/16;  6/5/18 consult with Dr. Madeline Chappell-see progress note    Sleep apnea    no current therapy    Small bowel obstruction (HCC)    Tendinitis    Unspecified essential hypertension    Visual impairment    Readers              Past Surgical History:   Procedure Laterality Date    Anal sphincterotomy      03/12/2013 [place]    Blepharoplasty anesthesia Bilateral 03/05/2020    Dr Suresh [facility]     Cataract extraction w/  intraocular lens implant Left 05/07/2018    L PC IOL with Dr. Suresh @ [facility]    Colonoscopy N/A 11/11/2016    Procedure: COLONOSCOPY;  Surgeon: Sabrina Cazares MD;  Location: [facility] ENDOSCOPY    Colonoscopy N/A  09/06/2019    Procedure: COLONOSCOPY;  Surgeon: Edwin Sterling MD;  Location: [facility] ENDOSCOPY    Colonoscopy  09/16/2024    Dr. Sterling; colon polyps, diverticulosis, hemorrhoids    Colonoscopy N/A 9/16/2024    Procedure: COLONOSCOPY;  Surgeon: Edwni Sterling MD;  Location: [facility] ENDOSCOPY    Egd  09/16/2024    Dr. Sterling; gastric polyps, small hiatal hernia    Excision of chalazion, single - od - right eye Right 6.27/2017    Nasally    Hc arthrocentesis or inject major joint w/o us      Hysterectomy  1996    KAI    Other      Lap Nissen Fundoplication surgery    Other surgical history  2005,2007,2008    LAPAROSCOPIC LYSIS OF ADHESION    Other surgical history      right foot bunionectomy    Other surgical history  11/14/2014    right superficial anterior peroneal nerve biopsy and right proximal thigh muscle biopsy.    Other surgical history  06/13/2023    Excision and Biopsy of two  perineal cysts    Upper gi endoscopy performed  05/2015    Yag capsulotomy - os - left eye Left 12/19/2018    RJ                Social History     Socioeconomic History    Marital status:    Tobacco Use    Smoking status: Never     Passive exposure: Never    Smokeless tobacco: Never   Vaping Use    Vaping status: Never Used   Substance and Sexual Activity    Alcohol use: No     Comment: None.     Drug use: No    Sexual activity: Yes     Birth control/protection: Hysterectomy   Other Topics Concern    Caffeine Concern Yes     Comment: occasional soda    Exercise Yes    Pt has a pacemaker No    Pt has a defibrillator No    Breast feeding No    Reaction to local anesthetic No    Right Handed Yes   Social History Narrative    Work - banking     Social Drivers of Health     Food Insecurity: No Food Insecurity (3/31/2025)    NCSS - Food Insecurity     Worried About Running Out of Food in the Last Year: No     Ran Out of Food in the Last Year: No   Transportation Needs: No Transportation Needs (3/31/2025)    NCSS - Transportation     Lack of  Transportation: No   Housing Stability: Not At Risk (3/31/2025)    NCSS - Housing/Utilities     Has Housing: Yes     Worried About Losing Housing: No     Unable to Get Utilities: No              Review of Systems    Positive for stated complaint: URI  Other systems are as noted in HPI.  Constitutional and vital signs reviewed.      All other systems reviewed and negative except as noted above.                  Physical Exam    ED Triage Vitals [06/21/25 1116]   /67   Pulse 80   Resp 18   Temp 97.8 °F (36.6 °C)   Temp src Oral   SpO2 97 %   O2 Device None (Room air)       Current Vitals:   Vital Signs  BP: 119/67  Pulse: 80  Resp: 18  Temp: 97.8 °F (36.6 °C)  Temp src: Oral    Oxygen Therapy  SpO2: 97 %  O2 Device: None (Room air)            Physical Exam  Vitals reviewed.   Constitutional:       General: She is not in acute distress.     Appearance: She is ill-appearing.   HENT:      Right Ear: Tympanic membrane, ear canal and external ear normal.      Left Ear: Tympanic membrane, ear canal and external ear normal.      Nose: No congestion.      Comments: Tenderness to percuss over right maxillary and right frontal sinuses     Mouth/Throat:      Mouth: Mucous membranes are moist.   Cardiovascular:      Rate and Rhythm: Normal rate and regular rhythm.   Pulmonary:      Effort: Pulmonary effort is normal.      Breath sounds: Normal breath sounds.   Musculoskeletal:      Cervical back: Normal range of motion and neck supple.   Lymphadenopathy:      Cervical: No cervical adenopathy.   Skin:     General: Skin is warm and dry.   Neurological:      General: No focal deficit present.      Mental Status: She is alert and oriented to person, place, and time.   Psychiatric:         Mood and Affect: Mood normal.         Behavior: Behavior normal.                 ED Course  Labs Reviewed - No data to display                         MDM             Medical Decision Making  64-year-old female presents with right-sided sinus  pain and congestion.  Differential diagnosis includes viral upper respiratory infection, sinusitis.  Patient has tried multiple over-the-counter medications.  She reports tenderness over her right frontal and right maxillary sinuses.  On exam she does have tenderness there to percuss.  Patient will be treated with antibiotics for acute sinus infection.  Prescription was sent to her pharmacy.  She was given verbal instructions for help with her symptoms.    Risk  OTC drugs.  Prescription drug management.        Disposition and Plan     Clinical Impression:  1. Acute sinusitis, recurrence not specified, unspecified location         Disposition:  Discharge  6/21/2025 11:33 am    Follow-up:  Bella Rogers MD  [address] [place] [id number]  [phone]      If symptoms worsen          Medications Prescribed:  Discharge Medication List as of 6/21/2025 11:36 AM        START taking these medications    Details   doxycycline 100 MG Oral Cap Take 1 capsule (100 mg total) by mouth 2 (two) times daily for 10 days., Normal, Disp-20 capsule, R-0                   Supplementary Documentation:

## 2025-06-27 ENCOUNTER — HOSPITAL ENCOUNTER (OUTPATIENT)
Dept: ULTRASOUND IMAGING | Age: 65
Discharge: HOME | End: 2025-06-27
Attending: INTERNAL MEDICINE
Payer: MEDICARE

## 2025-06-27 ENCOUNTER — OFFICE VISIT (OUTPATIENT)
Dept: PULMONOLOGY | Facility: CLINIC | Age: 65
End: 2025-06-27

## 2025-06-27 VITALS
DIASTOLIC BLOOD PRESSURE: 64 MMHG | RESPIRATION RATE: 16 BRPM | WEIGHT: 193 LBS | BODY MASS INDEX: 29.25 KG/M2 | HEIGHT: 68 IN | HEART RATE: 73 BPM | OXYGEN SATURATION: 96 % | SYSTOLIC BLOOD PRESSURE: 122 MMHG

## 2025-06-27 DIAGNOSIS — K76.9 LIVER LESION: ICD-10-CM

## 2025-06-27 DIAGNOSIS — G47.33 OSA (OBSTRUCTIVE SLEEP APNEA): Primary | ICD-10-CM

## 2025-06-27 PROCEDURE — 76705 ECHO EXAM OF ABDOMEN: CPT | Performed by: INTERNAL MEDICINE

## 2025-06-27 PROCEDURE — 99213 OFFICE O/P EST LOW 20 MIN: CPT | Performed by: INTERNAL MEDICINE

## 2025-06-27 NOTE — PROGRESS NOTES
Referring Physician  Bella Rogers MD    History of Present Illness  Patient seen today for follow-up visit to pulmonary clinic.  Using CPAP device on nightly basis.  Denies significant improvement in hypersomnia and fatigue.  Denies significant dyspnea symptoms throughout the day.    Medications  Medications Ordered Prior to Encounter[1]    Allergies  Allergies[2]    Physical Exam  Constitutional: no acute distress  HEENT: PERRL  Neck: supple, no JVD  Cardio: RRR, S1 S2  Respiratory: clear to auscultation bilaterally, no wheezing, rales, rhonchi, crackles  GI: abdomen soft, non tender, active bowel sounds, no organomegaly  Extremities: no clubbing, cyanosis, edema  Neurologic: no gross motor deficits  Skin: warm, dry  Lymphatic: no supraclavicular lymphadenopathy     Assessment  1.  KB    Plan  -Patient presents today for management of underlying KB.  I reviewed download data over the last 30 days with usage 97% on days.  Average usage 3 hours and 22 minutes.  AHI 0.2.  Advised patient to increase usage of CPAP if able to do so but otherwise appears to be benefiting from her device.  Patient states usage correlation not completely correlating with how much she is using it.  Advised patient to reach out to DME company.  Return to pulmonary clinic in 1 year    Tricia Eli DO  Pulmonary Critical Care Medicine  [facility]  6/27/2025  12:48 PM        [1]   Current Outpatient Medications on File Prior to Visit   Medication Sig Dispense Refill    doxycycline 100 MG Oral Cap Take 1 capsule (100 mg total) by mouth 2 (two) times daily for 10 days. 20 capsule 0    famotidine 20 MG Oral Tab as needed.      gabapentin 300 MG Oral Cap Take 2 capsules (600 mg total) by mouth nightly. 180 capsule 1    amLODIPine 10 MG Oral Tab Take 1 tablet (10 mg total) by mouth daily. 90 tablet 3    OMEPRAZOLE 40 MG Oral Capsule Delayed Release TAKE 1 CAPSULE(40 MG) BY MOUTH DAILY BEFORE BREAKFAST 90 capsule 3    ibuprofen 600 MG  Oral Tab Take 1 tablet (600 mg total) by mouth 2 (two) times daily as needed for Pain. After meals 60 tablet 0    polyethylene glycol, PEG 3350, 17 g Oral Powd Pack Take 17 g by mouth daily as needed. 24 each 0    rosuvastatin 10 MG Oral Tab Take 1 tablet (10 mg total) by mouth every evening.      latanoprost 0.005 % Ophthalmic Solution INSTILL 1 DROP IN BOTH EYES EVERY night 3 each 3    cholecalciferol 50 MCG (2000 UT) Oral Cap Take 1 capsule (2,000 Units total) by mouth daily.       No current facility-administered medications on file prior to visit.   [2]   Allergies  Allergen Reactions    Celecoxib TONGUE SWELLING     Other reaction(s): CELECOXIB    Sulfa Antibiotics TONGUE SWELLING     Other reaction(s): SULFA (SULFONAMIDE ANTIBIOTICS)    Erythromycin PAIN     Abdominal pain    Erythromycin Stearate PAIN     Abdominal pain    Amoxicillin DIARRHEA

## 2025-07-08 ENCOUNTER — TELEMEDICINE (OUTPATIENT)
Dept: INTERNAL MEDICINE CLINIC | Facility: CLINIC | Age: 65
End: 2025-07-08
Payer: MEDICARE

## 2025-07-08 DIAGNOSIS — G89.29 CHRONIC PAIN OF BOTH SHOULDERS: ICD-10-CM

## 2025-07-08 DIAGNOSIS — M25.512 CHRONIC PAIN OF BOTH SHOULDERS: ICD-10-CM

## 2025-07-08 DIAGNOSIS — M25.561 CHRONIC PAIN OF RIGHT KNEE: Primary | ICD-10-CM

## 2025-07-08 DIAGNOSIS — M25.511 CHRONIC PAIN OF BOTH SHOULDERS: ICD-10-CM

## 2025-07-08 DIAGNOSIS — G89.29 CHRONIC PAIN OF RIGHT KNEE: Primary | ICD-10-CM

## 2025-07-08 PROCEDURE — 99213 OFFICE O/P EST LOW 20 MIN: CPT | Performed by: INTERNAL MEDICINE

## 2025-07-08 PROCEDURE — G2211 COMPLEX E/M VISIT ADD ON: HCPCS | Performed by: INTERNAL MEDICINE

## 2025-07-08 NOTE — PROGRESS NOTES
Patient ID: Kerry Hsu is a 65 year old female.  No chief complaint on file.         HISTORY OF PRESENT ILLNESS:   Patient presents for above.  This visit is conducted using Telemedicine with live, interactive video and audio.  C/c follow up  C/o needs new ortho dr - has h/o of left shoulder rotator cuff and right one too and was told tto do exercises but she would like a second opinion and back in the hospital and not at an outside facility   Saw the pain dr for the knee and got an inj to his back but had to go to [facility] for that --sympathetic nerve block but it didn't help   States she needs a new dr for her knees and shoulders and also wondering if she needs a new eye dr         Review of Systems   Ten point review of systems otherwise negative with the exception of HPI and assessment and plan.    MEDICAL HISTORY:   Past Medical History[1]    Past Surgical History[2]    Medications - Current[3]    Allergies:Allergies[4]    Social History     Socioeconomic History    Marital status:      Spouse name: Not on file    Number of children: Not on file    Years of education: Not on file    Highest education level: Not on file   Occupational History    Not on file   Tobacco Use    Smoking status: Never     Passive exposure: Never    Smokeless tobacco: Never   Vaping Use    Vaping status: Never Used   Substance and Sexual Activity    Alcohol use: No     Comment: None.     Drug use: No    Sexual activity: Yes     Birth control/protection: Hysterectomy   Other Topics Concern     Service Not Asked    Blood Transfusions Not Asked    Caffeine Concern Yes     Comment: occasional soda    Occupational Exposure Not Asked    Hobby Hazards Not Asked    Sleep Concern Not Asked    Stress Concern Not Asked    Weight Concern Not Asked    Special Diet Not Asked    Back Care Not Asked    Exercise Yes    Bike Helmet Not Asked    Seat Belt Not Asked    Self-Exams Not Asked    Grew up on a farm Not Asked     History of tanning Not Asked    Outdoor occupation Not Asked    Pt has a pacemaker No    Pt has a defibrillator No    Breast feeding No    Reaction to local anesthetic No    Left Handed Not Asked    Right Handed Yes    Currently spends a great deal of time in the sun Not Asked    Past Sunlamp Treatments for Acne Not Asked    Hx of Spending Great Deal of Time in Sun Not Asked    Bad sunburns in the past Not Asked    Tanning Salons in the Past Not Asked    Hx of Radiation Treatments Not Asked    Regular use of sun block Not Asked   Social History Narrative    Work - banking     Social Drivers of Health     Food Insecurity: No Food Insecurity (3/31/2025)    NCSS - Food Insecurity     Worried About Running Out of Food in the Last Year: No     Ran Out of Food in the Last Year: No   Transportation Needs: No Transportation Needs (3/31/2025)    NCSS - Transportation     Lack of Transportation: No   Stress: Not on file   Housing Stability: Not At Risk (3/31/2025)    NCSS - Housing/Utilities     Has Housing: Yes     Worried About Losing Housing: No     Unable to Get Utilities: No       PHYSICAL EXAM:   Unable to perform vitals or do physical exam as this is a virtual video visit.  Patient appears alert and oriented x 3, no acute distress  Patient speaking complete sentences without any conversational dyspnea or respiratory distress  No coughing heard  .    ASSESSMENT/PLAN:   1. Chronic pain of right knee  - Ortho Referral - In Network    2. Chronic pain of both shoulders  - Ortho Referral - In Network    Cont with ice and topical agents   Referral placed as per pt request       No follow-ups on file.    Time spent on encounter  8 minutes   Video time 8 minutes   Documentation time 8 minutes     Kerry Hsu understands video evaluation is not a substitute for face-to-face examination or emergency care. Patient advised to go to ER or call 911 for worsening symptoms or acute distress.     Telehealth outside of  [facility]  Telehealth Verbal Consent   I conducted a telehealth visit with Kerry Hsu today, 07/08/25, which was completed using two-way, real-time interactive audio and video communication. This has been done in good vasyl to provide continuity of care in the best interest of the provider-patient relationship, due to the COVID - public health crisis/national emergency where restrictions of face-to-face office visits are ongoing. Every conscious effort was taken to allow for sufficient and adequate time to complete the visit.  The patient was made aware of the limitations of the telehealth visit, including treatment limitations as no physical exam could be performed.  The patient was advised to call 911 or to go to the ER in case there was an emergency.  The patient was also advised of the potential privacy & security concerns related to the telehealth platform.   The patient was made aware of where to find [facility]'s notice of privacy practices, telehealth consent form and other related consent forms and documents.  which are located on the [facility] website. The patient verbally agreed to telehealth consent form, related consents and the risks discussed.    Lastly, the patient confirmed that they were in [place].   Included in this visit, time may have been spent reviewing labs, medications, radiology tests and decision making. Appropriate medical decision-making and tests are ordered as detailed in the plan of care above.  Coding/billing information is submitted for this visit based on complexity of care and/or time spent for the visit.    This note was prepared using Dragon Medical voice recognition dictation software. As a result errors may occur. When identified these errors have been corrected. While every attempt is made to correct errors during dictation discrepancies may still exist.    Bella Rogers MD  7/8/2025         [1]   Past Medical History:   Abscess of left thigh    Acute meniscal tear of knee     Age-related nuclear cataract of both eyes    Anal sphincter incontinence    Arthritis    Back problem    Back problem    Cataract    left    Chondromalacia    Colon polyps    Degenerative disc disease    Disorder of kidney and ureter    Diverticular disease    Esophageal reflux    Glaucoma    Hammertoe    High blood pressure    High cholesterol    Hx of motion sickness    Hyperlipidemia with target low density lipoprotein (LDL) cholesterol less than 130 mg/dL    Insomnia    Kidney lesion    Liver lesion    per patient not being treated or seeing a specialist    Neuropathy    both feet    Obstructive sleep apnea syndrome    Osteoarthritis    Palpitation    PONV (postoperative nausea and vomiting)    Prediabetes    Primary open angle glaucoma of both eyes    Diagnosis of glaucoma OU; Started Latanoprost qhs after abnormal VF and OCT 2/25/16;  6/5/18 consult with Dr. Madeline Chappell-see progress note    Sleep apnea    no current therapy    Small bowel obstruction (HCC)    Tendinitis    Unspecified essential hypertension    Visual impairment    Readers   [2]   Past Surgical History:  Procedure Laterality Date    Anal sphincterotomy      03/12/2013 [place]    Blepharoplasty anesthesia Bilateral 03/05/2020    Dr Suresh [facility]     Cataract extraction w/  intraocular lens implant Left 05/07/2018    L PC IOL with Dr. Suresh @ [facility]    Colonoscopy N/A 11/11/2016    Procedure: COLONOSCOPY;  Surgeon: Sabrina Cazares MD;  Location: [facility] ENDOSCOPY    Colonoscopy N/A 09/06/2019    Procedure: COLONOSCOPY;  Surgeon: Edwin Sterling MD;  Location: [facility] ENDOSCOPY    Colonoscopy  09/16/2024    Dr. Sterling; colon polyps, diverticulosis, hemorrhoids    Colonoscopy N/A 9/16/2024    Procedure: COLONOSCOPY;  Surgeon: Edwin Sterling MD;  Location: [facility] ENDOSCOPY    Egd  09/16/2024    Dr. Sterling; gastric polyps, small hiatal hernia    Excision of chalazion, single - od - right eye Right 6.27/2017    Nasally    Hc arthrocentesis or  inject major joint w/o us      Hysterectomy  1996    KAI    Other      Lap Nissen Fundoplication surgery    Other surgical history  2005,2007,2008    LAPAROSCOPIC LYSIS OF ADHESION    Other surgical history      right foot bunionectomy    Other surgical history  11/14/2014    right superficial anterior peroneal nerve biopsy and right proximal thigh muscle biopsy.    Other surgical history  06/13/2023    Excision and Biopsy of two  perineal cysts    Upper gi endoscopy performed  05/2015    Yag capsulotomy - os - left eye Left 12/19/2018    RJFRANCHESKA   [3]   Current Outpatient Medications:     famotidine 20 MG Oral Tab, as needed., Disp: , Rfl:     gabapentin 300 MG Oral Cap, Take 2 capsules (600 mg total) by mouth nightly., Disp: 180 capsule, Rfl: 1    amLODIPine 10 MG Oral Tab, Take 1 tablet (10 mg total) by mouth daily., Disp: 90 tablet, Rfl: 3    OMEPRAZOLE 40 MG Oral Capsule Delayed Release, TAKE 1 CAPSULE(40 MG) BY MOUTH DAILY BEFORE BREAKFAST, Disp: 90 capsule, Rfl: 3    ibuprofen 600 MG Oral Tab, Take 1 tablet (600 mg total) by mouth 2 (two) times daily as needed for Pain. After meals, Disp: 60 tablet, Rfl: 0    polyethylene glycol, PEG 3350, 17 g Oral Powd Pack, Take 17 g by mouth daily as needed., Disp: 24 each, Rfl: 0    rosuvastatin 10 MG Oral Tab, Take 1 tablet (10 mg total) by mouth every evening., Disp: , Rfl:     latanoprost 0.005 % Ophthalmic Solution, INSTILL 1 DROP IN BOTH EYES EVERY night, Disp: 3 each, Rfl: 3    cholecalciferol 50 MCG (2000 UT) Oral Cap, Take 1 capsule (2,000 Units total) by mouth daily., Disp: , Rfl:   [4]   Allergies  Allergen Reactions    Celecoxib TONGUE SWELLING     Other reaction(s): CELECOXIB    Sulfa Antibiotics TONGUE SWELLING     Other reaction(s): SULFA (SULFONAMIDE ANTIBIOTICS)    Erythromycin PAIN     Abdominal pain    Erythromycin Stearate PAIN     Abdominal pain    Amoxicillin DIARRHEA

## 2025-07-10 ENCOUNTER — OFFICE VISIT (OUTPATIENT)
Dept: PODIATRY CLINIC | Facility: CLINIC | Age: 65
End: 2025-07-10
Payer: MEDICARE

## 2025-07-10 DIAGNOSIS — L60.0 ONYCHOCRYPTOSIS: ICD-10-CM

## 2025-07-10 DIAGNOSIS — L60.0 INGROWING TOENAIL WITHOUT INFECTION: Primary | ICD-10-CM

## 2025-07-10 DIAGNOSIS — M79.675 PAIN IN TOES OF BOTH FEET: ICD-10-CM

## 2025-07-10 DIAGNOSIS — M79.674 PAIN IN TOES OF BOTH FEET: ICD-10-CM

## 2025-07-10 PROCEDURE — 99213 OFFICE O/P EST LOW 20 MIN: CPT | Performed by: PODIATRIST

## 2025-07-14 NOTE — PROGRESS NOTES
Kerry Hsu is a 65 year old female.   Chief Complaint   Patient presents with    Toenail Care     Nail care and foot check- pcp lov 06/16/25 Bella Rogers MD         HPI:   Patient presents to the clinic for overall foot check she is complaining of painful toenails she points to both edges of both great toes.  They have been bothering her for some time she is unable to trim them out because of pain.  At today's visit reviewed nurse's history as taken above, allergies medications and medical history as documented below.  All changes duly noted  Allergies: Celecoxib, Sulfa antibiotics, Erythromycin, Erythromycin stearate, and Amoxicillin   Current Medications[1]   Past Medical History[2]   Past Surgical History[3]   Family History[4]   Social Hx on file[5]        REVIEW OF SYSTEMS:   Today reviewed systens as documented below  GENERAL HEALTH: feels well otherwise  SKIN: Refer to exam below  RESPIRATORY: denies shortness of breath with exertion  CARDIOVASCULAR: denies chest pain on exertion  GI: denies abdominal pain and denies heartburn  NEURO: denies headaches    EXAM:   There were no vitals taken for this visit.  GENERAL: well developed, well nourished, in no apparent distress  EXTREMITIES:   1. Integument: Her feet is warm and dry she has a surgical scar where the second toe was amputated on the left foot and there is a fusion of the first MTP joint on the left.   2. Vascular: Patient has palpable pulses both dorsalis pedis posterior tibial they are symmetrical and equivocal   3. Neurologic: Has intact sensorium there is no deficits noted   4. Musculoskeletal: Has good muscle strength she is ambulatory.  She has a first MTP joint fusion on the left foot along with a second toe amputation on the left.    ASSESSMENT AND PLAN:   Diagnoses and all orders for this visit:    Ingrowing toenail without infection    Onychocryptosis    Pain in toes of both feet        Plan: Slant back technique attempted to trim  and debride the toenails but they are very sensitive.  I recommended the patient have them corrected to reappoint to have toenail correction medial lateral nail borders of the hallux bilateral feet.  I described the procedure to her necessity for soaking afterwards and I will see her at that time.    The patient indicates understanding of these issues and agrees to the plan.    Praveen Hays DPM       [1]   Current Outpatient Medications   Medication Sig Dispense Refill    famotidine 20 MG Oral Tab as needed.      gabapentin 300 MG Oral Cap Take 2 capsules (600 mg total) by mouth nightly. 180 capsule 1    amLODIPine 10 MG Oral Tab Take 1 tablet (10 mg total) by mouth daily. 90 tablet 3    OMEPRAZOLE 40 MG Oral Capsule Delayed Release TAKE 1 CAPSULE(40 MG) BY MOUTH DAILY BEFORE BREAKFAST 90 capsule 3    ibuprofen 600 MG Oral Tab Take 1 tablet (600 mg total) by mouth 2 (two) times daily as needed for Pain. After meals 60 tablet 0    polyethylene glycol, PEG 3350, 17 g Oral Powd Pack Take 17 g by mouth daily as needed. 24 each 0    rosuvastatin 10 MG Oral Tab Take 1 tablet (10 mg total) by mouth every evening.      latanoprost 0.005 % Ophthalmic Solution INSTILL 1 DROP IN BOTH EYES EVERY night 3 each 3    cholecalciferol 50 MCG (2000 UT) Oral Cap Take 1 capsule (2,000 Units total) by mouth daily.     [2]   Past Medical History:   Abscess of left thigh    Acute meniscal tear of knee    Age-related nuclear cataract of both eyes    Anal sphincter incontinence    Arthritis    Back problem    Back problem    Cataract    left    Chondromalacia    Colon polyps    Degenerative disc disease    Disorder of kidney and ureter    Diverticular disease    Esophageal reflux    Glaucoma    Hammertoe    High blood pressure    High cholesterol    Hx of motion sickness    Hyperlipidemia with target low density lipoprotein (LDL) cholesterol less than 130 mg/dL    Insomnia    Kidney lesion    Liver lesion    per patient not being  treated or seeing a specialist    Neuropathy    both feet    Obstructive sleep apnea syndrome    Osteoarthritis    Palpitation    PONV (postoperative nausea and vomiting)    Prediabetes    Primary open angle glaucoma of both eyes    Diagnosis of glaucoma OU; Started Latanoprost qhs after abnormal VF and OCT 2/25/16;  6/5/18 consult with Dr. Madeline Chappell-see progress note    Sleep apnea    no current therapy    Small bowel obstruction (HCC)    Tendinitis    Unspecified essential hypertension    Visual impairment    Readers   [3]   Past Surgical History:  Procedure Laterality Date    Anal sphincterotomy      03/12/2013 [place]    Blepharoplasty anesthesia Bilateral 03/05/2020    Dr Kerwin Bello Ophthalmology     Cataract extraction w/  intraocular lens implant Left 05/07/2018    L PC IOL with Dr. Suresh @ [facility]    Colonoscopy N/A 11/11/2016    Procedure: COLONOSCOPY;  Surgeon: Sabrina Cazares MD;  Location: [facility] ENDOSCOPY    Colonoscopy N/A 09/06/2019    Procedure: COLONOSCOPY;  Surgeon: Edwin Sterling MD;  Location: [facility] ENDOSCOPY    Colonoscopy  09/16/2024    Dr. Sterling; colon polyps, diverticulosis, hemorrhoids    Colonoscopy N/A 9/16/2024    Procedure: COLONOSCOPY;  Surgeon: Edwin Sterling MD;  Location: [facility] ENDOSCOPY    Egd  09/16/2024    Dr. Sterling; gastric polyps, small hiatal hernia    Excision of chalazion, single - od - right eye Right 6.27/2017    Nasally    Hc arthrocentesis or inject major joint w/o us      Hysterectomy  1996    [facility]    Other      Lap Nissen Fundoplication surgery    Other surgical history  2005,2007,2008    LAPAROSCOPIC LYSIS OF ADHESION    Other surgical history      right foot bunionectomy    Other surgical history  11/14/2014    right superficial anterior peroneal nerve biopsy and right proximal thigh muscle biopsy.    Other surgical history  06/13/2023    Excision and Biopsy of two  perineal cysts    Upper gi endoscopy performed  05/2015    Yag capsulotomy - os - left eye Left  12/19/2018    RJM   [4]   Family History  Problem Relation Age of Onset    Hypertension Father     Hypertension Mother     Hypertension Sister     Cancer Sister         liver cancer    Hypertension Brother     Diabetes Neg     Glaucoma Neg     Macular degeneration Neg     Breast Cancer Neg     Ovarian Cancer Neg    [5]   Social History  Socioeconomic History    Marital status:    Tobacco Use    Smoking status: Never     Passive exposure: Never    Smokeless tobacco: Never   Vaping Use    Vaping status: Never Used   Substance and Sexual Activity    Alcohol use: No     Comment: None.     Drug use: No    Sexual activity: Yes     Birth control/protection: Hysterectomy   Other Topics Concern    Caffeine Concern Yes     Comment: occasional soda    Exercise Yes    Pt has a pacemaker No    Pt has a defibrillator No    Breast feeding No    Reaction to local anesthetic No    Right Handed Yes

## 2025-07-18 ENCOUNTER — LAB ENCOUNTER (OUTPATIENT)
Dept: LAB | Facility: HOSPITAL | Age: 65
End: 2025-07-18
Attending: INTERNAL MEDICINE
Payer: MEDICARE

## 2025-07-18 DIAGNOSIS — E55.9 VITAMIN D DEFICIENCY: ICD-10-CM

## 2025-07-18 DIAGNOSIS — E78.2 MIXED HYPERLIPIDEMIA: ICD-10-CM

## 2025-07-18 DIAGNOSIS — R73.03 PREDIABETES: ICD-10-CM

## 2025-07-18 DIAGNOSIS — I10 ESSENTIAL HYPERTENSION: ICD-10-CM

## 2025-07-18 LAB
CHOLEST SERPL-MCNC: 135 MG/DL (ref ?–200)
EST. AVERAGE GLUCOSE BLD GHB EST-MCNC: 131 MG/DL (ref 68–126)
FASTING PATIENT LIPID ANSWER: NO
HBA1C MFR BLD: 6.2 % (ref ?–5.7)
HDLC SERPL-MCNC: 64 MG/DL (ref 40–59)
LDLC SERPL CALC-MCNC: 50 MG/DL (ref ?–100)
NONHDLC SERPL-MCNC: 71 MG/DL (ref ?–130)
TRIGL SERPL-MCNC: 118 MG/DL (ref 30–149)
VIT D+METAB SERPL-MCNC: 27 NG/ML (ref 30–100)
VLDLC SERPL CALC-MCNC: 17 MG/DL (ref 0–30)

## 2025-07-18 PROCEDURE — 36415 COLL VENOUS BLD VENIPUNCTURE: CPT

## 2025-07-18 PROCEDURE — 83036 HEMOGLOBIN GLYCOSYLATED A1C: CPT

## 2025-07-18 PROCEDURE — 82306 VITAMIN D 25 HYDROXY: CPT

## 2025-07-18 PROCEDURE — 80061 LIPID PANEL: CPT

## 2025-07-25 ENCOUNTER — HOSPITAL ENCOUNTER (OUTPATIENT)
Age: 65
Discharge: HOME | End: 2025-07-25
Attending: EMERGENCY MEDICINE

## 2025-07-25 VITALS
DIASTOLIC BLOOD PRESSURE: 64 MMHG | TEMPERATURE: 98 F | OXYGEN SATURATION: 99 % | RESPIRATION RATE: 12 BRPM | HEART RATE: 62 BPM | SYSTOLIC BLOOD PRESSURE: 131 MMHG

## 2025-07-25 DIAGNOSIS — J01.00 ACUTE NON-RECURRENT MAXILLARY SINUSITIS: Primary | ICD-10-CM

## 2025-07-25 LAB — SARS-COV-2 RNA RESP QL NAA+PROBE: NOT DETECTED

## 2025-07-25 PROCEDURE — 99213 OFFICE O/P EST LOW 20 MIN: CPT

## 2025-07-25 RX ORDER — DOXYCYCLINE 100 MG/1
100 CAPSULE ORAL 2 TIMES DAILY
Qty: 14 CAPSULE | Refills: 0 | Status: SHIPPED | OUTPATIENT
Start: 2025-07-25 | End: 2025-08-01

## 2025-07-26 NOTE — ED PROVIDER NOTES
Patient Seen in: [facility]       The following individual(s) verbally consented to be recorded using ambient AI listening technology and understand that they can each withdraw their consent to this listening technology at any point by asking the clinician to turn off or pause the recording:    Patient name: Kerry Hsu        History  Chief Complaint   Patient presents with    Sinus Problem     Stated Complaint: sinus infection    Subjective:   HPI     Kerry Hsu is a 65 year old female who presents with worsening sinus congestion and facial pain.    She has been experiencing sinus drainage down her throat and ear discomfort for the past three days, starting on Wednesday. The facial pain is described as severe and has been worsening.    She has been taking over-the-counter sinus tablets without relief. No fever is present. The nasal discharge is clear, and she occasionally coughs up clear and yellow mucus.    She has not been exposed to any known illnesses and has not been to places like grocery stores recently.    She has allergies to Celebrex, amoxicillin, erythromycin, and sulfa medications.        Objective:     Past Medical History:    Abscess of left thigh    Acute meniscal tear of knee    Age-related nuclear cataract of both eyes    Anal sphincter incontinence    Arthritis    Back problem    Back problem    Cataract    left    Chondromalacia    Colon polyps    Degenerative disc disease    Disorder of kidney and ureter    Diverticular disease    Esophageal reflux    Glaucoma    Hammertoe    High blood pressure    High cholesterol    Hx of motion sickness    Hyperlipidemia with target low density lipoprotein (LDL) cholesterol less than 130 mg/dL    Insomnia    Kidney lesion    Liver lesion    per patient not being treated or seeing a specialist    Neuropathy    both feet    Obstructive sleep apnea syndrome    Osteoarthritis    Palpitation    PONV (postoperative nausea and  vomiting)    Prediabetes    Primary open angle glaucoma of both eyes    Diagnosis of glaucoma OU; Started Latanoprost qhs after abnormal VF and OCT 2/25/16;  6/5/18 consult with Dr. Madeline Chappell-see progress note    Sleep apnea    no current therapy    Small bowel obstruction (HCC)    Tendinitis    Unspecified essential hypertension    Visual impairment    Readers              Past Surgical History:   Procedure Laterality Date    Anal sphincterotomy      03/12/2013 [place]    Blepharoplasty anesthesia Bilateral 03/05/2020    Dr Kerwin Bello Ophthalmology     Cataract extraction w/  intraocular lens implant Left 05/07/2018    L PC IOL with Dr. Suresh @ [facility]    Colonoscopy N/A 11/11/2016    Procedure: COLONOSCOPY;  Surgeon: Sabrina Cazares MD;  Location: [facility] ENDOSCOPY    Colonoscopy N/A 09/06/2019    Procedure: COLONOSCOPY;  Surgeon: Edwin Sterling MD;  Location: [facility] ENDOSCOPY    Colonoscopy  09/16/2024    Dr. Sterling; colon polyps, diverticulosis, hemorrhoids    Colonoscopy N/A 9/16/2024    Procedure: COLONOSCOPY;  Surgeon: Edwin Sterling MD;  Location: [facility] ENDOSCOPY    Egd  09/16/2024    Dr. Sterling; gastric polyps, small hiatal hernia    Excision of chalazion, single - od - right eye Right 6.27/2017    Nasally    Hc arthrocentesis or inject major joint w/o us      Hysterectomy  1996    [facility]    Other      Lap Nissen Fundoplication surgery    Other surgical history  2005,2007,2008    LAPAROSCOPIC LYSIS OF ADHESION    Other surgical history      right foot bunionectomy    Other surgical history  11/14/2014    right superficial anterior peroneal nerve biopsy and right proximal thigh muscle biopsy.    Other surgical history  06/13/2023    Excision and Biopsy of two  perineal cysts    Upper gi endoscopy performed  05/2015    Yag capsulotomy - os - left eye Left 12/19/2018    RJM                Social History     Socioeconomic History    Marital status:    Tobacco Use    Smoking status: Never     Passive  exposure: Never    Smokeless tobacco: Never   Vaping Use    Vaping status: Never Used   Substance and Sexual Activity    Alcohol use: No     Comment: None.     Drug use: No    Sexual activity: Yes     Birth control/protection: Hysterectomy   Other Topics Concern    Caffeine Concern Yes     Comment: occasional soda    Exercise Yes    Pt has a pacemaker No    Pt has a defibrillator No    Breast feeding No    Reaction to local anesthetic No    Right Handed Yes   Social History Narrative    Work - banking     Social Drivers of Health     Food Insecurity: No Food Insecurity (3/31/2025)    NCSS - Food Insecurity     Worried About Running Out of Food in the Last Year: No     Ran Out of Food in the Last Year: No   Transportation Needs: No Transportation Needs (3/31/2025)    NCSS - Transportation     Lack of Transportation: No   Housing Stability: Not At Risk (3/31/2025)    NCSS - Housing/Utilities     Has Housing: Yes     Worried About Losing Housing: No     Unable to Get Utilities: No              Review of Systems    Positive for stated complaint: sinus infection  Other systems are as noted in HPI.  Constitutional and vital signs reviewed.      All other systems reviewed and negative except as noted above.                  Physical Exam    ED Triage Vitals [07/25/25 0932]   /64   Pulse 62   Resp 12   Temp 98 °F (36.7 °C)   Temp src Oral   SpO2 99 %   O2 Device None (Room air)       Current Vitals:   Vital Signs  BP: 131/64  Pulse: 62  Resp: 12  Temp: 98 °F (36.7 °C)  Temp src: Oral    Oxygen Therapy  SpO2: 99 %  O2 Device: None (Room air)            Physical Exam  Vitals and nursing note reviewed.   Constitutional:       Appearance: Normal appearance. She is well-developed.   HENT:      Head: Normocephalic and atraumatic.      Comments: Tender over maxillary sinuses     Right Ear: Tympanic membrane normal.      Left Ear: Tympanic membrane normal.      Nose: Congestion present.      Mouth/Throat:      Mouth: Mucous  membranes are moist.      Pharynx: Oropharynx is clear.   Cardiovascular:      Rate and Rhythm: Normal rate and regular rhythm.      Heart sounds: Normal heart sounds.   Pulmonary:      Effort: Pulmonary effort is normal. No respiratory distress.      Breath sounds: Normal breath sounds.   Musculoskeletal:      Cervical back: Neck supple.   Skin:     General: Skin is warm and dry.      Capillary Refill: Capillary refill takes less than 2 seconds.   Neurological:      General: No focal deficit present.      Mental Status: She is alert.   Psychiatric:         Mood and Affect: Mood normal.         Behavior: Behavior normal.                ED Course  Labs Reviewed   RAPID SARS-COV-2 BY PCR - Normal                            MDM            Medical Decision Making  Viral vs bacterial sinusitis, covid all in differential.   COVID test is negative.   Ongoing worsening symptoms despite otc meds so will mary anne with courese of doxycycline. Flonase nasal spray over the counter.     Disposition and Plan     Clinical Impression:  1. Acute non-recurrent maxillary sinusitis         Disposition:  Discharge  7/25/2025 10:10 am    Follow-up:  Bella Rogers MD  [address] [place] [id number]  [phone]      As needed          Medications Prescribed:  Discharge Medication List as of 7/25/2025 10:11 AM                Supplementary Documentation:

## 2025-07-31 ENCOUNTER — TELEPHONE (OUTPATIENT)
Dept: PULMONOLOGY | Facility: CLINIC | Age: 65
End: 2025-07-31

## 2025-08-04 ENCOUNTER — OFFICE VISIT (OUTPATIENT)
Dept: PODIATRY CLINIC | Facility: CLINIC | Age: 65
End: 2025-08-04

## 2025-08-04 VITALS — DIASTOLIC BLOOD PRESSURE: 74 MMHG | SYSTOLIC BLOOD PRESSURE: 122 MMHG

## 2025-08-04 DIAGNOSIS — M79.674 PAIN IN TOES OF BOTH FEET: ICD-10-CM

## 2025-08-04 DIAGNOSIS — L60.0 INGROWING TOENAIL WITHOUT INFECTION: Primary | ICD-10-CM

## 2025-08-04 DIAGNOSIS — M79.675 PAIN IN TOES OF BOTH FEET: ICD-10-CM

## 2025-08-04 PROCEDURE — 11750 EXCISION NAIL&NAIL MATRIX: CPT | Performed by: PODIATRIST

## 2025-08-04 RX ORDER — DOXYCYCLINE 100 MG/1
100 CAPSULE ORAL 2 TIMES DAILY
Qty: 14 CAPSULE | Refills: 0 | Status: SHIPPED | OUTPATIENT
Start: 2025-08-04

## 2025-08-04 RX ORDER — MUPIROCIN 2 %
OINTMENT (GRAM) TOPICAL
Qty: 30 G | Refills: 0 | Status: SHIPPED | OUTPATIENT
Start: 2025-08-04

## 2025-08-18 ENCOUNTER — OFFICE VISIT (OUTPATIENT)
Dept: PODIATRY CLINIC | Facility: CLINIC | Age: 65
End: 2025-08-18

## 2025-08-18 DIAGNOSIS — Z98.890 STATUS POST NAIL SURGERY: Primary | ICD-10-CM

## 2025-08-18 PROCEDURE — 99212 OFFICE O/P EST SF 10 MIN: CPT | Performed by: PODIATRIST

## 2025-08-30 ENCOUNTER — HOSPITAL ENCOUNTER (OUTPATIENT)
Age: 65
Discharge: HOME | End: 2025-08-30

## 2025-08-30 VITALS
OXYGEN SATURATION: 98 % | SYSTOLIC BLOOD PRESSURE: 130 MMHG | TEMPERATURE: 99 F | RESPIRATION RATE: 18 BRPM | DIASTOLIC BLOOD PRESSURE: 64 MMHG | HEART RATE: 72 BPM

## 2025-08-30 DIAGNOSIS — B35.1 ONYCHOMYCOSIS WITH INGROWN TOENAIL: Primary | ICD-10-CM

## 2025-08-30 DIAGNOSIS — L60.0 ONYCHOMYCOSIS WITH INGROWN TOENAIL: Primary | ICD-10-CM

## 2025-08-30 DIAGNOSIS — L60.0 INGROWN TOENAIL WITH INFECTION: ICD-10-CM

## 2025-08-30 RX ORDER — CLOTRIMAZOLE 1 %
1 CREAM (GRAM) TOPICAL 2 TIMES DAILY
Qty: 60 G | Refills: 0 | Status: SHIPPED | OUTPATIENT
Start: 2025-08-30

## 2025-08-30 RX ORDER — CEFADROXIL 500 MG/1
500 CAPSULE ORAL 2 TIMES DAILY
Qty: 14 CAPSULE | Refills: 0 | Status: SHIPPED | OUTPATIENT
Start: 2025-08-30 | End: 2025-09-06

## (undated) DIAGNOSIS — G62.9 NEUROPATHY: ICD-10-CM

## (undated) DIAGNOSIS — M79.672 PAIN IN BOTH FEET: Primary | ICD-10-CM

## (undated) DIAGNOSIS — M20.41 HAMMER TOE OF RIGHT FOOT: ICD-10-CM

## (undated) DIAGNOSIS — Z01.818 PREOP EXAMINATION: ICD-10-CM

## (undated) DIAGNOSIS — M79.674 PAIN OF TOE OF RIGHT FOOT: ICD-10-CM

## (undated) DIAGNOSIS — M75.121 NONTRAUMATIC COMPLETE TEAR OF RIGHT ROTATOR CUFF: ICD-10-CM

## (undated) DIAGNOSIS — M79.671 PAIN IN BOTH FEET: Primary | ICD-10-CM

## (undated) DIAGNOSIS — Z47.89 ORTHOPEDIC AFTERCARE: Primary | ICD-10-CM

## (undated) DEVICE — PACK CDS HIP PINNING

## (undated) DEVICE — NEEDLE HYPO 18GA L1.5IN PNK SS PVT SHLD BVL

## (undated) DEVICE — NEEDLE SCORPION AR-13995N

## (undated) DEVICE — SOL NACL IRRIG 0.9% 1000ML BTL

## (undated) DEVICE — SLIM BODY SKIN STAPLER: Brand: APPOSE ULC

## (undated) DEVICE — CANNULA 7MM TWIST AR-6570

## (undated) DEVICE — DISPOSABLE HIB PAC INCLUDES 3                                    GUIDEWIRES AND 2 ARTHROSCOPY NEEDLES

## (undated) DEVICE — SOL  .9 3000ML

## (undated) DEVICE — TRAY CATH FOLEY 16FR INCLUDE BARDX IC COMPLT CARE

## (undated) DEVICE — CONTAINER,SPECIMEN,OR STERILE,4OZ: Brand: MEDLINE

## (undated) DEVICE — HIP-PAC CAP-FIX BLADE CURVED: Brand: CAP-FIX

## (undated) DEVICE — SOLUTION IV 1000ML 0.9% NACL PRESERVATIVE

## (undated) DEVICE — Device: Brand: CUSTOM PROCEDURE KIT

## (undated) DEVICE — AMBIENT HIPVAC 50 IFS: Brand: AMBIENT

## (undated) DEVICE — PAD,ABDOMINAL,8"X7.5",STERILE,LF,1/PK: Brand: MEDLINE

## (undated) DEVICE — BURR SHVR COOLCUT 13CM 5MM 8

## (undated) DEVICE — ACCU-PASS DIRECT CRESCENT XL: Brand: ACCU-PASS

## (undated) DEVICE — 3M™ STERI-DRAPE™ U-DRAPE 1015: Brand: STERI-DRAPE™

## (undated) DEVICE — 5.5 MM LONG ABRADER SHAVER BLADES                                    AND BURRS, POWER/EP-1, 18 CM WORKING                                    LENGTH,BLACK,PACKAGED 3 PER BOX, STERILE

## (undated) DEVICE — SHOULDER P.A.D II: Brand: DEROYAL

## (undated) DEVICE — TUBING SET, GRAVITY, 4-SPIKE

## (undated) DEVICE — 3 ML SYRINGE LUER-LOCK TIP: Brand: MONOJECT

## (undated) DEVICE — WRAP COOLING KNEE W/ICE PILLOW

## (undated) DEVICE — BANDAGE,GAUZE,BULKEE II,4.5"X4.1YD,STRL: Brand: MEDLINE

## (undated) DEVICE — SUT ETHILON 4-0 PS-2 1667G

## (undated) DEVICE — 4.5 MM CURVED INCISOR PLUS ELITE                                    LONG SHAVER BLADES AND BURRS,                                    POWER/EP-1, SLATE, 18 CM WORKING                                    LENGTH, PACKAGED 3 PER BOX, STERILE: Brand: DYONICS INCISOR ELITE

## (undated) DEVICE — SKIN PREP TRAY 4 COMPARTM TRAY: Brand: MEDLINE INDUSTRIES, INC.

## (undated) DEVICE — 35 ML SYRINGE REGULAR TIP: Brand: MONOJECT

## (undated) DEVICE — MEDI-VAC NON-CONDUCTIVE SUCTION TUBING 6MM X 1.8M (6FT.) L: Brand: CARDINAL HEALTH

## (undated) DEVICE — SUT COAT VCRL 0 27IN CT-1 ABSRB VLT 36MM 1/2

## (undated) DEVICE — Device: Brand: STABLECUT®

## (undated) DEVICE — COVER,MAYO STAND,STERILE: Brand: MEDLINE

## (undated) DEVICE — APPLICATOR PREP 26ML CHG 2% ISO ALC 70%

## (undated) DEVICE — SCREW ORTH 2.5X25MM FEM HEX PERSONA

## (undated) DEVICE — PIN DRL 75MM HDLSS TRCR NXGN

## (undated) DEVICE — 3M™ MEDITPORE™ SOFT CLOTH TAPE 6 IN X 10 YD 12 ROLLS/CASE 2966: Brand: 3M™ MEDIPORE™

## (undated) DEVICE — SCREW FIX 3.5X48MM HEX HD

## (undated) DEVICE — GIJAW SINGLE-USE BIOPSY FORCEPS WITH NEEDLE: Brand: GIJAW

## (undated) DEVICE — SYRINGE, LUER SLIP, STERILE, 60ML: Brand: MEDLINE

## (undated) DEVICE — HOOD: Brand: FLYTE

## (undated) DEVICE — 6 ML SYRINGE LUER-LOCK TIP: Brand: MONOJECT

## (undated) DEVICE — GAMMEX® PI HYBRID SIZE 8, STERILE POWDER-FREE SURGICAL GLOVE, POLYISOPRENE AND NEOPRENE BLEND: Brand: GAMMEX

## (undated) DEVICE — ENCORE® LATEX MICRO SIZE 8, STERILE LATEX POWDER-FREE SURGICAL GLOVE: Brand: ENCORE

## (undated) DEVICE — STERILE LATEX POWDER-FREE SURGICAL GLOVESWITH NITRILE COATING: Brand: PROTEXIS

## (undated) DEVICE — SLEEVE CMPR VLCR STRL

## (undated) DEVICE — SUT PROLENE 3-0 SH 8832H

## (undated) DEVICE — CEMENT MIXING SYSTEM WITH FEMORAL BREAKWAY NOZZLE: Brand: REVOLUTION

## (undated) DEVICE — ENCORE® LATEX ACCLAIM SIZE 8, STERILE LATEX POWDER-FREE SURGICAL GLOVE: Brand: ENCORE

## (undated) DEVICE — SOLUTION IRRIG 1000ML 0.9% NACL USP BTL

## (undated) DEVICE — KIT CLEAN ENDOKIT 1.1OZ GOWNX2

## (undated) DEVICE — Device: Brand: DEFENDO AIR/WATER/SUCTION AND BIOPSY VALVE

## (undated) DEVICE — MEDI-VAC NON-CONDUCTIVE SUCTION TUBING: Brand: CARDINAL HEALTH

## (undated) DEVICE — HOOD, PEEL-AWAY: Brand: FLYTE

## (undated) DEVICE — BIT DRL 1.8MM FLX DISP FOR Q - FIX ALL - SUT

## (undated) DEVICE — FORCEP RADIAL JAW 4

## (undated) DEVICE — PACK CDS TOTAL KNEE

## (undated) DEVICE — CONMED SCOPE SAVER BITE BLOCK, 20X27 MM: Brand: SCOPE SAVER

## (undated) DEVICE — SUT ETHLN 3-0 30IN FS-1 NABSRB BLK 24MM 3/8 C

## (undated) DEVICE — SOLUTION IRRIG 3000ML 0.9% NACL FLX CONT

## (undated) DEVICE — LINE MNTR ADLT SET O2 INTMD

## (undated) DEVICE — GAMMEX® PI HYBRID SIZE 7.5, STERILE POWDER-FREE SURGICAL GLOVE, POLYISOPRENE AND NEOPRENE BLEND: Brand: GAMMEX

## (undated) DEVICE — KIT MFLD FOR SPEC COLL

## (undated) DEVICE — SOLUTION  .9 3000ML

## (undated) DEVICE — CUTTER ASCP COOLCUT 13CM 4MM

## (undated) DEVICE — ZZ-CONVERTED-TO-522442- SPONGE 4X4 10PK

## (undated) DEVICE — MINOR GENERAL: Brand: MEDLINE INDUSTRIES, INC.

## (undated) DEVICE — SNARE CAPTI HEX STIFF MEDIUM

## (undated) DEVICE — KIT ENDO ORCAPOD 160/180/190

## (undated) DEVICE — SUT LIGAPAK CHROMIC 3-0 L112G

## (undated) DEVICE — COTTON ROLL: Brand: DEROYAL

## (undated) DEVICE — SUTURE ETHILON 4-0 1667G

## (undated) DEVICE — CLEAR-TRAC COMPLETE HIP CANNULA                                    8.5 MM X 110 MM: Brand: CLEAR-TRAC

## (undated) DEVICE — TUBING PMP L16FT DISP INFLOW

## (undated) DEVICE — YANKAUER,BULB TIP,W/O VENT,RIGID,STERILE: Brand: MEDLINE

## (undated) DEVICE — 3M™ TEGADERM™ TRANSPARENT FILM DRESSING FRAME STYLE, 1626W, 4 IN X 4-3/4 IN (10 CM X 12 CM), 50/CT 4CT/CASE: Brand: 3M™ TEGADERM™

## (undated) DEVICE — DISPOSABLE TOURNIQUET CUFF SINGLE BLADDER, DUAL PORT AND QUICK CONNECT CONNECTOR: Brand: COLOR CUFF

## (undated) DEVICE — AMBIENT SUPER TURBOVAC 90 IFS: Brand: COBLATION

## (undated) DEVICE — PERINEAL POST PAD 1

## (undated) DEVICE — SUT ETHBND XL 1 18IN CTX NABSRB GRN CR 48MM

## (undated) DEVICE — BANDAGE COMPR W6INXL11YD WVN COT/E CLP CLSR

## (undated) DEVICE — ANTIBACTERIAL UNDYED BRAIDED (POLYGLACTIN 910), SYNTHETIC ABSORBABLE SUTURE: Brand: COATED VICRYL

## (undated) DEVICE — 3M™ COBAN™ NL STERILE NON-LATEX SELF-ADHERENT WRAP, 2084S, 4 IN X 5 YD (10 CM X 4,5 M), 18 ROLLS/CASE: Brand: 3M™ COBAN™

## (undated) DEVICE — SET GUIDEPIN FOR PSI CR PERSONA SYS

## (undated) DEVICE — LASSO POLYPECTOMY SNARE: Brand: LASSO

## (undated) DEVICE — SHEET,DRAPE,53X77,STERILE: Brand: MEDLINE

## (undated) DEVICE — SUT VICRYL 3-0 SH J416H

## (undated) DEVICE — ISLAND DRESSING 4IN X 10IN: Brand: SILVERLON ANTIMICROBIAL WOUND DRESSING

## (undated) DEVICE — CO2 CANNULA,SSOFT,ADLT,7O2,4CO2,FEMALE: Brand: MEDLINE

## (undated) DEVICE — TRAP 4 CPTR CHMBR N EZ INLN

## (undated) DEVICE — SHOULDER ARTHROSCOPY: Brand: MEDLINE INDUSTRIES, INC.

## (undated) DEVICE — 60 ML SYRINGE LUER-LOCK TIP: Brand: MONOJECT

## (undated) DEVICE — DRAPE SRG 131X112X63IN GYN URO

## (undated) NOTE — MR AVS SNAPSHOT
[facility]. [address] [place] [address]  [address] [place] [address]  [id number]  [phone]               Thank you for choosing us for your health care visit with Katiuska Hatch MD.  We are glad to serve you and happy to provide you with this summary of yo requirements for authorization, please wait 5-7 days and then contact your physician's office. At that time, you will be provided with any authorization numbers or be assured that none are required. You can then schedule your appointment.  Failure to obtain Apply to affeted area twice a day as needed.    Commonly known as:  MYCOSTATIN           Pantoprazole Sodium 40 MG Tbec   Take 1 tablet (40 mg total) by mouth every morning before breakfast.   Commonly known as:  PROTONIX           TraMADol HCl 50 MG Tabs

## (undated) NOTE — Clinical Note
6/2/2017          To Whom It May Concern:    Eliane Wall is currently under my medical care. Please excuse Brooks Rebolledo for Friday, June 2, 2017 as she had an appointment at our office for a procedure.  She may return back to work without restrictions on Monday,

## (undated) NOTE — LETTER
November 26, 2019    [place], [address] [facility]     Patient: Scott Myers   YOB: 1960   Date of Visit: 11/26/2019       Dear Dr. Hal Dominique MD:    Thank you for referring Amrit Luevano to me for evaluation.  Here is my (right foot bunionectomy); other surgical history (11-14-14) (right superficial anterior peroneal nerve biopsy and right proximal thigh muscle biopsy.); upper gi endoscopy performed (5/2015); colonoscopy (N/A, 11/11/2016) (Procedure: COLONOSCOPY;  Surgeon: • Pantoprazole Sodium 40 MG Oral Tab EC Take 1 tablet (40 mg total) by mouth every morning before breakfast. 90 tablet 3   • IBUPROFEN 600 MG Oral Tab TAKE 1 TABLET BY MOUTH EVERY 8 HOURS AS NEEDED FOR PAIN 270 tablet 0       Allergies:    Celecoxib Disc Good rim, Temporal crescent Good rim, Temporal crescent    C/D Ratio 0.7 0.5    Macula Normal Normal    Vessels Normal Normal    Periphery Normal Normal            Refraction     Wearing Rx       Sphere Cylinder Axis Add    Right -0.75 +1.00 145 2.50

## (undated) NOTE — LETTER
6/7/2019          To Whom It May Concern:    Andreina Avila is currently under my medical care. She may bear weight 50% of the day until her follow-up appointment with me on 6/18/2019. If you require additional information please contact our office.

## (undated) NOTE — Clinical Note
April 8, 2017    Earl Brooks MD  [address] [address]     Patient: Shantel Black   YOB: 1960   Date of Visit: 4/8/2017       Dear Dr. Antoinette Zarate MD:    Thank you for referring Mukul Yepez to me for evaluation.  Here is m colonoscopy (N/A, 11/11/2016) (Procedure: COLONOSCOPY;  Surgeon: Supriya Bray MD;  Location: [facility]).     Family History   Problem Relation Age of Onset   • Hypertension Father    • Hypertension Mother    • Hypertension Sister    • Hyp area twice a day as needed. Disp: 30 g Rfl: 5   ibuprofen (MOTRIN) 600 MG Oral Tab take 1 tablet (600MG)  by oral route 2 times every day with food only as needed.  Disp: 60 tablet Rfl: 1       Allergies:    Azithromycin                Comment:Other reactio excision. Will have patient call the office if chalazion worsens or does not resolve. Patient is instructed to use warm compresses twice a day to both eyelids forever for ocular comfort. Blepharitis info given.         Chalazion left upper eyelid  Same

## (undated) NOTE — Clinical Note
Hi- I was wondering if you can see this pt for palpitations- thinking she may need a holter monitor longer 24-48 hrs

## (undated) NOTE — LETTER
5/8/2019          To Whom It May Concern:    Mars Chandler is currently under my medical care. She requires the same postoperative restrictions which is may bear weight 50% of the day for the next 4 wks.      If you require additional information please c

## (undated) NOTE — MR AVS SNAPSHOT
[facility]  [address] [facility], [address] [facility]   [phone]               Thank you for choosing us for your health care visit with Lorri Hawkins DPM.  We are glad to serve you and happy to provide you with this summary of yo Take 1 tablet (625 mg total) by mouth 2 (two) times daily. Commonly known as:  FIBERCON           Ciprofloxacin HCl 500 MG Tabs   Take 1 tablet (500 mg total) by mouth 2 (two) times daily.    Commonly known as:  CIPRO           gabapentin 300 MG Caps   Co office, you can view your past visit information in [employer] by going to Visits < Visit Summaries. [employer] questions? Call [phone] for help. [employer] is NOT to be used for urgent needs. For medical emergencies, dial 911.            Visit [facility]

## (undated) NOTE — Clinical Note
Dear Saranya Brito,  Thank you for referring your patient to [facility]. We value our relationship with you and appreciate your confidence in our service and staff.    It is with great pleasure that we were able to provide treatment to Monica Lao

## (undated) NOTE — Clinical Note
Initial assessment completed with patient.  A message was sent to office to assist in scheduling.  Patient agrees to additional follow-up calls from nurse care manager.  Thank you!

## (undated) NOTE — LETTER
December 4, 2018    Glenys Carrel, MD  [address] [facility]  [facility] [address]     Patient: Eri Hill   YOB: 1960   Date of Visit: 12/4/2018       Dear Dr. Jill Layton MD:    Thank you for referring Bony Lee to me for evaluation.  Here is my hysterectomy (1996) ([facility]); anal sphincterotomy (03/12/2013 [place]);  arthrocentesis or inject major joint w/o us; other surgical history (2005,2007,2008) (LAPAROSCOPIC LYSIS OF ADHESION); other surgical history (right foot bunionectomy); other surgical h Erythromycin            PAIN  Sulfa Antibiotics       TONGUE SWELLING    Comment:Other reaction(s): SULFA (SULFONAMIDE ANTIBIOTICS)    ROS:     ROS     Positive for: Eyes    Negative for: Constitutional, Gastrointestinal, Neurological, Skin, Genitourinary, Sphere Cylinder Branch Dist [facility] Add [facility]    Right -0.75 +0.75 155 20/40+1 +2.50 20/30    Left -0.50 Sphere  20/50- +2.50 20/30          Final Rx       Sphere Cylinder Branch Dist [facility] Add [facility]    Right -0.75 +0.75 155 20/40+1 +2.50 20/30    Left -0.50 Sp

## (undated) NOTE — Clinical Note
CAN WE SENT THIS MSG TO GI STAFF-patient was seen today and she has picked up PEG 3350/KCl sodium lemon lime 4000 mL prep that says mix and drink as directed but patient has questions regarding this and is not understanding the MyChart instructions which were sent because she did not know she needed to buy MiraLAX and stool softeners etc. SHE would like instructions on the prep she has

## (undated) NOTE — LETTER
AUTHORIZATION FOR SURGICAL OPERATION OR OTHER PROCEDURE    1. I hereby [facility] Dr. Celia Henriquez, and [facility] my case to perform the following operation and/or procedure at the [facility]:    _______________________________________________________________________________________________    Cortisone injection Right foot  _______________________________________________________________________________________________    2. My physician has explained the nature and purpose of the operation or other procedure, possible alternative methods of treatment, the risks involved, and the possibility of complication to me. I acknowledge that no guarantee has been made as to the result that may be obtained. 3.  I recognize that, during the course of this operation, or other procedure, unforseen conditions may necessitate additional or different procedure than those listed above. I, therefore, further authorize and request that the above named physician, his/her physician assistants or designees perform such procedures as are, in his/her professional opinion, necessary and desirable. 4.  Any tissue or organs removed in the operation or other procedure may be disposed of by and at the discretion of the [facility] and [facility] AT [facility]. 5.  I understand that in the event of a medical emergency, I will be transported by local paramedics to [facility] or other hospital emergency department. 6.  I certify that I have read and fully understand the above consent to operation and/or other procedure. 7.  I acknowledge that my physician has explained sedation/analgesia administration to me including the risks and benefits. I consent to the administration of sedation/analgesia as may be necessary or desirable in the judgement of my physician.     Witness signature: ___________________________________________________ Date:  ______/______/_____ Time:  ________ A. M.  P.M. Patient Name:  ______________________________________________________  (please print)      Patient signature:  ___________________________________________________             Relationship to Patient:           []  Parent    Responsible person                          []  Spouse  In case of minor or                    [] Other  _____________   Incompetent name:  __________________________________________________                               (please print)      _____________      Responsible person  In case of minor or  Incompetent signature:  _______________________________________________    Statement of Physician  My signature below affirms that prior to the time of the procedure, I have explained to the patient and/or his/her guardian, the risks and benefits involved in the proposed treatment and any reasonable alternative to the proposed treatment. I have also explained the risks and benefits involved in the refusal of the proposed treatment and have answered the patient's questions.                         Date:  ______/______/_______  Provider                      Signature:  __________________________________________________________       Time:  ___________ A.M    P.M.

## (undated) NOTE — LETTER
[place] [address], [place], [place]    Authorization for Surgical Operation and Procedure                               I hereby authorize Edwin Sterling MD, my physician and his/her assistants (if applicable), which may include medical students, residents, and/or fellows, to perform the following surgical operation/ procedure and administer such anesthesia as may be determined necessary by my physician: Operation/Procedure name (s) COLONOSCOPY/ ESOPHAGOGASTRODUODENOSCOPY on Kerryobed Hsu   2.   I recognize that during the surgical operation/procedure, unforeseen conditions may necessitate additional or different procedures than those listed above.  I, therefore, further authorize and request that the above-named surgeon, assistants, or designees perform such procedures as are, in their judgment, necessary and desirable.    3.   My surgeon/physician has discussed prior to my surgery the potential benefits, risks and side effects of this procedure; the likelihood of achieving goals; and potential problems that might occur during recuperation.  They also discussed reasonable alternatives to the procedure, including risks, benefits, and side effects related to the alternatives and risks related to not receiving this procedure.  I have had all my questions answered and I acknowledge that no guarantee has been made as to the result that may be obtained.    4.   Should the need arise during my operation/procedure, which includes change of level of care prior to discharge, I also consent to the administration of blood and/or blood products.  Further, I understand that despite careful testing and screening of blood or blood products by collecting agencies, I may still be subject to ill effects as a result of receiving a blood transfusion and/or blood products.  The following are some, but not all, of the potential risks that can occur: fever and allergic reactions, hemolytic reactions,  transmission of diseases such as Hepatitis, AIDS and Cytomegalovirus (CMV) and fluid overload.  In the event that I wish to have an autologous transfusion of my own blood, or a directed donor transfusion, I will discuss this with my physician.  Check only if Refusing Blood or Blood Products  I understand refusal of blood or blood products as deemed necessary by my physician may have serious consequences to my condition to include possible death. I hereby assume responsibility for my refusal and release the hospital, its personnel, and my physicians from any responsibility for the consequences of my refusal.    o  Refuse   5.   I authorize the use of any specimen, organs, tissues, body parts or foreign objects that may be removed from my body during the operation/procedure for diagnosis, research or teaching purposes and their subsequent disposal by hospital authorities.  I also authorize the release of specimen test results and/or written reports to my treating physician on the hospital medical staff or other referring or consulting physicians involved in my care, at the discretion of the Pathologist or my treating physician.    6.   I consent to the photographing or videotaping of the operations or procedures to be performed, including appropriate portions of my body for medical, scientific, or educational purposes, provided my identity is not revealed by the pictures or by descriptive texts accompanying them.  If the procedure has been photographed/videotaped, the surgeon will obtain the original picture, image, videotape or CD.  The hospital will not be responsible for storage, release or maintenance of the picture, image, tape or CD.    7.   I consent to the presence of a  or observers in the operating room as deemed necessary by my physician or their designees.    8.   I recognize that in the event my procedure results in extended X-Ray/fluoroscopy time, I may develop a skin reaction.    9. If  I have a Do Not Attempt Resuscitation (DNAR) order in place, that status will be suspended while in the operating room, procedural suite, and during the recovery period unless otherwise explicitly stated by me (or a person authorized to consent on my behalf). The surgeon or my attending physician will determine when the applicable recovery period ends for purposes of reinstating the DNAR order.  10. Patients having a sterilization procedure: I understand that if the procedure is successful the results will be permanent and it will therefore be impossible for me to inseminate, conceive, or bear children.  I also understand that the procedure is intended to result in sterility, although the result has not been guaranteed.   11. I acknowledge that my physician has explained sedation/analgesia administration to me including the risk and benefits I consent to the administration of sedation/analgesia as may be necessary or desirable in the judgment of my physician.    I CERTIFY THAT I HAVE READ AND FULLY UNDERSTAND THE ABOVE CONSENT TO OPERATION and/or OTHER PROCEDURE.     ____________________________________  _________________________________        ______________________________  Signature of Patient    Signature of Responsible Person                Printed Name of Responsible Person                                      ____________________________________  _____________________________                ________________________________  Signature of Witness        Date  Time         Relationship to Patient    STATEMENT OF PHYSICIAN My signature below affirms that prior to the time of the procedure; I have explained to the patient and/or his/her legal representative, the risks and benefits involved in the proposed treatment and any reasonable alternative to the proposed treatment. I have also explained the risks and benefits involved in refusal of the proposed treatment and alternatives to the proposed treatment and have  answered the patient's questions. If I have a significant financial interest in a co-management agreement or a significant financial interest in any product or implant, or other significant relationship used in this procedure/surgery, I have disclosed this and had a discussion with my patient.     _____________________________________________________              _____________________________  (Signature of Physician)                                                                                         (Date)                                   (Time)  Patient Name: Kerry Hsu      : 1960      Printed: 9/10/2024     Medical Record #: [id number]                                      Page 1 of 1

## (undated) NOTE — LETTER
[facility]  [address] [facility], [facility] [address] [place] [address]  [phone]        Dear Rigo Addison,      I had the pleasure of seeing your patient, Ana Herrera on 6/23/2021. Below please find a summary of our visit.   If you have a maneuver at the wrist are negative. Deep tendon reflexes are +1 symmetric without Babinski signs. Joint position sense in the hands and feet are normal.  Vibration is 0-1 at the ankles -2 at the toes.   No objective sensory loss to temperature light touch

## (undated) NOTE — LETTER
AUTHORIZATION FOR SURGICAL OPERATION OR OTHER PROCEDURE    1. I hereby authorize Dr. Lynn Grajeda, and [place] [facility] staff assigned to my case to perform the following operation and/or procedure at the [facility]:    _______________________________________________________________________________________________    Cortisone injection, right knee  _______________________________________________________________________________________________    2. My physician has explained the nature and purpose of the operation or other procedure, possible alternative methods of treatment, the risks involved, and the possibility of complication to me. I acknowledge that no guarantee has been made as to the result that may be obtained. 3.  I recognize that, during the course of this operation, or other procedure, unforseen conditions may necessitate additional or different procedure than those listed above. I, therefore, further authorize and request that the above named physician, his/her physician assistants or designees perform such procedures as are, in his/her professional opinion, necessary and desirable. 4.  Any tissue or organs removed in the operation or other procedure may be disposed of by and at the discretion of the [facility] and [facility] AT [facility]. 5.  I understand that in the event of a medical emergency, I will be transported by local paramedics to [facility] or other hospital emergency department. 6.  I certify that I have read and fully understand the above consent to operation and/or other procedure. 7.  I acknowledge that my physician has explained sedation/analgesia administration to me including the risks and benefits. I consent to the administration of sedation/analgesia as may be necessary or desirable in the judgement of my physician.     Witness signature: ___________________________________________________ Date: ______/______/_____                    Time:  ________ A. M.  P.M. Patient Name:  ______________________________________________________  (please print)      Patient signature:  ___________________________________________________             Relationship to Patient:           []  Parent    Responsible person                          []  Spouse  In case of minor or                    [] Other  _____________   Incompetent name:  __________________________________________________                               (please print)      _____________      Responsible person  In case of minor or  Incompetent signature:  _______________________________________________    Statement of Physician  My signature below affirms that prior to the time of the procedure, I have explained to the patient and/or his/her guardian, the risks and benefits involved in the proposed treatment and any reasonable alternative to the proposed treatment. I have also explained the risks and benefits involved in the refusal of the proposed treatment and have answered the patient's questions.                         Date:  ______/______/_______  Provider                      Signature:  __________________________________________________________       Time:  ___________ A.M    P.M.

## (undated) NOTE — MR AVS SNAPSHOT
[place]  [address]  [phone]               Thank you for choosing us for your health care visit with Amaya Pina MD.  We are glad to serve you and happy to provide you with this hummel breastfeeding and your exam requires an IV Contrast (Gadolinium) injection, you need to stop breastfeeding for 24-48 hours after the procedure.      IF A CHILD is scheduled for this procedure, parents should be advised that they will not be able to accompan AmLODIPine Besylate 5 MG Tabs   Take at bedtime   Commonly known as:  NORVASC           Calcium Polycarbophil 625 MG Tabs   Take 1 tablet (625 mg total) by mouth 2 (two) times daily.    Commonly known as:  FIBERCON           diazepam 10 MG Tabs   Take 1 ta your doctor or other care provider to review them with you.          Where to Get Your Medications      These medications were sent to [facility], [address] AT [address] [facility] [address], [phone], [phone] discharge instructions in [facility] by going to Visits < Admission Summaries. If you've been to the Emergency Department or your doctor's office, you can view your past visit information in [facility] by going to Visits < Visit Summaries. [facility] questions?

## (undated) NOTE — LETTER
[facility]  [address] [facility], [facility] [address] [place] [address]  [phone]        Dear Anish Ruvalcaba,      I had the pleasure of seeing your patient, Fredis Bae on 6/28/2021. Below please find a summary of our visit.   If you have a

## (undated) NOTE — LETTER
AUTHORIZATION FOR SURGICAL OPERATION OR OTHER PROCEDURE    1. I hereby [facility] Shena Connor, and [facility] staff assigned to my case to perform the following operation and/or procedure at the [facility]:    _______________________________________________________________________________________________    Left Shoulder Cortisone Injection  _______________________________________________________________________________________________    2. My physician has explained the nature and purpose of the operation or other procedure, possible alternative methods of treatment, the risks involved, and the possibility of complication to me. I acknowledge that no guarantee has been made as to the result that may be obtained. 3.  I recognize that, during the course of this operation, or other procedure, unforseen conditions may necessitate additional or different procedure than those listed above. I, therefore, further authorize and request that the above named physician, his/her physician assistants or designees perform such procedures as are, in his/her professional opinion, necessary and desirable. 4.  Any tissue or organs removed in the operation or other procedure may be disposed of by and at the discretion of the [facility] and [facility] AT [facility]. 5.  I understand that in the event of a medical emergency, I will be transported by local paramedics to [facility] or other hospital emergency department. 6.  I certify that I have read and fully understand the above consent to operation and/or other procedure. 7.  I acknowledge that my physician has explained sedation/analgesia administration to me including the risks and benefits. I consent to the administration of sedation/analgesia as may be necessary or desirable in the judgement of my physician.     Witness signature: ___________________________________________________ Date:  ______/______/_____ Time:  ________ A. M.  P.M. Patient Name:  ______________________________________________________  (please print)      Patient signature:  ___________________________________________________             Relationship to Patient:           []  Parent    Responsible person                          []  Spouse  In case of minor or                    [] Other  _____________   Incompetent name:  __________________________________________________                               (please print)      _____________      Responsible person  In case of minor or  Incompetent signature:  _______________________________________________    Statement of Physician  My signature below affirms that prior to the time of the procedure, I have explained to the patient and/or his/her guardian, the risks and benefits involved in the proposed treatment and any reasonable alternative to the proposed treatment. I have also explained the risks and benefits involved in the refusal of the proposed treatment and have answered the patient's questions.                         Date:  ______/______/_______  Provider                      Signature:  __________________________________________________________       Time:  ___________ A.M    P.M.

## (undated) NOTE — LETTER
AUTHORIZATION FOR SURGICAL OPERATION OR OTHER PROCEDURE    1. I hereby authorize Dr. Zay Healy, and [address] [place] [address] staff assigned to my case to perform the following operation and/or procedure at the [address] [place] [address]:    _______________________________________________________________________________________________    Cortisone Injection Right foot  _______________________________________________________________________________________________    2. My physician has explained the nature and purpose of the operation or other procedure, possible alternative methods of treatment, the risks involved, and the possibility of complication to me. I acknowledge that no guarantee has been made as to the result that may be obtained. 3.  I recognize that, during the course of this operation, or other procedure, unforseen conditions may necessitate additional or different procedure than those listed above. I, therefore, further authorize and request that the above named physician, his/her physician assistants or designees perform such procedures as are, in his/her professional opinion, necessary and desirable. 4.  Any tissue or organs removed in the operation or other procedure may be disposed of by and at the discretion of the [address] [place] [address] and [facility]. 5.  I understand that in the event of a medical emergency, I will be transported by local paramedics to [facility] or other hospital emergency department. 6.  I certify that I have read and fully understand the above consent to operation and/or other procedure. 7.  I acknowledge that my physician has explained sedation/analgesia administration to me including the risks and benefits. I consent to the administration of sedation/analgesia as may be necessary or desirable in the judgement of my physician.     Witness signature: ___________________________________________________ Date:  ______/______/_____ Time:  ________ A. M.  P.M. Patient Name:  ______________________________________________________  (please print)      Patient signature:  ___________________________________________________             Relationship to Patient:           []  Parent    Responsible person                          []  Spouse  In case of minor or                    [] Other  _____________   Incompetent name:  __________________________________________________                               (please print)      _____________      Responsible person  In case of minor or  Incompetent signature:  _______________________________________________    Statement of Physician  My signature below affirms that prior to the time of the procedure, I have explained to the patient and/or his/her guardian, the risks and benefits involved in the proposed treatment and any reasonable alternative to the proposed treatment. I have also explained the risks and benefits involved in the refusal of the proposed treatment and have answered the patient's questions.                         Date:  ______/______/_______  Provider                      Signature:  __________________________________________________________       Time:  ___________ A.M    P.M.

## (undated) NOTE — Clinical Note
6/2/2017          To Whom It May Concern:    Bethany Jansen is currently under my medical care. Please excuse Delia Gavlin for 6/2/17 as she had an appointment at our office for a procedure. She may return to work without restrictions on Monday 6/4/17.       If

## (undated) NOTE — LETTER
Date & Time: 6/27/2022, 6:45 PM  Patient: Ingris Maker  Encounter Provider(s):    Ludy Mars MD       To Whom It May Concern:    Peggie Peabody was seen and treated in our department on 6/27/2022. She is COVID positive and cannot return to work until 7/4/22.     If you have any questions or concerns, please do not hesitate to call.        _____________________________  Physician/APC Signature

## (undated) NOTE — LETTER
AUTHORIZATION FOR SURGICAL OPERATION OR OTHER PROCEDURE    1.  I hereby authorize AMIRA Melendez, and [facility] staff assigned to my case to perform the following operation and/or procedure at the [facility]:    Cortisone inje Witness signature: ___________________________________________________ Date:  ______/______/_____                    Time:  ________ A. M.  P.M.        Patient Name:  ______________________________________________________  (please print)      Patient signatu

## (undated) NOTE — LETTER
4/8/2019          To Whom It May Concern:    Betty Nelson is currently under my medical care and may need extra travel time in the morning (30 minutes) and may leave work 30 minutes early until I release her from my care.     If you require additional info

## (undated) NOTE — LETTER
December 28, 2021    Roseanna De Santiago, [address] [facility]     Patient: Kristin Henriquez   YOB: 1960   Date of Visit: 12/28/2021       Dear Dr. Adore Lopez MD:    Thank you for referring Kasandra Gaines to me for evaluation.  Here is my Chalazion, Single - OD - Right Eye (Right, 6.27/2017) (Nasally); Cataract extraction w/  intraocular lens implant (Left, 05/07/2018) (L PC IOL with Dr. Jimena Celeste @ [facility]);  Yag Capsulotomy - OS - Left Eye (Left, 12/19/2018) (CHENCHO); other (Lap Nissen Fundoplicatio 0.005 % Ophthalmic Solution INSTILL 1 DROP IN BOTH EYES EVERY MORNING (Patient not taking: Reported on 12/28/2021) 7.5 mL 3       Allergies:    Erythromycin            PAIN  Celecoxib                   Comment:Other reaction(s): CELECOXIB  Sulfa Antibiotic Temporal crescent Good rim, Temporal crescent    C/D Ratio 0.7 0.5    Macula Normal Normal    Vessels Normal Normal    Periphery Normal Normal            Refraction     Wearing Rx       Sphere Cylinder Axis Add    Right -0.50 +0.75 125 +2.50    Left -1.00 Follow up instructions:  Return in about 4 months (around 4/28/2022) for IOP check. 12/28/2021  Scribed by: Renee Doll MD        If you have questions, please do not hesitate to call me. I look forward to following Vanessa Dolan along with you.     Sin

## (undated) NOTE — Clinical Note
4/27/2017        Dear Ricarda Ozuna MD,    Thank you for allowing me to participate in your patient's care. We appreciate your confidence in their care for your patient.     The patient will find the results of our examination and our treatment recommenda

## (undated) NOTE — LETTER
3/7/2022          To Whom It May Concern:    Dustin Busby is currently under my medical care. Please excuse her from work for 6 weeks. If you require additional information please contact our office.         Sincerely,    Ledy Cantu MD

## (undated) NOTE — LETTER
[facility]  Administration of Anesthesia  I, Kerry Hsu agree to be cared for by a physician anesthesiologist alone and/or with a nurse anesthetist, who is specially trained to monitor me and give me medicine to put me to sleep or keep me comfortable during my procedure    I understand that my anesthesiologist and/or anesthetist is not an employee or agent of [facility] or [employer]. He or she works for [facility] [facility]    As the patient asking for anesthesia services, I agree to:  Allow the anesthesiologist (anesthesia doctor) to give me medicine and do additional procedures as necessary. Some examples are: Starting or using an “IV” to give me medicine, fluids or blood during my procedure, and having a breathing tube placed to help me breathe when I’m asleep (intubation). In the event that my heart stops working properly, I understand that my anesthesiologist will make every effort to sustain my life, unless otherwise directed by [facility] Do Not Resuscitate documents.  Tell my anesthesia doctor before my procedure:  If I am pregnant.  The last time that I ate or drank.  iii. All of the medicines I take (including prescriptions, herbal supplements, and pills I can buy without a prescription (including street drugs/illegal medications). Failure to inform my anesthesiologist about these medicines may increase my risk of anesthetic complications.  iv.If I am allergic to anything or have had a reaction to anesthesia before.  I understand how the anesthesia medicine will help me (benefits).  I understand that with any type of anesthesia medicine there are risks:  The most common risks are: nausea, vomiting, sore throat, muscle soreness, damage to my eyes, mouth, or teeth (from breathing tube placement).  Rare risks include: remembering what happened during my procedure, allergic reactions to medications, injury to my airway, heart, lungs, vision, nerves,  or muscles and in extremely rare instances death.  My doctor has explained to me other choices available to me for my care (alternatives).  Pregnant Patients (“epidural”):  I understand that the risks of having an epidural (medicine given into my back to help control pain during labor), include itching, low blood pressure, difficulty urinating, headache or slowing of the baby’s heart. Very rare risks include infection, bleeding, seizure, irregular heart rhythms and nerve injury.  Regional Anesthesia (“spinal”, “epidural”, & “nerve blocks”):  I understand that rare but potential complications include headache, bleeding, infection, seizure, irregular heart rhythms, and nerve injury.    _____________________________________________________________________________  Patient (or Representative) Signature/Relationship to Patient  Date   Time    _____________________________________________________________________________   Name (if used)    Language/Organization   Time    _____________________________________________________________________________  Nurse Anesthetist Signature     Date   Time  _____________________________________________________________________________  Anesthesiologist Signature     Date   Time  I have discussed the procedure and information above with the patient (or patient’s representative) and answered their questions. The patient or their representative has agreed to have anesthesia services.    _____________________________________________________________________________  Witness        Date   Time  I have verified that the signature is that of the patient or patient’s representative, and that it was signed before the procedure  Patient Name: Kerry Hsu     : 1960                 Printed: 9/10/2024 at 2:23 PM    Medical Record #: [id number]                                            Page 1 of 1  ----------ANESTHESIA CONSENT----------

## (undated) NOTE — Clinical Note
January 14, 2017    Lizzie Romeo MD  [facility] [address] [address]     Patient: Mars Chandler   YOB: 1960   Date of Visit: 1/14/2017       Dear Dr. Ashlie La MD:    Thank you for referring Sourav Pena to me for evaluation.  Here Renetta Johnston MD;  Location: [facility]).     Family History   Problem Relation Age of Onset   • Hypertension Father    • Hypertension Mother    • Hypertension Sister    • Hypertension Brother    • Diabetes Neg    • Glaucoma Neg    • Macular deg area twice a day as needed. Disp: 30 g Rfl: 5   ibuprofen (MOTRIN) 600 MG Oral Tab take 1 tablet (600MG)  by oral route 2 times every day with food only as needed.  Disp: 60 tablet Rfl: 1   triamcinolone acetonide 0.1 % External Cream Apply topically 2 (two Right Left    Disc Good rim, Temporal crescent Good rim, Temporal crescent    C/D Ratio 0.6 0.5    Macula Normal Normal    Vessels Normal Normal    Periphery Normal Normal            Refraction     Wearing Rx      Sphere Cylinder Axis Add   Right -0.75 +

## (undated) NOTE — LETTER
Patient Name: Kerry Hsu  YOB: 1960          MRN :  [id number]  Date:  5/20/2025  Referring Physician:  Jessica Ladd    Progress Summary  Pt has attended 10 visits in Physical Therapy.      Today's Date   5/20/2025    Subjective  Pt reports that the cover still hurts in bed. The sensitivity isn't getting better. Still getting shooting pain around the R knee joint. Pt is now amb without an AD. She is also able to ascend descend stairs with a recipricol pattern. Overall she feels 60% better.       Pain: 3/10     Objective  See table below. R knee AROM: 4-98deg       Hip       4/17/2025 5/20/2025   Hip ROM/MMT   Rt Hip Flexion MMT (L2) 3- 3+   Lt Hip Flexion MMT (L2) 4 4   Rt Hip Extension MMT 3- 3+   Lt Hip Extension MMT 4 4   Rt Hip Abduction MMT 3+ 3+   Lt Hip Abduction MMT 4 4   Rt Hip ER MMT  3+   Lt Hip ER MMT  4   Rt Hip IR MMT  3+   Lt Hip IR MMT  4   , Knee       4/17/2025 5/20/2025   Knee ROM/MMT   Rt Knee Flexion 70 deg 98 deg   Lt Knee Flexion 0    Rt Knee Flexion MMT 2 3   Rt Knee Extension (L3) 10 deg 4 deg   Lt Knee Extension (L3) 124    Rt Knee Extension MMT (L3) 2 3          Assessment  Pt has limited inferior scar mobility. Encouraged patient to continue to address scar. Patient has a firm end feel for passive range of motion of the R knee. She is apprehensive to do flexion exercises at home due to shooting pain in the knee. She has some concern it could be nerve related. She will call physician office today to discuss pain management options. She has improved with strength and rom.    Goals (to be met in 16 visits)   Patient to be independent with HEP.  Patient to increase R Knee AROM = WFL to allow patient to sit to stand from a chair without discomfort.  Patient to increase B Hip and Knee strength = 4+/5 to allow for prolonged ambulation within the community.   Patient to increase hamstring length by 5 degrees.  Patient to improve score on LEFS by 10% to show improvement in  QOL.  Patient to have an overall subjective improvement of 75% or greater.                  Plan  Progress skilled PT as tolerated to help improve knee ROM and quad strength to help normalize gait pattern. Pt to follow up with physician re: pain. Rec patient continue an additional 10 visits to achieve rom goals and strength goals.    Treatment Last 4 Visits  Treatment Day: 10       5/8/2025 5/13/2025 5/16/2025 5/20/2025   LE Treatment   Therapeutic Exercise NuStep x7min L3 LE only  Prone HS curl 3x10  Prone Hip ext 2x10B  LAQ 2# 2x10  Knee flexion stretch at the stair 20 sec x 5   NuStep x7min  Bridge 3x5-with flexing R knee into more flexion after each set  DKTC with RSB x20  Brief STM R knee to decrease swelling, decrease sensitivity  Prone passive Rknee flexion x1min  Prone knee flexion with self op with opposite foot 10 sec hold x15  Sit to stand with UE 2x10-cueing to avoid kicking out R LE.   Knee flexion stretch at stair 10 sec 2x 10  4 in step up with opp hip flex  4 in step down x15 R     NuStep x6 min L5  Bridge 3x5-with flexing R knee into more flexion after each set  DKTC with RSB x20  Brief STM R knee to decrease swelling, decrease sensitivity  Prone passive Rknee flexion x1min  Prone knee flexion with self op with opposite foot 10 sec hold x15  Sit to stand with UE 2x10-cueing to avoid kicking out R LE.   Knee flexion stretch at stair 10 sec 2x 10  4 in step up with opp hip flex  4 in step down x15 R  4 in lateral step down x15 R NuStep x7min L4  Re-assessment completed  Prone quad stretch 10 sec hold x10  6 in lateral step down 3x10  6 in step up with opp hip flex 3x10B  Calf stretch on incline 30 sec x 4  Prone passive knee flexion x2min  Supine patellar mobs all planes with scar massage   Manual Therapy Patellar mobs all planes  STM R knee for pain relief  STM R gastroc to increase flexibility      Therapeutic Exercise Minutes 25 40 40 40   Manual Therapy Minutes 15      Total Time Of Timed  Procedures 40 40 40 40   Total Time Of Service-Based Procedures 0 0 0 0   Total Treatment Time 40 40 40 40   HEP  Access Code: [email or web address]  URL: [email or web address]  Date: 05/13/2025  Prepared by: Martha Birmingham    Exercises  - Prone Knee Flexion AAROM with Overpressure  - 1 x daily - 7 x weekly - 2 sets - 10 reps - 5 seconds hold  - Standing Knee Flexion Stretch on Step  - 1 x daily - 7 x weekly - 2 sets - 10 reps - 5 sec hold  - Sit to Stand with Armchair  - 1 x daily - 7 x weekly - 1 sets - 10 reps          HEP  Access Code: [email or web address]  URL: [email or web address]  Date: 05/13/2025  Prepared by: Martha Birmingham    Exercises  - Prone Knee Flexion AAROM with Overpressure  - 1 x daily - 7 x weekly - 2 sets - 10 reps - 5 seconds hold  - Standing Knee Flexion Stretch on Step  - 1 x daily - 7 x weekly - 2 sets - 10 reps - 5 sec hold  - Sit to Stand with Armchair  - 1 x daily - 7 x weekly - 1 sets - 10 reps    Charges     3 TherEx        Post LEFS Score  Post LEFS Score: (Patient-Rptd) 52.5 % (5/20/2025  9:14 AM)    16.25 % improvement    Plan: Continue skilled Physical Therapy 2 x/week or a total of 10 additional visits over a 90 day period. Treatment will include: TherEx, TherAct, Manual prn, Modalities prn, Gait, Neuroed       Patient/Family/Caregiver was advised of these findings, precautions, and treatment options and has agreed to actively participate in planning and for this course of care.    Thank you for your referral. If you have any questions, please contact me at Dept: [phone].    Sincerely,  Electronically signed by therapist: Martha Birmingham PT     Physician's certification required:  Yes  Please co-sign or sign and return this letter via fax as soon as possible to [phone].   I certify the need for these services furnished under this plan of treatment and while under my care.    X___________________________________________________  Date____________________    Certification From: 5/20/2025  To:8/18/2025              21st Century Cures Act Notice to Patient: Medical documents like this are made available to patients in the interest of transparency. However, be advised this is a medical document and it is intended as tndu-gv-ofvo communication between your medical providers. This medical document may contain abbreviations, assessments, medical data, and results or other terms that are unfamiliar. Medical documents are intended to carry relevant information, facts as evident, and the clinical opinion of the practitioner. As such, this medical document may be written in language that appears blunt or direct. You are encouraged to contact your medical provider and/or [facility] Experience if you have any questions about this medical document.

## (undated) NOTE — LETTER
AUTHORIZATION FOR SURGICAL OPERATION OR OTHER PROCEDURE    1.  I hereby authorize AMIRA Kern, and [facility] assigned to my case to perform the following operation and/or procedure at the [facility]:    _______________________ Time:  ________ A. M.  P.M.        Patient Name:  ______________________________________________________  (please print)      Patient signature:  ___________________________________________________             Relationship to Patient:

## (undated) NOTE — LETTER
AUTHORIZATION FOR SURGICAL OPERATION OR OTHER PROCEDURE    1.  I hereby authorize Dr. Debbie Ortiz, and [facility], [facility] staff assigned to my case to perform the following operation and/or procedure at the [facility], [facility]:    _______Cortisone left shoulder Patient Name:  ______________________________________________________  (please print)      Patient signature:  ___________________________________________________             Relationship to Patient:           []  Parent    Responsible person

## (undated) NOTE — LETTER
4/7/2022          To Whom It May Concern:    Maria G Husbands is currently under my medical care and may not return to work until 5/2/2022. She may return to work after that with the following restrictions: no lifting, pushing, pulling and reaching with the right arm for the following 2 weeks after return. If you require additional information please contact our office.         Sincerely,    Ken Campa MD

## (undated) NOTE — LETTER
2/24/2020              [address] [place] [address]         To whom it may concern, this is to inform you that the above patient was seen and examined today. She is at low risk for her eyelid procedure.   She will need ro

## (undated) NOTE — LETTER
AUTHORIZATION FOR SURGICAL OPERATION OR OTHER PROCEDURE    1. I hereby authorize Dr. Dorys Dunbar, [facility] staff assigned to my case to perform the following operation and/or procedure at the [facility]:    _______________________________________________________________________________________________    Right shoulder Cortisone injection   _______________________________________________________________________________________________    2. My physician has explained the nature and purpose of the operation or other procedure, possible alternative methods of treatment, the risks involved, and the possibility of complication to me. I acknowledge that no guarantee has been made as to the result that may be obtained. 3.  I recognize that, during the course of this operation, or other procedure, unforseen conditions may necessitate additional or different procedure than those listed above. I, therefore, further authorize and request that the above named physician, his/her physician assistants or designees perform such procedures as are, in his/her professional opinion, necessary and desirable. 4.  Any tissue or organs removed in the operation or other procedure may be disposed of by and at the discretion of the [facility] and [facility]. 5.  I understand that in the event of a medical emergency, I will be transported by local paramedics to [facility] or other hospital emergency department. 6.  I certify that I have read and fully understand the above consent to operation and/or other procedure. 7.  I acknowledge that my physician has explained sedation/analgesia administration to me including the risks and benefits. I consent to the administration of sedation/analgesia as may be necessary or desirable in the judgement of my physician.     Witness signature: ___________________________________________________ Date:  ______/______/_____ Time:  ________ A. M.  P.M. Patient Name:  ______________________________________________________  (please print)      Patient signature:  ___________________________________________________             Relationship to Patient:           []  Parent    Responsible person                          []  Spouse  In case of minor or                    [] Other  _____________   Incompetent name:  __________________________________________________                               (please print)      _____________      Responsible person  In case of minor or  Incompetent signature:  _______________________________________________    Statement of Physician  My signature below affirms that prior to the time of the procedure, I have explained to the patient and/or his/her guardian, the risks and benefits involved in the proposed treatment and any reasonable alternative to the proposed treatment. I have also explained the risks and benefits involved in the refusal of the proposed treatment and have answered the patient's questions.                         Date:  ______/______/_______  Provider                      Signature:  __________________________________________________________       Time:  ___________ A.M    P.M.

## (undated) NOTE — LETTER
6/7/2019          To Whom It May Concern:    Mars Chandler is currently under my medical care. She may need extra travel time in the morning (30 minutes) and may leave work 30 minutes early until her follow-up appointment with me on 6/18/2019.      If you

## (undated) NOTE — MR AVS SNAPSHOT
[address], [place]  [address]  [address]  [phone]               Thank you for choosing us for your health care visit with Ochoa Hayden MD.  We are glad to serve you and happy to provide you with this summary of yo Phone:  [phone]   Fax:  [phone]    Diagnoses:   Follow up   Order:  Podiatry - Internal          Referral Orders      Normal Orders This Visit    OPHTHALMOLOGY - INTERNAL [[id number] [facility]]  Order #:  [id number]         **REFERRAL REQUEST**    Your Assoc Dx:   Follow up Zohaib [[id number] [facility]]  Order #:  [id number]         **REFERRAL REQUEST**    Your physician has referred you to a specialist.  Your physician or the clinic staff will provide you with the phone number you jackie gabapentin 300 MG Caps   Commonly known as:  NEURONTIN           hydrocortisone 2.5 % Crea   Place 1 Application rectally 2 (two) times daily.  Apply by topical route 2 times every day to the affected area(s)   Commonly known as:  ANUSOL-HC           ibupr view more details from this visit by going to [email or web address]. If you've recently had a stay at the Hospital you can access your discharge instructions in [employer] by going to Visits < Admission Summaries.  If you've been to the Emergency Depar

## (undated) NOTE — MR AVS SNAPSHOT
[place]  [address]  [phone]               Thank you for choosing us for your health care visit with Evelyne Gage MD.  We are glad to serve you and happy to provide you with this summary Assoc Dx:  Mesha Romano hematuria [R31.0]                 Follow-up Instructions     Return in about 4 weeks (around 6/8/2017). Scheduling Instructions     Thursday May 11, 2017     Imaging:  CT UROGRAM(W+WO)(CPT=74178)    Instructions:   To schedule a chelle recent med list.                AmLODIPine Besylate 5 MG Tabs   Take at bedtime   Commonly known as:  NORVASC           Calcium Polycarbophil 625 MG Tabs   Take 1 tablet (625 mg total) by mouth 2 (two) times daily.    Commonly known as:  Yvonne Michel Call [phone] for help. MOG is NOT to be used for urgent needs. For medical emergencies, dial 911.            Visit [facility] online at  [email or web address]

## (undated) NOTE — LETTER
AUTHORIZATION FOR SURGICAL OPERATION OR OTHER PROCEDURE    1. I hereby authorize Dr. Melanie Wang , and [place] [employer] [place], [facility] staff assigned to my case to perform the following operation and/or procedure at the [facility], [facility]:    Cortisone injection in Left shoulder   _______________________________________________________________________________________________      _______________________________________________________________________________________________    2. My physician has explained the nature and purpose of the operation or other procedure, possible alternative methods of treatment, the risks involved, and the possibility of complication to me. I acknowledge that no guarantee has been made as to the result that may be obtained. 3.  I recognize that, during the course of this operation, or other procedure, unforseen conditions may necessitate additional or different procedure than those listed above. I, therefore, further authorize and request that the above named physician, his/her physician assistants or designees perform such procedures as are, in his/her professional opinion, necessary and desirable. 4.  Any tissue or organs removed in the operation or other procedure may be disposed of by and at the discretion of the [facility] and [address]. 5.  I understand that in the event of a medical emergency, I will be transported by local paramedics to [facility] or other [facility] emergency department. 6.  I certify that I have read and fully understand the above consent to operation and/or other procedure. 7.  I acknowledge that my physician has explained sedation/analgesia administration to me including the risks and benefits. I consent to the administration of sedation/analgesia as may be necessary or desirable in the judgement of my physician.     Witness signature: ___________________________________________________ Date: ______/______/_____                    Time:  ________ A. M.  P.M. Patient Name:  ______________________________________________________  (please print)      Patient signature:  ___________________________________________________             Relationship to Patient:           []  Parent    Responsible person                          []  Spouse  In case of minor or                    [] Other  _____________   Incompetent name:  __________________________________________________                               (please print)      _____________      Responsible person  In case of minor or  Incompetent signature:  _______________________________________________    Statement of Physician  My signature below affirms that prior to the time of the procedure, I have explained to the patient and/or his/her guardian, the risks and benefits involved in the proposed treatment and any reasonable alternative to the proposed treatment. I have also explained the risks and benefits involved in the refusal of the proposed treatment and have answered the patient's questions.                         Date:  ______/______/_______  Provider                      Signature:  __________________________________________________________       Time:  ___________ A.M    P.M.

## (undated) NOTE — Clinical Note
Hi dr Martine aLndry-- pt has an apt in nov but her knees are hurting very badly, would you be able to see her sooner

## (undated) NOTE — LETTER
[facility], [place]  Authorization for Invasive Procedures  1.  I hereby authorize Dr. Pina Bauer , my physician and whomever may be designated as the doctor's assistant, to perform the following operation and/or procedure:  Colonoscop performed for the purposes of advancing medicine, science, and/or education, provided my identity is not revealed. If the procedure has been videotaped, the physician/surgeon will obtain the original videotape.  The hospital will not be responsible for stor My signature below affirms that prior to the time of the procedure, I have explained to the patient and/or her legal representative, the risks and benefits involved in the proposed treatment and any reasonable alternative to the proposed treatment.  I have

## (undated) NOTE — LETTER
AUTHORIZATION FOR SURGICAL OPERATION OR OTHER PROCEDURE    1. I hereby authorize Dr. Duane Ritter  and [address] [place] [address] staff assigned to my case to perform the following operation and/or procedure at the [address] [place] [address]:    Cortisone injection in Bilateral knee   _______________________________________________________________________________________________      _______________________________________________________________________________________________    2. My physician has explained the nature and purpose of the operation or other procedure, possible alternative methods of treatment, the risks involved, and the possibility of complication to me. I acknowledge that no guarantee has been made as to the result that may be obtained. 3.  I recognize that, during the course of this operation, or other procedure, unforseen conditions may necessitate additional or different procedure than those listed above. I, therefore, further authorize and request that the above named physician, his/her physician assistants or designees perform such procedures as are, in his/her professional opinion, necessary and desirable. 4.  Any tissue or organs removed in the operation or other procedure may be disposed of by and at the discretion of the [address] [place] [address] and [facility]. 5.  I understand that in the event of a medical emergency, I will be transported by local paramedics to [facility] or other hospital emergency department. 6.  I certify that I have read and fully understand the above consent to operation and/or other procedure. 7.  I acknowledge that my physician has explained sedation/analgesia administration to me including the risks and benefits. I consent to the administration of sedation/analgesia as may be necessary or desirable in the judgement of my physician.     Witness signature: ___________________________________________________ Date:  ______/______/_____ Time:  ________ A. M.  P.M. Patient Name:  ______________________________________________________  (please print)      Patient signature:  ___________________________________________________             Relationship to Patient:           []  Parent    Responsible person                          []  Spouse  In case of minor or                    [] Other  _____________   Incompetent name:  __________________________________________________                               (please print)      _____________      Responsible person  In case of minor or  Incompetent signature:  _______________________________________________    Statement of Physician  My signature below affirms that prior to the time of the procedure, I have explained to the patient and/or his/her guardian, the risks and benefits involved in the proposed treatment and any reasonable alternative to the proposed treatment. I have also explained the risks and benefits involved in the refusal of the proposed treatment and have answered the patient's questions.                         Date:  ______/______/_______  Provider                      Signature:  __________________________________________________________       Time:  ___________ A.M    P.M.

## (undated) NOTE — LETTER
AUTHORIZATION FOR SURGICAL OPERATION OR OTHER PROCEDURE    1.  I hereby [facility] Dr. Felicia Vickers, and [facility] staff assigned to my case to perform the following operation and/or procedure at the [facility], [facility]:    ________________________________ ________ A. M.  P.M.        Patient Name:  ______________________________________________________  (please print)      Patient signature:  ___________________________________________________             Relationship to Patient:           []  Parent    Respon

## (undated) NOTE — LETTER
AUTHORIZATION FOR SURGICAL OPERATION OR OTHER PROCEDURE    1. I hereby authorize Dr. Jessica Ladd PA-c, and [facility] staff assigned to my case to perform the following operation and/or procedure at the [facility] site:    __________________________Left knee durolane injection  _____________________________________________________________________      _______________________________________________________________________________________________    2.  My physician has explained the nature and purpose of the operation or other procedure, possible alternative methods of treatment, the risks involved, and the possibility of complication to me.  I acknowledge that no guarantee has been made as to the result that may be obtained.  3.  I recognize that, during the course of this operation, or other procedure, unforseen conditions may necessitate additional or different procedure than those listed above.  I, therefore, further authorize and request that the above named physician, his/her physician assistants or designees perform such procedures as are, in his/her professional opinion, necessary and desirable.  4.  Any tissue or organs removed in the operation or other procedure may be disposed of by and at the discretion of the [facility] and [facility].  5.  I understand that in the event of a medical emergency, I will be transported by local paramedics to [facility] or other hospital emergency department.  6.  I certify that I have read and fully understand the above consent to operation and/or other procedure.    7.  I acknowledge that my physician has explained sedation/analgesia administration to me including the risks and benefits.  I consent to the administration of sedation/analgesia as may be necessary or desirable in the judgement of my physician.    Witness signature: ___________________________________________________ Date:   ______/______/_____                    Time:  ________ A.M.  P.M.       Patient Name:  ______________________________________________________  (please print)      Patient signature:  ___________________________________________________             Relationship to Patient:           []  Parent    Responsible person                          []  Spouse  In case of minor or                    [] Other  _____________   Incompetent name:  __________________________________________________                               (please print)      _____________      Responsible person  In case of minor or  Incompetent signature:  _______________________________________________    Statement of Physician  My signature below affirms that prior to the time of the procedure, I have explained to the patient and/or his/her guardian, the risks and benefits involved in the proposed treatment and any reasonable alternative to the proposed treatment.  I have also explained the risks and benefits involved in the refusal of the proposed treatment and have answered the patient's questions.                        Date:  ______/______/_______  Provider                      Signature:  __________________________________________________________       Time:  ___________ A.M    P.M.

## (undated) NOTE — LETTER
22      Patient: Flor Phillips  : 1960 Visit date: 2022    Dear Emely Arzola,      I examined your patient in consultation today. She has a nail split deformity on the ulnar aspect of the left middle finger nail plate.     We wi

## (undated) NOTE — LETTER
Patient Name: Kerry Hsu  YOB: 1960          MRN number:  [id number]  Date:  4/17/2025  Referring Physician:  Jessica Ladd           LOWER EXTREMITY EVALUATION:     Diagnosis:   S/P total knee arthroplasty, right (Z96.651) Patient:  Kerry Hsu (64 year old, female)        Referring Provider: Jessica Ladd  Today's Date   4/17/2025    Precautions:      Date of Evaluation: 04/17/25  Next MD visit: No data recorded  Date of Surgery: 3/17/25     PATIENT SUMMARY   Summary of chief complaints: S/P total knee arthroplasty, right (Z96.651)  History of current condition: Pt has had B knee pain for about 10 years. Pt said her R knee is worse than her L knee. She had a TKA performed on 3/17/25. Pt was stayed in the hospital for 2 nights. Pt went to ER a week later due to severe pain, and feeling ill. Pt states she received antibiotic infusions, and had her R knee drained. Pt received home health therapy, and was [facility] from [facility]. Pt just had an Xray. Pt states her incision is closed and healed. Pt is only taking tylenol   Pain level: current 6 /10, at best 0 /10, at worst 10 /10  Description of symptoms: sharp, shooting pain anterior knee   Occupation:     Leisure activities/Hobbies:     Prior level of function: Independent with all ADLs without an AD  Current limitations: Ambulation, stairs, squatting, bending, balance  Pt goals: \"Be able to walk better, straighten out my leg, and bend it back\"  Past medical history was reviewed with Kerry.  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), Arthritis, Back problem, Back problem, Cataract, Chondromalacia, Colon polyps, Degenerative disc disease, Disorder of kidney and ureter (09/17/2013), Diverticular disease, Esophageal reflux, Glaucoma,  Hammertoe, High blood pressure, High cholesterol, motion sickness, Hyperlipidemia with target low density lipoprotein (LDL) cholesterol less than 130 mg/dL (07/28/2023), Insomnia, Kidney lesion (07/25/2024), Liver lesion (07/25/2024), Neuropathy, Obstructive sleep apnea syndrome (03/21/2018), Osteoarthritis, Palpitation, PONV (postoperative nausea and vomiting), Prediabetes, Primary open angle glaucoma of both eyes (05/19/2015), Sleep apnea, Small bowel obstruction (HCC), Tendinitis, Unspecified essential hypertension, and Visual impairment.  She  has a past surgical history that includes hysterectomy (1996); anal sphincterotomy; hc arthrocentesis or inject major joint w/o us; upper gi endoscopy performed (05/2015); colonoscopy (N/A, 11/11/2016); Excision of Chalazion, Single - OD - Right Eye (Right, 6.27/2017); Cataract extraction w/  intraocular lens implant (Left, 05/07/2018); Yag Capsulotomy - OS - Left Eye (Left, 12/19/2018); other; colonoscopy (N/A, 09/06/2019); other surgical history (2005,2007,2008); other surgical history; other surgical history (11/14/2014); blepharoplasty anesthesia (Bilateral, 03/05/2020); other surgical history (06/13/2023); egd (09/16/2024); colonoscopy (09/16/2024); and colonoscopy (N/A, 9/16/2024).    ASSESSMENT  Kerry presents to physical therapy evaluation with primary c/o S/P total knee arthroplasty, right (Z96.651). The results of the objective tests and measures show Decreased ROM, decreased R LE strength, impaired gait and balance,. Functional deficits include but are not limited to Ambulation, stairs, squatting, bending, balance. Signs and symptoms are consistent with diagnosis of S/P total knee arthroplasty, right (Z96.651). Pt and PT discussed evaluation findings, pathology, POC and HEP.  Pt voiced understanding and performs HEP correctly without reported pain. Skilled Physical Therapy is medically necessary to address the above impairments and reach functional  goals.    OBJECTIVE:    Musculoskeletal  Observation: Pt's R knee is sweollen; amb with SC  Palpation: Warm to touch on the R   Accessory Motion: Gr 3 Patellar mobs all planes     Edema: Mid-patellar girth: 45cm R, 38cm   Special Tests:      ROM and Strength: (* denotes performed with pain)  Hip   MMT (-/5)    R L     Flex (L2) 3- 4     Ext  3- 4     Abd 3+ 4     ER         IR        ,   Knee   ROM MMT (-/5)    R L R L     Flex 70 deg 0 2       Ext (L3) 10 deg 124 2         Flexibility:  LE Flexibility R L     Hip Flexor mod restricted mod restricted     Hamstrings mod restricted min restricted     ITB mod restricted       Piriformis mod restricted mod restricted     Quads mod restricted mod restricted     Gastroc-soleus mod restricted mod restricted       Neurological:  Sensation: Intact     Balance and Functional Mobility:  Gait: pt ambulates on level ground with assistive device; decreased step length; decreased stance phase (SC)   Balance: SLS: R 4 sec,  L 3 sec  Functional Mobility:  5x sit to stand test:     TUG:        Today's Treatment and Response:   Pt education was provided on exam findings, treatment diagnosis, treatment plan, expectations, and prognosis.  Today's Treatment       4/17/2025   LE Treatment   Therapeutic Exercise QS 5 sec hold x15  Prone knee flexion x15  Discuss elevation + ankle pumps with ice   Therapeutic Exercise Minutes 10   Evaluation Minutes 30   Total Time Of Timed Procedures 10   Total Time Of Service-Based Procedures 30   Total Treatment Time 40   HEP Access Code: [email or web address]  URL: [email or web address]  Date: 04/17/2025  Prepared by: Martha Birmingham    Exercises  - Long Sitting Quad Set with Towel Roll Under Heel  - 3 x daily - 7 x weekly - 1 sets - 15 reps - 5 sec hold  - Supine Heel Slide  - 3 x daily - 7 x weekly - 2 sets - 10 reps - 5 seconds hold  - Seated Heel Slide  - 3 x daily - 7 x weekly - 2 sets - 10 reps - 5 seconds hold  - Prone Knee Flexion AROM  - 3 x  daily - 7 x weekly - 2 sets - 10 reps        Patient was instructed in and issued a HEP for: Access Code: [email or web address]  URL: [email or web address]  Date: 04/17/2025  Prepared by: Martha Birmingham    Exercises  - Long Sitting Quad Set with Towel Roll Under Heel  - 3 x daily - 7 x weekly - 1 sets - 15 reps - 5 sec hold  - Supine Heel Slide  - 3 x daily - 7 x weekly - 2 sets - 10 reps - 5 seconds hold  - Seated Heel Slide  - 3 x daily - 7 x weekly - 2 sets - 10 reps - 5 seconds hold  - Prone Knee Flexion AROM  - 3 x daily - 7 x weekly - 2 sets - 10 reps    Charges:  PT EVAL: Low Complexity, 1 Low Eval, 1 TherEx  In agreement with evaluation findings and clinical rationale, this evaluation involved LOW COMPLEXITY decision making due to no personal factors/comorbidities, 1-2 body structures involved/activity limitations, and stable symptoms as documented in the evaluation.                                                                                                                PLAN OF CARE:    Goals: (to be met in 16 visits)   Patient to be independent with HEP.  Patient to increase R Knee AROM = WFL to allow patient to sit to stand from a chair without discomfort.  Patient to increase B Hip and Knee strength = 4+/5 to allow for prolonged ambulation within the community.   Patient to increase hamstring length by 5 degrees.  Patient to improve score on LEFS by 10% to show improvement in QOL.  Patient to have an overall subjective improvement of 75% or greater.      Frequency / Duration: Patient will be seen 2x/week or a total of 16  visits over a 90 day period. Treatment will include: Gait training; Manual Therapy; Neuromuscular Re-education; Therapeutic Activities; Therapeutic Exercise; Home Exercise Program instruction; Electrical stimulation (unattended); Patient/Family Education    Education or treatment limitation: None   Rehab Potential: good     LEFS Score  LEFS Score: (Patient-Rptd) 36.25 %  (4/17/2025 10:57 AM)      Patient/Family/Caregiver was advised of these findings, precautions, and treatment options and has agreed to actively participate in planning and for this course of care.    Thank you for your referral. Please co-sign or sign and return this letter via fax as soon as possible to [phone]. If you have any questions, please contact me at Dept: [phone]    Sincerely,  Electronically signed by therapist: Martha Birmingham PT  Physician's certification required: Yes  I certify the need for these services furnished under this plan of treatment and while under my care.    X___________________________________________________ Date____________________    Certification From: 4/17/2025  To:7/16/2025 21st Century Cures Act Notice to Patient: Medical documents like this are made available to patients in the interest of transparency. However, be advised this is a medical document and it is intended as [id number] communication between your medical providers. This medical document may contain abbreviations, assessments, medical data, and results or other terms that are unfamiliar. Medical documents are intended to carry relevant information, facts as evident, and the clinical opinion of the practitioner. As such, this medical document may be written in language that appears blunt or direct. You are encouraged to contact your medical provider and/or [facility] Patient Experience if you have any questions about this medical document.

## (undated) NOTE — LETTER
AUTHORIZATION FOR SURGICAL OPERATION OR OTHER PROCEDURE    1.  I hereby authorize Dr. Karis Melissa, and [place] [facility] staff assigned to my case to perform the following operation and/or procedure at the [place] [facility]:    __Excision of chalazio Date:  ______/______/_____                    Time:  ________ A. M.  P.M.        Patient Name:  ______________________________________________________  (please print)      Patient signature:  ___________________________________________________             Re

## (undated) NOTE — LETTER
[facility]  Administration of Anesthesia  1. Sylwia Cutler, or _________________________________ acting on her behalf, (Patient) (Dependent/Representative) request to receive anesthesia for my pending procedure/operation/treatment.   MARGARET lomeli infections, high spinal block, spinal bleeding, seizure, cardiac arrest and death. 7. AWARENESS: I understand that it is possible (but unlikely) to have explicit memory of events from the operating room while under general anesthesia.   8. ELECTROCONVULSIV unconscious pt /Relationship    My signature below affirms that prior to the time of the procedure, I have explained to the patient and/or his/her guardian, the risks and benefits of undergoing anesthesia, as well as any reasonable alternatives.     _______

## (undated) NOTE — LETTER
AUTHORIZATION FOR SURGICAL OPERATION OR OTHER PROCEDURE    1.  I hereby authorize Dr. Mamie Galvan, and [place] [facility] staff assigned to my case to perform the following operation and/or procedure [facility]:    _Excision of chalazion o Date:  ______/______/_____                    Time:  ________ A. M.  P.M.        Patient Name:  ______________________________________________________  (please print)      Patient signature:  ___________________________________________________             Re

## (undated) NOTE — LETTER
AUTHORIZATION FOR SURGICAL OPERATION OR OTHER PROCEDURE    1. I hereby authorize Dr. Francy Ocampo/ Guillermo Valencia PA-C, and [address] [place] [address] staff assigned to my case to perform the following operation and/or procedure at the [address] [place] [address]:    _______________________________________________________________________________________________    Durolane Injection to Left Knee  _______________________________________________________________________________________________    2. My physician has explained the nature and purpose of the operation or other procedure, possible alternative methods of treatment, the risks involved, and the possibility of complication to me. I acknowledge that no guarantee has been made as to the result that may be obtained. 3.  I recognize that, during the course of this operation, or other procedure, unforseen conditions may necessitate additional or different procedure than those listed above. I, therefore, further authorize and request that the above named physician, his/her physician assistants or designees perform such procedures as are, in his/her professional opinion, necessary and desirable. 4.  Any tissue or organs removed in the operation or other procedure may be disposed of by and at the discretion of the [address] [place] [address] and [facility] AT [facility]. 5.  I understand that in the event of a medical emergency, I will be transported by local paramedics to [facility] or other hospital emergency department. 6.  I certify that I have read and fully understand the above consent to operation and/or other procedure. 7.  I acknowledge that my physician has explained sedation/analgesia administration to me including the risks and benefits. I consent to the administration of sedation/analgesia as may be necessary or desirable in the judgement of my physician.     Witness signature: ___________________________________________________ Date: ______/______/_____                    Time:  ________ A. M.  P.M. Patient Name:  ______________________________________________________  (please print)      Patient signature:  ___________________________________________________             Relationship to Patient:           []  Parent    Responsible person                          []  Spouse  In case of minor or                    [] Other  _____________   Incompetent name:  __________________________________________________                               (please print)      _____________      Responsible person  In case of minor or  Incompetent signature:  _______________________________________________    Statement of Physician  My signature below affirms that prior to the time of the procedure, I have explained to the patient and/or his/her guardian, the risks and benefits involved in the proposed treatment and any reasonable alternative to the proposed treatment. I have also explained the risks and benefits involved in the refusal of the proposed treatment and have answered the patient's questions.                         Date:  ______/______/_______  Provider                      Signature:  __________________________________________________________       Time:  ___________ A.M    P.M.

## (undated) NOTE — LETTER
AUTHORIZATION FOR SURGICAL OPERATION OR OTHER PROCEDURE    1. I hereby autnjection   _______________________________________________________________________________________________    2.   My physician has explained the nature and purpose of the operat person                          []  Spouse  In case of minor or                    [] Other  _____________   Incompetent name:  __________________________________________________                               (please print)      _____________      Osbaldo Dee

## (undated) NOTE — LETTER
[address]   Date:   5/5/2022     Name:   Davon Caro    YOB: 1960   MRN:   [id number]       WHERE IS YOUR PAIN NOW? Jame the areas on your body where you feel the described sensations. Use the appropriate symbol. Edmond Danker the areas of radiation. Include all affected areas. Just to complete the picture, please draw in the face. ACHE:  ^ ^ ^   NUMBNESS:  0000   PINS & NEEDLES:  = = = =                              ^ ^ ^                       0000              = = = =                                    ^ ^ ^                       0000            = = = =      BURNING:  XXXX   STABBING: ////                  XXXX                ////                         XXXX          ////     Please jame the line below indicating your degree of pain right now  with 0 being no pain 10 being the worst pain possible.                                          0             1             2              3             4              5              6              7             8             9             10         Patient Signature:

## (undated) NOTE — LETTER
5/7/2019          To Whom It May Concern:    Lawrence Prieto is currently under my medical care.    She may need extra travel time in the morning (30 minutes) and may leave work 30 minutes early until I release her from my care.     If you require additional

## (undated) NOTE — LETTER
[facility]  [address] [facility]  [address] [address] [facility] [id number] [phone]            Patient Information:  Patient Name:  Yeny Ledbetter ([id number]) Sex: female : 1960   __________________________________________

## (undated) NOTE — LETTER
AUTHORIZATION FOR SURGICAL OPERATION OR OTHER PROCEDURE    1.  I hereby [facility] Dr. Felicia Vickers, [facility] staff assigned to my case to perform the following operation and/or procedure at the [facility], [facility]:    _______________cortisone injecti Time:  ________ A. M.  P.M.        Patient Name:  ______________________________________________________  (please print)      Patient signature:  ___________________________________________________             Relationship to Patient:

## (undated) NOTE — LETTER
Date & Time: 6/27/2022, 6:44 PM  Patient: Ingris Maker  Encounter Provider(s):    Ludy Mars MD       To Whom It May Concern:    Peggie Peabody was seen and treated in our department on 6/27/2022.  She may return to work on 07/04/22    If you have any questions or concerns, please do not hesitate to call.        _____________________________  Physician/APC Signature

## (undated) NOTE — LETTER
4/7/2022          To Whom It May Concern:    Leigh Ann Rainey is currently under my medical care and may not return to work until 5/2/2022. She may return to work after that with the following restrictions: no lifting, pushing or pulling with the right arm for the following 2 weeks after return. If you require additional information please contact our office.         Sincerely,    Christiano Arevalo MD

## (undated) NOTE — LETTER
AUTHORIZATION FOR SURGICAL OPERATION OR OTHER PROCEDURE    1.  I hereby authorize AMRIA Concepcion, and [facility] staff assigned to my case to perform the following operation and/or procedure at the [facility]:    ________________Cortisone I ______________________________________________________  (please print)      Patient signature:  ___________________________________________________             Relationship to Patient:           []  Parent    Responsible person                          []

## (undated) NOTE — LETTER
4/1/2022          To Whom It May Concern:    Dayana Cole is currently under my medical care and may not return to work at this time. Please excuse Andreinalyn Aiden until 4/14/22. If you require additional information please contact our office.         Sincerely,    Dasie Buerger, MD

## (undated) NOTE — LETTER
AUTHORIZATION FOR SURGICAL OPERATION OR OTHER PROCEDURE    1.  I hereby authorize Dr. Ruthy Jarrell, and [address] [place] [address] staff assigned to my case to perform the following operation and/or procedure at the [address] [place] [address]:    _____________yag laser c Time:  ________ A. M.  P.M.        Patient Name:  ______________________________________________________  (please print)      Patient signature:  ___________________________________________________             Relationship to Patient:

## (undated) NOTE — LETTER
Patient Name: Maria Elena Simmons  YOB: 1960          MRN :  [id number]  Date:  12/27/2019  Referring Physician:  No ref. provider found    Vanda  Pt has attended 3 visits in Physical Therapy.      Dx: Myofascial pain (M79.18)  Chronic bi and management of their symptoms-met  2. Pt to demo at least 5/5 B LE strength in order to assist with car transfers. .-met  3. Pt will exhibit proper body mechanics with floor to waist lifting of 10# in order to lift laundry basket-not assessed  4.  Pt to r

## (undated) NOTE — LETTER
AUTHORIZATION FOR SURGICAL OPERATION OR OTHER PROCEDURE    1. I hereby authorize Dr. Nina Le, Cat Dewitt, and [facility], [facility] staff assigned to my case to perform the following operation and/or procedure at the [facility], [facility]:    _______________________________________________________________________________________________    Cortisone Injection to bilateral knees  _______________________________________________________________________________________________    2. My physician has explained the nature and purpose of the operation or other procedure, possible alternative methods of treatment, the risks involved, and the possibility of complication to me. I acknowledge that no guarantee has been made as to the result that may be obtained. 3.  I recognize that, during the course of this operation, or other procedure, unforseen conditions may necessitate additional or different procedure than those listed above. I, therefore, further authorize and request that the above named physician, his/her physician assistants or designees perform such procedures as are, in his/her professional opinion, necessary and desirable. 4.  Any tissue or organs removed in the operation or other procedure may be disposed of by and at the discretion of the [facility], [facility] and [facility]. 5.  I understand that in the event of a medical emergency, I will be transported by local paramedics to [facility] or other hospital emergency department. 6.  I certify that I have read and fully understand the above consent to operation and/or other procedure. 7.  I acknowledge that my physician has explained sedation/analgesia administration to me including the risks and benefits. I consent to the administration of sedation/analgesia as may be necessary or desirable in the judgement of my physician.     Witness signature: ___________________________________________________ Date: ______/______/_____                    Time:  ________ A. M.  P.M. Patient Name:  ______________________________________________________  (please print)      Patient signature:  ___________________________________________________             Relationship to Patient:           []  Parent    Responsible person                          []  Spouse  In case of minor or                    [] Other  _____________   Incompetent name:  __________________________________________________                               (please print)      _____________      Responsible person  In case of minor or  Incompetent signature:  _______________________________________________    Statement of Physician  My signature below affirms that prior to the time of the procedure, I have explained to the patient and/or his/her guardian, the risks and benefits involved in the proposed treatment and any reasonable alternative to the proposed treatment. I have also explained the risks and benefits involved in the refusal of the proposed treatment and have answered the patient's questions.                         Date:  ______/______/_______  Provider                      Signature:  __________________________________________________________       Time:  ___________ A.M    P.M.

## (undated) NOTE — LETTER
2/9/2022                To Whom it Portillo colorado in currently under my medical care and may not return to work at the time. She is scheduled to have surgery March 7th. If you have any questions please contact our office.               Sincerely,    Donn Moon MD  [facility], [facility] [place] [address] [phone]

## (undated) NOTE — MR AVS SNAPSHOT
[place]  [address]  [phone]               Thank you for choosing us for your health care visit with Holger Burns MD.  We are glad to serve you and happy to provide you with this summa Take 1 tablet (625 mg total) by mouth 2 (two) times daily. Commonly known as:  FIBERCON           gabapentin 300 MG Caps   Commonly known as:  NEURONTIN           hydrocortisone 2.5 % Crea   Place 1 Application rectally 2 (two) times daily.  Apply by Celia Dowd Call [phone] for help. [employer] is NOT to be used for urgent needs. For medical emergencies, dial 911.            Visit [facility] online at  [email or web address]

## (undated) NOTE — MR AVS SNAPSHOT
[place]  [address]  [phone]               Thank you for choosing us for your health care visit with Paxton Pizarro MD.  We are glad to serve you and happy to provide you with this hummel Erythromycin Pain    Sulfa Antibiotics     Other reaction(s): SULFA (SULFONAMIDE ANTIBIOTICS)                   Current Medications          This list is accurate as of: 3/22/17  5:31 PM.  Always use your most recent med list.                Amitriptyline * Notice: This list has 4 medication(s) that are the same as other medications prescribed for you. Read the directions carefully, and ask your doctor or other care provider to review them with you.             MyChart     Visit [email or web address]  You can access you

## (undated) NOTE — LETTER
AUTHORIZATION FOR SURGICAL OPERATION OR OTHER PROCEDURE    1. I hereby authorize Dr. Nettie Back, Sherryle Overman, and [place] [facility] staff assigned to my case to perform the following operation and/or procedure at the [facility], [facility]:    _______________________________________________________________________________________________    Durolane Injection to Right Knee  _______________________________________________________________________________________________    2. My physician has explained the nature and purpose of the operation or other procedure, possible alternative methods of treatment, the risks involved, and the possibility of complication to me. I acknowledge that no guarantee has been made as to the result that may be obtained. 3.  I recognize that, during the course of this operation, or other procedure, unforseen conditions may necessitate additional or different procedure than those listed above. I, therefore, further authorize and request that the above named physician, his/her physician assistants or designees perform such procedures as are, in his/her professional opinion, necessary and desirable. 4.  Any tissue or organs removed in the operation or other procedure may be disposed of by and at the discretion of the [facility], [facility] and [facility]. 5.  I understand that in the event of a medical emergency, I will be transported by local paramedics to [facility] or other hospital emergency department. 6.  I certify that I have read and fully understand the above consent to operation and/or other procedure. 7.  I acknowledge that my physician has explained sedation/analgesia administration to me including the risks and benefits. I consent to the administration of sedation/analgesia as may be necessary or desirable in the judgement of my physician.     Witness signature: ___________________________________________________ Date: ______/______/_____                    Time:  ________ A. M.  P.M. Patient Name:  ______________________________________________________  (please print)      Patient signature:  ___________________________________________________             Relationship to Patient:           []  Parent    Responsible person                          []  Spouse  In case of minor or                    [] Other  _____________   Incompetent name:  __________________________________________________                               (please print)      _____________      Responsible person  In case of minor or  Incompetent signature:  _______________________________________________    Statement of Physician  My signature below affirms that prior to the time of the procedure, I have explained to the patient and/or his/her guardian, the risks and benefits involved in the proposed treatment and any reasonable alternative to the proposed treatment. I have also explained the risks and benefits involved in the refusal of the proposed treatment and have answered the patient's questions.                         Date:  ______/______/_______  Provider                      Signature:  __________________________________________________________       Time:  ___________ A.M    P.M.

## (undated) NOTE — LETTER
06/14/21    Dear Dr. Aiyana Elizabeth      Thank you for referring your patient, Lei Tucker to me for an evaluation. Please see my initial consult note enclosed below. Let me know if you have any questions.     Thank you  Alexis Eller MD, Neurology  Edw time of day but notes at night they are mainly in thighs, legs and feet, and may wake her at night. She has had EMG done in the past but none recently and is scheduled for new EMG in [place] next week.      Patient also mentions her hands \"go to slee COLONOSCOPY N/A 9/6/2019    Procedure: COLONOSCOPY;  Surgeon: Nicanor Markham MD;  Location: [address] [place] [address] ENDOSCOPY   • EXCISION OF CHALAZION, SINGLE - OD - RIGHT EYE Right 6.27/2017    Nasally   • HC ARTHROCENTESIS OR INJECT MAJOR JOINT W/O US     • HYSTERECTOMY  19 • amLODIPine Besylate 10 MG Oral Tab TK 1 T PO  QD 90 tablet 3   • IBUPROFEN 600 MG Oral Tab TAKE 1 TABLET BY MOUTH EVERY 8 HOURS WITH FOOD AS NEEDED FOR PAIN. STOP IF STOMACH UPSET 15 tablet 0   • MAGNESIUM OR Take by mouth.             ROS:   A comprehen is midline with normal lateral movements    Motor System:  Strength: 5/5 throughout; no fasciculations noted; no atrophy; no fatigable weakness after 20 arm flaps.    Tone: normal    Sensory:  Pin is reduced in LE up to calf bilaterally and reduced up to wr GAP      0 - 18 mmol/L  8   BUN      7 - 18 mg/dL  16   CREATININE      0.55 - 1.02 mg/dL  0.94   BUN/CREAT Ratio      10.0 - 20.0  17.0   CALCIUM      8.5 - 10.1 mg/dL  9.7   CALCULATED OSMOLALITY      275 - 295 mOsm/kg  298 (H)   eGFR NON-AFR.  AMERICAN activity was found in R. CERV   PSP (L). ? The MUP waveform abnormality was found in R. DELTOID. ? Abnormal interference pattern was found in R. BICEPS.      Conclusion:     1. This is an abnormal study.    2. There is no electrodiagnostic evidence of with a neuropathy, and patient is scheduled to have repeat EMG testing in the near future. She was advised to have this done.   Otherwise, another possibility for cramping in pain would be stiff person syndrome, and I will send off GAD65 antibody test as w

## (undated) NOTE — MR AVS SNAPSHOT
[place]  [address]  [phone]               Thank you for choosing us for your health care visit with Sherri Bustamante MD.  We are glad to serve you and happy to provide you with this summa enough to excise at this time. Discussed with patient that since is still in the early stages, it may be able to resolve on its own without excision. Will have patient call the office if chalazion worsens or does not resolve.     Patient is instructed to u · Massage the area gently after applying the warm compress to help drain the chalazion. Or follow the directions given by your healthcare provider. · Do not try to pop or squeeze the chalazion.   · Do not wear eye makeup until the chalazion has healed, or Other reaction(s): CELECOXIB    Erythromycin Pain    Sulfa Antibiotics     Other reaction(s): SULFA (SULFONAMIDE ANTIBIOTICS)                   Current Medications          This list is accurate as of: 4/8/17 11:05 AM.  Always use your most recent med lis * Notice: This list has 4 medication(s) that are the same as other medications prescribed for you. Read the directions carefully, and ask your doctor or other care provider to review them with you.             MyChart     Visit [employer]  You can access you

## (undated) NOTE — ED AVS SNAPSHOT
[facility] Emergency Department    [address]     [address] [place] [address]    Phone:  [phone]    Fax:  [phone]           Betty Nelson   MRN: [id number]    Department:  [facility] Emergency Department   Date of Visit:  4/23/2 and Class Registration line at [phone] or find a doctor online by visiting [email or web address].    IF THERE IS ANY CHANGE OR WORSENING OF YOUR CONDITION, CALL YOUR PRIMARY CARE PHYSICIAN AT ONCE OR RETURN IMMEDIATELY TO [address] [place] [address].     If

## (undated) NOTE — LETTER
AUTHORIZATION FOR SURGICAL OPERATION OR OTHER PROCEDURE    1.  I hereby authorize AMIRA Ansari, and [facility] staff assigned to my case to perform the following operation and/or procedure at the [facility]:    Cortisone injection to savannah Time:  ________ A. M.  P.M.        Patient Name:  ______________________________________________________  (please print)      Patient signature:  ___________________________________________________             Relationship to Patient:

## (undated) NOTE — LETTER
February 10, 2018    Jhon Mehta, [address] [facility]     Patient: Amber Chavarria   YOB: 1960   Date of Visit: 2/10/2018       Dear Dr. Lavonne Steward MD:    Thank you for referring Pb Josue to me for evaluation.  Here is my asse colonoscopy (N/A, 11/11/2016) (Procedure: COLONOSCOPY;  Surgeon: José Luis Malone MD;  Location: [facility] ENDOSCOPY); and Excision of Chalazion, Single - OD - Right Eye (Right, 6.27/2017) (Nasally).     Family History   Problem Relation Age of Onset Sulfa Antibiotics       Tongue Swelling    Comment:Other reaction(s): SULFA (SULFONAMIDE ANTIBIOTICS)    ROS:     ROS     Positive for: Eyes    Negative for: Constitutional, Gastrointestinal, Neurological, Skin, Genitourinary, Musculoskeletal, HENT, Endocr [facility] [place] [facility] [place]    Right -1.25 +1.00 135 20/40 +2.75 20/40    Left -1.00 +0.75 030 20/60 +2.75 20/40          Final Rx       [facility] [place] [facility] [place]    Right -1.25 +1.00 135 20/40 +2.75 20/40    Left -1.00 +0.75 If you have questions, please do not hesitate to call me. I look forward to following Manolo Dentchrist along with you.     Sincerely,        Maritza Green MD        CC: No Recipients    Document electronically generated by: Maritza Green

## (undated) NOTE — LETTER
4/8/2022          To Whom It May Concern:    Leigh Ann Rainey is currently under my medical care and may not return to work until 5/2/2022. She may return to work after that with the following restrictions: no lifting, pushing, pulling and reaching with the right arm for the following 2 weeks after return. She must attend her physical therapy appointments and they may conflict with working hours which may require her to leave early on some days. If you require additional information please contact our office.       Sincerely,    Christiano Arevalo MD

## (undated) NOTE — LETTER
2/9/2022              To Whom it may concern,    Mary Lou Goldman is currently under my medical care and may not return to work until further notice. If you have any questions please contact our office.                     Sincerely,    Santiago Gonzalez MD  [address] [place] [address] [place]  [place]jite[place] Aguilar [id number]  [phone]        Document electronically generated by:  Franklin Ortega

## (undated) NOTE — MR AVS SNAPSHOT
Jacquelyn  [address]  [phone]               Thank you for choosing us for your health care visit with Tracie Rodriguez MD.  We are glad to serve you and happy to provide you with this summary Take at bedtime   Commonly known as:  NORVASC           Calcium Polycarbophil 625 MG Tabs   Take 1 tablet (625 mg total) by mouth 2 (two) times daily.    Commonly known as:  FIBERCON           gabapentin 300 MG Caps   Commonly known as:  NEURONTIN For medical emergencies, dial 911.            Visit [facility] online at  [email or web address]

## (undated) NOTE — LETTER
AUTHORIZATION FOR SURGICAL OPERATION OR OTHER PROCEDURE    1.  I hereby authorize Dr. Peng Fierro , and [address] [place] [address] staff assigned to my case to perform the following operation and/or procedure at the [address] [place] [address]:    __________________________ Time:  ________ A. M.  P.M.        Patient Name:  ______________________________________________________  (please print)      Patient signature:  ___________________________________________________             Relationship to Patient:           []  Parent

## (undated) NOTE — LETTER
[address] [facility].   [address] [facility] [address]  Dept: [phone]  Dept Fax: [phone]: [phone]    Date: 04/23/2017       I have seen Ana Herrera 7/2/1960 and found her medically cleared for incarceration

## (undated) NOTE — MR AVS SNAPSHOT
Jacquelyn  [address]  [phone]               Thank you for choosing us for your health care visit with Mami Ortiz MD.  We are glad to serve you and happy to provide you with this summa Current Medications          This list is accurate as of: 1/14/17  8:23 AM.  Always use your most recent med list.                Amitriptyline HCl 25 MG Tabs   TAKE 1 TABLET(25 MG) BY MOUTH NIGHTLY   Commonly known as:  ELAVIL           AmLODIPine Besylat Apply topically 2 (two) times daily. Commonly known as:  KENALOG           * Notice: This list has 6 medication(s) that are the same as other medications prescribed for you.  Read the directions carefully, and ask your doctor or other care provider to re

## (undated) NOTE — LETTER
AUTHORIZATION FOR SURGICAL OPERATION OR OTHER PROCEDURE    1. I hereby authorize Dr. Delgadillo/ Jessica Ladd PA-C, and [facility] staff assigned to my case to perform the following operation and/or procedure at the [facility] site:    _______________________________________________________________________________________________    Durolane injection to Right knee  _______________________________________________________________________________________________    2.  My physician has explained the nature and purpose of the operation or other procedure, possible alternative methods of treatment, the risks involved, and the possibility of complication to me.  I acknowledge that no guarantee has been made as to the result that may be obtained.  3.  I recognize that, during the course of this operation, or other procedure, unforseen conditions may necessitate additional or different procedure than those listed above.  I, therefore, further authorize and request that the above named physician, his/her physician assistants or designees perform such procedures as are, in his/her professional opinion, necessary and desirable.  4.  Any tissue or organs removed in the operation or other procedure may be disposed of by and at the discretion of the [facility] and [facility].  5.  I understand that in the event of a medical emergency, I will be transported by local paramedics to [facility] or other hospital emergency department.  6.  I certify that I have read and fully understand the above consent to operation and/or other procedure.    7.  I acknowledge that my physician has explained sedation/analgesia administration to me including the risks and benefits.  I consent to the administration of sedation/analgesia as may be necessary or desirable in the judgement of my physician.    Witness signature: ___________________________________________________ Date:   ______/______/_____                    Time:  ________ A.M.  P.M.       Patient Name:  ______________________________________________________  (please print)      Patient signature:  ___________________________________________________             Relationship to Patient:           []  Parent    Responsible person                          []  Spouse  In case of minor or                    [] Other  _____________   Incompetent name:  __________________________________________________                               (please print)      _____________      Responsible person  In case of minor or  Incompetent signature:  _______________________________________________    Statement of Physician  My signature below affirms that prior to the time of the procedure, I have explained to the patient and/or his/her guardian, the risks and benefits involved in the proposed treatment and any reasonable alternative to the proposed treatment.  I have also explained the risks and benefits involved in the refusal of the proposed treatment and have answered the patient's questions.                        Date:  ______/______/_______  Provider                      Signature:  __________________________________________________________       Time:  ___________ A.M    P.M.

## (undated) NOTE — LETTER
[facility]  [address], [place], [place]    Authorization for Surgical Operation and Procedure                               I hereby authorize * Right knee aspiration *, Dr. Bishop my physician and his/her assistants (if applicable), which may include medical students, residents, and/or fellows, to perform the following surgical operation/ procedure and administer such anesthesia as may be determined necessary by my physician: Operation/Procedure name (s)  on Kerry Hsu   2.   I recognize that during the surgical operation/procedure, unforeseen conditions may necessitate additional or different procedures than those listed above.  I, therefore, further authorize and request that the above-named surgeon, assistants, or designees perform such procedures as are, in their judgment, necessary and desirable.    3.   My surgeon/physician has discussed prior to my surgery the potential benefits, risks and side effects of this procedure; the likelihood of achieving goals; and potential problems that might occur during recuperation.  They also discussed reasonable alternatives to the procedure, including risks, benefits, and side effects related to the alternatives and risks related to not receiving this procedure.  I have had all my questions answered and I acknowledge that no guarantee has been made as to the result that may be obtained.    4.   Should the need arise during my operation/procedure, which includes change of level of care prior to discharge, I also consent to the administration of blood and/or blood products.  Further, I understand that despite careful testing and screening of blood or blood products by collecting agencies, I may still be subject to ill effects as a result of receiving a blood transfusion and/or blood products.  The following are some, but not all, of the potential risks that can occur: fever and allergic reactions, hemolytic reactions, transmission of  diseases such as Hepatitis, AIDS and Cytomegalovirus (CMV) and fluid overload.  In the event that I wish to have an autologous transfusion of my own blood, or a directed donor transfusion, I will discuss this with my physician.  Check only if Refusing Blood or Blood Products  I understand refusal of blood or blood products as deemed necessary by my physician may have serious consequences to my condition to include possible death. I hereby assume responsibility for my refusal and release the hospital, its personnel, and my physicians from any responsibility for the consequences of my refusal.    o  Refuse   5.   I authorize the use of any specimen, organs, tissues, body parts or foreign objects that may be removed from my body during the operation/procedure for diagnosis, research or teaching purposes and their subsequent disposal by hospital authorities.  I also authorize the release of specimen test results and/or written reports to my treating physician on the hospital medical staff or other referring or consulting physicians involved in my care, at the discretion of the Pathologist or my treating physician.    6.   I consent to the photographing or videotaping of the operations or procedures to be performed, including appropriate portions of my body for medical, scientific, or educational purposes, provided my identity is not revealed by the pictures or by descriptive texts accompanying them.  If the procedure has been photographed/videotaped, the surgeon will obtain the original picture, image, videotape or CD.  The hospital will not be responsible for storage, release or maintenance of the picture, image, tape or CD.    7.   I consent to the presence of a  or observers in the operating room as deemed necessary by my physician or their designees.    8.   I recognize that in the event my procedure results in extended X-Ray/fluoroscopy time, I may develop a skin reaction.    9. If I have a Do Not  Attempt Resuscitation (DNAR) order in place, that status will be suspended while in the operating room, procedural suite, and during the recovery period unless otherwise explicitly stated by me (or a person authorized to consent on my behalf). The surgeon or my attending physician will determine when the applicable recovery period ends for purposes of reinstating the DNAR order.  10. Patients having a sterilization procedure: I understand that if the procedure is successful the results will be permanent and it will therefore be impossible for me to inseminate, conceive, or bear children.  I also understand that the procedure is intended to result in sterility, although the result has not been guaranteed.   11. I acknowledge that my physician has explained sedation/analgesia administration to me including the risk and benefits I consent to the administration of sedation/analgesia as may be necessary or desirable in the judgment of my physician.    I CERTIFY THAT I HAVE READ AND FULLY UNDERSTAND THE ABOVE CONSENT TO OPERATION and/or OTHER PROCEDURE.     ____________________________________  _________________________________        ______________________________  Signature of Patient    Signature of Responsible Person                Printed Name of Responsible Person                                      ____________________________________  _____________________________                ________________________________  Signature of Witness        Date  Time         Relationship to Patient    STATEMENT OF PHYSICIAN My signature below affirms that prior to the time of the procedure; I have explained to the patient and/or his/her legal representative, the risks and benefits involved in the proposed treatment and any reasonable alternative to the proposed treatment. I have also explained the risks and benefits involved in refusal of the proposed treatment and alternatives to the proposed treatment and have answered the  patient's questions. If I have a significant financial interest in a co-management agreement or a significant financial interest in any product or implant, or other significant relationship used in this procedure/surgery, I have disclosed this and had a discussion with my patient.     _____________________________________________________              _____________________________  (Signature of Physician)                                                                                         (Date)                                   (Time)  Patient Name: Kerry Hsu      : 1960      Printed: 3/31/2025     Medical Record #: [id number]                                      Page 1 of 1

## (undated) NOTE — Clinical Note
Dear Dr. Sara Pacheco,    Thank you for sending Emely Agustin to see me for electrodiagnostic consultation. I appreciate your confidence in me to care for your patients. Please feel free call me with any questions at [phone] or contact me through [facility]     Sincerely,  Anna Godoy MD  Board Certified, Physical Medicine and Rehabilitation  Board certified, [address] [place] [address]

## (undated) NOTE — ED AVS SNAPSHOT
[facility] Emergency Department    [address] [address]    Phone:  [phone]    Fax:  [phone]           Kristin Henriquez   MRN: [id number]    Department:  [facility] Emergency Department   Date of Visit:  4/23/2 Commonly known as:  CIPRO   Take 1 tablet (500 mg total) by mouth 2 (two) times daily. TraMADol HCl 50 MG Tabs   Quantity:  15 tablet   Commonly known as:  ULTRAM   Take 1-2 tablets ( mg total) by mouth every 4 (four) hours as needed for Pain. to a primary care or a specialist physician for a follow-up visit, please tell this physician (or your personal doctor if your instructions are to return to your personal doctor) about any new or lasting problems.  The primary care or specialist physician m [place]  [address] [employer] ([address]) [address] [facility] [employer]. ([address]) [address] ([facility]) [phone]   Talia Valenzuela [address] [employer]  [facility]

## (undated) NOTE — LETTER
AUTHORIZATION FOR SURGICAL OPERATION OR OTHER PROCEDURE    1.  I hereby authorize AMIRA Sheehan, and [facility] staff assigned to my case to perform the following operation and/or procedure at the [facility]:    _________________________Co Patient Name:  ______________________________________________________  (please print)      Patient signature:  ___________________________________________________             Relationship to Patient:           []  Parent    Responsible person

## (undated) NOTE — LETTER
4/2/2019          To Whom It May Concern:    Fredis Bae is currently under my medical care and will wear cam walker boot for 1 more week until next office visit to reevaluate. If you require additional information please contact our office.         Sin

## (undated) NOTE — LETTER
5/7/2019          To Whom It May Concern:    Luz Elena Hanson is currently under my medical care, Celeste Keeley was seen today and requires the same postoperative restrictions, may bear weight 50% of the day.     If you require additional information please contact ou

## (undated) NOTE — LETTER
AUTHORIZATION FOR SURGICAL OPERATION OR OTHER PROCEDURE    1. I hereby authorize Dr. Rafa Purcell  and [facility] staff assigned to my case to perform the following operation and/or procedure at the [facility]:    Cortisone injection in Left shoulder   _______________________________________________________________________________________________      _______________________________________________________________________________________________    2. My physician has explained the nature and purpose of the operation or other procedure, possible alternative methods of treatment, the risks involved, and the possibility of complication to me. I acknowledge that no guarantee has been made as to the result that may be obtained. 3.  I recognize that, during the course of this operation, or other procedure, unforseen conditions may necessitate additional or different procedure than those listed above. I, therefore, further authorize and request that the above named physician, his/her physician assistants or designees perform such procedures as are, in his/her professional opinion, necessary and desirable. 4.  Any tissue or organs removed in the operation or other procedure may be disposed of by and at the discretion of the [facility] and [facility]. 5.  I understand that in the event of a medical emergency, I will be transported by local paramedics to [facility] or other hospital emergency department. 6.  I certify that I have read and fully understand the above consent to operation and/or other procedure. 7.  I acknowledge that my physician has explained sedation/analgesia administration to me including the risks and benefits. I consent to the administration of sedation/analgesia as may be necessary or desirable in the judgement of my physician.     Witness signature: ___________________________________________________ Date:  ______/______/_____ Time:  ________ A. M.  P.M. Patient Name:  ______________________________________________________  (please print)      Patient signature:  ___________________________________________________             Relationship to Patient:           []  Parent    Responsible person                          []  Spouse  In case of minor or                    [] Other  _____________   Incompetent name:  __________________________________________________                               (please print)      _____________      Responsible person  In case of minor or  Incompetent signature:  _______________________________________________    Statement of Physician  My signature below affirms that prior to the time of the procedure, I have explained to the patient and/or his/her guardian, the risks and benefits involved in the proposed treatment and any reasonable alternative to the proposed treatment. I have also explained the risks and benefits involved in the refusal of the proposed treatment and have answered the patient's questions.                         Date:  ______/______/_______  Provider                      Signature:  __________________________________________________________       Time:  ___________ A.M    P.M.

## (undated) NOTE — MR AVS SNAPSHOT
[place]  [address]  [phone]               Thank you for choosing us for your health care visit with Jenna San MD.  We are glad to serve you and happy to provide you with this summa Primary open angle glaucoma of both eyes  IOP is stable. Continue Latanoprost at bedtime in both eyes. Will have patient back in 4 months for a pressure check.                 Allergies as of Jefferson 10, 2017     Azithromycin     Other reaction(s): AZITHROM Where to Get Your Medications      These medications were sent to [address], [address] AT [address] [facility] [address], [phone], [phone]  [address] [phone]     Phone:  [phone]

## (undated) NOTE — LETTER
4/2/2019          To Whom It May Concern:    Miguel Escobar is currently under my medical care and will wear walker cam boot for 1 more week until next office visit. Patient can bear weight 50% of the day.     If you require additional information please co